# Patient Record
Sex: FEMALE | Race: WHITE | Employment: FULL TIME | URBAN - METROPOLITAN AREA
[De-identification: names, ages, dates, MRNs, and addresses within clinical notes are randomized per-mention and may not be internally consistent; named-entity substitution may affect disease eponyms.]

---

## 2021-04-12 ENCOUNTER — APPOINTMENT (OUTPATIENT)
Dept: CT IMAGING | Age: 51
End: 2021-04-12
Payer: COMMERCIAL

## 2021-04-12 ENCOUNTER — APPOINTMENT (OUTPATIENT)
Dept: GENERAL RADIOLOGY | Age: 51
End: 2021-04-12
Payer: COMMERCIAL

## 2021-04-12 ENCOUNTER — HOSPITAL ENCOUNTER (EMERGENCY)
Age: 51
Discharge: ANOTHER HOSPITAL | End: 2021-04-13
Attending: STUDENT IN AN ORGANIZED HEALTH CARE EDUCATION/TRAINING PROGRAM
Payer: COMMERCIAL

## 2021-04-12 DIAGNOSIS — I63.9 ACUTE CVA (CEREBROVASCULAR ACCIDENT) (HCC): Primary | ICD-10-CM

## 2021-04-12 LAB
GLUCOSE BLD-MCNC: 113 MG/DL (ref 70–99)
PERFORMED ON: ABNORMAL
REASON FOR REJECTION: NORMAL
REJECTED TEST: NORMAL

## 2021-04-12 PROCEDURE — 99285 EMERGENCY DEPT VISIT HI MDM: CPT

## 2021-04-12 PROCEDURE — 85025 COMPLETE CBC W/AUTO DIFF WBC: CPT

## 2021-04-12 PROCEDURE — 6360000004 HC RX CONTRAST MEDICATION: Performed by: STUDENT IN AN ORGANIZED HEALTH CARE EDUCATION/TRAINING PROGRAM

## 2021-04-12 PROCEDURE — 70450 CT HEAD/BRAIN W/O DYE: CPT

## 2021-04-12 PROCEDURE — 70496 CT ANGIOGRAPHY HEAD: CPT

## 2021-04-12 PROCEDURE — 71045 X-RAY EXAM CHEST 1 VIEW: CPT

## 2021-04-12 PROCEDURE — 36415 COLL VENOUS BLD VENIPUNCTURE: CPT

## 2021-04-12 PROCEDURE — 84484 ASSAY OF TROPONIN QUANT: CPT

## 2021-04-12 RX ADMIN — IOPAMIDOL 75 ML: 755 INJECTION, SOLUTION INTRAVENOUS at 23:19

## 2021-04-13 ENCOUNTER — ANESTHESIA (OUTPATIENT)
Dept: OPERATING ROOM | Age: 51
DRG: 023 | End: 2021-04-13
Payer: COMMERCIAL

## 2021-04-13 ENCOUNTER — HOSPITAL ENCOUNTER (INPATIENT)
Age: 51
LOS: 10 days | Discharge: NURSING, REHAB OR LONG-TERM CARE | DRG: 023 | End: 2021-04-23
Attending: INTERNAL MEDICINE | Admitting: INTERNAL MEDICINE
Payer: COMMERCIAL

## 2021-04-13 ENCOUNTER — APPOINTMENT (OUTPATIENT)
Dept: CT IMAGING | Age: 51
DRG: 023 | End: 2021-04-13
Attending: INTERNAL MEDICINE
Payer: COMMERCIAL

## 2021-04-13 ENCOUNTER — ANESTHESIA EVENT (OUTPATIENT)
Dept: OPERATING ROOM | Age: 51
DRG: 023 | End: 2021-04-13
Payer: COMMERCIAL

## 2021-04-13 VITALS
RESPIRATION RATE: 22 BRPM | TEMPERATURE: 98 F | OXYGEN SATURATION: 98 % | HEART RATE: 98 BPM | WEIGHT: 249.12 LBS | BODY MASS INDEX: 40.83 KG/M2 | SYSTOLIC BLOOD PRESSURE: 179 MMHG | DIASTOLIC BLOOD PRESSURE: 81 MMHG

## 2021-04-13 VITALS — SYSTOLIC BLOOD PRESSURE: 190 MMHG | DIASTOLIC BLOOD PRESSURE: 98 MMHG | OXYGEN SATURATION: 96 % | TEMPERATURE: 98.6 F

## 2021-04-13 DIAGNOSIS — Z98.890 STATUS POST CRANIECTOMY: ICD-10-CM

## 2021-04-13 DIAGNOSIS — I63.511 ACUTE RIGHT MCA STROKE (HCC): Primary | ICD-10-CM

## 2021-04-13 PROBLEM — I63.9 STROKE DETERMINED BY CLINICAL ASSESSMENT (HCC): Status: ACTIVE | Noted: 2021-04-13

## 2021-04-13 LAB
A/G RATIO: 1.1 (ref 1.1–2.2)
ABO/RH: NORMAL
ALBUMIN SERPL-MCNC: 4 G/DL (ref 3.4–5)
ALP BLD-CCNC: 86 U/L (ref 40–129)
ALT SERPL-CCNC: 32 U/L (ref 10–40)
ANION GAP SERPL CALCULATED.3IONS-SCNC: 15 MMOL/L (ref 3–16)
ANTIBODY SCREEN: NORMAL
AST SERPL-CCNC: 41 U/L (ref 15–37)
BANDED NEUTROPHILS RELATIVE PERCENT: 1 % (ref 0–7)
BASOPHILS ABSOLUTE: 0 K/UL (ref 0–0.2)
BASOPHILS RELATIVE PERCENT: 0 %
BILIRUB SERPL-MCNC: 0.7 MG/DL (ref 0–1)
BILIRUBIN URINE: NEGATIVE
BLOOD, URINE: ABNORMAL
BUN BLDV-MCNC: 10 MG/DL (ref 7–20)
BURR CELLS: ABNORMAL
CALCIUM SERPL-MCNC: 9.1 MG/DL (ref 8.3–10.6)
CHLORIDE BLD-SCNC: 102 MMOL/L (ref 99–110)
CLARITY: CLEAR
CO2: 19 MMOL/L (ref 21–32)
COLOR: YELLOW
CREAT SERPL-MCNC: 0.6 MG/DL (ref 0.6–1.1)
EKG ATRIAL RATE: 82 BPM
EKG DIAGNOSIS: NORMAL
EKG P AXIS: 67 DEGREES
EKG P-R INTERVAL: 134 MS
EKG Q-T INTERVAL: 390 MS
EKG QRS DURATION: 86 MS
EKG QTC CALCULATION (BAZETT): 455 MS
EKG R AXIS: 60 DEGREES
EKG T AXIS: 49 DEGREES
EKG VENTRICULAR RATE: 82 BPM
EOSINOPHILS ABSOLUTE: 0 K/UL (ref 0–0.6)
EOSINOPHILS RELATIVE PERCENT: 0 %
EPITHELIAL CELLS, UA: NORMAL /HPF (ref 0–5)
ETHANOL: NORMAL MG/DL (ref 0–0.08)
GFR AFRICAN AMERICAN: >60
GFR NON-AFRICAN AMERICAN: >60
GLOBULIN: 3.6 G/DL
GLUCOSE BLD-MCNC: 104 MG/DL (ref 70–99)
GLUCOSE URINE: NEGATIVE MG/DL
HCG QUALITATIVE: NEGATIVE
HCT VFR BLD CALC: 46.6 % (ref 36–48)
HEMOGLOBIN: 16 G/DL (ref 12–16)
HOWELL-JOLLY BODIES: ABNORMAL
INR BLD: 1.06 (ref 0.86–1.14)
KETONES, URINE: ABNORMAL MG/DL
LEUKOCYTE ESTERASE, URINE: NEGATIVE
LV EF: 55 %
LVEF MODALITY: NORMAL
LYMPHOCYTES ABSOLUTE: 2.6 K/UL (ref 1–5.1)
LYMPHOCYTES RELATIVE PERCENT: 12 %
MACROCYTES: ABNORMAL
MCH RBC QN AUTO: 35.7 PG (ref 26–34)
MCHC RBC AUTO-ENTMCNC: 34.4 G/DL (ref 31–36)
MCV RBC AUTO: 103.6 FL (ref 80–100)
MICROSCOPIC EXAMINATION: YES
MONOCYTES ABSOLUTE: 0.9 K/UL (ref 0–1.3)
MONOCYTES RELATIVE PERCENT: 4 %
NEUTROPHILS ABSOLUTE: 17.9 K/UL (ref 1.7–7.7)
NEUTROPHILS RELATIVE PERCENT: 83 %
NITRITE, URINE: NEGATIVE
PDW BLD-RTO: 14.2 % (ref 12.4–15.4)
PH UA: 5.5 (ref 5–8)
PLATELET # BLD: 282 K/UL (ref 135–450)
PMV BLD AUTO: 8.8 FL (ref 5–10.5)
POTASSIUM SERPL-SCNC: 3.8 MMOL/L (ref 3.5–5.1)
PROTEIN UA: NEGATIVE MG/DL
PROTHROMBIN TIME: 12.3 SEC (ref 10–13.2)
RBC # BLD: 4.5 M/UL (ref 4–5.2)
RBC UA: NORMAL /HPF (ref 0–4)
SODIUM BLD-SCNC: 136 MMOL/L (ref 136–145)
SPECIFIC GRAVITY UA: 1.02 (ref 1–1.03)
TOTAL CK: 466 U/L (ref 26–192)
TOTAL PROTEIN: 7.6 G/DL (ref 6.4–8.2)
TROPONIN: <0.01 NG/ML
URINE REFLEX TO CULTURE: ABNORMAL
URINE TYPE: ABNORMAL
UROBILINOGEN, URINE: 0.2 E.U./DL
WBC # BLD: 21.3 K/UL (ref 4–11)
WBC UA: NORMAL /HPF (ref 0–5)

## 2021-04-13 PROCEDURE — 83036 HEMOGLOBIN GLYCOSYLATED A1C: CPT

## 2021-04-13 PROCEDURE — 2580000003 HC RX 258: Performed by: NURSE PRACTITIONER

## 2021-04-13 PROCEDURE — 93005 ELECTROCARDIOGRAM TRACING: CPT | Performed by: STUDENT IN AN ORGANIZED HEALTH CARE EDUCATION/TRAINING PROGRAM

## 2021-04-13 PROCEDURE — 87075 CULTR BACTERIA EXCEPT BLOOD: CPT

## 2021-04-13 PROCEDURE — 86850 RBC ANTIBODY SCREEN: CPT

## 2021-04-13 PROCEDURE — C8929 TTE W OR WO FOL WCON,DOPPLER: HCPCS

## 2021-04-13 PROCEDURE — 93010 ELECTROCARDIOGRAM REPORT: CPT | Performed by: INTERNAL MEDICINE

## 2021-04-13 PROCEDURE — 87102 FUNGUS ISOLATION CULTURE: CPT

## 2021-04-13 PROCEDURE — 82077 ASSAY SPEC XCP UR&BREATH IA: CPT

## 2021-04-13 PROCEDURE — 6360000002 HC RX W HCPCS: Performed by: NEUROLOGICAL SURGERY

## 2021-04-13 PROCEDURE — 6370000000 HC RX 637 (ALT 250 FOR IP): Performed by: STUDENT IN AN ORGANIZED HEALTH CARE EDUCATION/TRAINING PROGRAM

## 2021-04-13 PROCEDURE — 82746 ASSAY OF FOLIC ACID SERUM: CPT

## 2021-04-13 PROCEDURE — 94761 N-INVAS EAR/PLS OXIMETRY MLT: CPT

## 2021-04-13 PROCEDURE — 82607 VITAMIN B-12: CPT

## 2021-04-13 PROCEDURE — 3700000001 HC ADD 15 MINUTES (ANESTHESIA): Performed by: NEUROLOGICAL SURGERY

## 2021-04-13 PROCEDURE — 2500000003 HC RX 250 WO HCPCS: Performed by: STUDENT IN AN ORGANIZED HEALTH CARE EDUCATION/TRAINING PROGRAM

## 2021-04-13 PROCEDURE — 2000000000 HC ICU R&B

## 2021-04-13 PROCEDURE — 86901 BLOOD TYPING SEROLOGIC RH(D): CPT

## 2021-04-13 PROCEDURE — 2700000000 HC OXYGEN THERAPY PER DAY

## 2021-04-13 PROCEDURE — 85610 PROTHROMBIN TIME: CPT

## 2021-04-13 PROCEDURE — 2580000003 HC RX 258: Performed by: NEUROLOGICAL SURGERY

## 2021-04-13 PROCEDURE — C1729 CATH, DRAINAGE: HCPCS | Performed by: NEUROLOGICAL SURGERY

## 2021-04-13 PROCEDURE — 81001 URINALYSIS AUTO W/SCOPE: CPT

## 2021-04-13 PROCEDURE — 87076 CULTURE ANAEROBE IDENT EACH: CPT

## 2021-04-13 PROCEDURE — 87070 CULTURE OTHR SPECIMN AEROBIC: CPT

## 2021-04-13 PROCEDURE — 36415 COLL VENOUS BLD VENIPUNCTURE: CPT

## 2021-04-13 PROCEDURE — 6360000002 HC RX W HCPCS: Performed by: NURSE ANESTHETIST, CERTIFIED REGISTERED

## 2021-04-13 PROCEDURE — 2580000003 HC RX 258: Performed by: NURSE ANESTHETIST, CERTIFIED REGISTERED

## 2021-04-13 PROCEDURE — C1763 CONN TISS, NON-HUMAN: HCPCS | Performed by: NEUROLOGICAL SURGERY

## 2021-04-13 PROCEDURE — 87116 MYCOBACTERIA CULTURE: CPT

## 2021-04-13 PROCEDURE — 2720000010 HC SURG SUPPLY STERILE: Performed by: NEUROLOGICAL SURGERY

## 2021-04-13 PROCEDURE — 87205 SMEAR GRAM STAIN: CPT

## 2021-04-13 PROCEDURE — 3700000000 HC ANESTHESIA ATTENDED CARE: Performed by: NEUROLOGICAL SURGERY

## 2021-04-13 PROCEDURE — 3600000004 HC SURGERY LEVEL 4 BASE: Performed by: NEUROLOGICAL SURGERY

## 2021-04-13 PROCEDURE — 82550 ASSAY OF CK (CPK): CPT

## 2021-04-13 PROCEDURE — 87015 SPECIMEN INFECT AGNT CONCNTJ: CPT

## 2021-04-13 PROCEDURE — 87206 SMEAR FLUORESCENT/ACID STAI: CPT

## 2021-04-13 PROCEDURE — 70450 CT HEAD/BRAIN W/O DYE: CPT

## 2021-04-13 PROCEDURE — 2500000003 HC RX 250 WO HCPCS: Performed by: NURSE ANESTHETIST, CERTIFIED REGISTERED

## 2021-04-13 PROCEDURE — 80061 LIPID PANEL: CPT

## 2021-04-13 PROCEDURE — 2709999900 HC NON-CHARGEABLE SUPPLY: Performed by: NEUROLOGICAL SURGERY

## 2021-04-13 PROCEDURE — 6370000000 HC RX 637 (ALT 250 FOR IP): Performed by: NEUROLOGICAL SURGERY

## 2021-04-13 PROCEDURE — 2780000010 HC IMPLANT OTHER: Performed by: NEUROLOGICAL SURGERY

## 2021-04-13 PROCEDURE — 2580000003 HC RX 258: Performed by: STUDENT IN AN ORGANIZED HEALTH CARE EDUCATION/TRAINING PROGRAM

## 2021-04-13 PROCEDURE — 92610 EVALUATE SWALLOWING FUNCTION: CPT

## 2021-04-13 PROCEDURE — 3600000014 HC SURGERY LEVEL 4 ADDTL 15MIN: Performed by: NEUROLOGICAL SURGERY

## 2021-04-13 PROCEDURE — 84703 CHORIONIC GONADOTROPIN ASSAY: CPT

## 2021-04-13 PROCEDURE — 92526 ORAL FUNCTION THERAPY: CPT

## 2021-04-13 PROCEDURE — 80053 COMPREHEN METABOLIC PANEL: CPT

## 2021-04-13 PROCEDURE — 2500000003 HC RX 250 WO HCPCS: Performed by: NEUROLOGICAL SURGERY

## 2021-04-13 PROCEDURE — 86900 BLOOD TYPING SEROLOGIC ABO: CPT

## 2021-04-13 PROCEDURE — 6360000002 HC RX W HCPCS: Performed by: NURSE PRACTITIONER

## 2021-04-13 PROCEDURE — 00N00ZZ RELEASE BRAIN, OPEN APPROACH: ICD-10-PCS | Performed by: NEUROLOGICAL SURGERY

## 2021-04-13 DEVICE — DURA 62110 SUBSTITUTE DUREPAIR 4X5IN NCE
Type: IMPLANTABLE DEVICE | Site: CRANIAL | Status: FUNCTIONAL
Brand: DUREPAIR®

## 2021-04-13 DEVICE — PATCH DURA W10XL16CM BOV PERICARD DURA-GUARD: Type: IMPLANTABLE DEVICE | Site: CRANIAL | Status: FUNCTIONAL

## 2021-04-13 RX ORDER — ATORVASTATIN CALCIUM 40 MG/1
40 TABLET, FILM COATED ORAL NIGHTLY
Status: DISCONTINUED | OUTPATIENT
Start: 2021-04-13 | End: 2021-04-21

## 2021-04-13 RX ORDER — SODIUM CHLORIDE 9 MG/ML
INJECTION, SOLUTION INTRAVENOUS CONTINUOUS PRN
Status: DISCONTINUED | OUTPATIENT
Start: 2021-04-13 | End: 2021-04-13 | Stop reason: SDUPTHER

## 2021-04-13 RX ORDER — MULTIVITAMIN WITH IRON
1 TABLET ORAL DAILY
Status: DISCONTINUED | OUTPATIENT
Start: 2021-04-13 | End: 2021-04-23 | Stop reason: HOSPADM

## 2021-04-13 RX ORDER — ACETAMINOPHEN AND CODEINE PHOSPHATE 120; 12 MG/5ML; MG/5ML
1 SOLUTION ORAL DAILY
Status: ON HOLD | COMMUNITY
End: 2021-04-22 | Stop reason: HOSPADM

## 2021-04-13 RX ORDER — LIDOCAINE HYDROCHLORIDE 20 MG/ML
INJECTION, SOLUTION INTRAVENOUS PRN
Status: DISCONTINUED | OUTPATIENT
Start: 2021-04-13 | End: 2021-04-13 | Stop reason: SDUPTHER

## 2021-04-13 RX ORDER — SUCCINYLCHOLINE CHLORIDE 20 MG/ML
INJECTION INTRAMUSCULAR; INTRAVENOUS PRN
Status: DISCONTINUED | OUTPATIENT
Start: 2021-04-13 | End: 2021-04-13 | Stop reason: SDUPTHER

## 2021-04-13 RX ORDER — SODIUM CHLORIDE 9 MG/ML
INJECTION, SOLUTION INTRAVENOUS CONTINUOUS
Status: DISCONTINUED | OUTPATIENT
Start: 2021-04-13 | End: 2021-04-14

## 2021-04-13 RX ORDER — LORAZEPAM 0.5 MG/1
0.5 TABLET ORAL EVERY 8 HOURS PRN
Status: ON HOLD | COMMUNITY
End: 2021-06-23 | Stop reason: HOSPADM

## 2021-04-13 RX ORDER — FENTANYL CITRATE 50 UG/ML
INJECTION, SOLUTION INTRAMUSCULAR; INTRAVENOUS PRN
Status: DISCONTINUED | OUTPATIENT
Start: 2021-04-13 | End: 2021-04-13 | Stop reason: SDUPTHER

## 2021-04-13 RX ORDER — LIDOCAINE HYDROCHLORIDE AND EPINEPHRINE 10; 10 MG/ML; UG/ML
INJECTION, SOLUTION INFILTRATION; PERINEURAL PRN
Status: DISCONTINUED | OUTPATIENT
Start: 2021-04-13 | End: 2021-04-13 | Stop reason: HOSPADM

## 2021-04-13 RX ORDER — MANNITOL 20 G/100ML
INJECTION, SOLUTION INTRAVENOUS PRN
Status: DISCONTINUED | OUTPATIENT
Start: 2021-04-13 | End: 2021-04-13 | Stop reason: SDUPTHER

## 2021-04-13 RX ORDER — SODIUM CHLORIDE 9 MG/ML
25 INJECTION, SOLUTION INTRAVENOUS PRN
Status: DISCONTINUED | OUTPATIENT
Start: 2021-04-13 | End: 2021-04-14 | Stop reason: SDUPTHER

## 2021-04-13 RX ORDER — SODIUM CHLORIDE 0.9 % (FLUSH) 0.9 %
5-40 SYRINGE (ML) INJECTION PRN
Status: DISCONTINUED | OUTPATIENT
Start: 2021-04-13 | End: 2021-04-23 | Stop reason: HOSPADM

## 2021-04-13 RX ORDER — POLYETHYLENE GLYCOL 3350 17 G/17G
17 POWDER, FOR SOLUTION ORAL DAILY PRN
Status: DISCONTINUED | OUTPATIENT
Start: 2021-04-13 | End: 2021-04-14

## 2021-04-13 RX ORDER — LABETALOL 20 MG/4 ML (5 MG/ML) INTRAVENOUS SYRINGE
10 EVERY 4 HOURS PRN
Status: DISCONTINUED | OUTPATIENT
Start: 2021-04-13 | End: 2021-04-17

## 2021-04-13 RX ORDER — DEXAMETHASONE SODIUM PHOSPHATE 4 MG/ML
INJECTION, SOLUTION INTRA-ARTICULAR; INTRALESIONAL; INTRAMUSCULAR; INTRAVENOUS; SOFT TISSUE PRN
Status: DISCONTINUED | OUTPATIENT
Start: 2021-04-13 | End: 2021-04-13 | Stop reason: SDUPTHER

## 2021-04-13 RX ORDER — PROPOFOL 10 MG/ML
INJECTION, EMULSION INTRAVENOUS PRN
Status: DISCONTINUED | OUTPATIENT
Start: 2021-04-13 | End: 2021-04-13 | Stop reason: SDUPTHER

## 2021-04-13 RX ORDER — FLUOXETINE HYDROCHLORIDE 20 MG/1
20 CAPSULE ORAL DAILY
COMMUNITY

## 2021-04-13 RX ORDER — PROMETHAZINE HYDROCHLORIDE 25 MG/1
12.5 TABLET ORAL EVERY 6 HOURS PRN
Status: DISCONTINUED | OUTPATIENT
Start: 2021-04-13 | End: 2021-04-23 | Stop reason: HOSPADM

## 2021-04-13 RX ORDER — SODIUM CHLORIDE 9 MG/ML
INJECTION, SOLUTION INTRAVENOUS CONTINUOUS
Status: DISCONTINUED | OUTPATIENT
Start: 2021-04-13 | End: 2021-04-15

## 2021-04-13 RX ORDER — LEVETIRACETAM 5 MG/ML
500 INJECTION INTRAVASCULAR ONCE
Status: COMPLETED | OUTPATIENT
Start: 2021-04-13 | End: 2021-04-13

## 2021-04-13 RX ORDER — ONDANSETRON 2 MG/ML
4 INJECTION INTRAMUSCULAR; INTRAVENOUS EVERY 6 HOURS PRN
Status: DISCONTINUED | OUTPATIENT
Start: 2021-04-13 | End: 2021-04-23 | Stop reason: HOSPADM

## 2021-04-13 RX ORDER — HYDRALAZINE HYDROCHLORIDE 20 MG/ML
5 INJECTION INTRAMUSCULAR; INTRAVENOUS EVERY 6 HOURS PRN
Status: DISCONTINUED | OUTPATIENT
Start: 2021-04-13 | End: 2021-04-17

## 2021-04-13 RX ORDER — MIDAZOLAM HYDROCHLORIDE 1 MG/ML
INJECTION INTRAMUSCULAR; INTRAVENOUS PRN
Status: DISCONTINUED | OUTPATIENT
Start: 2021-04-13 | End: 2021-04-13 | Stop reason: SDUPTHER

## 2021-04-13 RX ORDER — ROCURONIUM BROMIDE 10 MG/ML
INJECTION, SOLUTION INTRAVENOUS PRN
Status: DISCONTINUED | OUTPATIENT
Start: 2021-04-13 | End: 2021-04-13 | Stop reason: SDUPTHER

## 2021-04-13 RX ORDER — LANOLIN ALCOHOL/MO/W.PET/CERES
100 CREAM (GRAM) TOPICAL DAILY
Status: DISCONTINUED | OUTPATIENT
Start: 2021-04-13 | End: 2021-04-23 | Stop reason: HOSPADM

## 2021-04-13 RX ORDER — METOPROLOL TARTRATE 5 MG/5ML
INJECTION INTRAVENOUS PRN
Status: DISCONTINUED | OUTPATIENT
Start: 2021-04-13 | End: 2021-04-13 | Stop reason: SDUPTHER

## 2021-04-13 RX ORDER — NICOTINE 21 MG/24HR
1 PATCH, TRANSDERMAL 24 HOURS TRANSDERMAL DAILY PRN
Status: DISCONTINUED | OUTPATIENT
Start: 2021-04-13 | End: 2021-04-23 | Stop reason: HOSPADM

## 2021-04-13 RX ORDER — FAMOTIDINE 40 MG/1
40 TABLET, FILM COATED ORAL DAILY
Status: ON HOLD | COMMUNITY
End: 2021-04-22 | Stop reason: HOSPADM

## 2021-04-13 RX ORDER — SODIUM CHLORIDE 0.9 % (FLUSH) 0.9 %
5-40 SYRINGE (ML) INJECTION EVERY 12 HOURS SCHEDULED
Status: DISCONTINUED | OUTPATIENT
Start: 2021-04-13 | End: 2021-04-23 | Stop reason: HOSPADM

## 2021-04-13 RX ORDER — ACETAMINOPHEN 650 MG/1
650 SUPPOSITORY RECTAL EVERY 6 HOURS PRN
Status: DISCONTINUED | OUTPATIENT
Start: 2021-04-13 | End: 2021-04-14

## 2021-04-13 RX ORDER — ACETAMINOPHEN 325 MG/1
650 TABLET ORAL EVERY 6 HOURS PRN
Status: DISCONTINUED | OUTPATIENT
Start: 2021-04-13 | End: 2021-04-14

## 2021-04-13 RX ORDER — SODIUM CHLORIDE 9 MG/ML
25 INJECTION, SOLUTION INTRAVENOUS PRN
Status: DISCONTINUED | OUTPATIENT
Start: 2021-04-13 | End: 2021-04-23 | Stop reason: HOSPADM

## 2021-04-13 RX ADMIN — DEXAMETHASONE SODIUM PHOSPHATE 10 MG: 4 INJECTION, SOLUTION INTRAMUSCULAR; INTRAVENOUS at 14:41

## 2021-04-13 RX ADMIN — FENTANYL CITRATE 100 MCG: 50 INJECTION, SOLUTION INTRAMUSCULAR; INTRAVENOUS at 14:12

## 2021-04-13 RX ADMIN — SODIUM CHLORIDE: 9 INJECTION, SOLUTION INTRAVENOUS at 13:30

## 2021-04-13 RX ADMIN — METOPROLOL TARTRATE 5 MG: 5 INJECTION INTRAVENOUS at 16:05

## 2021-04-13 RX ADMIN — SODIUM CHLORIDE: 9 INJECTION, SOLUTION INTRAVENOUS at 13:56

## 2021-04-13 RX ADMIN — CEFAZOLIN SODIUM 2000 MG: 1 INJECTION, POWDER, FOR SOLUTION INTRAMUSCULAR; INTRAVENOUS at 14:32

## 2021-04-13 RX ADMIN — ACETAMINOPHEN 650 MG: 325 TABLET ORAL at 20:08

## 2021-04-13 RX ADMIN — LIDOCAINE HYDROCHLORIDE 100 MG: 20 INJECTION, SOLUTION INTRAVENOUS at 14:46

## 2021-04-13 RX ADMIN — LIDOCAINE HYDROCHLORIDE 100 MG: 20 INJECTION, SOLUTION INTRAVENOUS at 14:23

## 2021-04-13 RX ADMIN — ATORVASTATIN CALCIUM 40 MG: 40 TABLET, FILM COATED ORAL at 20:08

## 2021-04-13 RX ADMIN — Medication 10 ML: at 11:16

## 2021-04-13 RX ADMIN — LABETALOL HYDROCHLORIDE 10 MG: 5 INJECTION, SOLUTION INTRAVENOUS at 22:06

## 2021-04-13 RX ADMIN — PROPOFOL 200 MG: 10 INJECTION, EMULSION INTRAVENOUS at 14:23

## 2021-04-13 RX ADMIN — Medication 10 ML: at 20:09

## 2021-04-13 RX ADMIN — MIDAZOLAM HYDROCHLORIDE 2 MG: 2 INJECTION, SOLUTION INTRAMUSCULAR; INTRAVENOUS at 14:03

## 2021-04-13 RX ADMIN — PROPOFOL 100 MG: 10 INJECTION, EMULSION INTRAVENOUS at 14:40

## 2021-04-13 RX ADMIN — LABETALOL HYDROCHLORIDE 10 MG: 5 INJECTION, SOLUTION INTRAVENOUS at 11:15

## 2021-04-13 RX ADMIN — SODIUM CHLORIDE: 9 INJECTION, SOLUTION INTRAVENOUS at 14:26

## 2021-04-13 RX ADMIN — FAMOTIDINE 20 MG: 10 INJECTION, SOLUTION INTRAVENOUS at 11:15

## 2021-04-13 RX ADMIN — LEVETIRACETAM 500 MG: 5 INJECTION INTRAVENOUS at 14:40

## 2021-04-13 RX ADMIN — SODIUM CHLORIDE: 9 INJECTION, SOLUTION INTRAVENOUS at 18:35

## 2021-04-13 RX ADMIN — MANNITOL 75 G: 20 INJECTION, SOLUTION INTRAVENOUS at 15:15

## 2021-04-13 RX ADMIN — FAMOTIDINE 20 MG: 10 INJECTION, SOLUTION INTRAVENOUS at 20:11

## 2021-04-13 RX ADMIN — SODIUM CHLORIDE 25 ML: 9 INJECTION, SOLUTION INTRAVENOUS at 11:16

## 2021-04-13 RX ADMIN — SUCCINYLCHOLINE CHLORIDE 120 MG: 20 INJECTION, SOLUTION INTRAMUSCULAR; INTRAVENOUS; PARENTERAL at 14:23

## 2021-04-13 RX ADMIN — Medication 10 ML: at 20:30

## 2021-04-13 RX ADMIN — ROCURONIUM BROMIDE 50 MG: 10 INJECTION INTRAVENOUS at 14:29

## 2021-04-13 ASSESSMENT — ENCOUNTER SYMPTOMS
COUGH: 0
EYE PAIN: 0
COLOR CHANGE: 0
SHORTNESS OF BREATH: 0
SINUS PAIN: 0
ABDOMINAL PAIN: 0
DIARRHEA: 0
BACK PAIN: 0
CONSTIPATION: 0

## 2021-04-13 ASSESSMENT — PAIN DESCRIPTION - ORIENTATION
ORIENTATION: RIGHT
ORIENTATION: RIGHT;LEFT

## 2021-04-13 ASSESSMENT — PULMONARY FUNCTION TESTS
PIF_VALUE: 18
PIF_VALUE: 31
PIF_VALUE: 36
PIF_VALUE: 15
PIF_VALUE: 36
PIF_VALUE: 18
PIF_VALUE: 13
PIF_VALUE: 3
PIF_VALUE: 16
PIF_VALUE: 36
PIF_VALUE: 31
PIF_VALUE: 18
PIF_VALUE: 31
PIF_VALUE: 36
PIF_VALUE: 4
PIF_VALUE: 31
PIF_VALUE: 30
PIF_VALUE: 18
PIF_VALUE: 18
PIF_VALUE: 28
PIF_VALUE: 18
PIF_VALUE: 36
PIF_VALUE: 18
PIF_VALUE: 11
PIF_VALUE: 18
PIF_VALUE: 31
PIF_VALUE: 36
PIF_VALUE: 18
PIF_VALUE: 0
PIF_VALUE: 30
PIF_VALUE: 15
PIF_VALUE: 31
PIF_VALUE: 19
PIF_VALUE: 18
PIF_VALUE: 31
PIF_VALUE: 36
PIF_VALUE: 16
PIF_VALUE: 31
PIF_VALUE: 37
PIF_VALUE: 0
PIF_VALUE: 31
PIF_VALUE: 1
PIF_VALUE: 31
PIF_VALUE: 17
PIF_VALUE: 2
PIF_VALUE: 31
PIF_VALUE: 36
PIF_VALUE: 31
PIF_VALUE: 18
PIF_VALUE: 36
PIF_VALUE: 18
PIF_VALUE: 8
PIF_VALUE: 36
PIF_VALUE: 31
PIF_VALUE: 31
PIF_VALUE: 18
PIF_VALUE: 18
PIF_VALUE: 31
PIF_VALUE: 7
PIF_VALUE: 18
PIF_VALUE: 31
PIF_VALUE: 31
PIF_VALUE: 21
PIF_VALUE: 2
PIF_VALUE: 18

## 2021-04-13 ASSESSMENT — PAIN DESCRIPTION - LOCATION
LOCATION: HEAD
LOCATION: HEAD

## 2021-04-13 ASSESSMENT — PAIN SCALES - GENERAL
PAINLEVEL_OUTOF10: 0
PAINLEVEL_OUTOF10: 9
PAINLEVEL_OUTOF10: 9
PAINLEVEL_OUTOF10: 0
PAINLEVEL_OUTOF10: 0
PAINLEVEL_OUTOF10: 9

## 2021-04-13 ASSESSMENT — PAIN DESCRIPTION - DIRECTION: RADIATING_TOWARDS: LEFT

## 2021-04-13 ASSESSMENT — PAIN - FUNCTIONAL ASSESSMENT
PAIN_FUNCTIONAL_ASSESSMENT: PREVENTS OR INTERFERES SOME ACTIVE ACTIVITIES AND ADLS
PAIN_FUNCTIONAL_ASSESSMENT: ACTIVITIES ARE NOT PREVENTED

## 2021-04-13 ASSESSMENT — PAIN DESCRIPTION - FREQUENCY
FREQUENCY: CONTINUOUS
FREQUENCY: CONTINUOUS

## 2021-04-13 ASSESSMENT — PAIN DESCRIPTION - ONSET
ONSET: ON-GOING
ONSET: ON-GOING

## 2021-04-13 ASSESSMENT — PAIN DESCRIPTION - PROGRESSION
CLINICAL_PROGRESSION: NOT CHANGED
CLINICAL_PROGRESSION: NOT CHANGED

## 2021-04-13 ASSESSMENT — PAIN DESCRIPTION - PAIN TYPE
TYPE: ACUTE PAIN
TYPE: SURGICAL PAIN

## 2021-04-13 ASSESSMENT — LIFESTYLE VARIABLES: SMOKING_STATUS: 1

## 2021-04-13 NOTE — ED NOTES
Patient incontinent of large amount of urine, linens removed, soiled clothing removed  purwick in place     Jalen Valente RN  04/13/21 9479

## 2021-04-13 NOTE — H&P
ICU History and Physical  PGY-1    PCP: Chuyita Castillo MD    Code:Full Code  Admit Date: 2021  Vent Settings:    / / /   Diet: Diet NPO Effective Now Exceptions are: Ice Chips    History of present illness:      CC: Left-sided weakness    47 YO F PMH GERD, depression/anxiety presented to [facility] ED via EMS for new onset left sided weakness. Pt states that she fell out of her bed last night and was down on the ground all night until this AM.  Patient endorses she did not let her  call EMS because she felt like she could improve. Patient claims EMS was finally called after her coworker started calling her this morning. Pts GCS was 15 upon arrival @ ED with L sided facial droop, L sided weakness & R sided gaze deviation. Stroke alert was called. In the ED she was hemodynamically stable with /81. Labs significant for , troponin < 0.01, WBC 21.3, . 6. CXR with no acute cardiopulmonary abnormality. CTH revealed large acute/subacute infarct in the R frontal lobe with associated mass effect and 4 mm R to L midline shift. It also noted subdural hemorrhage of 6mm along falx, although NSGY less convinced. CTA head and neck revealed occluded R cervical ICA & R anterior cerebral artery with near complete occlusion of R MCA. Per Stroke team pt was found to be outside the window, so no intervention will be done. Patient denies fevers, chills, nausea, vomiting, chest pain, shortness of breath, diarrhea, constipation, dysuria, urinary frequency or urgency. Neurosurgery was consulted & recommended medical treatment. Pt transfered to [facility] for q1h neuro checks.      Past Medical / Surgical History:    Past Medical History:   Diagnosis Date    GERD (gastroesophageal reflux disease)     Hernia     Obesity     Unspecified sleep apnea     CPAP     Past Surgical History:   Procedure Laterality Date     SECTION  2004   Annika Paty HERNIA REPAIR      LAP BAND  2006    SPLENECTOMY  2000    TOTAL HIP ARTHROPLASTY  2001       Medications Prior to Admission:    No current facility-administered medications on file prior to encounter. Current Outpatient Medications on File Prior to Encounter   Medication Sig Dispense Refill    Multiple Vitamin (MULTIVITAMIN PO) Take  by mouth.  omeprazole (PRILOSEC) 40 MG capsule Take 1 capsule by mouth daily. 30 capsule 0       Allergies:  Patient has no known allergies. Social History:   TOBACCO: Patient has been smoking 1 pack/day for the past 15 years. Patient is still a current smoker      ETOH: Patient drinks 2 alcoholic seltzers per day  Patient currently lives  and son    Family History:       Problem Relation Age of Onset   Paola Cervantes Cancer Mother     Depression Mother     Mental Illness Mother     Stroke Father     Cancer Father         prostate       Vital/I&O/Physical examination:   VS:  BP (!) 156/84   Pulse 81   Temp 98.6 °F (37 °C) (Oral)   Resp 24   Ht 5' 6\" (1.676 m)   Wt 216 lb 4.3 oz (98.1 kg)   SpO2 98%   BMI 34.91 kg/m²     I/O:  No intake or output data in the 24 hours ending 04/13/21 1439    PE:  Physical Exam  HENT:      Head: Normocephalic and atraumatic. Eyes:      General: No scleral icterus. Right eye: No discharge. Left eye: No discharge. Extraocular Movements: Extraocular movements intact. Pupils: Pupils are equal, round, and reactive to light. Cardiovascular:      Rate and Rhythm: Normal rate and regular rhythm. Pulses: Normal pulses. Heart sounds: Normal heart sounds. No murmur. No friction rub. No gallop. Pulmonary:      Effort: Pulmonary effort is normal. No respiratory distress. Breath sounds: Normal breath sounds. No wheezing or rales. Abdominal:      General: Bowel sounds are normal. There is no distension. Palpations: Abdomen is soft. Tenderness: There is no abdominal tenderness. There is no guarding. Musculoskeletal:      Right lower leg: Edema present. Left lower leg: Edema present. Comments: Pedal edema present   Neurological:      Mental Status: She is alert and oriented to person, place, and time. Sensory: Sensory deficit present. Comments: Left-sided upper extremity weakness and left-sided lower extremity weakness  Patient has rightward gaze preference  No sensation on the left upper and left lower extremity  Sensation of the upper forehead on the left side intact   Psychiatric:         Behavior: Behavior normal.       ROS: Review of Systems   - Negative except HPI    Labs & Imaging:   LABS:  CBC:   Recent Labs     04/12/21 2315   WBC 21.3*   HGB 16.0   HCT 46.6      .6*                            Renal:   Recent Labs     04/13/21  0010      K 3.8      CO2 19*   BUN 10   CREATININE 0.6   GLUCOSE 104*   ANIONGAP 15     Hepatic:   Recent Labs     04/13/21  0010   AST 41*   ALT 32   BILITOT 0.7   ALKPHOS 86     Troponin:   Recent Labs     04/12/21 2315   TROPONINI <0.01     BNP: No results for input(s): BNP in the last 72 hours. Lipids: No results for input(s): CHOL, HDL in the last 72 hours. Invalid input(s): LDLCALCU, TRIGLYCERIDE  INR:   Recent Labs     04/13/21  0010   INR 1.06     Lactate: No results for input(s): LACTATE in the last 72 hours. ABGs:No results for input(s): PHART, GRE5BDV, PO2ART, AEB8GSV, BEART, THGBART, S5LRGUIU, TCM1EKQ in the last 72 hours. UA:No results for input(s): NITRITE, COLORU, PHUR, LABCAST, WBCUA, RBCUA, MUCUS, TRICHOMONAS, YEAST, BACTERIA, CLARITYU, SPECGRAV, LEUKOCYTESUR, UROBILINOGEN, BILIRUBINUR, BLOODU, GLUCOSEU, AMORPHOUS in the last 72 hours. Invalid input(s): Gerardine Sven     IMAGING:  CT head without contrast   Final Result   1. Suspect small parafalcine subdural hematoma in the left frontoparietal region measuring up to 4 mm in thickness.    2. Large zones of cytotoxic edema throughout the right cerebral hemisphere as described, within distribution of both the right anterior and right middle cerebral arteries. Hyperdense right MCA. Imaging findings are suspicious for occlusion of the right    internal carotid artery given infarct distribution within right anterior middle cerebral circulation. No intra-axial hemorrhage. 5 mm of right-to-left midline shift of the septum pellucidum. MRI brain without contrast    (Results Pending)       Assessment    Nell Tanner is a 46 y.o. female with PMH GERD p/w L sided weakness and facial droop. Neuro/Psych  #Acute ischemic CVA   CTH with large acute/subacute infarct in the R frontal lobe with 4 mm R to L midline shift & subdural hemorrhage of 6mm along falx. CTA showing complete occlusion R cervical ICA, R LEAH with near complete occlusion R MCA. Per stroke team no surgical intervention as outside window.  -Repeat CT head with large zones of cytotoxic edema throughout the right cerebral hemisphere as described, within distribution of both the right anterior and right middle cerebral arteries. Hyperdense right MCA. Imaging findings are suspicious for occlusion of the right internal carotid artery given infarct distribution within right anterior middle cerebral circulation. No intra-axial hemorrhage. 5 mm of right-to-left midline shift of the septum pellucidum.  -Neurosurgery consulted, appreciate recs: Right hemicraniectomy today  -Neurology consulted, appreciate recs: Neurochecks q1h. Seizure precautions. HOB at 30 degrees. Contact neurosugery for change in physical exam. F/U MRI-brain. #Subdural hemorrhage along falx   CTH subdural hemorrhage of 6mm along falx    #Depression  -Patient takes fluoxetine 20 mg daily at home    #Anxiety   -Patient takes Ativan 0.5 mg every 8 hours as needed for anxiety    Respiratory  #MISHA  -Patient uses CPAP at home    Gastroenterology  #GERD  -Patient takes omeprazole 40 mg daily at home    Endocrinology  #Obesity  Complicating assessment and treatment.   Patient's BMI is 34.91 kg/m² placing patient at risk for multiple co-morbidities as well as early death and contributing to the patient's presentation.      Infectious disease  #Leucocytosis  Likely reactive to current stress of acute stroke  -WBC 21.3 with ANC 17.9    Code Status: Full Code  FEN: Diet NPO Effective Now Exceptions are: Ice Chips  PPX: SCDs   DISPO: ICU    Plan     -Repeat CT head with worsening vasogenic edema, right decompressive hemicraniectomy  -MRI pending  -Lipid panel pending  -Hemoglobin A1c pending  -Every hour neurochecks  -SLP  -PT/OT  -Keep systolic blood TFIHYMKU<076  -Continue to monitor WBC count  -Continue CPAP while inpatient    This patient will be discussed with attending, Marquis Barakat MD.    Carina Gunter MD, PGY- 1  Contact via [email or web address]  4/13/2021,  2:39 PM 01-Jun-2019 14:36:56

## 2021-04-13 NOTE — ED NOTES
Patient safely transferred from ER bed to transport stretcher and taken to exit     [facility] Brigette García, RN  04/13/21 5531

## 2021-04-13 NOTE — OP NOTE
Operative Note      Patient: Dave Monge  YOB: 1970  MRN: [id number]    Date of Procedure: 4/13/2021    Pre-Op Diagnosis: RIGHT MCA STROKE, VASOGENIC EDEMA    Post-Op Diagnosis: Same       Procedure(s):  RIGHT HEMICRANIECTOMY    Surgeon(s):  Hannah Lancaster MD    Assistant:   * No surgical staff found *    Anesthesia: General    Estimated Blood Loss (mL): 206     Complications: None    Specimens:   * No specimens in log *   Skull flap (to freezer)    Implants:  * No implants in log *   Durarepair and Duraguard      Drains:   External Urinary Catheter (Active)   Catheter changed  Yes 04/13/21 0800   Urine Color Amee 04/13/21 0800   Urine Appearance Clear 04/13/21 0800   Suction- Female Only 40 mmgHg continuous 04/13/21 0800   Placement Initiated 04/13/21 0800   Skin Assessment No Injury 04/13/21 0800       Findings: malignant cerebral edema    Detailed Description of Procedure: The patient was brought into the operating room already intubated then was induced   under general anesthesia. The patient was placed in the supine position with the head turned to   the opposite side and fixed in the [facility]. Care was taken to ensure that all   pressure points are carefully padded and that the limbs are positioned comfortably. The   head was then shaved and marked for a large question agapito incision on the right. The   site was then prepped and draped in the usual sterile fashion. The skin of the scalp was infiltrated with 1% lidocaine with epinephrine and then incised   full thickness with a #10 blade. Hemostasis was maintained with Margret clips. The scalp   was elevated along with the temporalis extending down to the lateral orbital rim and the zygoma. The temporalis   muscle was then elevated from the periosteum at its base.  Lidia Garb holes were then placed in the frontal keyhole position in   the temporal bone and the posterior parietal bone and the underlying dura stripped free   from the inner table with blunt dissection. The craniotomy was then turned with a B1   foot-plated drill bit with thinning of the bone over the sphenoid wing and the bone rotated out of place as a single piece. At this point we turned our attention to further decompression. The residual bone over   the sphenoid wing was punched out with an Leksell rongeur and then dressed with bone wax for hemostasis. The dura was then opened in cruciate fashion with a Anglican scissors. Brain was noted to herniate partially through the craniectomy defect but remained pulsatile. The entire brain surface was then covered with Durarepair. During this maneuver, vigorous venous bleeding was encountered under the bone edge near the vertex that was controlled with FloSeal and Oxycel. The native dural leaflets were laid down loosely over the top of this then covered with Dura-Guard. The site was then irrigated with copious amounts of antibiotic irrigation. A 7 mm J-P drain was passed through a separate stab incision and cut to length. The   galea was then closed with 2-0 Vicryl sutures and the skin closed with skin staples. The   wound was then cleaned and dressed with PSO, Telfa dressing, sponge and paper tape. The patient was then allowed to awaken from anesthesia after removing the Shannon   crown-of-thorns but was left intubated, then was taken to the NSICU in stable condition. No complications were encountered.       Electronically signed by Rosita Kaur MD on 4/13/2021 at 2:14 PM
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
## 2021-04-13 NOTE — ED NOTES
Bed: [facility]  Expected date:   Expected time:   Means of arrival: [facility]  Comments:  nikko Olivo RN  04/12/21 7948
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
## 2021-04-13 NOTE — ANESTHESIA POSTPROCEDURE EVALUATION
Department of Anesthesiology  Postprocedure Note    Patient: Rosario Urbina  MRN: [id number]  YOB: 1970  Date of evaluation: 4/13/2021  Time:  4:49 PM     Procedure Summary     Date: 04/13/21 Room / Location: [facility]    Anesthesia Start: 3836 Anesthesia Stop: 1663    Procedure: RIGHT HEMICRANIECTOMY (Right ) Diagnosis: (RIGHT MCA STROKE, VASOGENIC EDEMA)    Surgeons: Adam Dillon MD Responsible Provider: Tee Allen MD    Anesthesia Type: general ASA Status: 3 - Emergent          Anesthesia Type: general    Gerardo Phase I:      Gerardo Phase II:      Last vitals: Reviewed and per EMR flowsheets.        Anesthesia Post Evaluation    Patient location during evaluation: bedside  Patient participation: complete - patient participated  Level of consciousness: awake  Pain score: 0  Airway patency: patent  Nausea & Vomiting: no nausea and no vomiting  Complications: no  Cardiovascular status: hemodynamically stable  Respiratory status: acceptable  Hydration status: euvolemic

## 2021-04-13 NOTE — PLAN OF CARE
Problem: HEMODYNAMIC STATUS  Goal: Patient has stable vital signs and fluid balance  Outcome: Ongoing     Problem: ACTIVITY INTOLERANCE/IMPAIRED MOBILITY  Goal: Mobility/activity is maintained at optimum level for patient  Outcome: Ongoing     Problem: COMMUNICATION IMPAIRMENT  Goal: Ability to express needs and understand communication  Outcome: Ongoing     Problem: Skin Integrity:  Goal: Will show no infection signs and symptoms  Description: Will show no infection signs and symptoms  Outcome: Ongoing  Goal: Absence of new skin breakdown  Description: Absence of new skin breakdown  Outcome: Ongoing     Problem: Falls - Risk of:  Goal: Will remain free from falls  Description: Will remain free from falls  Outcome: Ongoing  Goal: Absence of physical injury  Description: Absence of physical injury  Outcome: Ongoing     Problem: Discharge Planning:  Goal: Participates in care planning  Description: Participates in care planning  Outcome: Ongoing  Goal: Discharged to appropriate level of care  Description: Discharged to appropriate level of care  Outcome: Ongoing     Problem: Nutrition Deficit:  Goal: Ability to achieve adequate nutritional intake will improve  Description: Ability to achieve adequate nutritional intake will improve  Outcome: Ongoing     Problem: Pain:  Description: Pain management should include both nonpharmacologic and pharmacologic interventions.   Goal: Pain level will decrease  Description: Pain level will decrease  Outcome: Ongoing  Goal: Recognizes and communicates pain  Description: Recognizes and communicates pain  Outcome: Ongoing  Goal: Control of acute pain  Description: Control of acute pain  Outcome: Ongoing  Goal: Control of chronic pain  Description: Control of chronic pain  Outcome: Ongoing     Problem: Skin Integrity - Impaired:  Goal: Will show no infection signs and symptoms  Description: Will show no infection signs and symptoms  Outcome: Ongoing  Goal: Absence of new skin breakdown  Description: Absence of new skin breakdown  Outcome: Ongoing

## 2021-04-13 NOTE — PROGRESS NOTES
Patient received into [phone] via EMS from [facility]. Patient lethargic, answers appropriately. Contusion to left forearm noted. Bathed, Purewick placed.  Four eyes assessment completed with Felisha Childers RN

## 2021-04-13 NOTE — ANESTHESIA PRE PROCEDURE
Shahrzad Dumont MD        nicotine (NICODERM CQ) 21 MG/24HR 1 patch  1 patch Transdermal Daily PRN Bolling Harada, MD        atorvastatin (LIPITOR) tablet 40 mg  40 mg Oral Nightly Bolling Harada, MD        multivitamin 1 tablet  1 tablet Oral Daily Raisa Condon MD   Stopped at 21 1115    thiamine tablet 100 mg  100 mg Oral Daily Raisa Condon MD   Stopped at 21 1115    0.9 % sodium chloride infusion   Intravenous Continuous Alicia Del Orlin APRN -  mL/hr at 21 1356 New Bag at 21 1356    ceFAZolin (ANCEF) 2,000 mg in sodium chloride 0.9 % 50 mL IVPB  2,000 mg Intravenous On Call to [address], APRN - CNP           Allergies:  No Known Allergies    Problem List:    Patient Active Problem List   Diagnosis Code    GERD (gastroesophageal reflux disease) K21.9    Sleep apnea G47.30    Hernia K46.9    Status post gastric banding Z98.84    Obesity E66.9    Stroke determined by clinical assessment ([facility] [address] I63.9       Past Medical History:        Diagnosis Date    GERD (gastroesophageal reflux disease)     Hernia     Obesity     Unspecified sleep apnea     CPAP       Past Surgical History:        Procedure Laterality Date     SECTION      HERNIA REPAIR      LAP BAND      SPLENECTOMY      TOTAL HIP ARTHROPLASTY         Social History:    Social History     Tobacco Use    Smoking status: Current Every Day Smoker     Packs/day: 1.00     Years: 20.00     Pack years: 20.00    Smokeless tobacco: Never Used   Substance Use Topics    Alcohol use: Yes     Comment: occasional                                Ready to quit: Not Answered  Counseling given: Not Answered      Vital Signs (Current):   Vitals:    21 1000 21 1100 21 1200 21 1300   BP: (!) 146/75 (!) 178/92 (!) 156/135 (!) 156/84   Pulse: 79 91 85 81   Resp: 24  24   Temp:   98.6 °F (37 °C)    TempSrc:   Oral    SpO2: 97% 97% 98% 98%   Weight:       Height: BP Readings from Last 3 Encounters:   04/13/21 (!) 156/84   04/13/21 (!) 236/118   04/13/21 (!) 179/81       NPO Status:                                                                                 BMI:   Wt Readings from Last 3 Encounters:   04/13/21 216 lb 4.3 oz (98.1 kg)   04/13/21 249 lb 1.9 oz (113 kg)   12/06/12 218 lb 11.2 oz (99.2 kg)     Body mass index is 34.91 kg/m².     CBC:   Lab Results   Component Value Date    WBC 21.3 04/12/2021    RBC 4.50 04/12/2021    HGB 16.0 04/12/2021    HCT 46.6 04/12/2021    .6 04/12/2021    RDW 14.2 04/12/2021     04/12/2021       CMP:   Lab Results   Component Value Date     04/13/2021    K 3.8 04/13/2021     04/13/2021    CO2 19 04/13/2021    BUN 10 04/13/2021    CREATININE 0.6 04/13/2021    GFRAA >60 04/13/2021    AGRATIO 1.1 04/13/2021    LABGLOM >60 04/13/2021    GLUCOSE 104 04/13/2021    PROT 7.6 04/13/2021    CALCIUM 9.1 04/13/2021    BILITOT 0.7 04/13/2021    ALKPHOS 86 04/13/2021    AST 41 04/13/2021    ALT 32 04/13/2021       POC Tests:   Recent Labs     04/12/21  2313   POCGLU 113*       Coags:   Lab Results   Component Value Date    PROTIME 12.3 04/13/2021    INR 1.06 04/13/2021       HCG (If Applicable): No results found for: PREGTESTUR, PREGSERUM, HCG, HCGQUANT     ABGs: No results found for: PHART, PO2ART, TAD9JDW, FPY3KUI, BEART, D2QIGXZB     Type & Screen (If Applicable):  No results found for: LABABO, LABRH    Drug/Infectious Status (If Applicable):  No results found for: HIV, HEPCAB    COVID-19 Screening (If Applicable): No results found for: COVID19        Anesthesia Evaluation    Airway: Mallampati: IV  TM distance: >3 FB   Neck ROM: full  Mouth opening: < 3 FB Dental:          Pulmonary:   (+) COPD:  sleep apnea: on noncompliant,  current smoker                           Cardiovascular:  Exercise tolerance: good (>4 METS),   (+) hypertension:,     (-) past MI, dysrhythmias and  angina

## 2021-04-13 NOTE — ED NOTES
Stroke team advised no interventions at this time, patient outside of the window     Turner Matthews, [address] [facility]  04/12/21 7839

## 2021-04-13 NOTE — CONSULTS
List of Home Medications the patient is currently taking is complete. Home Medication list in EPIC updated to reflect changes noted below. Source of medications in list is [facility] prescription fill history. Medications added:   Fluoxetine   Famotidine   Lorazepam   Norethindrone    Medications removed:   Omeprazole    Medications adjusted:   None    Please call with questions.   Real Rowland, PharmD, [facility]  Main pharmacy: [id number]  4/13/2021 3:32 PM

## 2021-04-13 NOTE — ED PROVIDER NOTES
eMERGENCY dEPARTMENT eNCOUnter      Pt Name: Anatoly Silva  MRN: [id number]  [place] 1970  Date of evaluation: 2021  Provider: Marcy Owens MD     CHIEF COMPLAINT       Chief Complaint   Patient presents with    Cerebrovascular Accident     patient arrived via [address] [facility] from home, fell out of bed sometime last night, laid on the floor all day, spoke to coworker at [address] who noticed slurred speech, thought patient was intoxicated, [address] [employer] arrived for a well check and called EMS.  onscene all day. patient arrives alert but full L sided paralysis and L sided facial droop         HISTORY OF PRESENT ILLNESS   (Location/Symptom, Timing/Onset,Context/Setting, Quality, Duration, Modifying Factors, Severity) Note limiting factors. Pt presents to the ED via EMS for left side weakness. Per EMS report pt fell off bed last night, down since then until this AM when coworkers got worried and called 911. At ED arrival pt is GCS 15 with left side facial droop, left side weakness, gaze deviation to right. Stroke alert called. REVIEW OFSYSTEMS    (2+ for level 4; 10+ for level 5)   Review of Systems   Unable to perform ROS: Acuity of condition           PAST MEDICAL HISTORY     Past Medical History:   Diagnosis Date    GERD (gastroesophageal reflux disease)     Hernia     Obesity     Unspecified sleep apnea     CPAP       SURGICAL HISTORY       Past Surgical History:   Procedure Laterality Date     SECTION      HERNIA REPAIR      LAP BAND  2006    SPLENECTOMY      TOTAL HIP ARTHROPLASTY         CURRENT MEDICATIONS       Previous Medications    MULTIPLE VITAMIN (MULTIVITAMIN PO)    Take  by mouth. OMEPRAZOLE (PRILOSEC) 40 MG CAPSULE    Take 1 capsule by mouth daily. ALLERGIES     Patient has no known allergies.     FAMILY HISTORY       Family History   Problem Relation Age of Onset   Meagan Rose Cancer Mother     Depression Mother     Mental Illness Mother    Meagan Rose Stroke Father     Cancer Father         prostate        SOCIAL HISTORY       Social History     Socioeconomic History    Marital status:      Spouse name: Not on file    Number of children: Not on file    Years of education: Not on file    Highest education level: Not on file   Occupational History    Not on file   Social Needs    Financial resource strain: Not on file    Food insecurity     Worry: Not on file     Inability: Not on file    Transportation needs     Medical: Not on file     Non-medical: Not on file   Tobacco Use    Smoking status: Current Every Day Smoker     Packs/day: 1.00     Years: 20.00     Pack years: 20.00    Smokeless tobacco: Never Used   Substance and Sexual Activity    Alcohol use: Yes     Comment: occasional    Drug use: No    Sexual activity: Not on file   Lifestyle    Physical activity     Days per week: Not on file     Minutes per session: Not on file    Stress: Not on file   Relationships    Social connections     Talks on phone: Not on file     Gets together: Not on file     Attends [employer] service: Not on file     Active member of club or organization: Not on file     Attends meetings of clubs or organizations: Not on file     Relationship status: Not on file    Intimate partner violence     Fear of current or ex partner: Not on file     Emotionally abused: Not on file     Physically abused: Not on file     Forced sexual activity: Not on file   Other Topics Concern    Not on file   Social History Narrative    Not on file       SCREENINGS   NIH Stroke Scale  Interval: Baseline  Level of Consciousness (1a. ): Alert  LOC Questions (1b. ):  Answers both correctly  LOC Commands (1c. ): Performs both tasks correctly  Best Gaze (2. ): (!) Partial gaze palsy  Visual (3. ): (!) Partial hemianopia  Facial Palsy (4. ): (!) Partial paralysis  Motor Arm, Left (5a. ): No movement  Motor Arm, Right (5b. ): No drift  Motor Leg, Left (6a. ): No movement  Motor Leg, Right (6b. ): No drift  Limb Ataxia (7. ): (!) Present in one limb  Sensory (8. ): (!) Severe to total loss  Best Language (9. ): No aphasia  Dysarthria (10. ): Mild to moderate dysarthria  Extinction and Inattention (11): (!) Visual, tactile, auditory, spatial, or personal inattention  Total: 17Glasgow Coma Scale  Eye Opening: Spontaneous  Best Verbal Response: Oriented  Best Motor Response: Obeys commands  [place] Coma Scale Score: 15      PHYSICAL EXAM    (up to 7 for level 4, 8 or more for level 5)     ED Triage Vitals [04/12/21 2310]   BP Temp Temp src Pulse Resp SpO2 Height Weight   (!) 154/111 -- -- 80 23 98 % -- --       Physical Exam  Vitals signs and nursing note reviewed. Constitutional:       General: She is in acute distress. Appearance: She is obese. She is ill-appearing. HENT:      Head: Normocephalic. Right Ear: External ear normal.      Left Ear: External ear normal.      Nose: Nose normal.      Mouth/Throat:      Mouth: Mucous membranes are moist.      Pharynx: Oropharynx is clear. Eyes:      General: Visual field deficit present. No scleral icterus. Pupils: Pupils are equal, round, and reactive to light. Neck:      Musculoskeletal: Neck supple. No neck rigidity. Cardiovascular:      Rate and Rhythm: Normal rate. Pulses: Normal pulses. Heart sounds: Normal heart sounds. No friction rub. No gallop. Pulmonary:      Effort: Pulmonary effort is normal.      Breath sounds: Wheezing present. Abdominal:      General: Abdomen is flat. Palpations: Abdomen is soft. Tenderness: There is no abdominal tenderness. There is no guarding. Lymphadenopathy:      Cervical: No cervical adenopathy. Skin:     General: Skin is warm and dry. Capillary Refill: Capillary refill takes less than 2 seconds. Neurological:      Mental Status: She is lethargic. GCS: GCS eye subscore is 4. GCS verbal subscore is 5. GCS motor subscore is 6.       Cranial Nerves: Cranial nerve Reason for Exam: stroke FINDINGS: CTA NECK: AORTIC ARCH/ARCH VESSELS: No dissection or arterial injury. No significant stenosis of the brachiocephalic or subclavian arteries. CAROTID ARTERIES: The left carotid is normal in course and caliber. The right internal carotid artery is completely occluded after the bifurcation with partial reconstitution of flow beginning at the level of the petrous segment, likely filling in retrograde fashion. VERTEBRAL ARTERIES: No dissection, arterial injury, or significant stenosis. SOFT TISSUES: The lung apices are clear. No cervical or superior mediastinal lymphadenopathy. The larynx and pharynx are unremarkable. No acute abnormality of the salivary and thyroid glands. BONES: No acute osseous abnormality. CTA HEAD: ANTERIOR CIRCULATION: The left MCA is within normal limits. There appears to be occlusion of the A2 segment of the right anterior cerebral artery. A minimal amount of flow is seen within the right MCA. POSTERIOR CIRCULATION: No significant stenosis of the vertebral, basilar, or posterior cerebral arteries. No aneurysm. OTHER: No dural venous sinus thrombosis on this non-dedicated study. BRAIN: See separately dictated noncontrast head CT report. Occluded right cervical ICA. Occluded right LEAH. Near complete occlusion of the right MCA.        ED BEDSIDE ULTRASOUND:   Performed by ED Physician - none    LABS:  Labs Reviewed   CBC WITH AUTO DIFFERENTIAL - Abnormal; Notable for the following components:       Result Value    WBC 21.3 (*)     .6 (*)     MCH 35.7 (*)     All other components within normal limits    Narrative:     [address],  Rejected Test PT/Called to: Zulma Bearden RN, 04/12/2021 23:58, by Antonio Aguilar  Performed at:  [place] [address]   Phone [phone]   POCT GLUCOSE - Abnormal; Notable for the following components:    POC Glucose 113 (*)     All other They think this was so large that it is not surgical.  Recommended medical treatment. I discussed the case with the hospitalist at [facility] [employer] he accepted patient patient will go to the ICU. Patient is stable at this point. Is supportive care at this point. Given her chance of trauma I did not feel comfortable starting her on heparin. Can we just be monitor at this time. Stroke team was notified by Dr. Autumn Rodriguez. REVAL:         CRITICAL CARE TIME   Total CriticalCare time was 30 minutes, excluding separately reportable procedures. There was a high probability of clinically significant/life threatening deterioration in the patient's condition which required my urgent intervention. CONSULTS:  IP CONSULT TO PHARMACY  PHARMACY TO CHANGE BASE FLUIDS  IP CONSULT TO STROKE TEAM  IP CONSULT TO NEUROSURGERY  IP CONSULT TO HOSPITALIST    PROCEDURES:  Unless otherwise noted below, none     [unfilled]    FINAL IMPRESSION      1. Acute CVA (cerebrovascular accident) [facility])          DISPOSITION/PLAN   DISPOSITION Decision To Transfer 04/12/2021 11:27:52 PM      PATIENT REFERRED TO:  No follow-up provider specified. DISCHARGE MEDICATIONS:  New Prescriptions    No medications on file          (Please note:  Portions of this note were completed with a voice recognition program.Efforts were made to edit the dictations but occasionally words and phrases are mis-transcribed.)  Form v2016. J.5-cn    Lan DENG MD (electronically signed)  Emergency Medicine Provider        Jack Gonzalez MD  04/13/21 9782

## 2021-04-13 NOTE — CONSULTS
Denies chest pain  RESPIRATORY:  Denies shortness of breath  SKIN:  Denies rash or lesions   HEM:  Denies excessive bruising  PSYCH:  Denies anxiety or depression  NEURO: Left side weakness   :  Denies urinary difficulty  GI: Denies nausea, vomiting, diarrhea or constipation  MUSCULOSKELETAL:  No arthralgias    No Known Allergies    Past Medical History:   Diagnosis Date    GERD (gastroesophageal reflux disease)     Hernia     Obesity     Unspecified sleep apnea     CPAP        Past Surgical History:   Procedure Laterality Date     SECTION      HERNIA REPAIR      LAP BAND  2006    SPLENECTOMY      TOTAL HIP ARTHROPLASTY         Social History     Occupational History    Not on file   Tobacco Use    Smoking status: Current Every Day Smoker     Packs/day: 1.00     Years: 20.00     Pack years: 20.00    Smokeless tobacco: Never Used   Substance and Sexual Activity    Alcohol use: Yes     Comment: occasional    Drug use: No    Sexual activity: Not on file        Family History   Problem Relation Age of Onset    Cancer Mother     Depression Mother     Mental Illness Mother     Stroke Father     Cancer Father         prostate        No outpatient medications have been marked as taking for the 21 encounter [facility] Encounter).         Current Facility-Administered Medications   Medication Dose Route Frequency Provider Last Rate Last Admin    sodium chloride flush 0.9 % injection 5-40 mL  5-40 mL Intravenous 2 times per day Austin Flores MD   10 mL at 21 1116    sodium chloride flush 0.9 % injection 5-40 mL  5-40 mL Intravenous PRN Austin Flores MD        0.9 % sodium chloride infusion  25 mL Intravenous PRN Austin Flores  mL/hr at 21 1116 25 mL at 21 1116    promethazine (PHENERGAN) tablet 12.5 mg  12.5 mg Oral Q6H PRN Austin Flores MD        Or    ondansetron (ZOFRAN) injection 4 mg  4 mg Intravenous Q6H PRN Austin Flores MD        polyethylene glycol (GLYCOLAX) packet 17 g  17 g Oral Daily PRN Adalid Parker MD        acetaminophen (TYLENOL) tablet 650 mg  650 mg Oral Q6H PRN Adalid Parker MD        Or    acetaminophen (TYLENOL) suppository 650 mg  650 mg Rectal Q6H PRN Adalid Parker MD        perflutren lipid microspheres (DEFINITY) injection 1.65 mg  1.5 mL Intravenous ONCE PRN Adalid Parker MD        famotidine (PEPCID) injection 20 mg  20 mg Intravenous BID Adalid Parker MD   20 mg at 04/13/21 1115    labetalol (NORMODYNE;TRANDATE) injection syringe 10 mg  10 mg Intravenous Q4H PRN Adalid Parker MD   10 mg at 04/13/21 1115    hydrALAZINE (APRESOLINE) injection 5 mg  5 mg Intravenous Q6H PRN Adalid Parker MD        nicotine (NICODERM CQ) 21 MG/24HR 1 patch  1 patch Transdermal Daily PRN Adalid Parker MD        atorvastatin (LIPITOR) tablet 40 mg  40 mg Oral Nightly Adalid Parker MD        multivitamin 1 tablet  1 tablet Oral Daily Gilberto Tyler MD   Stopped at 04/13/21 1115    thiamine tablet 100 mg  100 mg Oral Daily Gilberto Tyler MD   Stopped at 04/13/21 1115    0.9 % sodium chloride infusion   Intravenous Continuous EUGENIE Bishop - CNP        ceFAZolin (ANCEF) 2000 mg in dextrose 3 % 50 mL IVPB (duplex)  2,000 mg Intravenous On Call to Σκαφίδια Ina, APRN - CNP            Objective:  BP (!) 178/92   Pulse 91   Temp 98.1 °F (36.7 °C) (Oral)   Resp 21   Ht 5' 6\" (1.676 m)   Wt 216 lb 4.3 oz (98.1 kg)   SpO2 97%   BMI 34.91 kg/m²     Physical Exam:   Patient seen and examined  GCS:  3 - Opens eyes to loud noise or command  5 - Alert and oriented  6 - Follows simple motor commands  General: Well developed. Alert and cooperative in no acute distress. HENT: atraumatic, neck supple  Eyes: Optic discs: Not tested  Pulmonary: unlabored respiratory effort  Cardiovascular:  Warm well perfused.  No peripheral edema  Gastrointestinal: abdomen soft, NT, ND    Neurological:  Mental Status:Lethargic but easily awakened; speech slurred but appropriate  Attention: Intact  Language: Dysarthic  Sensation: Decreased on left  Coordination: Intact  DTRs:    Right  Left    Patella   2+ 2+   ankle clonus  Neg Neg   toes (babinski)  Down Down     Cranial Nerves:  II: Left hemianopsia  III, IV, VI: PERRL, 3 mm bilaterally, EOMI, no nystagmus noted  V: Facial sensation intact bilaterally to touch  VII: Left facial droop  VIII: Hearing intact bilaterally to spoken voice  IX: Palate movement equal bilaterally  XI: Shoulder shrug R>L  XII: Tongue midline    Musculoskeletal:   Gait: Not tested   Assist devices: None   Tone: Left Flaccid; Right Normal  Motor strength:    Right  Left    Right  Left    Deltoid  5 0  Hip Flex  5 0   Biceps  5 0  Knee Extensors  5 0   Triceps  5 0  Knee Flexors  5 0   Wrist Ext  5 0  Ankle Dorsiflex. 5 0   Wrist Flex  5 0  Ankle Plantarflex. 5 0   Handgrip  5 0  Ext Gideon Longus  5 0   Thumb Ext  5 0         Radiological Findings:  Ct Head Wo Contrast  Result Date: 4/13/2021  Findings most consistent with large acute/subacute infarct in the right frontal lobe with associated mass effect as above. Further evaluation with MRI is suggested for better characterization and exclusion of additional underlying pathology. Additionally there is a small subdural hematoma along the falx cerebrum. Cta Head Neck W Contrast  Result Date: 4/12/2021  Occluded right cervical ICA. Occluded right LEAH. Near complete occlusion of the right MCA. Ct Head Without Contrast  Result Date: 4/13/2021  1. Suspect small parafalcine subdural hematoma in the left frontoparietal region measuring up to 4 mm in thickness. 2. Large zones of cytotoxic edema throughout the right cerebral hemisphere as described, within distribution of both the right anterior and right middle cerebral arteries. Hyperdense right MCA.  Imaging findings are suspicious for occlusion of the right internal carotid artery given infarct distribution within right anterior middle cerebral circulation. No intra-axial hemorrhage. 5 mm of right-to-left midline shift of the septum pellucidum. Labs:  Recent Labs     04/12/21  2315   WBC 21.3*   HGB 16.0   HCT 46.6          Recent Labs     04/13/21  0010      K 3.8      CO2 19*   BUN 10   CREATININE 0.6   GLUCOSE 104*   CALCIUM 9.1       Recent Labs     04/13/21  0010   PROTIME 12.3   INR 1.06       Patient Active Problem List    Diagnosis Date Noted    Stroke determined by clinical assessment ([facility] [address] 04/13/2021    Status post gastric banding 11/01/2012    Obesity 11/01/2012    GERD (gastroesophageal reflux disease)     Sleep apnea     Hernia        Assessment:  Patient is a 46 y.o. female w/right MCA & LEAH infarct with cytotoxic edema. Plan:  1. Right Hemicraniectomy today by Dr. Sung Hodge  2. Neurologic exams frequency:  - ICU: Q1H until otherwise directed  3. For change in exam MUST contact neurosurgery team along with critical care or primary team  4. NPO  5. Thank you for consult. Will follow inpatient. Please call with any questions or decline in neurological status    DISPO: Remain inpatient from neurosurgery standpoint. Dispo timing to be determined by primary team once patient is medically stable for discharge. Patient was discussed with Dr. Sung Hodge who agrees with above assessment and plan.      Electronically signed by: SHIN Pereira, 4/13/2021 12:23 PM  [phone]

## 2021-04-13 NOTE — CONSULTS
Clinical Pharmacy Consult Note    46 y.o. female admitted with ischemic stroke. Pharmacy has been asked by Dr. Annalee Saunders to adjust all drips to normal saline as appropriate based on compatibility to avoid fluid shifts since D5 is osmotically active. The following intermittent IV drips/infusions have been adjusted to saline:  None    The following medications must remain in D5W due to incompatibility with normal saline:  Amphotericin  Mycophenolate  Nitroprusside  Penicillin G Potassium    Please be aware that patient may have D5W ordered as part of hypoglycemia orderset. Total IV fluid delivered to patient over last 24h: N/A    RPh will follow daily to ensure all new IVPBs + drips are in NS. Please call with questions.   Michelle Almodovar PharmD, [facility]  Wireless: [phone]  4/13/2021 8:33 AM

## 2021-04-13 NOTE — ED NOTES
Patient arrived to ER via [facility], were called for a fall, patient reports falling out of bed \"sometime last night\" PD onscene states they were called for a welfare check, patient spoke to a coworker around 8am who noticed patient didn't come to work and that her speech was slurred, they thought she was intoxicated. Someone called for a welfare check. Pd found patient still on the floor with  at home intoxicated. Patient arrived with skin tear to LUE and bruising, patient alert to self, place and situation, L sided facial droop noted along with completely flaccid L side.      David Rivera RN  04/12/21 0248

## 2021-04-13 NOTE — PROGRESS NOTES
Speech Language Pathology  Facility/Department: [facility] ICU   CLINICAL BEDSIDE SWALLOW EVALUATION    NAME: Abelino Ashraf  : 1970  MRN: [id number]    ADMISSION DATE: 2021  ADMITTING DIAGNOSIS: has GERD (gastroesophageal reflux disease); Sleep apnea; Hernia; Status post gastric banding; Obesity; and Stroke determined by clinical assessment ([facility] [address] on their problem list.  ONSET DATE: 21    Recent Chest Xray 21  No acute abnormality. CT of head 21  Impression   Findings most consistent with large acute/subacute infarct in the right   frontal lobe with associated mass effect as above.  Further evaluation with   MRI is suggested for better characterization and exclusion of additional   underlying pathology.       Additionally there is a small subdural hematoma along the falx cerebrum. Date of Eval: 2021  Evaluating Therapist: Terri Mancuso    Current Diet level:  Current Diet : NPO  Current Liquid Diet : NPO      Primary Complaint  Patient Complaint: pt is without complaints    Pain:  Denied pain     Reason for Referral  Abelino Ashraf was referred for a bedside swallow evaluation to assess the efficiency of her swallow function, identify signs and symptoms of aspiration and make recommendations regarding safe dietary consistencies, effective compensatory strategies, and safe eating environment. HISTORY OF PRESENT ILLNESS   (Location/Symptom, Timing/Onset,Context/Setting, Quality, Duration, Modifying Factors, Severity) Note limiting factors. Pt presents to the ED via EMS for left side weakness. Per EMS report pt fell off bed last night, down since then until this AM when coworkers got worried and called 911. At ED arrival pt is GCS 15 with left side facial droop, left side weakness, gaze deviation to right. Stroke alert called. Impression  Pt is lethargic, but wakes easily. Pt is oriented x3 with left facial droop and dysarthria.  Pt with difficulty lateralizing tongue to the left. .  Pt had no difficulty closing lips around spoon or drawing liquid up a straw. Mastication with ice chip was prolonged with impaired coordination. There was no anterior spillage or oral residue noted with any consistency. Laryngeal movement noted with all PO trials, although question swallow delay and noted 2 spontaneous swallows noted following trials with ice chips and small sip of water. Pt produced reactive cough following 2 trials with ice chips and 2 small sips of water- Pt endorsed sensation of aspiration. No s/s aspiration with trials of 1/2 tsp of applesauce. Did not attempt cracker at this time due to concern for pt safety. Dysphagia Diagnosis: Mild to moderate oral stage dysphagia;Mild to moderate pharyngeal stage dysphagia  Dysphagia Outcome Severity Scale: Level 3: Moderate dysphagia- Total assisstance, supervision or strategies. Two or more diet consistencies restricted     Treatment Plan  Requires SLP Intervention: Yes  Duration/Frequency of Treatment: 3-5x  D/C Recommendations: To be determined       Recommended Diet and Intervention  Diet Solids Recommendation: NPO  Liquid Consistency Recommendation: NPO  Recommended Form of Meds: medication in applesauce   Recommendations: FEES       Compensatory Swallowing Strategies  TBD following instrumental assessment        Treatment/Goals  1- The patient will tolerate instrumental swallowing procedure    2- The pt/caregiver will demonstrate understanding of swallowing recommendations and concerns. 4/13-  The pt was educated to purpose of the visit, concerns for aspiration, recommendation for FEES, a description of the procedure, and plan to return at a later time to complete speech evaluation. The pt stated comprehension and agreement.  con't goal      General  Chart Reviewed: Yes  Behavior/Cognition: Lethargic;Cooperative;Pleasant mood  Respiratory Status: Room air  Communication Observation: Dysarthria  Follows Directions: Simple Dentition: Adequate  Patient Positioning: Upright in bed  Baseline Vocal Quality: Normal  Volitional Cough: Weak  Prior Dysphagia History: no history of dysphagia  Consistencies Administered: Ice Chips; Thin - straw;Dysphagia Pureed (Dysphagia I)         Vision/Hearing  Vision  Vision: Impaired  Vision Exceptions: Visual field cut  Hearing  Hearing: Within functional limits          Prognosis  Prognosis  Prognosis for safe diet advancement: excellent  Individuals consulted  Consulted and agree with results and recommendations: Patient;RN    Education  Patient Education: Role of SLP  Patient Education Response: Verbalizes understanding  Safety Devices in place: Yes  Type of devices: Call light within reach       Therapy Time  SLP Individual Minutes  Time In: 0845  Time Out: 0911  Minutes: 26        Pt's goal: to eat      Plan:  Continue goals per POC  Recommended diet:TBD following instrumental assessment/FEES (contacting MD for order)   Total treatment time:16dx, 10 tx  Pt's discharge plan:to go home   Discharge Plan: To be determined closer to discharge  Discussed with RNSilvia and Jaswinder Hawkins, SLP  Needs within reach.        Presley Keller, [address] [facility] [address] [facility] # [phone]  This document will serve as a discharge summary if pt discharge before next treatment   session

## 2021-04-14 ENCOUNTER — APPOINTMENT (OUTPATIENT)
Dept: CT IMAGING | Age: 51
DRG: 023 | End: 2021-04-14
Attending: INTERNAL MEDICINE
Payer: COMMERCIAL

## 2021-04-14 ENCOUNTER — APPOINTMENT (OUTPATIENT)
Dept: MRI IMAGING | Age: 51
DRG: 023 | End: 2021-04-14
Attending: INTERNAL MEDICINE
Payer: COMMERCIAL

## 2021-04-14 LAB
ANION GAP SERPL CALCULATED.3IONS-SCNC: 13 MMOL/L (ref 3–16)
BASOPHILS ABSOLUTE: 0 K/UL (ref 0–0.2)
BASOPHILS RELATIVE PERCENT: 0 %
BUN BLDV-MCNC: 9 MG/DL (ref 7–20)
CALCIUM SERPL-MCNC: 8.9 MG/DL (ref 8.3–10.6)
CHLORIDE BLD-SCNC: 107 MMOL/L (ref 99–110)
CHOLESTEROL, TOTAL: 251 MG/DL (ref 0–199)
CO2: 19 MMOL/L (ref 21–32)
CREAT SERPL-MCNC: 0.5 MG/DL (ref 0.6–1.1)
EOSINOPHILS ABSOLUTE: 0 K/UL (ref 0–0.6)
EOSINOPHILS RELATIVE PERCENT: 0 %
ESTIMATED AVERAGE GLUCOSE: 108.3 MG/DL
FOLATE: 5 NG/ML (ref 4.78–24.2)
GFR AFRICAN AMERICAN: >60
GFR NON-AFRICAN AMERICAN: >60
GLUCOSE BLD-MCNC: 132 MG/DL (ref 70–99)
HBA1C MFR BLD: 5.4 %
HCT VFR BLD CALC: 40.6 % (ref 36–48)
HDLC SERPL-MCNC: 45 MG/DL (ref 40–60)
HEMOGLOBIN: 13.8 G/DL (ref 12–16)
LDL CHOLESTEROL CALCULATED: 150 MG/DL
LYMPHOCYTES ABSOLUTE: 1.9 K/UL (ref 1–5.1)
LYMPHOCYTES RELATIVE PERCENT: 9 %
MACROCYTES: ABNORMAL
MAGNESIUM: 2.1 MG/DL (ref 1.8–2.4)
MCH RBC QN AUTO: 36 PG (ref 26–34)
MCHC RBC AUTO-ENTMCNC: 34.1 G/DL (ref 31–36)
MCV RBC AUTO: 105.7 FL (ref 80–100)
MONOCYTES ABSOLUTE: 1.3 K/UL (ref 0–1.3)
MONOCYTES RELATIVE PERCENT: 6 %
NEUTROPHILS ABSOLUTE: 18 K/UL (ref 1.7–7.7)
NEUTROPHILS RELATIVE PERCENT: 85 %
PDW BLD-RTO: 14.4 % (ref 12.4–15.4)
PLATELET # BLD: 298 K/UL (ref 135–450)
PMV BLD AUTO: 7.9 FL (ref 5–10.5)
POTASSIUM REFLEX MAGNESIUM: 3.9 MMOL/L (ref 3.5–5.1)
RBC # BLD: 3.84 M/UL (ref 4–5.2)
SODIUM BLD-SCNC: 139 MMOL/L (ref 136–145)
TRIGL SERPL-MCNC: 278 MG/DL (ref 0–150)
VITAMIN B-12: 335 PG/ML (ref 211–911)
VLDLC SERPL CALC-MCNC: 56 MG/DL
WBC # BLD: 21.2 K/UL (ref 4–11)

## 2021-04-14 PROCEDURE — 92526 ORAL FUNCTION THERAPY: CPT | Performed by: SPEECH-LANGUAGE PATHOLOGIST

## 2021-04-14 PROCEDURE — 2500000003 HC RX 250 WO HCPCS: Performed by: STUDENT IN AN ORGANIZED HEALTH CARE EDUCATION/TRAINING PROGRAM

## 2021-04-14 PROCEDURE — 92523 SPEECH SOUND LANG COMPREHEN: CPT

## 2021-04-14 PROCEDURE — 97166 OT EVAL MOD COMPLEX 45 MIN: CPT

## 2021-04-14 PROCEDURE — 70551 MRI BRAIN STEM W/O DYE: CPT

## 2021-04-14 PROCEDURE — 99222 1ST HOSP IP/OBS MODERATE 55: CPT | Performed by: INTERNAL MEDICINE

## 2021-04-14 PROCEDURE — 97530 THERAPEUTIC ACTIVITIES: CPT

## 2021-04-14 PROCEDURE — 97162 PT EVAL MOD COMPLEX 30 MIN: CPT

## 2021-04-14 PROCEDURE — 97110 THERAPEUTIC EXERCISES: CPT

## 2021-04-14 PROCEDURE — 92611 MOTION FLUOROSCOPY/SWALLOW: CPT | Performed by: SPEECH-LANGUAGE PATHOLOGIST

## 2021-04-14 PROCEDURE — 80048 BASIC METABOLIC PNL TOTAL CA: CPT

## 2021-04-14 PROCEDURE — 85025 COMPLETE CBC W/AUTO DIFF WBC: CPT

## 2021-04-14 PROCEDURE — 83036 HEMOGLOBIN GLYCOSYLATED A1C: CPT

## 2021-04-14 PROCEDURE — 2000000000 HC ICU R&B

## 2021-04-14 PROCEDURE — 6370000000 HC RX 637 (ALT 250 FOR IP): Performed by: STUDENT IN AN ORGANIZED HEALTH CARE EDUCATION/TRAINING PROGRAM

## 2021-04-14 PROCEDURE — 83735 ASSAY OF MAGNESIUM: CPT

## 2021-04-14 PROCEDURE — 70450 CT HEAD/BRAIN W/O DYE: CPT

## 2021-04-14 PROCEDURE — 6370000000 HC RX 637 (ALT 250 FOR IP): Performed by: NURSE PRACTITIONER

## 2021-04-14 PROCEDURE — 2580000003 HC RX 258: Performed by: NEUROLOGICAL SURGERY

## 2021-04-14 PROCEDURE — 2580000003 HC RX 258: Performed by: NURSE PRACTITIONER

## 2021-04-14 PROCEDURE — 36415 COLL VENOUS BLD VENIPUNCTURE: CPT

## 2021-04-14 PROCEDURE — 97535 SELF CARE MNGMENT TRAINING: CPT

## 2021-04-14 PROCEDURE — 6360000002 HC RX W HCPCS: Performed by: NURSE PRACTITIONER

## 2021-04-14 PROCEDURE — 92526 ORAL FUNCTION THERAPY: CPT

## 2021-04-14 RX ORDER — POLYETHYLENE GLYCOL 3350 17 G/17G
17 POWDER, FOR SOLUTION ORAL DAILY
Status: DISCONTINUED | OUTPATIENT
Start: 2021-04-14 | End: 2021-04-23 | Stop reason: HOSPADM

## 2021-04-14 RX ORDER — HEPARIN SODIUM 5000 [USP'U]/ML
5000 INJECTION, SOLUTION INTRAVENOUS; SUBCUTANEOUS EVERY 8 HOURS SCHEDULED
Status: DISCONTINUED | OUTPATIENT
Start: 2021-04-15 | End: 2021-04-23 | Stop reason: HOSPADM

## 2021-04-14 RX ORDER — ACETAMINOPHEN 500 MG
1000 TABLET ORAL EVERY 6 HOURS SCHEDULED
Status: DISCONTINUED | OUTPATIENT
Start: 2021-04-14 | End: 2021-04-23 | Stop reason: HOSPADM

## 2021-04-14 RX ORDER — OXYCODONE HYDROCHLORIDE 5 MG/1
5 TABLET ORAL EVERY 4 HOURS PRN
Status: DISCONTINUED | OUTPATIENT
Start: 2021-04-14 | End: 2021-04-23 | Stop reason: HOSPADM

## 2021-04-14 RX ORDER — OXYCODONE HYDROCHLORIDE 5 MG/1
10 TABLET ORAL EVERY 4 HOURS PRN
Status: DISCONTINUED | OUTPATIENT
Start: 2021-04-14 | End: 2021-04-23 | Stop reason: HOSPADM

## 2021-04-14 RX ORDER — SENNA AND DOCUSATE SODIUM 50; 8.6 MG/1; MG/1
2 TABLET, FILM COATED ORAL 2 TIMES DAILY
Status: DISCONTINUED | OUTPATIENT
Start: 2021-04-14 | End: 2021-04-23 | Stop reason: HOSPADM

## 2021-04-14 RX ADMIN — ACETAMINOPHEN 650 MG: 325 TABLET ORAL at 08:41

## 2021-04-14 RX ADMIN — THERA TABS 1 TABLET: TAB at 12:31

## 2021-04-14 RX ADMIN — DOCUSATE SODIUM 50 MG AND SENNOSIDES 8.6 MG 2 TABLET: 8.6; 5 TABLET, FILM COATED ORAL at 12:31

## 2021-04-14 RX ADMIN — ACETAMINOPHEN 1000 MG: 500 TABLET, COATED ORAL at 18:44

## 2021-04-14 RX ADMIN — ATORVASTATIN CALCIUM 40 MG: 40 TABLET, FILM COATED ORAL at 21:01

## 2021-04-14 RX ADMIN — DOCUSATE SODIUM 50 MG AND SENNOSIDES 8.6 MG 2 TABLET: 8.6; 5 TABLET, FILM COATED ORAL at 21:01

## 2021-04-14 RX ADMIN — POLYETHYLENE GLYCOL (3350) 17 G: 17 POWDER, FOR SOLUTION ORAL at 12:46

## 2021-04-14 RX ADMIN — Medication 100 MG: at 12:31

## 2021-04-14 RX ADMIN — FAMOTIDINE 20 MG: 10 INJECTION, SOLUTION INTRAVENOUS at 21:01

## 2021-04-14 RX ADMIN — Medication 5 ML: at 09:00

## 2021-04-14 RX ADMIN — SODIUM CHLORIDE: 9 INJECTION, SOLUTION INTRAVENOUS at 01:17

## 2021-04-14 RX ADMIN — OXYCODONE HYDROCHLORIDE 5 MG: 5 TABLET ORAL at 12:37

## 2021-04-14 RX ADMIN — FAMOTIDINE 20 MG: 10 INJECTION, SOLUTION INTRAVENOUS at 09:00

## 2021-04-14 RX ADMIN — SODIUM CHLORIDE 500 MG: 900 INJECTION, SOLUTION INTRAVENOUS at 12:31

## 2021-04-14 RX ADMIN — ACETAMINOPHEN 1000 MG: 500 TABLET, COATED ORAL at 12:37

## 2021-04-14 ASSESSMENT — PAIN DESCRIPTION - PROGRESSION
CLINICAL_PROGRESSION: NOT CHANGED
CLINICAL_PROGRESSION: GRADUALLY WORSENING
CLINICAL_PROGRESSION: NOT CHANGED
CLINICAL_PROGRESSION: GRADUALLY IMPROVING

## 2021-04-14 ASSESSMENT — PAIN SCALES - GENERAL
PAINLEVEL_OUTOF10: 0
PAINLEVEL_OUTOF10: 0
PAINLEVEL_OUTOF10: 3

## 2021-04-14 ASSESSMENT — PAIN DESCRIPTION - PAIN TYPE: TYPE: SURGICAL PAIN

## 2021-04-14 ASSESSMENT — PAIN DESCRIPTION - FREQUENCY
FREQUENCY: CONTINUOUS
FREQUENCY: CONTINUOUS

## 2021-04-14 ASSESSMENT — PAIN DESCRIPTION - LOCATION
LOCATION: HEAD
LOCATION: HEAD

## 2021-04-14 NOTE — CONSULTS
ICU History and Physical  PGY-1    PCP: Cheryl Bryant MD    Code:Full Code  Admit Date: 4/13/2021  Vent Settings:    / / /   Diet: Diet NPO Effective Now Exceptions are: Ice Chips    History of present illness:      CC: Left-sided weakness    45 YO F PMH GERD, depression/anxiety presented to [facility] ED via EMS for new onset left sided weakness. Pt states that she fell out of her bed last night and was down on the ground all night until this AM.  Patient endorses she did not let her  call EMS because she felt like she could improve. Patient claims EMS was finally called after her coworker started calling her this morning. Pts GCS was 15 upon arrival @ ED with L sided facial droop, L sided weakness & R sided gaze deviation. Stroke alert was called. In the ED she was hemodynamically stable with /81. Labs significant for , troponin < 0.01, WBC 21.3, . 6. CXR with no acute cardiopulmonary abnormality. CTH revealed large acute/subacute infarct in the R frontal lobe with associated mass effect and 4 mm R to L midline shift. It also noted subdural hemorrhage of 6mm along falx, although NSGY less convinced. CTA head and neck revealed occluded R cervical ICA & R anterior cerebral artery with near complete occlusion of R MCA. Per Stroke team pt was found to be outside the window, so no intervention will be done. Patient denies fevers, chills, nausea, vomiting, chest pain, shortness of breath, diarrhea, constipation, dysuria, urinary frequency or urgency. Neurosurgery was consulted & recommended medical treatment. Pt transfered to [facility] for q1h neuro checks. Interval History      Patient's repeat CT head came back yesterday in the morning, patient was taken for emergent right hemicraniectomy due to increasing vasogenic edema with concern of possible herniation.     Overnight, there were no acute events    Patient was seen this morning in bed with dressings over her right hemicraniectomy site as well as a HOLGER drain. Patient was quite sleepy was but was able to be awoken for examination. Patient still has left upper and lower extremity deficits. Patient endorses she feels better but does have a headache after her surgery. Patient at this time denies any shortness of breath, chest pain, nausea, vomiting, fever, chills, abdominal pain, diarrhea, constipation, urinary frequency or urgency, dysuria. Past Medical / Surgical History:    Past Medical History:   Diagnosis Date    GERD (gastroesophageal reflux disease)     Hernia     Obesity     Unspecified sleep apnea     CPAP     Past Surgical History:   Procedure Laterality Date     SECTION  2004    CRANIOTOMY Right 2021    RIGHT HEMICRANIECTOMY performed by Jose Miguel Plascencia MD at [address]       LAP BAND      SPLENECTOMY  2000    TOTAL HIP ARTHROPLASTY         Medications Prior to Admission:    No current facility-administered medications on file prior to encounter. Current Outpatient Medications on File Prior to Encounter   Medication Sig Dispense Refill    FLUoxetine (PROZAC) 20 MG capsule Take 20 mg by mouth daily      famotidine (PEPCID) 40 MG tablet Take 40 mg by mouth daily      LORazepam (ATIVAN) 0.5 MG tablet Take 0.5 mg by mouth every 8 hours as needed for Anxiety.  norethindrone (EDUARD) 0.35 MG tablet Take 1 tablet by mouth daily      Multiple Vitamin (MULTIVITAMIN PO) Take  by mouth. Allergies:  Patient has no known allergies. Social History:   TOBACCO:   reports that she has been smoking. She has a 20.00 pack-year smoking history. She has never used smokeless tobacco.       ETOH:   reports current alcohol use.   Patient currently lives with  and son    Family History:       Problem Relation Age of Onset   Marty Galla Cancer Mother     Depression Mother     Mental Illness Mother     Stroke Father     Cancer Father         prostate       Vital/I&O/Physical examination:   VS:  /66   Pulse 73   Temp 98 °F (36.7 °C) (Oral)   Resp 21   Ht 5' 6\" (1.676 m)   Wt 232 lb 2.3 oz (105.3 kg)   SpO2 93%   BMI 37.47 kg/m²     I/O:    Intake/Output Summary (Last 24 hours) at 4/14/2021 1812  Last data filed at 4/14/2021 1200  Gross per 24 hour   Intake 1577.08 ml   Output 2355 ml   Net -777.92 ml       PE:  Physical Exam   Physical Exam  HENT:      Head: Normocephalic and atraumatic. Patient head with surgical dressing. Dressing are not very soaked with bloody output. HOLGER drain noted with about 10ml serosanguinous output. Eyes:      General: No scleral icterus. Right eye: No discharge. Left eye: No discharge. Extraocular Movements: Extraocular movements intact. Pupils: Pupils are equal, round, and reactive to light. Cardiovascular:      Rate and Rhythm: Normal rate and regular rhythm. Pulses: Normal pulses. Heart sounds: Normal heart sounds. No murmur. No friction rub. No gallop. Pulmonary:      Effort: Pulmonary effort is normal. No respiratory distress. Breath sounds: Normal breath sounds. No wheezing or rales. Abdominal:      General: Bowel sounds are normal. There is no distension. Palpations: Abdomen is soft. Tenderness: There is no abdominal tenderness. There is no guarding. Neurological:      Mental Status: She is alert and oriented to person, place, and time. Sensory: Sensory deficit present.       Comments: Left-sided upper extremity weakness and left-sided lower extremity weakness  Patient has rightward gaze preference  No sensation on the left upper and left lower extremity  Sensation of the upper forehead on the left side intact   Psychiatric:         Behavior: Behavior normal.     ROS: Review of Systems   - Negative except HPI    Labs & Imaging:   LABS:  CBC:   Recent Labs     04/12/21  2315 04/14/21  0453   WBC 21.3* 21.2*   HGB 16.0 13.8   HCT 46.6 40.6    298   .6* 105.7* Renal:   Recent Labs     04/13/21  0010 04/14/21  0453    139   K 3.8 3.9    107   CO2 19* 19*   BUN 10 9   CREATININE 0.6 0.5*   GLUCOSE 104* 132*   ANIONGAP 15 13     Hepatic:   Recent Labs     04/13/21  0010   AST 41*   ALT 32   BILITOT 0.7   ALKPHOS 86     Troponin:   Recent Labs     04/12/21  2315   TROPONINI <0.01     BNP: No results for input(s): BNP in the last 72 hours. Lipids:   Recent Labs     04/13/21  1345   CHOL 251*   HDL 45     INR:   Recent Labs     04/13/21  0010   INR 1.06     Lactate: No results for input(s): LACTATE in the last 72 hours. ABGs:No results for input(s): PHART, JVT6ZDZ, PO2ART, QPQ7AMY, BEART, THGBART, U3BFWWUJ, QDQ8UKS in the last 72 hours. UA:  Recent Labs     04/13/21  2130   COLORU Yellow   PHUR 5.5   WBCUA 3-5   RBCUA 3-4   CLARITYU Clear   SPECGRAV 1.025   LEUKOCYTESUR Negative   UROBILINOGEN 0.2   BILIRUBINUR Negative   BLOODU TRACE-INTACT*   GLUCOSEU Negative        IMAGING:  MRI brain without contrast   Final Result   1. Imaging findings compatible with subacute infarct throughout the right hemisphere involving vascular distributions of right anterior cerebral artery and portions of the anterior division right middle cerebral artery. This zone of infarct includes    majority of the right temporal and right frontal lobes as well as a portion of the right parietal lobe, as well as the basal ganglia caudate and lentiform nuclei. Some areas of hemorrhagic transformation within this infarcted zone are demonstrated,    similar in appearance to recent CT from 4/14/2021. There is cytotoxic edema demonstrated. No midline shift identified status post right frontotemporal craniectomy. Continued effacement of the right lateral ventricle is demonstrated related to the    cytotoxic edema mass effect. CT HEAD WO CONTRAST   Final Result      No change since study earlier today.             CT HEAD WO CONTRAST   Final Result      Interval right hemicraniectomy with marked edema of the right cerebral hemisphere, similar to prior. New areas of hyperattenuation within the right frontal lobe reflecting likely intraparenchymal and subarachnoid hemorrhage. Small subdural hematoma, parafalcine, similar to prior. Findings were discussed with Cash Middleton RN at 0730 hours after discovery at [facility] hours. CT head without contrast   Final Result   1. Suspect small parafalcine subdural hematoma in the left frontoparietal region measuring up to 4 mm in thickness. 2. Large zones of cytotoxic edema throughout the right cerebral hemisphere as described, within distribution of both the right anterior and right middle cerebral arteries. Hyperdense right MCA. Imaging findings are suspicious for occlusion of the right    internal carotid artery given infarct distribution within right anterior middle cerebral circulation. No intra-axial hemorrhage. 5 mm of right-to-left midline shift of the septum pellucidum. Assessment    Kj Reyes a 46 y.o. female with PMH GERD p/w L sided weakness and facial droop.     Neuro/Psych  #Acute ischemic CVA   CTH s/p right hemicraniectomy with marked edema of the right cerebral hemisphere. There is 4 mm of left to right midline shift, previously 5 mm. There is local mass effect, frontal horn of the right lateral ventricle, similar to prior. MRI brain with subacute infarct throughout the right hemisphere involving vascular distributions of right anterior cerebral artery and portions of the anterior division right middle cerebral artery  -Neurosurgery consulted, appreciate recs: Every hour neurochecks, MRI postop.   -Neurology consulted, appreciate recs: MRI brain today.   Hypercoagulable studies.     #Subdural hemorrhage along falx   CTH s/p right hemicraniectomy with parafalcine subdural hematoma is again noted measuring 6 mm, slightly increased from the prior    #New intraparenchymal and subdural hemorrhage  CTH

## 2021-04-14 NOTE — PROGRESS NOTES
NEUROSURGERY PROGRESS NOTE    4/14/2021 10:55 AM                               Abelino Ashraf                      LOS: 1 day   POD#1  s/p Procedure(s) (LRB):  RIGHT HEMICRANIECTOMY (Right)    Subjective: Patient sitting up in bed sleeping upon entering the room. No acute events overnight. Patient has c/o headache pain. Physical Exam:  Patient seen and examined    Vitals:    04/14/21 0700   BP: 130/67   Pulse: 68   Resp: 21   Temp:    SpO2: 95%     GCS:  3 - Opens eyes to loud noise or command  5 - Alert and oriented  6 - Follows simple motor commands  General: Well developed. Alert and cooperative in no acute distress.     HENT: atraumatic, neck supple  Eyes: Optic discs: Not tested  Pulmonary: unlabored respiratory effort  Cardiovascular:  Warm well perfused. No peripheral edema  Gastrointestinal: abdomen soft, NT, ND     Neurological:  Mental Status:Lethargic but easily awakened; speech slurred but appropriate  Attention: Intact  Language: Dysarthic  Sensation: Decreased on left  Coordination: Intact  DTRs:     Right  Left    Patella   2+ 2+   ankle clonus  Neg Neg   toes (babinski)  Down Down      Cranial Nerves:  II: Left hemianopsia  III, IV, VI: PERRL, 3 mm bilaterally, EOMI, no nystagmus noted  V: Facial sensation intact bilaterally to touch  VII: Left facial droop  VIII: Hearing intact bilaterally to spoken voice  IX: Palate movement equal bilaterally  XI: Shoulder shrug R>L  XII: Tongue midline     Musculoskeletal:   Gait: Not tested   Assist devices: None   Tone: Left Flaccid; Right Normal  Motor strength:     Right  Left      Right  Left    Deltoid  5 0   Hip Flex  5 0   Biceps  5 0   Knee Extensors  5 0   Triceps  5 0   Knee Flexors  5 0   Wrist Ext  5 0   Ankle Dorsiflex. 5 0   Wrist Flex  5 0   Ankle Plantarflex.   5 0   Handgrip  5 0   Ext Gideon Longus  5 0   Thumb Ext  5 0              Incision: CDI    Drain: 80 mL in past 24 hours of bloody output    Radiological Findings:  Ct Head Wo Contrast Result Date: 4/13/2021  Findings most consistent with large acute/subacute infarct in the right frontal lobe with associated mass effect as above. Further evaluation with MRI is suggested for better characterization and exclusion of additional underlying pathology. Additionally there is a small subdural hematoma along the falx cerebrum.     Cta Head Neck W Contrast  Result Date: 4/12/2021  Occluded right cervical ICA. Occluded right LEAH. Near complete occlusion of the right MCA.      Ct Head Without Contrast  Result Date: 4/13/2021  1. Suspect small parafalcine subdural hematoma in the left frontoparietal region measuring up to 4 mm in thickness. 2. Large zones of cytotoxic edema throughout the right cerebral hemisphere as described, within distribution of both the right anterior and right middle cerebral arteries. Hyperdense right MCA. Imaging findings are suspicious for occlusion of the right internal carotid artery given infarct distribution within right anterior middle cerebral circulation. No intra-axial hemorrhage. 5 mm of right-to-left midline shift of the septum pellucidum. Ct Head Wo Contrast  Result Date: 4/14/2021  Interval right hemicraniectomy with marked edema of the right cerebral hemisphere, similar to prior. New areas of hyperattenuation within the right frontal lobe reflecting likely intraparenchymal and subarachnoid hemorrhage. Small subdural hematoma, parafalcine, similar to prior.     Labs:  Recent Labs     04/14/21  0453   WBC 21.2*   HGB 13.8   HCT 40.6          Recent Labs     04/14/21  0453      K 3.9      CO2 19*   BUN 9   CREATININE 0.5*   GLUCOSE 132*   CALCIUM 8.9   MG 2.10       Recent Labs     04/13/21  0010   PROTIME 12.3   INR 1.06       Patient Active Problem List    Diagnosis Date Noted    [facility]    Status post gastric banding 11/01/2012    Obesity 11/01/2012    GERD (gastroesophageal reflux disease)    

## 2021-04-14 NOTE — PROGRESS NOTES
Neurology Progress Note    Updates:  Denies hyperlipidemia, diabetes, CHF, afib, DVT/PE. Denies use of antiplatelet or anticoagulant at home  Reports 1 PPD smoker x 10 years, +hypertension  Has not had COVID-19 to her knowledge. Has not been vaccinated    Exam:  Constitutional    Vital signs: BP, HR, and RR reviewed   General Alert, no distress, well-nourished  Eyes: unable to visualize fundi   Cardiovascular: pulses symmetric in all 4 extremities. No peripheral edema. Psychiatric: cooperative with examination, no  psychotic behavior noted. Neurologic  Mental status: eyes open to voice  orientation to person and place, month, year. Attention intact as able to attend well to the exam; left hemineglect   Language fluent in conversation; no anomia. Repetition intact. Comprehension intact; follows simple commands and 2 step commands crossing midline  Cranial nerves:   CN2: left VF neglect  CN 3,4,6: pupils equal and reactive to light, rightward gaze preference. Eyes do not cross midline   CN5: facial sensation symmetric   CN7:face symmetric with mild dysarthria  CN8: hearing symmetric to finger rub   Strength: Left side 0/5. RUE 4/5. RLE antigravity 5 sec  Sensory: light touch intact and symmetric in all 4 extremities. Cerebellar/coordination: finger nose finger normal without ataxia on the right. Unable to assess given weakness on the left    Labs:  hcg negative  ETOH none detected  WBC 21.2K  INR 1.06    Studies:  CT-A head and neck 4/12/21 23:16: occluded cervical right ICA; occluded right LEAH. Near complete occlusion of right MCA    Head CT 4/14/21 5:20 AM  Interval right hemicraniectomy with marked edema of the right cerebral hemisphere, similar to prior.       New areas of hyperattenuation within the right frontal lobe reflecting likely intraparenchymal and subarachnoid hemorrhage.       Small subdural hematoma, parafalcine, similar to prior. TTE: EF 55%;  No evidence of right to left shunting    EKG: NSR    Medications   sodium chloride flush  5-40 mL Intravenous 2 times per day    famotidine (PEPCID) injection  20 mg Intravenous BID    atorvastatin  40 mg Oral Nightly    multivitamin  1 tablet Oral Daily    thiamine  100 mg Oral Daily    sodium chloride flush  5-40 mL Intravenous 2 times per day     Vitals:    04/14/21 0530 04/14/21 0600 04/14/21 0630 04/14/21 0700   BP: 120/67 115/67 118/75 130/67   Pulse: 63 59 67 68   Resp: 18 25 22 21   Temp:       TempSrc:       SpO2: 98% 95% 96% 95%   Weight:       Height:         Impression:  Maged Bangura is a 46 y.o. female with hypertension and tobacco abuse who presented after spending a prolonged period on the ground s/p fall out of bed; found to have acute left hemiparesis and gaze deviation and right ICA occlusion s/p hemicraniectomy 4/13. She has risk factors (HTN, smoking) but risk factors are disproportionate to her stroke burden and severity of presentation. Recommendations:  - MRI brain planned for today. If not done today would recheck head CT ~ 11 AM given hemorrhage on most recent CT (ordered)  - Hypercoagulable studies (ordered)  - Malignancy screening (CT chest, abdomen, pelvis) - (not ordered)  - JAKUB (non-urgent, not ordered)  - Hold off on antiplatelet, anticoagulation given small hemorrhage on head CT - need to ensure stability  - Telemetry monitoring and notify neurology of any atrial fibrillation. If no other source of stroke is found, would pursue long term cardiac monitor at discharge  - NPO for now. SLP for dysphagia   - BP parameters as per neurosurgery.  She appears to be tolerating normotension.   - PT/OT when cleared from neurosurgical perspective    A copy of this note was provided for MD Juan Pressley, NP  Neurology  [phone]  Evenings, weekends, and off weeks please discuss with the covering neurologist.

## 2021-04-14 NOTE — PROGRESS NOTES
Labetolol 10 mg IVP given for elevated BP. Lethargic but appropriate. Ice pack helping to relieve pain.

## 2021-04-14 NOTE — PROGRESS NOTES
Occupational Therapy   Occupational Therapy Initial Assessment/Tx  Date: 2021   Patient Name: Shola Cid  MRN: [id number]     : 1970    Date of Service: 2021    Discharge Recommendations: Shola Cid scored a 10/24 on the AM-PAC ADL Inpatient form. Current research shows that an AM-PAC score of 17 or less is typically not associated with a discharge to the patient's home setting. Based on the patient's AM-PAC score and their current ADL deficits, it is recommended that the patient have 3-5 sessions per week of Occupational Therapy at d/c to increase the patient's independence. Please see assessment section for further patient specific details. If patient discharges prior to next session this note will serve as a discharge summary. Please see below for the latest assessment towards goals. OT Equipment Recommendations  Other: defer to next level of care    Assessment   Performance deficits / Impairments: Decreased functional mobility ; Decreased ADL status; Decreased ROM; Decreased strength;Decreased sensation;Decreased fine motor control;Decreased endurance;Decreased posture;Decreased balance  Assessment: Pt presents to [facility] following a mechanical fall and left hemiparesis. Pt now POD #1 for hemicaniectomy. Pt is signficantly below independent baseline and requiring max A x 2 for all bed mobility and transfers with margo rawls. Pt able to use RUE functionally for grooming but has no purposeful movement in LUE. Pt sat on EOB with varying asssist from min A x2-max A x2 with fatigue. Pt would benefit from ongoing inpatient therapy services at discharge. Will cont to follow on acute OT POC. Treatment Diagnosis: decreased independence with ADLs and fx transfers sedondary to left hemiparesis  Decision Making: Medium Complexity  OT Education: OT Role;Plan of Care;Transfer Training;Family Education; ADL Adaptive Strategies  REQUIRES OT FOLLOW UP: Yes  Activity Tolerance  Activity Tolerance: stedy  ADL  Feeding: NPO  Grooming: Minimal assistance;Setup; Increased time to complete(setup to wash face with wash cloth ; min A to wipe blood from pts ear)  UE Dressing: Maximum assistance; Increased time to complete(max A to fix gown and don ties)  LE Dressing: Dependent/Total(total A to don/doff socks and underwear this date)  Toileting: Dependent/Total;Maximum assistance; Increased time to complete(max A to sit patient on bed pan from EOB- did not have BM; total A for brief mngmt; cath donned for urine)  Additional Comments: limited by left sided weakness  Tone RUE  RUE Tone: Normotonic  Tone LUE  LUE Tone: Hypertonic  Tone Description: per chart review pt initially hypotonic but appearing this date in transition to more rigity/hypertonic presentation noted in BUE particularily with movement and transfers  Coordination  Coordination and Movement description: Gross motor impairments; Fine motor impairments;Decreased accuracy; Left UE     Bed mobility  Rolling to Left: 2 Person assistance;Maximum assistance  Rolling to Right: 2 Person assistance;Maximum assistance  Supine to Sit: 2 Person assistance;Maximum assistance(HOB up, maximal cues to reach over with LUE)  Sit to Supine: Maximum assistance;2 Person assistance;Dependent/Total(fatigued quickly requiring total A to get back into bed, less engagement with sit-supine than with supine to sit transfer)  Scooting: Maximal assistance;2 Person assistance;Dependent/Total(to EOB ; to [facility])  Comment: maximal VC and TC throughout to sequencing bed mobility tasks; pt limtied by left hemiparesis  Transfers  Sit to stand: Maximum assistance;2 Person assistance  Stand to sit: Maximum assistance;2 Person assistance  Transfer Comments: 3x ; once from edge of bed, once from bed pan, and once from margo stedy seat -- use of RUE to assist pulling self up; margo stedy used for each transfer.  Pt with signficant Left lateral lean and weakness  Vision - Basic Assessment  Visual Field Cut: 1036)    Goals  Short term goals  Time Frame for Short term goals: by discharge  Short term goal 1: Pt will perform bed mobility with max A  Short term goal 2: Pt will perform seated on EOB for ~5 min with CGA to prepare for ADLs  Short term goal 3: Pt will perform hemipalegic dressing technique for UB dressing with assist as needed  Short term goal 4: Pt will perform 5 hand squeezes with LUE to increase functional movement with no more than verbal encouragement  Patient Goals   Patient goals : not stated       Therapy Time   Individual Concurrent Group Co-treatment   Time In 0905         Time Out 0959         Minutes 54         Timed Code Treatment Minutes: 39 Minutes(+ 15 min OT eval)       Marilyn Browne, OT

## 2021-04-14 NOTE — PROGRESS NOTES
Attempted to give patient a piece of ice. She coughed and drooled secretions. Will keep NPO.  Speech therapy evaluation pending

## 2021-04-14 NOTE — PROGRESS NOTES
Speech Language Pathology  Facility/Department: [address] ICU  Dysphagia Daily Treatment Note    NAME: Anila Vu  : 1970  MRN: [id number]    Patient Diagnosis(es):   Patient Active Problem List    Diagnosis Date Noted    [facility]    Status post gastric banding 2012    Obesity 2012    GERD (gastroesophageal reflux disease)     Sleep apnea     Hernia      Allergies: No Known Allergies    Recent Chest Xray 21  No acute abnormality.     CT of head 21  Impression   Findings most consistent with large acute/subacute infarct in the right   frontal lobe with associated mass effect as above.  Further evaluation with   MRI is suggested for better characterization and exclusion of additional   underlying pathology.       Additionally there is a small subdural hematoma along the falx cerebrum. Previous MBS - none    Chart reviewed. Medical Diagnosis: stroke  Treatment Diagnosis: dysphagia    BSE Impression 21  Pt is lethargic, but wakes easily. Pt is oriented x3 with left facial droop and dysarthria. Pt with difficulty lateralizing tongue to the left. .  Pt had no difficulty closing lips around spoon or drawing liquid up a straw. Mastication with ice chip was prolonged with impaired coordination. There was no anterior spillage or oral residue noted with any consistency. Laryngeal movement noted with all PO trials, although question swallow delay and noted 2 spontaneous swallows noted following trials with ice chips and small sip of water. Pt produced reactive cough following 2 trials with ice chips and 2 small sips of water- Pt endorsed sensation of aspiration. No s/s aspiration with trials of 1/2 tsp of applesauce.  Did not attempt cracker at this time due to concern for pt safety.     [facility] [place] - FEES scheduled this date,    Pain: yes at end of session pt reported headache, rated 10- notified KIZZY Booker Current Diet : NPO    Treatment:  Pt seen bedside to address the following goals:  1- The patient will tolerate instrumental swallowing procedure  4/14: FEES Scheduled this date     2- The pt/caregiver will demonstrate understanding of swallowing recommendations and concerns. 4/13-  The pt was educated to purpose of the visit, concerns for aspiration, recommendation for FEES, a description of the procedure, and plan to return at a later time to complete speech evaluation. The pt stated comprehension and agreement. con't goal  4/14: pt educated to purpose of visit and recommendation / rationale for FEES study. Pt stated understanding  Cont goal    3- Pt will participate in repeat BSE  4/14: pt re-assessed s/p Crani 4/13. Pt alert, oriented, followed simple commands. Pt analyzed with ice chips, water via cup and straw, puree and solid. No overt signs of aspiration emerged this date. Swallow movement difficult to palpate due to girth of pt neck. Pt demonstrated slow mastication with cracker, with lingual residue noted. Recommend proceeding with instrumental exam this date to fully assess swallow function, due to acute CVA/crani. Goal met    Patient/Family/Caregiver Education:  As above    Compensatory Strategies  TBD after FEES     Plan:  Continued daily Dysphagia treatment with goals per  plan of care. Diet recommendations: TBD after FEES stud  DC recommendation: TBD  Treatment: 13  D/W ashly Tran and Radonna Fabry, NP for NS  Needs met prior to leaving room, call button in reach. Mark Power M.S./[facility]-SLP #[id number]  Pg.  # [phone]  If patient is discharged prior to next treatment, this note will serve as the discharge summary

## 2021-04-14 NOTE — PROCEDURES
The [address] [facility]  Fiberoptic Endoscopic Evaluation of Swallowing  (FEES)      Name: Danita Hernández  YOB: 1970  Gender: female  MRN: [id number]  Account #: [de-identified]  Referring Physician: Dr. Clayton Sethi  Diagnosis: CVA  Onset Date: 4/12/21  History of Present Illness/Injury:   Patient Active Problem List    Diagnosis Date Noted    [facility] ([facility] 04/13/2021    Status post gastric banding 11/01/2012    Obesity 11/01/2012    GERD (gastroesophageal reflux disease)     Sleep apnea     Hernia      Date of Exam: 4/14/21    Recent Chest X-ray (4/12/21)-  Impression   No acute abnormality. BSE Impression 4/13/21  Pt is lethargic, but wakes easily. Pt is oriented x3 with left facial droop and dysarthria. Pt with difficulty lateralizing tongue to the left. .  Pt had no difficulty closing lips around spoon or drawing liquid up a straw.   Mastication with ice chip was prolonged with impaired coordination.  There was no anterior spillage or oral residue noted with any consistency. Laryngeal movement noted with all PO trials, although question swallow delay and noted 2 spontaneous swallows noted following trials with ice chips and small sip of water.  Pt produced reactive cough following 2 trials with ice chips and 2 small sips of water- Pt endorsed sensation of aspiration. No s/s aspiration with trials of 1/2 tsp of applesauce. Did not attempt cracker at this time due to concern for pt safety. Patient Complaints/Reason for Referral:  Danita Hernández was referred for a FEES to assess the efficiency of his/her swallow function, assess for aspiration, and to make recommendations regarding safe dietary consistencies, effective compensatory strategies, and safe eating environment.   Patient complaints: Thirsty; would like to begin PO    SUBJECTIVE:  Pt partially reclined resting comfortably in room on room air; easily roused and agreeable to instrumental evaluation. More alert this date. Independent recall of \"choking on water\" yesterday and concerned for swallow safety. Spouse present t/o evaluation. IMPRESSIONS:   Pt presents w/ mild oropharyngeal dysphagia characterized by premature spillage of thin liquids to UES and pyriforms w/ intermittent deep penetration of laryngeal vestibule; adequate airway protection and complete clearance of all residue after swallow initiation. No aspiration w/ any trials. Timely swallow initiation w/ puree and regular solid consistencies; no oropharyngeal residue. 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 Adequate ab/adduction. Complete closure of TVFs upon adduction. RECOMMENDATIONS:  Diet: [] NPO   [] NGT   [] PEG   [x] PO  Solid Consistency: Soft and Bite Sized (Dysphagia III)  Liquid Consistency:  Thin   Meds PO  Compensatory Techniques: Single sips of thin liquids  Positioning/Supervision: Upright as possible; remain upright 30-45 minutes  Referral to: [x] SLP (Dysphagia Tx)   [] ENT  [] GI    Current Diet Order:Diet NPO Effective Now Exceptions are: Ice Chips    Pain:  Mild headache    ORAL MOTOR EXAMINATION:  Alert : [x] Yes  [] No    Functional Comm : [x] intact  [] impaired [] absent  Voice Quality : [x] normal  [] hoarse  [] wet  [] aphonic  [] breathy  [] strained    Velopharyngeal competence : [x] intact  [] impaired [] absent   Gag : LEFT: [x] intact  [] impaired [] absent     RIGHT: [x] intact  [] impaired [] absent  Volitional Cough : [x] intact  [] impaired [] absent   Volitional Swallow : [x] intact  [] impaired [] absent   Spontaneous Swallow : [x] intact  [] impaired [] absent   Laryngeal Elevation : [x] intact  [] impaired [] absent    ENDOSCOPIC EXAMINATION:  Vocal Fold adduction : [x] Complete  [] Incomplete   Interarytenoid/Post-com Edema : [x] Yes  [] No   Arytenoid Edema : [x] Yes [] No   Arytenoid Erythema : [x] Yes  [] No   Vocal Fold Edema : [] Yes  [x] No   Pharyngeal Squeeze : LEFT: [x] intact  [] impaired [] absent      RIGHT:[x] intact  [] impaired [] absent    SWALLOWING EVALUATION:  Testing position :[] chair, upright  [] chair, 45 degrees  [x] bed, upright      [] bed, 45 degrees [] fully upright  Baseline Pooling of Secretions : [x] Yes  [] No    -Thick sputum noted on UES and R PPW; cleared w/ PO trials  Baseline Penetration of Secretions : [] Yes  [x] No   Baseline Aspiration of Secretions : [] Yes  [x] No  Backflow of Secretions : [] Yes  [x] No    CONSISTENCIES TRIALED :   Thin Puree Solid   Spillage [x] Yes [] No  Pyriform/UES [] Yes  [x] No [] Yes  [x] No     Laryngeal  Penetration  Able to clear? [x] Yes [] No  Intermittent  [x] Yes [] No  Complete clearance [] Yes  [x] No    [] Yes  [] No [] Yes  [x] No    [] Yes  [] No     Pharyngeal Residue  Able to clear? [x] Yes [] No  Trace UES d/t edematous changes  [x] Yes [] No   [] Yes  [x] No    [] Yes  [] No [] Yes  [x] No    [] Yes  [] No     Pharyngeal Pooling  Able to clear? [] Yes [x] No    [] Yes [] No [] Yes  [x] No    [] Yes  [] No [] Yes  [x] No    [] Yes  [] No   Aspiration    Response    Able to clear? [] Yes [x] No    []Silent []Cough    [] Yes  [] No [] Yes  [x] No    []Silent []Cough    [] Yes  [] No [] Yes  [x] No    []Silent []Cough    [] Yes  [] No       Bolus Backflow at UES  Able to clear?  [x] Yes  [] No      [x] Yes  [] No [] Yes  [x] No      [] Yes  [] No [] Yes  [x] No      [] Yes  [] No     COMPENSATORY TECHNIQUES FOR INCREASED SAFETY:  Postural Changes : [] Yes  [x] No   Comments: Upright during all meals    INCREASED RISK OF ASPIRATION DUE TO:  Poor Oral control and/or copious oral residue : [] Yes  [x] No   Redcued laryngopharyngeal sensation : [] Yes  [x] No  Premature spillage of bolus : [x] Yes  [] No  Inability to clear material from valleculae, pharynx, pyriform sinus or endolarynx : [] Yes  [x] No Backflow to Pharynx : [x] Yes  [] No     Other: Lethargy; ensure adequate alertness during meals    Endoscope was removed without incident, no adverse reactions. PENETRATION-ASPIRATION SCALE (PAS)  [] 8 Material enters the airway, passes below the vocal folds, and no effort is made to eject  [] 7 Material enters the airway, passes below the vocal folds, and is not ejected from the trachea despite effort  [] 6 Material enters the airway, passes below the vocal folds, and is ejected into the larynx or out of the airway  [] 5 Material enters the airway, contacts the vocal folds, and is not ejected into the airway  [] 4 Material enters the airway, contacts the vocal folds, and is ejected from the airway  [] 3 Material enters the airway, remains above the vocal folds, and is not ejected from airway  [x] 2 Material enters the airway, remains above the vocal folds, and is ejected from airway  [] 1 Material does not enter the airway    Dysphagia Treatment Goals  1- The patient will tolerate instrumental swallowing procedure  4/14: FEES Scheduled this date  4/14 (2): GOAL MET    2- The pt/caregiver will demonstrate understanding of swallowing recommendations and concerns. 4/13-  The pt was educated to purpose of the visit, concerns for aspiration, recommendation for FEES, a description of the procedure, and plan to return at a later time to complete speech evaluation. The pt stated comprehension and agreement. con't goal  4/14: pt educated to purpose of visit and recommendation / rationale for FEES study. Pt stated understanding  Cont goal  4/14 (2): Reviewed FEES w/ both pt and spouse; discussed premature spillage but adequate airway protection. Educated to remain upright during all meals. Discussed diet recommendation and pt endorsed preference for soft diet starting off; will begin dysphagia III (soft & bite sized) and upgrade per pt preference and/or as tolerated.    Cont goal     3- Pt will participate in repeat BSE  4/14: pt re-assessed s/p Crani 4/13. Pt alert, oriented, followed simple commands. Pt analyzed with ice chips, water via cup and straw, puree and solid. No overt signs of aspiration emerged this date. Swallow movement difficult to palpate due to girth of pt neck. Pt demonstrated slow mastication with cracker, with lingual residue noted. Recommend proceeding with instrumental exam this date to fully assess swallow function, due to acute CVA/crani. Goal met      RECOMMENDED COMPENSATORY STRATEGIES / POSTURAL CHANGES:  See the above. EDUCATION:  Reviewed results and recommendations of this evaluation, signs, symptoms, and risk of aspiration, as well as diet recommendations and strategies. Reviewed and discussed goals and plan of care. TREATMENT TIME:  30 mins    Plan:  Continued daily Dysphagia treatment with goals per plan of care. Diet recommendations: Upgrade to dysphagia III (soft & bite sized) + thin liquids; meds PO  DC recommendation: Ongoing inpatient ST  D/W nursing, Marc  Needs met prior to leaving room, call button in reach. Spouse present.       Thank you,    Young Ohs) [place], [place], [address] [facility]; [phone]  Speech-Language Pathologist  Pager #: [phone]

## 2021-04-14 NOTE — PLAN OF CARE
Problem: Pain:  Goal: Pain level will decrease  Description: Pain level will decrease  4/14/2021 0722 by Michelle Philip  Outcome: Ongoing   Significant headache, Tylenol ineffective.    Problem: HEMODYNAMIC STATUS  Goal: Patient has stable vital signs and fluid balance  4/14/2021 0722 by Michelle Philip  Outcome: Ongoing   Afebrile, VSS    Problem: ACTIVITY INTOLERANCE/IMPAIRED MOBILITY  Goal: Mobility/activity is maintained at optimum level for patient  4/14/2021 9730 by Michelle Philip  Outcome: Ongoing   Turn q2h, Avoiding right side due to craniotomy  Problem: Nutrition Deficit:  Goal: Ability to achieve adequate nutritional intake will improve  Description: Ability to achieve adequate nutritional intake will improve  4/14/2021 0722 by Michelle Philip  Outcome: Ongoing   Speech therapy to see
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
## 2021-04-14 NOTE — PROGRESS NOTES
Speech Language Pathology  Facility/Department: [facility]  Initial Speech/Language/Cognitive Assessment    NAME: Mariano Lindo  : 1970   MRN: [id number]  ADMISSION DATE: 2021  ADMITTING DIAGNOSIS: has GERD (gastroesophageal reflux disease); Sleep apnea; Hernia; Status post gastric banding; Obesity; and Stroke determined by clinical assessment [facility]) on their problem list.  DATE ONSET: 21    Date of Eval: 2021   Evaluating Therapist: Shayla Love, SLP    CT of head 21  Impression   Findings most consistent with large acute/subacute infarct in the right   frontal lobe with associated mass effect as above.  Further evaluation with   MRI is suggested for better characterization and exclusion of additional   underlying pathology.       Additionally there is a small subdural hematoma along the falx cerebrum. Primary Complaint:  Pt reports her speech is a little slurred and slow    Pain:  Pain Assessment  Pain Assessment:  At end of session pt reported headache, rated 10, notified RN Marc    Assessment:  pt exhibiting mild dysarthria, delayed responses, left visual field as well as cognitive impairment. No word finding/paraphasias noted. Pt followed commands and answered yes/no questions appropriately. Pt exhibited flat affect, kept eyes closed unless cued to keep open. Pt demonstrated significant difficulty with money/time concept problems.      Recommendations:  Requires SLP Intervention: Yes  Duration/Frequency of Treatment: 3-5 x/week  D/C Recommendations: (ongoing treatment indicated)       Plan:   Goals:  Long-term Goals  Goal 1: Pt will attend to left for reading/writing tasks with min cues  Goal 2: Pt will provide solutions to functional problems with 80% accuracy  Goal 3: Pt will solve money/time concept problems with 50% accuracy  Goal 4- the patient will name 3 strategies for improved speech intelligibility  Goal 5- The patient will use strategies for improved speech intelligibility with mod cues in structured sentence level tasks    Patient/family involved in developing goals and treatment plan: yes    Subjective:  Prior level of function and limitations: independent  General  Chart Reviewed: Yes  Family / Caregiver Present: No  Social/Functional History  Lives With: Spouse  Type of occupation: account managment      Objective:  Oral/Motor  Oral Motor: Exceptions to [facility]  Labial Symmetry: Abnormal symmetry left    Auditory Comprehension  Comprehension: Within Functional Limits    Verbal Expression  Verbal Expression: Within functional limits    Written Expression  Dominant Hand: Right   Pt wrote name accurately. Initially pt placed all numbers on clock on right side, however self corrected    Reading:  Pt read sentence level material with 80% accuracy, intermittently omitting words on left. Motor Speech  Motor Speech: Exceptions to [facility]  Intelligibility: Mild  Dysarthria : Mild    Pragmatics/Social Functioning  Pragmatics: Exceptions to [facility]  Affect: Moderate  Eye Contact: Moderate    Cognition:      Orientation  Overall Orientation Status: Within Functional Limits  Attention  Attention: Exceptions to [facility]  Sustained Attention: Mild  Numeric Reasoning  Numeric Reasoning: Exceptions to [facility]   Money Management: Moderate  Time: Moderate    Prognosis:  Speech Therapy Prognosis  Prognosis: Fair  Individuals consulted  Consulted and agree with results and recommendations: Patient;RN    Education:  Patient Education: pt educated to purpose of visit  Patient Education Response: Verbalizes understanding  Safety Devices in place: Yes  Type of devices: Call light within reach    Therapy Time:   Individual Concurrent Group Co-treatment   Time In 0810         Time Out 0830         Minutes 20            Plan:  Speech/lang/cog tx 3-5 x/wk  Dc recommendation: ongoing treatment indicated  Sarwat Rodriguez M.S./[facility]-SLP #[id number]  Pg.  # [phone]  Needs met prior to leaving room, call light within reach, d/w KIZZY Fraga  This document will serve as a dc summary if pt dc prior to next visit    4/14/2021 9:06 AM

## 2021-04-14 NOTE — PROGRESS NOTES
Hospitalist Progress Note      PCP: Karen Horta MD    Date of Admission: 4/13/2021    Status post right hemicraniectomy on 4/13    Subjective: Patient seen and examined  Extubated, lethargic but following commands, unable to move the left side      Medications:  Reviewed    Infusion Medications    sodium chloride      sodium chloride 75 mL/hr at 04/14/21 0810     Scheduled Medications    acetaminophen  1,000 mg Oral 4 times per day    levetiracetam  500 mg Intravenous Q12H    polyethylene glycol  17 g Oral Daily    sennosides-docusate sodium  2 tablet Oral BID    [START ON 4/15/2021] heparin (porcine)  5,000 Units Subcutaneous 3 times per day    sodium chloride flush  5-40 mL Intravenous 2 times per day    famotidine (PEPCID) injection  20 mg Intravenous BID    atorvastatin  40 mg Oral Nightly    multivitamin  1 tablet Oral Daily    thiamine  100 mg Oral Daily    sodium chloride flush  5-40 mL Intravenous 2 times per day     PRN Meds: oxyCODONE **OR** oxyCODONE, HYDROmorphone **OR** HYDROmorphone, sodium chloride flush, promethazine **OR** ondansetron, perflutren lipid microspheres, labetalol, hydrALAZINE, nicotine, sodium chloride flush, sodium chloride      Intake/Output Summary (Last 24 hours) at 4/14/2021 1356  Last data filed at 4/14/2021 1200  Gross per 24 hour   Intake 2677.08 ml   Output 2855 ml   Net -177.92 ml       Physical Exam Performed:    /66   Pulse 73   Temp 98 °F (36.7 °C) (Oral)   Resp 21   Ht 5' 6\" (1.676 m)   Wt 232 lb 2.3 oz (105.3 kg)   SpO2 93%   BMI 37.47 kg/m²     General appearance: No apparent distress, appears stated age and cooperative. Craniotomy scar  HEENT: Pupils equal, round, and reactive to light. Conjunctivae/corneas clear. Neck: Supple, with full range of motion. No jugular venous distention. Trachea midline. Respiratory:  Normal respiratory effort. Clear to auscultation, bilaterally without Rales/Wheezes/Rhonchi.   Cardiovascular: Regular rate and rhythm with normal S1/S2 without murmurs, rubs or gallops. Abdomen: Soft, non-tender, non-distended with normal bowel sounds. Musculoskeletal: No clubbing, cyanosis or edema bilaterally. Full range of motion without deformity. Skin: Skin color, texture, turgor normal.  No rashes or lesions. Neurologic: Unable to move left side psychiatric: Alert and oriented, thought content appropriate, normal insight  Capillary Refill: Brisk,< 3 seconds   Peripheral Pulses: +2 palpable, equal bilaterally       Labs:   Recent Labs     04/12/21 2315 04/14/21 0453   WBC 21.3* 21.2*   HGB 16.0 13.8   HCT 46.6 40.6    298     Recent Labs     04/13/21  0010 04/14/21 0453    139   K 3.8 3.9    107   CO2 19* 19*   BUN 10 9   CREATININE 0.6 0.5*   CALCIUM 9.1 8.9     Recent Labs     04/13/21  0010   AST 41*   ALT 32   BILITOT 0.7   ALKPHOS 86     Recent Labs     04/13/21  0010   INR 1.06     Recent Labs     04/12/21 2315 04/13/21  0010   CKTOTAL  --  466*   TROPONINI <0.01  --        Urinalysis:      Lab Results   Component Value Date    NITRU Negative 04/13/2021    WBCUA 3-5 04/13/2021    RBCUA 3-4 04/13/2021    BLOODU TRACE-INTACT 04/13/2021    SPECGRAV 1.025 04/13/2021    GLUCOSEU Negative 04/13/2021       Radiology:  CT HEAD WO CONTRAST   Final Result      Interval right hemicraniectomy with marked edema of the right cerebral hemisphere, similar to prior. New areas of hyperattenuation within the right frontal lobe reflecting likely intraparenchymal and subarachnoid hemorrhage. Small subdural hematoma, parafalcine, similar to prior. Findings were discussed with Anthony Mullen RN at 0730 hours after discovery at [facility] hours. CT head without contrast   Final Result   1. Suspect small parafalcine subdural hematoma in the left frontoparietal region measuring up to 4 mm in thickness.    2. Large zones of cytotoxic edema throughout the right cerebral hemisphere as described, within distribution of both the right anterior and right middle cerebral arteries. Hyperdense right MCA. Imaging findings are suspicious for occlusion of the right    internal carotid artery given infarct distribution within right anterior middle cerebral circulation. No intra-axial hemorrhage. 5 mm of right-to-left midline shift of the septum pellucidum.       MRI brain without contrast    (Results Pending)   CT HEAD WO CONTRAST    (Results Pending)           Assessment/Plan:    Active Hospital Problems    Diagnosis    Stroke determined by clinical assessment ([facility] [address] [I63.9]     Acute ischemic stroke/SDH:-Status post right hemicraniectomy, postop imaging giving new ICH and SAH  -Neurosurgery following, drain management per neurosurgery  Seizure prophylaxis  Continue ICU care  -Neurology following, hypercoagulable studies sent  SLP eval and treat patient they have been    Depression/anxiety  Continue home medications    Leukocytosis likely reactive  Continue to monitor    DVT Prophylaxis: scd  Diet: Diet NPO Effective Now Exceptions are: Ice Chips  Code Status: Full Code    PT[facility]OT Eval Status[facility]    Stormy Davila MD

## 2021-04-14 NOTE — PROGRESS NOTES
Clinical Pharmacy Progress Note    46 y.o. female admitted with ischemic stroke. Pharmacy has been asked by Dr. Tho Barrientos to adjust all drips to normal saline as appropriate based on compatibility to avoid fluid shifts since D5 is osmotically active. The following intermittent IV drips/infusions have been adjusted to saline:  Levetiracetam    The following medications must remain in D5W due to incompatibility with normal saline:  Amphotericin  Mycophenolate  Nitroprusside  Penicillin G Potassium    Please be aware that patient may have D5W ordered as part of hypoglycemia orderset. Total IV fluid delivered to patient over last 24h: ~2875 mL    RPh will follow daily to ensure all new IVPBs + drips are in NS. Please call with questions.   Honey Titus PharmD, [facility]  Wireless: [phone]  4/14/2021 11:04 AM

## 2021-04-15 LAB
ANION GAP SERPL CALCULATED.3IONS-SCNC: 13 MMOL/L (ref 3–16)
BASOPHILS ABSOLUTE: 0.1 K/UL (ref 0–0.2)
BASOPHILS RELATIVE PERCENT: 0.5 %
BUN BLDV-MCNC: 9 MG/DL (ref 7–20)
CALCIUM SERPL-MCNC: 8.5 MG/DL (ref 8.3–10.6)
CHLORIDE BLD-SCNC: 106 MMOL/L (ref 99–110)
CO2: 19 MMOL/L (ref 21–32)
CREAT SERPL-MCNC: <0.5 MG/DL (ref 0.6–1.1)
EOSINOPHILS ABSOLUTE: 0.1 K/UL (ref 0–0.6)
EOSINOPHILS RELATIVE PERCENT: 0.4 %
ESTIMATED AVERAGE GLUCOSE: 99.7 MG/DL
GFR AFRICAN AMERICAN: >60
GFR NON-AFRICAN AMERICAN: >60
GLUCOSE BLD-MCNC: 105 MG/DL (ref 70–99)
HBA1C MFR BLD: 5.1 %
HCT VFR BLD CALC: 37 % (ref 36–48)
HEMOGLOBIN: 12.5 G/DL (ref 12–16)
LYMPHOCYTES ABSOLUTE: 3.3 K/UL (ref 1–5.1)
LYMPHOCYTES RELATIVE PERCENT: 17 %
MCH RBC QN AUTO: 36.1 PG (ref 26–34)
MCHC RBC AUTO-ENTMCNC: 33.9 G/DL (ref 31–36)
MCV RBC AUTO: 106.5 FL (ref 80–100)
MONOCYTES ABSOLUTE: 2.1 K/UL (ref 0–1.3)
MONOCYTES RELATIVE PERCENT: 10.8 %
NEUTROPHILS ABSOLUTE: 13.8 K/UL (ref 1.7–7.7)
NEUTROPHILS RELATIVE PERCENT: 71.3 %
PDW BLD-RTO: 14.4 % (ref 12.4–15.4)
PLATELET # BLD: 269 K/UL (ref 135–450)
PMV BLD AUTO: 8.3 FL (ref 5–10.5)
POTASSIUM REFLEX MAGNESIUM: 3.8 MMOL/L (ref 3.5–5.1)
RBC # BLD: 3.47 M/UL (ref 4–5.2)
SODIUM BLD-SCNC: 138 MMOL/L (ref 136–145)
WBC # BLD: 19.4 K/UL (ref 4–11)

## 2021-04-15 PROCEDURE — 2580000003 HC RX 258: Performed by: NURSE PRACTITIONER

## 2021-04-15 PROCEDURE — 86146 BETA-2 GLYCOPROTEIN ANTIBODY: CPT

## 2021-04-15 PROCEDURE — 2500000003 HC RX 250 WO HCPCS: Performed by: STUDENT IN AN ORGANIZED HEALTH CARE EDUCATION/TRAINING PROGRAM

## 2021-04-15 PROCEDURE — 80048 BASIC METABOLIC PNL TOTAL CA: CPT

## 2021-04-15 PROCEDURE — 85613 RUSSELL VIPER VENOM DILUTED: CPT

## 2021-04-15 PROCEDURE — 6370000000 HC RX 637 (ALT 250 FOR IP): Performed by: STUDENT IN AN ORGANIZED HEALTH CARE EDUCATION/TRAINING PROGRAM

## 2021-04-15 PROCEDURE — 85025 COMPLETE CBC W/AUTO DIFF WBC: CPT

## 2021-04-15 PROCEDURE — 97530 THERAPEUTIC ACTIVITIES: CPT

## 2021-04-15 PROCEDURE — 6360000002 HC RX W HCPCS: Performed by: NURSE PRACTITIONER

## 2021-04-15 PROCEDURE — 85730 THROMBOPLASTIN TIME PARTIAL: CPT

## 2021-04-15 PROCEDURE — 85598 HEXAGNAL PHOSPH PLTLT NEUTRL: CPT

## 2021-04-15 PROCEDURE — 81241 F5 GENE: CPT

## 2021-04-15 PROCEDURE — 1200000000 HC SEMI PRIVATE

## 2021-04-15 PROCEDURE — 99233 SBSQ HOSP IP/OBS HIGH 50: CPT | Performed by: INTERNAL MEDICINE

## 2021-04-15 PROCEDURE — 81240 F2 GENE: CPT

## 2021-04-15 PROCEDURE — 94761 N-INVAS EAR/PLS OXIMETRY MLT: CPT

## 2021-04-15 PROCEDURE — 81400 MOPATH PROCEDURE LEVEL 1: CPT

## 2021-04-15 PROCEDURE — 2580000003 HC RX 258: Performed by: NEUROLOGICAL SURGERY

## 2021-04-15 PROCEDURE — 2580000003 HC RX 258: Performed by: STUDENT IN AN ORGANIZED HEALTH CARE EDUCATION/TRAINING PROGRAM

## 2021-04-15 PROCEDURE — 81291 MTHFR GENE: CPT

## 2021-04-15 PROCEDURE — 97535 SELF CARE MNGMENT TRAINING: CPT

## 2021-04-15 PROCEDURE — 99231 SBSQ HOSP IP/OBS SF/LOW 25: CPT | Performed by: NURSE PRACTITIONER

## 2021-04-15 PROCEDURE — 2000000000 HC ICU R&B

## 2021-04-15 PROCEDURE — 36415 COLL VENOUS BLD VENIPUNCTURE: CPT

## 2021-04-15 PROCEDURE — 6370000000 HC RX 637 (ALT 250 FOR IP): Performed by: NURSE PRACTITIONER

## 2021-04-15 PROCEDURE — 6360000002 HC RX W HCPCS: Performed by: STUDENT IN AN ORGANIZED HEALTH CARE EDUCATION/TRAINING PROGRAM

## 2021-04-15 PROCEDURE — 86147 CARDIOLIPIN ANTIBODY EA IG: CPT

## 2021-04-15 RX ADMIN — HEPARIN SODIUM 5000 UNITS: 5000 INJECTION INTRAVENOUS; SUBCUTANEOUS at 14:20

## 2021-04-15 RX ADMIN — HEPARIN SODIUM 5000 UNITS: 5000 INJECTION INTRAVENOUS; SUBCUTANEOUS at 20:50

## 2021-04-15 RX ADMIN — Medication 10 ML: at 08:34

## 2021-04-15 RX ADMIN — Medication 100 MG: at 08:19

## 2021-04-15 RX ADMIN — FAMOTIDINE 20 MG: 10 INJECTION, SOLUTION INTRAVENOUS at 08:18

## 2021-04-15 RX ADMIN — HEPARIN SODIUM 5000 UNITS: 5000 INJECTION INTRAVENOUS; SUBCUTANEOUS at 05:55

## 2021-04-15 RX ADMIN — ACETAMINOPHEN 1000 MG: 500 TABLET, COATED ORAL at 03:58

## 2021-04-15 RX ADMIN — SODIUM CHLORIDE: 9 INJECTION, SOLUTION INTRAVENOUS at 08:24

## 2021-04-15 RX ADMIN — ACETAMINOPHEN 1000 MG: 500 TABLET, COATED ORAL at 17:00

## 2021-04-15 RX ADMIN — DOCUSATE SODIUM 50 MG AND SENNOSIDES 8.6 MG 2 TABLET: 8.6; 5 TABLET, FILM COATED ORAL at 20:49

## 2021-04-15 RX ADMIN — SODIUM CHLORIDE 500 MG: 900 INJECTION, SOLUTION INTRAVENOUS at 11:43

## 2021-04-15 RX ADMIN — Medication 10 ML: at 20:56

## 2021-04-15 RX ADMIN — POLYETHYLENE GLYCOL (3350) 17 G: 17 POWDER, FOR SOLUTION ORAL at 08:19

## 2021-04-15 RX ADMIN — FAMOTIDINE 20 MG: 10 INJECTION, SOLUTION INTRAVENOUS at 20:49

## 2021-04-15 RX ADMIN — SODIUM CHLORIDE 500 MG: 900 INJECTION, SOLUTION INTRAVENOUS at 04:00

## 2021-04-15 RX ADMIN — HYDRALAZINE HYDROCHLORIDE 5 MG: 20 INJECTION INTRAMUSCULAR; INTRAVENOUS at 12:48

## 2021-04-15 RX ADMIN — ACETAMINOPHEN 1000 MG: 500 TABLET, COATED ORAL at 10:27

## 2021-04-15 RX ADMIN — ATORVASTATIN CALCIUM 40 MG: 40 TABLET, FILM COATED ORAL at 20:49

## 2021-04-15 RX ADMIN — THERA TABS 1 TABLET: TAB at 08:18

## 2021-04-15 RX ADMIN — OXYCODONE HYDROCHLORIDE 5 MG: 5 TABLET ORAL at 04:00

## 2021-04-15 RX ADMIN — DOCUSATE SODIUM 50 MG AND SENNOSIDES 8.6 MG 2 TABLET: 8.6; 5 TABLET, FILM COATED ORAL at 08:19

## 2021-04-15 ASSESSMENT — PAIN SCALES - GENERAL
PAINLEVEL_OUTOF10: 0
PAINLEVEL_OUTOF10: 5
PAINLEVEL_OUTOF10: 0
PAINLEVEL_OUTOF10: 0

## 2021-04-15 ASSESSMENT — PAIN DESCRIPTION - PROGRESSION
CLINICAL_PROGRESSION: NOT CHANGED

## 2021-04-15 NOTE — PROGRESS NOTES
Occupational Therapy  Facility/Department: [facility]  Daily Treatment Note  NAME: Tere Urbina  : 1970  MRN: [id number]    Date of Service: 4/15/2021    Discharge Recommendations:  Teer Urbina scored a 10/24 on the AM-PAC ADL Inpatient form. Current research shows that an AM-PAC score of 17 or less is typically not associated with a discharge to the patient's home setting. Based on the patient's AM-PAC score and their current ADL deficits, it is recommended that the patient have 3-5 sessions per week of Occupational Therapy at d/c to increase the patient's independence. Please see assessment section for further patient specific details. If patient discharges prior to next session this note will serve as a discharge summary. Please see below for the latest assessment towards goals. OT Equipment Recommendations  Other: defer to next level of care    Assessment   Assessment: Pt limited by fatigue and L side decreased funtion, sensation and strength. Bed mobility completed with Max A x2 and stand pivot transfer completed with Max A x2 toward R side. Pt sitting EOB requiring Max A - Min A. Pt would benefit from inpt OT upon discharge. Continue OT per POC. Treatment Diagnosis: decreased independence with ADLs and fx transfers sedondary to left hemiparesis  OT Education: OT Role;Plan of Care;Precautions;Transfer Training  Patient Education: Pt verb understanding and will need reinforcement  Activity Tolerance  Activity Tolerance: Patient Tolerated treatment well  Activity Tolerance: Pt with headache, RN aware. BP monitored after activity at 151/90         Patient Diagnosis(es): There were no encounter diagnoses. has a past medical history of GERD (gastroesophageal reflux disease), Hernia, Obesity, and Unspecified sleep apnea. has a past surgical history that includes Splenectomy (); Total hip arthroplasty (); Lap Band (); hernia repair ();   section (); and ADL Inpatient CMS G-Code Modifier : CL (04/15/21 0119)    Goals (as determined and assessed by primary OT)  Short term goals  Time Frame for Short term goals: by discharge  Short term goal 1: Pt will perform bed mobility with max A - ongoing  Short term goal 2: Pt will perform seated on EOB for ~5 min with CGA to prepare for ADLs - ongoing  Short term goal 3: Pt will perform hemipalegic dressing technique for UB dressing with assist as needed - ongoing  Short term goal 4: Pt will perform 5 hand squeezes with LUE to increase functional movement with no more than verbal encouragement - ongoing  Patient Goals   Patient goals : not stated       Therapy Time   Individual Concurrent Group Co-treatment   Time In 1345         Time Out 1430         Minutes 45         Timed Code Treatment Minutes: 1400 [facility], YANI/L
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
## 2021-04-15 NOTE — PROGRESS NOTES
9 9   CREATININE 0.5* <0.5*   GLUCOSE 132* 105*   CALCIUM 8.9 8.5   MG 2.10  --        Recent Labs     04/13/21  0010   PROTIME 12.3   INR 1.06       Patient Active Problem List    Diagnosis Date Noted    Stroke determined by clinical assessment ([facility] [address] 04/13/2021    Status post gastric banding 11/01/2012    Obesity 11/01/2012    GERD (gastroesophageal reflux disease)     Sleep apnea     Hernia        Assessment:  Patient is a 46 y.o. female w/ R MCA/LEAH ischemic stroke s/p R Hemicraniectomy by Dr. Feliberto Albrecht on 4/13/2021    Plan:  1. Neurologically stable  2. Neurologic exams frequency:  - ICU: Q4H until otherwise directed - if stable okay to transfer to floor later today or tomorrow. 3. For change in exam MUST contact neurosurgery team along with critical care or primary team  4. Head CT stable  5. Drain: Discontinue today   6. Cerebral edema:  - Keep Na within normal limits  - Keep HOB >30 degrees  7. GI Prophylaxis: Pepcid  8. Seizure prophylaxis: Keppra 500 mg BID x 7 days - okay to switch to PO  9. Bowel Regimen: Glycolax and Senokot-S  10. Pain control: Per primary team  11. DVT Prophylaxis: SCD's & SQ Heparin  12. Mobility:  - Advance as tolerates  - PT/OT consulted, appreciate recs  13. Diet: Advance as tolerates  14. Appreciate critical care team assistance in management  15. Will follow inpatient. Please call with any questions or decline in neurological status  16. PT/OT  17. PMR consult for Stroke rehab     DISPO: ICU today - okay to transfer to floor later today or tomorrow  PMR consult. Okay for discharge in next few days     Patient was discussed with Dr. Feliberto Albrecht who agrees with above assessment and plan.      Electronically signed by: EUGENIE Goodwin-CNP, 4/15/2021 11:42 AM  [phone]

## 2021-04-15 NOTE — PROGRESS NOTES
Neurology Progress Note    Exam:  Constitutional    Vital signs: BP, HR, and RR reviewed   General Alert, no distress, well-nourished  Eyes: unable to visualize fundi   Cardiovascular: pulses symmetric in all 4 extremities. Periorbital edema OD  Psychiatric: cooperative with examination, no psychotic behavior noted. Neurologic  Mental status: eyes open to voice  orientation to person and place, month, year. Attention intact as able to attend well to the exam; left hemineglect   Language fluent in conversation   Comprehension intact; follows simple commands   Cranial nerves:   CN2: left VF neglect  CN 3,4,6: ANU pupil on right due to edema, rightward gaze preference. Eyes do not cross midline   CN5: facial sensation symmetric   CN7:face symmetric with mild dysarthria  CN8: hearing symmetric to finger rub   Strength: Left side 0/5. RUE 4/5. RLE antigravity 5 sec  Sensory: sensation reduced on the left  Cerebellar/coordination: finger nose finger normal without ataxia on the right. Unable to assess given weakness on the left    Labs:  hcg negative  ETOH none detected   mg/dL  A1c 5.4%  WBC 19.4K    Studies:  CT-A head and neck 4/12/21 23:16: occluded cervical right ICA; occluded right LEAH. Near complete occlusion of right MCA    CT head 4/14/21: stable    EKG: NSR    MRI brain 4/14/21 14:12  1. Imaging findings compatible with subacute infarct throughout the right hemisphere involving vascular distributions of right anterior cerebral artery and portions of the anterior division right middle cerebral artery. This zone of infarct includes    majority of the right temporal and right frontal lobes as well as a portion of the right parietal lobe, as well as the basal ganglia caudate and lentiform nuclei. Some areas of hemorrhagic transformation within this infarcted zone are demonstrated,    similar in appearance to recent CT from 4/14/2021. There is cytotoxic edema demonstrated.  No midline shift identified status post right frontotemporal craniectomy. Continued effacement of the right lateral ventricle is demonstrated related to the    cytotoxic edema mass effect. Head CT 4/14/21 5:20 AM  Interval right hemicraniectomy with marked edema of the right cerebral hemisphere, similar to prior.       New areas of hyperattenuation within the right frontal lobe reflecting likely intraparenchymal and subarachnoid hemorrhage.       Small subdural hematoma, parafalcine, similar to prior. TTE: EF 55%; No evidence of right to left shunting    Medications   acetaminophen  1,000 mg Oral 4 times per day    levetiracetam  500 mg Intravenous Q12H    polyethylene glycol  17 g Oral Daily    sennosides-docusate sodium  2 tablet Oral BID    heparin (porcine)  5,000 Units Subcutaneous 3 times per day    sodium chloride flush  5-40 mL Intravenous 2 times per day    famotidine (PEPCID) injection  20 mg Intravenous BID    atorvastatin  40 mg Oral Nightly    multivitamin  1 tablet Oral Daily    thiamine  100 mg Oral Daily    sodium chloride flush  5-40 mL Intravenous 2 times per day     Vitals:    04/15/21 0830 04/15/21 0900 04/15/21 0905 04/15/21 0930   BP: (!) 165/85 (!) 159/73  (!) 183/97   Pulse: 73 59  77   Resp: 19 19 20 21   Temp:       TempSrc:       SpO2: 99% 99% 100% 100%   Weight:       Height:         Impression:  Emy Connor is a 46 y.o. female with hypertension and tobacco abuse who presented after spending a prolonged period on the ground s/p fall out of bed; found to have acute left hemiparesis and gaze deviation and right ICA occlusion s/p hemicraniectomy 4/13. She has risk factors (HTN, smoking) but risk factors are disproportionate to her stroke burden and severity of presentation. Exam somewhat worse today - increased sensory loss on the right.     Recommendations:  - SBP goal less than 160 mmHg given ICH  - Hold off on antiplatelets/anticoagulation given SAH and ICH  - Malignancy screening and JAKUB when able from post-op perspective  - F/U hypercoagulable labs    A copy of this note was provided for MD Jose Alfredo Garcia,   Neurology  [phone]  Evenings, weekends, and off weeks please discuss with the covering neurologist.

## 2021-04-15 NOTE — PROGRESS NOTES
Hospitalist Progress Note      PCP: Alfredo Sparrow MD    Date of Admission: 4/13/2021    CC left-sided weakness      Hospital course  Patient is 59-year-old female presented to [facility] ED with new onset left-sided weakness. In ED CT head and CT findings were consistent with ischemic stroke right ICA, LEAH, MCA. Patient was out of the window for therapeutic TPA intervention. Admitted with neurosurgery consultation. S/p right hemicraniectomy on 4/13. Subjective  Seen and examined today in ICU. She is lethargic. Able to move left side still have a gaze deviation. Denies any headache, nausea, vomiting.     Medications:  Reviewed    Infusion Medications    sodium chloride       Scheduled Medications    acetaminophen  1,000 mg Oral 4 times per day    levetiracetam  500 mg Intravenous Q12H    polyethylene glycol  17 g Oral Daily    sennosides-docusate sodium  2 tablet Oral BID    heparin (porcine)  5,000 Units Subcutaneous 3 times per day    sodium chloride flush  5-40 mL Intravenous 2 times per day    famotidine (PEPCID) injection  20 mg Intravenous BID    atorvastatin  40 mg Oral Nightly    multivitamin  1 tablet Oral Daily    thiamine  100 mg Oral Daily    sodium chloride flush  5-40 mL Intravenous 2 times per day     PRN Meds: oxyCODONE **OR** oxyCODONE, HYDROmorphone **OR** HYDROmorphone, sodium chloride flush, promethazine **OR** ondansetron, perflutren lipid microspheres, labetalol, hydrALAZINE, nicotine, sodium chloride flush, sodium chloride      Intake/Output Summary (Last 24 hours) at 4/15/2021 1341  Last data filed at 4/15/2021 1200  Gross per 24 hour   Intake 1480 ml   Output 820 ml   Net 660 ml       Physical Exam Performed:    BP (!) 167/86   Pulse 56   Temp 98.6 °F (37 °C) (Oral)   Resp 19   Ht 5' 6\" (1.676 m)   Wt 238 lb 8.6 oz (108.2 kg)   SpO2 99%   BMI 38.50 kg/m²     General lethargic, tired  HEENT surgical site covered with sutures no obvious drainage, HOLGER drain in place. Cardiac S1, S2 audible, regular demented, no murmur noted  Respiratory bilateral clear to auscultate, no wheeze no rhonchi  Abdomen soft, nontender, bowel sound present  Neuro alert oriented x3, left sided neglect, right thigh edema, rightward gaze preference. do not cross midline. Mild dysarthria, left upper and lower extremity motor 0, right upper and lower extremity 4+. MSK no lower extremity motor. Labs:   Recent Labs     04/12/21  2315 04/14/21  0453 04/15/21  0550   WBC 21.3* 21.2* 19.4*   HGB 16.0 13.8 12.5   HCT 46.6 40.6 37.0    298 269     Recent Labs     04/13/21  0010 04/14/21  0453 04/15/21  0550    139 138   K 3.8 3.9 3.8    107 106   CO2 19* 19* 19*   BUN 10 9 9   CREATININE 0.6 0.5* <0.5*   CALCIUM 9.1 8.9 8.5     Recent Labs     04/13/21  0010   AST 41*   ALT 32   BILITOT 0.7   ALKPHOS 86     Recent Labs     04/13/21  0010   INR 1.06     Recent Labs     04/12/21  2315 04/13/21  0010   CKTOTAL  --  466*   TROPONINI <0.01  --        Urinalysis:      Lab Results   Component Value Date    NITRU Negative 04/13/2021    WBCUA 3-5 04/13/2021    RBCUA 3-4 04/13/2021    BLOODU TRACE-INTACT 04/13/2021    SPECGRAV 1.025 04/13/2021    GLUCOSEU Negative 04/13/2021       Radiology:  MRI brain without contrast   Final Result   1. Imaging findings compatible with subacute infarct throughout the right hemisphere involving vascular distributions of right anterior cerebral artery and portions of the anterior division right middle cerebral artery. This zone of infarct includes    majority of the right temporal and right frontal lobes as well as a portion of the right parietal lobe, as well as the basal ganglia caudate and lentiform nuclei. Some areas of hemorrhagic transformation within this infarcted zone are demonstrated,    similar in appearance to recent CT from 4/14/2021. There is cytotoxic edema demonstrated.  No midline shift identified status post right frontotemporal craniectomy. Continued effacement of the right lateral ventricle is demonstrated related to the    cytotoxic edema mass effect. CT HEAD WO CONTRAST   Final Result      No change since study earlier today. CT HEAD WO CONTRAST   Final Result      Interval right hemicraniectomy with marked edema of the right cerebral hemisphere, similar to prior. New areas of hyperattenuation within the right frontal lobe reflecting likely intraparenchymal and subarachnoid hemorrhage. Small subdural hematoma, parafalcine, similar to prior. Findings were discussed with Anthony Mullen RN at 0730 hours after discovery at [facility] hours. CT head without contrast   Final Result   1. Suspect small parafalcine subdural hematoma in the left frontoparietal region measuring up to 4 mm in thickness. 2. Large zones of cytotoxic edema throughout the right cerebral hemisphere as described, within distribution of both the right anterior and right middle cerebral arteries. Hyperdense right MCA. Imaging findings are suspicious for occlusion of the right    internal carotid artery given infarct distribution within right anterior middle cerebral circulation. No intra-axial hemorrhage. 5 mm of right-to-left midline shift of the septum pellucidum. Assessment/Plan:    Active Hospital Problems    Diagnosis Date Noted    Status post craniectomy [Z98.890]     Stroke determined by clinical assessment ([facility] [address] [I63.9] 04/13/2021   Assessment and plan  #Acute ischemic stroke status post right hemicraniectomy on 4/13  Keep SBP less than 160. Hold AC/AP. Neurosurgery and neurology on board. Echo EF 55%. Bubble study failed to show PFO. Continue Keppra for seizure prophylaxis. Continue statin. #Alcohol use  Continue multivitamin and thiamine. DVT Prophylaxis: heparin  Diet: DIET DYSPHAGIA SOFT AND BITE-SIZED;  Dysphagia Soft and Bite-Sized  Dietary Nutrition Supplements: Low Calorie High Protein Supplement Code Status: Full Code    PT/OT Eval Status: in progress    Dispo - inpatient pending clinical course.     This chart was likely completed using voice recognition technology and may contain unintended grammatical , phraseology,and/or punctuation errors        Asya Saldana MD

## 2021-04-15 NOTE — PROGRESS NOTES
NUTRITION ASSESSMENT  Admission Date: 4/13/2021     Type and Reason for Visit: Initial, Positive Nutrition Screen    NUTRITION RECOMMENDATIONS:   1. Continue dysphagia soft and bite sized diet per SLP recommendations  2. Add Ensure High Protein BID and encourage acceptance    NUTRITION ASSESSMENT:  Positive screen for swallow. Patient admitted with stroke and s/p hemicraniectomy. Patient was evaluated by SLP yesterdebora who recommended dysphagia three diet however patient has been too fatigued for po. Patient reported to SLP that she had decreased appetite. Will order ONS and monitor nutritional status and need for further intervention. MALNUTRITION ASSESSMENT  Context of Malnutrition: Acute Illness   Malnutrition Status: At risk for malnutrition (Comment)  Due to current CDC guidelines recommending 6-ft distancing for social isolation for COVID19 prevention, physical aspects of the malnutrition assessment were withheld at this time.      NUTRITION DIAGNOSIS   Problem: Problem #1: Inadequate oral intake   Etiology: Acute or chronic injury or trauma  Signs & Symptoms: Intake 0-25%    NUTRITION INTERVENTION  Food and/or Nutrient Delivery: Continue Current Diet, Start Oral Nutrition Supplement Continue Current diet  or Start ONS       Coordination of Nutrition Care: Continue to monitor while inpatient       NUTRITION MONITORING & EVALUATION:  Evaluation:Goals set   Goals:Goals: Patient will tolerate and consume 50% or greater of all meals and supplements  Monitoring: Diet Tolerance , Pertinent Labs  or Supplement Intake      OBJECTIVE DATA: Significant to nutrition assessment  · Nutrition-Focused Physical Findings: No BM recorded  · Wounds Surgical Wound      Past Medical History:   Diagnosis Date    GERD (gastroesophageal reflux disease)     Hernia     Obesity     Unspecified sleep apnea     CPAP        ANTHROPOMETRICS  Current Height: 5' 6\" (167.6 cm)  Current Weight: 238 lb 8.6 oz (108.2 kg)    Admission weight: 216 lb 4.3 oz (98.1 kg)  Ideal Bodyweight 136 lb (62 kg)   Usual Bodyweight ANU   Weight Changes ANU       BMI BMI (Calculated): 38.6    Wt Readings from Last 50 Encounters:   04/15/21 238 lb 8.6 oz (108.2 kg)   04/13/21 249 lb 1.9 oz (113 kg)     COMPARATIVE STANDARDS  Estimated Total Kcals/Day : 8-15 Current Bodyweight (108 kg) 0060-7158 kcal/day    Estimated Total Protein (g/day) : 1.3-1.5 Ideal Bodyweight  (62 kg) 80-93 g/day  Estimated Daily Total Fluid (ml/day): 0568-7447 mL per day     Food / Nutrition-Related History  Pre-Admission / Home Diet:  Pre-Admission/Home Diet: General   Home Supplements / Herbals:    none noted  Food Restrictions / Cultural Requests:    none noted    Current Nutrition Therapies   DIET DYSPHAGIA SOFT AND BITE-SIZED; Dysphagia Soft and Bite-Sized     DIET DYSPHAGIA SOFT AND BITE-SIZED; Dysphagia Soft and Bite-Sized  PO Intake: 0%   PO Supplement: None   PO Supplement Intake: None   IVF: N/A ml/hr     NUTRITION RISK LEVEL: Risk Level:  Moderate     Shilpa Bird RD, LD  Adis:  [phone]  Office:  [phone]

## 2021-04-15 NOTE — PROGRESS NOTES
Speech Language Pathology  Therapy Attempt x 3    Chart reviewed, d/w RN Addi Cronin. Noted to still have NPO diet order; [employer] sent to Dr. Joshua Camarillo who provided verbal orders to change diet. Upon arrival, pt sleeping soundly in bed; easily roused. Expressed little-no sleep and requested rest.     Breakfast tray ordered and upon arrival, attempted tx again; pt reported no appetite and requested ongoing rest d/t excessive fatigue. Noted to quickly fall back asleep w/ snoring. Will attempt to see pt as more alert and schedule allows. Thank you,  Tammy Solis) [place], [place], [address] [facility]; [phone]  Speech-Language Pathologist  Pager #: [phone]      Addendum:  D/w Rufus Liter, who states okay to attempt follow up w/ pt. Pt remains lethargic, awakened briefly to verbal stim and stated she had some water. Pt stated too tired and asleep again. Educated pt spouse to purpose of visit and rationale for visit and diet recommendations. Explained need for pt to be fully alert and sitting upright for all PO. Educated to plan to follow up for treatment, as pt appropriate. Spouse stated comprehension. Deb Barnes M.S./CCC-SLP #[id number]  Pg.  # [phone] 12:13 PM

## 2021-04-15 NOTE — PROGRESS NOTES
ICU Progress Note  PGY-1    Admit Date: 4/13/2021  ICU Day: Hospital Day: 3  Vent Settings:    / / /   Diet: DIET DYSPHAGIA SOFT AND BITE-SIZED; Dysphagia Soft and Bite-Sized  Dietary Nutrition Supplements: Low Calorie High Protein Supplement  Code Status: Full Code     Interval history:  Overnight, patient had swelling in the right orbital region. Patient did not have any changes on neurological exam, and therefore a CTH was not pursued. Patient was seen this morning, with swelling around her right orbital region. Could not open her eyes for examination. Neurosurgery was contacted about this and they endorse some swelling after surgery is expected. Patient endorses slight headache that is well controlled with Tylenol. Patient denies fevers, chills, nausea, vomiting, chest pain, shortness of breath, diarrhea, constipation, dysuria, urinary frequency or urgency.      Medications:   Scheduled Meds:   acetaminophen  1,000 mg Oral 4 times per day    levetiracetam  500 mg Intravenous Q12H    polyethylene glycol  17 g Oral Daily    sennosides-docusate sodium  2 tablet Oral BID    heparin (porcine)  5,000 Units Subcutaneous 3 times per day    sodium chloride flush  5-40 mL Intravenous 2 times per day    famotidine (PEPCID) injection  20 mg Intravenous BID    atorvastatin  40 mg Oral Nightly    multivitamin  1 tablet Oral Daily    thiamine  100 mg Oral Daily    sodium chloride flush  5-40 mL Intravenous 2 times per day       Continuous Infusions:   sodium chloride         PRN Meds:  oxyCODONE **OR** oxyCODONE, HYDROmorphone **OR** HYDROmorphone, sodium chloride flush, promethazine **OR** ondansetron, perflutren lipid microspheres, labetalol, hydrALAZINE, nicotine, sodium chloride flush, sodium chloride    Vital/I&O/Physical examination:   VS:  BP (!) 151/90   Pulse 63   Temp 98.6 °F (37 °C) (Oral)   Resp 23   Ht 5' 6\" (1.676 m)   Wt 238 lb 8.6 oz (108.2 kg)   SpO2 99%   BMI 38.50 kg/m²     I/O: Intake/Output Summary (Last 24 hours) at 4/15/2021 1424  Last data filed at 4/15/2021 1400  Gross per 24 hour   Intake 1961.25 ml   Output 710 ml   Net 1251.25 ml       PE:  Physical Exam  Physical Exam  HENT:      Head: Normocephalic and atraumatic. Patient head with surgical dressing. Dressing are not very soaked with bloody output. HOLGER drain noted with about 5ml serosanguinous output. Eyes:      General: No scleral icterus.        Right eye: No discharge.         Left eye: No discharge.      Extraocular Movements: Extraocular movements intact.      Pupils: Pupils are equal, round, and reactive to light. Cardiovascular:      Rate and Rhythm: Normal rate and regular rhythm.      Pulses: Normal pulses.      Heart sounds: Normal heart sounds. No murmur. No friction rub. No gallop.    Pulmonary:      Effort: Pulmonary effort is normal. No respiratory distress.      Breath sounds: Normal breath sounds. No wheezing or rales. Abdominal:      General: Bowel sounds are normal. There is no distension.      Palpations: Abdomen is soft.      Tenderness: There is no abdominal tenderness. There is no guarding. Neurological:      Mental Status: She is alert and oriented to person, place, and time.      Sensory: Sensory deficit present.      Comments: Left-sided upper extremity weakness and left-sided lower extremity weakness  Patient has rightward gaze preference  No sensation on the left upper and left lower extremity  Sensation of the upper forehead on the left side intact   Psychiatric:         Behavior: Behavior normal.     ROS: Review of Systems   - Negative except interval history.     Labs & Imaging:   LABS:  Renal:   Recent Labs     04/13/21  0010 04/14/21  0453 04/15/21  0550    139 138   K 3.8 3.9 3.8    107 106   CO2 19* 19* 19*   BUN 10 9 9   CREATININE 0.6 0.5* <0.5*   GLUCOSE 104* 132* 105*   ANIONGAP 15 13 13     CBC:   Recent Labs     04/12/21  2315 04/14/21  0453 04/15/21  0550   WBC 21.3* 21.2* 19.4*   HGB 16.0 13.8 12.5   HCT 46.6 40.6 37.0    298 269   .6* 105.7* 106.5*                            Hepatic:   Recent Labs     04/13/21  0010   AST 41*   ALT 32   BILITOT 0.7   ALKPHOS 86     Troponin:   Recent Labs     04/12/21  2315   TROPONINI <0.01     BNP: No results for input(s): BNP in the last 72 hours. Lipids:   Recent Labs     04/13/21  1345   CHOL 251*   HDL 45     INR:   Recent Labs     04/13/21  0010   INR 1.06     Lactate: No results for input(s): LACTATE in the last 72 hours. ABGs:No results for input(s): PHART, SOR6HVN, PO2ART, RMS3IQG, BEART, THGBART, W3MGXEZM, YJW6CJV in the last 72 hours. UA:  Recent Labs     04/13/21  2130   COLORU Yellow   PHUR 5.5   WBCUA 3-5   RBCUA 3-4   CLARITYU Clear   SPECGRAV 1.025   LEUKOCYTESUR Negative   UROBILINOGEN 0.2   BILIRUBINUR Negative   BLOODU TRACE-INTACT*   GLUCOSEU Negative        IMAGING:  MRI brain without contrast   Final Result   1. Imaging findings compatible with subacute infarct throughout the right hemisphere involving vascular distributions of right anterior cerebral artery and portions of the anterior division right middle cerebral artery. This zone of infarct includes    majority of the right temporal and right frontal lobes as well as a portion of the right parietal lobe, as well as the basal ganglia caudate and lentiform nuclei. Some areas of hemorrhagic transformation within this infarcted zone are demonstrated,    similar in appearance to recent CT from 4/14/2021. There is cytotoxic edema demonstrated. No midline shift identified status post right frontotemporal craniectomy. Continued effacement of the right lateral ventricle is demonstrated related to the    cytotoxic edema mass effect. CT HEAD WO CONTRAST   Final Result      No change since study earlier today.             CT HEAD WO CONTRAST   Final Result      Interval right hemicraniectomy with marked edema of the right cerebral hemisphere, similar to #New intraparenchymal and subdural hemorrhage  CTH after right hemicraniectomy with new areas of hyperattenuation within the right frontal lobe reflecting likely intraparenchymal and subarachnoid hemorrhage.      #Depression  -Patient takes fluoxetine 20 mg daily at home     #Anxiety   -Patient takes Ativan 0.5 mg every 8 hours as needed for anxiety     Respiratory  #MISHA  -Patient uses CPAP at home     Gastroenterology  #GERD  -Patient takes omeprazole 40 mg daily at home     Endocrinology  #Obesity  Complicating assessment and treatment.  Patient's BMI is 34.91 kg/m² placing patient at risk for multiple co-morbidities as well as early death and contributing to the patient's presentation.      Infectious disease  #Leucocytosis-improving  Likely reactive to current stress of acute stroke and emergent surgical intervention. -WBC 21.2>>19.4    Code Status: Full Code  FEN: DIET DYSPHAGIA SOFT AND BITE-SIZED; Dysphagia Soft and Bite-Sized  Dietary Nutrition Supplements: Low Calorie High Protein Supplement   PPX: Subcu heparin  DISPO: ICU    Plan      -Neurochecks every 4 hours  -Keppra 500 mg twice daily for 7 days  -Continue to monitor HOLGER drain output  -Continue feeding  -Hypercoagulable studies pending  -Stop IV fluids  -Transfer out of the ICU likely tomorrow    This patient will be discussed with attending, Yumiko Nguyen MD.    Jd Armas MD, PGY- 1  Contact via [address] [place] [address]  4/15/2021,  2:24 PM  Patient examined, findings as discussed with Dr. Jani Cagle.   Agree with assessment and plan as above
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
## 2021-04-15 NOTE — PROGRESS NOTES
Drain site cleansed and suture removed, drain removed with sterile technique. Site closed with 2 staples.

## 2021-04-15 NOTE — PROGRESS NOTES
Clinical Pharmacy Progress Note    46 y.o. female admitted with ischemic stroke. Pharmacy has been asked by Dr. Beth Collado to adjust all drips to normal saline as appropriate based on compatibility to avoid fluid shifts since D5 is osmotically active. The following intermittent IV drips/infusions have been adjusted to saline:  Levetiracetam    Total IV fluid delivered to patient over last 24h: ~2 L    As patient is not being treated for pituitary tumor resection or requiring hypertonic saline (defined as >0.9% NaCl), pharmacy will sign off of IV review consult, per protocol. Please call with questions.   Nick Higgins, PharmD  PGY1 Pharmacy Resident  4/15/2021 10:23 AM  [id number]

## 2021-04-15 NOTE — PROGRESS NOTES
D: Pt's right eye appears grossly swollen. Roosevelt Chaney has lost suction. 20ccs total out overnight. Neuro exam at baseline. A: Informed internal medicine residents. Applied ice to right eye. Paged neuro surgery on call. R: Currently waiting on call back. Will continue to monitor.

## 2021-04-15 NOTE — PLAN OF CARE
Problem: Pain:  Goal: Pain level will decrease  Description: Pain level will decrease  Outcome: Ongoing  Goal: Control of acute pain  Description: Control of acute pain  Outcome: Ongoing  Goal: Control of chronic pain  Description: Control of chronic pain  Outcome: Ongoing     Problem: HEMODYNAMIC STATUS  Goal: Patient has stable vital signs and fluid balance  Outcome: Ongoing     Problem: ACTIVITY INTOLERANCE/IMPAIRED MOBILITY  Goal: Mobility/activity is maintained at optimum level for patient  Outcome: Ongoing     Problem: COMMUNICATION IMPAIRMENT  Goal: Ability to express needs and understand communication  Outcome: Ongoing     Problem: Skin Integrity:  Goal: Will show no infection signs and symptoms  Description: Will show no infection signs and symptoms  Outcome: Ongoing  Goal: Absence of new skin breakdown  Description: Absence of new skin breakdown  Outcome: Ongoing     Problem: Falls - Risk of:  Goal: Will remain free from falls  Description: Will remain free from falls  Outcome: Ongoing  Goal: Absence of physical injury  Description: Absence of physical injury  Outcome: Ongoing     Problem: Discharge Planning:  Goal: Participates in care planning  Description: Participates in care planning  Outcome: Ongoing  Goal: Discharged to appropriate level of care  Description: Discharged to appropriate level of care  Outcome: Ongoing     Problem: Nutrition Deficit:  Goal: Ability to achieve adequate nutritional intake will improve  Description: Ability to achieve adequate nutritional intake will improve  Outcome: Ongoing     Problem: Pain:  Goal: Pain level will decrease  Description: Pain level will decrease  Outcome: Ongoing  Goal: Recognizes and communicates pain  Description: Recognizes and communicates pain  Outcome: Ongoing  Goal: Control of acute pain  Description: Control of acute pain  Outcome: Ongoing  Goal: Control of chronic pain  Description: Control of chronic pain  Outcome: Ongoing     Problem: Skin

## 2021-04-16 LAB
ANION GAP SERPL CALCULATED.3IONS-SCNC: 12 MMOL/L (ref 3–16)
BASOPHILS ABSOLUTE: 0 K/UL (ref 0–0.2)
BASOPHILS RELATIVE PERCENT: 0 %
BUN BLDV-MCNC: 9 MG/DL (ref 7–20)
CALCIUM SERPL-MCNC: 8.9 MG/DL (ref 8.3–10.6)
CHLORIDE BLD-SCNC: 100 MMOL/L (ref 99–110)
CO2: 20 MMOL/L (ref 21–32)
CREAT SERPL-MCNC: <0.5 MG/DL (ref 0.6–1.1)
EOSINOPHILS ABSOLUTE: 0 K/UL (ref 0–0.6)
EOSINOPHILS RELATIVE PERCENT: 0 %
GFR AFRICAN AMERICAN: >60
GFR NON-AFRICAN AMERICAN: >60
GLUCOSE BLD-MCNC: 107 MG/DL (ref 70–99)
HCT VFR BLD CALC: 37.2 % (ref 36–48)
HEMOGLOBIN: 12.9 G/DL (ref 12–16)
LYMPHOCYTES ABSOLUTE: 2.7 K/UL (ref 1–5.1)
LYMPHOCYTES RELATIVE PERCENT: 16 %
MACROCYTES: ABNORMAL
MAGNESIUM: 2.1 MG/DL (ref 1.8–2.4)
MCH RBC QN AUTO: 36.2 PG (ref 26–34)
MCHC RBC AUTO-ENTMCNC: 34.7 G/DL (ref 31–36)
MCV RBC AUTO: 104.2 FL (ref 80–100)
MONOCYTES ABSOLUTE: 1.8 K/UL (ref 0–1.3)
MONOCYTES RELATIVE PERCENT: 11 %
NEUTROPHILS ABSOLUTE: 12.2 K/UL (ref 1.7–7.7)
NEUTROPHILS RELATIVE PERCENT: 73 %
PDW BLD-RTO: 13.8 % (ref 12.4–15.4)
PLATELET # BLD: 307 K/UL (ref 135–450)
PMV BLD AUTO: 8.5 FL (ref 5–10.5)
POLYCHROMASIA: ABNORMAL
POTASSIUM REFLEX MAGNESIUM: 3.5 MMOL/L (ref 3.5–5.1)
RBC # BLD: 3.57 M/UL (ref 4–5.2)
REPORT: NORMAL
SODIUM BLD-SCNC: 130 MMOL/L (ref 136–145)
SODIUM BLD-SCNC: 132 MMOL/L (ref 136–145)
WBC # BLD: 16.7 K/UL (ref 4–11)

## 2021-04-16 PROCEDURE — 36415 COLL VENOUS BLD VENIPUNCTURE: CPT

## 2021-04-16 PROCEDURE — 99232 SBSQ HOSP IP/OBS MODERATE 35: CPT | Performed by: NURSE PRACTITIONER

## 2021-04-16 PROCEDURE — 2580000003 HC RX 258: Performed by: STUDENT IN AN ORGANIZED HEALTH CARE EDUCATION/TRAINING PROGRAM

## 2021-04-16 PROCEDURE — 80048 BASIC METABOLIC PNL TOTAL CA: CPT

## 2021-04-16 PROCEDURE — 97112 NEUROMUSCULAR REEDUCATION: CPT

## 2021-04-16 PROCEDURE — 85730 THROMBOPLASTIN TIME PARTIAL: CPT

## 2021-04-16 PROCEDURE — 85613 RUSSELL VIPER VENOM DILUTED: CPT

## 2021-04-16 PROCEDURE — 92526 ORAL FUNCTION THERAPY: CPT

## 2021-04-16 PROCEDURE — 86147 CARDIOLIPIN ANTIBODY EA IG: CPT

## 2021-04-16 PROCEDURE — 6360000002 HC RX W HCPCS: Performed by: PHYSICIAN ASSISTANT

## 2021-04-16 PROCEDURE — 6360000002 HC RX W HCPCS: Performed by: NURSE PRACTITIONER

## 2021-04-16 PROCEDURE — 83735 ASSAY OF MAGNESIUM: CPT

## 2021-04-16 PROCEDURE — 2500000003 HC RX 250 WO HCPCS: Performed by: STUDENT IN AN ORGANIZED HEALTH CARE EDUCATION/TRAINING PROGRAM

## 2021-04-16 PROCEDURE — 2580000003 HC RX 258: Performed by: PHYSICIAN ASSISTANT

## 2021-04-16 PROCEDURE — 81241 F5 GENE: CPT

## 2021-04-16 PROCEDURE — 81240 F2 GENE: CPT

## 2021-04-16 PROCEDURE — 85610 PROTHROMBIN TIME: CPT

## 2021-04-16 PROCEDURE — 2580000003 HC RX 258: Performed by: NURSE PRACTITIONER

## 2021-04-16 PROCEDURE — 6370000000 HC RX 637 (ALT 250 FOR IP): Performed by: STUDENT IN AN ORGANIZED HEALTH CARE EDUCATION/TRAINING PROGRAM

## 2021-04-16 PROCEDURE — 97530 THERAPEUTIC ACTIVITIES: CPT

## 2021-04-16 PROCEDURE — 85025 COMPLETE CBC W/AUTO DIFF WBC: CPT

## 2021-04-16 PROCEDURE — 6370000000 HC RX 637 (ALT 250 FOR IP): Performed by: PHYSICIAN ASSISTANT

## 2021-04-16 PROCEDURE — 97110 THERAPEUTIC EXERCISES: CPT

## 2021-04-16 PROCEDURE — 6370000000 HC RX 637 (ALT 250 FOR IP): Performed by: NURSE PRACTITIONER

## 2021-04-16 PROCEDURE — 2500000003 HC RX 250 WO HCPCS: Performed by: PHYSICIAN ASSISTANT

## 2021-04-16 PROCEDURE — 6370000000 HC RX 637 (ALT 250 FOR IP): Performed by: INTERNAL MEDICINE

## 2021-04-16 PROCEDURE — 99255 IP/OBS CONSLTJ NEW/EST HI 80: CPT | Performed by: PHYSICAL MEDICINE & REHABILITATION

## 2021-04-16 PROCEDURE — 85303 CLOT INHIBIT PROT C ACTIVITY: CPT

## 2021-04-16 PROCEDURE — 85306 CLOT INHIBIT PROT S FREE: CPT

## 2021-04-16 PROCEDURE — 84295 ASSAY OF SERUM SODIUM: CPT

## 2021-04-16 PROCEDURE — 86146 BETA-2 GLYCOPROTEIN ANTIBODY: CPT

## 2021-04-16 PROCEDURE — 1200000000 HC SEMI PRIVATE

## 2021-04-16 PROCEDURE — 97535 SELF CARE MNGMENT TRAINING: CPT

## 2021-04-16 PROCEDURE — 2580000003 HC RX 258: Performed by: NEUROLOGICAL SURGERY

## 2021-04-16 PROCEDURE — 92507 TX SP LANG VOICE COMM INDIV: CPT

## 2021-04-16 RX ORDER — AMLODIPINE BESYLATE 5 MG/1
5 TABLET ORAL DAILY
Status: DISCONTINUED | OUTPATIENT
Start: 2021-04-16 | End: 2021-04-23 | Stop reason: HOSPADM

## 2021-04-16 RX ORDER — SODIUM CHLORIDE 1000 MG
1 TABLET, SOLUBLE MISCELLANEOUS ONCE
Status: DISCONTINUED | OUTPATIENT
Start: 2021-04-16 | End: 2021-04-16

## 2021-04-16 RX ORDER — SODIUM CHLORIDE 1000 MG
1 TABLET, SOLUBLE MISCELLANEOUS
Status: DISCONTINUED | OUTPATIENT
Start: 2021-04-16 | End: 2021-04-23 | Stop reason: HOSPADM

## 2021-04-16 RX ADMIN — THERA TABS 1 TABLET: TAB at 08:08

## 2021-04-16 RX ADMIN — FAMOTIDINE 20 MG: 10 INJECTION, SOLUTION INTRAVENOUS at 08:15

## 2021-04-16 RX ADMIN — HEPARIN SODIUM 5000 UNITS: 5000 INJECTION INTRAVENOUS; SUBCUTANEOUS at 14:28

## 2021-04-16 RX ADMIN — ACETAMINOPHEN 1000 MG: 500 TABLET, COATED ORAL at 20:24

## 2021-04-16 RX ADMIN — ATORVASTATIN CALCIUM 40 MG: 40 TABLET, FILM COATED ORAL at 20:24

## 2021-04-16 RX ADMIN — SODIUM CHLORIDE 500 MG: 900 INJECTION, SOLUTION INTRAVENOUS at 00:17

## 2021-04-16 RX ADMIN — OXYCODONE HYDROCHLORIDE 10 MG: 5 TABLET ORAL at 05:13

## 2021-04-16 RX ADMIN — POLYETHYLENE GLYCOL (3350) 17 G: 17 POWDER, FOR SOLUTION ORAL at 08:14

## 2021-04-16 RX ADMIN — DOCUSATE SODIUM 50 MG AND SENNOSIDES 8.6 MG 2 TABLET: 8.6; 5 TABLET, FILM COATED ORAL at 08:08

## 2021-04-16 RX ADMIN — Medication 10 ML: at 08:22

## 2021-04-16 RX ADMIN — DOCUSATE SODIUM 50 MG AND SENNOSIDES 8.6 MG 2 TABLET: 8.6; 5 TABLET, FILM COATED ORAL at 20:24

## 2021-04-16 RX ADMIN — ACETAMINOPHEN 1000 MG: 500 TABLET, COATED ORAL at 05:06

## 2021-04-16 RX ADMIN — ACETAMINOPHEN 1000 MG: 500 TABLET, COATED ORAL at 00:17

## 2021-04-16 RX ADMIN — Medication 1 G: at 17:13

## 2021-04-16 RX ADMIN — AMLODIPINE BESYLATE 5 MG: 5 TABLET ORAL at 08:39

## 2021-04-16 RX ADMIN — FAMOTIDINE 20 MG: 10 INJECTION, SOLUTION INTRAVENOUS at 20:24

## 2021-04-16 RX ADMIN — Medication 5 ML: at 22:17

## 2021-04-16 RX ADMIN — Medication 100 MG: at 08:14

## 2021-04-16 RX ADMIN — OXYCODONE HYDROCHLORIDE 10 MG: 5 TABLET ORAL at 17:11

## 2021-04-16 RX ADMIN — LABETALOL HYDROCHLORIDE 10 MG: 5 INJECTION, SOLUTION INTRAVENOUS at 00:34

## 2021-04-16 RX ADMIN — HEPARIN SODIUM 5000 UNITS: 5000 INJECTION INTRAVENOUS; SUBCUTANEOUS at 20:24

## 2021-04-16 RX ADMIN — Medication 10 ML: at 08:23

## 2021-04-16 RX ADMIN — OXYCODONE HYDROCHLORIDE 10 MG: 5 TABLET ORAL at 20:24

## 2021-04-16 RX ADMIN — HEPARIN SODIUM 5000 UNITS: 5000 INJECTION INTRAVENOUS; SUBCUTANEOUS at 05:08

## 2021-04-16 RX ADMIN — SODIUM CHLORIDE 500 MG: 900 INJECTION, SOLUTION INTRAVENOUS at 12:41

## 2021-04-16 ASSESSMENT — PAIN DESCRIPTION - ONSET: ONSET: ON-GOING

## 2021-04-16 ASSESSMENT — PAIN DESCRIPTION - DESCRIPTORS: DESCRIPTORS: ACHING

## 2021-04-16 ASSESSMENT — PAIN DESCRIPTION - FREQUENCY
FREQUENCY: CONTINUOUS

## 2021-04-16 ASSESSMENT — PAIN DESCRIPTION - LOCATION
LOCATION: HEAD

## 2021-04-16 ASSESSMENT — PAIN DESCRIPTION - ORIENTATION
ORIENTATION: RIGHT
ORIENTATION: RIGHT

## 2021-04-16 ASSESSMENT — PAIN SCALES - GENERAL
PAINLEVEL_OUTOF10: 5
PAINLEVEL_OUTOF10: 0
PAINLEVEL_OUTOF10: 5

## 2021-04-16 ASSESSMENT — PAIN DESCRIPTION - PROGRESSION
CLINICAL_PROGRESSION: NOT CHANGED
CLINICAL_PROGRESSION: NOT CHANGED
CLINICAL_PROGRESSION: GRADUALLY WORSENING
CLINICAL_PROGRESSION: NOT CHANGED
CLINICAL_PROGRESSION: NOT CHANGED

## 2021-04-16 ASSESSMENT — PAIN - FUNCTIONAL ASSESSMENT
PAIN_FUNCTIONAL_ASSESSMENT: PREVENTS OR INTERFERES SOME ACTIVE ACTIVITIES AND ADLS
PAIN_FUNCTIONAL_ASSESSMENT: PREVENTS OR INTERFERES SOME ACTIVE ACTIVITIES AND ADLS

## 2021-04-16 ASSESSMENT — PAIN DESCRIPTION - PAIN TYPE
TYPE: SURGICAL PAIN
TYPE: SURGICAL PAIN

## 2021-04-16 NOTE — PROGRESS NOTES
4 Eyes Admission Assessment     I agree as the admission nurse that 2 RN's have performed a thorough Head to Toe Skin Assessment on the patient. ALL assessment sites listed below have been assessed on admission. Areas assessed by both nurses:   [x]   Head, Face, and Ears   [x]   Shoulders, Back, and Chest  [x]   Arms, Elbows, and Hands   [x]   Coccyx, Sacrum, and Ischium  [x]   Legs, Feet, and Heels        Does the Patient have Skin Breakdown?   No         Mark Prevention initiated:  No   Wound Care Orders initiated:  No      [facility] nurse consulted for Pressure Injury (Stage 3,4, Unstageable, DTI, NWPT, and Complex wounds) or Mark score 18 or lower:  No      Nurse 1 eSignature: Electronically signed by Brandon Song RN on 4/16/21 at 1:10 AM EDT    **SHARE this note so that the co-signing nurse is able to place an eSignature**    Nurse 2 eSignature: Electronically signed by Efren Pereira RN on 4/18/21 at 4:30 AM EDT

## 2021-04-16 NOTE — PLAN OF CARE
Problem: Pain:  Goal: Pain level will decrease  Description: Pain level will decrease  4/16/2021 1933 by Ezequiel Fan RN  Outcome: Ongoing     Problem: Pain:  Goal: Control of acute pain  Description: Control of acute pain  4/16/2021 1933 by Ezequiel Fan RN  Outcome: Ongoing     Problem: Falls - Risk of:  Goal: Will remain free from falls  Description: Will remain free from falls  Outcome: Ongoing     Problem: Falls - Risk of:  Goal: Absence of physical injury  Description: Absence of physical injury  Outcome: Ongoing

## 2021-04-16 NOTE — PROGRESS NOTES
Occupational Therapy  Facility/Department: [facility] 5T ORTHO/NEURO  Daily Treatment Note  NAME: Donna Tran  : 1970  MRN: [id number]    Date of Service: 2021    Discharge Recommendations:    Donna Tran scored a 10/24 on the AM-PAC ADL Inpatient form. Current research shows that an AM-PAC score of 17 or less is typically not associated with a discharge to the patient's home setting. Based on the patient's AM-PAC score and their current ADL deficits, it is recommended that the patient have 3-5 sessions per week of Occupational Therapy at d/c to increase the patient's independence. Please see assessment section for further patient specific details. If patient discharges prior to next session this note will serve as a discharge summary. Please see below for the latest assessment towards goals. Assessment   Assessment: Pt limited by fatigue with difficulty keeping eyes open during session. LUE and LE continue to have significant tone and decreased strenght and sensation. Pt requiring Max A x2 for Bed mobility and stand pivot transfer from EOB to Chair. Pt would benefit from inpt OT upond discharge. If activity tolerance increases pt may benefit from intensive inpt OT. Continue OT per POC. Treatment Diagnosis: decreased independence with ADLs and fx transfers sedondary to left hemiparesis  OT Education: OT Role;Plan of Care;Transfer Training;Precautions  Patient Education: Pt verb understanding and will need reinforcement  Activity Tolerance  Activity Tolerance: Patient limited by fatigue  Activity Tolerance: Pt with difficulty keeping eyes open during session. Patient Diagnosis(es): There were no encounter diagnoses. has a past medical history of GERD (gastroesophageal reflux disease), Hernia, Obesity, and Unspecified sleep apnea. has a past surgical history that includes Splenectomy (); Total hip arthroplasty (); Lap Band (); hernia repair ();   section (2004); and craniotomy (Right, 4/13/2021). Restrictions  Position Activity Restriction  Other position/activity restrictions: Ambulate, Up with assist, Pt to wear hemet when OOB       Diagnosis: Stroke determined by clinical assessment  Treatment Diagnosis: decreased independence with ADLs and fx transfers sedondary to left hemiparesis    Subjective:   Pt met supine in bed requiring increased time for arousal. Pt cooperative and motivated for therapy despite fatigue. Pain:   Headache    Objective:    Cognition/Orientation:  Oriented x4     Bed mobility   Rolling: Mod A   Supine to sit: Max A x2 with VCs for tech  Scooting: Max A scooting to EOB and max A x2 scooting back in chair    Functional Mobility   Sit to Stand: Max A x2 from EOB with LLateral lean   Stand to Sit: Max A x2  Bed to Chair Transfer: Max A x2 for stand pivot transfer from EOB to chair    Pt sitting EOB for 4 min with L lateral lean but increased abililty to move toward midline requiring Min - Mod A for maintaining sitting. ADLs   Grooming: SBA washing face seated in recliner chair and Max A washing L hand    UE Exercises   PROM for LUE with prolong stretch for decreased tone  10 reps all digits flex/ext  10 reps elbow flex/ext  10 reps horizontal ab/adduction      fashioned and placed in Lhand. Safety Devices in Place:  Pt left seated in recliner chair.             Plan  If pt discharges prior to next treatment, this note will serve as discharge summary  Plan  Times per week: 5-7  Current Treatment Recommendations: Strengthening, Endurance Training, Neuromuscular Re-education, Self-Care / ADL, Functional Mobility Training, Safety Education & Training           AM-PAC Score        AM-PAC Inpatient Daily Activity Raw Score: 10 (04/16/21 1216)  AM-PAC Inpatient ADL T-Scale Score : 27.31 (04/16/21 1216)  ADL Inpatient CMS 0-100% Score: 74.7 (04/16/21 1216)  ADL Inpatient CMS G-Code Modifier : CL (04/16/21 1216)    Goals (as determined and assessed by primary OT)  Short term goals  Time Frame for Short term goals: by discharge  Short term goal 1: Pt will perform bed mobility with max A - ongoing  Short term goal 2: Pt will perform seated on EOB for ~5 min with CGA to prepare for ADLs - ongoing  Short term goal 3: Pt will perform hemipalegic dressing technique for UB dressing with assist as needed - ongoing  Short term goal 4: Pt will perform 5 hand squeezes with LUE to increase functional movement with no more than verbal encouragement - ongoing  Patient Goals   Patient goals : not stated       Therapy Time   Individual Concurrent Group Co-treatment   Time In 1130         Time Out 1215         Minutes 45         Timed Code Treatment Minutes: Roseanna Young 364, LOMELI/L

## 2021-04-16 NOTE — PROGRESS NOTES
Speech Language Pathology  Facility/Department: [facility] 5T ORTHO/NEURO  Sp/language/cognitive Daily Treatment Note    NAME: Jorge Coleman  : 1970  MRN: [id number]    Patient Diagnosis(es):   Patient Active Problem List    Diagnosis Date Noted    Status post craniectomy     [facility]    Status post gastric banding 2012    Obesity 2012    GERD (gastroesophageal reflux disease)     Sleep apnea     Hernia      Allergies: No Known Allergies     CT of head 21  Impression   Findings most consistent with large acute/subacute infarct in the right   frontal lobe with associated mass effect as above.  Further evaluation with   MRI is suggested for better characterization and exclusion of additional   underlying pathology.       Additionally there is a small subdural hematoma along the falx cerebrum.      MRI brain 21  Impression   1. Imaging findings compatible with subacute infarct throughout the right hemisphere involving vascular distributions of right anterior cerebral artery and portions of the anterior division right middle cerebral artery. This zone of infarct includes    majority of the right temporal and right frontal lobes as well as a portion of the right parietal lobe, as well as the basal ganglia caudate and lentiform nuclei. Some areas of hemorrhagic transformation within this infarcted zone are demonstrated,    similar in appearance to recent CT from 2021. There is cytotoxic edema demonstrated. No midline shift identified status post right frontotemporal craniectomy. Continued effacement of the right lateral ventricle is demonstrated related to the    cytotoxic edema mass effect.              Initial Assessment 21  pt exhibiting mild dysarthria, delayed responses, left visual field as well as cognitive impairment. No word finding/paraphasias noted. Pt followed commands and answered yes/no questions appropriately. Pt exhibited flat affect, kept eyes closed unless cued to keep open. Pt demonstrated significant difficulty with money/time concept problems. Chart reviewed. Pain: no  Medical diagnosis: stroke  Treatment diagnosis: dysarthria/cognitive impairment    Treatment:  Pt seen to address the following goals:  Goal 1: Pt will attend to left for reading/writing tasks with min cues  4/16: pt cont with left neglect, gazes to right requiring max cues to attend to left. Did not address reading/writing as pt became lethargic as session progressed  Cont goal     Goal 2: Pt will provide 3 solutions to functional problems with 80% accuracy  4/16: pt proving 1-2 solutions to everyday problems. Pt demonstrated decreased flexibility of thought when asked to provide additional solutions. Responses were often concrete  Cont goal    Goal 3: Pt will solve money/time concept problems with 50% accuracy  4/16: pt solved functional money/time concept problems with 20% accuracy and max cues provided. Cont goal    Goal 4- the patient will name 3 strategies for improved speech intelligibility  4/16: pt educated to strategies for improved speech intelligibility. Pt did not restate when requested  Cont goal    Goal 5- The patient will use strategies for improved speech intelligibility with mod cues in structured sentence level tasks  4/16: speech is ~80% intelligible this date, although lethargy appears to be impacting this as well. Pt completed graded phrases, requiring max cues to utilize strategies  Cont goal    Education:  Pt educated to purpose of visit and tasks presented. Pt stated comprehension    Plan:  Continue speech/language therapy to address above goals, 3-5 x/week x LOS  DC recommendations:ongonig treatment indicated  D/W nursing  Paige Oconnell M.S./[facility]-SLP #[id number]  Pg. # [phone]  Needs met prior to leaving room, call button in reach.   Treatment time:15  If patient is discharged prior to next treatment, this note will

## 2021-04-16 NOTE — PROGRESS NOTES
NEUROSURGERY PROGRESS NOTE    4/16/2021 11:26 AM                               Sydnee Moore                      LOS: 3 days   POD#3  s/p Procedure(s) (LRB):  RIGHT HEMICRANIECTOMY (Right)    Subjective: Patient moved to floor yesterday, easily arousable, no new events noted overnight        Physical Exam:  Patient seen and examined    Vitals:    04/16/21 1101   BP: 137/85   Pulse: 72   Resp: 16   Temp: 99.1 °F (37.3 °C)   SpO2: 98%     GCS:  4 - Opens eyes to loud noise or command  5 - Alert and oriented  6 - Follows simple motor commands  General: Well developed. Alert and cooperative in no acute distress.     HENT: atraumatic, neck supple  Eyes: Optic discs: Not tested  Pulmonary: unlabored respiratory effort  Cardiovascular:  Warm well perfused. No peripheral edema  Gastrointestinal: abdomen soft, NT, ND     Neurological:  Mental Status: Awake, alert  Attention: Intact  Language: Dysarthic  Sensation: Decreased on left  Coordination: Intact    Cranial Nerves:  II: Left hemianopsia  III, IV, VI: PERRL, 3 mm bilaterally, EOMI, no nystagmus noted  V: Facial sensation intact bilaterally to touch  VII: Left facial droop  VIII: Hearing intact bilaterally to spoken voice  IX: Palate movement equal bilaterally  XI: Shoulder shrug R>L  XII: Tongue midline     Musculoskeletal:   Gait: Not tested   Assist devices: None   Tone: Left Flaccid; Right Normal  Motor strength:     Right  Left      Right  Left    Deltoid  5 0   Hip Flex  5 0   Biceps  5 0   Knee Extensors  5 0   Triceps  5 0   Knee Flexors  5 0   Wrist Ext  5 0   Ankle Dorsiflex. 5 0   Wrist Flex  5 0   Ankle Plantarflex.   5 0   Handgrip  5 0   Ext Gideon Longus  5 0   Thumb Ext  5 0              Incision: cranial incision w/ stable, clean, dry        Labs:  Recent Labs     04/16/21  0619   WBC 16.7*   HGB 12.9   HCT 37.2          Recent Labs     04/16/21  0619   *   K 3.5      CO2 20*   BUN 9   CREATININE <0.5*   GLUCOSE 107*   CALCIUM 8.9   MG 2.10       No results for input(s): PROTIME, INR, APTT in the last 72 hours. Patient Active Problem List    Diagnosis Date Noted    Status post craniectomy     Stroke determined by clinical assessment ([facility] 04/13/2021    Status post gastric banding 11/01/2012    Obesity 11/01/2012    GERD (gastroesophageal reflux disease)     Sleep apnea     Hernia        Assessment:  Patient is a 46 y.o. female w/ R MCA/LEAH ischemic stroke s/p R Hemicraniectomy by Dr. Maykel Turpin on 4/13/2021    Plan:  1. Neurologically stable  2. Neurologic exams frequency: Q4H  3. For change in exam MUST contact neurosurgery team along with critical care or primary team  4. Head CT stable  5. Cerebral edema:  - Keep Na within normal limits  - Keep HOB >30 degrees  6. GI Prophylaxis: Pepcid  7. Seizure prophylaxis: Keppra 500 mg BID x 7 days - okay to switch to PO  8. Bowel Regimen: Glycolax and Senokot-S  9. Pain control: Per primary team  10. DVT Prophylaxis: SCD's & SQ Heparin  11. Mobility:  - Advance as tolerates  - PT/OT consulted, appreciate recs  12. Diet: Advance as tolerates  13. Appreciate critical care team assistance in management  14. Will follow inpatient. Please call with any questions or decline in neurological status  15. PT/OT  16. PMR consult for Stroke rehab     DISPO: Inpatient - floor status - PM&R consulted today    Patient was discussed with Dr. Maykel Turpin who agrees with above assessment and plan.      Electronically signed by: SHIN Diego, 4/16/2021 11:26 AM  [phone]
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
## 2021-04-16 NOTE — PROGRESS NOTES
ICU transfer admitted to [facility], A&Ox4, VSS, denies n/v at this time. Pain is managed with Prn and scheduled meds. No skin issues noted, fall precautions in place. Oriented to room. No need expressed at this time.

## 2021-04-16 NOTE — CONSULTS
Consult Note  Physical Medicine and Rehabilitation    Patient: Colton Sutherland  [id number]  Date: 2021      Chief Complaint: Left-sided weakness    History of Present Illness/Hospital Course:  47 YO F PMH GERD, depression/anxiety presented to [facility] ED via EMS for new onset left sided weakness. Pt states that she fell out of her bed the night prior to admission and was down on the ground all night until the AM.  Patient endorses she did not let her  call EMS because she felt like she could improve. Patient claims EMS was finally called after her coworker started calling her in the morning. Pts GCS was 15 upon arrival @ ED with L sided facial droop, L sided weakness & R sided gaze deviation. Stroke alert was called. In the ED she was hemodynamically stable with /81. CTH revealed large acute/subacute infarct in the R frontal lobe with associated mass effect and 4 mm R to L midline shift. It also noted subdural hemorrhage of 6mm along falx, although NSGY less convinced. CTA head and neck revealed occluded R cervical ICA & R anterior cerebral artery with near complete occlusion of R MCA. She was ultimately seen by NSx who performed a R hemicraniectomy. A MRI on  showing large subacute infarct throughout R LEAH and MCA with some areas of hemorrhagic conversion. She has been on seizure ppx.      Prior Level of Function:  Independent for mobility, ADLs, and IADLs    Current Level of Function:  PT:  AM-PAC score  OT: 10/24 AM-PAC score    Pertinent Social History:  Support: lives at home with her   Home set-up: unknown    Past Medical History:   Diagnosis Date    GERD (gastroesophageal reflux disease)     Hernia     Obesity     Unspecified sleep apnea     CPAP       Past Surgical History:   Procedure Laterality Date     SECTION  2004    CRANIOTOMY Right 2021    RIGHT HEMICRANIECTOMY performed by Adela Hinojosa MD at [place] [id number]      LAP BAND 2006    SPLENECTOMY  2000    TOTAL HIP ARTHROPLASTY  2001       Family History   Problem Relation Age of Onset   Shanice Meyer Cancer Mother     Depression Mother     Mental Illness Mother     Stroke Father     Cancer Father         prostate       Social History     Socioeconomic History    Marital status:      Spouse name: None    Number of children: None    Years of education: None    Highest education level: None   Occupational History    None   Social Needs    Financial resource strain: None    Food insecurity     Worry: None     Inability: None    Transportation needs     Medical: None     Non-medical: None   Tobacco Use    Smoking status: Current Every Day Smoker     Packs/day: 1.00     Years: 20.00     Pack years: 20.00    Smokeless tobacco: Never Used   Substance and Sexual Activity    Alcohol use: Yes     Comment: occasional    Drug use: No    Sexual activity: None   Lifestyle    Physical activity     Days per week: None     Minutes per session: None    Stress: None   Relationships    Social connections     Talks on phone: None     Gets together: None     Attends Rastafari service: None     Active member of club or organization: None     Attends meetings of clubs or organizations: None     Relationship status: None    Intimate partner violence     Fear of current or ex partner: None     Emotionally abused: None     Physically abused: None     Forced sexual activity: None   Other Topics Concern    None   Social History Narrative    None           REVIEW OF SYSTEMS:   CONSTITUTIONAL: negative for fevers, chills, diaphoresis, appetite change, night sweats, unexpected weight change, fatigue  EYES: negative for blurred vision, eye discharge, visual disturbance and icterus  HEENT: negative for hearing loss, tinnitus, ear drainage, sinus pressure, nasal congestion, epistaxis and snoring  RESPIRATORY: Negative for hemoptysis, cough, sputum production  CARDIOVASCULAR: negative for chest pain, palpitations, exertional chest pressure/discomfort, syncope, edema  GASTROINTESTINAL: negative for nausea, vomiting, diarrhea, blood in stool, abdominal pain, constipation  GENITOURINARY: negative for frequency, dysuria, urinary incontinence, decreased urine volume, and hematuria  HEMATOLOGIC/LYMPHATIC: negative for easy bruising, bleeding and lymphadenopathy  ALLERGIC/IMMUNOLOGIC: negative for recurrent infections, angioedema, anaphylaxis and drug reactions  ENDOCRINE: negative for weight changes and diabetic symptoms including polyuria, polydipsia and polyphagia  MUSCULOSKELETAL: negative for pain, joint swelling, decreased range of motion  NEUROLOGICAL: negative for headaches, slurred speech, unilateral weakness  PSYCHIATRIC/BEHAVIORAL: negative for hallucinations, behavioral problems, confusion and agitation.      Physical Examination:  Vitals:   Patient Vitals for the past 24 hrs:   BP Temp Temp src Pulse Resp SpO2   04/16/21 1101 137/85 99.1 °F (37.3 °C) Oral 72 16 98 %   04/16/21 0600 (!) 151/76 98.8 °F (37.1 °C) Oral 82 18 99 %   04/16/21 0100 (!) 149/80 98.4 °F (36.9 °C) Oral 79 18 98 %   04/16/21 0000 (!) 166/98 98.7 °F (37.1 °C) Oral 83 22 100 %   04/15/21 2300 (!) 153/94   68 22 99 %   04/15/21 2200 (!) 153/78   77 19 100 %   04/15/21 2100 (!) 167/72   82 23 100 %   04/15/21 2000 (!) 154/81 98.4 °F (36.9 °C) Oral 82 24 99 %   04/15/21 1900 (!) 165/112   113 21 99 %   04/15/21 1800 (!) 156/73   65 22 98 %   04/15/21 1730 (!) 150/80   72 20 99 %   04/15/21 1700 (!) 167/76        04/15/21 1630 (!) 156/76   74 24 98 %   04/15/21 1600 (!) 162/75 98.4 °F (36.9 °C) Oral 79 23 99 %   04/15/21 1530 (!) 161/74   66 22 98 %   04/15/21 1500 (!) 156/67   65 21 98 %   04/15/21 1430 (!) 163/87   78 20 99 %   04/15/21 1404 (!) 151/90   63 23 99 %   04/15/21 1400    72 20    04/15/21 1300 (!) 167/86   56 19 99 %   04/15/21 1248 (!) 176/75        04/15/21 1230 (!) 176/75   59 18 98 % 04/15/21 1200 (!) 161/73 98.6 °F (37 °C) Oral 55 19 99 %     Const: Alert. WDWN. No distress  Eyes: Conjunctiva noninjected, no icterus noted; pupils equal, round, and reactive to light. HENT: Scalp sutures in place without drainage or marginal erythema. Oral mucosa moist  Neck: Trachea midline, neck supple. No thyromegaly noted. CV: Regular rate and rhythm, no murmur rub or gallop noted  Resp: Lungs clear to auscultation bilaterally, no rales wheezes or ronchi, no retractions. Respirations unlabored. GI: Soft, nontender, nondistended. Normal bowel sounds. No palpable masses. Skin: Normal temperature and turgor. No rashes or breakdown noted. Ext: No significant edema appreciated. No varicosities. MSK: No joint tenderness, erythema, warmth noted. AROM intact. Neuro: Alert, oriented, appropriate. No cranial nerve deficits appreciated. 0/5 LUE and LLE. 5.5 stength on RHS. Psych: Stable mood, normal judgement, normal affect     Lab Results   Component Value Date    WBC 16.7 (H) 04/16/2021    HGB 12.9 04/16/2021    HCT 37.2 04/16/2021    .2 (H) 04/16/2021     04/16/2021     Lab Results   Component Value Date    INR 1.06 04/13/2021    PROTIME 12.3 04/13/2021     Lab Results   Component Value Date    CREATININE <0.5 (L) 04/16/2021    BUN 9 04/16/2021     (L) 04/16/2021    K 3.5 04/16/2021     04/16/2021    CO2 20 (L) 04/16/2021     Lab Results   Component Value Date    ALT 32 04/13/2021    AST 41 (H) 04/13/2021    ALKPHOS 86 04/13/2021    BILITOT 0.7 04/13/2021       Most recent echocardiogram revealed:  4/13/21  Summary   Normal left ventricle size. There is mild-moderate concentric left   ventricular hypertrophy. Overall left ventricular systolic function appears   normal with an ejection fraction of 55%. No regional wall motion   abnormalities are noted. Diastolic filling parameters suggests normal   diastolic function.    The aortic valve appears tricuspid with minimal right anterior and right middle cerebral arteries. Hyperdense right MCA. Imaging findings are suspicious for occlusion of the right    internal carotid artery given infarct distribution within right anterior middle cerebral circulation. No intra-axial hemorrhage. 5 mm of right-to-left midline shift of the septum pellucidum. Assessment:  Margie Villafuerte a 46 y.o. female with PMH GERD p/w L sided weakness and facial droop. CTA head/neck on 4/12 revealed occluded R cervical ICA, occluded right LEAH, near complete occlusion of right MCA.     Acute ischemic CVA, Subdural heomorrhage s/p right hemicraniectomy and subsequen SAH and IP hemorrhage  No tPA given. MRI 4/14 showing large subacute infarct throughout R LEAH and MCA with some areas of hemorrhagic conversion.  - Neurosurgery and neurology following  --SBP <160, PRN hydralazine, scheduled Norvasc  -- Keppra 500 mg twice daily for 7 days  - holding antiplatelets and anticoag in setting of SAH, ICA  - f/u hypercoagulable panel  - PT/OT consulted      Depression-Patient takes fluoxetine 20 mg daily at home  Anxiety -Patient takes Ativan 0.5 mg every 8 hours as needed for anxiety  MISHA-Patient uses CPAP at home   GERD- Patient takes omeprazole 40 mg daily at home  Obesity  Complicating assessment and treatment.  Patient's BMI is 34.91 kg/m² placing patient at risk for multiple co-morbidities as well as early death and contributing to the patient's presentation.      Impairments- Decreased functional mobility, Decreased ADLs    Recommendations:    Recommend ARU placement in a few days  - Still not medically ready  -  also is interested in [facility] as they live close. - Will need re-evaluation       Thank you for this consult. Please contact me with any questions or concerns.        Khloe Morillo MD  PGY-3    Kong Marin D.O. M.P.H  PM&R  4/16/2021  11:42 AM

## 2021-04-16 NOTE — PROGRESS NOTES
Speech Language Pathology  Facility/Department: [facility] ICU  Dysphagia Daily Treatment Note    NAME: Shola Cid  : 1970  MRN: [id number]    Patient Diagnosis(es):   Patient Active Problem List    Diagnosis Date Noted    Status post craniectomy     [facility]    Status post gastric banding 2012    Obesity 2012    GERD (gastroesophageal reflux disease)     Sleep apnea     Hernia      Allergies: No Known Allergies    Recent Chest Xray 21  No acute abnormality. CT of head 21  Impression   Findings most consistent with large acute/subacute infarct in the right   frontal lobe with associated mass effect as above.  Further evaluation with   MRI is suggested for better characterization and exclusion of additional   underlying pathology.       Additionally there is a small subdural hematoma along the falx cerebrum. MRI brain 21  Impression   1. Imaging findings compatible with subacute infarct throughout the right hemisphere involving vascular distributions of right anterior cerebral artery and portions of the anterior division right middle cerebral artery. This zone of infarct includes    majority of the right temporal and right frontal lobes as well as a portion of the right parietal lobe, as well as the basal ganglia caudate and lentiform nuclei. Some areas of hemorrhagic transformation within this infarcted zone are demonstrated,    similar in appearance to recent CT from 2021. There is cytotoxic edema demonstrated. No midline shift identified status post right frontotemporal craniectomy. Continued effacement of the right lateral ventricle is demonstrated related to the    cytotoxic edema mass effect. Previous MBS - none  Chart reviewed. Medical Diagnosis: stroke  Treatment Diagnosis: dysphagia    BSE Impression 21  Pt is lethargic, but wakes easily.  Pt is oriented x3 with left facial droop and dysarthria. Pt with difficulty lateralizing tongue to the left. .  Pt had no difficulty closing lips around spoon or drawing liquid up a straw. Mastication with ice chip was prolonged with impaired coordination. There was no anterior spillage or oral residue noted with any consistency. Laryngeal movement noted with all PO trials, although question swallow delay and noted 2 spontaneous swallows noted following trials with ice chips and small sip of water. Pt produced reactive cough following 2 trials with ice chips and 2 small sips of water- Pt endorsed sensation of aspiration. No s/s aspiration with trials of 1/2 tsp of applesauce. Did not attempt cracker at this time due to concern for pt safety.     FEES results 4/14  Pt presents w/ mild oropharyngeal dysphagia characterized by premature spillage of thin liquids to UES and pyriforms w/ intermittent deep penetration of laryngeal vestibule; adequate airway protection and complete clearance of all residue after swallow initiation. No aspiration w/ any trials. Timely swallow initiation w/ puree and regular solid consistencies; no oropharyngeal residue. 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 Adequate ab/adduction. Complete closure of TVFs upon adduction.     Pain:denies    Current Diet : soft and bite sized/thin liquids    Treatment:  Pt seen bedside to address the following goals:  1- The patient will tolerate instrumental swallowing procedure  4/14: FEES Scheduled this date - goal met     2- The pt/caregiver will demonstrate understanding of swallowing recommendations and concerns. 4/13-  The pt was educated to purpose of the visit, concerns for aspiration, recommendation for FEES, a description of the procedure, and plan to return at a later time to complete speech evaluation. The pt stated comprehension and agreement.  con't goal  4/14: pt educated to Plan:  Continued daily Dysphagia treatment with goals per  plan of care. Diet recommendations: dysphagia III soft and bite sized/thin liquids  DC recommendation: ongoing treatment for sp/cognition  Treatment: 15  D/W nursing Yeison Yuan prior to session  Needs met prior to leaving room, call button in reach. Jerome Zaragoza M.S./[facility] #[id number]  Pg.  # [phone]  If patient is discharged prior to next treatment, this note will serve as the discharge summary

## 2021-04-16 NOTE — PROGRESS NOTES
Patient A&Ox4, VSS. Neuro checks unchanged, NIH scale remains 17, patient flaccid on left side. Surgical incision  SHEMAR, clean, dry, and intact. Pain managed with oral medications. Patient up to the chair for several hours, returned to bed with maxi move x2 assist. Patient anticipating ARU at discharge. Will continue to monitor.

## 2021-04-16 NOTE — PLAN OF CARE
Problem: Pain:  Goal: Pain level will decrease  Description: Pain level will decrease  Outcome: Ongoing     Problem: Pain:  Goal: Control of acute pain  Description: Control of acute pain  Outcome: Ongoing     Problem: Falls - Risk of:  Goal: Will remain free from falls  Description: Will remain free from falls  Outcome: Ongoing

## 2021-04-16 NOTE — PLAN OF CARE
Problem: Pain:  Goal: Pain level will decrease  Description: Pain level will decrease  Outcome: Ongoing     Problem: Pain:  Goal: Control of acute pain  Description: Control of acute pain  Outcome: Ongoing     Problem: HEMODYNAMIC STATUS  Goal: Patient has stable vital signs and fluid balance  Outcome: Ongoing     Problem: ACTIVITY INTOLERANCE/IMPAIRED MOBILITY  Goal: Mobility/activity is maintained at optimum level for patient  Outcome: Ongoing     Problem: COMMUNICATION IMPAIRMENT  Goal: Ability to express needs and understand communication  Outcome: Ongoing     Problem: Skin Integrity:  Goal: Will show no infection signs and symptoms  Description: Will show no infection signs and symptoms  Outcome: Ongoing     Problem: Discharge Planning:  Goal: Discharged to appropriate level of care  Description: Discharged to appropriate level of care  Outcome: Ongoing     Problem: Pain:  Goal: Pain level will decrease  Description: Pain level will decrease  Outcome: Ongoing

## 2021-04-16 NOTE — PROGRESS NOTES
Progress Note  The patient is being evaluated for right hemispheric stroke; s/p decompressive pratik crani. Updates  No acute events overnight. She just finished working with therapy . She is sitting in the chair.  is at bedside. SR on monitor per RN.    Active Ambulatory Problems     Diagnosis Date Noted    GERD (gastroesophageal reflux disease)     Sleep apnea     Hernia     Status post gastric banding 11/01/2012    Obesity 11/01/2012     Resolved Ambulatory Problems     Diagnosis Date Noted    Dysphagia 11/01/2012     Past Medical History:   Diagnosis Date    Unspecified sleep apnea        Current Facility-Administered Medications:     amLODIPine (NORVASC) tablet 5 mg, 5 mg, Oral, Daily, Janus Siemens, MD, 5 mg at 04/16/21 0839    acetaminophen (TYLENOL) tablet 1,000 mg, 1,000 mg, Oral, 4 times per day, Yassine Fischer MD, Stopped at 04/16/21 0845    oxyCODONE (ROXICODONE) immediate release tablet 5 mg, 5 mg, Oral, Q4H PRN, 5 mg at 04/15/21 0400 **OR** oxyCODONE (ROXICODONE) immediate release tablet 10 mg, 10 mg, Oral, Q4H PRN, Yassine Fischer MD, 10 mg at 04/16/21 0513    levETIRAcetam (KEPPRA) 500 mg in sodium chloride 0.9 % 100 mL IVPB, 500 mg, Intravenous, Q12H, Yassine Fischer MD, Stopped at 04/16/21 0109    polyethylene glycol (GLYCOLAX) packet 17 g, 17 g, Oral, Daily, Yassine Fischer MD, 17 g at 04/16/21 0814    sennosides-docusate sodium (SENOKOT-S) 8.6-50 MG tablet 2 tablet, 2 tablet, Oral, BID, Yassine Fischer MD, 2 tablet at 04/16/21 0808    heparin (porcine) injection 5,000 Units, 5,000 Units, Subcutaneous, 3 times per day, Yassine Fischer MD, 5,000 Units at 04/16/21 0508    sodium chloride flush 0.9 % injection 5-40 mL, 5-40 mL, Intravenous, 2 times per day, Yassine Fischer MD, 10 mL at 04/16/21 0823    sodium chloride flush 0.9 % injection 5-40 mL, 5-40 mL, Intravenous, PRN, Yassine Fischer MD    promethazine (PHENERGAN) tablet 12.5 mg, 12.5 mg, Oral, Q6H PRN **OR** ondansetron (ZOFRAN) injection 4 mg, 4 mg, Intravenous, Q6H PRN, Rosa Flores MD    perflutren lipid microspheres (DEFINITY) injection 1.65 mg, 1.5 mL, Intravenous, ONCE PRN, Rosa Flores MD    famotidine (PEPCID) injection 20 mg, 20 mg, Intravenous, BID, Rosa Flores MD, 20 mg at 04/16/21 0815    labetalol (NORMODYNE;TRANDATE) injection syringe 10 mg, 10 mg, Intravenous, Q4H PRN, Rosa Flores MD, 10 mg at 04/16/21 0034    hydrALAZINE (APRESOLINE) injection 5 mg, 5 mg, Intravenous, Q6H PRN, Rosa Flores MD, 5 mg at 04/15/21 1248    nicotine (NICODERM CQ) 21 MG/24HR 1 patch, 1 patch, Transdermal, Daily PRN, Rosa Flores MD    atorvastatin (LIPITOR) tablet 40 mg, 40 mg, Oral, Nightly, Rosa Flores MD, 40 mg at 04/15/21 2049    multivitamin 1 tablet, 1 tablet, Oral, Daily, Rosa Flores MD, 1 tablet at 04/16/21 0808    thiamine tablet 100 mg, 100 mg, Oral, Daily, Rosa Flores MD, 100 mg at 04/16/21 7693    sodium chloride flush 0.9 % injection 5-40 mL, 5-40 mL, Intravenous, 2 times per day, Rosa Flores MD, 10 mL at 04/16/21 8174    sodium chloride flush 0.9 % injection 5-40 mL, 5-40 mL, Intravenous, PRN, Rosa Flores MD    0.9 % sodium chloride infusion, 25 mL, Intravenous, PRN, Rosa Flores MD      ROS -   Constitutional- No weight loss or fevers  Eyes- No diplopia. No photophobia. Ears/nose/throat- + dysphagia. + Dysarthria  Cardiovascular- No palpitations. No chest pain  Respiratory- No dyspnea. No Cough  Gastrointestinal- No Abdominal pain. No Vomiting. Neurologic- + left weakness. + Headache.      amLODIPine  5 mg Oral Daily    acetaminophen  1,000 mg Oral 4 times per day    levetiracetam  500 mg Intravenous Q12H    polyethylene glycol  17 g Oral Daily    sennosides-docusate sodium  2 tablet Oral BID    heparin (porcine)  5,000 Units Subcutaneous 3 times per day    sodium chloride flush  5-40 mL Intravenous 2 times per day    famotidine (PEPCID) injection  20 mg Intravenous BID    atorvastatin  40 mg Oral Nightly  multivitamin  1 tablet Oral Daily    thiamine  100 mg Oral Daily    sodium chloride flush  5-40 mL Intravenous 2 times per day         sodium chloride          oxyCODONE **OR** oxyCODONE, sodium chloride flush, promethazine **OR** ondansetron, perflutren lipid microspheres, labetalol, hydrALAZINE, nicotine, sodium chloride flush, sodium chloride     Exam:  Blood pressure (!) 151/76, pulse 82, temperature 98.8 °F (37.1 °C), temperature source Oral, resp. rate 18, height 5' 6\" (1.676 m), weight 238 lb 8.6 oz (108.2 kg), SpO2 99 %. Constitutional    Vital signs: BP, HR, and RR reviewed   General Alert, no distress, well-nourished. Right facial edema. Eyes: unable to visualize the fundi  Cardiovascular: pulses symmetric in all 4 extremities. No peripheral edema. Psychiatric: cooperative with examination, no  psychotic behavior noted. Neurologic  Mental status:   orientation to person, place, time and situation   General fund of knowledge:  grossly intact   Memory: Short term and long term intact   Attention intact as able to attend well to the exam     Language fluent in conversation   Comprehension intact; follows simple commands  Cranial nerves:   CN2: ANU right given facial edema. Left visual fields full   CN 3,4,6:ANU right eye given facial edema. Right rasta preference, grossly unable to cross midline to the left. Left pupil is round and reactive at 3mm. CN5: V1: V2: V3: intact to light touch sensation bilaterally  CN7: left facial weakness, speech is mildly dysarthric. CN8: hearing grossly intact to conversation. CN11: Right full, quite diminutive on left. CN12: tongue midline with protrusion. Strength:   RUE: 4+/5  LUE: 0/5. RLE: grossly 4+/5  LLE: 0/5  Sensory: decreased sensation to LUE and LLE throughout with sensory extincition with simultaneous testing. Cerebellar/coordination: finger nose finger normal without ataxia in RUE. LUE not assessed. Tone: decreased in LUE and LLE. Gait: deferred for safety. Labs  Na: 132  K: 3.5  BUN: 9  Creatinine: <0.5  Glucose: 107  Ca: 8.9    HbA1c: 51  LDL: 150  Triglycerides: 278    WBC: 16.7  RBC: 3.57  Hgb: 12.9  Hct: 37.2  Platelet: 639    Hcg: Negative. EtOH: non detected. Hypercoag Studies:   APTT 23.7 (L) (25.1-36.5 sec)  Antithrombin III Rapid 108 (%)  Factor 8 Quantitation: 387.60 (H) (50..00%)  Homocysteine Risk Factor Assay 17.8 (H) (3.7-13.9 mcmol/L)  EI Hemolysis 1 (0-2)  EI Icterus 0 (0-2)  EI Lipemia 0 (0-3)      Studies  MRI Brain w/o 4/14/21: Independently reviewed. Imaging findings compatible with subacute infarct throughout the right hemisphere involving vascular distributions of right anterior cerebral artery and portions of the anterior division right middle cerebral artery. This zone of infarct includes majority of the right temporal and right frontal lobes as well as a portion of the right parietal lobe, as well as the basal ganglia caudate and lentiform nuclei. Some areas of hemorrhagic transformation within this infarcted zone are demonstrated, similar in appearance to recent CT from 4/14/2021. There is cytotoxic edema demonstrated. No midline shift identified status post right frontotemporal craniectomy. Continued effacement of the right lateral ventricle is demonstrated related to the cytotoxic edema mass effect. CT Head w/o 4/14/21 13:09: stable     CT Head w/o 4/14/21 0520: Interval right hemicraniectomy with marked edema of the right cerebral hemisphere, similar to prior. New areas of hyperattenuation within the right frontal lobe reflecting likely intraparenchymal and subarachnoid hemorrhage. Small subdural hematoma, parafalcine, similar to prior. CTA Head & Neck w/ 4/12/21: Occluded right cervical ICA. Occluded right LEAH. Near complete occlusion of the right MCA. TTE w/ Bubble 4/13/21:  Ejection fraction of 55%. Left atrium is normal in size.  A bubble study was performed and

## 2021-04-16 NOTE — PROGRESS NOTES
Progress Note    Admit Date: 4/13/2021  Diet: DIET DYSPHAGIA SOFT AND BITE-SIZED; Dysphagia Soft and Bite-Sized  Dietary Nutrition Supplements: Low Calorie High Protein Supplement    CC: No chief complaint on file. Interval history: No acute events overnight. Ms. Gemma Godfrey was resting in bed, easily roused but very tired. She feels that her pain is well-controlled. She was able to eat some of her breakfast but does not have much of an appetite.     She denies dyspnea, difficulty swallowing, chest pain    Medications:     Scheduled Meds:   amLODIPine  5 mg Oral Daily    acetaminophen  1,000 mg Oral 4 times per day    levetiracetam  500 mg Intravenous Q12H    polyethylene glycol  17 g Oral Daily    sennosides-docusate sodium  2 tablet Oral BID    heparin (porcine)  5,000 Units Subcutaneous 3 times per day    sodium chloride flush  5-40 mL Intravenous 2 times per day    famotidine (PEPCID) injection  20 mg Intravenous BID    atorvastatin  40 mg Oral Nightly    multivitamin  1 tablet Oral Daily    thiamine  100 mg Oral Daily    sodium chloride flush  5-40 mL Intravenous 2 times per day     Continuous Infusions:   sodium chloride       PRN Meds:oxyCODONE **OR** oxyCODONE, sodium chloride flush, promethazine **OR** ondansetron, perflutren lipid microspheres, labetalol, hydrALAZINE, nicotine, sodium chloride flush, sodium chloride    Objective:   Vitals:   T-max:  Patient Vitals for the past 8 hrs:   BP Temp Temp src Pulse Resp SpO2   04/16/21 0600 (!) 151/76 98.8 °F (37.1 °C) Oral 82 18 99 %       Intake/Output Summary (Last 24 hours) at 4/16/2021 1012  Last data filed at 4/15/2021 2300  Gross per 24 hour   Intake 981.25 ml   Output 700 ml   Net 281.25 ml       Physical Exam:  Const: Well nourished, not diaphoretic  Eyes: PERRL, EOMI  HENT: Neck: Supple, no JVD, surgical staples on scalp without oozing or surrounding erythema   CV: RRR, no murmurs, 2+ pulses peripherally  RESP: CTAB, no wheezing, rhonchi, rales  GI: S/NT/ND  MSK/Extremities: No edema, no tenderness to palpation  Skin: Warm, dry. No rashes, no erythema  Neuro: AAOx3.  0/5 ULE and LLE; left facial  droop  Psych: Appropriate mood and affect. LABS:    CBC:   Recent Labs     04/14/21 0453 04/15/21  0550 04/16/21  0619   WBC 21.2* 19.4* 16.7*   HGB 13.8 12.5 12.9   HCT 40.6 37.0 37.2    269 307   .7* 106.5* 104.2*     Renal:    Recent Labs     04/14/21 0453 04/15/21  0550 04/16/21  0619    138 132*   K 3.9 3.8 3.5    106 100   CO2 19* 19* 20*   BUN 9 9 9   CREATININE 0.5* <0.5* <0.5*   GLUCOSE 132* 105* 107*   CALCIUM 8.9 8.5 8.9   MG 2.10  --  2.10   ANIONGAP 13 13 12     Hepatic: No results for input(s): AST, ALT, BILITOT, BILIDIR, PROT, LABALBU, ALKPHOS in the last 72 hours. Troponin: No results for input(s): TROPONINI in the last 72 hours. BNP: No results for input(s): BNP in the last 72 hours. Lipids:   Recent Labs     04/13/21  1345   CHOL 251*   HDL 45     ABGs:  No results for input(s): PHART, NJW6KCY, PO2ART, RYB4KCP, BEART, THGBART, K2AAHMXM, EWF6UKL in the last 72 hours. INR: No results for input(s): INR in the last 72 hours. Lactate: No results for input(s): LACTATE in the last 72 hours. Cultures:  -----------------------------------------------------------------  RAD:   MRI brain without contrast   Final Result   1. Imaging findings compatible with subacute infarct throughout the right hemisphere involving vascular distributions of right anterior cerebral artery and portions of the anterior division right middle cerebral artery. This zone of infarct includes    majority of the right temporal and right frontal lobes as well as a portion of the right parietal lobe, as well as the basal ganglia caudate and lentiform nuclei. Some areas of hemorrhagic transformation within this infarcted zone are demonstrated,    similar in appearance to recent CT from 4/14/2021. There is cytotoxic edema demonstrated.  No midline shift identified status post right frontotemporal craniectomy. Continued effacement of the right lateral ventricle is demonstrated related to the    cytotoxic edema mass effect. CT HEAD WO CONTRAST   Final Result      No change since study earlier today. CT HEAD WO CONTRAST   Final Result      Interval right hemicraniectomy with marked edema of the right cerebral hemisphere, similar to prior. New areas of hyperattenuation within the right frontal lobe reflecting likely intraparenchymal and subarachnoid hemorrhage. Small subdural hematoma, parafalcine, similar to prior. Findings were discussed with Zohreh Yañez RN at 0730 hours after discovery at [facility] hours. CT head without contrast   Final Result   1. Suspect small parafalcine subdural hematoma in the left frontoparietal region measuring up to 4 mm in thickness. 2. Large zones of cytotoxic edema throughout the right cerebral hemisphere as described, within distribution of both the right anterior and right middle cerebral arteries. Hyperdense right MCA. Imaging findings are suspicious for occlusion of the right    internal carotid artery given infarct distribution within right anterior middle cerebral circulation. No intra-axial hemorrhage. 5 mm of right-to-left midline shift of the septum pellucidum. Assessment/Plan:   Jay Jay Armendarizots a 46 y.o. female with PMH GERD p/w L sided weakness and facial droop. CTA head/neck on 4/12 revealed occluded R cervical ICA, occluded right LEAH, near complete occlusion of right MCA. Pending placement. Sodium trending down this AM; will recheck this afternoon and give NaCl tablets if further decreased.     PT/OT scores 6/10, respectively     Acute ischemic CVA, Subdural heomorrhage s/p right hemicraniectomy and subsequen SAH and IP hemorrhage  -no tPA given  -MRI 4/14 showing large subacute infarct throughout R LEAH and MCA with some areas of hemorrhagic conversion.  -Neurosurgery and neurology following   -SBP <160, PRN hydralazine, scheduled Norvasc   - Keppra 500 mg twice daily for 7 days  -holding antiplatelets and anticoag in setting of SAH, ICA  -f/u hypercoagulable panel  -PT/OT       Depression-Patient takes fluoxetine 20 mg daily at home  Anxiety -Patient takes Ativan 0.5 mg every 8 hours as needed for anxiety  MISHA-Patient uses CPAP at home   GERD- Patient takes omeprazole 40 mg daily at home  Obesity  Complicating assessment and treatment.  Patient's BMI is 34.91 kg/m² placing patient at risk for multiple co-morbidities as well as early death and contributing to the patient's presentation.      Leucocytosis-improving  Likely reactive to current stress of acute stroke and emergent surgical intervention.     Code Status: Full Code  FEN: DIET DYSPHAGIA SOFT AND BITE-SIZED;  Dysphagia Soft and Bite-Sized  Dietary Nutrition Supplements: Low Calorie High Protein Supplement  PPX: SCD's, Pepcid  DISPO: general medical floors; pending placement     Marciano Mao MD, PGY-1  04/16/21  10:12 AM    This patient has been staffed and discussed with Adwoa Greenfield MD.

## 2021-04-17 LAB
ANION GAP SERPL CALCULATED.3IONS-SCNC: 12 MMOL/L (ref 3–16)
BASOPHILS ABSOLUTE: 0.1 K/UL (ref 0–0.2)
BASOPHILS RELATIVE PERCENT: 0.9 %
BUN BLDV-MCNC: 10 MG/DL (ref 7–20)
CALCIUM SERPL-MCNC: 9.3 MG/DL (ref 8.3–10.6)
CHLORIDE BLD-SCNC: 98 MMOL/L (ref 99–110)
CO2: 23 MMOL/L (ref 21–32)
CREAT SERPL-MCNC: <0.5 MG/DL (ref 0.6–1.1)
EOSINOPHILS ABSOLUTE: 0.2 K/UL (ref 0–0.6)
EOSINOPHILS RELATIVE PERCENT: 1.1 %
GFR AFRICAN AMERICAN: >60
GFR NON-AFRICAN AMERICAN: >60
GLUCOSE BLD-MCNC: 101 MG/DL (ref 70–99)
HCT VFR BLD CALC: 36.1 % (ref 36–48)
HEMOGLOBIN: 12.3 G/DL (ref 12–16)
LYMPHOCYTES ABSOLUTE: 3.2 K/UL (ref 1–5.1)
LYMPHOCYTES RELATIVE PERCENT: 21.3 %
MCH RBC QN AUTO: 35.8 PG (ref 26–34)
MCHC RBC AUTO-ENTMCNC: 34 G/DL (ref 31–36)
MCV RBC AUTO: 105.2 FL (ref 80–100)
MONOCYTES ABSOLUTE: 1.8 K/UL (ref 0–1.3)
MONOCYTES RELATIVE PERCENT: 11.8 %
NEUTROPHILS ABSOLUTE: 9.7 K/UL (ref 1.7–7.7)
NEUTROPHILS RELATIVE PERCENT: 64.9 %
OSMOLALITY URINE: 754 MOSM/KG (ref 390–1070)
OSMOLALITY: 282 MOSM/KG (ref 278–305)
PDW BLD-RTO: 13.7 % (ref 12.4–15.4)
PLATELET # BLD: 338 K/UL (ref 135–450)
PMV BLD AUTO: 8.4 FL (ref 5–10.5)
POTASSIUM REFLEX MAGNESIUM: 3.6 MMOL/L (ref 3.5–5.1)
RBC # BLD: 3.43 M/UL (ref 4–5.2)
SODIUM BLD-SCNC: 132 MMOL/L (ref 136–145)
SODIUM BLD-SCNC: 133 MMOL/L (ref 136–145)
SODIUM BLD-SCNC: 134 MMOL/L (ref 136–145)
SODIUM URINE: 99 MMOL/L
WBC # BLD: 15 K/UL (ref 4–11)

## 2021-04-17 PROCEDURE — 2060000000 HC ICU INTERMEDIATE R&B

## 2021-04-17 PROCEDURE — 84300 ASSAY OF URINE SODIUM: CPT

## 2021-04-17 PROCEDURE — 83935 ASSAY OF URINE OSMOLALITY: CPT

## 2021-04-17 PROCEDURE — 2580000003 HC RX 258: Performed by: PHYSICIAN ASSISTANT

## 2021-04-17 PROCEDURE — 6370000000 HC RX 637 (ALT 250 FOR IP): Performed by: PHYSICIAN ASSISTANT

## 2021-04-17 PROCEDURE — 2500000003 HC RX 250 WO HCPCS: Performed by: PHYSICIAN ASSISTANT

## 2021-04-17 PROCEDURE — 83930 ASSAY OF BLOOD OSMOLALITY: CPT

## 2021-04-17 PROCEDURE — 6360000002 HC RX W HCPCS: Performed by: PHYSICIAN ASSISTANT

## 2021-04-17 PROCEDURE — 6370000000 HC RX 637 (ALT 250 FOR IP): Performed by: NURSE PRACTITIONER

## 2021-04-17 PROCEDURE — 84295 ASSAY OF SERUM SODIUM: CPT

## 2021-04-17 PROCEDURE — 36415 COLL VENOUS BLD VENIPUNCTURE: CPT

## 2021-04-17 PROCEDURE — 85025 COMPLETE CBC W/AUTO DIFF WBC: CPT

## 2021-04-17 PROCEDURE — 6370000000 HC RX 637 (ALT 250 FOR IP): Performed by: INTERNAL MEDICINE

## 2021-04-17 PROCEDURE — 80048 BASIC METABOLIC PNL TOTAL CA: CPT

## 2021-04-17 RX ADMIN — FAMOTIDINE 20 MG: 10 INJECTION, SOLUTION INTRAVENOUS at 09:39

## 2021-04-17 RX ADMIN — HEPARIN SODIUM 5000 UNITS: 5000 INJECTION INTRAVENOUS; SUBCUTANEOUS at 05:20

## 2021-04-17 RX ADMIN — SODIUM CHLORIDE 500 MG: 900 INJECTION, SOLUTION INTRAVENOUS at 12:04

## 2021-04-17 RX ADMIN — ACETAMINOPHEN 1000 MG: 500 TABLET, COATED ORAL at 16:28

## 2021-04-17 RX ADMIN — DOCUSATE SODIUM 50 MG AND SENNOSIDES 8.6 MG 2 TABLET: 8.6; 5 TABLET, FILM COATED ORAL at 09:39

## 2021-04-17 RX ADMIN — Medication 1 G: at 16:28

## 2021-04-17 RX ADMIN — POLYETHYLENE GLYCOL (3350) 17 G: 17 POWDER, FOR SOLUTION ORAL at 09:38

## 2021-04-17 RX ADMIN — Medication 10 ML: at 09:41

## 2021-04-17 RX ADMIN — ACETAMINOPHEN 1000 MG: 500 TABLET, COATED ORAL at 21:16

## 2021-04-17 RX ADMIN — Medication 1 G: at 09:40

## 2021-04-17 RX ADMIN — ATORVASTATIN CALCIUM 40 MG: 40 TABLET, FILM COATED ORAL at 21:17

## 2021-04-17 RX ADMIN — HEPARIN SODIUM 5000 UNITS: 5000 INJECTION INTRAVENOUS; SUBCUTANEOUS at 13:27

## 2021-04-17 RX ADMIN — Medication 100 MG: at 09:40

## 2021-04-17 RX ADMIN — FAMOTIDINE 20 MG: 10 INJECTION, SOLUTION INTRAVENOUS at 21:16

## 2021-04-17 RX ADMIN — THERA TABS 1 TABLET: TAB at 09:39

## 2021-04-17 RX ADMIN — AMLODIPINE BESYLATE 5 MG: 5 TABLET ORAL at 09:38

## 2021-04-17 RX ADMIN — OXYCODONE HYDROCHLORIDE 10 MG: 5 TABLET ORAL at 02:45

## 2021-04-17 RX ADMIN — DOCUSATE SODIUM 50 MG AND SENNOSIDES 8.6 MG 2 TABLET: 8.6; 5 TABLET, FILM COATED ORAL at 21:17

## 2021-04-17 RX ADMIN — Medication 10 ML: at 21:21

## 2021-04-17 RX ADMIN — Medication 1 G: at 12:04

## 2021-04-17 RX ADMIN — SODIUM CHLORIDE 500 MG: 900 INJECTION, SOLUTION INTRAVENOUS at 00:00

## 2021-04-17 RX ADMIN — Medication 10 ML: at 21:17

## 2021-04-17 RX ADMIN — HEPARIN SODIUM 5000 UNITS: 5000 INJECTION INTRAVENOUS; SUBCUTANEOUS at 21:16

## 2021-04-17 RX ADMIN — ACETAMINOPHEN 1000 MG: 500 TABLET, COATED ORAL at 09:38

## 2021-04-17 ASSESSMENT — PAIN SCALES - GENERAL
PAINLEVEL_OUTOF10: 7
PAINLEVEL_OUTOF10: 4
PAINLEVEL_OUTOF10: 7
PAINLEVEL_OUTOF10: 4

## 2021-04-17 ASSESSMENT — PAIN DESCRIPTION - PROGRESSION
CLINICAL_PROGRESSION: NOT CHANGED

## 2021-04-17 ASSESSMENT — PAIN DESCRIPTION - ONSET
ONSET: ON-GOING
ONSET: ON-GOING

## 2021-04-17 ASSESSMENT — PAIN DESCRIPTION - ORIENTATION: ORIENTATION: RIGHT

## 2021-04-17 ASSESSMENT — PAIN DESCRIPTION - FREQUENCY
FREQUENCY: CONTINUOUS
FREQUENCY: CONTINUOUS

## 2021-04-17 ASSESSMENT — PAIN DESCRIPTION - LOCATION
LOCATION: HEAD
LOCATION: HEAD

## 2021-04-17 ASSESSMENT — PAIN DESCRIPTION - DESCRIPTORS: DESCRIPTORS: ACHING

## 2021-04-17 NOTE — PROGRESS NOTES
Assessment complete, see flow sheet. Per report and per previous assessments the pt's neuro exam remains stable and intact. The pt doesn't want to eat right now or get bathed right now. The pt is able to answer my questions correctly, pt does have slow responses which has been her base line. Pt hasn't had a BM since admission and the pt was concerned about that. The pt denies any nausea and/or vomiting, bowel sounds are positive, pt states she hasn't really been eating a lot \"mostly applesauce. \" I did let the Md know and he would like to see if she is able to have a BM today with the ordered medications she is already on ( Senokot and Glycolax) and if not he will look into ordering her something different tomorrow. I will continue to follow through out the shift.   Ninfa Alfonso

## 2021-04-17 NOTE — PLAN OF CARE
Problem: Pain:  Goal: Pain level will decrease  Description: Pain level will decrease  Outcome: Ongoing  Goal: Control of acute pain  Description: Control of acute pain  Outcome: Ongoing  Goal: Control of chronic pain  Description: Control of chronic pain  Outcome: Ongoing     Problem: HEMODYNAMIC STATUS  Goal: Patient has stable vital signs and fluid balance  Outcome: Ongoing     Problem: ACTIVITY INTOLERANCE/IMPAIRED MOBILITY  Goal: Mobility/activity is maintained at optimum level for patient  Outcome: Ongoing     Problem: COMMUNICATION IMPAIRMENT  Goal: Ability to express needs and understand communication  Outcome: Ongoing     Problem: Nutrition  Goal: Optimal nutrition therapy  Description: Nutrition Problem #1: Inadequate oral intake  Intervention: Food and/or Nutrient Delivery: Continue Current Diet, Start Oral Nutrition Supplement  Nutritional Goals: Patient will tolerate and consume 50% or greater of all meals and supplements         Outcome: Ongoing     Problem: Skin Integrity:  Goal: Will show no infection signs and symptoms  Description: Will show no infection signs and symptoms  Outcome: Ongoing  Goal: Absence of new skin breakdown  Description: Absence of new skin breakdown  Outcome: Ongoing     Problem: Falls - Risk of:  Goal: Will remain free from falls  Description: Will remain free from falls  Outcome: Ongoing  Goal: Absence of physical injury  Description: Absence of physical injury  Outcome: Ongoing     Problem: Discharge Planning:  Goal: Participates in care planning  Description: Participates in care planning  Outcome: Ongoing  Goal: Discharged to appropriate level of care  Description: Discharged to appropriate level of care  Outcome: Ongoing     Problem: Nutrition Deficit:  Goal: Ability to achieve adequate nutritional intake will improve  Description: Ability to achieve adequate nutritional intake will improve  Outcome: Ongoing     Problem: Pain:  Goal: Pain level will decrease Description: Pain level will decrease  Outcome: Ongoing  Goal: Recognizes and communicates pain  Description: Recognizes and communicates pain  Outcome: Ongoing  Goal: Control of acute pain  Description: Control of acute pain  Outcome: Ongoing  Goal: Control of chronic pain  Description: Control of chronic pain  Outcome: Ongoing     Problem: Skin Integrity - Impaired:  Goal: Will show no infection signs and symptoms  Description: Will show no infection signs and symptoms  Outcome: Ongoing  Goal: Absence of new skin breakdown  Description: Absence of new skin breakdown  Outcome: Ongoing

## 2021-04-17 NOTE — PROGRESS NOTES
Pt's eye swelling improved, per last shift report, I will continue to follow her swelling as well, and watch for improvement throughout the shift.  Abdiel Sanchez

## 2021-04-17 NOTE — PROGRESS NOTES
A&Ox4 but lethargic and somnolent. Swelling on right eye has decreased and is now able to fully open. Denies n/v throughout shift, pain is being managed with PRN meds per [facility]. Voiding small amount of cloudy, yellow urine via purewick. New purewick placed on patient at approx 0430 to obtain urine sample to monitor sodium. Incision on scalp is C/D/I with no new drainage noted. Patient NIH remains unchanged at 16. No needs expressed at this time. Fall precautions in place. Patient on avasys for seizure precautions.

## 2021-04-17 NOTE — PROGRESS NOTES
Pt ate some dinner, not much, pt was done eating and then pt wanted to get back to bed. Assisted pt back to bed with 2 assist and the maxi move. The pt was repositioned until she stated she was comfortable, once back in bed the pt's helmet removed for comfort/per Md orders. Call light in reach and bed alarm on. Pt denies any other needs at this time. PARAS.   Pratibha Echevarria

## 2021-04-17 NOTE — PROGRESS NOTES
Progress Note    Admit Date: 4/13/2021  Diet: Dietary Nutrition Supplements: Low Calorie High Protein Supplement  DIET DYSPHAGIA SOFT AND BITE-SIZED; Dysphagia Soft and Bite-Sized; Daily Fluid Restriction: 1200 ml    CC: No chief complaint on file. Interval history: No acute events overnight. Some scalp pain over surgical site but overall pain is well-controlled.      She denies dyspnea, difficulty swallowing, chest pain    Medications:     Scheduled Meds:   amLODIPine  5 mg Oral Daily    sodium chloride  1 g Oral TID WC    acetaminophen  1,000 mg Oral 4 times per day    levetiracetam  500 mg Intravenous Q12H    polyethylene glycol  17 g Oral Daily    sennosides-docusate sodium  2 tablet Oral BID    heparin (porcine)  5,000 Units Subcutaneous 3 times per day    sodium chloride flush  5-40 mL Intravenous 2 times per day    famotidine (PEPCID) injection  20 mg Intravenous BID    atorvastatin  40 mg Oral Nightly    multivitamin  1 tablet Oral Daily    thiamine  100 mg Oral Daily    sodium chloride flush  5-40 mL Intravenous 2 times per day     Continuous Infusions:   sodium chloride       PRN Meds:oxyCODONE **OR** oxyCODONE, sodium chloride flush, promethazine **OR** ondansetron, perflutren lipid microspheres, nicotine, sodium chloride flush, sodium chloride    Objective:   Vitals:   T-max:  Patient Vitals for the past 8 hrs:   BP Temp Temp src Pulse Resp SpO2   04/17/21 1033 131/73 98.4 °F (36.9 °C) Oral  18    04/17/21 0630 130/80 97.4 °F (36.3 °C) Oral 63 18 95 %   04/17/21 0405 124/74 97.7 °F (36.5 °C) Oral 62 18 96 %       Intake/Output Summary (Last 24 hours) at 4/17/2021 1053  Last data filed at 4/17/2021 0941  Gross per 24 hour   Intake 210 ml   Output    Net 210 ml       Physical Exam:  Const: Well nourished, not diaphoretic  Eyes: PERRL, EOMI  HENT: Neck: Supple, no JVD, surgical staples on scalp without oozing or surrounding erythema   CV: RRR, no murmurs, 2+ pulses peripherally RESP: CTAB, no wheezing, rhonchi, rales  GI: S/NT/ND  MSK/Extremities: No edema, no tenderness to palpation  Skin: Warm, dry. No rashes, no erythema  Neuro: AAOx3.  0/5 ULE and LLE; left facial droop; several second delay in responding to questions  Psych: Appropriate mood and affect. LABS:    CBC:   Recent Labs     04/15/21  0550 04/16/21  0619 04/17/21  0425   WBC 19.4* 16.7* 15.0*   HGB 12.5 12.9 12.3   HCT 37.0 37.2 36.1    307 338   .5* 104.2* 105.2*     Renal:    Recent Labs     04/15/21  0550 04/16/21  0619 04/16/21  1334 04/17/21  0425 04/17/21  0712    132* 130* 133* 132*   K 3.8 3.5  --  3.6  --     100  --  98*  --    CO2 19* 20*  --  23  --    BUN 9 9  --  10  --    CREATININE <0.5* <0.5*  --  <0.5*  --    GLUCOSE 105* 107*  --  101*  --    CALCIUM 8.5 8.9  --  9.3  --    MG  --  2.10  --   --   --    ANIONGAP 13 12  --  12  --      Hepatic: No results for input(s): AST, ALT, BILITOT, BILIDIR, PROT, LABALBU, ALKPHOS in the last 72 hours. Troponin: No results for input(s): TROPONINI in the last 72 hours. BNP: No results for input(s): BNP in the last 72 hours. Lipids:   No results for input(s): CHOL, HDL in the last 72 hours. Invalid input(s): LDLCALCU, TRIGLYCERIDE  ABGs:  No results for input(s): PHART, PCY3ZIC, PO2ART, BYD1YVB, BEART, THGBART, P9QRMFIU, QPT8VHO in the last 72 hours. INR: No results for input(s): INR in the last 72 hours. Lactate: No results for input(s): LACTATE in the last 72 hours. Cultures:  -----------------------------------------------------------------  RAD:   MRI brain without contrast   Final Result   1. Imaging findings compatible with subacute infarct throughout the right hemisphere involving vascular distributions of right anterior cerebral artery and portions of the anterior division right middle cerebral artery.  This zone of infarct includes    majority of the right temporal and right frontal lobes as well as a portion of the right parietal lobe, as well as the basal ganglia caudate and lentiform nuclei. Some areas of hemorrhagic transformation within this infarcted zone are demonstrated,    similar in appearance to recent CT from 4/14/2021. There is cytotoxic edema demonstrated. No midline shift identified status post right frontotemporal craniectomy. Continued effacement of the right lateral ventricle is demonstrated related to the    cytotoxic edema mass effect. CT HEAD WO CONTRAST   Final Result      No change since study earlier today. CT HEAD WO CONTRAST   Final Result      Interval right hemicraniectomy with marked edema of the right cerebral hemisphere, similar to prior. New areas of hyperattenuation within the right frontal lobe reflecting likely intraparenchymal and subarachnoid hemorrhage. Small subdural hematoma, parafalcine, similar to prior. Findings were discussed with Zohreh Yañez RN at 0730 hours after discovery at [facility] hours. CT head without contrast   Final Result   1. Suspect small parafalcine subdural hematoma in the left frontoparietal region measuring up to 4 mm in thickness. 2. Large zones of cytotoxic edema throughout the right cerebral hemisphere as described, within distribution of both the right anterior and right middle cerebral arteries. Hyperdense right MCA. Imaging findings are suspicious for occlusion of the right    internal carotid artery given infarct distribution within right anterior middle cerebral circulation. No intra-axial hemorrhage. 5 mm of right-to-left midline shift of the septum pellucidum. Assessment/Plan:   Jay Jay Armendarizots a 46 y.o. female with PMH GERD p/w L sided weakness and facial droop. CTA head/neck on 4/12 revealed occluded R cervical ICA, occluded right LEAH, near complete occlusion of right MCA. Awaiting hypercoag labs and urine na studies. Na is stable; getting NaCl tablets. BP improved after PO Norvasc.   PRN BP meds dc'ed; continue oxycodone PRN as needed for pain. Will keep tonight. hopefully will be ready for dc by tomorrow or Monday. PT/OT scores 6/10, respectively     Acute ischemic CVA, Subdural heomorrhage s/p right hemicraniectomy and subsequen SAH and IP hemorrhage  -no tPA given  -MRI 4/14 showing large subacute infarct throughout R LEAH and MCA with some areas of hemorrhagic conversion.  -Neurosurgery and neurology following   -SBP <160, PRN hydralazine, scheduled Norvasc   - Keppra 500 mg twice daily for 7 days  -holding antiplatelets and anticoag in setting of SAH, ICA  -f/u hypercoagulable panel  -PT/OT       Depression-Patient takes fluoxetine 20 mg daily at home  Anxiety -Patient takes Ativan 0.5 mg every 8 hours as needed for anxiety  MISHA-Patient uses CPAP at home   GERD- Patient takes omeprazole 40 mg daily at home  Obesity  Complicating assessment and treatment.  Patient's BMI is 34.91 kg/m² placing patient at risk for multiple co-morbidities as well as early death and contributing to the patient's presentation.      Leucocytosis-improving  Likely reactive to current stress of acute stroke and emergent surgical intervention.     Code Status: Full Code  FEN: Dietary Nutrition Supplements: Low Calorie High Protein Supplement  DIET DYSPHAGIA SOFT AND BITE-SIZED;  Dysphagia Soft and Bite-Sized; Daily Fluid Restriction: 1200 ml  PPX: SCD's, Pepcid  DISPO: general medical floors; pending placement     Ángel Nolan MD, PGY-1  04/17/21  10:53 AM    This patient has been staffed and discussed with Daniella Ferrer MD.

## 2021-04-17 NOTE — PROGRESS NOTES
Assisted pt up to the chair using the maxi move and 2 assist.  The pt has her helmet on while out of bed and during transfers. Call light in reach and chair alarm is on. Pt denies any needs currently.  Heidi Perea

## 2021-04-18 LAB
ANION GAP SERPL CALCULATED.3IONS-SCNC: 11 MMOL/L (ref 3–16)
BASOPHILS ABSOLUTE: 0.1 K/UL (ref 0–0.2)
BASOPHILS RELATIVE PERCENT: 0.8 %
BUN BLDV-MCNC: 11 MG/DL (ref 7–20)
CALCIUM SERPL-MCNC: 9.3 MG/DL (ref 8.3–10.6)
CHLORIDE BLD-SCNC: 101 MMOL/L (ref 99–110)
CO2: 23 MMOL/L (ref 21–32)
CREAT SERPL-MCNC: <0.5 MG/DL (ref 0.6–1.1)
EOSINOPHILS ABSOLUTE: 0.2 K/UL (ref 0–0.6)
EOSINOPHILS RELATIVE PERCENT: 1.3 %
GFR AFRICAN AMERICAN: >60
GFR NON-AFRICAN AMERICAN: >60
GLUCOSE BLD-MCNC: 107 MG/DL (ref 70–99)
HCT VFR BLD CALC: 40.4 % (ref 36–48)
HEMOGLOBIN: 13.3 G/DL (ref 12–16)
LYMPHOCYTES ABSOLUTE: 3 K/UL (ref 1–5.1)
LYMPHOCYTES RELATIVE PERCENT: 20.2 %
MCH RBC QN AUTO: 36 PG (ref 26–34)
MCHC RBC AUTO-ENTMCNC: 32.9 G/DL (ref 31–36)
MCV RBC AUTO: 109.2 FL (ref 80–100)
MONOCYTES ABSOLUTE: 1.7 K/UL (ref 0–1.3)
MONOCYTES RELATIVE PERCENT: 11.4 %
NEUTROPHILS ABSOLUTE: 10 K/UL (ref 1.7–7.7)
NEUTROPHILS RELATIVE PERCENT: 66.3 %
PDW BLD-RTO: 14.2 % (ref 12.4–15.4)
PLATELET # BLD: 343 K/UL (ref 135–450)
PMV BLD AUTO: 7.9 FL (ref 5–10.5)
POTASSIUM REFLEX MAGNESIUM: 4.2 MMOL/L (ref 3.5–5.1)
RBC # BLD: 3.7 M/UL (ref 4–5.2)
SODIUM BLD-SCNC: 133 MMOL/L (ref 136–145)
SODIUM BLD-SCNC: 134 MMOL/L (ref 136–145)
SODIUM BLD-SCNC: 134 MMOL/L (ref 136–145)
SODIUM BLD-SCNC: 135 MMOL/L (ref 136–145)
WBC # BLD: 15.1 K/UL (ref 4–11)

## 2021-04-18 PROCEDURE — 80048 BASIC METABOLIC PNL TOTAL CA: CPT

## 2021-04-18 PROCEDURE — 6370000000 HC RX 637 (ALT 250 FOR IP): Performed by: PHYSICIAN ASSISTANT

## 2021-04-18 PROCEDURE — 2580000003 HC RX 258: Performed by: PHYSICIAN ASSISTANT

## 2021-04-18 PROCEDURE — 97112 NEUROMUSCULAR REEDUCATION: CPT

## 2021-04-18 PROCEDURE — 97129 THER IVNTJ 1ST 15 MIN: CPT

## 2021-04-18 PROCEDURE — 6370000000 HC RX 637 (ALT 250 FOR IP): Performed by: NURSE PRACTITIONER

## 2021-04-18 PROCEDURE — 84295 ASSAY OF SERUM SODIUM: CPT

## 2021-04-18 PROCEDURE — 2500000003 HC RX 250 WO HCPCS: Performed by: PHYSICIAN ASSISTANT

## 2021-04-18 PROCEDURE — 85025 COMPLETE CBC W/AUTO DIFF WBC: CPT

## 2021-04-18 PROCEDURE — 2060000000 HC ICU INTERMEDIATE R&B

## 2021-04-18 PROCEDURE — 6370000000 HC RX 637 (ALT 250 FOR IP): Performed by: INTERNAL MEDICINE

## 2021-04-18 PROCEDURE — 36415 COLL VENOUS BLD VENIPUNCTURE: CPT

## 2021-04-18 PROCEDURE — 92526 ORAL FUNCTION THERAPY: CPT

## 2021-04-18 PROCEDURE — 6370000000 HC RX 637 (ALT 250 FOR IP): Performed by: STUDENT IN AN ORGANIZED HEALTH CARE EDUCATION/TRAINING PROGRAM

## 2021-04-18 PROCEDURE — 97530 THERAPEUTIC ACTIVITIES: CPT

## 2021-04-18 PROCEDURE — 6360000002 HC RX W HCPCS: Performed by: PHYSICIAN ASSISTANT

## 2021-04-18 RX ORDER — BISACODYL 10 MG
10 SUPPOSITORY, RECTAL RECTAL DAILY PRN
Status: DISCONTINUED | OUTPATIENT
Start: 2021-04-18 | End: 2021-04-23 | Stop reason: HOSPADM

## 2021-04-18 RX ORDER — LEVETIRACETAM 500 MG/1
500 TABLET ORAL 2 TIMES DAILY
Status: DISCONTINUED | OUTPATIENT
Start: 2021-04-18 | End: 2021-04-23 | Stop reason: HOSPADM

## 2021-04-18 RX ORDER — LEVETIRACETAM 500 MG/1
500 TABLET ORAL 2 TIMES DAILY
Status: DISCONTINUED | OUTPATIENT
Start: 2021-04-18 | End: 2021-04-18

## 2021-04-18 RX ADMIN — Medication 1 G: at 12:22

## 2021-04-18 RX ADMIN — Medication 10 ML: at 08:39

## 2021-04-18 RX ADMIN — ATORVASTATIN CALCIUM 40 MG: 40 TABLET, FILM COATED ORAL at 21:02

## 2021-04-18 RX ADMIN — ACETAMINOPHEN 1000 MG: 500 TABLET, COATED ORAL at 16:40

## 2021-04-18 RX ADMIN — BISACODYL 10 MG: 10 SUPPOSITORY RECTAL at 16:41

## 2021-04-18 RX ADMIN — Medication 10 ML: at 21:13

## 2021-04-18 RX ADMIN — THERA TABS 1 TABLET: TAB at 08:38

## 2021-04-18 RX ADMIN — Medication 20 ML: at 21:03

## 2021-04-18 RX ADMIN — DOCUSATE SODIUM 50 MG AND SENNOSIDES 8.6 MG 2 TABLET: 8.6; 5 TABLET, FILM COATED ORAL at 08:37

## 2021-04-18 RX ADMIN — SODIUM CHLORIDE 500 MG: 900 INJECTION, SOLUTION INTRAVENOUS at 12:24

## 2021-04-18 RX ADMIN — FAMOTIDINE 20 MG: 10 INJECTION, SOLUTION INTRAVENOUS at 08:38

## 2021-04-18 RX ADMIN — POLYETHYLENE GLYCOL (3350) 17 G: 17 POWDER, FOR SOLUTION ORAL at 08:37

## 2021-04-18 RX ADMIN — LEVETIRACETAM 500 MG: 500 TABLET, FILM COATED ORAL at 21:03

## 2021-04-18 RX ADMIN — AMLODIPINE BESYLATE 5 MG: 5 TABLET ORAL at 08:38

## 2021-04-18 RX ADMIN — ACETAMINOPHEN 1000 MG: 500 TABLET, COATED ORAL at 10:49

## 2021-04-18 RX ADMIN — Medication 100 MG: at 08:39

## 2021-04-18 RX ADMIN — OXYCODONE HYDROCHLORIDE 10 MG: 5 TABLET ORAL at 16:43

## 2021-04-18 RX ADMIN — Medication 1 G: at 08:37

## 2021-04-18 RX ADMIN — HEPARIN SODIUM 5000 UNITS: 5000 INJECTION INTRAVENOUS; SUBCUTANEOUS at 14:02

## 2021-04-18 RX ADMIN — HEPARIN SODIUM 5000 UNITS: 5000 INJECTION INTRAVENOUS; SUBCUTANEOUS at 21:02

## 2021-04-18 RX ADMIN — DOCUSATE SODIUM 50 MG AND SENNOSIDES 8.6 MG 2 TABLET: 8.6; 5 TABLET, FILM COATED ORAL at 21:03

## 2021-04-18 RX ADMIN — FAMOTIDINE 20 MG: 10 INJECTION, SOLUTION INTRAVENOUS at 21:02

## 2021-04-18 RX ADMIN — SODIUM CHLORIDE 500 MG: 900 INJECTION, SOLUTION INTRAVENOUS at 00:20

## 2021-04-18 RX ADMIN — Medication 1 G: at 16:40

## 2021-04-18 RX ADMIN — HEPARIN SODIUM 5000 UNITS: 5000 INJECTION INTRAVENOUS; SUBCUTANEOUS at 06:02

## 2021-04-18 RX ADMIN — ACETAMINOPHEN 1000 MG: 500 TABLET, COATED ORAL at 06:02

## 2021-04-18 ASSESSMENT — PAIN SCALES - GENERAL
PAINLEVEL_OUTOF10: 0
PAINLEVEL_OUTOF10: 6
PAINLEVEL_OUTOF10: 8
PAINLEVEL_OUTOF10: 0
PAINLEVEL_OUTOF10: 7

## 2021-04-18 ASSESSMENT — PAIN DESCRIPTION - FREQUENCY: FREQUENCY: CONTINUOUS

## 2021-04-18 ASSESSMENT — PAIN DESCRIPTION - PAIN TYPE: TYPE: SURGICAL PAIN

## 2021-04-18 ASSESSMENT — PAIN DESCRIPTION - PROGRESSION
CLINICAL_PROGRESSION: NOT CHANGED

## 2021-04-18 ASSESSMENT — PAIN DESCRIPTION - LOCATION: LOCATION: HEAD

## 2021-04-18 ASSESSMENT — PAIN DESCRIPTION - DESCRIPTORS: DESCRIPTORS: ACHING;HEADACHE

## 2021-04-18 NOTE — PROGRESS NOTES
Internal Medicine Progress Note    Admit Date: 4/13/2021  Diet: Dietary Nutrition Supplements: Low Calorie High Protein Supplement  DIET DYSPHAGIA SOFT AND BITE-SIZED; Dysphagia Soft and Bite-Sized; Daily Fluid Restriction: 1200 ml    CC: Stroke    Interval history: No acute events overnight. Doing well working with therapies this morning. Some pain, but managed with current pain regimen. Still has not had a bowel movement yet, willing to try a suppository.     She denies dyspnea, difficulty swallowing, chest pain    Medications:     Scheduled Meds:   amLODIPine  5 mg Oral Daily    sodium chloride  1 g Oral TID WC    acetaminophen  1,000 mg Oral 4 times per day    levetiracetam  500 mg Intravenous Q12H    polyethylene glycol  17 g Oral Daily    sennosides-docusate sodium  2 tablet Oral BID    heparin (porcine)  5,000 Units Subcutaneous 3 times per day    sodium chloride flush  5-40 mL Intravenous 2 times per day    famotidine (PEPCID) injection  20 mg Intravenous BID    atorvastatin  40 mg Oral Nightly    multivitamin  1 tablet Oral Daily    thiamine  100 mg Oral Daily    sodium chloride flush  5-40 mL Intravenous 2 times per day     Continuous Infusions:   sodium chloride       PRN Meds:oxyCODONE **OR** oxyCODONE, sodium chloride flush, promethazine **OR** ondansetron, perflutren lipid microspheres, nicotine, sodium chloride flush, sodium chloride    Objective:   Vitals:   T-max:  Patient Vitals for the past 8 hrs:   BP Temp Temp src Pulse Resp SpO2   04/18/21 0731 131/74 98.5 °F (36.9 °C) Oral 73 16    04/18/21 0334 (!) 143/88 98.5 °F (36.9 °C) Oral 80 16 97 %       Intake/Output Summary (Last 24 hours) at 4/18/2021 0818  Last data filed at 4/18/2021 7229  Gross per 24 hour   Intake 750 ml   Output 2050 ml   Net -1300 ml       Physical Exam:  Const: Well nourished, not diaphoretic  Eyes: PERRL, EOMI  HENT: Neck: Supple, no JVD, surgical staples on scalp without oozing or surrounding erythema   CV: RRR, no murmurs, 2+ pulses peripherally  RESP: CTAB, no wheezing, rhonchi, rales  GI: S/NT/ND  MSK/Extremities: No edema, no tenderness to palpation  Skin: Warm, dry. No rashes, no erythema  Neuro: AAOx3.  0/5 ULE and LLE; left facial droop; several second delay in responding to questions  Psych: Appropriate mood and affect. LABS:    CBC:   Recent Labs     04/16/21 0619 04/17/21 0425 04/18/21 0624   WBC 16.7* 15.0* 15.1*   HGB 12.9 12.3 13.3   HCT 37.2 36.1 40.4    338 343   .2* 105.2* 109.2*     Renal:    Recent Labs     04/16/21 0619 04/16/21 0619 04/17/21 0425 04/17/21 0425 04/17/21  1748 04/18/21  0003 04/18/21 0624   *   < > 133*   < > 134* 134* 135*   K 3.5  --  3.6  --   --   --  4.2     --  98*  --   --   --  101   CO2 20*  --  23  --   --   --  23   BUN 9  --  10  --   --   --  11   CREATININE <0.5*  --  <0.5*  --   --   --  <0.5*   GLUCOSE 107*  --  101*  --   --   --  107*   CALCIUM 8.9  --  9.3  --   --   --  9.3   MG 2.10  --   --   --   --   --   --    ANIONGAP 12  --  12  --   --   --  11    < > = values in this interval not displayed. Hepatic: No results for input(s): AST, ALT, BILITOT, BILIDIR, PROT, LABALBU, ALKPHOS in the last 72 hours. Troponin: No results for input(s): TROPONINI in the last 72 hours. BNP: No results for input(s): BNP in the last 72 hours. Lipids:   No results for input(s): CHOL, HDL in the last 72 hours. Invalid input(s): LDLCALCU, TRIGLYCERIDE  ABGs:  No results for input(s): PHART, MGH4OJN, PO2ART, UGN2ACG, BEART, THGBART, F5GEZDIZ, YJO0STO in the last 72 hours. INR: No results for input(s): INR in the last 72 hours. Lactate: No results for input(s): LACTATE in the last 72 hours. Cultures:  -----------------------------------------------------------------  RAD:   MRI brain without contrast   Final Result   1.  Imaging findings compatible with subacute infarct throughout the right hemisphere involving vascular distributions of right anterior cerebral artery and portions of the anterior division right middle cerebral artery. This zone of infarct includes    majority of the right temporal and right frontal lobes as well as a portion of the right parietal lobe, as well as the basal ganglia caudate and lentiform nuclei. Some areas of hemorrhagic transformation within this infarcted zone are demonstrated,    similar in appearance to recent CT from 4/14/2021. There is cytotoxic edema demonstrated. No midline shift identified status post right frontotemporal craniectomy. Continued effacement of the right lateral ventricle is demonstrated related to the    cytotoxic edema mass effect. CT HEAD WO CONTRAST   Final Result      No change since study earlier today. CT HEAD WO CONTRAST   Final Result      Interval right hemicraniectomy with marked edema of the right cerebral hemisphere, similar to prior. New areas of hyperattenuation within the right frontal lobe reflecting likely intraparenchymal and subarachnoid hemorrhage. Small subdural hematoma, parafalcine, similar to prior. Findings were discussed with Claudia Bush RN at 0730 hours after discovery at [facility] hours. CT head without contrast   Final Result   1. Suspect small parafalcine subdural hematoma in the left frontoparietal region measuring up to 4 mm in thickness. 2. Large zones of cytotoxic edema throughout the right cerebral hemisphere as described, within distribution of both the right anterior and right middle cerebral arteries. Hyperdense right MCA. Imaging findings are suspicious for occlusion of the right    internal carotid artery given infarct distribution within right anterior middle cerebral circulation. No intra-axial hemorrhage. 5 mm of right-to-left midline shift of the septum pellucidum.           Assessment/Plan:     Acute ischemic CVA, Subdural heomorrhage s/p right hemicraniectomy and subsequen SAH and IP hemorrhage  -MRI as above

## 2021-04-18 NOTE — PROGRESS NOTES
Speech Language Pathology  Facility/Department: [facility] 5T ORTHO/NEURO  Speech / Language/ Cognitive Daily Treatment Note    NAME: Maged Bangura  : 1970  MRN: [id number]    Patient Diagnosis(es):   Patient Active Problem List    Diagnosis Date Noted    Status post craniectomy     [facility]    Status post gastric banding 2012    Obesity 2012    GERD (gastroesophageal reflux disease)     Sleep apnea     Hernia      Allergies: No Known Allergies     CT of head 21  Impression   Findings most consistent with large acute/subacute infarct in the right   frontal lobe with associated mass effect as above.  Further evaluation with   MRI is suggested for better characterization and exclusion of additional   underlying pathology.       Additionally there is a small subdural hematoma along the falx cerebrum.      MRI brain 21  Impression   1. Imaging findings compatible with subacute infarct throughout the right hemisphere involving vascular distributions of right anterior cerebral artery and portions of the anterior division right middle cerebral artery. This zone of infarct includes    majority of the right temporal and right frontal lobes as well as a portion of the right parietal lobe, as well as the basal ganglia caudate and lentiform nuclei. Some areas of hemorrhagic transformation within this infarcted zone are demonstrated,    similar in appearance to recent CT from 2021. There is cytotoxic edema demonstrated. No midline shift identified status post right frontotemporal craniectomy. Continued effacement of the right lateral ventricle is demonstrated related to the    cytotoxic edema mass effect.                Chart reviewed.   Pain: no  Medical diagnosis: stroke  Treatment diagnosis: dysarthria; cognitive-linguistic impairment; pragmatic impairment    Subjective: Pt in bed upon entry, endorsed fatigue but agreeable to tx and

## 2021-04-18 NOTE — PROGRESS NOTES
Progress Note  The patient is being evaluated for right hemispheric stroke; s/p decompressive pratik crani. Updates  No acute events overnight. ROS -   Constitutional- No weight loss or fevers  Eyes- No diplopia. No photophobia. Ears/nose/throat- + dysphagia. + Dysarthria  Cardiovascular- No palpitations. No chest pain  Respiratory- No dyspnea. No Cough  Gastrointestinal- No Abdominal pain. No Vomiting. Neurologic- + left weakness. + Headache.  [START ON 4/19/2021] levetiracetam  500 mg Intravenous Q12H    amLODIPine  5 mg Oral Daily    sodium chloride  1 g Oral TID WC    acetaminophen  1,000 mg Oral 4 times per day    polyethylene glycol  17 g Oral Daily    sennosides-docusate sodium  2 tablet Oral BID    heparin (porcine)  5,000 Units Subcutaneous 3 times per day    sodium chloride flush  5-40 mL Intravenous 2 times per day    famotidine (PEPCID) injection  20 mg Intravenous BID    atorvastatin  40 mg Oral Nightly    multivitamin  1 tablet Oral Daily    thiamine  100 mg Oral Daily    sodium chloride flush  5-40 mL Intravenous 2 times per day         sodium chloride          bisacodyl, oxyCODONE **OR** oxyCODONE, sodium chloride flush, promethazine **OR** ondansetron, perflutren lipid microspheres, nicotine, sodium chloride flush, sodium chloride     Exam:  Blood pressure 122/67, pulse 74, temperature 98.8 °F (37.1 °C), temperature source Oral, resp. rate 16, height 5' 6\" (1.676 m), weight 238 lb 8.6 oz (108.2 kg), SpO2 97 %. Constitutional    Vital signs: BP, HR, and RR reviewed   General Alert, no distress, well-nourished. Eyes: unable to visualize the fundi  Cardiovascular: pulses symmetric in all 4 extremities. No peripheral edema. Psychiatric: cooperative with examination, no  psychotic behavior noted.   Neurologic  Mental status:   orientation to person, place, time and situation   General fund of knowledge:  grossly intact   Memory: Short term and long term intact   Attention intact as able to attend well to the exam     Language fluent in conversation   Comprehension intact; follows simple commands  Cranial nerves:   CN2: JONATHON 4 mm to 2 mm  CN 3,4,6: Right gaze preference, grossly unable to cross midline to the left. CN5: V1: V2: V3: intact to light touch sensation bilaterally  CN7: left facial weakness, speech is mildly dysarthric. CN8: hearing grossly intact to conversation. CN12: tongue midline with protrusion. Strength:   RUE: 5/5  LUE: 0/5. RLE: 5/5  LLE: 0/5  Sensory: decreased sensation to LUE and LLE throughout with sensory extincition with simultaneous testing. Cerebellar/coordination: finger nose finger normal without ataxia in RUE. LUE not assessed. Tone: decreased in LUE and LLE. Gait: deferred for safety. Studies  MRI Brain w/o 4/14/21: Independently reviewed. Imaging findings compatible with subacute infarct throughout the right hemisphere involving vascular distributions of right anterior cerebral artery and portions of the anterior division right middle cerebral artery. This zone of infarct includes majority of the right temporal and right frontal lobes as well as a portion of the right parietal lobe, as well as the basal ganglia caudate and lentiform nuclei. Some areas of hemorrhagic transformation within this infarcted zone are demonstrated, similar in appearance to recent CT from 4/14/2021. There is cytotoxic edema demonstrated. No midline shift identified status post right frontotemporal craniectomy. Continued effacement of the right lateral ventricle is demonstrated related to the cytotoxic edema mass effect. TTE w/ Bubble 4/13/21:  Ejection fraction of 55%. Left atrium is normal in size. A bubble study was performed and fails to show evidence of right to left shunting. CTA Head Neck:  - Occluded right cervical ICA  - Occluded right LEAH. - Near complete occlusion of the right MCA      Impression  1.  Acute right hemispheric ischemic stroke with hemorrhagic conversion  2. Right ICA occlusion  3. S/P hemicraniectomy: 4/13  4. HTN  5. Tobacco abuse    Stable compared to when seen by Neurology on 4/16. Mechanism of stroke: unclear. Hypercoag labs ordered on 4/16, pending. Recommendations  - Contiunue Q4H Neuro checks. Page Neurosurgery and primary service/critical care for any acute changes. - Continue to hold antiplatelet/anticoagulation given ICH and SAH, will defer to Neurosurgery when safe to resume.   - Seizure prophylaxis per Neurosurgery. - Maintain SBP < 160 given evidence of bleed. Aim towards normotension  - Continue igh intensity statin    - Keep HOB elevated at 30 degrees. - Rehab efforts to continue      Naren Thomson MD  Consultant Neurologist  [address] [place] [address] [id number] Neuroscience  Ph: [phone]      A copy of this note was provided for Dr Adwoa Greenfield MD

## 2021-04-18 NOTE — PROGRESS NOTES
Message sent to the residents about changing the pt's IV keppra now to oral because speech just worked with the pt and states that the pt should be able to tolerate taking pills whole in applesauce, prior to speech seeing her she was taking pills crushed in apple sauce and keppra cannot be crushed, but now she is able to do the whole pills. I will continue to follow throughout the shift.   Latrice Leon

## 2021-04-18 NOTE — PROGRESS NOTES
Pt ate good for dinner, she had about half of her food (pasta and meat sauce with green beans), ate all of her ice cream, and drank about 3/4 of her ensure. Pt then repositioned for comfort and pressure relief. Pt states she is comfortable and denies any other needs at this time. The pt tried but was unable to have a BM post suppository at this time, call light in her hand if she needs me. Bed alarm on.   Ninfa Alfonso

## 2021-04-18 NOTE — PROGRESS NOTES
dysarthria. Pt with difficulty lateralizing tongue to the left. .  Pt had no difficulty closing lips around spoon or drawing liquid up a straw. Mastication with ice chip was prolonged with impaired coordination. There was no anterior spillage or oral residue noted with any consistency. Laryngeal movement noted with all PO trials, although question swallow delay and noted 2 spontaneous swallows noted following trials with ice chips and small sip of water. Pt produced reactive cough following 2 trials with ice chips and 2 small sips of water- Pt endorsed sensation of aspiration. No s/s aspiration with trials of 1/2 tsp of applesauce. Did not attempt cracker at this time due to concern for pt safety.     FEES results 4/14  Pt presents w/ mild oropharyngeal dysphagia characterized by premature spillage of thin liquids to UES and pyriforms w/ intermittent deep penetration of laryngeal vestibule; adequate airway protection and complete clearance of all residue after swallow initiation. No aspiration w/ any trials. Timely swallow initiation w/ puree and regular solid consistencies; no oropharyngeal residue. 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 Adequate ab/adduction. Complete closure of TVFs upon adduction.     Pain:denies    Current Diet : soft and bite sized/thin liquids    Treatment:  Pt seen bedside to address the following goals:  1- The patient will tolerate instrumental swallowing procedure  4/14: FEES Scheduled this date - goal met     2- The pt/caregiver will demonstrate understanding of swallowing recommendations and concerns. 4/13-  The pt was educated to purpose of the visit, concerns for aspiration, recommendation for FEES, a description of the procedure, and plan to return at a later time to complete speech evaluation. The pt stated comprehension and agreement.  con't goal  4/14: pt educated to purpose of visit and recommendation / rationale for FEES study. Pt stated understanding  Cont goal  4/14 (2): Reviewed FEES w/ both pt and spouse; discussed premature spillage but adequate airway protection. Educated to remain upright during all meals. Discussed diet recommendation and pt endorsed preference for soft diet starting off; will begin dysphagia III (soft & bite sized) and upgrade per pt preference and/or as tolerated. Cont goal  4/16: pt educated to purpose of tx session. Discussed need for full alertness when consuming PO. Discussed diet texture and pt agrees safer to cont soft /bite sized due to intermittent lethargy. Cont goal  4/18: Educated pt to aspiration risk, FEES results, importance of oral care, and rationale for tx tasks to address oral dysphagia. Pt verbalized comprehension but will require ongoing reinforcement. Continue goal.    3- Pt will participate in repeat BSE  4/14: pt re-assessed s/p Crani 4/13. Pt alert, oriented, followed simple commands. Pt analyzed with ice chips, water via cup and straw, puree and solid. No overt signs of aspiration emerged this date. Swallow movement difficult to palpate due to girth of pt neck. Pt demonstrated slow mastication with cracker, with lingual residue noted. Recommend proceeding with instrumental exam this date to fully assess swallow function, due to acute CVA/crani. Goal met    New goal:  4-  Pt will consume PO safely without signs or symptoms of aspiration  4/16: pt initially very alert with flat affect. Pt became lethargic intermittently throughout session, requiring cues to maintain alertness. Pt took medications crushed in puree per RN without difficulty. Pt consumed thin liquids via cup and straw with no overt signs of aspiration, voice clear, good swallow movement upon palpation of anterior neck. Pt demonstrated effective mastication with solid texture. Recommend cont soft and bite sized texture, due to intermittent lethargy. Pt agrees stating, \"I don't want to choke. \"  Cont goal  4/18: RN questioning if pt can consume meds whole. Pt in bed upon entry - residue noted on L labial seal where apparent anterior spillage vs reduced precision with self feeding occurred during lunch. Mild pooling of secretions with min residue in L buccal cavity. Targeted oral dysphagia via bolus control exercises. Lateralization of toothette x 20 with mod cues for attention and control. A-P propulsion with toothette x 10 with min-no cues. Pt demonstrated adequate ability to control toothette in middle of lingual surface for posterior propulsion - recommend trial meds whole one at a time with sip of liquids or in puree and check for pocketing on L. Notified pt and RN. No concerns for aspiration per chart review. Continue goal.    Patient/Family/Caregiver Education:  As above    Compensatory Strategies  Eat only when fully alert  90 degrees all meals  Check for pocketing of food  Oral care 3x/day       Plan:  Continued daily Dysphagia treatment with goals per  plan of care. Diet recommendations: dysphagia III soft and bite sized / thin liquids    Meds - trial whole one at a time placed midline followed by small sip of liquid or in puree; check for pocketing on L - if pt with difficulty, resume crushed in puree  DC recommendation: ongoing treatment indicated  Treatment: 15 min  D/W nursing Ashley  Needs met prior to leaving room, call button in reach. Cheyenne Davis MA CCC-SLP; [id number]  Speech-Language Pathologist  Pager # [phone]  Phone # [phone]  Office # [phone]    If patient is discharged prior to next session, this note will serve as discharge summary.

## 2021-04-18 NOTE — PROGRESS NOTES
Patient is A/O x4, VSS, and pain is being managed per the [facility]. Patient's appetite is decreased and is a total feed. Patient did finish a cup of applesauce after taking evening medications and tolerated it well. Patient's NIH continues to be 16 with Left side flaccid. Fall precautions in place - bed alarm engaged, will continue to monitor and assess patient.

## 2021-04-18 NOTE — PROGRESS NOTES
Pt just got done working with Pt/OT and is now sitting up in the chair, chair alarm on, and call light in reach. The pt currently denies any needs at this time.   Coy Saldana

## 2021-04-18 NOTE — PROGRESS NOTES
Assisted pt back to bed using the lift maxi move and another nurse, pt's helmet always on for transfers. Pt back to bed and repositioned for comfort, new pure wick put on and eugenie care done and brief changed. Pt assisted in feeding and ate about half of her chicken noodle soup. Pt has no other needs at this time, call light in reach, and bed alarm on.   Joi Yuan

## 2021-04-18 NOTE — PROGRESS NOTES
Incision care done per MD order. Washed pt's incision with warm soapy water, then rinsed with regular water, pat dry and painted incision with CHG swab. Pt tolerated well. Pt has no needs at this time and is resting comfortably.   Umesh Mahmood

## 2021-04-18 NOTE — PLAN OF CARE
injury  Outcome: Ongoing     Problem: Discharge Planning:  Goal: Participates in care planning  Description: Participates in care planning  Outcome: Ongoing  Goal: Discharged to appropriate level of care  Description: Discharged to appropriate level of care  Outcome: Ongoing     Problem: Nutrition Deficit:  Goal: Ability to achieve adequate nutritional intake will improve  Description: Ability to achieve adequate nutritional intake will improve  Outcome: Ongoing     Problem: Pain:  Goal: Pain level will decrease  Description: Pain level will decrease  4/18/2021 0737 by Madyson Almazan RN  Outcome: Ongoing  4/18/2021 0201 by Allyn Bowman RN  Outcome: Ongoing  Note: Patient's pain will continue to improve and continue to be managed per the [facility]. Patient will also try cold therapy to help with headache pain.   Goal: Recognizes and communicates pain  Description: Recognizes and communicates pain  Outcome: Ongoing  Goal: Control of acute pain  Description: Control of acute pain  Outcome: Ongoing  Goal: Control of chronic pain  Description: Control of chronic pain  Outcome: Ongoing     Problem: Skin Integrity - Impaired:  Goal: Will show no infection signs and symptoms  Description: Will show no infection signs and symptoms  Outcome: Ongoing  Goal: Absence of new skin breakdown  Description: Absence of new skin breakdown  Outcome: Ongoing

## 2021-04-18 NOTE — PROGRESS NOTES
Pt is sitting up in the chair and wanted to be reclined at this time, pt reclined per request.  Pt's  and son in room visiting her. Pt denies any other needs at this time. I will continue to follow throughout the shift.   Jason Bryan
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
## 2021-04-18 NOTE — PROGRESS NOTES
Occupational Therapy  Facility/Department: [facility] 5T ORTHO/NEURO  Daily Treatment Note  NAME: Nell Tanner  : 1970  MRN: [id number]    Date of Service: 2021    Discharge Recommendations:  . Nell Tanner scored a 10/24 on the AM-PAC ADL Inpatient form. Current research shows that an AM-PAC score of 17 or less is typically not associated with a discharge to the patient's home setting. Based on the patient's AM-PAC score and their current ADL deficits, it is recommended that the patient have 5-7 sessions per week of Occupational Therapy at d/c to increase the patient's independence. At this time, this patient demonstrates the endurance, and/or tolerance for 3 hours of therapy each day, with a treatment frequency of 5-7x/wk. Please see assessment section for further patient specific details. If patient discharges prior to next session this note will serve as a discharge summary. Please see below for the latest assessment towards goals. OT Equipment Recommendations  Other: defer to next level of care    Assessment   Assessment: Pt with increased alertness this session with delayed response to questions and high motivation for therapy. Pt requiring assist x2 for stand pivot transfer from EOB to chair. Pt with increased sensation in LUE but continues to have tone and decreased function in LUE and LE. Pt would benefit from intensive in pt OT for maximizing functional independence. Continue OT per POC      Treatment Diagnosis: decreased independence with ADLs and fx transfers sedondary to left hemiparesis  OT Education: OT Role;Plan of Care;Precautions;Transfer Training  Patient Education: Pt verb understanding and will need reinforcement  Activity Tolerance  Activity Tolerance: Patient limited by fatigue  Activity Tolerance: Pt with increased alertness this session. Delayed response to questions         Patient Diagnosis(es): There were no encounter diagnoses.       has a past medical history of GERD (gastroesophageal reflux disease), Hernia, Obesity, and Unspecified sleep apnea. has a past surgical history that includes Splenectomy (); Total hip arthroplasty (); Lap Band (); hernia repair ();  section (); and craniotomy (Right, 2021). Restrictions  Position Activity Restriction  Other position/activity restrictions: Ambulate, Up with assist, Pt to wear hemet when OOB       Diagnosis: Stroke determined by clinical assessment  Treatment Diagnosis: decreased independence with ADLs and fx transfers sedondary to left hemiparesis    Subjective:   Pt met supine in bed eating breakfast with assist from PCA. Pt agreeable and motivated for therapy. Pain:   Headache pain. RN aware. Ice applied      Objective:    Cognition/Orientation:  Oriented to place. Delayed response to questions. Bed mobility   Supine to sit: Max A x2 with increased time and step by step commands  Scooting: Max A for scooting to EOB and Mod A initially for scooting back in chair then Max A x2 for scooting all the way back. Functional Mobility   Sit to Stand: Max A + Mod A for paritial stand during transfer  Stand to Sit: Max A + Mod A   Bed to Chair Transfer: Stand pivot transfer from EOB to chair with Max A + Mod A    Pt sitting EOB for 10 min with fluctuating assist from SBA to Max A. Pt with L lateral lean and pushing with R UE. Elbow propping completed x2 to R with Min - Mod A for return to midline and improved midline sitting after each lean to R. Pt with tone in LUE. Forward rowing completed holding therapist hands. Prolong stretch completed in LUE. ADLs   Eating: Min A for eating several bits of pancake and sips of water with VCs for tongue sweep on both sides of mouth for pocketing and small sips with hard swallow.    Other: Max A donning helmet supine in bed    UE Exercises    PROM for LUE with prolonged stretch    5 reps all digits   5 reps elbow flex/ext   5 reps wrist flex/ext

## 2021-04-19 LAB
ANION GAP SERPL CALCULATED.3IONS-SCNC: 9 MMOL/L (ref 3–16)
ANTICARDIOLIPIN IGG ANTIBODY: 8 GPL (ref 0–14)
BASOPHILS ABSOLUTE: 0.1 K/UL (ref 0–0.2)
BASOPHILS RELATIVE PERCENT: 0.5 %
BETA-2 GLYCOPROTEIN 1 IGG ANTIBODY: 0 SGU (ref 0–20)
BETA-2 GLYCOPROTEIN 1 IGM ANTIBODY: 3 SMU (ref 0–20)
BUN BLDV-MCNC: 12 MG/DL (ref 7–20)
CALCIUM SERPL-MCNC: 9.4 MG/DL (ref 8.3–10.6)
CARDIOLIPIN AB IGM: 7 MPL (ref 0–12)
CHLORIDE BLD-SCNC: 101 MMOL/L (ref 99–110)
CO2: 23 MMOL/L (ref 21–32)
CREAT SERPL-MCNC: <0.5 MG/DL (ref 0.6–1.1)
EOSINOPHILS ABSOLUTE: 0.2 K/UL (ref 0–0.6)
EOSINOPHILS RELATIVE PERCENT: 1.5 %
GFR AFRICAN AMERICAN: >60
GFR NON-AFRICAN AMERICAN: >60
GLUCOSE BLD-MCNC: 110 MG/DL (ref 70–99)
HCT VFR BLD CALC: 37.1 % (ref 36–48)
HEMOGLOBIN: 12.7 G/DL (ref 12–16)
LYMPHOCYTES ABSOLUTE: 3.5 K/UL (ref 1–5.1)
LYMPHOCYTES RELATIVE PERCENT: 22.3 %
MCH RBC QN AUTO: 36 PG (ref 26–34)
MCHC RBC AUTO-ENTMCNC: 34.3 G/DL (ref 31–36)
MCV RBC AUTO: 104.9 FL (ref 80–100)
MONOCYTES ABSOLUTE: 1.6 K/UL (ref 0–1.3)
MONOCYTES RELATIVE PERCENT: 10.6 %
NEUTROPHILS ABSOLUTE: 10.1 K/UL (ref 1.7–7.7)
NEUTROPHILS RELATIVE PERCENT: 65.1 %
PDW BLD-RTO: 13.9 % (ref 12.4–15.4)
PLATELET # BLD: 392 K/UL (ref 135–450)
PMV BLD AUTO: 8 FL (ref 5–10.5)
POTASSIUM REFLEX MAGNESIUM: 4.1 MMOL/L (ref 3.5–5.1)
RBC # BLD: 3.53 M/UL (ref 4–5.2)
SARS-COV-2: NOT DETECTED
SODIUM BLD-SCNC: 132 MMOL/L (ref 136–145)
SODIUM BLD-SCNC: 133 MMOL/L (ref 136–145)
SODIUM BLD-SCNC: 134 MMOL/L (ref 136–145)
WBC # BLD: 15.5 K/UL (ref 4–11)

## 2021-04-19 PROCEDURE — U0003 INFECTIOUS AGENT DETECTION BY NUCLEIC ACID (DNA OR RNA); SEVERE ACUTE RESPIRATORY SYNDROME CORONAVIRUS 2 (SARS-COV-2) (CORONAVIRUS DISEASE [COVID-19]), AMPLIFIED PROBE TECHNIQUE, MAKING USE OF HIGH THROUGHPUT TECHNOLOGIES AS DESCRIBED BY CMS-2020-01-R: HCPCS

## 2021-04-19 PROCEDURE — 6370000000 HC RX 637 (ALT 250 FOR IP): Performed by: STUDENT IN AN ORGANIZED HEALTH CARE EDUCATION/TRAINING PROGRAM

## 2021-04-19 PROCEDURE — 97130 THER IVNTJ EA ADDL 15 MIN: CPT

## 2021-04-19 PROCEDURE — 6370000000 HC RX 637 (ALT 250 FOR IP): Performed by: INTERNAL MEDICINE

## 2021-04-19 PROCEDURE — 99232 SBSQ HOSP IP/OBS MODERATE 35: CPT | Performed by: PHYSICAL MEDICINE & REHABILITATION

## 2021-04-19 PROCEDURE — U0005 INFEC AGEN DETEC AMPLI PROBE: HCPCS

## 2021-04-19 PROCEDURE — 6370000000 HC RX 637 (ALT 250 FOR IP): Performed by: PHYSICIAN ASSISTANT

## 2021-04-19 PROCEDURE — 6370000000 HC RX 637 (ALT 250 FOR IP): Performed by: COUNSELOR

## 2021-04-19 PROCEDURE — 97535 SELF CARE MNGMENT TRAINING: CPT

## 2021-04-19 PROCEDURE — 85025 COMPLETE CBC W/AUTO DIFF WBC: CPT

## 2021-04-19 PROCEDURE — 97129 THER IVNTJ 1ST 15 MIN: CPT

## 2021-04-19 PROCEDURE — 6360000002 HC RX W HCPCS: Performed by: PHYSICIAN ASSISTANT

## 2021-04-19 PROCEDURE — 97530 THERAPEUTIC ACTIVITIES: CPT

## 2021-04-19 PROCEDURE — 99232 SBSQ HOSP IP/OBS MODERATE 35: CPT | Performed by: NURSE PRACTITIONER

## 2021-04-19 PROCEDURE — 99223 1ST HOSP IP/OBS HIGH 75: CPT | Performed by: INTERNAL MEDICINE

## 2021-04-19 PROCEDURE — 36415 COLL VENOUS BLD VENIPUNCTURE: CPT

## 2021-04-19 PROCEDURE — 2060000000 HC ICU INTERMEDIATE R&B

## 2021-04-19 PROCEDURE — 6370000000 HC RX 637 (ALT 250 FOR IP): Performed by: NURSE PRACTITIONER

## 2021-04-19 PROCEDURE — 84295 ASSAY OF SERUM SODIUM: CPT

## 2021-04-19 PROCEDURE — 2580000003 HC RX 258: Performed by: PHYSICIAN ASSISTANT

## 2021-04-19 PROCEDURE — 97110 THERAPEUTIC EXERCISES: CPT

## 2021-04-19 PROCEDURE — 80048 BASIC METABOLIC PNL TOTAL CA: CPT

## 2021-04-19 PROCEDURE — 2500000003 HC RX 250 WO HCPCS: Performed by: PHYSICIAN ASSISTANT

## 2021-04-19 RX ORDER — ASPIRIN 81 MG/1
81 TABLET, CHEWABLE ORAL DAILY
Status: DISCONTINUED | OUTPATIENT
Start: 2021-04-19 | End: 2021-04-23 | Stop reason: HOSPADM

## 2021-04-19 RX ADMIN — Medication 10 ML: at 20:09

## 2021-04-19 RX ADMIN — ACETAMINOPHEN 1000 MG: 500 TABLET, COATED ORAL at 11:40

## 2021-04-19 RX ADMIN — POLYETHYLENE GLYCOL (3350) 17 G: 17 POWDER, FOR SOLUTION ORAL at 11:42

## 2021-04-19 RX ADMIN — HEPARIN SODIUM 5000 UNITS: 5000 INJECTION INTRAVENOUS; SUBCUTANEOUS at 23:00

## 2021-04-19 RX ADMIN — DOCUSATE SODIUM 50 MG AND SENNOSIDES 8.6 MG 2 TABLET: 8.6; 5 TABLET, FILM COATED ORAL at 20:08

## 2021-04-19 RX ADMIN — OXYCODONE HYDROCHLORIDE 10 MG: 5 TABLET ORAL at 17:35

## 2021-04-19 RX ADMIN — DOCUSATE SODIUM 50 MG AND SENNOSIDES 8.6 MG 2 TABLET: 8.6; 5 TABLET, FILM COATED ORAL at 11:40

## 2021-04-19 RX ADMIN — Medication 10 ML: at 11:41

## 2021-04-19 RX ADMIN — THERA TABS 1 TABLET: TAB at 11:42

## 2021-04-19 RX ADMIN — LEVETIRACETAM 500 MG: 500 TABLET, FILM COATED ORAL at 11:40

## 2021-04-19 RX ADMIN — Medication 100 MG: at 11:42

## 2021-04-19 RX ADMIN — HEPARIN SODIUM 5000 UNITS: 5000 INJECTION INTRAVENOUS; SUBCUTANEOUS at 06:08

## 2021-04-19 RX ADMIN — FAMOTIDINE 20 MG: 10 INJECTION, SOLUTION INTRAVENOUS at 20:09

## 2021-04-19 RX ADMIN — OXYCODONE HYDROCHLORIDE 10 MG: 5 TABLET ORAL at 04:00

## 2021-04-19 RX ADMIN — Medication 1 G: at 11:40

## 2021-04-19 RX ADMIN — Medication 1 G: at 17:35

## 2021-04-19 RX ADMIN — LEVETIRACETAM 500 MG: 500 TABLET, FILM COATED ORAL at 20:09

## 2021-04-19 RX ADMIN — ACETAMINOPHEN 1000 MG: 500 TABLET, COATED ORAL at 17:35

## 2021-04-19 RX ADMIN — ACETAMINOPHEN 1000 MG: 500 TABLET, COATED ORAL at 04:00

## 2021-04-19 RX ADMIN — AMLODIPINE BESYLATE 5 MG: 5 TABLET ORAL at 11:40

## 2021-04-19 RX ADMIN — ACETAMINOPHEN 1000 MG: 500 TABLET, COATED ORAL at 23:00

## 2021-04-19 RX ADMIN — OXYCODONE HYDROCHLORIDE 10 MG: 5 TABLET ORAL at 11:39

## 2021-04-19 RX ADMIN — ATORVASTATIN CALCIUM 40 MG: 40 TABLET, FILM COATED ORAL at 20:09

## 2021-04-19 RX ADMIN — HEPARIN SODIUM 5000 UNITS: 5000 INJECTION INTRAVENOUS; SUBCUTANEOUS at 17:36

## 2021-04-19 RX ADMIN — FAMOTIDINE 20 MG: 10 INJECTION, SOLUTION INTRAVENOUS at 11:40

## 2021-04-19 RX ADMIN — ASPIRIN 81 MG: 81 TABLET, CHEWABLE ORAL at 17:35

## 2021-04-19 RX ADMIN — Medication 10 ML: at 11:43

## 2021-04-19 ASSESSMENT — PAIN DESCRIPTION - DESCRIPTORS
DESCRIPTORS: ACHING;HEADACHE

## 2021-04-19 ASSESSMENT — PAIN SCALES - GENERAL
PAINLEVEL_OUTOF10: 7

## 2021-04-19 ASSESSMENT — PAIN DESCRIPTION - ORIENTATION
ORIENTATION: RIGHT;ANTERIOR
ORIENTATION: RIGHT;ANTERIOR

## 2021-04-19 ASSESSMENT — PAIN DESCRIPTION - DIRECTION
RADIATING_TOWARDS: LEFT

## 2021-04-19 ASSESSMENT — PAIN - FUNCTIONAL ASSESSMENT
PAIN_FUNCTIONAL_ASSESSMENT: PREVENTS OR INTERFERES SOME ACTIVE ACTIVITIES AND ADLS

## 2021-04-19 ASSESSMENT — PAIN DESCRIPTION - FREQUENCY
FREQUENCY: CONTINUOUS
FREQUENCY: CONTINUOUS

## 2021-04-19 ASSESSMENT — PAIN DESCRIPTION - PAIN TYPE
TYPE: SURGICAL PAIN

## 2021-04-19 ASSESSMENT — PAIN DESCRIPTION - ONSET: ONSET: ON-GOING

## 2021-04-19 ASSESSMENT — PAIN DESCRIPTION - PROGRESSION
CLINICAL_PROGRESSION: NOT CHANGED
CLINICAL_PROGRESSION: NOT CHANGED

## 2021-04-19 NOTE — PLAN OF CARE
Problem: Pain:  Goal: Pain level will decrease  Description: Pain level will decrease  Outcome: Ongoing  Note: Patient's pain will continue to improve and continue to be managed per the [facility]. Problem: Falls - Risk of:  Goal: Will remain free from falls  Description: Will remain free from falls  Outcome: Ongoing  Note: Patient will remain free from falls. Patient will use call light to notify staff of needs prior to exiting the bed. Patient's bed will remain in lowest position with wheels locked and bed alarm engaged. Problem: Pain:  Goal: Pain level will decrease  Description: Pain level will decrease  Outcome: Ongoing  Note: Patient's pain will continue to improve and continue to be managed per the [facility].

## 2021-04-19 NOTE — PROGRESS NOTES
for reading/writing tasks with min cues  4/16: pt cont with left neglect, gazes to right requiring max cues to attend to left. Did not address reading/writing as pt became lethargic as session progressed  Cont goal  4/18: Targeted L attention during functional conversational task. MAX fading to mod cues to attend to conversational partner situated L of midline and make eye contact - pt frequently turned head to L but maintained R gaze preference. Max cues required to attend to L side of board in room detailing staff and activity recs. Continue goal.  4/19: when questioned regarding deficits from stroke, pt stated my left vision is bad. Pt demonstrates awareness of visual deficits however requires max cues to attend/locate windows/flowers on left side of room. Pt wrote name initially midline, however with cues to locate left of page, pt did so independently and wrote name accurately. Pt rosette clock face with appropriate contour and placement of numbers  Cont goal    Goal 2: Pt will provide 3 solutions to functional problems with 80% accuracy  4/16: pt proving 1-2 solutions to everyday problems. Pt demonstrated decreased flexibility of thought when asked to provide additional solutions. Responses were often concrete  Cont goal  4/18: Goal not targeted directly. Pt independently identified deficits s/p CVA. 4/19: pt completed problem solving tasks accurately and demonstrated good flexibility of thought this date, being able to provide alternate solutions  Goal met    Goal 3: Pt will solve money/time concept problems with 50% accuracy  4/16: pt solved functional money/time concept problems with 20% accuracy and max cues provided. Cont goal  4/18: Goal not targeted. 4/19: pt states \"I am still thinking about that problem the other day, the 7:45 one. \"  Pt cont to demonstrate significant difficulty with calculations/money / time concept problems, with 20% accuracy. Pt states, \" I should be able to do that. \" When presented with visual cues (looking at clock, drawing coins, etc), improvement to 50%. Cont goal    Goal 4- the patient will name 3 strategies for improved speech intelligibility  4/16: pt educated to strategies for improved speech intelligibility. Pt did not restate when requested  Cont goal  4/18: Goal not targeted directly. Pt identified feeling speech improved. 4/19: speech much improved, no dysarthria noted this date  Cont as appropriate    Goal 5- The patient will use strategies for improved speech intelligibility with mod cues in structured sentence level tasks  4/16: speech is ~80% intelligible this date, although lethargy appears to be impacting this as well. Pt completed graded phrases, requiring max cues to utilize strategies  Cont goal  4/18: Goal not directly targeted. Speech >90% comprehensible t/o session. Pt endorsed \"sound more like myself\" this date. Continue goal.  4/19:  speech much improved, no dysarthria noted this date  Cont as appropriate    Other areas targeted: discussed intonation/inflection of speech. Pt states she doesn't think her speech is off, however participated in tasks targeting inflection. Pt states, \" I feel like I am acting! \"  Pt did state understanding as to rationale for tasks presented    Assessment: Cognitive linguistic impairment with attention deficits (including L neglect) as well as executive dysfunction with reduced initiation and propulsion for task completion. Flat affect. Mild dysarthria which pt endorsed improved this date. Education:  Educated pt to rationale for tasks presented and recommendation for ongoing treatment at [facility]. Pt stated understanding and states she is hoping to go to rehab here    Plan:  Continue speech/language therapy to address above goals, 3-5 x/week x LOS  DC recommendations: ongoing ST at d/c  D/W nursing  Needs met prior to leaving room, call button placed in reach.    Treatment time:25 min timed code tx min  Arcadio Ya M.S./[facility]-SLP #[id number]  Pg. # [phone]  If patient is discharged prior to next session, this note will serve as discharge summary.

## 2021-04-19 NOTE — PLAN OF CARE
Problem: Nutrition  Goal: Optimal nutrition therapy  Description: Nutrition Problem #1: Inadequate oral intake  Intervention: Food and/or Nutrient Delivery: Continue Current Diet, Start Oral Nutrition Supplement  Nutritional Goals: Patient will tolerate and consume 50% or greater of all meals and supplements

## 2021-04-19 NOTE — PROGRESS NOTES
Scooting: Maximal assistance;2 Person assistance;Dependent/Total(To EOB, dependent posterior in recliner)  Comment: Pt with severe L posteriorlateral leaning requiring MaxAx1 for supine to sit transfer. Mod VC/TC for sequencing of all bed mobility. Transfers  Sit to Stand: 2 Person Assistance; Moderate Assistance;Minimal Assistance(ModAx1+MinAx1 from EOB, ModAx2-ModAx1 from recliner)  Stand to sit: Moderate Assistance(ModAx2-ModAx1 to recliner)  Bed to Chair: Dependent/Total;2 Person Assistance(Via Ramon Astudillo from EOB>recliner)  Comment: Pt with requiring constant VC/TC for sequencing of transfer demonstrating L posterior lean in sitting and standing with transfer. Balance  Posture: Poor  Sitting - Static: Fair  Sitting - Dynamic: Poor  Standing - Static: Fair  Standing - Dynamic: Fair  Comments: Weightshifitng onto R elbow in sitting with improved midline positioning. Moments of CGA but overall requiring MaxAx1 for static sitting with max VC/TC for use of BUE to maintain static sitting. ModAx1-MinAx1 required to maintain static standing for 1' with RUE on bedrail. Exercises  Knee Long Arc Quad: 1x10 LLE in sitting, AAROM with maxAx1  Comments: Stretching into DF LLE 3x30\"        AM-PAC Score  AM-PAC Inpatient Mobility Raw Score : 9 (04/19/21 0855)  AM-PAC Inpatient T-Scale Score : 30.55 (04/19/21 0855)  Mobility Inpatient CMS 0-100% Score: 81.38 (04/19/21 0855)  Mobility Inpatient CMS G-Code Modifier : CM (04/19/21 5204)          Goals  Short term goals  Time Frame for Short term goals: DC  Short term goal 1: Supine<>sit with MaxAx1 ongoing  Short term goal 2: Sit<>stand with MaxAx1 ongoing MET 4/19; progressed to sit<>stand with MinAx1  Short term goal 3: Pt will tolerate EOB>chair transfer for assessment MET 4/15; updated Pt will transfer EOB>chair with maxAx1 and LRAD. Ongoing  Short term goal 4: Pt will tolerate AMB assessment ongoing  Patient Goals   Patient goals :  To return home    Plan    Plan Times per week: 5-7  Times per day: Daily  Current Treatment Recommendations: Strengthening, ROM, Balance Training, Endurance Training, Functional Mobility Training, Transfer Training, Gait Training, Safety Education & Training, Home Exercise Program, Patient/Caregiver Education & Training  Safety Devices  Type of devices: Call light within reach, Chair alarm in place, Nurse notified, Left in chair     Therapy Time   Individual Concurrent Group Co-treatment   Time In 0750         Time Out 0830         Minutes 40           Timed Code Treatment Minutes:  40    Total Treatment Minutes:  40     If the patient is discharged before the next treatment session, this note will serve as the discharge summary. Rogers Gomez, PT, DPT     Therapist was present, directed the patient's care, made skilled judgement and was responsible for assessment and treatment of the patient.

## 2021-04-19 NOTE — PROGRESS NOTES
NUTRITION ASSESSMENT  Admission Date: 4/13/2021     Type and Reason for Visit: Reassess    NUTRITION RECOMMENDATIONS:   1. Continue dysphagia soft and bite sized diet per SLP recommendations (following testing)  2. Continue Ensure High Protein BID and encourage acceptance    NUTRITION ASSESSMENT:  Patient improving nutritionally as she is tolerating a dysphagia three diet with fair po intake per nsg. Patient is currently NPO for JAKUB procedure today. SLP continues to follow. ONS ordered, will continue to monitor nutritional status. MALNUTRITION ASSESSMENT  Context of Malnutrition: Acute Illness   Malnutrition Status: At risk for malnutrition (Comment)  Due to current CDC guidelines recommending 6-ft distancing for social isolation for COVID19 prevention, physical aspects of the malnutrition assessment were withheld at this time.      NUTRITION DIAGNOSIS   Problem: Problem #1: Inadequate oral intake   Etiology: Acute or chronic injury or trauma  Signs & Symptoms: Intake 0-25%    NUTRITION INTERVENTION  Food and/or Nutrient Delivery: Continue NPO(Resume po diet following procedure) Continue Current ONS   Coordination of Nutrition Care: Continue to monitor while inpatient     NUTRITION MONITORING & EVALUATION:  Evaluation:Progressing towards goal   Goals:Goals: Patient will tolerate and consume 50% or greater of all meals and supplements  Monitoring: Diet Tolerance , Pertinent Labs  or Supplement Intake      OBJECTIVE DATA: Significant to nutrition assessment  · Nutrition-Focused Physical Findings: +BM 4/18  · Wounds Surgical Wound      Past Medical History:   Diagnosis Date    GERD (gastroesophageal reflux disease)     Hernia     Obesity     Unspecified sleep apnea     CPAP        ANTHROPOMETRICS  Current Height: 5' 6\" (167.6 cm)  Current Weight: 238 lb 8.6 oz (108.2 kg)    Admission weight: 216 lb 4.3 oz (98.1 kg)  Ideal Bodyweight 136 lb (62 kg)   Usual Bodyweight ANU   Weight Changes ANU       BMI BMI (Calculated): 38.6    Wt Readings from Last 50 Encounters:   04/15/21 238 lb 8.6 oz (108.2 kg)   04/13/21 249 lb 1.9 oz (113 kg)     COMPARATIVE STANDARDS  Estimated Total Kcals/Day : 8-15 Current Bodyweight (108 kg) 9595-3906 kcal/day    Estimated Total Protein (g/day) : 1.3-1.5 Ideal Bodyweight  (62 kg) 80-93 g/day  Estimated Daily Total Fluid (ml/day): 5029-8977 mL per day     Food / Nutrition-Related History  Pre-Admission / Home Diet:  Pre-Admission/Home Diet: General   Home Supplements / Herbals:    none noted  Food Restrictions / Cultural Requests:    none noted    Current Nutrition Therapies   Diet NPO, After Midnight Exceptions are: Ice Chips  DIET GENERAL; No Added Salt (3-4 GM); Dysphagia Soft and Bite-Sized; Daily Fluid Restriction: 1200 ml     Diet NPO, After Midnight Exceptions are: Ice Chips  DIET GENERAL; No Added Salt (3-4 GM); Dysphagia Soft and Bite-Sized; Daily Fluid Restriction: 1200 ml  PO Intake: 0%  and 26-50%  PO Supplement: Low Calorie/High Protein    PO Supplement Intake: 26-50% and 51-75%  IVF: N/A ml/hr     NUTRITION RISK LEVEL: Risk Level:  Moderate     Álvaro Morales RD, LD  Adis:  [phone]  Office:  [phone]

## 2021-04-19 NOTE — PROGRESS NOTES
Internal Medicine Progress Note    Admit Date: 4/13/2021  Diet: Diet NPO, After Midnight Exceptions are: Ice Chips    CC: Stroke    Interval history: No acute events overnight. Bowel movement this AM after suppository. NPO for JAKUB today. Scalp pain present over surgical site but moderately well-controlled. She denies dyspnea, difficulty swallowing, chest pain    Medications:     Scheduled Meds:   levETIRAcetam  500 mg Oral BID    amLODIPine  5 mg Oral Daily    sodium chloride  1 g Oral TID WC    acetaminophen  1,000 mg Oral 4 times per day    polyethylene glycol  17 g Oral Daily    sennosides-docusate sodium  2 tablet Oral BID    heparin (porcine)  5,000 Units Subcutaneous 3 times per day    sodium chloride flush  5-40 mL Intravenous 2 times per day    famotidine (PEPCID) injection  20 mg Intravenous BID    atorvastatin  40 mg Oral Nightly    multivitamin  1 tablet Oral Daily    thiamine  100 mg Oral Daily    sodium chloride flush  5-40 mL Intravenous 2 times per day     Continuous Infusions:   sodium chloride       PRN Meds:bisacodyl, oxyCODONE **OR** oxyCODONE, sodium chloride flush, promethazine **OR** ondansetron, perflutren lipid microspheres, nicotine, sodium chloride flush, sodium chloride    Objective:   Vitals:   T-max:  Patient Vitals for the past 8 hrs:   BP Temp Temp src Pulse Resp   04/19/21 0700 112/70 98.5 °F (36.9 °C) Oral 63 16   04/19/21 0345 (!) 144/67 99.3 °F (37.4 °C) Oral 77 16       Intake/Output Summary (Last 24 hours) at 4/19/2021 1024  Last data filed at 4/19/2021 0552  Gross per 24 hour   Intake 570 ml   Output 1100 ml   Net -530 ml       Physical Exam:  Const: Well nourished, sitting up in chair with safety helmet on.   Eyes: PERRL, EOMI  HENT: Neck: Supple, no JVD, surgical staples on scalp without oozing or surrounding erythema   CV: RRR, no murmurs, 2+ pulses peripherally  RESP: CTAB, no wheezing, rhonchi, rales  GI: S/NT/ND  MSK/Extremities: No edema, no tenderness to palpation  Skin: Warm, dry. No rashes, no erythema  Neuro: AAOx3.  0/5 ULE and LLE; left facial droop; several second delay in responding to questions  Psych: Appropriate mood and affect. LABS:    CBC:   Recent Labs     04/17/21 0425 04/18/21  0624 04/19/21  0530   WBC 15.0* 15.1* 15.5*   HGB 12.3 13.3 12.7   HCT 36.1 40.4 37.1    343 392   .2* 109.2* 104.9*     Renal:    Recent Labs     04/17/21 0425 04/17/21  0425 04/18/21  0624 04/18/21  0909 04/18/21  1638 04/19/21  0530   *   < > 135* 134* 133* 133*   K 3.6  --  4.2  --   --  4.1   CL 98*  --  101  --   --  101   CO2 23  --  23  --   --  23   BUN 10  --  11  --   --  12   CREATININE <0.5*  --  <0.5*  --   --  <0.5*   GLUCOSE 101*  --  107*  --   --  110*   CALCIUM 9.3  --  9.3  --   --  9.4   ANIONGAP 12  --  11  --   --  9    < > = values in this interval not displayed. Hepatic: No results for input(s): AST, ALT, BILITOT, BILIDIR, PROT, LABALBU, ALKPHOS in the last 72 hours. Troponin: No results for input(s): TROPONINI in the last 72 hours. BNP: No results for input(s): BNP in the last 72 hours. Lipids:   No results for input(s): CHOL, HDL in the last 72 hours. Invalid input(s): LDLCALCU, TRIGLYCERIDE  ABGs:  No results for input(s): PHART, TBI8CXU, PO2ART, PYY5PJD, BEART, THGBART, J7FRZMIN, ZTW6ZID in the last 72 hours. INR: No results for input(s): INR in the last 72 hours. Lactate: No results for input(s): LACTATE in the last 72 hours. Cultures:  -----------------------------------------------------------------  RAD:   MRI brain without contrast   Final Result   1. Imaging findings compatible with subacute infarct throughout the right hemisphere involving vascular distributions of right anterior cerebral artery and portions of the anterior division right middle cerebral artery.  This zone of infarct includes    majority of the right temporal and right frontal lobes as well as a portion of the right parietal lobe, as well as the basal ganglia caudate and lentiform nuclei. Some areas of hemorrhagic transformation within this infarcted zone are demonstrated,    similar in appearance to recent CT from 4/14/2021. There is cytotoxic edema demonstrated. No midline shift identified status post right frontotemporal craniectomy. Continued effacement of the right lateral ventricle is demonstrated related to the    cytotoxic edema mass effect. CT HEAD WO CONTRAST   Final Result      No change since study earlier today. CT HEAD WO CONTRAST   Final Result      Interval right hemicraniectomy with marked edema of the right cerebral hemisphere, similar to prior. New areas of hyperattenuation within the right frontal lobe reflecting likely intraparenchymal and subarachnoid hemorrhage. Small subdural hematoma, parafalcine, similar to prior. Findings were discussed with Sharif Lam RN at 0730 hours after discovery at [facility] hours. CT head without contrast   Final Result   1. Suspect small parafalcine subdural hematoma in the left frontoparietal region measuring up to 4 mm in thickness. 2. Large zones of cytotoxic edema throughout the right cerebral hemisphere as described, within distribution of both the right anterior and right middle cerebral arteries. Hyperdense right MCA. Imaging findings are suspicious for occlusion of the right    internal carotid artery given infarct distribution within right anterior middle cerebral circulation. No intra-axial hemorrhage. 5 mm of right-to-left midline shift of the septum pellucidum. Assessment/Plan:   Destinee Castro is a 46 y.o. woman with  PMHx of HTN, HLD, gastric banding, splenectomy, smoking, who presented with acute right ICA occlusion/massive ischemic stroke s/p hemicrani. Continues working with PT; pending placement hoping to go to [facility]. JAKUB today to assess for intracardiac thrombus. Na stable on salt tablets. BP well-controlled. Acute ischemic CVA, Subdural heomorrhage s/p right hemicraniectomy and subsequen SAH and IP hemorrhage  -MRI as above  -Neurosurgery and neurology following   -SBP <160, PRN hydralazine, scheduled Norvasc   - Keppra 500 mg twice daily for 7 days  -holding antiplatelets and anticoag in setting of SAH, ICA  -f/u hypercoagulable panel; cardiolipin and beta-2 glycoprotein  antibodies negative  -PT/OT     Depression-Patient takes fluoxetine 20 mg daily at home  Anxiety -Patient takes Ativan 0.5 mg every 8 hours as needed for anxiety  MISHA-Patient uses CPAP at home   GERD- Patient takes omeprazole 40 mg daily at home  Obesity  Complicating assessment and treatment.  Patient's BMI is 34.91 kg/m² placing patient at risk for multiple co-morbidities as well as early death and contributing to the patient's presentation.      Leucocytosis-improving  Likely reactive to current stress of acute stroke and emergent surgical intervention.     Code Status: Full Code  FEN: Diet NPO, After Midnight Exceptions are: Ice Chips  PPX: SCD's, Pepcid  DISPO: general medical floors; pending placement     This patient will be staffed and discussed with Norm Jean Baptiste MD.     Serafin Loene MD  Internal Medicine, PGY-1  Reach me via [address] [place] [address]

## 2021-04-19 NOTE — PROGRESS NOTES
Patient is A/O x4, VSS, and pain is being managed per the [facility]. Patient's NIH - 16 and remains unchanged. Patient is currently NPO for possible JAKUB today, prior patient was tolerating PO liquids and diet well. Patient is a total assist with eating/drinking. Patient did have a small bowel movement early in shift. Fall precautions are in place - bed alarm is engaged. Will continue to monitor and assess patient.

## 2021-04-19 NOTE — PROGRESS NOTES
Tolerated treatment well  Safety Devices  Safety Devices in place: Yes  Type of devices: All fall risk precautions in place;Call light within reach; Chair alarm in place; Left in chair;Patient at risk for falls;Nurse notified         Patient Diagnosis(es): There were no encounter diagnoses. has a past medical history of GERD (gastroesophageal reflux disease), Hernia, Obesity, and Unspecified sleep apnea. has a past surgical history that includes Splenectomy (); Total hip arthroplasty (); Lap Band (); hernia repair ();  section (); and craniotomy (Right, 2021). Restrictions  Position Activity Restriction  Other position/activity restrictions: Ambulate, Up with assist, Pt to wear helmet when OOB  Subjective   General  Chart Reviewed: Yes  Patient assessed for rehabilitation services?: Yes  Additional Pertinent Hx: 46 y.o. female with hypertension and tobacco abuse who presented after spending a prolonged period on the ground s/p fall out of bed; found to have acute left hemiparesis and gaze deviation. CXR with no acute cardiopulmonary abnormality. CTH revealed large acute/subacute infarct in the R frontal lobe with associated mass effect and 4 mm R to L midline shift. It also noted subdural hemorrhage of 6mm along falx, although NSGY less convinced. CTA head and neck revealed occluded R cervical ICA & R anterior cerebral artery with near complete occlusion of R MCA. Outside window on TPA. Pt is now POD #1 following hemicraniectomy ()  Response to previous treatment: Patient with no complaints from previous session  Family / Caregiver Present: No  Referring Practitioner: Marcelino Winkler MD  Diagnosis: Stroke determined by clinical assessment  Subjective  Subjective: Pt supine in bed asleep upon entry, easily awakened with vcs/tactile cues and agreeable to therapy session.   General Comment  Comments: Pt supine in bed PROM LUE: 10 elbow flexion/extension x 2 sets and 10 wrist flexion/extension x 2 sets. Pt supine to sit Dependent Min A (LLE assist) and Max A (trunk). Pt with helmet donned Dependent. Pt sit EOB with left latera/posterior lean ranging from CGA - Max A (mostly); pt groomed (oral care/wash face). Pt sit to stand Sallye Aloe Min A + Mod A and pt transferred (requiring Max A seated on Sallye Aloe) to recliner via Sallye Aloe stand to sit (Mod A x 2). Pt with sit to stand from recliner (Mod A) pt in stance Min A ~30 sec witih pt using right hand holding on to bed rail. Pt stand to sit Mod A. Call light in reach, chair alarm on and pt positioned comfortably with pillows and rolled up washcloth placed in left hand to encourage extension of digits. Vital Signs  Patient Currently in Pain: Denies   Orientation  Orientation  Overall Orientation Status: Within Normal Limits  Objective    ADL  Grooming: Setup(seated balance on EOB CGA - Max A)        Balance  Sitting Balance: Maximum assistance(CGA - Max A)  Standing Balance: Minimal assistance(~30 sec with HHA and pt holding on to bed rail with right hand)  Standing Balance  Time: ~< 1 min total  Activity: Sallye Aloe transfer, static stance with HHA  Bed mobility  Rolling to Left: Minimal assistance  Supine to Sit: Dependent/Total;2 Person assistance(Max A (trunk) Min A (LLE))  Transfers  Sit to stand: Moderate assistance(Mod A from recliner, Mod A + Min A EOB to Sallye Aloe)  Stand to sit: Dependent/Total;2 Person assistance(Mod A x 2)                       Cognition  Overall Cognitive Status: Exceptions  Arousal/Alertness: Delayed responses to stimuli  Following Commands: Follows one step commands consistently; Follows one step commands with increased time  Attention Span: Appears intact  Memory: Appears intact  Insights: Decreased awareness of deficits  Initiation: Requires cues for some  Sequencing: Requires cues for some                                         Plan   Plan  Times per week: 5-7  Current Treatment Recommendations: Strengthening, Endurance Training, Neuromuscular Re-education, Self-Care / ADL, Functional Mobility Training, Safety Education & Training  G-Code     OutComes Score                                                  AM-PAC Score        AM-PAC Inpatient Daily Activity Raw Score: 11 (04/19/21 1531)  AM-PAC Inpatient ADL T-Scale Score : 29.04 (04/19/21 1531)  ADL Inpatient CMS 0-100% Score: 70.42 (04/19/21 1531)  ADL Inpatient CMS G-Code Modifier : CL (04/19/21 1531)    Goals  Short term goals  Time Frame for Short term goals: by discharge  Short term goal 1: Pt will perform bed mobility with max A - ongoing 4/19  Short term goal 2: Pt will perform seated on EOB for ~5 min with CGA to prepare for ADLs - ongoing 4/19  Short term goal 3: Pt will perform hemipalegic dressing technique for UB dressing with assist as needed - ongoing, not addressed 4/19  Short term goal 4: Pt will perform 5 hand squeezes with LUE to increase functional movement with no more than verbal encouragement - ongoing 4/19  Patient Goals   Patient goals : not stated       Therapy Time   Individual Concurrent Group Co-treatment   Time In [address]         Time Out 0830         Minutes 40         Timed Code Treatment Minutes: [facility] [place] [facility] [address]

## 2021-04-19 NOTE — PROGRESS NOTES
Neurology Progress Note    Exam:  -Mental status: Alert and oriented to person, place, month, year, flat & appropriate  -Speech & Language: no aphasia; no dysarthria  -Cranial nerves: pupils symmetric; no notable dysconjugate gaze; eyes do not cross midline to left; left facial weakness  -Motor: Left side 0/5. Right side 5/5.   -Other: no adventitious movements noted  Other Systems  -General Appearance: well-developed, well-nourished, no apparent distress  -Neck: supple  -Lungs: breathing unlabored, regular, no audible wheezes  -CV: pulses strong x 4 extremities  -Abd: flat    Labs:  hcg negative  ETOH none detected   mg/dL  A1c 5.4%  WBC 19.4K     Beta-2 Glycoprotein 1 IgG 0  Beta-2 Glycoprotein 1 IgM 3  Factor V Leiden, Protein C/S, Prothrombin gene, cardiolipin ab pending    APTT 23.7 s (L)  Antithrombin %  Factor 8 Quantitation 387% (H)  Homocysteine 17.8 (H)    Studies:  CT-A head and neck 4/12/21 23:16: occluded cervical right ICA; occluded right LEAH. Near complete occlusion of right MCA     CT head 4/14/21: stable    EKG: NSR     MRI brain 4/14/21 14:12  1. Imaging findings compatible with subacute infarct throughout the right hemisphere involving vascular distributions of right anterior cerebral artery and portions of the anterior division right middle cerebral artery. This zone of infarct includes    majority of the right temporal and right frontal lobes as well as a portion of the right parietal lobe, as well as the basal ganglia caudate and lentiform nuclei. Some areas of hemorrhagic transformation within this infarcted zone are demonstrated,    similar in appearance to recent CT from 4/14/2021. There is cytotoxic edema demonstrated. No midline shift identified status post right frontotemporal craniectomy.  Continued effacement of the right lateral ventricle is demonstrated related to the    cytotoxic edema mass effect.      Head CT 4/14/21 5:20 AM  Interval right hemicraniectomy with marked edema of the right cerebral hemisphere, similar to prior.       New areas of hyperattenuation within the right frontal lobe reflecting likely intraparenchymal and subarachnoid hemorrhage.       Small subdural hematoma, parafalcine, similar to prior.           TTE: EF 55%; No evidence of right to left shunting         Impression:  Danita Hernández is a 46 y.o. female with hypertension, hyperlipidemia, and tobacco abuse who presents with acute left hemiparesis found to have right ICA occlusion and massive ischemic stroke s/p hemicraniectomy with evidence of hemorrhagic transformation. Exam stable. Planning for possible JAKUB today. Recommendations:  - JAKUB and malignancy screening (CT chest/abdomen/pelvis), when able. Will leave to discretion of primary team. May consider heme/onc consult for further guidance. - F/U hypercoagulable labs  - Continue normotension, euglycemia, statin, PT/OT efforts, and telemetry    Will follow peripherally.  Call with questions or changes in exam.     A copy of this note was provided for MD Shraddha Gilbert NP  Neurology  [phone]  Evenings, weekends, and off weeks please discuss with the covering neurologist.

## 2021-04-19 NOTE — PLAN OF CARE
Problem: COMMUNICATION IMPAIRMENT  Goal: Ability to express needs and understand communication  Outcome: Ongoing  Note: Speech is clear but delayed. Patient answers questions appropriately. Pt is able to express needs. Pt oriented on ways to reach RN for needs. Will continue to monitor. Problem: Falls - Risk of:  Goal: Will remain free from falls  Description: Will remain free from falls  4/19/2021 0902 by Junior Eden RN  Outcome: Ongoing  Note: Pt is in bed with alarm on. Non-skid socks are on. Up x2 with stedy, tolerating fairly well. Fall precautions in place. Call light and bedside table in reach. Will continue to monitor.

## 2021-04-19 NOTE — PROGRESS NOTES
Progress/Consult Note  Physical Medicine and Rehabilitation    Patient: Adrianna Todd  [id number]  Date: 2021      Chief Complaint: Left-sided weakness    History of Present Illness/Hospital Course:  47 YO F PMH GERD, depression/anxiety presented to [facility] ED via EMS for new onset left sided weakness. Pt states that she fell out of her bed the night prior to admission and was down on the ground all night until the AM.  Patient endorses she did not let her  call EMS because she felt like she could improve. Patient claims EMS was finally called after her coworker started calling her in the morning. Pts GCS was 15 upon arrival @ ED with L sided facial droop, L sided weakness & R sided gaze deviation. Stroke alert was called. In the ED she was hemodynamically stable with /81. CTH revealed large acute/subacute infarct in the R frontal lobe with associated mass effect and 4 mm R to L midline shift. It also noted subdural hemorrhage of 6mm along falx, although NSGY less convinced. CTA head and neck revealed occluded R cervical ICA & R anterior cerebral artery with near complete occlusion of R MCA. She was ultimately seen by NSx who performed a R hemicraniectomy. A MRI on  showing large subacute infarct throughout R LEAH and MCA with some areas of hemorrhagic conversion. She has been on seizure ppx. Interval history: Still with left sided pratik-spatial  neglect. She is hopeful and improving. Plan to admit to ARU once medically stable.      Prior Level of Function:  Independent for mobility, ADLs, and IADLs    Current Level of Function:  PT:  AM-PAC score  OT: 10/24 AM-PAC score    Pertinent Social History:  Support: lives at home with her   Home set-up: unknown    Past Medical History:   Diagnosis Date    GERD (gastroesophageal reflux disease)     Hernia     Obesity     Unspecified sleep apnea     CPAP       Past Surgical History:   Procedure Laterality Date     SECTION  2004    CRANIOTOMY Right 4/13/2021    RIGHT HEMICRANIECTOMY performed by Yakov De Dios MD at [address]  2010    LAP BAND  2006    SPLENECTOMY  2000    TOTAL HIP ARTHROPLASTY  2001       Family History   Problem Relation Age of Onset   [facility] Cancer Mother     Depression Mother     Mental Illness Mother     Stroke Father     Cancer Father         prostate       Social History     Socioeconomic History    Marital status:      Spouse name: None    Number of children: None    Years of education: None    Highest education level: None   Occupational History    None   Social Needs    Financial resource strain: None    Food insecurity     Worry: None     Inability: None    Transportation needs     Medical: None     Non-medical: None   Tobacco Use    Smoking status: Current Every Day Smoker     Packs/day: 1.00     Years: 20.00     Pack years: 20.00    Smokeless tobacco: Never Used   Substance and Sexual Activity    Alcohol use: Yes     Comment: occasional    Drug use: No    Sexual activity: None   Lifestyle    Physical activity     Days per week: None     Minutes per session: None    Stress: None   Relationships    Social connections     Talks on phone: None     Gets together: None     Attends [employer] service: None     Active member of club or organization: None     Attends meetings of clubs or organizations: None     Relationship status: None    Intimate partner violence     Fear of current or ex partner: None     Emotionally abused: None     Physically abused: None     Forced sexual activity: None   Other Topics Concern    None   Social History Narrative    None           REVIEW OF SYSTEMS:   CONSTITUTIONAL: negative for fevers, chills, diaphoresis, appetite change, night sweats, unexpected weight change, fatigue  EYES: negative for blurred vision, eye discharge, visual disturbance and icterus  HEENT: negative for hearing loss, tinnitus, ear drainage, sinus pressure, nasal congestion, epistaxis and snoring  RESPIRATORY: Negative for hemoptysis, cough, sputum production  CARDIOVASCULAR: negative for chest pain, palpitations, exertional chest pressure/discomfort, syncope, edema  GASTROINTESTINAL: negative for nausea, vomiting, diarrhea, blood in stool, abdominal pain, constipation  GENITOURINARY: negative for frequency, dysuria, urinary incontinence, decreased urine volume, and hematuria  HEMATOLOGIC/LYMPHATIC: negative for easy bruising, bleeding and lymphadenopathy  ALLERGIC/IMMUNOLOGIC: negative for recurrent infections, angioedema, anaphylaxis and drug reactions  ENDOCRINE: negative for weight changes and diabetic symptoms including polyuria, polydipsia and polyphagia  MUSCULOSKELETAL: negative for pain, joint swelling, decreased range of motion  NEUROLOGICAL: negative for headaches, slurred speech, unilateral weakness  PSYCHIATRIC/BEHAVIORAL: negative for hallucinations, behavioral problems, confusion and agitation. Physical Examination:  Vitals:   Patient Vitals for the past 24 hrs:   BP Temp Temp src Pulse Resp   04/19/21 0700 112/70 98.5 °F (36.9 °C) Oral 63 16   04/19/21 0345 (!) 144/67 99.3 °F (37.4 °C) Oral 77 16   04/18/21 2345 117/73 99 °F (37.2 °C) Axillary 82 14   04/18/21 1904 126/81 98.4 °F (36.9 °C) Oral 82 15   04/18/21 1728    66    04/18/21 1528    65    04/18/21 1135 122/67 98.8 °F (37.1 °C) Oral 74 16     Const: Alert. WDWN. No distress  Eyes: Conjunctiva noninjected, no icterus noted; pupils equal, round, and reactive to light. HENT: Scalp sutures in place without drainage or marginal erythema. Oral mucosa moist  Neck: Trachea midline, neck supple. No thyromegaly noted. CV: Regular rate and rhythm, no murmur rub or gallop noted  Resp: Lungs clear to auscultation bilaterally, no rales wheezes or ronchi, no retractions. Respirations unlabored. GI: Soft, nontender, nondistended. Normal bowel sounds. No palpable masses.    Skin: Normal temperature and turgor. No rashes or breakdown noted. Ext: No significant edema appreciated. No varicosities. MSK: No joint tenderness, erythema, warmth noted. AROM intact. Neuro: Alert, oriented, appropriate. No cranial nerve deficits appreciated. 0/5 LUE and LLE. 5.5 stength on RHS. Psych: Stable mood, normal judgement, normal affect     Lab Results   Component Value Date    WBC 15.5 (H) 04/19/2021    HGB 12.7 04/19/2021    HCT 37.1 04/19/2021    .9 (H) 04/19/2021     04/19/2021     Lab Results   Component Value Date    INR 1.06 04/13/2021    PROTIME 12.3 04/13/2021     Lab Results   Component Value Date    CREATININE <0.5 (L) 04/19/2021    BUN 12 04/19/2021     (L) 04/19/2021    K 4.1 04/19/2021     04/19/2021    CO2 23 04/19/2021     Lab Results   Component Value Date    ALT 32 04/13/2021    AST 41 (H) 04/13/2021    ALKPHOS 86 04/13/2021    BILITOT 0.7 04/13/2021       Most recent echocardiogram revealed:  4/13/21  Summary   Normal left ventricle size. There is mild-moderate concentric left   ventricular hypertrophy. Overall left ventricular systolic function appears   normal with an ejection fraction of 55%. No regional wall motion   abnormalities are noted. Diastolic filling parameters suggests normal   diastolic function. The aortic valve appears tricuspid with minimal sclerosis. Trivial tricuspid regurgitation. Estimated pulmonary artery systolic pressure is 26 mmHg assuming a right   atrial pressure of 3 mmHg. Trivial pulmonic regurgitation present. A bubble study was performed and fails to show evidence of right to left   shunting. Most recent EKG revealed:  NSR      MRI brain without contrast   Final Result   1. Imaging findings compatible with subacute infarct throughout the right hemisphere involving vascular distributions of right anterior cerebral artery and portions of the anterior division right middle cerebral artery.  This zone of infarct includes majority of the right temporal and right frontal lobes as well as a portion of the right parietal lobe, as well as the basal ganglia caudate and lentiform nuclei. Some areas of hemorrhagic transformation within this infarcted zone are demonstrated,    similar in appearance to recent CT from 4/14/2021. There is cytotoxic edema demonstrated. No midline shift identified status post right frontotemporal craniectomy. Continued effacement of the right lateral ventricle is demonstrated related to the    cytotoxic edema mass effect. CT HEAD WO CONTRAST   Final Result      No change since study earlier today. CT HEAD WO CONTRAST   Final Result      Interval right hemicraniectomy with marked edema of the right cerebral hemisphere, similar to prior. New areas of hyperattenuation within the right frontal lobe reflecting likely intraparenchymal and subarachnoid hemorrhage. Small subdural hematoma, parafalcine, similar to prior. Findings were discussed with Marilu Johnson RN at 0730 hours after discovery at [facility] hours. CT head without contrast   Final Result   1. Suspect small parafalcine subdural hematoma in the left frontoparietal region measuring up to 4 mm in thickness. 2. Large zones of cytotoxic edema throughout the right cerebral hemisphere as described, within distribution of both the right anterior and right middle cerebral arteries. Hyperdense right MCA. Imaging findings are suspicious for occlusion of the right    internal carotid artery given infarct distribution within right anterior middle cerebral circulation. No intra-axial hemorrhage. 5 mm of right-to-left midline shift of the septum pellucidum. Assessment:  Ulysses American a 46 y.o. female with PMH GERD p/w L sided weakness and facial droop.   CTA head/neck on 4/12 revealed occluded R cervical ICA, occluded right LEAH, near complete occlusion of right MCA.     Acute ischemic CVA, Subdural heomorrhage s/p right hemicraniectomy and subsequen SAH and IP hemorrhage  No tPA given. MRI 4/14 showing large subacute infarct throughout R LEAH and MCA with some areas of hemorrhagic conversion.  - Neurosurgery and neurology following  --SBP <160, PRN hydralazine, scheduled Norvasc  -- Keppra 500 mg twice daily for 7 days  - holding antiplatelets and anticoag in setting of SAH, ICA  - f/u hypercoagulable panel  - PT/OT consulted      Depression-Patient takes fluoxetine 20 mg daily at home  Anxiety -Patient takes Ativan 0.5 mg every 8 hours as needed for anxiety  MISHA-Patient uses CPAP at home   GERD- Patient takes omeprazole 40 mg daily at home  Obesity  Complicating assessment and treatment.  Patient's BMI is 34.91 kg/m² placing patient at risk for multiple co-morbidities as well as early death and contributing to the patient's presentation.      Impairments- Decreased functional mobility, Decreased ADLs    Recommendations:    Recommend ARU placement in a few days     Improving and motivated. Thank you for this consult. Please contact me with any questions or concerns.      Jeanette Colorado D.O. M.P.H  PM&R  4/19/2021  11:13 AM

## 2021-04-19 NOTE — CONSULTS
[facility]  Cardiology Inpatient Consult Service                                                                                          Pt Name: Neno Peterson  Age: 46 y.o. Sex: female  : 1970  Location:     Referring Physician: Rashad Case MD      Reason for Consult:       Reason for Consultation/Chief Complaint: Stroke      HPI:      Neno Peterson is a 46 y.o. female hx HTN,HLD,smoking, who presented with acute right ICA occlusion/massive ischemic stroke s/p hemicrani. We are consulted to perform a JAKUB to assess for intracardiac thrombus. Histories     Past Medical History:   has a past medical history of GERD (gastroesophageal reflux disease), Hernia, Obesity, and Unspecified sleep apnea. Surgical History:   has a past surgical history that includes Splenectomy (); Total hip arthroplasty (); Lap Band (); hernia repair ();  section (); and craniotomy (Right, 2021). Social History:   reports that she has been smoking. She has a 20.00 pack-year smoking history. She has never used smokeless tobacco. She reports current alcohol use. She reports that she does not use drugs. Family History:  No evidence for sudden cardiac death or premature CAD      Medications:       Home Medications  Were reviewed and are listed in nursing record. and/or listed below  Prior to Admission medications    Medication Sig Start Date End Date Taking? Authorizing Provider   FLUoxetine (PROZAC) 20 MG capsule Take 20 mg by mouth daily   Yes Historical Provider, MD   famotidine (PEPCID) 40 MG tablet Take 40 mg by mouth daily   Yes Historical Provider, MD   LORazepam (ATIVAN) 0.5 MG tablet Take 0.5 mg by mouth every 8 hours as needed for Anxiety. Yes Historical Provider, MD   norethindrone (EDUARD) 0.35 MG tablet Take 1 tablet by mouth daily   Yes Historical Provider, MD   Multiple Vitamin (MULTIVITAMIN PO) Take  by mouth.       Historical Provider, MD          Inpatient Medications:   levETIRAcetam  500 mg Oral BID    amLODIPine  5 mg Oral Daily    sodium chloride  1 g Oral TID WC    acetaminophen  1,000 mg Oral 4 times per day    polyethylene glycol  17 g Oral Daily    sennosides-docusate sodium  2 tablet Oral BID    heparin (porcine)  5,000 Units Subcutaneous 3 times per day    sodium chloride flush  5-40 mL Intravenous 2 times per day    famotidine (PEPCID) injection  20 mg Intravenous BID    atorvastatin  40 mg Oral Nightly    multivitamin  1 tablet Oral Daily    thiamine  100 mg Oral Daily    sodium chloride flush  5-40 mL Intravenous 2 times per day       IV drips:   sodium chloride         PRN:  bisacodyl, oxyCODONE **OR** oxyCODONE, sodium chloride flush, promethazine **OR** ondansetron, perflutren lipid microspheres, nicotine, sodium chloride flush, sodium chloride    Allergy:     Patient has no known allergies. Review of Systems:     All 12 point review of symptoms completed. Pertinent positives identified in the HPI, all other review of symptoms negative as below. CONSTITUTIONAL: Nounanticipated weight loss. No change in energy level, sleep pattern, or activity level. SKIN: No rash or pruritis. EYES: No visual changes or diplopia. No scleral icterus. ENT: No Headaches, hearing loss or vertigo. No mouth sores or sore throat. CARDIOVASCULAR: No chest pain/chest pressure/chest discomfort. No palpitations. RESPIRATORY: No cough or wheezing, no sputum production. No hematemesis. GASTROINTESTINAL: No N/V/D. No abdominal pain, appetite loss, blood in stools. GENITOURINARY: No dysuria, trouble voiding, or hematuria. MUSCULOSKELETAL:  No gait disturbance, weakness or joint complaints. NEUROLOGICAL: No headache, diplopia, change in muscle strength, numbness or tingling. No change in gait, balance, coordination, mood, affect, memory, mentation, behavior.   PSHYCH: No anxiety, loss of interest, change in sexual behavior, feelings of self-harm, or confusion. ENDOCRINE: No malaise, fatigue or temperature intolerance. No excessive thirst, fluid intake, or urination. No tremor. HEMATOLOGIC: No abnormal bruising or bleeding. ALLERGY: No nasal congestion or hives.       Physical Examination:     Vitals:    04/18/21 1904 04/18/21 2345 04/19/21 0345 04/19/21 0700   BP: 126/81 117/73 (!) 144/67 112/70   Pulse: 82 82 77 63   Resp: 15 14 16 16   Temp: 98.4 °F (36.9 °C) 99 °F (37.2 °C) 99.3 °F (37.4 °C) 98.5 °F (36.9 °C)   TempSrc: Oral Axillary Oral Oral   SpO2:       Weight:       Height:           Wt Readings from Last 3 Encounters:   04/15/21 238 lb 8.6 oz (108.2 kg)   04/13/21 249 lb 1.9 oz (113 kg)   12/06/12 218 lb 11.2 oz (99.2 kg)       Objective      General Appearance:  Alert, cooperative, no distress, appears stated age Appropriate weight   Head:  Normocephalic, without obvious abnormality, atraumatic   Eyes:  PERRL, conjunctiva/corneas clear EOM intact  Ears normal   Throat no lesions       Nose: Nares normal, no drainage or sinus tenderness   Throat: Lips, mucosa, and tongue normal   Neck: Supple, symmetrical, trachea midline, no adenopathy, thyroid: not enlarged, symmetric, no tenderness/mass/nodules, no carotid bruit or JVD       Lungs:   Clear to auscultation bilaterally, respirations unlabored   Chest Wall:  No tenderness or deformity   Heart:  Regular rate and rhythm, S1, S2 normal, no murmur, rub or gallop PMI intact   Abdomen:   Soft, non-tender, bowel sounds active all four quadrants,  no masses, no organomegaly       Extremities: Extremities normal, atraumatic, no cyanosis or edema   Pulses: 2+ and symmetric   Skin: Skin color, texture, turgor normal, no rashes or lesions   Pysch: Normal mood and affect   Neurologic: Normal gross motor and sensory exam.  Cranial nerves intact        Labs:     Recent Labs     04/17/21  0425 04/17/21  0425 04/18/21  0003 04/18/21  5989 04/18/21  0909 04/18/21  1638 04/19/21 0530   *   < > 134* 135* 134* 133* 133*   K 3.6  --   --  4.2  --   --  4.1   BUN 10  --   --  11  --   --  12   CREATININE <0.5*  --   --  <0.5*  --   --  <0.5*   CL 98*  --   --  101  --   --  101   CO2 23  --   --  23  --   --  23   GLUCOSE 101*  --   --  107*  --   --  110*   CALCIUM 9.3  --   --  9.3  --   --  9.4    < > = values in this interval not displayed. Recent Labs     04/17/21  0425 04/18/21  0624 04/19/21  0530   WBC 15.0* 15.1* 15.5*   HGB 12.3 13.3 12.7   HCT 36.1 40.4 37.1    343 392   .2* 109.2* 104.9*     No results for input(s): CHOLTOT, TRIG, HDL in the last 72 hours. Invalid input(s): LIPIDCOMM, CHOLHDL, VLDCHOL, LDL  No results for input(s): PTT, INR in the last 72 hours. Invalid input(s): PT  No results for input(s): CKTOTAL, CKMB, CKMBINDEX, TROPONINI in the last 72 hours. No results for input(s): BNP in the last 72 hours. No results for input(s): TSH in the last 72 hours. No results for input(s): CHOL, HDL, LDLCALC, TRIG in the last 72 hours.]    Lab Results   Component Value Date    DNBUMQY 925 (H) 04/13/2021    TROPONINI <0.01 04/12/2021         Imaging:     I personally reviewed imaging studies including CXR, Stress test, TTE/JAKUB. Last ECG (if available) - EKG:  I have reviewed EKG with the following interpretation  NSR    Telemetry:  NSR    Last Stress (if available):    Last TTE/JAKUB(if available):  Normal left ventricle size. There is mild-moderate concentric left   ventricular hypertrophy. Overall left ventricular systolic function appears   normal with an ejection fraction of 55%. No regional wall motion   abnormalities are noted. Diastolic filling parameters suggests normal   diastolic function. The aortic valve appears tricuspid with minimal sclerosis. Trivial tricuspid regurgitation. Estimated pulmonary artery systolic pressure is 26 mmHg assuming a right   atrial pressure of 3 mmHg. Trivial pulmonic regurgitation present.    A bubble study was performed and fails to show evidence of right to left   shunting. Last Cath (if available):    Last CMR  (if available):      Assessment / Plan:     Stroke/JAKUB request  - plan for JAKUB tomorrow at noon  - NPO after midnight    Tobacco use was discussed with the patient and educated on the negative effects. I have asked the patient to not utilize these agents. Thank you for allowing to us to participate in the care or [place] [facility]. Further evaluation will be based upon the patient's clinical course and testing results. I have spent 40 minutes of face to face time with the patient with more than 50% spent counseling and coordinating care. All questions and concerns were addressed to the patient/family. Alternatives to my treatment were discussed. The note was completed using EMR. Every effort was made to ensure accuracy; however, inadvertent computerized transcription errors may be present. I have personally reviewed the reports and images of labs, radiological studies, cardiac studies including ECG's and telemetry, current and old medical records. Umesh Patel MD, [facility] [place], [facility]

## 2021-04-19 NOTE — PROGRESS NOTES
Pt is A/O x4. VSS. NIHSS 17, unchanged throughout shift. Incision is CDI, cleaned with CHG swab. Pt c/o pain to head, managed with PRN PO meds per MAR. Up x2 with stedy tolerating mobility better. Fall precautions in place. Will continue to monitor.

## 2021-04-20 ENCOUNTER — ANESTHESIA (OUTPATIENT)
Dept: CARDIAC CATH/INVASIVE PROCEDURES | Age: 51
DRG: 023 | End: 2021-04-20
Payer: COMMERCIAL

## 2021-04-20 ENCOUNTER — ANESTHESIA EVENT (OUTPATIENT)
Dept: CARDIAC CATH/INVASIVE PROCEDURES | Age: 51
DRG: 023 | End: 2021-04-20
Payer: COMMERCIAL

## 2021-04-20 ENCOUNTER — APPOINTMENT (OUTPATIENT)
Dept: CARDIAC CATH/INVASIVE PROCEDURES | Age: 51
DRG: 023 | End: 2021-04-20
Attending: INTERNAL MEDICINE
Payer: COMMERCIAL

## 2021-04-20 VITALS — SYSTOLIC BLOOD PRESSURE: 144 MMHG | DIASTOLIC BLOOD PRESSURE: 92 MMHG

## 2021-04-20 PROBLEM — I63.511 ACUTE RIGHT MCA STROKE (HCC): Status: ACTIVE | Noted: 2021-04-13

## 2021-04-20 LAB
ANAEROBIC CULTURE: ABNORMAL
ANION GAP SERPL CALCULATED.3IONS-SCNC: 10 MMOL/L (ref 3–16)
BASOPHILS ABSOLUTE: 0.1 K/UL (ref 0–0.2)
BASOPHILS RELATIVE PERCENT: 1 %
BUN BLDV-MCNC: 15 MG/DL (ref 7–20)
CALCIUM SERPL-MCNC: 9.3 MG/DL (ref 8.3–10.6)
CHLORIDE BLD-SCNC: 100 MMOL/L (ref 99–110)
CO2: 24 MMOL/L (ref 21–32)
CREAT SERPL-MCNC: 0.6 MG/DL (ref 0.6–1.1)
CULTURE SURGICAL: ABNORMAL
EOSINOPHILS ABSOLUTE: 0.1 K/UL (ref 0–0.6)
EOSINOPHILS RELATIVE PERCENT: 1 %
GFR AFRICAN AMERICAN: >60
GFR NON-AFRICAN AMERICAN: >60
GLUCOSE BLD-MCNC: 98 MG/DL (ref 70–99)
GRAM STAIN RESULT: ABNORMAL
HCT VFR BLD CALC: 36.3 % (ref 36–48)
HEMOGLOBIN: 12.3 G/DL (ref 12–16)
LYMPHOCYTES ABSOLUTE: 4 K/UL (ref 1–5.1)
LYMPHOCYTES RELATIVE PERCENT: 30 %
MACROCYTES: ABNORMAL
MCH RBC QN AUTO: 35.8 PG (ref 26–34)
MCHC RBC AUTO-ENTMCNC: 34 G/DL (ref 31–36)
MCV RBC AUTO: 105.1 FL (ref 80–100)
MONOCYTES ABSOLUTE: 0.9 K/UL (ref 0–1.3)
MONOCYTES RELATIVE PERCENT: 7 %
NEUTROPHILS ABSOLUTE: 8.1 K/UL (ref 1.7–7.7)
NEUTROPHILS RELATIVE PERCENT: 61 %
ORGANISM: ABNORMAL
PDW BLD-RTO: 14.1 % (ref 12.4–15.4)
PLATELET # BLD: 445 K/UL (ref 135–450)
PMV BLD AUTO: 8.1 FL (ref 5–10.5)
POTASSIUM REFLEX MAGNESIUM: 3.8 MMOL/L (ref 3.5–5.1)
PROTEIN C FUNCTIONAL: 163 % (ref 83–168)
PROTEIN S, FUNCTIONAL: 123 % (ref 57–131)
RBC # BLD: 3.45 M/UL (ref 4–5.2)
SODIUM BLD-SCNC: 132 MMOL/L (ref 136–145)
SODIUM BLD-SCNC: 134 MMOL/L (ref 136–145)
SODIUM BLD-SCNC: 135 MMOL/L (ref 136–145)
SODIUM BLD-SCNC: 136 MMOL/L (ref 136–145)
WBC # BLD: 13.3 K/UL (ref 4–11)

## 2021-04-20 PROCEDURE — 97112 NEUROMUSCULAR REEDUCATION: CPT

## 2021-04-20 PROCEDURE — 2580000003 HC RX 258: Performed by: PHYSICIAN ASSISTANT

## 2021-04-20 PROCEDURE — 97530 THERAPEUTIC ACTIVITIES: CPT

## 2021-04-20 PROCEDURE — 93312 ECHO TRANSESOPHAGEAL: CPT | Performed by: INTERNAL MEDICINE

## 2021-04-20 PROCEDURE — 6370000000 HC RX 637 (ALT 250 FOR IP): Performed by: STUDENT IN AN ORGANIZED HEALTH CARE EDUCATION/TRAINING PROGRAM

## 2021-04-20 PROCEDURE — 2500000003 HC RX 250 WO HCPCS: Performed by: PHYSICIAN ASSISTANT

## 2021-04-20 PROCEDURE — 6360000002 HC RX W HCPCS: Performed by: NURSE PRACTITIONER

## 2021-04-20 PROCEDURE — 6370000000 HC RX 637 (ALT 250 FOR IP): Performed by: PHYSICIAN ASSISTANT

## 2021-04-20 PROCEDURE — 97110 THERAPEUTIC EXERCISES: CPT

## 2021-04-20 PROCEDURE — 2060000000 HC ICU INTERMEDIATE R&B

## 2021-04-20 PROCEDURE — 6360000002 HC RX W HCPCS: Performed by: PHYSICIAN ASSISTANT

## 2021-04-20 PROCEDURE — 2580000003 HC RX 258: Performed by: NURSE ANESTHETIST, CERTIFIED REGISTERED

## 2021-04-20 PROCEDURE — 6370000000 HC RX 637 (ALT 250 FOR IP): Performed by: INTERNAL MEDICINE

## 2021-04-20 PROCEDURE — 84295 ASSAY OF SERUM SODIUM: CPT

## 2021-04-20 PROCEDURE — 80048 BASIC METABOLIC PNL TOTAL CA: CPT

## 2021-04-20 PROCEDURE — 99232 SBSQ HOSP IP/OBS MODERATE 35: CPT | Performed by: PSYCHIATRY & NEUROLOGY

## 2021-04-20 PROCEDURE — 6370000000 HC RX 637 (ALT 250 FOR IP): Performed by: NURSE PRACTITIONER

## 2021-04-20 PROCEDURE — 3700000001 HC ADD 15 MINUTES (ANESTHESIA)

## 2021-04-20 PROCEDURE — 6360000002 HC RX W HCPCS: Performed by: NURSE ANESTHETIST, CERTIFIED REGISTERED

## 2021-04-20 PROCEDURE — 6370000000 HC RX 637 (ALT 250 FOR IP): Performed by: COUNSELOR

## 2021-04-20 PROCEDURE — 3700000000 HC ANESTHESIA ATTENDED CARE

## 2021-04-20 PROCEDURE — 93320 DOPPLER ECHO COMPLETE: CPT | Performed by: INTERNAL MEDICINE

## 2021-04-20 PROCEDURE — 93325 DOPPLER ECHO COLOR FLOW MAPG: CPT | Performed by: INTERNAL MEDICINE

## 2021-04-20 PROCEDURE — 2500000003 HC RX 250 WO HCPCS: Performed by: NURSE ANESTHETIST, CERTIFIED REGISTERED

## 2021-04-20 PROCEDURE — 85025 COMPLETE CBC W/AUTO DIFF WBC: CPT

## 2021-04-20 PROCEDURE — B246ZZ4 ULTRASONOGRAPHY OF RIGHT AND LEFT HEART, TRANSESOPHAGEAL: ICD-10-PCS | Performed by: INTERNAL MEDICINE

## 2021-04-20 PROCEDURE — 36415 COLL VENOUS BLD VENIPUNCTURE: CPT

## 2021-04-20 PROCEDURE — 99231 SBSQ HOSP IP/OBS SF/LOW 25: CPT | Performed by: PHYSICAL MEDICINE & REHABILITATION

## 2021-04-20 RX ORDER — LIDOCAINE HYDROCHLORIDE 20 MG/ML
INJECTION, SOLUTION EPIDURAL; INFILTRATION; INTRACAUDAL; PERINEURAL PRN
Status: DISCONTINUED | OUTPATIENT
Start: 2021-04-20 | End: 2021-04-20 | Stop reason: SDUPTHER

## 2021-04-20 RX ORDER — FAMOTIDINE 20 MG/1
20 TABLET, FILM COATED ORAL 2 TIMES DAILY
Status: DISCONTINUED | OUTPATIENT
Start: 2021-04-20 | End: 2021-04-20

## 2021-04-20 RX ORDER — MORPHINE SULFATE 2 MG/ML
2 INJECTION, SOLUTION INTRAMUSCULAR; INTRAVENOUS
Status: ACTIVE | OUTPATIENT
Start: 2021-04-20 | End: 2021-04-21

## 2021-04-20 RX ORDER — SODIUM CHLORIDE 9 MG/ML
INJECTION, SOLUTION INTRAVENOUS CONTINUOUS PRN
Status: DISCONTINUED | OUTPATIENT
Start: 2021-04-20 | End: 2021-04-20 | Stop reason: SDUPTHER

## 2021-04-20 RX ORDER — FAMOTIDINE 20 MG/1
20 TABLET, FILM COATED ORAL 2 TIMES DAILY
Status: DISCONTINUED | OUTPATIENT
Start: 2021-04-20 | End: 2021-04-23 | Stop reason: HOSPADM

## 2021-04-20 RX ORDER — PROPOFOL 10 MG/ML
INJECTION, EMULSION INTRAVENOUS PRN
Status: DISCONTINUED | OUTPATIENT
Start: 2021-04-20 | End: 2021-04-20 | Stop reason: SDUPTHER

## 2021-04-20 RX ORDER — MORPHINE SULFATE 2 MG/ML
4 INJECTION, SOLUTION INTRAMUSCULAR; INTRAVENOUS
Status: DISPENSED | OUTPATIENT
Start: 2021-04-20 | End: 2021-04-21

## 2021-04-20 RX ADMIN — ACETAMINOPHEN 1000 MG: 500 TABLET, COATED ORAL at 20:46

## 2021-04-20 RX ADMIN — DOCUSATE SODIUM 50 MG AND SENNOSIDES 8.6 MG 2 TABLET: 8.6; 5 TABLET, FILM COATED ORAL at 20:43

## 2021-04-20 RX ADMIN — PROPOFOL 50 MG: 10 INJECTION, EMULSION INTRAVENOUS at 11:30

## 2021-04-20 RX ADMIN — PROPOFOL 50 MG: 10 INJECTION, EMULSION INTRAVENOUS at 11:29

## 2021-04-20 RX ADMIN — HEPARIN SODIUM 5000 UNITS: 5000 INJECTION INTRAVENOUS; SUBCUTANEOUS at 20:46

## 2021-04-20 RX ADMIN — Medication 1 G: at 17:10

## 2021-04-20 RX ADMIN — DOCUSATE SODIUM 50 MG AND SENNOSIDES 8.6 MG 2 TABLET: 8.6; 5 TABLET, FILM COATED ORAL at 13:08

## 2021-04-20 RX ADMIN — Medication 10 ML: at 20:58

## 2021-04-20 RX ADMIN — Medication 1 G: at 13:09

## 2021-04-20 RX ADMIN — THERA TABS 1 TABLET: TAB at 13:08

## 2021-04-20 RX ADMIN — ATORVASTATIN CALCIUM 40 MG: 40 TABLET, FILM COATED ORAL at 20:46

## 2021-04-20 RX ADMIN — HEPARIN SODIUM 5000 UNITS: 5000 INJECTION INTRAVENOUS; SUBCUTANEOUS at 13:12

## 2021-04-20 RX ADMIN — FAMOTIDINE 20 MG: 20 TABLET, FILM COATED ORAL at 20:46

## 2021-04-20 RX ADMIN — OXYCODONE HYDROCHLORIDE 10 MG: 5 TABLET ORAL at 21:19

## 2021-04-20 RX ADMIN — LIDOCAINE HYDROCHLORIDE 100 MG: 20 INJECTION, SOLUTION EPIDURAL; INFILTRATION; INTRACAUDAL; PERINEURAL at 11:29

## 2021-04-20 RX ADMIN — Medication 100 MG: at 13:10

## 2021-04-20 RX ADMIN — AMLODIPINE BESYLATE 5 MG: 5 TABLET ORAL at 13:09

## 2021-04-20 RX ADMIN — FAMOTIDINE 20 MG: 10 INJECTION, SOLUTION INTRAVENOUS at 13:09

## 2021-04-20 RX ADMIN — PROPOFOL 50 MG: 10 INJECTION, EMULSION INTRAVENOUS at 11:36

## 2021-04-20 RX ADMIN — Medication 10 ML: at 09:48

## 2021-04-20 RX ADMIN — LEVETIRACETAM 500 MG: 500 TABLET, FILM COATED ORAL at 13:08

## 2021-04-20 RX ADMIN — LEVETIRACETAM 500 MG: 500 TABLET, FILM COATED ORAL at 20:57

## 2021-04-20 RX ADMIN — Medication 10 ML: at 09:49

## 2021-04-20 RX ADMIN — SODIUM CHLORIDE: 9 INJECTION, SOLUTION INTRAVENOUS at 11:27

## 2021-04-20 RX ADMIN — ASPIRIN 81 MG: 81 TABLET, CHEWABLE ORAL at 13:08

## 2021-04-20 RX ADMIN — POLYETHYLENE GLYCOL (3350) 17 G: 17 POWDER, FOR SOLUTION ORAL at 13:08

## 2021-04-20 RX ADMIN — ACETAMINOPHEN 1000 MG: 500 TABLET, COATED ORAL at 13:07

## 2021-04-20 RX ADMIN — PROPOFOL 50 MG: 10 INJECTION, EMULSION INTRAVENOUS at 11:33

## 2021-04-20 RX ADMIN — HEPARIN SODIUM 5000 UNITS: 5000 INJECTION INTRAVENOUS; SUBCUTANEOUS at 06:12

## 2021-04-20 RX ADMIN — MORPHINE SULFATE 4 MG: 2 INJECTION, SOLUTION INTRAMUSCULAR; INTRAVENOUS at 09:10

## 2021-04-20 ASSESSMENT — PAIN DESCRIPTION - PROGRESSION: CLINICAL_PROGRESSION: GRADUALLY WORSENING

## 2021-04-20 ASSESSMENT — PAIN SCALES - GENERAL
PAINLEVEL_OUTOF10: 0
PAINLEVEL_OUTOF10: 7
PAINLEVEL_OUTOF10: 0
PAINLEVEL_OUTOF10: 2
PAINLEVEL_OUTOF10: 9
PAINLEVEL_OUTOF10: 7

## 2021-04-20 ASSESSMENT — PAIN - FUNCTIONAL ASSESSMENT: PAIN_FUNCTIONAL_ASSESSMENT: PREVENTS OR INTERFERES SOME ACTIVE ACTIVITIES AND ADLS

## 2021-04-20 ASSESSMENT — PAIN DESCRIPTION - ORIENTATION
ORIENTATION: ANTERIOR
ORIENTATION: LEFT
ORIENTATION: LEFT

## 2021-04-20 ASSESSMENT — PAIN DESCRIPTION - PAIN TYPE
TYPE: SURGICAL PAIN
TYPE: ACUTE PAIN

## 2021-04-20 ASSESSMENT — LIFESTYLE VARIABLES: SMOKING_STATUS: 1

## 2021-04-20 ASSESSMENT — PAIN DESCRIPTION - FREQUENCY
FREQUENCY: CONTINUOUS
FREQUENCY: INTERMITTENT

## 2021-04-20 ASSESSMENT — PAIN DESCRIPTION - LOCATION
LOCATION: SHOULDER
LOCATION: HIP

## 2021-04-20 ASSESSMENT — PAIN DESCRIPTION - DESCRIPTORS
DESCRIPTORS: ACHING

## 2021-04-20 ASSESSMENT — PAIN DESCRIPTION - ONSET: ONSET: ON-GOING

## 2021-04-20 NOTE — PROGRESS NOTES
Progress/Consult Note  Physical Medicine and Rehabilitation    Patient: Destinee Castro  [id number]  Date: 2021      Chief Complaint: Left-sided weakness    History of Present Illness/Hospital Course:  47 YO F PMH GERD, depression/anxiety presented to [facility] ED via EMS for new onset left sided weakness. Pt states that she fell out of her bed the night prior to admission and was down on the ground all night until the AM.  Patient endorses she did not let her  call EMS because she felt like she could improve. Patient claims EMS was finally called after her coworker started calling her in the morning. Pts GCS was 15 upon arrival @ ED with L sided facial droop, L sided weakness & R sided gaze deviation. Stroke alert was called. In the ED she was hemodynamically stable with /81. CTH revealed large acute/subacute infarct in the R frontal lobe with associated mass effect and 4 mm R to L midline shift. It also noted subdural hemorrhage of 6mm along falx, although NSGY less convinced. CTA head and neck revealed occluded R cervical ICA & R anterior cerebral artery with near complete occlusion of R MCA. She was ultimately seen by NSx who performed a R hemicraniectomy. A MRI on  showing large subacute infarct throughout R LEAH and MCA with some areas of hemorrhagic conversion. She has been on seizure ppx. Interval history: Participating more in therapy today. Showing improvement. Admit to ARU when medically ready.      Prior Level of Function:  Independent for mobility, ADLs, and IADLs    Current Level of Function:  PT:  AM-PAC score  OT: 10/24 AM-PAC score    Pertinent Social History:  Support: lives at home with her   Home set-up: unknown    Past Medical History:   Diagnosis Date    GERD (gastroesophageal reflux disease)     Hernia     Obesity     Unspecified sleep apnea     CPAP       Past Surgical History:   Procedure Laterality Date     SECTION  2004    CRANIOTOMY Right 4/13/2021    RIGHT HEMICRANIECTOMY performed by Fco Lancaster MD at [address]   2010    LAP BAND  2006    SPLENECTOMY  2000    TOTAL HIP ARTHROPLASTY  2001       Family History   Problem Relation Age of Onset   Carlie Keyes Cancer Mother     Depression Mother     Mental Illness Mother     Stroke Father     Cancer Father         prostate       Social History     Socioeconomic History    Marital status:      Spouse name: None    Number of children: None    Years of education: None    Highest education level: None   Occupational History    None   Social Needs    Financial resource strain: None    Food insecurity     Worry: None     Inability: None    Transportation needs     Medical: None     Non-medical: None   Tobacco Use    Smoking status: Current Every Day Smoker     Packs/day: 1.00     Years: 20.00     Pack years: 20.00    Smokeless tobacco: Never Used   Substance and Sexual Activity    Alcohol use: Yes     Comment: occasional    Drug use: No    Sexual activity: None   Lifestyle    Physical activity     Days per week: None     Minutes per session: None    Stress: None   Relationships    Social connections     Talks on phone: None     Gets together: None     Attends [employer] service: None     Active member of club or organization: None     Attends meetings of clubs or organizations: None     Relationship status: None    Intimate partner violence     Fear of current or ex partner: None     Emotionally abused: None     Physically abused: None     Forced sexual activity: None   Other Topics Concern    None   Social History Narrative    None           REVIEW OF SYSTEMS:   CONSTITUTIONAL: negative for fevers, chills, diaphoresis, appetite change, night sweats, unexpected weight change, fatigue  EYES: negative for blurred vision, eye discharge, visual disturbance and icterus  HEENT: negative for hearing loss, tinnitus, ear drainage, sinus pressure, nasal congestion, epistaxis and snoring  RESPIRATORY: Negative for hemoptysis, cough, sputum production  CARDIOVASCULAR: negative for chest pain, palpitations, exertional chest pressure/discomfort, syncope, edema  GASTROINTESTINAL: negative for nausea, vomiting, diarrhea, blood in stool, abdominal pain, constipation  GENITOURINARY: negative for frequency, dysuria, urinary incontinence, decreased urine volume, and hematuria  HEMATOLOGIC/LYMPHATIC: negative for easy bruising, bleeding and lymphadenopathy  ALLERGIC/IMMUNOLOGIC: negative for recurrent infections, angioedema, anaphylaxis and drug reactions  ENDOCRINE: negative for weight changes and diabetic symptoms including polyuria, polydipsia and polyphagia  MUSCULOSKELETAL: negative for pain, joint swelling, decreased range of motion  NEUROLOGICAL: negative for headaches, slurred speech, unilateral weakness  PSYCHIATRIC/BEHAVIORAL: negative for hallucinations, behavioral problems, confusion and agitation. Physical Examination:  Vitals:   Patient Vitals for the past 24 hrs:   BP Temp Temp src Pulse Resp SpO2   04/20/21 0716 127/80 98.8 °F (37.1 °C) Oral 74 16 96 %   04/20/21 0255 116/74 97.3 °F (36.3 °C) Oral 72 16 95 %   04/19/21 2259 133/87 98.7 °F (37.1 °C) Oral 77 16 95 %   04/19/21 1857 125/88 98.6 °F (37 °C) Oral 83 16 95 %   04/19/21 1453 124/78 98.6 °F (37 °C) Oral 76 16 97 %     Const: Alert. WDWN. No distress  Eyes: Conjunctiva noninjected, no icterus noted; pupils equal, round, and reactive to light. HENT: Scalp sutures in place without drainage or marginal erythema. Oral mucosa moist  Neck: Trachea midline, neck supple. No thyromegaly noted. CV: Regular rate and rhythm, no murmur rub or gallop noted  Resp: Lungs clear to auscultation bilaterally, no rales wheezes or ronchi, no retractions. Respirations unlabored. GI: Soft, nontender, nondistended. Normal bowel sounds. No palpable masses. Skin: Normal temperature and turgor. No rashes or breakdown noted.    Ext: No significant edema appreciated. No varicosities. MSK: No joint tenderness, erythema, warmth noted. AROM intact. Neuro: Alert, oriented, appropriate. No cranial nerve deficits appreciated. 0/5 LUE and LLE. 5.5 stength on RHS. Psych: Stable mood, normal judgement, normal affect     Lab Results   Component Value Date    WBC 13.3 (H) 04/20/2021    HGB 12.3 04/20/2021    HCT 36.3 04/20/2021    .1 (H) 04/20/2021     04/20/2021     Lab Results   Component Value Date    INR 1.06 04/13/2021    PROTIME 12.3 04/13/2021     Lab Results   Component Value Date    CREATININE 0.6 04/20/2021    BUN 15 04/20/2021     (L) 04/20/2021    K 3.8 04/20/2021     04/20/2021    CO2 24 04/20/2021     Lab Results   Component Value Date    ALT 32 04/13/2021    AST 41 (H) 04/13/2021    ALKPHOS 86 04/13/2021    BILITOT 0.7 04/13/2021       Most recent echocardiogram revealed:  4/13/21  Summary   Normal left ventricle size. There is mild-moderate concentric left   ventricular hypertrophy. Overall left ventricular systolic function appears   normal with an ejection fraction of 55%. No regional wall motion   abnormalities are noted. Diastolic filling parameters suggests normal   diastolic function. The aortic valve appears tricuspid with minimal sclerosis. Trivial tricuspid regurgitation. Estimated pulmonary artery systolic pressure is 26 mmHg assuming a right   atrial pressure of 3 mmHg. Trivial pulmonic regurgitation present. A bubble study was performed and fails to show evidence of right to left   shunting. Most recent EKG revealed:  NSR      MRI brain without contrast   Final Result   1. Imaging findings compatible with subacute infarct throughout the right hemisphere involving vascular distributions of right anterior cerebral artery and portions of the anterior division right middle cerebral artery.  This zone of infarct includes    majority of the right temporal and right frontal lobes as well as a portion of the right parietal lobe, as well as the basal ganglia caudate and lentiform nuclei. Some areas of hemorrhagic transformation within this infarcted zone are demonstrated,    similar in appearance to recent CT from 4/14/2021. There is cytotoxic edema demonstrated. No midline shift identified status post right frontotemporal craniectomy. Continued effacement of the right lateral ventricle is demonstrated related to the    cytotoxic edema mass effect. CT HEAD WO CONTRAST   Final Result      No change since study earlier today. CT HEAD WO CONTRAST   Final Result      Interval right hemicraniectomy with marked edema of the right cerebral hemisphere, similar to prior. New areas of hyperattenuation within the right frontal lobe reflecting likely intraparenchymal and subarachnoid hemorrhage. Small subdural hematoma, parafalcine, similar to prior. Findings were discussed with [facility], RN at 0730 hours after discovery at [facility] hours. CT head without contrast   Final Result   1. Suspect small parafalcine subdural hematoma in the left frontoparietal region measuring up to 4 mm in thickness. 2. Large zones of cytotoxic edema throughout the right cerebral hemisphere as described, within distribution of both the right anterior and right middle cerebral arteries. Hyperdense right MCA. Imaging findings are suspicious for occlusion of the right    internal carotid artery given infarct distribution within right anterior middle cerebral circulation. No intra-axial hemorrhage. 5 mm of right-to-left midline shift of the septum pellucidum. Assessment:  Javier Born a 46 y.o. female with PMH GERD p/w L sided weakness and facial droop. CTA head/neck on 4/12 revealed occluded R cervical ICA, occluded right LEAH, near complete occlusion of right MCA.     Acute ischemic CVA, Subdural heomorrhage s/p right hemicraniectomy and subsequen SAH and IP hemorrhage  No tPA given.  MRI 4/14 showing large subacute infarct throughout R LEAH and MCA with some areas of hemorrhagic conversion.  - Neurosurgery and neurology following  --SBP <160, PRN hydralazine, scheduled Norvasc  -- Keppra 500 mg twice daily for 7 days  - holding antiplatelets and anticoag in setting of SAH, ICA  - f/u hypercoagulable panel  - PT/OT consulted      Depression-Patient takes fluoxetine 20 mg daily at home  Anxiety -Patient takes Ativan 0.5 mg every 8 hours as needed for anxiety  MISHA-Patient uses CPAP at home   GERD- Patient takes omeprazole 40 mg daily at home  Obesity  Complicating assessment and treatment.  Patient's BMI is 34.91 kg/m² placing patient at risk for multiple co-morbidities as well as early death and contributing to the patient's presentation.      Impairments- Decreased functional mobility, Decreased ADLs    Recommendations:    Recommend ARU placement      Improving and motivated. Thank you for this consult. Please contact me with any questions or concerns.      TITO BestP.H  PM&R  4/20/2021  12:30 PM

## 2021-04-20 NOTE — PROGRESS NOTES
PRE JAKUB SEDATION     Pre procedural Sedation Assessment Note - JAKUB    Pre - procedure Diagnosis: Stroke    Planned procedure:  JAKUB    H&P reviewed   Colton Sutherland is a 46 y.o. female with a past medical ischemic stroke, we were asked to perform a JAKUB to rule out cardioembolic etiologies. Will proceed with JAKUB. Nursing history and assessment - reviewed    Allergies:  No Known Allergies    PMH:  Past Medical History:   Diagnosis Date    GERD (gastroesophageal reflux disease)     Hernia     Obesity     Unspecified sleep apnea     CPAP       Outpatient Meds:  Medications Prior to Admission: FLUoxetine (PROZAC) 20 MG capsule, Take 20 mg by mouth daily  famotidine (PEPCID) 40 MG tablet, Take 40 mg by mouth daily  LORazepam (ATIVAN) 0.5 MG tablet, Take 0.5 mg by mouth every 8 hours as needed for Anxiety. norethindrone (EDUARD) 0.35 MG tablet, Take 1 tablet by mouth daily  Multiple Vitamin (MULTIVITAMIN PO), Take  by mouth. [DISCONTINUED] omeprazole (PRILOSEC) 40 MG capsule, Take 1 capsule by mouth daily.     Inpatient Medications:   aspirin  81 mg Oral Daily    levETIRAcetam  500 mg Oral BID    amLODIPine  5 mg Oral Daily    sodium chloride  1 g Oral TID WC    acetaminophen  1,000 mg Oral 4 times per day    polyethylene glycol  17 g Oral Daily    sennosides-docusate sodium  2 tablet Oral BID    heparin (porcine)  5,000 Units Subcutaneous 3 times per day    sodium chloride flush  5-40 mL Intravenous 2 times per day    famotidine (PEPCID) injection  20 mg Intravenous BID    atorvastatin  40 mg Oral Nightly    multivitamin  1 tablet Oral Daily    thiamine  100 mg Oral Daily    sodium chloride flush  5-40 mL Intravenous 2 times per day       IV drips:   sodium chloride         PRN:  morphine **OR** morphine, bisacodyl, oxyCODONE **OR** oxyCODONE, sodium chloride flush, promethazine **OR** ondansetron, perflutren lipid microspheres, nicotine, sodium chloride flush, sodium chloride    Physical Examination   Vitals:    04/20/21 0716   BP: 127/80   Pulse: 74   Resp: 16   Temp: 98.8 °F (37.1 °C)   SpO2: 96%     General appearance - AAOX3  Chest - clear to auscultation  Heart - Regular heart sounds  Neck - No JVD    Airway assessment - Mallampati class - 3*    ASA classification - 3*    Labs:  Recent Labs     04/18/21  0624 04/18/21  0624 04/19/21  0530 04/19/21  0530 04/19/21  1636 04/19/21  2338 04/20/21  0507   *   < > 133*   < > 132* 132* 134*   K 4.2  --  4.1  --   --   --  3.8   BUN 11  --  12  --   --   --  15   CREATININE <0.5*  --  <0.5*  --   --   --  0.6     --  101  --   --   --  100   CO2 23  --  23  --   --   --  24   GLUCOSE 107*  --  110*  --   --   --  98   CALCIUM 9.3  --  9.4  --   --   --  9.3    < > = values in this interval not displayed. Recent Labs     04/18/21  0624 04/19/21  0530 04/20/21  0507   WBC 15.1* 15.5* 13.3*   HGB 13.3 12.7 12.3   HCT 40.4 37.1 36.3    392 445   .2* 104.9* 105.1*     No results for input(s): CHOLTOT, TRIG, HDL in the last 72 hours. Invalid input(s): LIPIDCOMM, CHOLHDL, VLDCHOL, LDL  No results for input(s): PTT, INR in the last 72 hours. Invalid input(s): PT  No results for input(s): CKTOTAL, CKMB, CKMBINDEX, TROPONINI in the last 72 hours. No results for input(s): BNP in the last 72 hours. No results for input(s): NTPROBNP in the last 72 hours. No results for input(s): TSH in the last 72 hours. Planned anesthetic agent -provided by anesthesia    Sedation plan, risks, benefits. Potential complications and any alternative options associated with procedure and possible use of blood discussed with patient. Informed consent obtained. Plan:  Proceed with procedure    Umesh Claire MD, [facility]    4/20/2021  11:51 AM

## 2021-04-20 NOTE — PROGRESS NOTES
Occupational Therapy  Facility/Department: [facility] 5T ORTHO/NEURO  Daily Treatment Note  NAME: Donna Tran  : 1970  MRN: [id number]    Date of Service: 2021    Discharge Recommendations:  Donna Tran scored a  on the AM-PAC ADL Inpatient form. Current research shows that an AM-PAC score of 17 or less is typically not associated with a discharge to the patient's home setting. Based on the patient's AM-PAC score and their current ADL deficits, it is recommended that the patient have 5-7 sessions per week of Occupational Therapy at d/c to increase the patient's independence. At this time, this patient demonstrates the endurance, and/or tolerance for 3 hours of therapy each day, with a treatment frequency of 5-7x/wk. Please see assessment section for further patient specific details. If patient discharges prior to next session this note will serve as a discharge summary. Please see below for the latest assessment towards goals. OT Equipment Recommendations  Other: defer to next level of care    Assessment   Performance deficits / Impairments: Decreased functional mobility ; Decreased ADL status; Decreased ROM; Decreased strength;Decreased sensation;Decreased fine motor control;Decreased endurance;Decreased posture;Decreased balance  Assessment: Pt requiring Max A + Min A this date for transfers with [place] and to back of [place] bar. Pt Max A for supine to sit and Max A for seated balance EOB with strong left lateral lean. Pt would greatly benefit from intensive inpt therapy to maximize functional independence. Continue with POC.   Treatment Diagnosis: decreased independence with ADLs and fx transfers sedondary to left hemiparesis  Prognosis: Good  OT Education: OT Role;Plan of Care;Transfer Training;Precautions  Patient Education: Pt verb understanding and will need reinforcement  REQUIRES OT FOLLOW UP: Yes  Activity Tolerance  Activity Tolerance: Patient Tolerated treatment well  Activity shoulder flexion. Pt supine to sit Max A (trunk and LLE). Pt sit EOB (helmet donned) with left latera/posterior lean Max A. Pt down on right elbow ~2 min then sit to midline for orientation to midline. Pt sit to stand Loise Ruddle Max A + Min A and pt transferred (requiring Max A seated on Loise Ruddle) to recliner via Loise Ruddle stand to sit (Max A + Min A). Pt with sit to stand from recliner to back of [place] Max A + Min A, pt in stance ~1 min at [address] with strong left lateral lean. Pt stand to sit Max A + Min A. Pt very fatigued. Call light in reach, chair alarm on and pt positioned comfortably with pillows and rolled up washcloth placed in left hand to encourage extension of digits. Pain Assessment  Pain Level: 6  Pain Type: Acute pain  Pain Location: Hip  Pain Orientation: Left  Pain Descriptors: Shooting  Pain Frequency: Intermittent  Pre Treatment Pain Screening  Intervention List: Patient declined any intervention;Patient able to continue with treatment  Comments / Details: At EOS pt reports pain not there after moving and standing  Vital Signs  Patient Currently in Pain: Yes   Orientation  Orientation  Overall Orientation Status: Within Normal Limits  Objective    ADL  LE Dressing: Dependent/Total(for brief change)        Balance  Sitting Balance: Maximum assistance  Standing Balance: Dependent/Total(Max A + Min A to back of Loise Ruddle with strong left lateral lean)  Standing Balance  Time: ~ 1 min 20 sec total  Activity: Loise Ruddle transfer, static stance to back of Loise Ruddle  Bed mobility  Supine to Sit: Maximum assistance  Transfers  Sit to stand: 2 Person assistance;Dependent/Total(Max A + Min A)  Stand to sit: Dependent/Total;2 Person assistance(Max A + Min A)                       Cognition  Overall Cognitive Status: Exceptions  Arousal/Alertness: Delayed responses to stimuli  Following Commands: Follows one step commands consistently; Follows one step commands with increased time  Attention Span: Appears intact  Memory: Appears intact  Insights: Decreased awareness of deficits  Initiation: Requires cues for some  Sequencing: Requires cues for some                                         Plan   Plan  Times per week: 5-7  Current Treatment Recommendations: Strengthening, Endurance Training, Neuromuscular Re-education, Self-Care / ADL, Functional Mobility Training, Safety Education & Training  G-Code     OutComes Score                                                  AM-PAC Score        AM-PAC Inpatient Daily Activity Raw Score: 11 (04/20/21 1520)  AM-PAC Inpatient ADL T-Scale Score : 29.04 (04/20/21 1520)  ADL Inpatient CMS 0-100% Score: 70.42 (04/20/21 1520)  ADL Inpatient CMS G-Code Modifier : CL (04/20/21 1520)    Goals  Short term goals  Time Frame for Short term goals: by discharge  Short term goal 1: Pt will perform bed mobility with max A - ongoing 4/19, goal met supine to sit 4/20  Short term goal 2: Pt will perform seated on EOB for ~5 min with CGA to prepare for ADLs - ongoing 4/20  Short term goal 3: Pt will perform hemipalegic dressing technique for UB dressing with assist as needed - ongoing, not addressed 4/19, 4/20  Short term goal 4: Pt will perform 5 hand squeezes with LUE to increase functional movement with no more than verbal encouragement - ongoing 4/19, 4/20  Patient Goals   Patient goals : not stated       Therapy Time   Individual Concurrent Group Co-treatment   Time In 0930         Time Out 1008         Minutes 38         Timed Code Treatment Minutes: [place], [address]

## 2021-04-20 NOTE — ANESTHESIA POSTPROCEDURE EVALUATION
Department of Anesthesiology  Postprocedure Note    Patient: Leora Fofana  MRN: [id number]  YOB: 1970  Date of evaluation: 4/20/2021  Time:  12:28 PM     Procedure Summary     Date: 04/20/21 Room / Location: [facility] Cath Lab    Anesthesia Start: 8678 Anesthesia Stop: 7468    Procedure: CATH LAB WITH ANESTHESIA Diagnosis:     Scheduled Providers: Lezlie Krabbe, APRN - CRNA; Kavon Muller MD Responsible Provider: Kavon Muller MD    Anesthesia Type: general ASA Status: 3          Anesthesia Type: general    Gerardo Phase I:      Gerardo Phase II:      Last vitals: Reviewed and per EMR flowsheets.        Anesthesia Post Evaluation    Patient location during evaluation: PACU  Level of consciousness: awake and alert  Airway patency: patent  Nausea & Vomiting: no vomiting  Complications: no  Cardiovascular status: blood pressure returned to baseline  Respiratory status: acceptable  Hydration status: euvolemic

## 2021-04-20 NOTE — PROGRESS NOTES
NEUROSURGERY PROGRESS NOTE    4/20/2021 10:21 AM                               Chevis Root                      LOS: 7 days   POD#7  s/p Procedure(s) (LRB):  RIGHT HEMICRANIECTOMY (Right)    Subjective: No new changes today     Physical Exam:  Patient seen and examined    Vitals:    04/20/21 0716   BP: 127/80   Pulse: 74   Resp: 16   Temp: 98.8 °F (37.1 °C)   SpO2: 96%     GCS:  4 - Opens eyes to loud noise or command  5 - Alert and oriented  6 - Follows simple motor commands  General: Well developed. Alert and cooperative in no acute distress.     HENT: atraumatic, neck supple  Eyes: Optic discs: Not tested  Pulmonary: unlabored respiratory effort  Cardiovascular:  Warm well perfused. No peripheral edema  Gastrointestinal: abdomen soft, NT, ND     Neurological:  Mental Status: Awake, alert  Attention: Intact  Language: Dysarthic  Sensation: Decreased on left  Coordination: Intact    Cranial Nerves:  II: Left hemianopsia  III, IV, VI: PERRL, 3 mm bilaterally, EOMI, no nystagmus noted  V: Facial sensation intact bilaterally to touch  VII: Left facial droop  VIII: Hearing intact bilaterally to spoken voice  IX: Palate movement equal bilaterally  XI: Shoulder shrug R>L  XII: Tongue midline     Musculoskeletal:   Gait: Not tested   Assist devices: None   Tone: Left Flaccid; Right Normal  Motor strength:     Right  Left      Right  Left    Deltoid  5 0   Hip Flex  5 0   Biceps  5 0   Knee Extensors  5 0   Triceps  5 0   Knee Flexors  5 0   Wrist Ext  5 0   Ankle Dorsiflex. 5 0   Wrist Flex  5 0   Ankle Plantarflex. 5 0   Handgrip  5 0   Ext Gideon Longus  5 0   Thumb Ext  5 0              Incision: cranial incision w/ stable, clean, dry    Labs:  Recent Labs     04/20/21  0507   WBC 13.3*   HGB 12.3   HCT 36.3          Recent Labs     04/20/21  0507   *   K 3.8      CO2 24   BUN 15   CREATININE 0.6   GLUCOSE 98   CALCIUM 9.3       No results for input(s): PROTIME, INR, APTT in the last 72 hours. Patient Active Problem List    Diagnosis Date Noted    Status post craniectomy     Stroke determined by clinical assessment ([facility] [address] 04/13/2021    Status post gastric banding 11/01/2012    Obesity 11/01/2012    GERD (gastroesophageal reflux disease)     Sleep apnea     Hernia        Assessment:  Patient is a 46 y.o. female w/ R MCA/LEAH ischemic stroke s/p R Hemicraniectomy by Dr. Rain Walden on 4/13/2021    Plan:  1. Neurologically stable  2. Okay to resume antiplt therapy now  3. Neurologic exams frequency: Q4H  4. Head CT stable  5. Cerebral edema:  - Keep Na within normal limits  - Keep HOB >30 degrees  6. GI Prophylaxis: Pepcid  7. Seizure prophylaxis: Keppra 500 mg BID x 7 days - okay to switch to PO  8. Bowel Regimen: Glycolax and Senokot-S  9. Pain control: Per primary team  10. DVT Prophylaxis: SCD's & SQ Heparin  11. Mobility:  - Advance as tolerates  - PT/OT consulted, appreciate recs  12. Diet: Advance as tolerates  13. Appreciate critical care team assistance in management  14. Will follow inpatient. Please call with any questions or decline in neurological status  15. PT/OT  16. PMR consult for Stroke rehab   17. Can proceed w/ JAKUB per cardiology / primary team recs - if full dose anticoagulation needed please update neurosurgery to discuss timing  18. We will sign off. Please call with questions  19. Follow up w/ Dr. Rain Walden in 1 months for evaluation for cranioplasty    20. Wash incision daily w/ soap and water - Call neurosurgery w/ any new drainage or swelling of incision.    21. Staples can be removed on 4/28/2021    DISPO: per primary team     Electronically signed by: SHIN Marrufo, 4/20/2021 10:21 AM  [phone]
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
## 2021-04-20 NOTE — PROGRESS NOTES
Internal Medicine Progress Note    Admit Date: 4/13/2021  Diet: Diet NPO, After Midnight Exceptions are: Ice Chips    CC: Stroke    Interval history: No acute events overnight. NPO for JAKUB today. Patient participating in physical therapy.   Surgical site pain on scalp is bothering her but otherwise no complaints    She denies dyspnea, chest pain    Medications:     Scheduled Meds:   aspirin  81 mg Oral Daily    levETIRAcetam  500 mg Oral BID    amLODIPine  5 mg Oral Daily    sodium chloride  1 g Oral TID WC    acetaminophen  1,000 mg Oral 4 times per day    polyethylene glycol  17 g Oral Daily    sennosides-docusate sodium  2 tablet Oral BID    heparin (porcine)  5,000 Units Subcutaneous 3 times per day    sodium chloride flush  5-40 mL Intravenous 2 times per day    famotidine (PEPCID) injection  20 mg Intravenous BID    atorvastatin  40 mg Oral Nightly    multivitamin  1 tablet Oral Daily    thiamine  100 mg Oral Daily    sodium chloride flush  5-40 mL Intravenous 2 times per day     Continuous Infusions:   sodium chloride       PRN Meds:morphine **OR** morphine, bisacodyl, oxyCODONE **OR** oxyCODONE, sodium chloride flush, promethazine **OR** ondansetron, perflutren lipid microspheres, nicotine, sodium chloride flush, sodium chloride    Objective:   Vitals:   T-max:  Patient Vitals for the past 8 hrs:   BP Temp Temp src Pulse Resp SpO2   04/20/21 0716 127/80 98.8 °F (37.1 °C) Oral 74 16 96 %   04/20/21 0255 116/74 97.3 °F (36.3 °C) Oral 72 16 95 %       Intake/Output Summary (Last 24 hours) at 4/20/2021 1008  Last data filed at 4/20/2021 0935  Gross per 24 hour   Intake 430 ml   Output 450 ml   Net -20 ml       Physical Exam:  Const: Well nourished, resting in bed  Eyes: PERRL, EOMI  HENT: Neck: Supple, no JVD, surgical staples on scalp without oozing or surrounding erythema   CV: RRR, no murmurs, 2+ pulses peripherally  RESP: CTAB, no wheezing, rhonchi, rales  GI: S/NT/ND  MSK/Extremities: No edema, no tenderness to palpation  Skin: Warm, dry. No rashes, no erythema; surgical site wound over scalp: sutures intact, no oozing or erythem  Neuro: AAOx3.  0/5 ULE and LLE; left facial droop; several second delay in responding to questions  Psych: Appropriate mood and affect. LABS:    CBC:   Recent Labs     04/18/21 0624 04/19/21  0530 04/20/21  0507   WBC 15.1* 15.5* 13.3*   HGB 13.3 12.7 12.3   HCT 40.4 37.1 36.3    392 445   .2* 104.9* 105.1*     Renal:    Recent Labs     04/18/21 0624 04/18/21  0624 04/19/21  0530 04/19/21  0530 04/19/21  1636 04/19/21  2338 04/20/21  0507   *   < > 133*   < > 132* 132* 134*   K 4.2  --  4.1  --   --   --  3.8     --  101  --   --   --  100   CO2 23  --  23  --   --   --  24   BUN 11  --  12  --   --   --  15   CREATININE <0.5*  --  <0.5*  --   --   --  0.6   GLUCOSE 107*  --  110*  --   --   --  98   CALCIUM 9.3  --  9.4  --   --   --  9.3   ANIONGAP 11  --  9  --   --   --  10    < > = values in this interval not displayed. Hepatic: No results for input(s): AST, ALT, BILITOT, BILIDIR, PROT, LABALBU, ALKPHOS in the last 72 hours. Troponin: No results for input(s): TROPONINI in the last 72 hours. BNP: No results for input(s): BNP in the last 72 hours. Lipids:   No results for input(s): CHOL, HDL in the last 72 hours. Invalid input(s): LDLCALCU, TRIGLYCERIDE  ABGs:  No results for input(s): PHART, RKB1BLJ, PO2ART, AEL1CHB, BEART, THGBART, V4TSMHZX, XAM5BTL in the last 72 hours. INR: No results for input(s): INR in the last 72 hours. Lactate: No results for input(s): LACTATE in the last 72 hours. Cultures:  -----------------------------------------------------------------  RAD:   MRI brain without contrast   Final Result   1.  Imaging findings compatible with subacute infarct throughout the right hemisphere involving vascular distributions of right anterior cerebral artery and portions of the anterior division right middle cerebral artery. This zone of infarct includes    majority of the right temporal and right frontal lobes as well as a portion of the right parietal lobe, as well as the basal ganglia caudate and lentiform nuclei. Some areas of hemorrhagic transformation within this infarcted zone are demonstrated,    similar in appearance to recent CT from 4/14/2021. There is cytotoxic edema demonstrated. No midline shift identified status post right frontotemporal craniectomy. Continued effacement of the right lateral ventricle is demonstrated related to the    cytotoxic edema mass effect. CT HEAD WO CONTRAST   Final Result      No change since study earlier today. CT HEAD WO CONTRAST   Final Result      Interval right hemicraniectomy with marked edema of the right cerebral hemisphere, similar to prior. New areas of hyperattenuation within the right frontal lobe reflecting likely intraparenchymal and subarachnoid hemorrhage. Small subdural hematoma, parafalcine, similar to prior. Findings were discussed with Theresa Rios RN at 0730 hours after discovery at [facility] hours. CT head without contrast   Final Result   1. Suspect small parafalcine subdural hematoma in the left frontoparietal region measuring up to 4 mm in thickness. 2. Large zones of cytotoxic edema throughout the right cerebral hemisphere as described, within distribution of both the right anterior and right middle cerebral arteries. Hyperdense right MCA. Imaging findings are suspicious for occlusion of the right    internal carotid artery given infarct distribution within right anterior middle cerebral circulation. No intra-axial hemorrhage. 5 mm of right-to-left midline shift of the septum pellucidum.           Assessment/Plan:   Gisele Prather is a 46 y.o. woman with  PMHx of HTN, HLD, gastric banding, splenectomy, smoking, who presented with acute right ICA occlusion/massive ischemic stroke s/p hemicrani.    -Continues working with PT; pending placement hoping to go to [facility]. -IV morphine added for surgical site pain. -JAKUB today at noon to assess for intracardiac thrombus.    -Na stable on salt tablets.   BP well-controlled.  -planning for CT c/a/p tomorrow for malignancy screening    Acute ischemic CVA, Subdural heomorrhage s/p right hemicraniectomy and subsequen SAH and IP hemorrhage  -Neurosurgery and neurology following   -SBP <160, scheduled Norvasc   - Keppra 500 mg twice daily for 7 days  -JAKUB today 4/20; TTE showed normal EF and diastolic function, no shunting  -started home aspirin  -high dose statin  -PT/OT  -f/u hypercoagulable panel; cardiolipin and beta-2 glycoprotein  antibodies  Negative, protein C and S wnl  -CT c/a/p tomorrow for malignancy screening   -hematology consulted; appreciate recs     Depression-Patient takes fluoxetine 20 mg daily at home; holding in setting    of hyponatremia  MISHA- CPAP   GERD- Patient takes omeprazole 40 mg daily at home  Obesity  Complicating assessment and treatment.  Patient's BMI is >35 kg/m² placing patient at risk for multiple co-morbidities as well as early death and contributing to the patient's presentation.      Leucocytosis-improving  Likely reactive to current stress of acute stroke and emergent surgical intervention.     Code Status: Full Code  FEN: Diet NPO, After Midnight Exceptions are: Ice Chips  PPX: SCD's, Pepcid  DISPO: general medical floors; pending placement     This patient will be staffed and discussed with Natalya Crow MD.     Brianna Rivera MD  Internal Medicine, PGY-1  Reach me via [facility]

## 2021-04-20 NOTE — CONSULTS
Needs    Financial resource strain: Not on file    Food insecurity     Worry: Not on file     Inability: Not on file    Transportation needs     Medical: Not on file     Non-medical: Not on file   Tobacco Use    Smoking status: Current Every Day Smoker     Packs/day: 1.00     Years: 20.00     Pack years: 20.00    Smokeless tobacco: Never Used   Substance and Sexual Activity    Alcohol use: Yes     Comment: occasional    Drug use: No    Sexual activity: Not on file   Lifestyle    Physical activity     Days per week: Not on file     Minutes per session: Not on file    Stress: Not on file   Relationships    Social connections     Talks on phone: Not on file     Gets together: Not on file     Attends [employer] service: Not on file     Active member of club or organization: Not on file     Attends meetings of clubs or organizations: Not on file     Relationship status: Not on file    Intimate partner violence     Fear of current or ex partner: Not on file     Emotionally abused: Not on file     Physically abused: Not on file     Forced sexual activity: Not on file   Other Topics Concern    Not on file   Social History Narrative    Not on file        Family History:    Family History   Problem Relation Age of Onset    Cancer Mother     Depression Mother     Mental Illness Mother     Stroke Father     Cancer Father         prostate        Allergies:    No Known Allergies     Medications:    Current Facility-Administered Medications   Medication Dose Route Frequency Provider Last Rate Last Admin    morphine (PF) injection 2 mg  2 mg Intravenous Q2H PRN EUGENIE Gross CNP        Or    morphine (PF) injection 4 mg  4 mg Intravenous Q2H PRN EUGENIE Gross CNP   4 mg at 04/20/21 0910    aspirin chewable tablet 81 mg  81 mg Oral Daily Celeste Rosario MD   Stopped at 04/20/21 0947    bisacodyl (DULCOLAX) suppository 10 mg  10 mg Rectal Daily PRN Saul Alicia DO   10 mg at 04/18/21 1647    levETIRAcetam (KEPPRA) tablet 500 mg  500 mg Oral BID Yumiko Irby MD   Stopped at 04/20/21 0948    amLODIPine (NORVASC) tablet 5 mg  5 mg Oral Daily Juan M Dalton MD   Stopped at 04/20/21 0947    sodium chloride tablet 1 g  1 g Oral TID WC Jay Jenkins, APRN - CNP   Stopped at 04/20/21 1963    acetaminophen (TYLENOL) tablet 1,000 mg  1,000 mg Oral 4 times per day Eugenia Ledesma MD   Stopped at 04/20/21 0319    oxyCODONE (ROXICODONE) immediate release tablet 5 mg  5 mg Oral Q4H PRN Eugenia Ledesma MD   5 mg at 04/15/21 0400    Or    oxyCODONE (ROXICODONE) immediate release tablet 10 mg  10 mg Oral Q4H PRN Eugenia Ledesma MD   10 mg at 04/19/21 1735    polyethylene glycol (GLYCOLAX) packet 17 g  17 g Oral Daily Eugenia Ledesma MD   Stopped at 04/20/21 0948    sennosides-docusate sodium (SENOKOT-S) 8.6-50 MG tablet 2 tablet  2 tablet Oral BID Eugenia Ledesma MD   Stopped at 04/20/21 0948    heparin (porcine) injection 5,000 Units  5,000 Units Subcutaneous 3 times per day Eugenia Ledesma MD   5,000 Units at 04/20/21 0612    sodium chloride flush 0.9 % injection 5-40 mL  5-40 mL Intravenous 2 times per day Eugenia Ledesma MD   10 mL at 04/20/21 0949    sodium chloride flush 0.9 % injection 5-40 mL  5-40 mL Intravenous PRN Eugenia Ledesma MD        promethazine (PHENERGAN) tablet 12.5 mg  12.5 mg Oral Q6H PRN Eugenia Ledesma MD        Or    ondansetron [facility]) injection 4 mg  4 mg Intravenous Q6H PRN Eugenia Ledesma MD        perflutren lipid microspheres (DEFINITY) injection 1.65 mg  1.5 mL Intravenous ONCE PRN Eugenia Ledesma MD        famotidine (PEPCID) injection 20 mg  20 mg Intravenous BID Eugenia Ledesma MD   Stopped at 04/20/21 0948    nicotine (NICODERM CQ) 21 MG/24HR 1 patch  1 patch Transdermal Daily PRN Eugenia Ledesma MD        atorvastatin (LIPITOR) tablet 40 mg  40 mg Oral Nightly Eugenia Ledesma MD   40 mg at 04/19/21 2009    multivitamin 1 tablet  1 tablet Oral Daily Eugenia Ledesma MD   Stopped at 04/20/21 6091    thiamine ------------------------------------------------------------------  Electronically signed by Bettye Bailey MD (Interpreting  physician) on 04/13/2021 at 02:34 PM  ------------------------------------------------------------------   Findings   Left Ventricle  Normal left ventricle size. There is mild-moderate concentric left  ventricular hypertrophy. Overall left ventricular systolic function appears  normal with an ejection fraction of 55%. No regional wall motion  abnormalities are noted. Diastolic filling parameters suggests normal  diastolic function. Mitral Valve  The mitral valve normal in structure and function. Trivial mitral regurgitation is present. No evidence of mitral valve stenosis. Left Atrium  The left atrium is normal in size. A bubble study was performed and fails to show evidence of right to left  shunting. Aortic Valve  The aortic valve appears tricuspid with minimal sclerosis. The aortic valve  is structurally normal. There is no significant aortic valve regurgitation  or stenosis. Aorta  The aortic root is normal in size. Right Ventricle  The right ventricle is normal in size and function. TAPSE 2.30 cm  RV S velocity 16.0 cm/s   Tricuspid Valve  The tricuspid valve is normal in structure and function. Trivial tricuspid regurgitation. Estimated pulmonary artery systolic pressure is 26 mmHg assuming a right  atrial pressure of 3 mmHg. No evidence of tricuspid stenosis. Right Atrium  The right atrial size is normal.   Pulmonic Valve  The pulmonic valve is not well visualized. Trivial pulmonic regurgitation present. No evidence of pulmonic valve stenosis. Pericardial Effusion  No pericardial effusion noted. Pleural Effusion  No pleural effusion. Miscellaneous  The inferior vena cava appears normal in size with normal respiratory  variation.   M-Mode/2D Measurements (cm)   LV Diastolic Dimension: 5.95 cm LV Systolic Dimension: 6.22 cm  LV Septum Diastolic: 0.75 cm LV Septum Systolic: 0.28 cm  LV PW Diastolic: 1.95 cm        LV PW Systolic: 7.49 cm  RV Diastolic Dimension: 3.16 cm AO Root Dimension: 2.8 cm                                  LA Dimension: 2.9 cm                                  LA Area: 21.6 cm2                                  LA volume/Index: 68.1 ml /33 ml/m2  Doppler Measurements                                  MV Peak E-Wave: 86.5 cm/s                                 MV Peak A-Wave: 104 cm/s                                 MV E/A Ratio: 0.83  TR Velocity:242 cm/s  TR Gradient:23.43 mmHg  Estimated RAP:3 mmHg  Estimated RVSP: 26 mmHg  E' Septal Velocity: 7.29 cm/s  MV Deceleration Time: 222 msec  E' Lateral Velocity: 11.4 cm/s  E/E' ratio: 7.6  Aorta   Aortic Root: 2.8 cm      Ct Head Wo Contrast    Result Date: 4/14/2021  CT head without contrast HISTORY: Evaluate new small hemorrhage COMPARISON: 4/14/2021 at 0534 CONTRAST:  none  TECHNIQUE: Individualized dose optimization technique was used in order to meet ALARA standards for radiation dose reduction. In addition to vendor specific dose reduction algorithms, the dose reduction techniques vary based on the specific scanner utilized but frequently include automated exposure control, adjustment of the mA and/or kV according to patient size, and use of iterative reconstruction technique. COMMENTS: Extensive right-sided postoperative changes are again seen. Multiple small foci of hemorrhage in the postoperative space are without significant change. No new hemorrhage. Fall seen subdural hematomas again noted. No hydrocephalus. No change since study earlier today. Ct Head Wo Contrast    Result Date: 4/14/2021  CT HEAD WITHOUT CONTRAST: REASON FOR EXAM: Stroke follow-up TECHNIQUE: Axial CT imaging obtained from vertex to skull base. Axial images and multiplanar reformatted images reviewed. Individualized dose optimization technique was used in order to meet ALARA standards for radiation dose reduction. In addition to  vendor specific dose reduction algorithms, the dose reduction techniques vary based on the specific scanner utilized but frequently include automated exposure control, adjustment of the mA and/or kV according to patient size, and use of iterative reconstruction technique. COMPARISON: 4/13/2021 FINDINGS: Interval right hemicraniectomy. There is marked edema again noted in the right cerebral hemisphere anteriorly involving the vascular territories of the right anterior cerebral artery and middle cerebral artery. Areas of hyperattenuation are noted within this reflecting focal hemorrhage. There is an intraparenchymal hemorrhage measuring approximately 11 mm of the high right frontal lobe medially. Additional areas of hemorrhage are somewhat linear in configuration and may pertain to the subarachnoid space and or intraparenchymal. A parafalcine subdural hematoma is again noted measuring 6 mm, slightly increased from the prior. There is 4 mm of left to right midline shift, previously 5 mm. There is local mass effect, frontal horn of the right lateral ventricle, similar to prior. Overall, the ventricular system is unchanged. Effacement of the basilar cisterns without herniation. Visualized paranasal sinuses and mastoid air cells are clear. No acute or suspicious bony abnormality. Interval right hemicraniectomy with marked edema of the right cerebral hemisphere, similar to prior. New areas of hyperattenuation within the right frontal lobe reflecting likely intraparenchymal and subarachnoid hemorrhage. Small subdural hematoma, parafalcine, similar to prior. Findings were discussed with Juan Carlos Sales RN at 0730 hours after discovery at [facility] hours. Ct Head Without Contrast    Result Date: 4/13/2021  CT head without contrast on 4/13/2021 Clinical history: Small subdural hematoma. Comparisons: None. PROCEDURE: Helical CT imaging evaluation through the head without intravenous contrast. Multiplanar reformats. Individualized dose optimization technique was used in order to meet ALARA standards for radiation dose reduction. In addition to vendor specific dose reduction algorithms, the dose reduction techniques vary based on the specific scanner utilized but frequently include automated exposure control, adjustment of the mA and/or kV according to patient size, and use of iterative reconstruction  technique. FINDINGS: No evidence of depressed skull fracture. Paranasal sinuses and mastoid air cells are clear. Cytotoxic edema demonstrated throughout the right cerebral hemisphere involving portions of the anterior medial right frontal lobe, lateral right frontal and temporal lobes, and right parietal lobe. These findings are within the distribution of the right  anterior and right middle cerebral arteries. Asymmetric hyperdense right MCA demonstrated, reference axial series 601 image 39. Cytotoxic edema within the right hemisphere effaces the frontal horn and body of the right lateral ventricle. Septum pellucidum deviates toward the left, 5 mm from midline. No evidence of intra-axial hemorrhage. There is mild increased attenuation and thickening along the left portion of the falx cerebri, reference axial image 59. This could represent a small left parafalcine subdural hematoma measuring up to 4 mm in thickness. There are no previous CT comparison examinations available at this institution. 1. Suspect small parafalcine subdural hematoma in the left frontoparietal region measuring up to 4 mm in thickness. 2. Large zones of cytotoxic edema throughout the right cerebral hemisphere as described, within distribution of both the right anterior and right middle cerebral arteries. Hyperdense right MCA. Imaging findings are suspicious for occlusion of the right internal carotid artery given infarct distribution within right anterior middle cerebral circulation. No intra-axial hemorrhage.  5 mm of right-to-left midline shift of the unremarkable. No acute abnormality. Cta Head Neck W Contrast    Result Date: 4/12/2021  EXAMINATION: CTA OF THE HEAD AND NECK WITH CONTRAST 4/12/2021 11:16 pm: TECHNIQUE: CTA of the head and neck was performed with the administration of intravenous contrast. Multiplanar reformatted images are provided for review. MIP images are provided for review. Stenosis of the internal carotid arteries measured using NASCET criteria. Dose modulation, iterative reconstruction, and/or weight based adjustment of the mA/kV was utilized to reduce the radiation dose to as low as reasonably achievable. COMPARISON: None. HISTORY: ORDERING SYSTEM PROVIDED HISTORY: stroke TECHNOLOGIST PROVIDED HISTORY: Reason for exam:->stroke Reason for Exam: stroke FINDINGS: CTA NECK: AORTIC ARCH/ARCH VESSELS: No dissection or arterial injury. No significant stenosis of the brachiocephalic or subclavian arteries. CAROTID ARTERIES: The left carotid is normal in course and caliber. The right internal carotid artery is completely occluded after the bifurcation with partial reconstitution of flow beginning at the level of the petrous segment, likely filling in retrograde fashion. VERTEBRAL ARTERIES: No dissection, arterial injury, or significant stenosis. SOFT TISSUES: The lung apices are clear. No cervical or superior mediastinal lymphadenopathy. The larynx and pharynx are unremarkable. No acute abnormality of the salivary and thyroid glands. BONES: No acute osseous abnormality. CTA HEAD: ANTERIOR CIRCULATION: The left MCA is within normal limits. There appears to be occlusion of the A2 segment of the right anterior cerebral artery. A minimal amount of flow is seen within the right MCA. POSTERIOR CIRCULATION: No significant stenosis of the vertebral, basilar, or posterior cerebral arteries. No aneurysm. OTHER: No dural venous sinus thrombosis on this non-dedicated study. BRAIN: See separately dictated noncontrast head CT report. Occluded right cervical ICA. Occluded right LEAH. Near complete occlusion of the right MCA. Mri Brain Without Contrast    Result Date: 4/14/2021  MRI head without contrast 4/14/2021 CLINICAL HISTORY: Right hemispheric infarct. Comparisons: 4/14/2021 CT scan of the head. PROCEDURE: Multiplanar, multisequence MR imaging of the head without contrast. FINDINGS: Midline structures: No abnormality of craniocervical junction or sella turcica. Paranasal sinuses/mastoid bones: No abnormality. Vascular: No gross abnormalities of flow voids or major dural sinuses identified. Brain parenchyma: Diffusion restriction is demonstrated involving the majority of the right frontal lobe, right temporal lobe, and portions of the right parietal lobe compatible with recent acute or subacute infarct. Diffusion restriction also demonstrated within the right caudate and lentiform basal nuclei. Gradient echo sequences demonstrate numerous areas of susceptibility artifact within the right frontal, right parietal and right temporal lobes, as well as within the right basal ganglia lentiform nucleus, corresponding to areas of known intra-axial mild hemorrhagic transformation noted on the recent CT scan. There is cytotoxic edema demonstrated throughout the subacute infarct zone. This produces some partial effacement of the right lateral ventricle. Patient status post right frontotemporal craniectomy. There is a small amount of extra-axial fluid noted in the region of the craniectomy. At this time, there is no evidence of significant midline shift. Contrast was not administered. 1. Imaging findings compatible with subacute infarct throughout the right hemisphere involving vascular distributions of right anterior cerebral artery and portions of the anterior division right middle cerebral artery.  This zone of infarct includes majority of the right temporal and right frontal lobes as well as a portion of the right parietal lobe, as well as the basal ganglia caudate and lentiform nuclei. Some areas of hemorrhagic transformation within this infarcted zone are demonstrated, similar in appearance to recent CT from 4/14/2021. There is cytotoxic edema demonstrated. No midline shift identified status post right frontotemporal craniectomy. Continued effacement of the right lateral ventricle is demonstrated related to the cytotoxic edema mass effect. Assessment / Plan:      R MCA/LEAH ischemic stroke  POD#7  s/p RIGHT HEMICRANIECTOMY    - anticipate at least 14 days prior to anti-plt therapy or anticoagulation  - patient does not have predisposed risk factors or family history of thrombophilia    - followup existing hypercoag workup, but do not anticipate a need for anticoagulation beyond aspirin +/- plavix          As always, thank you for allowing me the opportunity to participate in the care of your patient. Please do not hesitate to contact me with questions or concerns regarding further management. Deandre Mora DO, MS  [facility]    Please contact via:  1. [employer]  2.   Cell Phone:  [phone]    4/20/2021   12:53 PM

## 2021-04-20 NOTE — PLAN OF CARE
Problem: Pain:  Goal: Pain level will decrease  Description: Pain level will decrease  Outcome: Ongoing  Note: Patient denies pain at this time, patient exhibits no objective characteristics of pain, will continue to monitor. Problem: COMMUNICATION IMPAIRMENT  Goal: Ability to express needs and understand communication  Outcome: Ongoing  Note: Patient's speech is clear w/delayed responses. Patient able to utilize call light and voice all needs appropriately. Problem: Falls - Risk of:  Goal: Will remain free from falls  Description: Will remain free from falls  Outcome: Ongoing  Note: Hourly rounding on patient for needs. Non-skid socks on, bed in lowest position and locked. Bedside table, personal belongs, and nurse call light within reach. Instructed patient to use call light for assistance. Bed alarm on, camera in use for safety. Floor clear of clutter. Patient remains free of falls at this time.

## 2021-04-20 NOTE — PROGRESS NOTES
Neurology Progress Note    Updates:   No major changes. Mood withdrawn. \"this sucks. I had a big stroke\"     Exam:  -Mental status: Alert and oriented to person, place, month, year, flat & appropriate  -Speech & Language: no aphasia; no dysarthria  -Cranial nerves: pupils symmetric; no notable dysconjugate gaze; eyes do not cross midline to left; left facial weakness  -Motor: Left side 0/5. Right side 5/5.   -Other: no adventitious movements noted  Other Systems  -General Appearance: well-developed, well-nourished, no apparent distress  -Neck: supple  -Lungs: breathing unlabored, regular, no audible wheezes  -CV: pulses strong x 4 extremities  -Abd: flat    Labs:  hcg negative  ETOH none detected   mg/dL  A1c 5.4%  WBC 13.3K  COVID-19 not detected     Beta-2 Glycoprotein 1 IgG 0  Beta-2 Glycoprotein 1 IgM 3  Factor V Leiden, Protein C/S, Prothrombin gene, cardiolipin ab pending    APTT 23.7 s (L)  Antithrombin %  Factor 8 Quantitation 387% (H)  Homocysteine 17.8 (H)    Studies:  CT-A head and neck 4/12/21 23:16: occluded cervical right ICA; occluded right LEAH. Near complete occlusion of right MCA     CT head 4/14/21: stable    EKG: NSR     MRI brain 4/14/21 14:12  1. Imaging findings compatible with subacute infarct throughout the right hemisphere involving vascular distributions of right anterior cerebral artery and portions of the anterior division right middle cerebral artery. This zone of infarct includes    majority of the right temporal and right frontal lobes as well as a portion of the right parietal lobe, as well as the basal ganglia caudate and lentiform nuclei. Some areas of hemorrhagic transformation within this infarcted zone are demonstrated,    similar in appearance to recent CT from 4/14/2021. There is cytotoxic edema demonstrated. No midline shift identified status post right frontotemporal craniectomy.  Continued effacement of the right lateral ventricle is demonstrated related to the    cytotoxic edema mass effect.      Head CT 4/14/21 5:20 AM  Interval right hemicraniectomy with marked edema of the right cerebral hemisphere, similar to prior.       New areas of hyperattenuation within the right frontal lobe reflecting likely intraparenchymal and subarachnoid hemorrhage.       Small subdural hematoma, parafalcine, similar to prior.           TTE: EF 55%; No evidence of right to left shunting     Impression:  Maria Dolores Vick is a 46 y.o. female with hypertension, hyperlipidemia, and tobacco abuse who presents with acute left hemiparesis found to have right ICA occlusion and massive ischemic stroke s/p hemicraniectomy with evidence of hemorrhagic transformation. Exam stable. Planning for JAKUB today 12 PM. She endorses feeling sad about her diagnosis which is entirely appropriate. May be a good candidate for antidepressive therapy. Fluoxetine 20 mg daily shown to significantly decrease the risk of depression at 6 months. 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 May consider Fluoxetine 20 mg daily. I gave her some time to think this over. Would add calcium and vitamin D supplement as this may increase risk of fracture. - We discussed the importance of PT/OT/rehab and prognosis. - JAKUB today  - Will order malignancy screening (CT chest/abdomen/pelvis) tomorrow.     - May consider heme/onc consult given age  - F/U hypercoagulable labs  - Continue normotension, euglycemia, statin, PT/OT efforts, and telemetry    A copy of this note was provided for MD Lanre Clarke, BOBO  Neurology  [phone]  Evenings, weekends, and off weeks please discuss with the covering neurologist.

## 2021-04-20 NOTE — ANESTHESIA PRE PROCEDURE
Department of Anesthesiology  Preprocedure Note       Name:  Abelino Ashraf   Age:  46 y.o.  :  1970                                          MRN:  [id number]         Date:  2021      Surgeon: * Surgery not found *    Procedure:     Medications prior to admission:   Prior to Admission medications    Medication Sig Start Date End Date Taking? Authorizing Provider   FLUoxetine (PROZAC) 20 MG capsule Take 20 mg by mouth daily    Historical Provider, MD   famotidine (PEPCID) 40 MG tablet Take 40 mg by mouth daily    Historical Provider, MD   LORazepam (ATIVAN) 0.5 MG tablet Take 0.5 mg by mouth every 8 hours as needed for Anxiety. Historical Provider, MD   norethindrone (EDUARD) 0.35 MG tablet Take 1 tablet by mouth daily    Historical Provider, MD   Multiple Vitamin (MULTIVITAMIN PO) Take  by mouth. Historical Provider, MD       Current medications:    No current facility-administered medications for this visit. No current outpatient medications on file.      Facility-Administered Medications Ordered in Other Visits   Medication Dose Route Frequency Provider Last Rate Last Admin    morphine (PF) injection 2 mg  2 mg Intravenous Q2H PRN EUGENIE Pimentel CNP        Or    morphine (PF) injection 4 mg  4 mg Intravenous Q2H PRN EUGENIE Pimentel CNP   4 mg at 21 7124    aspirin chewable tablet 81 mg  81 mg Oral Daily Sharon Garduno MD   Stopped at 21 0947    bisacodyl (DULCOLAX) suppository 10 mg  10 mg Rectal Daily PRN Saul Alicia DO   10 mg at 21 1641    levETIRAcetam (KEPPRA) tablet 500 mg  500 mg Oral BID Jaya Bowman MD   Stopped at 21 0948    amLODIPine (NORVASC) tablet 5 mg  5 mg Oral Daily Blaire Conner MD   Stopped at 21 0947    sodium chloride tablet 1 g  1 g Oral TID  EUGENIE Pimentel CNP   Stopped at 21 8182    acetaminophen (TYLENOL) tablet 1,000 mg  1,000 mg Oral 4 times per day Natalee Quintana MD   Stopped at 21 6760    oxyCODONE (ROXICODONE) immediate release tablet 5 mg  5 mg Oral Q4H PRN Karon Fairchild MD   5 mg at 04/15/21 0400    Or    oxyCODONE (ROXICODONE) immediate release tablet 10 mg  10 mg Oral Q4H PRN Karon Fairchild MD   10 mg at 04/19/21 1735    polyethylene glycol (GLYCOLAX) packet 17 g  17 g Oral Daily Karon Fairchild MD   Stopped at 04/20/21 0948    sennosides-docusate sodium (SENOKOT-S) 8.6-50 MG tablet 2 tablet  2 tablet Oral BID Karon Fairchild MD   Stopped at 04/20/21 0948    heparin (porcine) injection 5,000 Units  5,000 Units Subcutaneous 3 times per day Karon Fairchild MD   5,000 Units at 04/20/21 0612    sodium chloride flush 0.9 % injection 5-40 mL  5-40 mL Intravenous 2 times per day Karon Fairchild MD   10 mL at 04/20/21 0949    sodium chloride flush 0.9 % injection 5-40 mL  5-40 mL Intravenous PRN Karon Fairchild MD        promethazine (PHENERGAN) tablet 12.5 mg  12.5 mg Oral Q6H PRN Karon Fairchild MD        Or    ondansetron [facility] injection 4 mg  4 mg Intravenous Q6H PRN Karon Fairchild MD        perflutren lipid microspheres (DEFINITY) injection 1.65 mg  1.5 mL Intravenous ONCE PRN Karon Fairchild MD        famotidine (PEPCID) injection 20 mg  20 mg Intravenous BID Karon Fairchild MD   Stopped at 04/20/21 0948    nicotine (NICODERM CQ) 21 MG/24HR 1 patch  1 patch Transdermal Daily PRN Karon Fairchild MD        atorvastatin (LIPITOR) tablet 40 mg  40 mg Oral Nightly Karon Fairchild MD   40 mg at 04/19/21 2009    multivitamin 1 tablet  1 tablet Oral Daily Karon Fairchild MD   Stopped at 04/20/21 3920    thiamine tablet 100 mg  100 mg Oral Daily Karon Fairchild MD   Stopped at 04/20/21 0949    sodium chloride flush 0.9 % injection 5-40 mL  5-40 mL Intravenous 2 times per day Karon Fairchild MD   10 mL at 04/20/21 0948    sodium chloride flush 0.9 % injection 5-40 mL  5-40 mL Intravenous PRN Karon Fairchild MD        0.9 % sodium chloride infusion  25 mL Intravenous PRN Karon Fairchild MD           Allergies:  No Known CO2 24 04/20/2021    BUN 15 04/20/2021    CREATININE 0.6 04/20/2021    GFRAA >60 04/20/2021    AGRATIO 1.1 04/13/2021    LABGLOM >60 04/20/2021    GLUCOSE 98 04/20/2021    PROT 7.6 04/13/2021    CALCIUM 9.3 04/20/2021    BILITOT 0.7 04/13/2021    ALKPHOS 86 04/13/2021    AST 41 04/13/2021    ALT 32 04/13/2021       POC Tests:   No results for input(s): POCGLU, POCNA, POCK, POCCL, POCBUN, POCHEMO, POCHCT in the last 72 hours. Coags:   Lab Results   Component Value Date    PROTIME 12.3 04/13/2021    INR 1.06 04/13/2021       HCG (If Applicable): No results found for: PREGTESTUR, PREGSERUM, HCG, HCGQUANT     ABGs: No results found for: PHART, PO2ART, NIB7JCH, MXA5DFX, BEART, F8FLZQXG     Type & Screen (If Applicable):  No results found for: LABABO, LABRH    Drug/Infectious Status (If Applicable):  No results found for: HIV, HEPCAB    COVID-19 Screening (If Applicable):   Lab Results   Component Value Date    COVID19 Not Detected 04/19/2021           Anesthesia Evaluation    Airway: Mallampati: IV  TM distance: >3 FB   Neck ROM: full  Mouth opening: < 3 FB Dental:          Pulmonary: breath sounds clear to auscultation  (+) COPD:  sleep apnea: on noncompliant,  current smoker                           Cardiovascular:  Exercise tolerance: good (>4 METS),   (+) hypertension:,     (-) past MI, dysrhythmias and  angina      Rhythm: regular  Rate: normal           Beta Blocker:  Not on Beta Blocker         Neuro/Psych:   (+) CVA:,    (-) seizures           GI/Hepatic/Renal:   (+) GERD:, morbid obesity          Endo/Other:        (-) diabetes mellitus               Abdominal:           Vascular:                                          Anesthesia Plan      general     ASA 3       Induction: intravenous. Anesthetic plan and risks discussed with patient. Plan discussed with CRNA.                   Breezy Dumas MD   4/20/2021

## 2021-04-20 NOTE — PROGRESS NOTES
Patient a/o x4,, responses clear, however delayed. Flaccid to L side, follows commands appropriately to R side. Patient endorses no sensation to L side. Patient tolerating PO intake well, no evidence of aspiration noted. Voiding per purewick. Call light bedside table and personal belongings within reach. Camera and bed alarm in use for safety.

## 2021-04-20 NOTE — PROGRESS NOTES
Speech Language Pathology  HOLD    Chart reviewed, d/w KIZZY Whitley. Pt currently NPO and transferring off-floor for JAKUB. Will attempt to see as pt available and schedule allows.     Thank you,    Bryon Crigler) [place], [place], Shani Molina; [phone]  Speech-Language Pathologist  Pager #: [phone]

## 2021-04-20 NOTE — PROGRESS NOTES
Notification of IV to PO Conversion     IV To Po Conversion:   Will convert famotidine to PO based on [facility] IV to PO policy (see below). Please call with questions.   Loris Councilman, PharmD, [address] [place] [address]: [phone]  [address] [place] [address]: [phone]  4/20/2021 1:35 PM      Criteria for conversion from IV to PO therapy per [facility] IV to PO Protocol:   Patients should meet all of the following inclusion criteria and none of the exclusion criteria    Criteria to initiate medication route switch (Inclusion Criteria)  IV therapy for > 24 hours (antibiotics only)  Tolerating diet more advanced than clear liquids  Tolerating oral (PO) medications  Does not require vasopressor therapy for blood pressure support  No seizures for 72hrs (antiepileptic medications only)      Criteria indicating that the patient is NOT a candidate for IV to PO conversion (Exclusion Criteria)  Infections requiring IV therapy (ie: meningitis, endocarditis, osteomyelitis, pancreatitis)  Nausea and/or vomiting or severe diarrhea within past 24 hours  Has gastrectomy, ileus, gastric outlet or bowel obstruction, or malabsorption syndromes  Has significant, painful oral ulceration  TPN with an NPO order  Active GI bleed  Unable to swallow  Nothing by Mouth (NPO) status  Febrile in the last 24 hours- (antibiotics only)  Clinical deteriorating or unstable - (antibiotics only)  Pediatric patients and patients who are not euthyroid (not on oral levothyroxine/not stabilized on oral levothyroxine) - (Levothyroxine only)  Patients being treated for myxedema coma or during the organ donation process - (Levothyroxine only)    Other notes-   Patients may be given suppositories when available for the product being ordered if no contraindications exist (ie: rectal surgery or infection)   Oral solutions/suspensions may be considered for patients with a functioning enteral tube not on continuous suction (if medication is available in this formulation)

## 2021-04-20 NOTE — PLAN OF CARE
Problem: Pain:  Goal: Pain level will decrease  Description: Pain level will decrease  4/20/2021 0938 by Mega Scott RN  Outcome: Ongoing  Note: Patient medicated per [facility] for pain control. Will continue to monitor. Problem: COMMUNICATION IMPAIRMENT  Goal: Ability to express needs and understand communication  4/20/2021 0938 by Mega Scott RN  Outcome: Ongoing  Note: Patient is able to communicate needs. Patient does have some delayed responses. Will continue to monitor. Problem: Falls - Risk of:  Goal: Will remain free from falls  Description: Will remain free from falls  4/20/2021 0938 by Mega Scott RN  Outcome: Ongoing  Note: Patient has remained free from falls. Bed is low and locked, bed alarm on, side rails up 2/4, non skid socks on patient. Call light and bedside table are within reach. Patient calls out appropriately. Will continue to monitor.

## 2021-04-21 ENCOUNTER — APPOINTMENT (OUTPATIENT)
Dept: CT IMAGING | Age: 51
DRG: 023 | End: 2021-04-21
Attending: INTERNAL MEDICINE
Payer: COMMERCIAL

## 2021-04-21 LAB
ANION GAP SERPL CALCULATED.3IONS-SCNC: 10 MMOL/L (ref 3–16)
BASOPHILS ABSOLUTE: 0.1 K/UL (ref 0–0.2)
BASOPHILS RELATIVE PERCENT: 0.8 %
BUN BLDV-MCNC: 15 MG/DL (ref 7–20)
CALCIUM SERPL-MCNC: 9.3 MG/DL (ref 8.3–10.6)
CHLORIDE BLD-SCNC: 101 MMOL/L (ref 99–110)
CO2: 23 MMOL/L (ref 21–32)
CREAT SERPL-MCNC: <0.5 MG/DL (ref 0.6–1.1)
DRVVT CONFIRMATION TEST: ABNORMAL RATIO
DRVVT SCREEN: 44 SEC (ref 33–44)
DRVVT,DIL: ABNORMAL SEC (ref 33–44)
EOSINOPHILS ABSOLUTE: 0.3 K/UL (ref 0–0.6)
EOSINOPHILS RELATIVE PERCENT: 1.9 %
FACTOR V LEIDEN: NEGATIVE
GFR AFRICAN AMERICAN: >60
GFR NON-AFRICAN AMERICAN: >60
GLUCOSE BLD-MCNC: 98 MG/DL (ref 70–99)
HCT VFR BLD CALC: 35.3 % (ref 36–48)
HEMOGLOBIN: 12.4 G/DL (ref 12–16)
HEXAGONAL PHOSPHOLIPID NEUTRALIZAT TEST: ABNORMAL
LUPUS ANTICOAG INTERP: ABNORMAL
LYMPHOCYTES ABSOLUTE: 3.3 K/UL (ref 1–5.1)
LYMPHOCYTES RELATIVE PERCENT: 23.9 %
MCH RBC QN AUTO: 36.5 PG (ref 26–34)
MCHC RBC AUTO-ENTMCNC: 35 G/DL (ref 31–36)
MCV RBC AUTO: 104.3 FL (ref 80–100)
MONOCYTES ABSOLUTE: 1.2 K/UL (ref 0–1.3)
MONOCYTES RELATIVE PERCENT: 8.5 %
NEUTROPHILS ABSOLUTE: 9 K/UL (ref 1.7–7.7)
NEUTROPHILS RELATIVE PERCENT: 64.9 %
PDW BLD-RTO: 13.6 % (ref 12.4–15.4)
PLATELET # BLD: 448 K/UL (ref 135–450)
PLT NEUTA: ABNORMAL
PMV BLD AUTO: 7.8 FL (ref 5–10.5)
POTASSIUM REFLEX MAGNESIUM: 3.9 MMOL/L (ref 3.5–5.1)
PT D: 13.4 SEC (ref 12–15.5)
PTT D: 51 SEC (ref 32–48)
PTT-D CORR REFLEX: 47 SEC (ref 32–48)
PTT-HEPARIN NEUTRALIZED: ABNORMAL SEC (ref 32–48)
RBC # BLD: 3.39 M/UL (ref 4–5.2)
REPTILASE TIME: ABNORMAL SEC
SODIUM BLD-SCNC: 131 MMOL/L (ref 136–145)
SODIUM BLD-SCNC: 132 MMOL/L (ref 136–145)
SODIUM BLD-SCNC: 134 MMOL/L (ref 136–145)
SODIUM BLD-SCNC: 134 MMOL/L (ref 136–145)
SPECIMEN: NORMAL
THROMBIN TIME: 15.1 SEC (ref 14.7–19.5)
WBC # BLD: 13.9 K/UL (ref 4–11)

## 2021-04-21 PROCEDURE — 80048 BASIC METABOLIC PNL TOTAL CA: CPT

## 2021-04-21 PROCEDURE — 6360000004 HC RX CONTRAST MEDICATION: Performed by: NURSE PRACTITIONER

## 2021-04-21 PROCEDURE — 85025 COMPLETE CBC W/AUTO DIFF WBC: CPT

## 2021-04-21 PROCEDURE — 6370000000 HC RX 637 (ALT 250 FOR IP): Performed by: INTERNAL MEDICINE

## 2021-04-21 PROCEDURE — 2580000003 HC RX 258: Performed by: PHYSICIAN ASSISTANT

## 2021-04-21 PROCEDURE — 71260 CT THORAX DX C+: CPT

## 2021-04-21 PROCEDURE — 6360000002 HC RX W HCPCS: Performed by: PHYSICIAN ASSISTANT

## 2021-04-21 PROCEDURE — 2060000000 HC ICU INTERMEDIATE R&B

## 2021-04-21 PROCEDURE — 84295 ASSAY OF SERUM SODIUM: CPT

## 2021-04-21 PROCEDURE — 99231 SBSQ HOSP IP/OBS SF/LOW 25: CPT | Performed by: PHYSICAL MEDICINE & REHABILITATION

## 2021-04-21 PROCEDURE — 6370000000 HC RX 637 (ALT 250 FOR IP): Performed by: NURSE PRACTITIONER

## 2021-04-21 PROCEDURE — 6370000000 HC RX 637 (ALT 250 FOR IP): Performed by: STUDENT IN AN ORGANIZED HEALTH CARE EDUCATION/TRAINING PROGRAM

## 2021-04-21 PROCEDURE — 99232 SBSQ HOSP IP/OBS MODERATE 35: CPT | Performed by: PSYCHIATRY & NEUROLOGY

## 2021-04-21 PROCEDURE — 94660 CPAP INITIATION&MGMT: CPT

## 2021-04-21 PROCEDURE — 36415 COLL VENOUS BLD VENIPUNCTURE: CPT

## 2021-04-21 PROCEDURE — 6370000000 HC RX 637 (ALT 250 FOR IP): Performed by: COUNSELOR

## 2021-04-21 PROCEDURE — 85732 THROMBOPLASTIN TIME PARTIAL: CPT

## 2021-04-21 PROCEDURE — 97530 THERAPEUTIC ACTIVITIES: CPT

## 2021-04-21 PROCEDURE — 6370000000 HC RX 637 (ALT 250 FOR IP): Performed by: PHYSICIAN ASSISTANT

## 2021-04-21 PROCEDURE — 97535 SELF CARE MNGMENT TRAINING: CPT

## 2021-04-21 PROCEDURE — 85670 THROMBIN TIME PLASMA: CPT

## 2021-04-21 PROCEDURE — 99223 1ST HOSP IP/OBS HIGH 75: CPT | Performed by: INTERNAL MEDICINE

## 2021-04-21 RX ORDER — ATORVASTATIN CALCIUM 80 MG/1
80 TABLET, FILM COATED ORAL NIGHTLY
Status: DISCONTINUED | OUTPATIENT
Start: 2021-04-21 | End: 2021-04-23 | Stop reason: HOSPADM

## 2021-04-21 RX ORDER — FLUOXETINE HYDROCHLORIDE 20 MG/1
20 CAPSULE ORAL DAILY
Status: DISCONTINUED | OUTPATIENT
Start: 2021-04-21 | End: 2021-04-23 | Stop reason: HOSPADM

## 2021-04-21 RX ADMIN — HEPARIN SODIUM 5000 UNITS: 5000 INJECTION INTRAVENOUS; SUBCUTANEOUS at 14:50

## 2021-04-21 RX ADMIN — ATORVASTATIN CALCIUM 80 MG: 80 TABLET, FILM COATED ORAL at 21:25

## 2021-04-21 RX ADMIN — Medication 10 ML: at 08:53

## 2021-04-21 RX ADMIN — HEPARIN SODIUM 5000 UNITS: 5000 INJECTION INTRAVENOUS; SUBCUTANEOUS at 05:33

## 2021-04-21 RX ADMIN — IOPAMIDOL 80 ML: 755 INJECTION, SOLUTION INTRAVENOUS at 11:02

## 2021-04-21 RX ADMIN — Medication 100 MG: at 08:52

## 2021-04-21 RX ADMIN — Medication 1 G: at 08:47

## 2021-04-21 RX ADMIN — DOCUSATE SODIUM 50 MG AND SENNOSIDES 8.6 MG 2 TABLET: 8.6; 5 TABLET, FILM COATED ORAL at 21:25

## 2021-04-21 RX ADMIN — THERA TABS 1 TABLET: TAB at 08:48

## 2021-04-21 RX ADMIN — DOCUSATE SODIUM 50 MG AND SENNOSIDES 8.6 MG 2 TABLET: 8.6; 5 TABLET, FILM COATED ORAL at 08:48

## 2021-04-21 RX ADMIN — Medication 1 G: at 17:57

## 2021-04-21 RX ADMIN — FAMOTIDINE 20 MG: 20 TABLET, FILM COATED ORAL at 21:25

## 2021-04-21 RX ADMIN — ACETAMINOPHEN 1000 MG: 500 TABLET, COATED ORAL at 21:25

## 2021-04-21 RX ADMIN — BISACODYL 10 MG: 10 SUPPOSITORY RECTAL at 18:24

## 2021-04-21 RX ADMIN — Medication 10 ML: at 21:48

## 2021-04-21 RX ADMIN — ASPIRIN 81 MG: 81 TABLET, CHEWABLE ORAL at 08:48

## 2021-04-21 RX ADMIN — OXYCODONE HYDROCHLORIDE 10 MG: 5 TABLET ORAL at 17:57

## 2021-04-21 RX ADMIN — AMLODIPINE BESYLATE 5 MG: 5 TABLET ORAL at 08:47

## 2021-04-21 RX ADMIN — ACETAMINOPHEN 1000 MG: 500 TABLET, COATED ORAL at 03:02

## 2021-04-21 RX ADMIN — IOHEXOL 50 ML: 240 INJECTION, SOLUTION INTRATHECAL; INTRAVASCULAR; INTRAVENOUS; ORAL at 11:02

## 2021-04-21 RX ADMIN — Medication 10 ML: at 08:52

## 2021-04-21 RX ADMIN — ACETAMINOPHEN 1000 MG: 500 TABLET, COATED ORAL at 17:57

## 2021-04-21 RX ADMIN — LEVETIRACETAM 500 MG: 500 TABLET, FILM COATED ORAL at 21:25

## 2021-04-21 RX ADMIN — Medication 1 G: at 12:19

## 2021-04-21 RX ADMIN — ACETAMINOPHEN 1000 MG: 500 TABLET, COATED ORAL at 10:24

## 2021-04-21 RX ADMIN — POLYETHYLENE GLYCOL (3350) 17 G: 17 POWDER, FOR SOLUTION ORAL at 08:46

## 2021-04-21 RX ADMIN — OXYCODONE HYDROCHLORIDE 10 MG: 5 TABLET ORAL at 08:48

## 2021-04-21 RX ADMIN — FAMOTIDINE 20 MG: 20 TABLET, FILM COATED ORAL at 08:47

## 2021-04-21 RX ADMIN — FLUOXETINE 20 MG: 20 CAPSULE ORAL at 12:07

## 2021-04-21 RX ADMIN — OXYCODONE HYDROCHLORIDE 10 MG: 5 TABLET ORAL at 03:02

## 2021-04-21 RX ADMIN — HEPARIN SODIUM 5000 UNITS: 5000 INJECTION INTRAVENOUS; SUBCUTANEOUS at 21:25

## 2021-04-21 RX ADMIN — LEVETIRACETAM 500 MG: 500 TABLET, FILM COATED ORAL at 08:47

## 2021-04-21 ASSESSMENT — PAIN DESCRIPTION - LOCATION
LOCATION: SHOULDER

## 2021-04-21 ASSESSMENT — PAIN DESCRIPTION - FREQUENCY
FREQUENCY: CONTINUOUS
FREQUENCY: CONTINUOUS

## 2021-04-21 ASSESSMENT — PAIN DESCRIPTION - ORIENTATION
ORIENTATION: LEFT

## 2021-04-21 ASSESSMENT — PAIN - FUNCTIONAL ASSESSMENT: PAIN_FUNCTIONAL_ASSESSMENT: PREVENTS OR INTERFERES SOME ACTIVE ACTIVITIES AND ADLS

## 2021-04-21 ASSESSMENT — PAIN SCALES - GENERAL
PAINLEVEL_OUTOF10: 5
PAINLEVEL_OUTOF10: 7

## 2021-04-21 ASSESSMENT — PAIN DESCRIPTION - PAIN TYPE
TYPE: ACUTE PAIN
TYPE: ACUTE PAIN

## 2021-04-21 ASSESSMENT — PAIN DESCRIPTION - PROGRESSION
CLINICAL_PROGRESSION: GRADUALLY WORSENING

## 2021-04-21 ASSESSMENT — PAIN DESCRIPTION - ONSET
ONSET: ON-GOING
ONSET: ON-GOING

## 2021-04-21 ASSESSMENT — PAIN DESCRIPTION - DIRECTION
RADIATING_TOWARDS: RIGHT
RADIATING_TOWARDS: RIGHT
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
## 2021-04-21 NOTE — CONSULTS
Review of System:  All other systems reviewed except for that noted above. Pertinent negatives and positives are:     Objective      · General: negative for fever, chills   · Ophthalmic ROS: negative for - eye pain or loss of vision  · ENT ROS: negative for - headaches, sore throat   · Respiratory: negative for - cough, sputum  · Cardiovascular: Reviewed in HPI  · Gastrointestinal: negative for - abdominal pain, diarrhea, N/V  · Hematology: negative for - bleeding, blood clots, bruising or jaundice  · Genito-Urinary:  negative for - Dysuria or incontinence  · Musculoskeletal: negative for - Joint swelling, muscle pain  · Neurological: negative for - confusion, dizziness, headaches   · Psychiatric: No anxiety, no depression. · Dermatological: negative for - rash    Physical Examination:  Vitals:    21 1200   BP: 121/80   Pulse: 72   Resp: 16   Temp: 98.4 °F (36.9 °C)   SpO2: 97%        Intake/Output Summary (Last 24 hours) at 2021 1717  Last data filed at 2021 1411  Gross per 24 hour   Intake 780 ml   Output 1200 ml   Net -420 ml     In: 680 [P.O.:680]  Out: 700    Wt Readings from Last 3 Encounters:   04/15/21 238 lb 8.6 oz (108.2 kg)   21 249 lb 1.9 oz (113 kg)   12 218 lb 11.2 oz (99.2 kg)     Temp  Av.8 °F (37.1 °C)  Min: 98.4 °F (36.9 °C)  Max: 99.6 °F (37.6 °C)  Pulse  Av  Min: 63  Max: 73  BP  Min: 119/75  Max: 139/82  SpO2  Av.4 %  Min: 96 %  Max: 97 %    · Telemetry: Sinus rhythm   · Constitutional: Alert. Oriented to person, place, and time. No distress. · Head: Normocephalic and atraumatic. · Mouth/Throat: Lips appear moist. Oropharynx is clear and moist.  · Eyes: Conjunctivae normal. EOM are normal.   · Neck: Neck supple. No lymphadenopathy. No rigidity. No JVD present. · Cardiovascular: Normal rate, regular rhythm. Normal S1&S2. Carotid pulse 2+ bilaterally. · Pulmonary/Chest: Bilateral respiratory sounds present. No respiratory accessory muscle use. amLODIPine  5 mg Oral Daily    sodium chloride  1 g Oral TID WC    acetaminophen  1,000 mg Oral 4 times per day    polyethylene glycol  17 g Oral Daily    sennosides-docusate sodium  2 tablet Oral BID    heparin (porcine)  5,000 Units Subcutaneous 3 times per day    sodium chloride flush  5-40 mL Intravenous 2 times per day    multivitamin  1 tablet Oral Daily    thiamine  100 mg Oral Daily    sodium chloride flush  5-40 mL Intravenous 2 times per day     Continuous Infusions:   sodium chloride       PRN Meds:.bisacodyl, oxyCODONE **OR** oxyCODONE, sodium chloride flush, promethazine **OR** ondansetron, perflutren lipid microspheres, nicotine, sodium chloride flush, sodium chloride     Assessment:   Patient Active Problem List    Diagnosis Date Noted    ICAO (internal carotid artery occlusion), right     Cerebral edema (HCC)     Smoking     Secondary hypertension     Status post craniectomy     Acute right MCA stroke ([facility] 04/13/2021    Status post gastric banding 11/01/2012    Obesity 11/01/2012    GERD (gastroesophageal reflux disease)     Sleep apnea     Hernia       Active Hospital Problems    Diagnosis Date Noted    ICAO (internal carotid artery occlusion), right [I65.21]     Cerebral edema (HCC) [G93.6]     Smoking [F17.200]     Secondary hypertension [I15.9]     Status post craniectomy [Z98.890]     Acute right MCA stroke ([facility] [I63.511] 04/13/2021     Recommendation (s):  1. Acute stroke involving LEAH and MCA, with hemorrhagic conversion, on aspirin and statins  2. Cerebral edema  3. Hypertension, on amlodipine  4. Sleep apnea, on CPAP    Implanting an implantable loop recorder for long-term cardiac dysrhythmia monitoring in order to evaluate for possible atrial fibrillation/atrial flutter as an etiology for the patient's CVA. The benefits and risks of implanting an implantable loop recorder has been discussed with the patient.  The potential risks include, but are not limited to, bleeding, trauma, infection. The patient is aware and understands the risks and willing to proceed with the ILR implantation. We will proceed with ILR implantation by tomorrow noon    She can have a light breakfast; after her ILR implantation, she can be transferred to ARU, on the same day    Thank you for allowing me to participate in the care of Delphine Ghosh . If you have any questions/comments, please do not hesitate to contact us. Mariaelena Newton MD   Cardiac Electrophysiology  [address] [place] [address]    For any EP related issues after 5 PM, contact [facility] [address] on call cardiology through .

## 2021-04-21 NOTE — PROGRESS NOTES
Occupational Therapy  Facility/Department: [facility] 5T ORTHO/NEURO  Daily Treatment Note  NAME: Gisele Prather  : 1970  MRN: [id number]    Date of Service: 2021    Discharge Recommendations:  Gisele Prather scored a 1224 on the AM-PAC ADL Inpatient form. Current research shows that an AM-PAC score of 17 or less is typically not associated with a discharge to the patient's home setting. Based on the patient's AM-PAC score and their current ADL deficits, it is recommended that the patient have 5-7 sessions per week of Occupational Therapy at d/c to increase the patient's independence. At this time, this patient demonstrates the endurance, and/or tolerance for 3 hours of therapy each day, with a treatment frequency of 5-7x/wk. Please see assessment section for further patient specific details. If patient discharges prior to next session this note will serve as a discharge summary. Please see below for the latest assessment towards goals. OT Equipment Recommendations  Other: defer to next level of care    Assessment   Performance deficits / Impairments: Decreased functional mobility ; Decreased ADL status; Decreased ROM; Decreased strength;Decreased sensation;Decreased fine motor control;Decreased endurance;Decreased posture;Decreased balance  Assessment: Pt progressing with therapy, with improved sitting balance SBA - Min A ~15 min. Pt requires Max A sit to stand and stand to sit Delphina Cables. Pt more alert, less fatigued and very motivated for therapy. Pt would greatly benefit from intensive inpt therapy to maximize functional independence. Continue with POC.   Treatment Diagnosis: decreased independence with ADLs and fx transfers sedondary to left hemiparesis  Prognosis: Good  OT Education: OT Role;Plan of Care;Transfer Training  Patient Education: Pt verb understanding and will need reinforcement  REQUIRES OT FOLLOW UP: Yes  Activity Tolerance  Activity Tolerance: Patient Tolerated treatment well  Safety Devices  Safety Devices in place: Yes  Type of devices: All fall risk precautions in place;Call light within reach; Chair alarm in place; Left in chair;Patient at risk for falls;Nurse notified         Patient Diagnosis(es): There were no encounter diagnoses. has a past medical history of GERD (gastroesophageal reflux disease), Hernia, Obesity, and Unspecified sleep apnea. has a past surgical history that includes Splenectomy (); Total hip arthroplasty (); Lap Band (); hernia repair ();  section (); and craniotomy (Right, 2021). Restrictions  Position Activity Restriction  Other position/activity restrictions: Ambulate, Up with assist, Pt to wear hemet when OOB  Subjective   General  Chart Reviewed: Yes  Patient assessed for rehabilitation services?: Yes  Additional Pertinent Hx: 46 y.o. female with hypertension and tobacco abuse who presented after spending a prolonged period on the ground s/p fall out of bed; found to have acute left hemiparesis and gaze deviation. CXR with no acute cardiopulmonary abnormality. CTH revealed large acute/subacute infarct in the R frontal lobe with associated mass effect and 4 mm R to L midline shift. It also noted subdural hemorrhage of 6mm along falx, although NSGY less convinced. CTA head and neck revealed occluded R cervical ICA & R anterior cerebral artery with near complete occlusion of R MCA. Outside window on TPA. Pt is now POD #1 following hemicraniectomy ()  Response to previous treatment: Patient with no complaints from previous session  Family / Caregiver Present: No  Referring Practitioner: Abhay Mejía MD  Diagnosis: Stroke determined by clinical assessment  Subjective  Subjective: Pt supine in bed asleep upon entry and agreeable to therapy session. General Comment  Comments: Pt supine in bed PROM LUE: 10 elbow flexion/extension. Pt supine to sit Max A (trunk and LLE).  Pt sit EOB (helmet donned) Min A progressing to SBA while feeding with setup. Pt down on right elbow ~2 min then sit to midline for orientation to midline. Pt sit to stand [place] Max A and pt transferred (requiring Max A seated on [place]) to recliner via [place] stand to sit Max A . Pt with sit to stand from recliner to back of [place] Max A for brief mgmt. Pt stand to sit Max A. Call light in reach, chair alarm on and pt positioned comfortably with pillows and rolled up washcloth placed in left hand to encourage extension of digits. Orientation  Orientation  Overall Orientation Status: Within Normal Limits  Objective    ADL  Feeding: Setup  LE Dressing: Dependent/Total(brief only)  Toileting: Dependent/Total(incontinent of urine)        Balance  Sitting Balance: Minimal assistance(SBA - Min A ~15 min EOB while feeding)  Standing Balance: Dependent/Total(Max A on [place])  Standing Balance  Time: ~1 min  Activity: [place] transfer and brief mgmt on [place]  Bed mobility  Supine to Sit: Maximum assistance  Scooting: Maximal assistance  Transfers  Sit to stand: Maximum assistance(with [place])  Stand to sit: Maximum assistance(with [place] Lento)                       Cognition  Overall Cognitive Status: Exceptions  Arousal/Alertness: Delayed responses to stimuli  Following Commands: Follows one step commands consistently; Follows one step commands with increased time  Attention Span: Appears intact  Memory: Appears intact  Insights: Decreased awareness of deficits  Initiation: Requires cues for some  Sequencing: Requires cues for some                                         Plan   Plan  Times per week: 5-7  Current Treatment Recommendations: Strengthening, Endurance Training, Neuromuscular Re-education, Self-Care / ADL, Functional Mobility Training, Safety Education & Training  G-Code     OutComes Score                                                  AM-PAC Score        AM-PAC Inpatient Daily Activity Raw Score: 12 (04/21/21 0936)  AM-PAC Inpatient ADL T-Scale Score : 30.6 (04/21/21 0936)  ADL Inpatient CMS 0-100% Score: 66.57 (04/21/21 0936)  ADL Inpatient CMS G-Code Modifier : CL (04/21/21 8021)    Goals  Short term goals  Time Frame for Short term goals: by discharge  Short term goal 1: Pt will perform bed mobility with max A - ongoing 4/19, goal met supine to sit 4/20; New goal: Pt will perform bed mobility with min A- not met 4/21  Short term goal 2: Pt will perform seated on EOB for ~5 min with CGA to prepare for ADLs - ongoing 4/20, goal met 4/21: new goal: pt will perform static standing balance >1min with min A to prepare for ADLS- not met 0/81  Short term goal 3: Pt will perform hemipalegic dressing technique for UB dressing with assist as needed - ongoing, not addressed 4/19, 4/20, 4/21  Short term goal 4: Pt will perform 5 hand squeezes with LUE to increase functional movement with no more than verbal encouragement - ongoing 4/19, 4/20, 4/21  Patient Goals   Patient goals : not stated       Therapy Time   Individual Concurrent Group Co-treatment   Time In [facility] 68         Time Out 0836         Minutes 48         Timed Code Treatment Minutes: [address], [address] [facility] GEO [place]/ZACHARY

## 2021-04-21 NOTE — DISCHARGE INSTR - COC
Continuity of Care Form    Patient Name: Sydnee Moore   :  1970  MRN:  [id number]    Admit date:  2021  Discharge date:  2021    Code Status Order: Full Code   Advance Directives:   Advance Care Flowsheet Documentation       Date/Time Healthcare Directive Type of Healthcare Directive Copy in [address] Agent's Name Healthcare Agent's Phone Number    21 1822  No, patient does not have an advance directive for healthcare treatment -- -- -- -- --            Admitting Physician:  Jamarcus Flores MD  PCP: Kristen Cheung MD    Discharging Nurse: [facility] Unit/Room#: 8399/2161-36  Discharging Unit Phone Number: [phone]    Emergency Contact:   Extended Emergency Contact Information  Primary Emergency Contact: GregorioRaymundo  Home Phone: [phone]  Relation: Spouse  Secondary Emergency Contact: Raymundo Perkins  Address: [address] Phone: [phone]  Work Phone: [phone]  Relation: Spouse    Past Surgical History:  Past Surgical History:   Procedure Laterality Date     SECTION  2004    CRANIOTOMY Right 2021    RIGHT HEMICRANIECTOMY performed by Arlis Brunner, MD at [address] LAP BAND  2006    SPLENECTOMY  2000   [facility] ARTHROPLASTY         Immunization History: There is no immunization history on file for this patient.     Active Problems:  Patient Active Problem List   Diagnosis Code    GERD (gastroesophageal reflux disease) K21.9    Sleep apnea G47.30    Hernia K46.9    Status post gastric banding Z98.84    Obesity E66.9    Acute right MCA stroke ([facility] [address] I63.511    Status post craniectomy Z98.890    ICAO (internal carotid artery occlusion), right I65.21    Cerebral edema (HCC) G93.6    Smoking F17.200    Secondary hypertension I15.9       Isolation/Infection:   Isolation            No Isolation Patient Infection Status       Infection Onset Added Last Indicated Last Indicated By Review Planned Expiration Resolved Resolved By    None active    Resolved    COVID-19 Rule Out 04/19/21 04/19/21 04/19/21 COVID-19 (Ordered)   04/19/21 Rule-Out Test Resulted            Nurse Assessment:  Last Vital Signs: /80   Pulse 72   Temp 98.4 °F (36.9 °C) (Oral)   Resp 16   Ht 5' 6\" (1.676 m)   Wt 238 lb 8.6 oz (108.2 kg)   SpO2 97%   BMI 38.50 kg/m²     Last documented pain score (0-10 scale): Pain Level: 7  Last Weight:   Wt Readings from Last 1 Encounters:   04/15/21 238 lb 8.6 oz (108.2 kg)     Mental Status:  oriented and alert    IV Access:  - Peripheral IV - site  R Antecubital, insertion date: 4/13/2021    Nursing Mobility/ADLs:  Walking   Dependent  Transfer  Dependent  Bathing  Dependent  Dressing  Dependent  Toileting  Dependent  Feeding  Assisted  Med Admin  Assisted  Med Delivery   whole    Wound Care Documentation and Therapy:        Elimination:  Continence:   · Bowel: No  · Bladder: No  Urinary Catheter: None   Colostomy/Ileostomy/Ileal Conduit: No       Date of Last BM: 4/21/2021    Intake/Output Summary (Last 24 hours) at 4/21/2021 1336  Last data filed at 4/21/2021 0944  Gross per 24 hour   Intake 400 ml   Output 1200 ml   Net -800 ml     I/O last 3 completed shifts: In: 540 [P.O.:340; I.V.:200]  Out: 1450 [Urine:1450]    Safety Concerns: At Risk for Falls    Impairments/Disabilities:      None    Nutrition Therapy:  Current Nutrition Therapy:   - Oral Diet:  Dysphagia 2 mechanically altered and Low Sodium (3-4gm)    Routes of Feeding: Oral  Liquids: No Restrictions  Daily Fluid Restriction: no  Last Modified Barium Swallow with Video (Video Swallowing Test): not done    Treatments at the Time of Hospital Discharge:   Respiratory Treatments: none  Oxygen Therapy:  is not on home oxygen therapy.      Ventilator:    - No ventilator support    Rehab Therapies: Physical Therapy, Occupational Therapy and Speech/Language Therapy  Weight Bearing Status/Restrictions: No weight bearing restirctions  Other Medical Equipment (for information only, NOT a DME order):  wheelchair and feliz  Other Treatments:     Patient's personal belongings (please select all that are sent with patient):  None    RN SIGNATURE:  Electronically signed by Willa Sky RN on 4/23/21 at 1:25 PM EDT    CASE MANAGEMENT/SOCIAL WORK SECTION    Inpatient Status Date: 4/13/2021    Readmission Risk Assessment Score:  Readmission Risk              Risk of Unplanned Readmission:        11         / signature: Electronically signed by Rosalina Lynne RN on 4/23/21 at 11:42 AM EDT    PHYSICIAN SECTION    Prognosis: Fair    Condition at Discharge: Stable    Rehab Potential (if transferring to Rehab): Good    Recommended Labs or Other Treatments After Discharge:  daily sodium levels    Physician Certification: I certify the above information and transfer of Dave Monge  is necessary for the continuing treatment of the diagnosis listed and that she requires Acute Rehab for less 30 days.      Update Admission H&P: No change in H&P    PHYSICIAN SIGNATURE:  Electronically signed by Ileana Nowak MD on 4/21/21 at 1:37 PM EDT

## 2021-04-21 NOTE — PROGRESS NOTES
Neurology Progress Note    Exam:  -Mental status: Alert and oriented to person, place, month, year, flat & appropriate  -Speech & Language: no aphasia; no dysarthria  -Cranial nerves: pupils symmetric; no notable dysconjugate gaze; eyes do not cross midline to left; left facial weakness  -Motor: Left side 0/5. Right side 5/5.   -Other: no adventitious movements noted  Other Systems  -General Appearance: well-developed, well-nourished, no apparent distress  -Neck: supple  -Lungs: breathing unlabored, regular, no audible wheezes  -CV: pulses strong x 4 extremities  -Abd: flat    Labs:  hcg negative  ETOH none detected   mg/dL  A1c 5.4%  WBC 13.9K  COVID-19 not detected     Beta-2 Glycoprotein 1 IgG 0  Beta-2 Glycoprotein 1 IgM 3  Factor V Leiden, Protein C/S, Prothrombin gene, cardiolipin ab pending    APTT 23.7 s (L)  Antithrombin %  Factor 8 Quantitation 387% (H)  Homocysteine 17.8 (H)  Protein C 1563  Protein S 123    Studies:  CT-A head and neck 4/12/21 23:16: occluded cervical right ICA; occluded right LEAH. Near complete occlusion of right MCA     CT head 4/14/21: stable    EKG: NSR     MRI brain 4/14/21 14:12  1. Imaging findings compatible with subacute infarct throughout the right hemisphere involving vascular distributions of right anterior cerebral artery and portions of the anterior division right middle cerebral artery. This zone of infarct includes    majority of the right temporal and right frontal lobes as well as a portion of the right parietal lobe, as well as the basal ganglia caudate and lentiform nuclei. Some areas of hemorrhagic transformation within this infarcted zone are demonstrated,    similar in appearance to recent CT from 4/14/2021. There is cytotoxic edema demonstrated. No midline shift identified status post right frontotemporal craniectomy.  Continued effacement of the right lateral ventricle is demonstrated related to the    cytotoxic edema mass effect.      Head CT 4/14/21 5:20 AM  Interval right hemicraniectomy with marked edema of the right cerebral hemisphere, similar to prior.       New areas of hyperattenuation within the right frontal lobe reflecting likely intraparenchymal and subarachnoid hemorrhage.       Small subdural hematoma, parafalcine, similar to prior.           TTE: EF 55%; No evidence of right to left shunting    JAKUB:    Normal aortic valve, no thrombus or vegetation. Normal mitral valve, no thrombus or vegetation. Left atrial appendage free of thrombus. Normal emptying velocities. Medications   famotidine  20 mg Oral BID    aspirin  81 mg Oral Daily    levETIRAcetam  500 mg Oral BID    amLODIPine  5 mg Oral Daily    sodium chloride  1 g Oral TID WC    acetaminophen  1,000 mg Oral 4 times per day    polyethylene glycol  17 g Oral Daily    sennosides-docusate sodium  2 tablet Oral BID    heparin (porcine)  5,000 Units Subcutaneous 3 times per day    sodium chloride flush  5-40 mL Intravenous 2 times per day    atorvastatin  40 mg Oral Nightly    multivitamin  1 tablet Oral Daily    thiamine  100 mg Oral Daily    sodium chloride flush  5-40 mL Intravenous 2 times per day     Impression:  Delphine Ghosh is a 46 y.o. female with hypertension, hyperlipidemia, and tobacco abuse who presents with acute left hemiparesis found to have right ICA occlusion and massive ischemic stroke s/p hemicraniectomy with evidence of hemorrhagic transformation. Exam stable. JAKUB unrevealing. Recommendations:  - Will order malignancy screening (CT chest/abdomen/pelvis)  - Heme/onc consultation  - If no evidence of malignancy will consult EP for long term cardiac monitor  - Start Fluoxetine 20 mg daily.  She reports higher doses as outpatient so would titrate up as tolerated to max dose of 80 mg daily  - F/U hypercoagulable labs  - Continue normotension, euglycemia, statin, PT/OT efforts, rehab, and telemetry    A copy of this note was provided for Dr Donnie GALLAGHER MD Nakia Moyer NP  Neurology  [phone]  Evenings, weekends, and off weeks please discuss with the covering neurologist.

## 2021-04-21 NOTE — PLAN OF CARE
Problem: Skin Integrity:  Goal: Will show no infection signs and symptoms  Description: Will show no infection signs and symptoms  Outcome: Ongoing  Note: No signs of infection. VSS. Surgical incision is clean/dry/intact. Incision cleaned with soap and water and CHG swab. No new redness, swelling or drainage. Will continue to monitor. Problem: Falls - Risk of:  Goal: Will remain free from falls  Description: Will remain free from falls  4/21/2021 0950 by Sage Paniagua RN  Outcome: Ongoing  Note: Pt is sitting up in chair with alarm on. Up x2 with stedy or maximove. Non-skid socks are on. Fall precautions in place. Call light and bedside table are in reach.

## 2021-04-21 NOTE — PROGRESS NOTES
Progress/Consult Note  Physical Medicine and Rehabilitation    Patient: Jacek Powers  [id number]  Date: 2021      Chief Complaint: Left-sided weakness    History of Present Illness/Hospital Course:  47 YO F PMH GERD, depression/anxiety presented to [facility] ED via EMS for new onset left sided weakness. Pt states that she fell out of her bed the night prior to admission and was down on the ground all night until the AM.  Patient endorses she did not let her  call EMS because she felt like she could improve. Patient claims EMS was finally called after her coworker started calling her in the morning. Pts GCS was 15 upon arrival @ ED with L sided facial droop, L sided weakness & R sided gaze deviation. Stroke alert was called. In the ED she was hemodynamically stable with /81. CTH revealed large acute/subacute infarct in the R frontal lobe with associated mass effect and 4 mm R to L midline shift. It also noted subdural hemorrhage of 6mm along falx, although NSGY less convinced. CTA head and neck revealed occluded R cervical ICA & R anterior cerebral artery with near complete occlusion of R MCA. She was ultimately seen by NSx who performed a R hemicraniectomy. A MRI on  showing large subacute infarct throughout R LEAH and MCA with some areas of hemorrhagic conversion. She has been on seizure ppx. Interval history: Doing well with continued left sided hemiplegia. Slowly improving. Will admit to ARU with bed availability.     Prior Level of Function:  Independent for mobility, ADLs, and IADLs    Current Level of Function:  PT:  AM-PAC score  OT: 10/24 AM-PAC score    Pertinent Social History:  Support: lives at home with her   Home set-up: unknown    Past Medical History:   Diagnosis Date    GERD (gastroesophageal reflux disease)     Hernia     Obesity     Unspecified sleep apnea     CPAP       Past Surgical History:   Procedure Laterality Date     SECTION    CRANIOTOMY Right 4/13/2021    RIGHT HEMICRANIECTOMY performed by Fco Lancaster MD at [address]  2010    LAP BAND  2006    SPLENECTOMY  2000    TOTAL HIP ARTHROPLASTY  2001       Family History   Problem Relation Age of Onset   [employer] Cancer Mother     Depression Mother     Mental Illness Mother     Stroke Father     Cancer Father         prostate       Social History     Socioeconomic History    Marital status:      Spouse name: None    Number of children: None    Years of education: None    Highest education level: None   Occupational History    None   Social Needs    Financial resource strain: None    Food insecurity     Worry: None     Inability: None    Transportation needs     Medical: None     Non-medical: None   Tobacco Use    Smoking status: Current Every Day Smoker     Packs/day: 1.00     Years: 20.00     Pack years: 20.00    Smokeless tobacco: Never Used   Substance and Sexual Activity    Alcohol use: Yes     Comment: occasional    Drug use: No    Sexual activity: None   Lifestyle    Physical activity     Days per week: None     Minutes per session: None    Stress: None   Relationships    Social connections     Talks on phone: None     Gets together: None     Attends Restorationist service: None     Active member of club or organization: None     Attends meetings of clubs or organizations: None     Relationship status: None    Intimate partner violence     Fear of current or ex partner: None     Emotionally abused: None     Physically abused: None     Forced sexual activity: None   Other Topics Concern    None   Social History Narrative    None           REVIEW OF SYSTEMS:   CONSTITUTIONAL: negative for fevers, chills, diaphoresis, appetite change, night sweats, unexpected weight change, fatigue  EYES: negative for blurred vision, eye discharge, visual disturbance and icterus  HEENT: negative for hearing loss, tinnitus, ear drainage, sinus pressure, nasal congestion, epistaxis and snoring  RESPIRATORY: Negative for hemoptysis, cough, sputum production  CARDIOVASCULAR: negative for chest pain, palpitations, exertional chest pressure/discomfort, syncope, edema  GASTROINTESTINAL: negative for nausea, vomiting, diarrhea, blood in stool, abdominal pain, constipation  GENITOURINARY: negative for frequency, dysuria, urinary incontinence, decreased urine volume, and hematuria  HEMATOLOGIC/LYMPHATIC: negative for easy bruising, bleeding and lymphadenopathy  ALLERGIC/IMMUNOLOGIC: negative for recurrent infections, angioedema, anaphylaxis and drug reactions  ENDOCRINE: negative for weight changes and diabetic symptoms including polyuria, polydipsia and polyphagia  MUSCULOSKELETAL: negative for pain, joint swelling, decreased range of motion  NEUROLOGICAL: negative for headaches, slurred speech, unilateral weakness  PSYCHIATRIC/BEHAVIORAL: negative for hallucinations, behavioral problems, confusion and agitation. Physical Examination:  Vitals:   Patient Vitals for the past 24 hrs:   BP Temp Temp src Pulse Resp SpO2   04/21/21 0302 128/85 98.8 °F (37.1 °C) Oral 66 16 96 %   04/21/21 0201 139/82 98.6 °F (37 °C) Oral 63 16 96 %   04/20/21 2330 127/71 98.7 °F (37.1 °C) Oral 66 16 96 %   04/20/21 1904 119/75 99.6 °F (37.6 °C) Oral 73 16 97 %   04/20/21 1445 116/75 98.5 °F (36.9 °C) Oral 73 16 96 %   04/20/21 1253 124/76 98.8 °F (37.1 °C) Oral 75 16 95 %     Const: Alert. WDWN. No distress  Eyes: Conjunctiva noninjected, no icterus noted; pupils equal, round, and reactive to light. HENT: Scalp sutures in place without drainage or marginal erythema. Oral mucosa moist  Neck: Trachea midline, neck supple. No thyromegaly noted. CV: Regular rate and rhythm, no murmur rub or gallop noted  Resp: Lungs clear to auscultation bilaterally, no rales wheezes or ronchi, no retractions. Respirations unlabored. GI: Soft, nontender, nondistended. Normal bowel sounds. No palpable masses. Skin: Normal temperature and turgor. No rashes or breakdown noted. Ext: No significant edema appreciated. No varicosities. MSK: No joint tenderness, erythema, warmth noted. AROM intact. Neuro: Alert, oriented, appropriate. No cranial nerve deficits appreciated. 0/5 LUE and LLE. 5.5 stength on RHS. Psych: Stable mood, normal judgement, normal affect     Lab Results   Component Value Date    WBC 13.9 (H) 04/21/2021    HGB 12.4 04/21/2021    HCT 35.3 (L) 04/21/2021    .3 (H) 04/21/2021     04/21/2021     Lab Results   Component Value Date    INR 1.06 04/13/2021    PROTIME 12.3 04/13/2021     Lab Results   Component Value Date    CREATININE <0.5 (L) 04/21/2021    BUN 15 04/21/2021     (L) 04/21/2021    K 3.9 04/21/2021     04/21/2021    CO2 23 04/21/2021     Lab Results   Component Value Date    ALT 32 04/13/2021    AST 41 (H) 04/13/2021    ALKPHOS 86 04/13/2021    BILITOT 0.7 04/13/2021       Most recent echocardiogram revealed:  4/13/21  Summary   Normal left ventricle size. There is mild-moderate concentric left   ventricular hypertrophy. Overall left ventricular systolic function appears   normal with an ejection fraction of 55%. No regional wall motion   abnormalities are noted. Diastolic filling parameters suggests normal   diastolic function. The aortic valve appears tricuspid with minimal sclerosis. Trivial tricuspid regurgitation. Estimated pulmonary artery systolic pressure is 26 mmHg assuming a right   atrial pressure of 3 mmHg. Trivial pulmonic regurgitation present. A bubble study was performed and fails to show evidence of right to left   shunting. Most recent EKG revealed:  NSR      MRI brain without contrast   Final Result   1. Imaging findings compatible with subacute infarct throughout the right hemisphere involving vascular distributions of right anterior cerebral artery and portions of the anterior division right middle cerebral artery.  This zone of infarct includes    majority of the right temporal and right frontal lobes as well as a portion of the right parietal lobe, as well as the basal ganglia caudate and lentiform nuclei. Some areas of hemorrhagic transformation within this infarcted zone are demonstrated,    similar in appearance to recent CT from 4/14/2021. There is cytotoxic edema demonstrated. No midline shift identified status post right frontotemporal craniectomy. Continued effacement of the right lateral ventricle is demonstrated related to the    cytotoxic edema mass effect. CT HEAD WO CONTRAST   Final Result      No change since study earlier today. CT HEAD WO CONTRAST   Final Result      Interval right hemicraniectomy with marked edema of the right cerebral hemisphere, similar to prior. New areas of hyperattenuation within the right frontal lobe reflecting likely intraparenchymal and subarachnoid hemorrhage. Small subdural hematoma, parafalcine, similar to prior. Findings were discussed with Krissy Jaramillo RN at 0730 hours after discovery at [facility] hours. CT head without contrast   Final Result   1. Suspect small parafalcine subdural hematoma in the left frontoparietal region measuring up to 4 mm in thickness. 2. Large zones of cytotoxic edema throughout the right cerebral hemisphere as described, within distribution of both the right anterior and right middle cerebral arteries. Hyperdense right MCA. Imaging findings are suspicious for occlusion of the right    internal carotid artery given infarct distribution within right anterior middle cerebral circulation. No intra-axial hemorrhage. 5 mm of right-to-left midline shift of the septum pellucidum. CT CHEST ABDOMEN PELVIS W CONTRAST    (Results Pending)         Assessment:  Panchito Salguero a 46 y.o. female with PMH GERD p/w L sided weakness and facial droop.   CTA head/neck on 4/12 revealed occluded R cervical ICA, occluded right LEAH, near complete occlusion

## 2021-04-21 NOTE — PROGRESS NOTES
Internal Medicine Progress Note    Admit Date: 4/13/2021  Diet: DIET GENERAL; No Added Salt (3-4 GM); Dysphagia Soft and Bite-Sized; Daily Fluid Restriction: 1800 ml    CC: Stroke    Interval history: No acute events overnight. Worked with PT this AM and very tired. Still with scalp pain but she says oxycodone helps. CT c/a/p today for cancer screening. She denies dyspnea, chest pain    Medications:     Scheduled Meds:   atorvastatin  80 mg Oral Nightly    FLUoxetine  20 mg Oral Daily    famotidine  20 mg Oral BID    aspirin  81 mg Oral Daily    levETIRAcetam  500 mg Oral BID    amLODIPine  5 mg Oral Daily    sodium chloride  1 g Oral TID WC    acetaminophen  1,000 mg Oral 4 times per day    polyethylene glycol  17 g Oral Daily    sennosides-docusate sodium  2 tablet Oral BID    heparin (porcine)  5,000 Units Subcutaneous 3 times per day    sodium chloride flush  5-40 mL Intravenous 2 times per day    multivitamin  1 tablet Oral Daily    thiamine  100 mg Oral Daily    sodium chloride flush  5-40 mL Intravenous 2 times per day     Continuous Infusions:   sodium chloride       PRN Meds:bisacodyl, oxyCODONE **OR** oxyCODONE, sodium chloride flush, promethazine **OR** ondansetron, perflutren lipid microspheres, nicotine, sodium chloride flush, sodium chloride    Objective:   Vitals:   T-max:  Patient Vitals for the past 8 hrs:   BP Temp Temp src Pulse Resp SpO2   04/21/21 0302 128/85 98.8 °F (37.1 °C) Oral 66 16 96 %       Intake/Output Summary (Last 24 hours) at 4/21/2021 1022  Last data filed at 4/21/2021 0944  Gross per 24 hour   Intake 600 ml   Output 1450 ml   Net -850 ml       Physical Exam:  Const: Well nourished, sitting in chair  Eyes: PERRL, EOMI  HENT: Neck: Supple, no JVD, safety helmet on   CV: RRR, no murmurs, 2+ pulses peripherally  RESP: CTAB, no wheezing, rhonchi, rales  GI: S/NT/ND  MSK/Extremities: No edema, no tenderness to palpation  Skin: Warm, dry.  No rashes, no erythema; surgical site wound over scalp: sutures intact, no oozing or erythem  Neuro: AAOx3.  0/5 ULE and LLE; left facial droop; several second delay in responding to questions  Psych: Appropriate mood and affect. LABS:    CBC:   Recent Labs     04/19/21  0530 04/20/21  0507 04/21/21  0500   WBC 15.5* 13.3* 13.9*   HGB 12.7 12.3 12.4   HCT 37.1 36.3 35.3*    445 448   .9* 105.1* 104.3*     Renal:    Recent Labs     04/19/21  0530 04/19/21  0530 04/20/21  0507 04/20/21  0507 04/20/21  1624 04/20/21  2344 04/21/21  0500   *   < > 134*   < > 136 134* 134*   K 4.1  --  3.8  --   --   --  3.9     --  100  --   --   --  101   CO2 23  --  24  --   --   --  23   BUN 12  --  15  --   --   --  15   CREATININE <0.5*  --  0.6  --   --   --  <0.5*   GLUCOSE 110*  --  98  --   --   --  98   CALCIUM 9.4  --  9.3  --   --   --  9.3   ANIONGAP 9  --  10  --   --   --  10    < > = values in this interval not displayed. Hepatic: No results for input(s): AST, ALT, BILITOT, BILIDIR, PROT, LABALBU, ALKPHOS in the last 72 hours. Troponin: No results for input(s): TROPONINI in the last 72 hours. BNP: No results for input(s): BNP in the last 72 hours. Lipids:   No results for input(s): CHOL, HDL in the last 72 hours. Invalid input(s): LDLCALCU, TRIGLYCERIDE  ABGs:  No results for input(s): PHART, PUW5FUY, PO2ART, YMU0ZAN, BEART, THGBART, F8BHICVD, KJV6ZYY in the last 72 hours. INR: No results for input(s): INR in the last 72 hours. Lactate: No results for input(s): LACTATE in the last 72 hours. Cultures:  -----------------------------------------------------------------  RAD:   MRI brain without contrast   Final Result   1. Imaging findings compatible with subacute infarct throughout the right hemisphere involving vascular distributions of right anterior cerebral artery and portions of the anterior division right middle cerebral artery.  This zone of infarct includes    majority of the right temporal and right frontal lobes as well as a portion of the right parietal lobe, as well as the basal ganglia caudate and lentiform nuclei. Some areas of hemorrhagic transformation within this infarcted zone are demonstrated,    similar in appearance to recent CT from 4/14/2021. There is cytotoxic edema demonstrated. No midline shift identified status post right frontotemporal craniectomy. Continued effacement of the right lateral ventricle is demonstrated related to the    cytotoxic edema mass effect. CT HEAD WO CONTRAST   Final Result      No change since study earlier today. CT HEAD WO CONTRAST   Final Result      Interval right hemicraniectomy with marked edema of the right cerebral hemisphere, similar to prior. New areas of hyperattenuation within the right frontal lobe reflecting likely intraparenchymal and subarachnoid hemorrhage. Small subdural hematoma, parafalcine, similar to prior. Findings were discussed with Don Hodges RN at 0730 hours after discovery at [facility] hours. CT head without contrast   Final Result   1. Suspect small parafalcine subdural hematoma in the left frontoparietal region measuring up to 4 mm in thickness. 2. Large zones of cytotoxic edema throughout the right cerebral hemisphere as described, within distribution of both the right anterior and right middle cerebral arteries. Hyperdense right MCA. Imaging findings are suspicious for occlusion of the right    internal carotid artery given infarct distribution within right anterior middle cerebral circulation. No intra-axial hemorrhage. 5 mm of right-to-left midline shift of the septum pellucidum.       CT CHEST ABDOMEN PELVIS W CONTRAST    (Results Pending)       Assessment/Plan:   Colton Sutherland is a 46 y.o. woman with  PMHx of HTN, HLD, gastric banding, splenectomy, smoking, who presented with acute right ICA occlusion/massive ischemic stroke s/p hemicrani.    -Continues working with PT; pending placement hoping to go to [facility]. -JAKUB showing no thrombus  -Na stable on salt tablets. BP well-controlled.  -telemetry dc'ed  -CT chest/abd/pelvis today as cancer screen; fluid restriction changed  from 1200 to 1800 mL for today only    Acute ischemic CVA, Subdural heomorrhage s/p right hemicraniectomy and subsequen SAH and IP hemorrhage  -Neurosurgery and neurology following   -SBP <160, scheduled Norvasc   - Keppra 500 mg twice daily for 7 days  -JAKUB 4/20 no thrombus; TTE showed normal EF and diastolic function, no shunting  -started home aspirin  -high dose statin  -PT/OT  -f/u hypercoagulable panel; cardiolipin and beta-2 glycoprotein  antibodies  Negative, protein C and S wnl  -CT c/a/p tomorrow for malignancy screening   -hematology consulted; appreciate recs     Depression-home prozac restarted  MISHA- CPAP   GERD- Patient takes omeprazole 40 mg daily at home  Obesity  Complicating assessment and treatment.  Patient's BMI is >35 kg/m² placing patient at risk for multiple co-morbidities as well as early death and contributing to the patient's presentation.      Leucocytosis-improving  Likely reactive to current stress of acute stroke and emergent surgical intervention.     Code Status: Full Code  FEN: DIET GENERAL; No Added Salt (3-4 GM);  Dysphagia Soft and Bite-Sized; Daily Fluid Restriction: 1800 ml  PPX: SCD's, Pepcid  DISPO: general medical floors; pending placement     This patient will be staffed and discussed with Karleen Cooks, MD.     Will Jones MD  Internal Medicine, PGY-1  Reach me via [address] [place] [address]

## 2021-04-22 ENCOUNTER — NURSE ONLY (OUTPATIENT)
Dept: CARDIOLOGY CLINIC | Age: 51
End: 2021-04-22

## 2021-04-22 ENCOUNTER — APPOINTMENT (OUTPATIENT)
Dept: CARDIAC CATH/INVASIVE PROCEDURES | Age: 51
DRG: 023 | End: 2021-04-22
Attending: INTERNAL MEDICINE
Payer: COMMERCIAL

## 2021-04-22 DIAGNOSIS — Z45.09 ENCOUNTER FOR LOOP RECORDER CHECK: Primary | ICD-10-CM

## 2021-04-22 LAB
ANION GAP SERPL CALCULATED.3IONS-SCNC: 8 MMOL/L (ref 3–16)
BASOPHILS ABSOLUTE: 0.1 K/UL (ref 0–0.2)
BASOPHILS RELATIVE PERCENT: 0.9 %
BUN BLDV-MCNC: 12 MG/DL (ref 7–20)
CALCIUM SERPL-MCNC: 9.4 MG/DL (ref 8.3–10.6)
CHLORIDE BLD-SCNC: 101 MMOL/L (ref 99–110)
CO2: 25 MMOL/L (ref 21–32)
CREAT SERPL-MCNC: 0.6 MG/DL (ref 0.6–1.1)
EOSINOPHILS ABSOLUTE: 0.3 K/UL (ref 0–0.6)
EOSINOPHILS RELATIVE PERCENT: 1.8 %
GFR AFRICAN AMERICAN: >60
GFR NON-AFRICAN AMERICAN: >60
GLUCOSE BLD-MCNC: 96 MG/DL (ref 70–99)
HCT VFR BLD CALC: 35.6 % (ref 36–48)
HEMOGLOBIN: 12.4 G/DL (ref 12–16)
LYMPHOCYTES ABSOLUTE: 3.3 K/UL (ref 1–5.1)
LYMPHOCYTES RELATIVE PERCENT: 21.5 %
MCH RBC QN AUTO: 36.3 PG (ref 26–34)
MCHC RBC AUTO-ENTMCNC: 34.7 G/DL (ref 31–36)
MCV RBC AUTO: 104.5 FL (ref 80–100)
MONOCYTES ABSOLUTE: 1.2 K/UL (ref 0–1.3)
MONOCYTES RELATIVE PERCENT: 8 %
NEUTROPHILS ABSOLUTE: 10.3 K/UL (ref 1.7–7.7)
NEUTROPHILS RELATIVE PERCENT: 67.8 %
PDW BLD-RTO: 13.8 % (ref 12.4–15.4)
PLATELET # BLD: 485 K/UL (ref 135–450)
PMV BLD AUTO: 8.3 FL (ref 5–10.5)
POTASSIUM REFLEX MAGNESIUM: 4.3 MMOL/L (ref 3.5–5.1)
PROTHROMBIN G20210A MUTATION: NEGATIVE
PT PCR SPECIMEN: NORMAL
RBC # BLD: 3.41 M/UL (ref 4–5.2)
SODIUM BLD-SCNC: 133 MMOL/L (ref 136–145)
SODIUM BLD-SCNC: 134 MMOL/L (ref 136–145)
SODIUM BLD-SCNC: 134 MMOL/L (ref 136–145)
SODIUM BLD-SCNC: 135 MMOL/L (ref 136–145)
SODIUM BLD-SCNC: 136 MMOL/L (ref 136–145)
WBC # BLD: 15.2 K/UL (ref 4–11)

## 2021-04-22 PROCEDURE — 33285 INSJ SUBQ CAR RHYTHM MNTR: CPT

## 2021-04-22 PROCEDURE — 85025 COMPLETE CBC W/AUTO DIFF WBC: CPT

## 2021-04-22 PROCEDURE — 33285 INSJ SUBQ CAR RHYTHM MNTR: CPT | Performed by: INTERNAL MEDICINE

## 2021-04-22 PROCEDURE — 6370000000 HC RX 637 (ALT 250 FOR IP): Performed by: PHYSICIAN ASSISTANT

## 2021-04-22 PROCEDURE — C1764 EVENT RECORDER, CARDIAC: HCPCS

## 2021-04-22 PROCEDURE — 6360000002 HC RX W HCPCS: Performed by: PHYSICIAN ASSISTANT

## 2021-04-22 PROCEDURE — 97535 SELF CARE MNGMENT TRAINING: CPT

## 2021-04-22 PROCEDURE — 92526 ORAL FUNCTION THERAPY: CPT

## 2021-04-22 PROCEDURE — 97530 THERAPEUTIC ACTIVITIES: CPT

## 2021-04-22 PROCEDURE — 36415 COLL VENOUS BLD VENIPUNCTURE: CPT

## 2021-04-22 PROCEDURE — 97129 THER IVNTJ 1ST 15 MIN: CPT

## 2021-04-22 PROCEDURE — 2580000003 HC RX 258: Performed by: PHYSICIAN ASSISTANT

## 2021-04-22 PROCEDURE — 99232 SBSQ HOSP IP/OBS MODERATE 35: CPT | Performed by: PHYSICAL MEDICINE & REHABILITATION

## 2021-04-22 PROCEDURE — 97110 THERAPEUTIC EXERCISES: CPT

## 2021-04-22 PROCEDURE — 6370000000 HC RX 637 (ALT 250 FOR IP): Performed by: INTERNAL MEDICINE

## 2021-04-22 PROCEDURE — 80048 BASIC METABOLIC PNL TOTAL CA: CPT

## 2021-04-22 PROCEDURE — 2060000000 HC ICU INTERMEDIATE R&B

## 2021-04-22 PROCEDURE — 97112 NEUROMUSCULAR REEDUCATION: CPT

## 2021-04-22 PROCEDURE — 6370000000 HC RX 637 (ALT 250 FOR IP): Performed by: STUDENT IN AN ORGANIZED HEALTH CARE EDUCATION/TRAINING PROGRAM

## 2021-04-22 PROCEDURE — 0JH632Z INSERTION OF MONITORING DEVICE INTO CHEST SUBCUTANEOUS TISSUE AND FASCIA, PERCUTANEOUS APPROACH: ICD-10-PCS | Performed by: INTERNAL MEDICINE

## 2021-04-22 PROCEDURE — 6370000000 HC RX 637 (ALT 250 FOR IP): Performed by: NURSE PRACTITIONER

## 2021-04-22 PROCEDURE — 6370000000 HC RX 637 (ALT 250 FOR IP): Performed by: COUNSELOR

## 2021-04-22 PROCEDURE — 84295 ASSAY OF SERUM SODIUM: CPT

## 2021-04-22 RX ORDER — LANOLIN ALCOHOL/MO/W.PET/CERES
100 CREAM (GRAM) TOPICAL DAILY
Qty: 30 TABLET | Refills: 3 | Status: ON HOLD | OUTPATIENT
Start: 2021-04-22 | End: 2021-04-24

## 2021-04-22 RX ORDER — FLUOXETINE HYDROCHLORIDE 20 MG/1
20 CAPSULE ORAL DAILY
Status: CANCELLED | OUTPATIENT
Start: 2021-04-23

## 2021-04-22 RX ORDER — ONDANSETRON 2 MG/ML
4 INJECTION INTRAMUSCULAR; INTRAVENOUS EVERY 6 HOURS PRN
Status: CANCELLED | OUTPATIENT
Start: 2021-04-22

## 2021-04-22 RX ORDER — PROMETHAZINE HYDROCHLORIDE 12.5 MG/1
12.5 TABLET ORAL EVERY 6 HOURS PRN
Status: CANCELLED | OUTPATIENT
Start: 2021-04-22

## 2021-04-22 RX ORDER — POLYETHYLENE GLYCOL 3350 17 G/17G
17 POWDER, FOR SOLUTION ORAL DAILY PRN
Status: CANCELLED | OUTPATIENT
Start: 2021-04-22

## 2021-04-22 RX ORDER — OXYCODONE HYDROCHLORIDE 5 MG/1
5 TABLET ORAL EVERY 4 HOURS PRN
Qty: 18 TABLET | Refills: 0 | Status: ON HOLD
Start: 2021-04-22 | End: 2021-04-24

## 2021-04-22 RX ORDER — HYDROCODONE BITARTRATE AND ACETAMINOPHEN 5; 325 MG/1; MG/1
1 TABLET ORAL EVERY 6 HOURS PRN
Status: DISCONTINUED | OUTPATIENT
Start: 2021-04-22 | End: 2021-04-22

## 2021-04-22 RX ORDER — LEVETIRACETAM 500 MG/1
500 TABLET ORAL 2 TIMES DAILY
Qty: 60 TABLET | Refills: 3 | Status: ON HOLD | OUTPATIENT
Start: 2021-04-22 | End: 2021-04-24

## 2021-04-22 RX ORDER — ATORVASTATIN CALCIUM 80 MG/1
80 TABLET, FILM COATED ORAL NIGHTLY
Status: CANCELLED | OUTPATIENT
Start: 2021-04-22

## 2021-04-22 RX ORDER — SODIUM CHLORIDE 1000 MG
1 TABLET, SOLUBLE MISCELLANEOUS
Qty: 90 TABLET | Refills: 3 | Status: ON HOLD | OUTPATIENT
Start: 2021-04-22 | End: 2021-04-24

## 2021-04-22 RX ORDER — AMLODIPINE BESYLATE 5 MG/1
5 TABLET ORAL DAILY
Status: CANCELLED | OUTPATIENT
Start: 2021-04-23

## 2021-04-22 RX ORDER — ATORVASTATIN CALCIUM 80 MG/1
80 TABLET, FILM COATED ORAL NIGHTLY
Qty: 30 TABLET | Refills: 3 | Status: ON HOLD | OUTPATIENT
Start: 2021-04-22 | End: 2021-04-24

## 2021-04-22 RX ORDER — ASPIRIN 81 MG/1
81 TABLET, CHEWABLE ORAL DAILY
Qty: 30 TABLET | Refills: 3 | Status: ON HOLD | OUTPATIENT
Start: 2021-04-22 | End: 2021-04-24

## 2021-04-22 RX ORDER — LEVETIRACETAM 500 MG/1
500 TABLET ORAL 2 TIMES DAILY
Status: CANCELLED | OUTPATIENT
Start: 2021-04-22

## 2021-04-22 RX ORDER — LANOLIN ALCOHOL/MO/W.PET/CERES
100 CREAM (GRAM) TOPICAL DAILY
Status: CANCELLED | OUTPATIENT
Start: 2021-04-23

## 2021-04-22 RX ORDER — FAMOTIDINE 20 MG/1
20 TABLET, FILM COATED ORAL 2 TIMES DAILY
Qty: 60 TABLET | Refills: 3 | Status: ON HOLD | OUTPATIENT
Start: 2021-04-22 | End: 2021-04-24

## 2021-04-22 RX ORDER — BISACODYL 10 MG
10 SUPPOSITORY, RECTAL RECTAL DAILY PRN
Status: CANCELLED | OUTPATIENT
Start: 2021-04-22

## 2021-04-22 RX ORDER — NICOTINE 21 MG/24HR
1 PATCH, TRANSDERMAL 24 HOURS TRANSDERMAL DAILY PRN
Qty: 30 PATCH | Refills: 3 | Status: ON HOLD | OUTPATIENT
Start: 2021-04-22 | End: 2021-04-24

## 2021-04-22 RX ORDER — OXYCODONE HYDROCHLORIDE 5 MG/1
5 TABLET ORAL EVERY 4 HOURS PRN
Status: CANCELLED | OUTPATIENT
Start: 2021-04-22

## 2021-04-22 RX ORDER — HEPARIN SODIUM 5000 [USP'U]/ML
5000 INJECTION, SOLUTION INTRAVENOUS; SUBCUTANEOUS EVERY 8 HOURS SCHEDULED
Status: CANCELLED | OUTPATIENT
Start: 2021-04-22

## 2021-04-22 RX ORDER — ASPIRIN 81 MG/1
81 TABLET, CHEWABLE ORAL DAILY
Status: CANCELLED | OUTPATIENT
Start: 2021-04-23

## 2021-04-22 RX ORDER — ACETAMINOPHEN 500 MG
1000 TABLET ORAL EVERY 6 HOURS SCHEDULED
Status: CANCELLED | OUTPATIENT
Start: 2021-04-22

## 2021-04-22 RX ORDER — SENNA AND DOCUSATE SODIUM 50; 8.6 MG/1; MG/1
2 TABLET, FILM COATED ORAL 2 TIMES DAILY
Status: CANCELLED | OUTPATIENT
Start: 2021-04-22

## 2021-04-22 RX ORDER — SODIUM CHLORIDE 1000 MG
1 TABLET, SOLUBLE MISCELLANEOUS
Status: CANCELLED | OUTPATIENT
Start: 2021-04-22

## 2021-04-22 RX ORDER — NICOTINE 21 MG/24HR
1 PATCH, TRANSDERMAL 24 HOURS TRANSDERMAL DAILY PRN
Status: CANCELLED | OUTPATIENT
Start: 2021-04-22

## 2021-04-22 RX ORDER — FAMOTIDINE 20 MG/1
20 TABLET, FILM COATED ORAL 2 TIMES DAILY
Status: CANCELLED | OUTPATIENT
Start: 2021-04-22

## 2021-04-22 RX ORDER — AMLODIPINE BESYLATE 5 MG/1
5 TABLET ORAL DAILY
Qty: 30 TABLET | Refills: 3 | Status: ON HOLD | OUTPATIENT
Start: 2021-04-22 | End: 2021-04-24

## 2021-04-22 RX ORDER — MULTIVITAMIN WITH IRON
1 TABLET ORAL DAILY
Status: CANCELLED | OUTPATIENT
Start: 2021-04-23

## 2021-04-22 RX ORDER — OXYCODONE HYDROCHLORIDE 5 MG/1
10 TABLET ORAL EVERY 4 HOURS PRN
Status: CANCELLED | OUTPATIENT
Start: 2021-04-22

## 2021-04-22 RX ADMIN — Medication 10 ML: at 09:06

## 2021-04-22 RX ADMIN — Medication 100 MG: at 09:06

## 2021-04-22 RX ADMIN — ASPIRIN 81 MG: 81 TABLET, CHEWABLE ORAL at 09:04

## 2021-04-22 RX ADMIN — DOCUSATE SODIUM 50 MG AND SENNOSIDES 8.6 MG 2 TABLET: 8.6; 5 TABLET, FILM COATED ORAL at 21:40

## 2021-04-22 RX ADMIN — HEPARIN SODIUM 5000 UNITS: 5000 INJECTION INTRAVENOUS; SUBCUTANEOUS at 21:41

## 2021-04-22 RX ADMIN — HEPARIN SODIUM 5000 UNITS: 5000 INJECTION INTRAVENOUS; SUBCUTANEOUS at 17:43

## 2021-04-22 RX ADMIN — THERA TABS 1 TABLET: TAB at 09:05

## 2021-04-22 RX ADMIN — AMLODIPINE BESYLATE 5 MG: 5 TABLET ORAL at 09:04

## 2021-04-22 RX ADMIN — Medication 5 ML: at 21:41

## 2021-04-22 RX ADMIN — HEPARIN SODIUM 5000 UNITS: 5000 INJECTION INTRAVENOUS; SUBCUTANEOUS at 05:41

## 2021-04-22 RX ADMIN — DOCUSATE SODIUM 50 MG AND SENNOSIDES 8.6 MG 2 TABLET: 8.6; 5 TABLET, FILM COATED ORAL at 09:05

## 2021-04-22 RX ADMIN — LEVETIRACETAM 500 MG: 500 TABLET, FILM COATED ORAL at 09:04

## 2021-04-22 RX ADMIN — OXYCODONE HYDROCHLORIDE 10 MG: 5 TABLET ORAL at 05:41

## 2021-04-22 RX ADMIN — ATORVASTATIN CALCIUM 80 MG: 80 TABLET, FILM COATED ORAL at 21:41

## 2021-04-22 RX ADMIN — LEVETIRACETAM 500 MG: 500 TABLET, FILM COATED ORAL at 21:41

## 2021-04-22 RX ADMIN — Medication 1 G: at 18:12

## 2021-04-22 RX ADMIN — OXYCODONE HYDROCHLORIDE 10 MG: 5 TABLET ORAL at 21:40

## 2021-04-22 RX ADMIN — POLYETHYLENE GLYCOL (3350) 17 G: 17 POWDER, FOR SOLUTION ORAL at 09:05

## 2021-04-22 RX ADMIN — FLUOXETINE 20 MG: 20 CAPSULE ORAL at 09:04

## 2021-04-22 RX ADMIN — ACETAMINOPHEN 1000 MG: 500 TABLET, COATED ORAL at 17:46

## 2021-04-22 RX ADMIN — OXYCODONE HYDROCHLORIDE 10 MG: 5 TABLET ORAL at 17:47

## 2021-04-22 RX ADMIN — ACETAMINOPHEN 1000 MG: 500 TABLET, COATED ORAL at 21:40

## 2021-04-22 RX ADMIN — ACETAMINOPHEN 1000 MG: 500 TABLET, COATED ORAL at 05:41

## 2021-04-22 RX ADMIN — FAMOTIDINE 20 MG: 20 TABLET, FILM COATED ORAL at 09:04

## 2021-04-22 RX ADMIN — FAMOTIDINE 20 MG: 20 TABLET, FILM COATED ORAL at 21:41

## 2021-04-22 RX ADMIN — Medication 1 G: at 09:05

## 2021-04-22 ASSESSMENT — PAIN DESCRIPTION - ONSET
ONSET: ON-GOING

## 2021-04-22 ASSESSMENT — PAIN SCALES - GENERAL: PAINLEVEL_OUTOF10: 7

## 2021-04-22 ASSESSMENT — PAIN DESCRIPTION - PAIN TYPE: TYPE: SURGICAL PAIN

## 2021-04-22 ASSESSMENT — PAIN DESCRIPTION - PROGRESSION
CLINICAL_PROGRESSION: GRADUALLY WORSENING
CLINICAL_PROGRESSION: NOT CHANGED
CLINICAL_PROGRESSION: GRADUALLY WORSENING

## 2021-04-22 ASSESSMENT — PAIN DESCRIPTION - ORIENTATION
ORIENTATION: RIGHT
ORIENTATION: RIGHT

## 2021-04-22 ASSESSMENT — PAIN DESCRIPTION - DESCRIPTORS
DESCRIPTORS: ACHING
DESCRIPTORS: ACHING

## 2021-04-22 ASSESSMENT — PAIN - FUNCTIONAL ASSESSMENT: PAIN_FUNCTIONAL_ASSESSMENT: PREVENTS OR INTERFERES SOME ACTIVE ACTIVITIES AND ADLS

## 2021-04-22 NOTE — PROGRESS NOTES
1.06 04/13/2021        CARDIAC LABS  ENZYMES:No results for input(s): CKMB, CKMBINDEX, TROPONINI in the last 72 hours. Invalid input(s): CKTOTAL;3  FASTING LIPID PANEL:  Lab Results   Component Value Date    HDL 45 04/13/2021    [address] 150 04/13/2021    TRIG 278 04/13/2021     LIVER PROFILE:  Lab Results   Component Value Date    AST 41 04/13/2021    ALT 32 04/13/2021       -----------------------------------------------------------------  Telemetry: Personally reviewed  NSR    Objective:   Vitals: /82   Pulse 68   Temp 99 °F (37.2 °C) (Oral)   Resp 16   Ht 5' 6\" (1.676 m)   Wt 238 lb 8.6 oz (108.2 kg)   SpO2 98%   BMI 38.50 kg/m²   General appearance: alert, appears stated age and cooperative, No acute distress   Eyes: Conjunctiva and pupils normal and reactive  Skin: Skin color, texture, turgor normal. No rashes or ecchymosis. Neck: no JVD, supple, symmetrical, trachea midline   Lungs: , no accessory muscle use, no respiratory distress  Heart: RRR  Abdomen: soft, non-tender; bowel sounds normal  Extremities: No edema, DP +, L arm dysarthrai  Psychiatric: normal insight and affect    Patient Active Problem List:     GERD (gastroesophageal reflux disease)     Sleep apnea     Hernia     Status post gastric banding     Obesity     Acute right MCA stroke (HCC)     Status post craniectomy     ICAO (internal carotid artery occlusion), right     Cerebral edema (HCC)     Smoking     Secondary hypertension        Assessment & Plan:      1. CVA  2. ILR placement    45 y/o woman with a h/o MISHA, morbid obesity, GERD who p/w L sided weakness, CT showed a large acute/subacute infarct corresponding to R LEAH/MCA territory with mass effect w/ associated subdural hemorrhage, occluded R ICA, and R cerebral artery with near complete occlusion of R MCA, EP consult for long term monitoring for AF.      CVA  - Reviewed ILR with patient  - Reviewed post op care  - F/u appmts made      Opal Mathur [address] [place]

## 2021-04-22 NOTE — PROGRESS NOTES
Occupational Therapy  Facility/Department: [facility] 5T ORTHO/NEURO  Daily Treatment Note  NAME: Colton Sutherland  : 1970  MRN: [id number]    Date of Service: 2021    Discharge Recommendations:    Colton Sutherland scored a  on the AM-PAC ADL Inpatient form. Current research shows that an AM-PAC score of 17 or less is typically not associated with a discharge to the patient's home setting. Based on the patient's AM-PAC score and their current ADL deficits, it is recommended that the patient have 5-7 sessions per week of Occupational Therapy at d/c to increase the patient's independence. At this time, this patient demonstrates the endurance, and/or tolerance for 3 hours of therapy each day, with a treatment frequency of 5-7x/wk. Please see assessment section for further patient specific details. If patient discharges prior to next session this note will serve as a discharge summary. Please see below for the latest assessment towards goals. OT Equipment Recommendations  Other: defer to next level of care    Assessment   Assessment: Pt motivated for therapy. Pt sitting EOB for 12min with SBA to Min A. Raul rawls used for transfer from EOB to chair with assist x2 for stance and L lateral lean requiring Max A. Pt would benefit from intensive inpt OT for maximizing functional indepence. Continue OT per POC. Treatment Diagnosis: decreased independence with ADLs and fx transfers sedondary to left hemiparesis  OT Education: OT Role;Plan of Care;Transfer Training  Patient Education: Pt verb understanding and will need reinforcement  Activity Tolerance  Activity Tolerance: Patient Tolerated treatment well;Patient limited by fatigue         Patient Diagnosis(es): There were no encounter diagnoses. has a past medical history of GERD (gastroesophageal reflux disease), Hernia, Obesity, and Unspecified sleep apnea. has a past surgical history that includes Splenectomy (); Total hip arthroplasty ();  Lap Band (); hernia repair ();  section (); and craniotomy (Right, 2021). Restrictions  Position Activity Restriction  Other position/activity restrictions: Ambulate, Up with assist, Pt to wear hemet when OOB       Diagnosis: Stroke determined by clinical assessment  Treatment Diagnosis: decreased independence with ADLs and fx transfers sedondary to left hemiparesis    Subjective:   Pt met supine in bed and agreeable to OT treatment. Pain:   Mild headache pain    Objective:    Cognition/Orientation:  Oriented x4 delayed response to commands/questions. Increased processing time    Bed mobility   Rolling: Mod A   Supine to sit:  Mod a x2  Scooting: Mod A scooting to EOB    Pt sitting EOB for 10 min with SBA to Min A with L lateral lean    Functional Mobility   Sit to Stand: Max A + Mod A x2 from EOB to margo stedy  Stand to Sit: Mod A to recliner chair  Bed to Chair Transfer:  Dependent on Edward Abler stedy with assist x2      ADLs   Grooming: CGA to SBA for oral care and washing face/hands  seated EOB    UE Exercises   PROM completed for LUE with prolong stretch for decreased tone  5 reps flex/ext all digit  5 rep wrist flex/ext  5 reps elbow flex/ext    10 reps AROM for RUE  Shoulder flex/ext  Elbow flex/ext  Grasp release          Safety Devices in Place:  Pt left seated in bed with alarm on and call light in reach.                          Plan  If pt discharges prior to next treatment, this note will serve as discharge summary  Plan  Times per week: 5-7  Current Treatment Recommendations: Strengthening, Endurance Training, Neuromuscular Re-education, Self-Care / ADL, Functional Mobility Training, Safety Education & Training    AM-PAC Score        AM-PAC Inpatient Daily Activity Raw Score: 11 (21 1220)  AM-PAC Inpatient ADL T-Scale Score : 29.04 (21 1220)  ADL Inpatient CMS 0-100% Score: 70.42 (21 1220)  ADL Inpatient CMS G-Code Modifier : CL (21 122)    Goals (as determined and assessed by primary OT)  Short term goals  Time Frame for Short term goals: by discharge  Short term goal 1: Pt will perform bed mobility with max A - goal met supine to sit 4/20; new goal: pt will perform bed mobility with min A- not met 4/21  Short term goal 2: Pt will perform seated on EOB for ~5 min with CGA to prepare for ADLs - goal met 4/21: new goal: pt will perform static standing balance >1min with min A to prepare for ADLS- goal met 2/83; New goal: pt will perform static standing balance >1min with CGA to prepare for ADLS- not met, 4/40  Short term goal 3: Pt will perform hemipalegic dressing technique for UB dressing with assist as needed - ongoing  Short term goal 4: Pt will perform 5 hand squeezes with LUE to increase functional movement with no more than verbal encouragement - ongoing  Patient Goals   Patient goals : not stated       Therapy Time   Individual Concurrent Group Co-treatment   Time In 1020         Time Out 1109         Minutes 49         Timed Code Treatment Minutes: [address], [address] [facility], OTR/L

## 2021-04-22 NOTE — PROGRESS NOTES
Pt returned to unit. Loop recorder in place, dressing over site. Pt tolerating well.  Will continue to monitor

## 2021-04-22 NOTE — PROCEDURES
[employer]     Electrophysiology Procedure Note       Date of Procedure: 4/22/2021  Patient's Name: Benedict Allison  YOB: 1970   Medical Record Number: [id number]  Procedure Performed by: Avelino Trujillo MD.,    Procedures performed:    · Loop recorder implantation    Indication of the procedure: Syncope, Palpitation   Benedict Allison is a 46 y.o. female with cerebrovascular accident. Because the patient is at risk of having atrial dysrhythmia, particularly atrial fibrillation, the decision was made to undergo a procedure that would afford long term rhythm detection. Therefore, the patient has been scheduled to undergo an ILR implantation. Details of procedure:   Procedure's risks, benefits and alternatives of procedure were explained to patient. All questions were answered. Patient understood and informed consent was obtained. The patient was brought to the holding bay in a fasting nonsedated state. Patient was prepped and draped in usual sterile fashion. No moderate sedation (conscious sedation) nor general anesthesia was administered or utilized for this procedure. The patient was monitored continuously with ECG, pulse oximetry, blood pressure monitoring, and direct observation. After injection of 0.5% bupivacaine/lidocaine mixture in the left 4th intercostal space, a 5 mm small incision was made using the [facility] -provided scalpel, after which a #11 blade was used to expand the opening of this incision on both ends. Then a [facility]-provided tunneling tool was inserted into this scar in a diagonal trajectory towards the L nipple which created the necessary space to insert the implantable loop recorder. This tool was used to deliver the implantable loop recorder into the created space. Both the plunger and the tunneling tool was then retracted. The surgical scar was taped with Steri-strips and manual pressure was held over the taped area to achieve hemostasis.      The patient tolerated the procedure well without any complications. Device information:       Impression:    Pre- and post-procedural diagnoses of stroke with successful implantation of a SJM Implantable Loop Recorder. Plan:   The patient will have usual post-implant care with a 1-week wound check with our nurse in the [facility] at [facility].     The patient can be discharged home if the patient remains stable. Follow-up also includes routine device interrogation with the Device Clinic. Thank you for allowing us to participate in the care of your patient. If you have any questions or comments, please feel free to contact us.     Frederick Moran MD   Cardiac Electrophysiology  [facility]

## 2021-04-22 NOTE — PLAN OF CARE
Problem: Pain:  Goal: Pain level will decrease  Description: Pain level will decrease  Outcome: Ongoing     Problem: HEMODYNAMIC STATUS  Goal: Patient has stable vital signs and fluid balance  Outcome: Ongoing     Problem: ACTIVITY INTOLERANCE/IMPAIRED MOBILITY  Goal: Mobility/activity is maintained at optimum level for patient  Outcome: Ongoing     Problem: COMMUNICATION IMPAIRMENT  Goal: Ability to express needs and understand communication  Outcome: Ongoing     Problem: Skin Integrity:  Goal: Will show no infection signs and symptoms  Description: Will show no infection signs and symptoms  Outcome: Ongoing     Problem: Falls - Risk of:  Goal: Will remain free from falls  Description: Will remain free from falls  Outcome: Ongoing
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
## 2021-04-22 NOTE — PLAN OF CARE
Problem: COMMUNICATION IMPAIRMENT  Goal: Ability to express needs and understand communication  4/22/2021 0930 by Sushant Castillo RN  Outcome: Ongoing  Note: Speech is clear but delayed. Pt oriented to call light and uses it appropriately. Pt able to express needs. Will continue to monitor. Problem: Skin Integrity:  Goal: Will show no infection signs and symptoms  Description: Will show no infection signs and symptoms  4/22/2021 0930 by Sushant Castillo RN  Outcome: Ongoing  Note: No signs of infection. VSS. Surgical site is CDI. Incision cleaned with soap and water and CHG swab. No signs of redness, swelling or drainage. Will continue to monitor.

## 2021-04-22 NOTE — PROGRESS NOTES
Internal Medicine Progress Note    Admit Date: 4/13/2021  Diet: DIET GENERAL; No Added Salt (3-4 GM); Dysphagia Soft and Bite-Sized; Daily Fluid Restriction: 1500 ml    CC: Stroke    Interval history: No acute events overnight. Worked with PT this AM.  No complaints; surgical site pain on scalp is well-controlled. She is going to have implantable loop recorder placed today. Planning for transfer to ARU tomorrow morning.     She denies dyspnea, chest pain, cough    Medications:     Scheduled Meds:   atorvastatin  80 mg Oral Nightly    FLUoxetine  20 mg Oral Daily    famotidine  20 mg Oral BID    aspirin  81 mg Oral Daily    levETIRAcetam  500 mg Oral BID    amLODIPine  5 mg Oral Daily    sodium chloride  1 g Oral TID WC    acetaminophen  1,000 mg Oral 4 times per day    polyethylene glycol  17 g Oral Daily    sennosides-docusate sodium  2 tablet Oral BID    heparin (porcine)  5,000 Units Subcutaneous 3 times per day    sodium chloride flush  5-40 mL Intravenous 2 times per day    multivitamin  1 tablet Oral Daily    thiamine  100 mg Oral Daily    sodium chloride flush  5-40 mL Intravenous 2 times per day     Continuous Infusions:   sodium chloride       PRN Meds:HYDROcodone 5 mg - acetaminophen, bisacodyl, oxyCODONE **OR** oxyCODONE, sodium chloride flush, promethazine **OR** ondansetron, perflutren lipid microspheres, nicotine, sodium chloride flush, sodium chloride    Objective:   Vitals:   T-max:  Patient Vitals for the past 8 hrs:   BP Temp Temp src Pulse Resp SpO2   04/22/21 0715 123/82 99 °F (37.2 °C) Oral 68 16 98 %       Intake/Output Summary (Last 24 hours) at 4/22/2021 1148  Last data filed at 4/22/2021 0901  Gross per 24 hour   Intake 240 ml   Output 1000 ml   Net -760 ml       Physical Exam:  Const: Well nourished, sitting up working with physical therapy  Eyes: PERRL, EOMI  HENT: Neck: Supple, no JVD, safety helmet on   CV: RRR, no murmurs, 2+ pulses peripherally  RESP: CTAB, no junction with previous gastric band surgery noted. CT OF THE ABDOMEN AND PELVIS WITH CONTRAST      FINDINGS: CT ABDOMEN:       The lung bases  are clear . Gastric band device noted in the upper stomach region       There is normal appearance of the liver. The spleen is of normal size. The adrenal glands appear normal in size. The gallbladder appears normal without stone disease or wall thickening. The pancreas is normal in appearance without signs of any discrete mass or ductal dilation. The right and left kidney excrete contrast without delay or hydronephrosis. The aorta is of normal caliber without aneurysm. There is no sign of free air or free fluid. Some fluid filled loops of small bowel are noted. No sign of any retroperitoneal or periaortic adenopathy. Infrarenal atherosclerotic disease of the aorta and common iliac arteries are noted. CT THE PELVIS:      CT pelvis performed demonstrates a mild amount of stool throughout the colon. There is intact uterus. No adnexal mass appreciated. Scatter artifact noted from left hip replacement. Urinary bladder is unremarkable. [facility] IMPRESSION:      1. No sign of any mass or lymphadenopathy       2. Postsurgical changes with gastric band device in place in satisfactory position with some mild thickening in the gastroesophageal junction      3. No lytic or blastic bony lesions evident with disc space loss and spondylosis at L5-S1 level      MRI brain without contrast   Final Result   1. Imaging findings compatible with subacute infarct throughout the right hemisphere involving vascular distributions of right anterior cerebral artery and portions of the anterior division right middle cerebral artery. This zone of infarct includes    majority of the right temporal and right frontal lobes as well as a portion of the right parietal lobe, as well as the basal ganglia caudate and lentiform nuclei.  Some areas of hemorrhagic following   -SBP <160, scheduled Norvasc   - Keppra 500 mg twice daily for 7 days  -JAKUB 4/20 no thrombus; TTE showed normal EF and diastolic function, no shunting  -started home aspirin  -high dose statin  -PT/OT  -f/u hypercoagulable panel; cardiolipin and beta-2 glycoprotein  antibodies  Negative, protein C and S wnl  -CT c/a/p tomorrow for malignancy screening   -hematology consulted; appreciate recs     Depression-home prozac restarted  MISHA- CPAP   GERD- Pepcid 20 BID  Obesity  Complicating assessment and treatment.  Patient's BMI is >35 kg/m² placing patient at risk for multiple co-morbidities as well as early death and contributing to the patient's presentation.      Leucocytosis-improving  Likely reactive to current stress of acute stroke and emergent surgical intervention.     Code Status: Full Code  FEN: DIET GENERAL; No Added Salt (3-4 GM);  Dysphagia Soft and Bite-Sized; Daily Fluid Restriction: 1500 ml  PPX: SCD's, Pepcid  DISPO: general medical floors; ARU tomorrow    This patient has been and discussed with Jesus Nagy MD.     Miguelito Eid MD  Internal Medicine, PGY-1  Reach me via [address] [place] [address]

## 2021-04-22 NOTE — PROGRESS NOTES
Patient had large bowel movement this shift. Patient is resting in bed with all fall precautions in place. Will continue to monitor.

## 2021-04-22 NOTE — PROGRESS NOTES
Speech Language Pathology  Facility/Department: [facility] 5T ORTHO/NEURO  Speech / Language/ Cognitive Daily Treatment Note    NAME: Meghan Rick  : 1970  MRN: [id number]    Patient Diagnosis(es):   Patient Active Problem List    Diagnosis Date Noted    ICAO (internal carotid artery occlusion), right     Cerebral edema ([facility] [address]     Smoking     Secondary hypertension     Status post craniectomy     Acute right MCA stroke ([facility] [address] 2021    Status post gastric banding 2012    Obesity 2012    GERD (gastroesophageal reflux disease)     Sleep apnea     Hernia      Allergies: No Known Allergies     CT of head 21  Impression   Findings most consistent with large acute/subacute infarct in the right   frontal lobe with associated mass effect as above.  Further evaluation with   MRI is suggested for better characterization and exclusion of additional   underlying pathology.       Additionally there is a small subdural hematoma along the falx cerebrum.      MRI brain 21  Impression   1. Imaging findings compatible with subacute infarct throughout the right hemisphere involving vascular distributions of right anterior cerebral artery and portions of the anterior division right middle cerebral artery. This zone of infarct includes    majority of the right temporal and right frontal lobes as well as a portion of the right parietal lobe, as well as the basal ganglia caudate and lentiform nuclei. Some areas of hemorrhagic transformation within this infarcted zone are demonstrated,    similar in appearance to recent CT from 2021. There is cytotoxic edema demonstrated. No midline shift identified status post right frontotemporal craniectomy. Continued effacement of the right lateral ventricle is demonstrated related to the    cytotoxic edema mass effect.            Chart reviewed.   Pain: no  Medical diagnosis: stroke  Treatment diagnosis: dysarthria; cognitive-linguistic impairment; pragmatic impairment    Treatment:  Pt seen to address the following goals:  Goal 1: Pt will attend to left for reading/writing tasks with min cues  4/16: pt cont with left neglect, gazes to right requiring max cues to attend to left. Did not address reading/writing as pt became lethargic as session progressed  Cont goal  4/18: Targeted L attention during functional conversational task. MAX fading to mod cues to attend to conversational partner situated L of midline and make eye contact - pt frequently turned head to L but maintained R gaze preference. Max cues required to attend to L side of board in room detailing staff and activity recs. Continue goal.  4/19: when questioned regarding deficits from stroke, pt stated my left vision is bad. Pt demonstrates awareness of visual deficits however requires max cues to attend/locate windows/flowers on left side of room. Pt wrote name initially midline, however with cues to locate left of page, pt did so independently and wrote name accurately. Pt rosette clock face with appropriate contour and placement of numbers  Cont goal  4/22: pt cont with significant left visual field neglect. As stated in dysphagia note, pt was not eating breakfast, as she stated she didn't have utensils, which were on left of tray. Pt provided with max cues and instructed to use hand on tray and feel until she reaches left side and look where her fingers are. Pt also requiring max cues for short sentence level reading tasks. Pt wrote name with appropriate page orientations when dark/bold line placed at left margin. Pt rosette face of clock with correct number placement this date  Cont goal    Goal 2: Pt will provide 3 solutions to functional problems with 80% accuracy  4/16: pt proving 1-2 solutions to everyday problems. Pt demonstrated decreased flexibility of thought when asked to provide additional solutions. Responses were often concrete  Cont goal  4/18: Goal not targeted directly.  Pt independently identified deficits s/p CVA. 4/19: pt completed problem solving tasks accurately and demonstrated good flexibility of thought this date, being able to provide alternate solutions  Goal met  4/22: not targeted    Goal 3: Pt will solve money/time concept problems with 50% accuracy  4/16: pt solved functional money/time concept problems with 20% accuracy and max cues provided. Cont goal  4/18: Goal not targeted. 4/19: pt states \"I am still thinking about that problem the other day, the 7:45 one. \"  Pt cont to demonstrate significant difficulty with calculations/money / time concept problems, with 20% accuracy. Pt states, \" I should be able to do that. \" When presented with visual cues (looking at clock, drawing coins, etc), improvement to 50%. Cont goal  4/22: pt solved money concept problems with 50% accuracy and min cues, improved from prior sessions. Cont goal    Goal 4- the patient will name 3 strategies for improved speech intelligibility  4/16: pt educated to strategies for improved speech intelligibility. Pt did not restate when requested  Cont goal  4/18: Goal not targeted directly. Pt identified feeling speech improved. 4/19: speech much improved, no dysarthria noted this date  Cont as appropriate  4/22: pt recalled 1 strategy, reviewed others for use as needed. Pt stated understanding  Cont goal    Goal 5- The patient will use strategies for improved speech intelligibility with mod cues in structured sentence level tasks  4/16: speech is ~80% intelligible this date, although lethargy appears to be impacting this as well. Pt completed graded phrases, requiring max cues to utilize strategies  Cont goal  4/18: Goal not directly targeted. Speech >90% comprehensible t/o session. Pt endorsed \"sound more like myself\" this date. Continue goal.  4/19:  speech much improved, no dysarthria noted this date  Cont as appropriate  4/22: intelligibility of speech >90%.   Pt remains monotone with flat affect  Cont as appropriate    Assessment: Cognitive linguistic impairment with attention deficits (including L neglect) as well as executive dysfunction with reduced initiation and propulsion for task completion. Flat affect. Mild dysarthria which pt endorsed improved this date. Education:  Educated pt to rationale for tasks presented and recommendation for ongoing treatment at [facility]. Pt stated understanding and states she is hoping to go to rehab here    Plan:  Continue speech/language therapy to address above goals, 3-5 x/week x LOS  DC recommendations: ongoing ST at d/c  D/W nursing  Needs met prior to leaving room, call button placed in reach. Treatment time:  20 min  Tre Worrell M.S./[facility]-SLP #[id number]  Pg. # [phone]  If patient is discharged prior to next session, this note will serve as discharge summary.

## 2021-04-22 NOTE — PROGRESS NOTES
Attempted to call report to ARU. Receiving RN stated pt will not be going today because there is no bed available. Pt informed.

## 2021-04-22 NOTE — PROGRESS NOTES
2004    CRANIOTOMY Right 4/13/2021    RIGHT HEMICRANIECTOMY performed by Jose Miguel Plascencia MD at [address]  2010    LAP BAND  2006    SPLENECTOMY  2000    TOTAL HIP ARTHROPLASTY  2001       Family History   Problem Relation Age of Onset   Marty Galla Cancer Mother     Depression Mother     Mental Illness Mother     Stroke Father     Cancer Father         prostate       Social History     Socioeconomic History    Marital status:      Spouse name: None    Number of children: None    Years of education: None    Highest education level: None   Occupational History    None   Social Needs    Financial resource strain: None    Food insecurity     Worry: None     Inability: None    Transportation needs     Medical: None     Non-medical: None   Tobacco Use    Smoking status: Current Every Day Smoker     Packs/day: 1.00     Years: 20.00     Pack years: 20.00    Smokeless tobacco: Never Used   Substance and Sexual Activity    Alcohol use: Yes     Comment: occasional    Drug use: No    Sexual activity: None   Lifestyle    Physical activity     Days per week: None     Minutes per session: None    Stress: None   Relationships    Social connections     Talks on phone: None     Gets together: None     Attends Baptism service: None     Active member of club or organization: None     Attends meetings of clubs or organizations: None     Relationship status: None    Intimate partner violence     Fear of current or ex partner: None     Emotionally abused: None     Physically abused: None     Forced sexual activity: None   Other Topics Concern    None   Social History Narrative    None           REVIEW OF SYSTEMS:   CONSTITUTIONAL: negative for fevers, chills, diaphoresis, appetite change, night sweats, unexpected weight change, fatigue  EYES: negative for blurred vision, eye discharge, visual disturbance and icterus  HEENT: negative for hearing loss, tinnitus, ear drainage, sinus pressure, nasal congestion, epistaxis and snoring  RESPIRATORY: Negative for hemoptysis, cough, sputum production  CARDIOVASCULAR: negative for chest pain, palpitations, exertional chest pressure/discomfort, syncope, edema  GASTROINTESTINAL: negative for nausea, vomiting, diarrhea, blood in stool, abdominal pain, constipation  GENITOURINARY: negative for frequency, dysuria, urinary incontinence, decreased urine volume, and hematuria  HEMATOLOGIC/LYMPHATIC: negative for easy bruising, bleeding and lymphadenopathy  ALLERGIC/IMMUNOLOGIC: negative for recurrent infections, angioedema, anaphylaxis and drug reactions  ENDOCRINE: negative for weight changes and diabetic symptoms including polyuria, polydipsia and polyphagia  MUSCULOSKELETAL: negative for pain, joint swelling, decreased range of motion  NEUROLOGICAL: negative for headaches, slurred speech, unilateral weakness  PSYCHIATRIC/BEHAVIORAL: negative for hallucinations, behavioral problems, confusion and agitation. Physical Examination:  Vitals:   Patient Vitals for the past 24 hrs:   BP Temp Temp src Pulse Resp SpO2   04/22/21 0715 123/82 99 °F (37.2 °C) Oral 68 16 98 %   04/22/21 0330 124/77 98.6 °F (37 °C) Oral 66 16 97 %   04/22/21 0220     16    04/21/21 2251 136/79 98.7 °F (37.1 °C) Oral 73 16 96 %   04/21/21 1915 119/81 99.4 °F (37.4 °C) Oral 75 16 95 %   04/21/21 1504 131/84 98.9 °F (37.2 °C) Oral 78 16 96 %   04/21/21 1200 121/80 98.4 °F (36.9 °C) Oral 72 16 97 %     Const: Alert. WDWN. No distress  Eyes: Conjunctiva noninjected, no icterus noted; pupils equal, round, and reactive to light. HENT: Scalp sutures in place without drainage or marginal erythema. Oral mucosa moist  Neck: Trachea midline, neck supple. No thyromegaly noted. CV: Regular rate and rhythm, no murmur rub or gallop noted  Resp: Lungs clear to auscultation bilaterally, no rales wheezes or ronchi, no retractions. Respirations unlabored. GI: Soft, nontender, nondistended.  Normal bowel sounds. No palpable masses. Skin: Normal temperature and turgor. No rashes or breakdown noted. Ext: No significant edema appreciated. No varicosities. MSK: No joint tenderness, erythema, warmth noted. AROM intact. Neuro: Alert, oriented, appropriate. No cranial nerve deficits appreciated. 0/5 LUE and LLE. 5.5 stength on RHS. Psych: Stable mood, normal judgement, normal affect     Lab Results   Component Value Date    WBC 15.2 (H) 04/22/2021    HGB 12.4 04/22/2021    HCT 35.6 (L) 04/22/2021    .5 (H) 04/22/2021     (H) 04/22/2021     Lab Results   Component Value Date    INR 1.06 04/13/2021    PROTIME 12.3 04/13/2021     Lab Results   Component Value Date    CREATININE 0.6 04/22/2021    BUN 12 04/22/2021     (L) 04/22/2021    K 4.3 04/22/2021     04/22/2021    CO2 25 04/22/2021     Lab Results   Component Value Date    ALT 32 04/13/2021    AST 41 (H) 04/13/2021    ALKPHOS 86 04/13/2021    BILITOT 0.7 04/13/2021       Most recent echocardiogram revealed:  4/13/21  Summary   Normal left ventricle size. There is mild-moderate concentric left   ventricular hypertrophy. Overall left ventricular systolic function appears   normal with an ejection fraction of 55%. No regional wall motion   abnormalities are noted. Diastolic filling parameters suggests normal   diastolic function. The aortic valve appears tricuspid with minimal sclerosis. Trivial tricuspid regurgitation. Estimated pulmonary artery systolic pressure is 26 mmHg assuming a right   atrial pressure of 3 mmHg. Trivial pulmonic regurgitation present. A bubble study was performed and fails to show evidence of right to left   shunting. Most recent EKG revealed:  NSR      CT CHEST ABDOMEN PELVIS W CONTRAST   Final Result      1. No sign of any lymphadenopathy, mass or lung parenchymal consolidation. 2.  Mild thickening of lower esophagus and gastroesophageal junction with previous gastric band surgery noted. CT OF THE ABDOMEN AND PELVIS WITH CONTRAST      FINDINGS: CT ABDOMEN:       The lung bases  are clear . Gastric band device noted in the upper stomach region       There is normal appearance of the liver. The spleen is of normal size. The adrenal glands appear normal in size. The gallbladder appears normal without stone disease or wall thickening. The pancreas is normal in appearance without signs of any discrete mass or ductal dilation. The right and left kidney excrete contrast without delay or hydronephrosis. The aorta is of normal caliber without aneurysm. There is no sign of free air or free fluid. Some fluid filled loops of small bowel are noted. No sign of any retroperitoneal or periaortic adenopathy. Infrarenal atherosclerotic disease of the aorta and common iliac arteries are noted. CT THE PELVIS:      CT pelvis performed demonstrates a mild amount of stool throughout the colon. There is intact uterus. No adnexal mass appreciated. Scatter artifact noted from left hip replacement. Urinary bladder is unremarkable. Wellington Hurtado IMPRESSION:      1. No sign of any mass or lymphadenopathy       2. Postsurgical changes with gastric band device in place in satisfactory position with some mild thickening in the gastroesophageal junction      3. No lytic or blastic bony lesions evident with disc space loss and spondylosis at L5-S1 level      MRI brain without contrast   Final Result   1. Imaging findings compatible with subacute infarct throughout the right hemisphere involving vascular distributions of right anterior cerebral artery and portions of the anterior division right middle cerebral artery. This zone of infarct includes    majority of the right temporal and right frontal lobes as well as a portion of the right parietal lobe, as well as the basal ganglia caudate and lentiform nuclei.  Some areas of hemorrhagic transformation within this infarcted zone are demonstrated,    similar in appearance to recent CT from 4/14/2021. There is cytotoxic edema demonstrated. No midline shift identified status post right frontotemporal craniectomy. Continued effacement of the right lateral ventricle is demonstrated related to the    cytotoxic edema mass effect. CT HEAD WO CONTRAST   Final Result      No change since study earlier today. CT HEAD WO CONTRAST   Final Result      Interval right hemicraniectomy with marked edema of the right cerebral hemisphere, similar to prior. New areas of hyperattenuation within the right frontal lobe reflecting likely intraparenchymal and subarachnoid hemorrhage. Small subdural hematoma, parafalcine, similar to prior. Findings were discussed with [facility], RN at 0730 hours after discovery at [facility] hours. CT head without contrast   Final Result   1. Suspect small parafalcine subdural hematoma in the left frontoparietal region measuring up to 4 mm in thickness. 2. Large zones of cytotoxic edema throughout the right cerebral hemisphere as described, within distribution of both the right anterior and right middle cerebral arteries. Hyperdense right MCA. Imaging findings are suspicious for occlusion of the right    internal carotid artery given infarct distribution within right anterior middle cerebral circulation. No intra-axial hemorrhage. 5 mm of right-to-left midline shift of the septum pellucidum. Assessment:  Karen Kasper a 46 y.o. female with PMH GERD p/w L sided weakness and facial droop. CTA head/neck on 4/12 revealed occluded R cervical ICA, occluded right LEAH, near complete occlusion of right MCA.     Acute ischemic CVA, Subdural heomorrhage s/p right hemicraniectomy and subsequen SAH and IP hemorrhage  No tPA given.  MRI 4/14 showing large subacute infarct throughout R LEAH and MCA with some areas of hemorrhagic conversion.  - Neurosurgery and neurology following  --SBP <160, PRN hydralazine, scheduled Norvasc  -- Keppra 500 mg twice daily   -  SQH  - PT/OT consulted      Aphasia- SLP consulted   Depression-Patient takes fluoxetine 20 mg daily at home  Anxiety -Patient takes Ativan 0.5 mg every 8 hours as needed for anxiety- holding  MISHA-Patient uses CPAP at home   GERD- Patient takes omeprazole 40 mg daily at home- half dose  Obesity  Complicating assessment and treatment.  Patient's BMI is 34.91 kg/m² placing patient at risk for multiple co-morbidities as well as early death and contributing to the patient's presentation.      Impairments- Decreased functional mobility, Decreased ADLs    Recommendations:  Admit to ARU tomorrow- The patient is going to require an extended stay in the ARU. Bed availability would be best for a Friday morning admit. - Prolongs her ARU days. Thank you for this consult. Please contact me with any questions or concerns.      Jovani Ca D.O. M.P.H  PM&R  4/22/2021  9:23 AM

## 2021-04-22 NOTE — PROGRESS NOTES
Speech Language Pathology  Facility/Department: [facility]  Dysphagia Daily Treatment Note    NAME: Carmelo Lagunas  : 1970  MRN: [id number]    Patient Diagnosis(es):   Patient Active Problem List    Diagnosis Date Noted    ICAO (internal carotid artery occlusion), right     Cerebral edema ([facility] [address] [facility]     Smoking     Secondary hypertension     Status post craniectomy     Acute right MCA stroke ([facility] [address] [facility] 2021    Status post gastric banding 2012    Obesity 2012    GERD (gastroesophageal reflux disease)     Sleep apnea     Hernia      Allergies: No Known Allergies    Recent Chest Xray 21  No acute abnormality. CT of head 21  Impression   Findings most consistent with large acute/subacute infarct in the right   frontal lobe with associated mass effect as above.  Further evaluation with   MRI is suggested for better characterization and exclusion of additional   underlying pathology.       Additionally there is a small subdural hematoma along the falx cerebrum. MRI brain 21  Impression   1. Imaging findings compatible with subacute infarct throughout the right hemisphere involving vascular distributions of right anterior cerebral artery and portions of the anterior division right middle cerebral artery. This zone of infarct includes    majority of the right temporal and right frontal lobes as well as a portion of the right parietal lobe, as well as the basal ganglia caudate and lentiform nuclei. Some areas of hemorrhagic transformation within this infarcted zone are demonstrated,    similar in appearance to recent CT from 2021. There is cytotoxic edema demonstrated. No midline shift identified status post right frontotemporal craniectomy. Continued effacement of the right lateral ventricle is demonstrated related to the    cytotoxic edema mass effect. Previous MBS - none  Chart reviewed.   Medical Diagnosis: stroke  Treatment Diagnosis: dysphagia    BSE Impression 4/13/21  Pt is lethargic, but wakes easily. Pt is oriented x3 with left facial droop and dysarthria. Pt with difficulty lateralizing tongue to the left. .  Pt had no difficulty closing lips around spoon or drawing liquid up a straw. Mastication with ice chip was prolonged with impaired coordination. There was no anterior spillage or oral residue noted with any consistency. Laryngeal movement noted with all PO trials, although question swallow delay and noted 2 spontaneous swallows noted following trials with ice chips and small sip of water. Pt produced reactive cough following 2 trials with ice chips and 2 small sips of water- Pt endorsed sensation of aspiration. No s/s aspiration with trials of 1/2 tsp of applesauce. Did not attempt cracker at this time due to concern for pt safety.     FEES results 4/14/21  Pt presents w/ mild oropharyngeal dysphagia characterized by premature spillage of thin liquids to UES and pyriforms w/ intermittent deep penetration of laryngeal vestibule; adequate airway protection and complete clearance of all residue after swallow initiation. No aspiration w/ any trials. Timely swallow initiation w/ puree and regular solid consistencies; no oropharyngeal residue. 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 Adequate ab/adduction. Complete closure of TVFs upon adduction.     Pain:denies    Current Diet : soft and bite sized/thin liquids    Treatment:  Pt seen bedside to address the following goals:  1- The patient will tolerate instrumental swallowing procedure  4/14: FEES Scheduled this date - goal met     2- The pt/caregiver will demonstrate understanding of swallowing recommendations and concerns.   4/13-  The pt was educated to purpose of the visit, concerns for aspiration, recommendation for FEES, a description of the procedure, and plan to return at a later time to complete speech evaluation. The pt stated comprehension and agreement. con't goal  4/14: pt educated to purpose of visit and recommendation / rationale for FEES study. Pt stated understanding  Cont goal  4/14 (2): Reviewed FEES w/ both pt and spouse; discussed premature spillage but adequate airway protection. Educated to remain upright during all meals. Discussed diet recommendation and pt endorsed preference for soft diet starting off; will begin dysphagia III (soft & bite sized) and upgrade per pt preference and/or as tolerated. Cont goal  4/16: pt educated to purpose of tx session. Discussed need for full alertness when consuming PO. Discussed diet texture and pt agrees safer to cont soft /bite sized due to intermittent lethargy. Cont goal  4/18: Educated pt to aspiration risk, FEES results, importance of oral care, and rationale for tx tasks to address oral dysphagia. Pt verbalized comprehension but will require ongoing reinforcement. Continue goal.  4/22: pt recalled use of tongue sweep, utilizing independently. Pt required cues for sitting fully upright and small bites/sips. Educated again to importance of oral care and rationale. Pt stated comprehension  Cont goal    3- Pt will participate in repeat BSE  4/14: pt re-assessed s/p Crani 4/13. Pt alert, oriented, followed simple commands. Pt analyzed with ice chips, water via cup and straw, puree and solid. No overt signs of aspiration emerged this date. Swallow movement difficult to palpate due to girth of pt neck. Pt demonstrated slow mastication with cracker, with lingual residue noted. Recommend proceeding with instrumental exam this date to fully assess swallow function, due to acute CVA/crani. Goal met    New goal:  4-  Pt will consume PO safely without signs or symptoms of aspiration  4/16: pt initially very alert with flat affect. Pt became lethargic intermittently throughout session, requiring cues to maintain alertness.   Pt took medications crushed in puree per RN without difficulty. Pt consumed thin liquids via cup and straw with no overt signs of aspiration, voice clear, good swallow movement upon palpation of anterior neck. Pt demonstrated effective mastication with solid texture. Recommend cont soft and bite sized texture, due to intermittent lethargy. Pt agrees stating, \"I don't want to choke. \"  Cont goal  4/18: RN questioning if pt can consume meds whole. Pt in bed upon entry - residue noted on L labial seal where apparent anterior spillage vs reduced precision with self feeding occurred during lunch. Mild pooling of secretions with min residue in L buccal cavity. Targeted oral dysphagia via bolus control exercises. Lateralization of toothette x 20 with mod cues for attention and control. A-P propulsion with toothette x 10 with min-no cues. Pt demonstrated adequate ability to control toothette in middle of lingual surface for posterior propulsion - recommend trial meds whole one at a time with sip of liquids or in puree and check for pocketing on L. Notified pt and RN. No concerns for aspiration per chart review. Continue goal.  4/22: pt found sitting up in bed, with tray in front of her. Pt questioned as to if she wanted to eat. Pt stated she didn't have anything to eat with. Explained to pt that utensils on right side of tray, pt able to locate with mod cues. Pt did not then initiate feeding self. When SLP placed food on utensil and given to pt, she then ate. Explained to pt rationale for decreased initiation, related to neuro deficits. Pt then analyzed with soft solids and thin liquids. No  Overt signs of aspiration emerged, voice clear. Pt with prolonged mastication with mild residue in left buccal cavity. Pt demonstrated use of lingual sweep independently to clear. Pt also instructed to use liquid wash. Pt stated understanding but will require cues/reminders.  No respiratory decline per chart review  Cont goal Patient/Family/Caregiver Education:  As above    Compensatory Strategies  Place utensils on right side of tray and tray to right on bedside table  Eat only when fully alert  90 degrees all meals  Check for pocketing of food    Plan:  Continued daily Dysphagia treatment with goals per  plan of care. Diet recommendations: dysphagia III soft and bite sized / thin liquids  Oral care 2-3 x/day  Meds - trial whole one at a time placed midline followed by small sip of liquid or in puree; check for pocketing on L - if pt with difficulty, resume crushed in puree  DC recommendation: ongoing treatment indicated  Treatment: 15 min  D/W nursing [place] - discussed placement of tray and utensils so that pt is able to locate them. Needs met prior to leaving room, call button in reach. Arcelia Dupont M.S./[facility]-SLP #[id number]  Pg. # [phone]  If patient is discharged prior to next session, this note will serve as discharge summary.

## 2021-04-23 ENCOUNTER — HOSPITAL ENCOUNTER (INPATIENT)
Age: 51
LOS: 62 days | Discharge: NURSING, REHAB OR LONG-TERM CARE | DRG: 057 | End: 2021-06-24
Attending: PHYSICAL MEDICINE & REHABILITATION | Admitting: PHYSICAL MEDICINE & REHABILITATION
Payer: COMMERCIAL

## 2021-04-23 VITALS
OXYGEN SATURATION: 97 % | SYSTOLIC BLOOD PRESSURE: 108 MMHG | HEART RATE: 70 BPM | WEIGHT: 238.54 LBS | RESPIRATION RATE: 16 BRPM | HEIGHT: 66 IN | DIASTOLIC BLOOD PRESSURE: 69 MMHG | BODY MASS INDEX: 38.34 KG/M2 | TEMPERATURE: 98.9 F

## 2021-04-23 DIAGNOSIS — I63.9 ACUTE CVA (CEREBROVASCULAR ACCIDENT) (HCC): Primary | ICD-10-CM

## 2021-04-23 DIAGNOSIS — Z98.890 STATUS POST CRANIECTOMY: ICD-10-CM

## 2021-04-23 DIAGNOSIS — I63.511 ACUTE RIGHT MCA STROKE (HCC): ICD-10-CM

## 2021-04-23 LAB
ANION GAP SERPL CALCULATED.3IONS-SCNC: 9 MMOL/L (ref 3–16)
BASOPHILS ABSOLUTE: 0.1 K/UL (ref 0–0.2)
BASOPHILS RELATIVE PERCENT: 0.9 %
BUN BLDV-MCNC: 13 MG/DL (ref 7–20)
CALCIUM SERPL-MCNC: 9.5 MG/DL (ref 8.3–10.6)
CHLORIDE BLD-SCNC: 98 MMOL/L (ref 99–110)
CO2: 25 MMOL/L (ref 21–32)
CREAT SERPL-MCNC: 0.6 MG/DL (ref 0.6–1.1)
EOSINOPHILS ABSOLUTE: 0.3 K/UL (ref 0–0.6)
EOSINOPHILS RELATIVE PERCENT: 2.1 %
GFR AFRICAN AMERICAN: >60
GFR NON-AFRICAN AMERICAN: >60
GLUCOSE BLD-MCNC: 90 MG/DL (ref 70–99)
HCT VFR BLD CALC: 38 % (ref 36–48)
HEMOGLOBIN: 12.8 G/DL (ref 12–16)
LYMPHOCYTES ABSOLUTE: 4.8 K/UL (ref 1–5.1)
LYMPHOCYTES RELATIVE PERCENT: 30.9 %
MCH RBC QN AUTO: 35.7 PG (ref 26–34)
MCHC RBC AUTO-ENTMCNC: 33.8 G/DL (ref 31–36)
MCV RBC AUTO: 105.6 FL (ref 80–100)
MONOCYTES ABSOLUTE: 1.5 K/UL (ref 0–1.3)
MONOCYTES RELATIVE PERCENT: 9.6 %
NEUTROPHILS ABSOLUTE: 8.8 K/UL (ref 1.7–7.7)
NEUTROPHILS RELATIVE PERCENT: 56.5 %
PDW BLD-RTO: 13.8 % (ref 12.4–15.4)
PLATELET # BLD: 531 K/UL (ref 135–450)
PMV BLD AUTO: 8.2 FL (ref 5–10.5)
POTASSIUM REFLEX MAGNESIUM: 4 MMOL/L (ref 3.5–5.1)
RBC # BLD: 3.6 M/UL (ref 4–5.2)
SODIUM BLD-SCNC: 132 MMOL/L (ref 136–145)
SODIUM BLD-SCNC: 132 MMOL/L (ref 136–145)
SODIUM BLD-SCNC: 137 MMOL/L (ref 136–145)
WBC # BLD: 15.5 K/UL (ref 4–11)

## 2021-04-23 PROCEDURE — 97530 THERAPEUTIC ACTIVITIES: CPT

## 2021-04-23 PROCEDURE — 6370000000 HC RX 637 (ALT 250 FOR IP): Performed by: INTERNAL MEDICINE

## 2021-04-23 PROCEDURE — 80048 BASIC METABOLIC PNL TOTAL CA: CPT

## 2021-04-23 PROCEDURE — 6370000000 HC RX 637 (ALT 250 FOR IP): Performed by: STUDENT IN AN ORGANIZED HEALTH CARE EDUCATION/TRAINING PROGRAM

## 2021-04-23 PROCEDURE — 84295 ASSAY OF SERUM SODIUM: CPT

## 2021-04-23 PROCEDURE — 2580000003 HC RX 258: Performed by: PHYSICIAN ASSISTANT

## 2021-04-23 PROCEDURE — 1280000000 HC REHAB R&B

## 2021-04-23 PROCEDURE — 6360000002 HC RX W HCPCS: Performed by: PHYSICAL MEDICINE & REHABILITATION

## 2021-04-23 PROCEDURE — 6370000000 HC RX 637 (ALT 250 FOR IP): Performed by: COUNSELOR

## 2021-04-23 PROCEDURE — 99232 SBSQ HOSP IP/OBS MODERATE 35: CPT | Performed by: PHYSICAL MEDICINE & REHABILITATION

## 2021-04-23 PROCEDURE — 97112 NEUROMUSCULAR REEDUCATION: CPT

## 2021-04-23 PROCEDURE — 36415 COLL VENOUS BLD VENIPUNCTURE: CPT

## 2021-04-23 PROCEDURE — 6360000002 HC RX W HCPCS: Performed by: PHYSICIAN ASSISTANT

## 2021-04-23 PROCEDURE — 85025 COMPLETE CBC W/AUTO DIFF WBC: CPT

## 2021-04-23 PROCEDURE — 6370000000 HC RX 637 (ALT 250 FOR IP): Performed by: NURSE PRACTITIONER

## 2021-04-23 PROCEDURE — 97535 SELF CARE MNGMENT TRAINING: CPT

## 2021-04-23 PROCEDURE — 6370000000 HC RX 637 (ALT 250 FOR IP): Performed by: PHYSICIAN ASSISTANT

## 2021-04-23 PROCEDURE — 6370000000 HC RX 637 (ALT 250 FOR IP): Performed by: PHYSICAL MEDICINE & REHABILITATION

## 2021-04-23 PROCEDURE — 99222 1ST HOSP IP/OBS MODERATE 55: CPT | Performed by: INTERNAL MEDICINE

## 2021-04-23 RX ORDER — POLYETHYLENE GLYCOL 3350 17 G/17G
17 POWDER, FOR SOLUTION ORAL DAILY PRN
Status: DISCONTINUED | OUTPATIENT
Start: 2021-04-23 | End: 2021-06-24 | Stop reason: HOSPADM

## 2021-04-23 RX ORDER — ACETAMINOPHEN 500 MG
1000 TABLET ORAL EVERY 6 HOURS SCHEDULED
Status: DISCONTINUED | OUTPATIENT
Start: 2021-04-23 | End: 2021-06-24 | Stop reason: HOSPADM

## 2021-04-23 RX ORDER — NICOTINE 21 MG/24HR
1 PATCH, TRANSDERMAL 24 HOURS TRANSDERMAL DAILY PRN
Status: DISCONTINUED | OUTPATIENT
Start: 2021-04-23 | End: 2021-06-24 | Stop reason: HOSPADM

## 2021-04-23 RX ORDER — FAMOTIDINE 20 MG/1
20 TABLET, FILM COATED ORAL 2 TIMES DAILY
Status: DISCONTINUED | OUTPATIENT
Start: 2021-04-23 | End: 2021-06-24 | Stop reason: HOSPADM

## 2021-04-23 RX ORDER — MULTIVITAMIN WITH IRON
1 TABLET ORAL DAILY
Status: DISCONTINUED | OUTPATIENT
Start: 2021-04-24 | End: 2021-06-24 | Stop reason: HOSPADM

## 2021-04-23 RX ORDER — FLUOXETINE HYDROCHLORIDE 20 MG/1
20 CAPSULE ORAL DAILY
Status: DISCONTINUED | OUTPATIENT
Start: 2021-04-24 | End: 2021-06-24 | Stop reason: HOSPADM

## 2021-04-23 RX ORDER — GAUZE BANDAGE 2" X 2"
100 BANDAGE TOPICAL DAILY
Status: DISCONTINUED | OUTPATIENT
Start: 2021-04-24 | End: 2021-06-24 | Stop reason: HOSPADM

## 2021-04-23 RX ORDER — SODIUM CHLORIDE 1000 MG
1 TABLET, SOLUBLE MISCELLANEOUS
Status: DISCONTINUED | OUTPATIENT
Start: 2021-04-24 | End: 2021-05-05

## 2021-04-23 RX ORDER — AMLODIPINE BESYLATE 5 MG/1
5 TABLET ORAL DAILY
Status: DISCONTINUED | OUTPATIENT
Start: 2021-04-24 | End: 2021-06-24 | Stop reason: HOSPADM

## 2021-04-23 RX ORDER — OXYCODONE HYDROCHLORIDE 5 MG/1
5 TABLET ORAL EVERY 4 HOURS PRN
Status: DISCONTINUED | OUTPATIENT
Start: 2021-04-23 | End: 2021-06-24 | Stop reason: HOSPADM

## 2021-04-23 RX ORDER — HEPARIN SODIUM 5000 [USP'U]/ML
5000 INJECTION, SOLUTION INTRAVENOUS; SUBCUTANEOUS EVERY 8 HOURS SCHEDULED
Status: DISCONTINUED | OUTPATIENT
Start: 2021-04-23 | End: 2021-06-24 | Stop reason: HOSPADM

## 2021-04-23 RX ORDER — BISACODYL 10 MG
10 SUPPOSITORY, RECTAL RECTAL DAILY PRN
Status: DISCONTINUED | OUTPATIENT
Start: 2021-04-23 | End: 2021-06-24 | Stop reason: HOSPADM

## 2021-04-23 RX ORDER — LEVETIRACETAM 500 MG/1
500 TABLET ORAL 2 TIMES DAILY
Status: DISCONTINUED | OUTPATIENT
Start: 2021-04-23 | End: 2021-06-24 | Stop reason: HOSPADM

## 2021-04-23 RX ORDER — SENNA AND DOCUSATE SODIUM 50; 8.6 MG/1; MG/1
2 TABLET, FILM COATED ORAL 2 TIMES DAILY
Status: DISCONTINUED | OUTPATIENT
Start: 2021-04-23 | End: 2021-06-24 | Stop reason: HOSPADM

## 2021-04-23 RX ORDER — OXYCODONE HYDROCHLORIDE 5 MG/1
10 TABLET ORAL EVERY 4 HOURS PRN
Status: DISCONTINUED | OUTPATIENT
Start: 2021-04-23 | End: 2021-06-24 | Stop reason: HOSPADM

## 2021-04-23 RX ORDER — ASPIRIN 81 MG/1
81 TABLET, CHEWABLE ORAL DAILY
Status: DISCONTINUED | OUTPATIENT
Start: 2021-04-24 | End: 2021-06-24 | Stop reason: HOSPADM

## 2021-04-23 RX ORDER — ONDANSETRON 2 MG/ML
4 INJECTION INTRAMUSCULAR; INTRAVENOUS EVERY 6 HOURS PRN
Status: DISCONTINUED | OUTPATIENT
Start: 2021-04-23 | End: 2021-06-24 | Stop reason: HOSPADM

## 2021-04-23 RX ORDER — ATORVASTATIN CALCIUM 80 MG/1
80 TABLET, FILM COATED ORAL NIGHTLY
Status: DISCONTINUED | OUTPATIENT
Start: 2021-04-23 | End: 2021-06-24 | Stop reason: HOSPADM

## 2021-04-23 RX ORDER — PROMETHAZINE HYDROCHLORIDE 25 MG/1
12.5 TABLET ORAL EVERY 6 HOURS PRN
Status: DISCONTINUED | OUTPATIENT
Start: 2021-04-23 | End: 2021-06-24 | Stop reason: HOSPADM

## 2021-04-23 RX ADMIN — LEVETIRACETAM 500 MG: 500 TABLET, FILM COATED ORAL at 10:22

## 2021-04-23 RX ADMIN — ACETAMINOPHEN 1000 MG: 500 TABLET, COATED ORAL at 05:19

## 2021-04-23 RX ADMIN — ACETAMINOPHEN 1000 MG: 500 TABLET, COATED ORAL at 10:25

## 2021-04-23 RX ADMIN — LEVETIRACETAM 500 MG: 500 TABLET ORAL at 21:07

## 2021-04-23 RX ADMIN — FAMOTIDINE 20 MG: 20 TABLET, FILM COATED ORAL at 10:13

## 2021-04-23 RX ADMIN — HEPARIN SODIUM 5000 UNITS: 5000 INJECTION INTRAVENOUS; SUBCUTANEOUS at 15:09

## 2021-04-23 RX ADMIN — OXYCODONE HYDROCHLORIDE 5 MG: 5 TABLET ORAL at 21:07

## 2021-04-23 RX ADMIN — ASPIRIN 81 MG: 81 TABLET, CHEWABLE ORAL at 10:12

## 2021-04-23 RX ADMIN — AMLODIPINE BESYLATE 5 MG: 5 TABLET ORAL at 10:13

## 2021-04-23 RX ADMIN — THERA TABS 1 TABLET: TAB at 10:22

## 2021-04-23 RX ADMIN — OXYCODONE HYDROCHLORIDE 10 MG: 5 TABLET ORAL at 05:20

## 2021-04-23 RX ADMIN — FLUOXETINE 20 MG: 20 CAPSULE ORAL at 10:13

## 2021-04-23 RX ADMIN — Medication 1 G: at 10:18

## 2021-04-23 RX ADMIN — HEPARIN SODIUM 5000 UNITS: 5000 INJECTION INTRAVENOUS; SUBCUTANEOUS at 05:18

## 2021-04-23 RX ADMIN — Medication 10 ML: at 10:13

## 2021-04-23 RX ADMIN — ACETAMINOPHEN 1000 MG: 500 TABLET, COATED ORAL at 23:19

## 2021-04-23 RX ADMIN — Medication 100 MG: at 10:25

## 2021-04-23 RX ADMIN — DOCUSATE SODIUM 50 MG AND SENNOSIDES 8.6 MG 2 TABLET: 8.6; 5 TABLET, FILM COATED ORAL at 21:07

## 2021-04-23 RX ADMIN — DOCUSATE SODIUM 50 MG AND SENNOSIDES 8.6 MG 2 TABLET: 8.6; 5 TABLET, FILM COATED ORAL at 10:12

## 2021-04-23 RX ADMIN — Medication 1 G: at 15:09

## 2021-04-23 RX ADMIN — FAMOTIDINE 20 MG: 20 TABLET, FILM COATED ORAL at 21:07

## 2021-04-23 RX ADMIN — HEPARIN SODIUM 5000 UNITS: 5000 INJECTION INTRAVENOUS; SUBCUTANEOUS at 23:19

## 2021-04-23 RX ADMIN — ATORVASTATIN CALCIUM 80 MG: 80 TABLET, FILM COATED ORAL at 21:07

## 2021-04-23 RX ADMIN — POLYETHYLENE GLYCOL (3350) 17 G: 17 POWDER, FOR SOLUTION ORAL at 10:13

## 2021-04-23 ASSESSMENT — PAIN DESCRIPTION - LOCATION
LOCATION: HEAD

## 2021-04-23 ASSESSMENT — PAIN DESCRIPTION - FREQUENCY
FREQUENCY: CONTINUOUS
FREQUENCY: INTERMITTENT
FREQUENCY: CONTINUOUS
FREQUENCY: INTERMITTENT

## 2021-04-23 ASSESSMENT — PAIN - FUNCTIONAL ASSESSMENT
PAIN_FUNCTIONAL_ASSESSMENT: ACTIVITIES ARE NOT PREVENTED

## 2021-04-23 ASSESSMENT — PAIN DESCRIPTION - PROGRESSION
CLINICAL_PROGRESSION: GRADUALLY WORSENING

## 2021-04-23 ASSESSMENT — PAIN SCALES - GENERAL
PAINLEVEL_OUTOF10: 7
PAINLEVEL_OUTOF10: 5
PAINLEVEL_OUTOF10: 5
PAINLEVEL_OUTOF10: 4
PAINLEVEL_OUTOF10: 6

## 2021-04-23 ASSESSMENT — PAIN DESCRIPTION - DESCRIPTORS
DESCRIPTORS: ACHING;HEADACHE
DESCRIPTORS: ACHING;DULL
DESCRIPTORS: ACHING;HEADACHE
DESCRIPTORS: ACHING

## 2021-04-23 ASSESSMENT — PAIN DESCRIPTION - ORIENTATION
ORIENTATION: RIGHT

## 2021-04-23 ASSESSMENT — PAIN DESCRIPTION - PAIN TYPE
TYPE: ACUTE PAIN;SURGICAL PAIN
TYPE: ACUTE PAIN;SURGICAL PAIN
TYPE: SURGICAL PAIN
TYPE: ACUTE PAIN

## 2021-04-23 ASSESSMENT — PAIN DESCRIPTION - ONSET
ONSET: ON-GOING

## 2021-04-23 NOTE — PROGRESS NOTES
Pt arrived to ARU from  at 1810. Report taken from [place]. Pt was stable. Alert and oriented and able to answer all BIM questions appropriately. Pt verbalized having a headache. Pt head was with midline head incision with staples and WA. No drainage note. Oncoming RN made aware. Pt was orientated call light, Room. New ID armband applied. Remaining admission to be completed by Cayetano Becerra RN.      Electronically signed by Ricky Eaton RN on 4/23/2021 at 7:50 PM

## 2021-04-23 NOTE — CARE COORDINATION
CM reviewed discharge plans at bedside with patient. Patient here from home. Plans for ARU when medically ready. Per previous note, patient trying to decide between ARU at [facility] vs ARU at [facility].  Patient stated she prefers to stay at [facility] due to her drs being here. CM then notified Ashley/SALONI. Will initiate precert. CM will continue to follow for any changes to discharge plans.   Nathalie Garcia RN  RN Case Manager  [phone]
Case Management Assessment            Discharge Note                    Date / Time of Note: 4/23/2021 11:39 AM                  Discharge Note Completed by: Carmen Miranda    Patient Name: Anatoly Silva   YOB: 1970  Diagnosis: Stroke determined by clinical assessment [facility]) [I63.9]   Date / Time: 4/13/2021  7:01 AM    Current PCP: Gemma Winter MD  Clinic patient: No    Hospitalization in the last 30 days: No    Advance Directives:  Code Status: Full Code  [place] DNR form completed and on chart: Not Indicated    Financial:  Payor: Mita Aguilar / Plan: Mita Aguilar / Product Type: *No Product type* /      Pharmacy:  No Pharmacies [id number] [employer] medications?:    Assistance provided by Case Management: None at this time    Does patient want to participate in local refill/ meds to beds program?:      Meds To Beds General Rules:  1. Can ONLY be done Monday- Friday between 8:30am-5pm  2. Prescription(s) must be in pharmacy by 3pm to be filled same day  3. Copy of patient's insurance/ prescription drug card and patient face sheet must be sent along with the prescription(s)  4. Cost of Rx cannot be added to hospital bill. If financial assistance is needed, please contact unit  or ;  or  CANNOT provide pharmacy voucher for patients co-pays  5. Patients can then  the prescription on their way out of the hospital at discharge, or pharmacy can deliver to the bedside if staff is available. (payment due at time of pick-up or delivery - cash, check, or card accepted)     Able to afford home medications/ co-pay costs: Yes    ADLS:  Current PT AM-PAC Score: 8 /24  Current OT AM-PAC Score: 11 /24      DISCHARGE Disposition: Inpatient Rehab: [facility] Phone: . Fax: Sheridan Trimble     LOC at discharge: Skilled  LIZA Completed: Yes    Notification completed in HENS/PAS?:  Not Applicable    IMM Completed:   Not Indicated    Transportation:  Transportation PLAN for discharge:
Case Management Assessment           Daily Note                 Date/ Time of Note: 4/19/2021 10:13 AM         Note completed by: Lonnie Kaur    Patient Name: Shola Cid  YOB: 1970    Diagnosis:Stroke determined by clinical assessment [facility]) [I63.9]  Patient Admission Status: Inpatient    Date of Admission:4/13/2021  7:01 AM Length of Stay: 6 GLOS:      Current Plan of Care: Ilda Cantrell  ________________________________________________________________________________________  PT AM-PAC: 9 / 24 per last evaluation on: 4/19    OT AM-PAC: 10 / 24 per last evaluation on: 4/18    DME Needs for discharge: defer  ________________________________________________________________________________________  Discharge Plan: Inpatient Rehab: Ilda Cantrell    Tentative discharge date: pending    Current barriers to discharge: medically ready, precert    Referrals completed: Inpatient Rehab: Ilda Cantrell    Resources/ information provided: Not indicated at this time  ________________________________________________________________________________________  Case Management Notes:  Patient here from home. Precert pending Ilda Cantrell. Cardiology consult pending. PCR pending. Stephy Barnhart and her family were provided with choice of provider; she and her family are in agreement with the discharge plan.     Care Transition Patient: Marisela Kaur RN  [facility]  Case Management Department  Ph: [phone]  Fax: [phone]
Case Management Assessment           Daily Note                 Date/ Time of Note: 4/22/2021 9:41 AM         Note completed by: Sena Linder    Patient Name: Sydnee Moore  YOB: 1970    Diagnosis:Stroke determined by clinical assessment [facility]) [I63.9]  Patient Admission Status: Inpatient    Date of Admission:4/13/2021  7:01 AM Length of Stay: 9 GLOS: GMLOS: 7.1    Current Plan of Care: Fatimah Cat  ________________________________________________________________________________________  PT AM-PAC: 8 / 24 per last evaluation on: 4/21    OT AM-PAC: 12 / 24 per last evaluation on: 4/21    DME Needs for discharge: defer  ________________________________________________________________________________________   Discharge Plan: Inpatient Rehab: Fatimah Cat    Tentative discharge date: 1 day    Current barriers to discharge: medically ready    Referrals completed: Inpatient Rehab: .    Resources/ information provided: Inpatient Rehab Information  ________________________________________________________________________________________  Case Management Notes:  Patient here from home with spouse. Plans for discharge to ARU. Originally planned for discharge today. However, after Fatimah Cat Dr saw patient this morning he determined patient would not be ready until tomorrow. Patient is not willing to admit to any other ARU. Stated her drs are all here so wants to remain at [facility]. Loop recorder placement planned for today. Yusef Chopra and her family were provided with choice of provider; she and her family are in agreement with the discharge plan.     Care Transition Patient: Marisela Linder RN  [facility]  Case Management Department  Ph:   Fax: [phone]
Patient here from home. Precert pending Lizzy Tim. Covid completed. Echo pending. CM will continue to follow for any changes to discharge plans.   Keara Reyes RN  RN Case Manager  [phone]
Spoke with patient while up in chair. She does agree that she will need rehab upon discharge and prefers an ARU. She lives on the [address] [place] [address] and is not sure if she would want to stay at ours or go to [address] [place] [address]. We will need therapy evals to submit for [employer] precert when pt is closer to d/c.      Electronically signed by Matheus Mclaughlin RN on 4/15/2021 at 3:32 PM  [phone]
NA    The Plan for Transition of Care is related to the following treatment goals of Stroke determined by clinical assessment [facility]) [I63.9]    The Patient and/or patient representative Marita Grullon and her family were provided with a choice of provider and agrees with the discharge plan Not Indicated    Freedom of choice list was provided with basic dialogue that supports the patient's individualized plan of care/goals and shares the quality data associated with the providers.  Not Indicated    Care Transition patient: No    Joe Webster RN  [facility] [employer]  Case Management Department  Ph: [phone]   Fax: [phone]

## 2021-04-23 NOTE — PROGRESS NOTES
Patient is alert and oriented. Tolerating diet well. Denies nausea/vomiting. Was up to the chair today, maxi-moved back to bed. VSS. Helmet on when OOB. Incontinent, pure wick in place, has been voiding today. No BM this shift. Incision CD&I.
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
## 2021-04-23 NOTE — PLAN OF CARE
Problem: Pain:  Goal: Pain level will decrease  Description: Pain level will decrease. Patient resting comfortably, tylenol given this morning. Patient c/o some incisional head pain on the right side of her head. 4/23/2021 1204 by Mery Hemphill RN  Outcome: Ongoing     Problem: Falls - Risk of:  Goal: Will remain free from falls  Description: Fall precautions in place. Bed is in lowest position, wheels locked, alarm on, non-skid socks on. Call light and bedside table within reach. Patient calls out appropriately. Patient is up x2 max assist with feliz. Will continue to assess and monitor. Outcome: Ongoing     Problem: HEMODYNAMIC STATUS  Goal: Patient has stable vital signs and fluid balance  VSS. Will continue to monitor and assess.

## 2021-04-23 NOTE — PROGRESS NOTES
Occupational Therapy  Facility/Department: [facility] 5T ORTHO/NEURO  Daily Treatment Note  NAME: Abelino Ashraf  : 1970  MRN: [id number]    Date of Service: 2021    Discharge Recommendations:  Abelino Ashraf scored a  on the AM-PAC ADL Inpatient form. Current research shows that an AM-PAC score of 17 or less is typically not associated with a discharge to the patient's home setting. Based on the patient's AM-PAC score and their current ADL deficits, it is recommended that the patient have 5-7 sessions per week of Occupational Therapy at d/c to increase the patient's independence. At this time, this patient demonstrates the endurance, and/or tolerance for 3 hours of therapy each day, with a treatment frequency of 5-7x/wk. Please see assessment section for further patient specific details. If patient discharges prior to next session this note will serve as a discharge summary. Please see below for the latest assessment towards goals. OT Equipment Recommendations  Other: defer to next level of care    Assessment   Assessment: Pt with good participation in therapy however with slightly decreased activity tolerance this session. Sitting balance during reaching tasks completed with CGA to Min A. Transfer completed with Sawyer rawls with assist x2 and heavy Llateral lean. Bed mobility completed with Max A. Pt would benefit from intensive in pt OT for maximizing functional independence. Continue OT per POC.       Treatment Diagnosis: decreased independence with ADLs and fx transfers sedondary to left hemiparesis  Prognosis: Good  OT Education: OT Role;Plan of Care;Transfer Training  Patient Education: helmet wearing schedule - pt verb understanding  REQUIRES OT FOLLOW UP: Yes  Activity Tolerance  Activity Tolerance: Patient Tolerated treatment well;Patient limited by fatigue  Activity Tolerance: BP monitored due to pt reporting feeling \"strange\"  BP at 106/52 and 121/84         Patient Diagnosis(es): The primary encounter diagnosis was Acute right MCA stroke ([facility] [address] A diagnosis of Status post craniectomy was also pertinent to this visit. has a past medical history of GERD (gastroesophageal reflux disease), Hernia, Obesity, and Unspecified sleep apnea. has a past surgical history that includes Splenectomy (); Total hip arthroplasty (); Lap Band (); hernia repair ();  section (); and craniotomy (Right, 2021). Restrictions  Position Activity Restriction  Other position/activity restrictions: Ambulate, Up with assist, Pt to wear hemet when OOB       Diagnosis: Stroke determined by clinical assessment  Treatment Diagnosis: decreased independence with ADLs and fx transfers sedondary to left hemiparesis    Subjective:   Pt met supine in bed and agreeable to OT treatment. Pt with discomfort in bed and eager to get out of bed. Pt stating \"This helmet gets on my nerves\" during session. Pain:   Migrating pain in L side and buttocks. Hypersensitivity in LUE. Objective:    Cognition/Orientation:  Oriented x4 with increased processing time. Bed mobility   Supine to sit: Max A with increased time for completion   Scooting: Mod A scooting to EOB. Max A x2 scooting back in chair    Functional Mobility   Sit to Stand: Max A + [facility]  Stand to Sit: Mod A + Min A to recliner from [address] [place] [address] ste  Bed to Chair Transfer: Dependent upon Gutierrez rawls with assist x2  Other:  Pt with heavy L latera lean while in stance and during transfer with Gutierrez rawls     Pt sitting EOB with CGA leaning to R and re turing to midline x2 with improved midline sitting after elbow propping to R. Reaching task completed seated in recliner chair grasping object out of therapist hand requiring CGA to Min A with increased time and effort needed when crossing midline toward R. ADLs   UB dressing:  Mod A donning gown with education on attention to L side    UE Exercises   PROM completed x5 reps for RUE with pt noting increased sensitivity in RUE. Elbow flex/ext  Wrist flex/ext  Supination/pronation  All digit flex/ext  Pt with decreased tone in RUE. Safety Devices in Place:  Pt left seated in recliner chair with alarm on and call light in reach.               Plan  If pt discharges prior to next treatment, this note will serve as discharge summary  Plan  Times per week: 5-7  Current Treatment Recommendations: Strengthening, Endurance Training, Neuromuscular Re-education, Self-Care / ADL, Functional Mobility Training, Safety Education & Training           AM-PAC Score        AM-[facility] Inpatient Daily Activity Raw Score: 11 (04/23/21 1421)  AM-PAC Inpatient ADL T-Scale Score : 29.04 (04/23/21 1421)  ADL Inpatient CMS 0-100% Score: 70.42 (04/23/21 1421)  ADL Inpatient CMS G-Code Modifier : CL (04/23/21 1421)    Goals (as determined and assessed by primary OT)  Short term goals  Time Frame for Short term goals: by discharge  Short term goal 1: Pt will perform bed mobility with max A - goal met supine to sit 4/20; new goal: pt will perform bed mobility with min A- not met 4/21  Short term goal 2: Pt will perform seated on EOB for ~5 min with CGA to prepare for ADLs - goal met 4/21: new goal: pt will perform static standing balance >1min with min A to prepare for ADLS- goal met 1/24; New goal: pt will perform static standing balance >1min with CGA to prepare for ADLS- not met  Short term goal 3: Pt will perform hemipalegic dressing technique for UB dressing with assist as needed - ongoing  Short term goal 4: Pt will perform 5 hand squeezes with LUE to increase functional movement with no more than verbal encouragement - ongoing  Patient Goals   Patient goals : not stated       Therapy Time   Individual Concurrent Group Co-treatment   Time In 1330         Time Out 1418         Minutes 48         Timed Code Treatment Minutes: [address]

## 2021-04-23 NOTE — PROGRESS NOTES
Progress/Consult Note  Physical Medicine and Rehabilitation    Patient: Gisele Prather  [id number]  Date: 2021      Chief Complaint: Left-sided weakness    History of Present Illness/Hospital Course:  47 YO F PMH GERD, depression/anxiety presented to [facility] ED via EMS for new onset left sided weakness. Pt states that she fell out of her bed the night prior to admission and was down on the ground all night until the AM.  Patient endorses she did not let her  call EMS because she felt like she could improve. Patient claims EMS was finally called after her coworker started calling her in the morning. Pts GCS was 15 upon arrival @ ED with L sided facial droop, L sided weakness & R sided gaze deviation. Stroke alert was called. In the ED she was hemodynamically stable with /81. CTH revealed large acute/subacute infarct in the R frontal lobe with associated mass effect and 4 mm R to L midline shift. It also noted subdural hemorrhage of 6mm along falx, although NSGY less convinced. CTA head and neck revealed occluded R cervical ICA & R anterior cerebral artery with near complete occlusion of R MCA. She was ultimately seen by NSx who performed a R hemicraniectomy. A MRI on  showing large subacute infarct throughout R LEAH and MCA with some areas of hemorrhagic conversion. She has been on seizure ppx. Interval history: Plan to admit to ARU today.       Prior Level of Function:  Independent for mobility, ADLs, and IADLs    Current Level of Function:  PT:  AM-PAC score  OT: 10/24 AM-PAC score    Pertinent Social History:  Support: lives at home with her   Home set-up: unknown    Past Medical History:   Diagnosis Date    GERD (gastroesophageal reflux disease)     Hernia     Obesity     Unspecified sleep apnea     CPAP       Past Surgical History:   Procedure Laterality Date     SECTION  2004    CRANIOTOMY Right 2021    RIGHT HEMICRANIECTOMY performed by Choco Smith MD Anisha at [address]   2010    LAP BAND  2006    SPLENECTOMY  2000    TOTAL HIP ARTHROPLASTY  2001       Family History   Problem Relation Age of Onset   Mcqueen Cancer Mother     Depression Mother     Mental Illness Mother     Stroke Father     Cancer Father         prostate       Social History     Socioeconomic History    Marital status:      Spouse name: None    Number of children: None    Years of education: None    Highest education level: None   Occupational History    None   Social Needs    Financial resource strain: None    Food insecurity     Worry: None     Inability: None    Transportation needs     Medical: None     Non-medical: None   Tobacco Use    Smoking status: Current Every Day Smoker     Packs/day: 1.00     Years: 20.00     Pack years: 20.00    Smokeless tobacco: Never Used   Substance and Sexual Activity    Alcohol use: Yes     Comment: occasional    Drug use: No    Sexual activity: None   Lifestyle    Physical activity     Days per week: None     Minutes per session: None    Stress: None   Relationships    Social connections     Talks on phone: None     Gets together: None     Attends Jainism service: None     Active member of club or organization: None     Attends meetings of clubs or organizations: None     Relationship status: None    Intimate partner violence     Fear of current or ex partner: None     Emotionally abused: None     Physically abused: None     Forced sexual activity: None   Other Topics Concern    None   Social History Narrative    None           REVIEW OF SYSTEMS:   CONSTITUTIONAL: negative for fevers, chills, diaphoresis, appetite change, night sweats, unexpected weight change, fatigue  EYES: negative for blurred vision, eye discharge, visual disturbance and icterus  HEENT: negative for hearing loss, tinnitus, ear drainage, sinus pressure, nasal congestion, epistaxis and snoring  RESPIRATORY: Negative for hemoptysis, cough, sputum production  CARDIOVASCULAR: negative for chest pain, palpitations, exertional chest pressure/discomfort, syncope, edema  GASTROINTESTINAL: negative for nausea, vomiting, diarrhea, blood in stool, abdominal pain, constipation  GENITOURINARY: negative for frequency, dysuria, urinary incontinence, decreased urine volume, and hematuria  HEMATOLOGIC/LYMPHATIC: negative for easy bruising, bleeding and lymphadenopathy  ALLERGIC/IMMUNOLOGIC: negative for recurrent infections, angioedema, anaphylaxis and drug reactions  ENDOCRINE: negative for weight changes and diabetic symptoms including polyuria, polydipsia and polyphagia  MUSCULOSKELETAL: negative for pain, joint swelling, decreased range of motion  NEUROLOGICAL: negative for headaches, slurred speech, unilateral weakness  PSYCHIATRIC/BEHAVIORAL: negative for hallucinations, behavioral problems, confusion and agitation. Physical Examination:  Vitals:   Patient Vitals for the past 24 hrs:   BP Temp Temp src Pulse Resp SpO2   04/23/21 0732 123/85 98.7 °F (37.1 °C) Oral 79 16 96 %   04/23/21 0330 129/87 98.6 °F (37 °C) Oral 65 18 97 %   04/22/21 2330 108/74 97.5 °F (36.4 °C) Oral 60 18 95 %   04/22/21 1528 (!) 143/82 99 °F (37.2 °C) Oral 78 16 96 %     Const: Alert. WDWN. No distress  Eyes: Conjunctiva noninjected, no icterus noted; pupils equal, round, and reactive to light. HENT: Scalp sutures in place without drainage or marginal erythema. Oral mucosa moist  Neck: Trachea midline, neck supple. No thyromegaly noted. CV: Regular rate and rhythm, no murmur rub or gallop noted  Resp: Lungs clear to auscultation bilaterally, no rales wheezes or ronchi, no retractions. Respirations unlabored. GI: Soft, nontender, nondistended. Normal bowel sounds. No palpable masses. Skin: Normal temperature and turgor. No rashes or breakdown noted. Ext: No significant edema appreciated. No varicosities. MSK: No joint tenderness, erythema, warmth noted. AROM intact. Neuro: Alert, oriented, appropriate. No cranial nerve deficits appreciated. 0/5 LUE and LLE. 5.5 stength on RHS. Psych: Stable mood, normal judgement, normal affect     Lab Results   Component Value Date    WBC 15.5 (H) 04/23/2021    HGB 12.8 04/23/2021    HCT 38.0 04/23/2021    .6 (H) 04/23/2021     (H) 04/23/2021     Lab Results   Component Value Date    INR 1.06 04/13/2021    PROTIME 12.3 04/13/2021     Lab Results   Component Value Date    CREATININE 0.6 04/23/2021    BUN 13 04/23/2021     (L) 04/23/2021    K 4.0 04/23/2021    CL 98 (L) 04/23/2021    CO2 25 04/23/2021     Lab Results   Component Value Date    ALT 32 04/13/2021    AST 41 (H) 04/13/2021    ALKPHOS 86 04/13/2021    BILITOT 0.7 04/13/2021       Most recent echocardiogram revealed:  4/13/21  Summary   Normal left ventricle size. There is mild-moderate concentric left   ventricular hypertrophy. Overall left ventricular systolic function appears   normal with an ejection fraction of 55%. No regional wall motion   abnormalities are noted. Diastolic filling parameters suggests normal   diastolic function. The aortic valve appears tricuspid with minimal sclerosis. Trivial tricuspid regurgitation. Estimated pulmonary artery systolic pressure is 26 mmHg assuming a right   atrial pressure of 3 mmHg. Trivial pulmonic regurgitation present. A bubble study was performed and fails to show evidence of right to left   shunting. Most recent EKG revealed:  NSR      CT CHEST ABDOMEN PELVIS W CONTRAST   Final Result      1. No sign of any lymphadenopathy, mass or lung parenchymal consolidation. 2.  Mild thickening of lower esophagus and gastroesophageal junction with previous gastric band surgery noted. CT OF THE ABDOMEN AND PELVIS WITH CONTRAST      FINDINGS: CT ABDOMEN:       The lung bases  are clear . Gastric band device noted in the upper stomach region       There is normal appearance of the liver.  The spleen is of normal size. The adrenal glands appear normal in size. The gallbladder appears normal without stone disease or wall thickening. The pancreas is normal in appearance without signs of any discrete mass or ductal dilation. The right and left kidney excrete contrast without delay or hydronephrosis. The aorta is of normal caliber without aneurysm. There is no sign of free air or free fluid. Some fluid filled loops of small bowel are noted. No sign of any retroperitoneal or periaortic adenopathy. Infrarenal atherosclerotic disease of the aorta and common iliac arteries are noted. CT THE PELVIS:      CT pelvis performed demonstrates a mild amount of stool throughout the colon. There is intact uterus. No adnexal mass appreciated. Scatter artifact noted from left hip replacement. Urinary bladder is unremarkable. Gerber Hirsch IMPRESSION:      1. No sign of any mass or lymphadenopathy       2. Postsurgical changes with gastric band device in place in satisfactory position with some mild thickening in the gastroesophageal junction      3. No lytic or blastic bony lesions evident with disc space loss and spondylosis at L5-S1 level      MRI brain without contrast   Final Result   1. Imaging findings compatible with subacute infarct throughout the right hemisphere involving vascular distributions of right anterior cerebral artery and portions of the anterior division right middle cerebral artery. This zone of infarct includes    majority of the right temporal and right frontal lobes as well as a portion of the right parietal lobe, as well as the basal ganglia caudate and lentiform nuclei. Some areas of hemorrhagic transformation within this infarcted zone are demonstrated,    similar in appearance to recent CT from 4/14/2021. There is cytotoxic edema demonstrated. No midline shift identified status post right frontotemporal craniectomy.  Continued effacement of the right lateral ventricle anxiety- holding  MISHA-Patient uses CPAP at home   GERD- Patient takes omeprazole 40 mg daily at home- half dose  Obesity  Complicating assessment and treatment.  Patient's BMI is 34.91 kg/m² placing patient at risk for multiple co-morbidities as well as early death and contributing to the patient's presentation.      Impairments- Decreased functional mobility, Decreased ADLs    Recommendations:  Admit to ARU       Thank you for this consult. Please contact me with any questions or concerns.      Megan Acevedo D.O. M.P.H  PM&R  4/23/2021  9:25 AM

## 2021-04-23 NOTE — PLAN OF CARE
Problem: Pain:  Goal: Pain level will decrease  Description: Pain level will decrease  Outcome: Ongoing  RN assesses pain using 0-10 scale. Patient understands how to rate pain using 0-10 scale. Pain is controled with medication per MAR. RN encourages patient to call out for breakthrough pain. Will continue to monitor and reassess. Problem: HEMODYNAMIC STATUS  Goal: Patient has stable vital signs and fluid balance  Outcome: Ongoing   Patient's vital signs are stable. Patient tolerates PO fluids. Ale Sober in place and has adequate output. Will continue to monitor and reassess.
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
## 2021-04-24 PROCEDURE — 6360000002 HC RX W HCPCS: Performed by: PHYSICAL MEDICINE & REHABILITATION

## 2021-04-24 PROCEDURE — 92610 EVALUATE SWALLOWING FUNCTION: CPT

## 2021-04-24 PROCEDURE — 99232 SBSQ HOSP IP/OBS MODERATE 35: CPT | Performed by: INTERNAL MEDICINE

## 2021-04-24 PROCEDURE — 97535 SELF CARE MNGMENT TRAINING: CPT

## 2021-04-24 PROCEDURE — 99223 1ST HOSP IP/OBS HIGH 75: CPT | Performed by: PHYSICAL MEDICINE & REHABILITATION

## 2021-04-24 PROCEDURE — 94660 CPAP INITIATION&MGMT: CPT

## 2021-04-24 PROCEDURE — 1280000000 HC REHAB R&B

## 2021-04-24 PROCEDURE — 97163 PT EVAL HIGH COMPLEX 45 MIN: CPT

## 2021-04-24 PROCEDURE — 92523 SPEECH SOUND LANG COMPREHEN: CPT

## 2021-04-24 PROCEDURE — 97530 THERAPEUTIC ACTIVITIES: CPT

## 2021-04-24 PROCEDURE — 6370000000 HC RX 637 (ALT 250 FOR IP): Performed by: PHYSICAL MEDICINE & REHABILITATION

## 2021-04-24 PROCEDURE — 97167 OT EVAL HIGH COMPLEX 60 MIN: CPT

## 2021-04-24 RX ORDER — OMEPRAZOLE 20 MG/1
40 CAPSULE, DELAYED RELEASE ORAL DAILY
COMMUNITY

## 2021-04-24 RX ORDER — ACETAMINOPHEN AND CODEINE PHOSPHATE 120; 12 MG/5ML; MG/5ML
1 SOLUTION ORAL DAILY
Status: ON HOLD | COMMUNITY
End: 2021-06-23 | Stop reason: HOSPADM

## 2021-04-24 RX ADMIN — LEVETIRACETAM 500 MG: 500 TABLET ORAL at 10:56

## 2021-04-24 RX ADMIN — HEPARIN SODIUM 5000 UNITS: 5000 INJECTION INTRAVENOUS; SUBCUTANEOUS at 14:29

## 2021-04-24 RX ADMIN — HEPARIN SODIUM 5000 UNITS: 5000 INJECTION INTRAVENOUS; SUBCUTANEOUS at 06:18

## 2021-04-24 RX ADMIN — Medication 1 G: at 18:53

## 2021-04-24 RX ADMIN — Medication 5 MG: at 10:56

## 2021-04-24 RX ADMIN — OXYCODONE HYDROCHLORIDE 5 MG: 5 TABLET ORAL at 11:04

## 2021-04-24 RX ADMIN — THERA TABS 1 TABLET: TAB at 10:56

## 2021-04-24 RX ADMIN — DOCUSATE SODIUM 50 MG AND SENNOSIDES 8.6 MG 2 TABLET: 8.6; 5 TABLET, FILM COATED ORAL at 22:31

## 2021-04-24 RX ADMIN — ATORVASTATIN CALCIUM 80 MG: 80 TABLET, FILM COATED ORAL at 21:17

## 2021-04-24 RX ADMIN — Medication 1 G: at 11:04

## 2021-04-24 RX ADMIN — THIAMINE HCL TAB 100 MG 100 MG: 100 TAB at 10:56

## 2021-04-24 RX ADMIN — FAMOTIDINE 20 MG: 20 TABLET, FILM COATED ORAL at 21:17

## 2021-04-24 RX ADMIN — FAMOTIDINE 20 MG: 20 TABLET, FILM COATED ORAL at 10:56

## 2021-04-24 RX ADMIN — AMLODIPINE BESYLATE 5 MG: 5 TABLET ORAL at 10:56

## 2021-04-24 RX ADMIN — ACETAMINOPHEN 1000 MG: 500 TABLET, COATED ORAL at 05:21

## 2021-04-24 RX ADMIN — ACETAMINOPHEN 1000 MG: 500 TABLET, COATED ORAL at 17:03

## 2021-04-24 RX ADMIN — ACETAMINOPHEN 1000 MG: 500 TABLET, COATED ORAL at 11:04

## 2021-04-24 RX ADMIN — FLUOXETINE 20 MG: 20 CAPSULE ORAL at 10:56

## 2021-04-24 RX ADMIN — ASPIRIN 81 MG: 81 TABLET, CHEWABLE ORAL at 10:56

## 2021-04-24 RX ADMIN — HEPARIN SODIUM 5000 UNITS: 5000 INJECTION INTRAVENOUS; SUBCUTANEOUS at 21:18

## 2021-04-24 RX ADMIN — LEVETIRACETAM 500 MG: 500 TABLET ORAL at 21:17

## 2021-04-24 RX ADMIN — OXYCODONE HYDROCHLORIDE 5 MG: 5 TABLET ORAL at 17:03

## 2021-04-24 RX ADMIN — DOCUSATE SODIUM 50 MG AND SENNOSIDES 8.6 MG 2 TABLET: 8.6; 5 TABLET, FILM COATED ORAL at 10:57

## 2021-04-24 RX ADMIN — Medication 1 G: at 14:29

## 2021-04-24 ASSESSMENT — PAIN DESCRIPTION - DESCRIPTORS
DESCRIPTORS: ACHING;HEADACHE
DESCRIPTORS: ACHING;HEADACHE

## 2021-04-24 ASSESSMENT — PAIN SCALES - GENERAL
PAINLEVEL_OUTOF10: 5
PAINLEVEL_OUTOF10: 3
PAINLEVEL_OUTOF10: 0
PAINLEVEL_OUTOF10: 5
PAINLEVEL_OUTOF10: 4

## 2021-04-24 ASSESSMENT — PAIN DESCRIPTION - ORIENTATION
ORIENTATION: ANTERIOR;LOWER
ORIENTATION: ANTERIOR;LOWER

## 2021-04-24 ASSESSMENT — PAIN DESCRIPTION - PAIN TYPE
TYPE: ACUTE PAIN;SURGICAL PAIN
TYPE: ACUTE PAIN;SURGICAL PAIN

## 2021-04-24 ASSESSMENT — PAIN DESCRIPTION - LOCATION
LOCATION: HEAD;BACK
LOCATION: HEAD;BACK

## 2021-04-24 ASSESSMENT — PAIN DESCRIPTION - FREQUENCY
FREQUENCY: CONTINUOUS
FREQUENCY: CONTINUOUS

## 2021-04-24 ASSESSMENT — PAIN DESCRIPTION - ONSET
ONSET: ON-GOING
ONSET: ON-GOING

## 2021-04-24 ASSESSMENT — PAIN - FUNCTIONAL ASSESSMENT: PAIN_FUNCTIONAL_ASSESSMENT: PREVENTS OR INTERFERES SOME ACTIVE ACTIVITIES AND ADLS

## 2021-04-24 NOTE — PROGRESS NOTES
Occupational Therapy   Occupational Therapy Initial Assessment/Treatment Note   Date: 2021   Patient Name: Elier Flores  MRN: [id number]     : 1970    Date of Service: 2021    Discharge Recommendations:  24 hour supervision or assist, Home with Home health OT, Home with nursing aide, Continue to assess pending progress  OT Equipment Recommendations  Mobility Devices: ADL Assistive Devices  ADL Assistive Devices: Transfer Tub Bench  Other: continue to assess    Assessment   Performance deficits / Impairments: Decreased functional mobility ; Decreased ADL status; Decreased ROM; Decreased strength;Decreased sensation;Decreased fine motor control;Decreased endurance;Decreased posture;Decreased balance;Decreased safe awareness;Decreased coordination;Decreased vision/visual deficit; Decreased cognition  Assessment: Pt is a 45 yo female who presents to [facility] 2/2 CVA. Prior to admission pt was independent w/ all ADLs and IADLs and she was working full time. Pt is currently functioning well below her baseline and is requiring assist x2 for all mobility and transfers. Pt requires assist x2 for most ADLs and is significantly limited by LUE and LLE weakness. Pt presents w/ increased tone in LUE and w/ no active movement. Pt is limited by L neglect and impaired balance. Pt is highly motivated and benefits from OT in order to maximize functional independence. Treatment Diagnosis: decreased independence with ADLs and decreased functional mobility 2/2 CVA  Prognosis: Fair  Decision Making: High Complexity    OT Education: Plan of Care;OT Role;Precautions; ADL Adaptive Strategies;Transfer Training;Orientation  Patient Education: pt verb understanding but benefits from reinforcement  REQUIRES OT FOLLOW UP: Yes  Activity Tolerance  Activity Tolerance: Patient Tolerated treatment well  Safety Devices  Safety Devices in place: Yes  Type of devices: Call light within reach; Chair alarm in place; Left in chair;Nurse notified Patient Diagnosis(es): There were no encounter diagnoses. has a past medical history of GERD (gastroesophageal reflux disease), Hernia, Obesity, and Unspecified sleep apnea. has a past surgical history that includes Splenectomy (); Total hip arthroplasty (); Lap Band (); hernia repair ();  section (); and craniotomy (Right, 2021). Treatment Diagnosis: decreased independence with ADLs and decreased functional mobility 2/2 CVA      Restrictions  Position Activity Restriction  Other position/activity restrictions: Ambulate, Up with assist, Pt to wear hemet when OOB (no orders in chart regarding helmet wear, per chart review this was in therapy notes), Per pt HOB to elevated at 30*    Subjective   General  Chart Reviewed: Yes  Patient assessed for rehabilitation services?: Yes  Additional Pertinent Hx: 46 y.o. female with hypertension and tobacco abuse who presented after spending a prolonged period on the ground s/p fall out of bed; found to have acute left hemiparesis and gaze deviation. CXR with no acute cardiopulmonary abnormality. CTH revealed large acute/subacute infarct in the R frontal lobe with associated mass effect and 4 mm R to L midline shift. It also noted subdural hemorrhage of 6mm along falx, although NSGY less convinced. CTA head and neck revealed occluded R cervical ICA & R anterior cerebral artery with near complete occlusion of R MCA. Outside window on TPA. Pt is now POD #1 following hemicraniectomy (). Pt admitted to ARU   Family / Caregiver Present: No  Referring Practitioner: Dr. Walter Whitney DO  Diagnosis: CVA  Subjective  Subjective: Pt was seated EOB w/ PT present upon arrival. Pt was pleasant and agreeable to OT evaluation. General Comment  Comments: .        Social/Functional History  Social/Functional History  Lives With: Spouse, Son( reported that son is diagnosed with Autism.)  Type of Home: House  Home Layout: One level, Laundry in basement  Home Access: Level entry  Bathroom Shower/Tub: Tub/Shower unit  Bathroom Toilet: Handicap height  Home Equipment: (No DME)  ADL Assistance: Independent  Homemaking Assistance: Independent  Homemaking Responsibilities: Yes  Ambulation Assistance: Independent  Transfer Assistance: Independent  Active : Yes  Mode of Transportation: Car  Occupation: Full time employment  Type of occupation:  at [employer]"  [address]: Likes to hang out with her friends  Additional Comments: One recent fall preceeding admission. Objective        Orientation  Overall Orientation Status: Within Functional Limits  Observation/Palpation  Observation: incision on the R side of skull. Incision on chest wall between breasts from ILR. Healing wounds on L forearm. IV in R wrist.    Balance  Sitting Balance: Minimal assistance(CGA-SBA seated on TTB; up to min A seated on toilet due to L lateral lean)  Standing Balance: Dependent/Total  Standing Balance  Time: ~1 min total  Activity: squat pivot transfers; stance for pants management  Comment: Pt required assist x2 in stance for pants management and toileting tasks. VCs and TCs n  eeded in stance to prevent L lateral lean  Functional Mobility  Functional - Mobility Device: Wheelchair  Activity: To/from bathroom  Assist Level: Dependent/Total  Functional Mobility Comments: OT assisted pt in/out of bathroom due to time constraints  Toilet Transfers  Toilet - Technique: Squat pivot  Equipment Used: Standard toilet  Toilet Transfer: Dependent/Total(Max Ax1 + Mod Ax1 from w/c <> standard toilet. VCs needed to sequence transfer and attend to LLE.)  Shower Transfers  Shower - Transfer From: Wheelchair  Shower - Transfer Type: To and From  Shower - Transfer To: Transfer tub bench  Shower - Technique: Squat pivot  Shower Transfers: Dependent(Max Ax1 + Mod Ax1 from w/c <> TTB. VCs needed to sequence transfer)  ADL  Grooming: Setup;Verbal cueing; Increased time to complete(Pt brushed teeth seated in w/c at sink. Assistance needed to open toothpaste. HOHA provided to maintain grasp of toothrbush in L hand while pt squeezed toothpasted onto brush w/ R hand.)  UE Bathing: Setup;Verbal cueing; Increased time to complete; Moderate assistance(Pt initiated washing LUE w/ RUE however w/ decreased thoroughness. Pt washed chest and abdomen but required assistance to wash RUE and lift LUE to clean thoroughly)  LE Bathing: Setup;Verbal cueing; Increased time to complete;Dependent/Total(Pt washed B thighs w/ RUE. Pt required assistance to wash B feet. Assist x2 in stance needed to dry buttocks)  UE Dressing: Setup;Verbal cueing; Increased time to complete;Maximum assistance(Pt did not have any clothes from home present. Max A needed to don gown)  LE Dressing: Dependent/Total(Pt required total A for threading BLEs into underwear and pants and assist x2 in stance to pull over hips. Total A to don socks)  Toileting: Dependent/Total(Pt sat on toilet in attempt to have a BM but was unsuccessful. Pt urinated and required assist x2 in stance to manage clothing up and down and to complete eugenie care. Pt attempted to wipe seated w/ RUE but could not maintain seated balance safely)  Additional Comments: Pt completed ADLs w/ above listed levels of assistance needed. Pt maintained seated balance on TTB w/ CGA-SBA and VCs needed to correct L lateral lean. Pt w/ inability to engage LUE actively due to hypertonicity and no active movement of LUE.  Pt w/ poor awareness of LUE and LLE during shower and required VCs to initiate bathing    Tone RUE  RUE Tone: Normotonic  Tone LUE  LUE Tone: Hypertonic  Coordination  Coordination and Movement description: Left UE       Bed mobility  Supine to Sit: Maximum assistance  Sit to Supine: Maximum assistance    Transfers  Sit to stand: Dependent/Total(Max Ax1 + CGAx1 from toilet and TTB for pants management and eugenie care)  Stand to sit: Maximum assistance  Vision - Basic Assessment  Visual Field Cut: Left  Vision Comments: R visual gaze preference however pt able to attend to L w/ VCs. Pt denied any diplopia or blurred vision. Pt was able to read whiteboard from ~10ft away. Pt able to scan to the L w/ VCs    Cognition  Overall Cognitive Status: Exceptions  Arousal/Alertness: Delayed responses to stimuli  Following Commands: Follows one step commands consistently; Follows one step commands with increased time  Attention Span: Appears intact  Memory: Appears intact  Safety Judgement: Decreased awareness of need for assistance;Decreased awareness of need for safety  Problem Solving: Assistance required to generate solutions  Insights: Decreased awareness of deficits  Initiation: Requires cues for some  Sequencing: Requires cues for some  Cognition Comment: Pt w/ delayed processing at times and requires increased time to respond to commands. Pt presents w/ flat affect but appears very motivated    Perception  Overall Perceptual Status: Impaired  Unilateral Attention: Cues to attend left visual field;Cues to attend to left side of body;Cues to maintain midline in sitting;Cues to maintain midline in standing  Initiation: Cues to initiate tasks       Sensation  Overall Sensation Status: Impaired(Pt c/o numbness to LUE and LLE. Pt reported she was able to feel hot and cold during shower)        LUE AROM (degrees)  LUE AROM : Exceptions  LUE General AROM: Pt limited by hypertonicity. OT performed passive stretch of L shoulder, L elbow and L wrist/hand. Pt's L hand became less hypertonic w/ stretch however OT w/ concerns related to skin integrity due to increased flexor tone resulting in fingernails digging into skin. OT placed a rolled up towel to extend fingers and prevent increased tone. Resting hand splint should be considered in next session. Pt w/ some c/o pain in L pec during passive stretch. Pt w/ no active movement of LUE.   RUE AROM (degrees)  RUE AROM : [facility] Plan   Plan  Times per week: 5x a week for 60 mins daily  Current Treatment Recommendations: Strengthening, Endurance Training, Neuromuscular Re-education, Self-Care / ADL, Functional Mobility Training, Safety Education & Training, ROM, Positioning, Wheelchair Mobility Training, Balance Training, Patient/Caregiver Education & Training, Manual Therapy:  MLD, Equipment Evaluation, Education, & procurement, Home Management Training, Cognitive Reorientation    G-Code     OutComes Score                                                  AM-PAC Score             Goals  Short term goals  Time Frame for Short term goals: 2 weeks  Short term goal 1: Pt will complete toilet transfer w/ Mod A  Short term goal 2: Pt will complete UE dressing w/ Mod A  Short term goal 3: Pt will complete oral care w/ spvn seated at sink w/o VCs for L sided attention  Short term goal 4: Pt will complete LE dressing w/ Mod A  Long term goals  Time Frame for Long term goals : 4 weeks  Long term goal 1: Pt will complete toilet transfer w/ SBA  Long term goal 2: Pt will complete UE dressing w/ setup  Long term goal 3: Pt will complete LE dressing w/ SBA  Long term goal 4: Pt will be independent w/ tone management exercises to improve functional use of LUE  Long term goal 5: Pt will complete oral care I'ly  Patient Goals   Patient goals : \"get back to my normal self\"       Therapy Time   Individual Concurrent Group Co-treatment   Time In 0845     0745   Time Out 0900     0845   Minutes 15     60   Timed Code Treatment Minutes: 75 Minutes       Lawanda Greer OT

## 2021-04-24 NOTE — PLAN OF CARE
Problem: Pain:  Goal: Control of acute pain  Description: Control of acute pain  Outcome: Ongoing  Note: Pt complains of a continued headache. Scheduled Tylenol and prn Oxycodone given for pain. Will continue to assess pain and notify MD of increase in pain or ineffectiveness of pain medication. Pt advised a decrease in pain. Problem: Falls - Risk of:  Goal: Will remain free from falls  Description: Will remain free from falls  Outcome: Ongoing  Note: Pt with history of falls. Up with max assistance and feliz. Pt's LUE & LLE flaccid. Fall precautions maintained. Fall risk armband on. Non skid footwear on. Bed in lowest position. Bed alarm in use. Reminded pt to call for assistance with any needs. Call light within reach. Pt uses call light appropriately. No falls thus far during shift. Problem: Skin Integrity:  Goal: Will show no infection signs and symptoms  Description: Will show no infection signs and symptoms  Outcome: Ongoing  Note: Pt with surgical incision to right side of head. Staples in place. No drainage or redness to incision. Pt afebrile. Surgical site shows no s/sx of infection at this time. Will continue to assess q shift and prn and provide interventions as needed.

## 2021-04-24 NOTE — PROGRESS NOTES
Pt awake in wheelchair. Physical assessment and vital signs as charted. Pt currently rates her pain as a 5 out of 10 on the pain scale, pain medication given at this time. Pt assisted back to bed by 2 RNs for comfort. Call light placed within reach. RN will continue to monitor Pt.

## 2021-04-24 NOTE — PLAN OF CARE
bowel/bladder training   [x] education for medical assistive devices   [x] medication education   [] O2 saturation management   [] energy conservation   [] stress management techniques   [x] fall prevention   [] alarms protocol   [x] seating and positioning   [x] skin/wound care   [x] pressure relief instruction   [] dressing changes     [x] infection protection   [x] DVT prophylaxis  [x] assistance with in room safety with transfers to bed, toilet, wheelchair, shower   [] bathroom activities and hygiene  [] Other:    Patient/Caregiver Education for:  [] Disease/sustained injury/management     [] Medication Use  [x] Surgical intervention  [x] Safety  [] Body mechanics and or joint protection  [x] Health maintenance     [] Other:     PHYSICAL THERAPY:  Goals:                  Short term goals  Time Frame for Short term goals: 2 Weeks  Short term goal 1: Pt will complete bed mobility with MIN A  Short term goal 2: Pt will transfer sit <> stand with MIN A  Short term goal 3: Pt will transfer bed <> chair with MIN A  Short term goal 4: Pt will propel the w/c 50' with SBA  Short term goal 5: Pt will ambulate 5' with LRAD and MOD A            Long term goals  Time Frame for Long term goals : 4 Weeks  Long term goal 1: Pt will complete bed mobility with SBA  Long term goal 2: Pt will transfer sit <> stand with SBA  Long term goal 3: Pt will transfer bed <> chair with SBA  Long term goal 4: Pt will propel the w/c 150' independently  Long term goal 5: Pt will ambulate 22' with LRAD and MIN A  These goals were reviewed with this patient at the time of assessment and Delphine Ghosh is in agreement.      Plan of Care: Pt to be seen 5 out of 7 days per week per ARU protocol (60 minutes with PT)                  Current Treatment Recommendations: Strengthening, ROM, Balance Training, Endurance Training, Functional Mobility Training, Transfer Training, Gait Training, Safety Education & Training, Home Exercise Program, mgmt    [x]  cognitive training            [x]  energy conservation        []  dysphagia tx    []  speech/language/communication therapy   []  group therapy    [x]  patient/family education    [] Other:    CASE MANAGEMENT:  Goals:   Assist patient/family with discharge planning, patient/family counseling,   and coordination with insurance during ARU stay.     Admission Period/Goal QM SCORES  QM Admit/Goal Score   Eating CARE Score: 4 / Discharge Goal: Set-up or clean-up assistance   Oral Hygiene CARE Score: 4 / Discharge Goal: Independent   Shower/Bathing CARE Score: 1 / Discharge Goal: Supervision or touching assistance   UB Dressing CARE Score: 10 / Discharge Goal: Set-up or clean-up assistance   LB Dressing CARE Score: 1 / Discharge Goal: Supervision or touching assistance   Putting on/off Footwear CARE Score: 1 / Discharge Goal: Supervision or touching assistance   Toileting Hygiene CARE Score: 1 / Discharge Goal: Supervision or touching assistance   Bladder Continence Bladder Continence: Incontinent daily    Bowel Continence Bowel Continence: Frequently incontinent    Toilet Transfers CARE Score: 1 / Discharge Goal: Supervision or touching assistance   Shower/Bathe Self  CARE Score: 1 / Discharge Goal: Supervision or touching assistance   Rolling Left and Right CARE Score: 1 / Discharge Goal: Supervision or touching assistance   Sit to Lying CARE Score: 2 / Discharge Goal: Supervision or touching assistance   Lying to Sitting on Bedside CARE Score: 2 / Discharge Goal: Supervision or touching assistance   Sit to Stand CARE Score: 1 / Discharge Goal: Supervision or touching assistance   Chair/Bed to Chair Transfer CARE Score: 1 / Discharge Goal: Supervision or touching assistance   Car Transfers CARE Score: 1 / Discharge Goal: Supervision or touching assistance   Walk 10 Feet CARE Score: 88 / Discharge Goal: Partial/moderate assistance   Walk 50 Feet with Two Turns CARE Score: 88 / Discharge Goal: Not Attempted Walk 150 Feet CARE Score: 88 / Discharge Goal: Not Attempted   Walk 10 Feet on Uneven Surfaces CARE Score: 88 / Discharge Goal: Not Attempted   1 Step (Curb) CARE Score: 88 / Discharge Goal: Partial/moderate assistance   4 Steps CARE Score: 88 / Discharge Goal: Partial/moderate assistance   12 Steps CARE Score: 88 / Discharge Goal: Not Attempted   Picking up Object from Floor CARE Score: 88 / Discharge Goal: Partial/moderate assistance   Wheel 50 Feet with 2 Turns CARE Score: 88 / Discharge Goal: Independent   Type         [] Manual        [] Motorized        [] N/A   Wheel 150 Feet CARE Score: 88 / Discharge Goal: Independent   Type         [] Manual        [] Motorized        [] N/A        Donna Tran will be seen a minimum of 3 hours of therapy per day, a minimum of 5 out of 7 days per week (please see above for specific treatment plan per PT/OT/SLP). [] In this rare instance due to the nature of this patient's medical involvement, this patient will be seen 15 hours per week (900 minutes within a 7day period). In addition, dietician/nutritionist may monitor calorie count as well as intake and collaboratively work with SLP on dietary upgrades. Neuropsychology/Psychology may evaluate and provide necessary support.     Medical issues being managed closely and that require 24hour availability of a physician:  [x] Swallowing Precautions  [x] Bowel/Bladder Fx  [] Weight bearing precautions  [x] Wound Care    [x] Pain Mgmt   [x] Infection Protection  [x] DVT Prophylaxis   [x] Fall Precautions  [x] Fluid/Electrolyte/Nutrition Balance  [x] Voice Protection   [] Respiratory  [] Other:    Medical Prognosis: [] Good  [x] Fair    [] Guarded   Total expected IRF days:  23  Anticipated discharge destination: Home with spouse  [] Home Independently   [x] Home Modified Independent  [] Home with supervision    []SNF     [] Other                                           Physician anticipated functional outcomes:Home with LRAD    IPOC brief synthesis: 47 YO F PMH GERD, depression/anxiety presented to [facility] ED via EMS for new onset left sided weakness. This initial ARU patient treatment plan of care, together with the IPOC & the Education plan, form the foundation for the patient's plan of care. Weekly patient care conferences are held to evaluate progress towards the initial treatment plan & goals.     I have reviewed this initial plan of care and agree with its contents:    Title   Name    Date    Time    Physician: Antonio Evans D.O. M.P.H  PM&R  4/26/2021  10:29 AM        Case Mgmt:Electronically signed by USMAN Johnson, BEATAW-S on 4/26/2021 at 4:13 PM     OT: Ovidio Beavers, [id number]/L 4/24/2021  1612     PT: Tonny Banda PT, DPT 4/24/2021, @ 786 [id number]    RN: Madi Moscoso    4/24/2021    ST: Adriana Sandoval, [address] [place] [address] Indy Rea [facility]-SLP; [id number] 4/24/21 @4524    :  Judit Adams MA, PD, 4/25/2021  17:43    Other:

## 2021-04-24 NOTE — CONSULTS
Nephrology Consult Note                                                                                                                                                                                                                                                                                                                                                               Office : [phone]     Fax :[phone]              Patient's Name: Maged Bangura  12:50 AM  4/25/2021    Reason for Consult:  Hyponatremia   Requesting Physician:  Tosha Mendez MD      Chief Complaint:   debility     History of Present Ilness:    45 YO F PMH GERD, depression/anxiety presented to [facility] ED via EMS for new onset left sided weakness.      Pt states that she fell out of her bed the night prior to admission and was down on the ground all night until the AM.  Patient endorses she did not let her  call EMS because she felt like she could improve.  Patient claims EMS was finally called after her coworker started calling her in the morning.  Pts GCS was 15 upon arrival @ ED with L sided facial droop, L sided weakness & R sided gaze deviation. Stroke alert was called. In the ED she was hemodynamically stable with /81. CTH revealed large acute/subacute infarct in the R frontal lobe with associated mass effect and 4 mm R to L midline shift. It also noted subdural hemorrhage of 6mm along falx, although NSGY less convinced. CTA head and neck revealed occluded R cervical ICA & R anterior cerebral artery with near complete occlusion of R MCA. She was ultimately seen by NSx who performed a R hemicraniectomy. A MRI on 4/14 showing large subacute infarct throughout R LEAH and MCA with some areas of hemorrhagic conversion.  She has been on seizure ppx.        Past Medical History:   Diagnosis Date    GERD (gastroesophageal reflux disease)     Hernia     Obesity     Unspecified sleep apnea     CPAP       Past Surgical History:   Procedure Laterality Date     SECTION  2004    CRANIOTOMY Right 2021    RIGHT HEMICRANIECTOMY performed by Irvin Lozano MD at [place] [address]    LAP BAND  2006    SPLENECTOMY      TOTAL HIP ARTHROPLASTY         Family History   Problem Relation Age of Onset    Cancer Mother     Depression Mother     Mental Illness Mother     Stroke Father     Cancer Father         prostate        reports that she has been smoking. She has a 20.00 pack-year smoking history. She has never used smokeless tobacco. She reports current alcohol use. She reports that she does not use drugs. Allergies:  Patient has no known allergies.     Current Medications:    nicotine (NICODERM CQ) 21 MG/24HR 1 patch, Daily PRN  promethazine (PHENERGAN) tablet 12.5 mg, Q6H PRN    Or  ondansetron (ZOFRAN) injection 4 mg, Q6H PRN  atorvastatin (LIPITOR) tablet 80 mg, Nightly  acetaminophen (TYLENOL) tablet 1,000 mg, 4 times per day  oxyCODONE (ROXICODONE) immediate release tablet 5 mg, Q4H PRN    Or  oxyCODONE (ROXICODONE) immediate release tablet 10 mg, Q4H PRN  bisacodyl (DULCOLAX) EC tablet 5 mg, Daily  magnesium hydroxide (MILK OF MAGNESIA) 400 MG/5ML suspension 30 mL, Daily PRN  polyethylene glycol (GLYCOLAX) packet 17 g, Daily PRN  amLODIPine (NORVASC) tablet 5 mg, Daily  aspirin chewable tablet 81 mg, Daily  bisacodyl (DULCOLAX) suppository 10 mg, Daily PRN  famotidine (PEPCID) tablet 20 mg, BID  FLUoxetine (PROZAC) capsule 20 mg, Daily  heparin (porcine) injection 5,000 Units, 3 times per day  levETIRAcetam (KEPPRA) tablet 500 mg, BID  multivitamin 1 tablet, Daily  sennosides-docusate sodium (SENOKOT-S) 8.6-50 MG tablet 2 tablet, BID  sodium chloride tablet 1 g, TID WC  thiamine mononitrate tablet 100 mg, Daily        Review of Systems:   14 point ROS obtained but were negative except mentioned in HPI      Physical exam:     Vitals:  /75   Pulse 77   Temp 97.9 °F (36.6 °C) (Oral)   Resp 16   Ht 5' 6\" (1.676 m)   Wt 238 lb 8.6 oz (108.2 kg)   SpO2 95%   BMI 38.50 kg/m²   Constitutional:  OAA X3 NAD  Skin: no rash, turgor wnl  Heent:  eomi, mmm  Neck: no bruits or jvd noted  Cardiovascular:  S1, S2 without m/r/g  Respiratory: CTA B without w/r/r  Abdomen:  +bs, soft, nt, nd  Ext: + llower extremity edema  Psychiatric: mood and affect appropriate  Musculoskeletal:  Rom, muscular strength intact    Data:   Labs:  CBC:   Recent Labs     04/22/21  0520 04/23/21  0433   WBC 15.2* 15.5*   HGB 12.4 12.8   * 531*     BMP:    Recent Labs     04/22/21 0520 04/22/21 0520 04/23/21 0433 04/23/21  0915 04/23/21  1647   *   < > 132* 132* 137   K 4.3  --  4.0  --   --      --  98*  --   --    CO2 25  --  25  --   --    BUN 12  --  13  --   --    CREATININE 0.6  --  0.6  --   --    GLUCOSE 96  --  90  --   --     < > = values in this interval not displayed. Ca/Mg/Phos:   Recent Labs     04/22/21 0520 04/23/21 0433   CALCIUM 9.4 9.5     Hepatic: No results for input(s): AST, ALT, ALB, BILITOT, ALKPHOS in the last 72 hours. Troponin: No results for input(s): TROPONINI in the last 72 hours. BNP: No results for input(s): BNP in the last 72 hours. Lipids: No results for input(s): CHOL, TRIG, HDL, LDLCALC, LABVLDL in the last 72 hours. ABGs: No results for input(s): PHART, PO2ART, NVE7BGQ in the last 72 hours. INR: No results for input(s): INR in the last 72 hours. UA:No results for input(s): Gabi Labs, GLUCOSEU, BILIRUBINUR, Dub Dancer, BLOODU, PHUR, PROTEINU, UROBILINOGEN, NITRU, LEUKOCYTESUR, LABMICR, URINETYPE in the last 72 hours. Urine Microscopic: No results for input(s): LABCAST, BACTERIA, COMU, HYALCAST, WBCUA, RBCUA, EPIU in the last 72 hours. Urine Culture: No results for input(s): LABURIN in the last 72 hours. Urine Chemistry: No results for input(s): Eudelia Moots, PROTEINUR, NAUR in the last 72 hours.           IMAGING:  No orders to display

## 2021-04-24 NOTE — PROGRESS NOTES
Assistance: Independent  Transfer Assistance: Independent  Active : Yes  Mode of Transportation: Car  Occupation: Full time employment  Type of occupation:  at [employer]"  Additional Comments: One recent fall preceeding admission. Cognition        Objective     Observation/Palpation  Observation: incision on the R side of skull. Incision on chest wall between breasts from ILR. Healing wounds on L forearm. IV in R wrist.    AROM RLE (degrees)  RLE AROM: WFL  PROM LLE (degrees)  LLE PROM: WFL  AROM LLE (degrees)  LLE General AROM: No active movement demonstrated  Strength RLE  Strength RLE: WFL  Strength LLE  Comment: Unable to demonstrate active movement on her L LE. Has increased tone in extensors. Strength RUE  Strength RUE: WFL  Strength LUE  Comment: No active movement demonstrated  Tone LLE  LLE Tone: Hypertonic  Tone Description: Predominantly through extensors  LLE Modified Mando Scale  LLE Modified Mando Scale Completed: Yes  LLE Modified Mando Scale  LLE Knee Flexors Score: 2  LLE Knee Extensors Score: 4  LLE Plantarflexors Score: 4  Motor Control  Gross Motor?: (No active movement demonstrated. Pt with 6 beat clonus at the L ankle)  Sensation  Overall Sensation Status: Impaired(Inconsistently reporting she can feel pressure on the L LE during deep pressure. No sensation to light touch)  Bed mobility  Rolling to Left: Minimal assistance  Rolling to Right: Dependent/Total  Supine to Sit: Maximum assistance  Sit to Supine: Maximum assistance  Scooting: Maximal assistance  Comment: Mobility completed in the bed with HOB at 30* per medical order. No bed rail used. Pt needing step by step instruction for sequencing and progression of all bed mobility. Unable to advance L LE or LUE at all during mobility. Transfers  Sit to Stand:  Total Assistance(From bed, w/c, commode, TTB; Max of 1 and CGA of 1; Pt requiring VC for sequencing, facilitation for trunk flexion, ant wt shift, B LE positioning, and midline once in standing.)  Stand to sit: Maximum Assistance  Bed to Chair: Dependent/Total(bed to w/c, w/c <> commode, w/c <> TTB; Pt requiring MAX of 1 and MOD-MAX of 2nd therapist;Pt given step by step instruction prior to transfer. Pt requiring facilitation for wt shifting, LE placement and advancement of transfer. Consistently pushing to the L during transfers)  Ambulation  Ambulation?: No(unsafe to attempt at this time 2/2 deficits)  Stairs/Curb  Stairs?: No  Wheelchair Activities  Wheelchair Size: 18\"  Wheelchair Type: Standard  Wheelchair Cushion: Standard  Propulsion: Yes  Propulsion 1  Propulsion: Manual  Level: Level Tile  Method: RUE;RLE  Level of Assistance: Moderate assistance  Description/ Details: Difficulty steering path. Pt unable to coordinate between R UE and R LE to navigate the w/c for operating in a straight path. Distance: 5'     Balance  Comments: Pt fluctuating between CGA and MIN A to maintain static sitting at the EOB. Pt with L push that she was able to intermittently correct by reaching to the R or utilizing R elbow propping. Pt needing increased assistance when attempting to complete reaching at the EOB. Pt requiring MOD-MAX A for all standing tasks. One brief instance of standing with the use of a grab bar on the R with MIN A and MAX VC. PT completed ~15 minutes of seated dynamic reaching in/out of JOSETTE with R UE with CGA and back support via TTB. Pt needing strong VC to reach across midline.         Plan   Plan  Times per week: 5x/wk for 60 minutes/day  Times per day: Daily  Current Treatment Recommendations: Strengthening, ROM, Balance Training, Endurance Training, Functional Mobility Training, Transfer Training, Gait Training, Safety Education & Training, Home Exercise Program, Patient/Caregiver Education & Training, Neuromuscular Re-education, Stair training  Safety Devices  Type of devices: Call light within reach, Left in chair, Chair alarm in place, Nurse

## 2021-04-24 NOTE — PROGRESS NOTES
Dysfunction   Pharyngeal Phase  Pharyngeal Phase: WFL  Pharyngeal Phase   Pharyngeal: Not assessed    Prognosis  Prognosis  Prognosis for safe diet advancement: excellent  Individuals consulted  Consulted and agree with results and recommendations: Patient; Family member  Family member consulted:     Education  Patient Education: Pt was educated to safe swallow compensatory strategies.   Patient Education Response: Verbalizes understanding  Safety Devices in place: Yes  Type of devices: Bed alarm in place;Call light within reach       Therapy Time  SLP Individual Minutes  Time In: 7134  Time Out: Saad Bo 79  Minutes: 30     SLP Total Treatment Time  Timed Code Treatment Minutes: 0 Minutes  Total Treatment Time: [address] [facility], SLP  4/24/2021 3:09 PM     Electronically Signed By:  Bob Veloz, [address]; [id number]  Speech-Language Pathologist

## 2021-04-24 NOTE — PROGRESS NOTES
Pt in bed, awake. Complained of a headache. Vitals and assessment completed. Nighttime medications given including Oxycodone for pain,  whole in pureed. Pt tolerated well. Pt incontinent of bladder. Changed attends and repositioned pt in the bed. Oriented pt to the call light. Call light within reach. Safety measures in place.

## 2021-04-24 NOTE — PROGRESS NOTES
A complete drug regimen review was completed for this patient.      [x]  No clinically significant medication issue was identified    []   Yes, a clinically significant medication issue was identified     []  Adverse Drug Event:      []  Allergy:      []  Side Effect:      []  Ineffective Therapy:      []  Drug Interaction:     []  Duplicated Therapy:     []  Untreated Indication:      []  Non-adherence:     []  Other:      Nursing/Pharmacy contacted the physician:   Date:   Time:      Actions recommended by physician were completed:  Date :   Time:      Electronically signed by Gatuam Coelho RN on 4/24/2021 at 1:29 AM

## 2021-04-24 NOTE — PROGRESS NOTES
Speech Language Pathology  Facility/Department: [facility] ACUTE REHAB UNIT  Initial Speech/Language/Cognitive Assessment    NAME: Jacek Powers  : 1970   MRN: [id number]  ADMISSION DATE: 2021  ADMITTING DIAGNOSIS: has GERD (gastroesophageal reflux disease); Sleep apnea; Hernia; Status post gastric banding; Obesity; Acute right MCA stroke ([address] Status post craniectomy; ICAO (internal carotid artery occlusion), right; Cerebral edema ([address] Smoking; Secondary hypertension; Encounter for loop recorder check-[facility] CONFIRM ILR 2021 Dr Ashlyn Galvez for cryptogenic stroke. [email or web address]; and Acute CVA [facility]cerebrovascular accident) [facility]) on their problem list.  DATE ONSET: 21    Date of Eval: 2021   Evaluating Therapist: FELIX Garcia    RECENT RESULTS  CT OF HEAD/MRI:   Narrative   CT head without contrast       HISTORY: Evaluate new small hemorrhage   COMPARISON: 2021 at 0534   CONTRAST:  none     TECHNIQUE: Individualized dose optimization technique was used in order to meet ALARA standards for radiation dose reduction. In addition to vendor specific dose reduction algorithms, the dose reduction techniques vary based on the specific scanner    utilized but frequently include automated exposure control, adjustment of the mA and/or kV according to patient size, and use of iterative reconstruction technique.       COMMENTS:        Extensive right-sided postoperative changes are again seen. Multiple small foci of hemorrhage in the postoperative space are without significant change. No new hemorrhage. Fall seen subdural hematomas again noted. No hydrocephalus. Primary Complaint: Pt reports that she isn't thinking as quickly. Assessment:  Cognitive Diagnosis: Pt presents with moderate-severe cognitive linguistic impairment. Diagnosis: Pt presents with moderate-severe cognitive linguistic impairment characterized by significantly decreased attention and exective functioning skills.  Pt with severe L sided visual field cut. See below for results of CLQT. Pt moderately impulsive with decreased insight to deficits. Mildly decrease pragmatic language. Flat affect noted throughout evaluation. Recommendations:  Requires SLP Intervention: Yes  Duration/Frequency of Treatment: 5x per week for LOS  D/C Recommendations: To be determined       Plan:   Goals:  Short-term Goals  Goal 1: Pt will complete divided/alternating attention tasks with 85% accuracy given min cues. Goal 2: Pt will accurately complete executive functioning tasks with min cues. Goal 3: Pt will attend to L visual field with min cues. Goal 4: Pt will participate in ongoing cognitive linguistic assessment. Patient/family involved in developing goals and treatment plan:  was educated to results of evaluation and was in agreement. Subjective:  General  Chart Reviewed: Yes  Patient assessed for rehabilitation services?: Yes  Additional Pertinent Hx: GERD (gastroesophageal reflux disease); Sleep apnea; Hernia; Status post gastric banding; Obesity; and Stroke determined by clinical assessment ([facility] [address] Family / Caregiver Present: Yes  General Comment  Comments:  and son present at bedside upon SLP arrival.  Subjective  Subjective: Pt was visited laying in bed. She appeared mildly lethargic but was agreeable to evaluation.   Social/Functional History  Lives With: Spouse;Son( reported that son is diagnosed with Autism.)  Type of Home: House  Active : Yes  Mode of Transportation: Car  Occupation: Full time employment  Type of occupation:  at [employer]"  Vision  Vision: Impaired  Vision Exceptions: Visual field cut(Pt with severe L sided neglect.)  Hearing  Hearing: Within functional limits           Objective:     Oral/Motor  Oral Motor: Exceptions to [facility]  Labial Symmetry: Abnormal symmetry left(Most noticeable during oral [facility] exam when pt was asked to smile.)    Auditory Comprehension  Comprehension: Within Functional Limits         Expression  Primary Mode of Expression: Verbal    Verbal Expression  Verbal Expression: Within functional limits   Mild difficulty with confrontation naming but suspected difficulty is due to decreased attention. Written Expression  Dominant Hand: Right    Motor Speech  Motor Speech: Within Functional Limits(Per chart review, pt had previously presented with dysarthria that negatively impacted her speech intelligibility. At this time, pt did not present with dysarthria and speech intelligibility was 100%. )    Pragmatics/Social Functioning  Pragmatics: Exceptions to WFL(Pt with what appeared to be random outbursts. She stated, \"You're bossy. \" when the SLP asked her to begin eating.)  Affect: Mild(Mildly flat affect this date.)    Cognition:      Orientation  Overall Orientation Status: Within Normal Limits(Oriented x4 this date.)  Attention  Attention: Exceptions to [facility]  Alternating Attention: Moderate  Divided Attention: Moderate  Selective Attention: Moderate  Sustained Attention: Moderate  Memory  Memory: Exceptions to [facility]  Short-term Memory: Moderate  Working Memory: Moderate  Problem Solving  Problem Solving: Exceptions to [facility]  Complex Functional Tasks: Mild  Managing Finances: To be assessed in therapy  Managing Medications: To be assessed in therapy  Simple Functional Tasks: To be assessed in therapy  Verbal Reasoning Skills: To be assessed in therapy  Safety/Judgement  Safety/Judgement: Exceptions to [facility]  Unable to Self-monitor and Self-correct Consistently: Mild  Insight: Mild    Impulsive: Moderate  Flexibility of Thought: Moderate    Additional Assessments:  Pt was administer the Cognitive Linguistic Quick Test (CLQT) with the following results:    Attention: Moderate  Memory: Moderate  Executive Function: Severe  Language: Mild  Visuospatial: Severe    Results of the CLQT indicate a moderate-severe cognitive-linguistic impairment.     Prognosis:  Speech Therapy Prognosis Prognosis: Good  Prognosis Considerations: Participation Level; Family/Community Support  Individuals consulted  Consulted and agree with results and recommendations: Patient; Family member  Family member consulted:     Education:  Patient Education: Pt was educated to rational for visit and to results of evaluation.   Patient Education Response: Verbalizes understanding  Safety Devices in place: Yes  Type of devices: Bed alarm in place;Call light within reach    Therapy Time:   Individual Concurrent Group Co-treatment   Time In 1130         Time Out 1215         Minutes 45            Timed Code Treatment Minutes: 0 Minutes  Total Treatment Time: [facility], [facility]     Electronically Signed By:  Cresencio Monte, [address] [facility]; .[phone]  Speech-Language Pathologist    4/24/2021 2:42 PM

## 2021-04-24 NOTE — CONSULTS
NUTRITION ASSESSMENT  Admission Date: 4/23/2021     Type and Reason for Visit: Consult, Initial    NUTRITION RECOMMENDATIONS:   1. Continue dysphagia soft and bite sized diet per SLP recommendations. No added salt per MD.   2. Add Ensure High Protein BID. Offer if Po intakes < 50% of meal provided. NUTRITION ASSESSMENT:  Nutritional summary & status: Consult for nutrition supplements recommendations for new ARU admission. This pt has been followed by this department prior to transfer to rehab unit and it was noted she has been nutritionally improving AEB increased PO intakes per EMR. RD unable to see pt as she was busy with therapy however will send ONS BID to be offered if PO intakes is < 50% and will continue to monitor per [facility]. Patient admitted d/t GERD, depression/anxiety     PMH significant for: CVA    MALNUTRITION ASSESSMENT  Context of Malnutrition: Acute Illness   Malnutrition Status: At risk for malnutrition (Comment)  Findings of the 6 clinical characteristics of malnutrition (Minimum of 2 out of 6 clinical characteristics is required to make the diagnosis of moderate or severe Protein Calorie Malnutrition based on AND/ASPEN Guidelines):  Energy Intake: Less than/equal to 75% of estimated energy requirements    Energy Intake Time: Greater than or equal to 7 days    Weight Loss %: No significant loss   Weight loss Time: Greater than or equal to 7 days    Due to current CDC guidelines recommending 6-ft distancing for social isolation for COVID19 prevention, physical aspects of the malnutrition assessment were withheld at this time.      NUTRITION DIAGNOSIS   Problem: Problem #1: Predicted inadequate energy intake   Etiology: Decreased ability to consume sufficient energy   Signs & Symptoms: Intake 0-25%    NUTRITION INTERVENTION  Food and/or Nutrient Delivery: Continue Current Diet, Start Oral Nutrition Supplement   Nutrition Education/Counseling: No recommendation at this time   Coordination of Nutrition Care: Continue to monitor while inpatient     NUTRITION MONITORING & EVALUATION:  Evaluation:Goals set   Goals:Goals: Patient will tolerate and consume 50% or greater of all meals and supplements  Monitoring: Chewing/Swallowing , Meal Intake  or Supplement Intake      OBJECTIVE DATA: Significant to nutrition assessment  · Nutrition-Focused Physical Findings: LBM 4/21. No edema. · Labs: Reviewed;   · Meds: Reviewed;   · Wounds Surgical Wound      ANTHROPOMETRICS  Current Height: 5' 6\" (167.6 cm)  Current Weight: 238 lb 8.6 oz (108.2 kg)    Admission weight: 238 lb 8.6 oz (108.2 kg)  Ideal Bodyweight 136 lb/ 62 kg   Usual Bodyweight ANU   Weight Changes ANU       BMI BMI (Calculated): 38.6    Wt Readings from Last 50 Encounters:   04/23/21 238 lb 8.6 oz (108.2 kg)   04/15/21 238 lb 8.6 oz (108.2 kg)   04/13/21 249 lb 1.9 oz (113 kg)       COMPARATIVE STANDARDS  Estimated Total Kcals/Day : 11-14  Current Bodyweight (108 kg) 1760-4925 kcal/day    Estimated Total Protein (g/day) : 1.3-1.5 Ideal Bodyweight  (62 kg) 80-93 g/day  Estimated Daily Total Fluid (ml/day): >1500 mL per day     Food / Nutrition-Related History  Pre-Admission / Home Diet:  Pre-Admission/Home Diet: General, Low Sodium, Fluid Restriction   Home Supplements / Herbals:    none noted  Food Restrictions / Cultural Requests:    none noted    Current Nutrition Therapies   DIET GENERAL; No Added Salt (3-4 GM); Dysphagia Soft and Bite-Sized; Daily Fluid Restriction: 1500 ml  Dietary Nutrition Supplements: Low Calorie High Protein Supplement     PO Intake: 26-50%, 51-75% and %  PO Supplement: None   PO Supplement Intake: None   IVF: none    NUTRITION RISK LEVEL: Risk Level:  Moderate     Devorah Pearson, [address]  [place]:  [phone]  Office:  [phone]

## 2021-04-24 NOTE — PROGRESS NOTES
4 Eyes Admission Assessment     I agree as the admission nurse that 2 RN's have performed a thorough Head to Toe Skin Assessment on the patient. ALL assessment sites listed below have been assessed on admission. Areas assessed by both nurses:   [x]   Head, Face, and Ears   [x]   Shoulders, Back, and Chest  [x]   Arms, Elbows, and Hands   [x]   Coccyx, Sacrum, and Ischium  [x]   Legs, Feet, and Heels        Does the Patient have Skin Breakdown? No. Surgical incision to right side of head, staples in place. Abrasion to left arm.          Mark Prevention initiated:  No, already in place   Wound Care Orders initiated:  No      [facility] nurse consulted for Pressure Injury (Stage 3,4, Unstageable, DTI, NWPT, and Complex wounds) or Mark score 18 or lower:  No     Nurse 1 eSignature: Electronically signed by Jonny Spangler RN on 4/24/2021 at 12:56 AM    **SHARE this note so that the co-signing nurse is able to place an eSignature**    Nurse 2 eSignature: Electronically signed by Chino Norris RN on 4/24/2021 at 4:47 AM

## 2021-04-25 LAB
ALBUMIN SERPL-MCNC: 3.4 G/DL (ref 3.4–5)
ANION GAP SERPL CALCULATED.3IONS-SCNC: 14 MMOL/L (ref 3–16)
BUN BLDV-MCNC: 16 MG/DL (ref 7–20)
CALCIUM SERPL-MCNC: 9.5 MG/DL (ref 8.3–10.6)
CHLORIDE BLD-SCNC: 100 MMOL/L (ref 99–110)
CO2: 23 MMOL/L (ref 21–32)
CREAT SERPL-MCNC: <0.5 MG/DL (ref 0.6–1.1)
GFR AFRICAN AMERICAN: >60
GFR NON-AFRICAN AMERICAN: >60
GLUCOSE BLD-MCNC: 107 MG/DL (ref 70–99)
PHOSPHORUS: 4.3 MG/DL (ref 2.5–4.9)
POTASSIUM SERPL-SCNC: 3.9 MMOL/L (ref 3.5–5.1)
SODIUM BLD-SCNC: 137 MMOL/L (ref 136–145)

## 2021-04-25 PROCEDURE — 80069 RENAL FUNCTION PANEL: CPT

## 2021-04-25 PROCEDURE — 99232 SBSQ HOSP IP/OBS MODERATE 35: CPT | Performed by: INTERNAL MEDICINE

## 2021-04-25 PROCEDURE — 99232 SBSQ HOSP IP/OBS MODERATE 35: CPT | Performed by: PHYSICAL MEDICINE & REHABILITATION

## 2021-04-25 PROCEDURE — 1280000000 HC REHAB R&B

## 2021-04-25 PROCEDURE — 94660 CPAP INITIATION&MGMT: CPT

## 2021-04-25 PROCEDURE — 36415 COLL VENOUS BLD VENIPUNCTURE: CPT

## 2021-04-25 PROCEDURE — 6370000000 HC RX 637 (ALT 250 FOR IP): Performed by: PHYSICAL MEDICINE & REHABILITATION

## 2021-04-25 PROCEDURE — 6360000002 HC RX W HCPCS: Performed by: PHYSICAL MEDICINE & REHABILITATION

## 2021-04-25 RX ADMIN — LEVETIRACETAM 500 MG: 500 TABLET ORAL at 10:14

## 2021-04-25 RX ADMIN — THERA TABS 1 TABLET: TAB at 10:14

## 2021-04-25 RX ADMIN — FAMOTIDINE 20 MG: 20 TABLET, FILM COATED ORAL at 10:15

## 2021-04-25 RX ADMIN — POLYETHYLENE GLYCOL 3350 17 G: 17 POWDER, FOR SOLUTION ORAL at 18:14

## 2021-04-25 RX ADMIN — FLUOXETINE 20 MG: 20 CAPSULE ORAL at 10:15

## 2021-04-25 RX ADMIN — ATORVASTATIN CALCIUM 80 MG: 80 TABLET, FILM COATED ORAL at 20:13

## 2021-04-25 RX ADMIN — Medication 1 G: at 18:14

## 2021-04-25 RX ADMIN — ACETAMINOPHEN 1000 MG: 500 TABLET, COATED ORAL at 05:38

## 2021-04-25 RX ADMIN — HEPARIN SODIUM 5000 UNITS: 5000 INJECTION INTRAVENOUS; SUBCUTANEOUS at 20:11

## 2021-04-25 RX ADMIN — ASPIRIN 81 MG: 81 TABLET, CHEWABLE ORAL at 10:14

## 2021-04-25 RX ADMIN — HEPARIN SODIUM 5000 UNITS: 5000 INJECTION INTRAVENOUS; SUBCUTANEOUS at 13:26

## 2021-04-25 RX ADMIN — ACETAMINOPHEN 1000 MG: 500 TABLET, COATED ORAL at 12:27

## 2021-04-25 RX ADMIN — Medication 1 G: at 13:26

## 2021-04-25 RX ADMIN — AMLODIPINE BESYLATE 5 MG: 5 TABLET ORAL at 10:15

## 2021-04-25 RX ADMIN — Medication 1 G: at 10:15

## 2021-04-25 RX ADMIN — OXYCODONE HYDROCHLORIDE 5 MG: 5 TABLET ORAL at 10:15

## 2021-04-25 RX ADMIN — DOCUSATE SODIUM 50 MG AND SENNOSIDES 8.6 MG 2 TABLET: 8.6; 5 TABLET, FILM COATED ORAL at 20:13

## 2021-04-25 RX ADMIN — DOCUSATE SODIUM 50 MG AND SENNOSIDES 8.6 MG 2 TABLET: 8.6; 5 TABLET, FILM COATED ORAL at 10:13

## 2021-04-25 RX ADMIN — ACETAMINOPHEN 1000 MG: 500 TABLET, COATED ORAL at 22:12

## 2021-04-25 RX ADMIN — Medication 5 MG: at 10:14

## 2021-04-25 RX ADMIN — HEPARIN SODIUM 5000 UNITS: 5000 INJECTION INTRAVENOUS; SUBCUTANEOUS at 05:39

## 2021-04-25 RX ADMIN — LEVETIRACETAM 500 MG: 500 TABLET ORAL at 20:13

## 2021-04-25 RX ADMIN — FAMOTIDINE 20 MG: 20 TABLET, FILM COATED ORAL at 20:13

## 2021-04-25 RX ADMIN — OXYCODONE HYDROCHLORIDE 5 MG: 5 TABLET ORAL at 18:46

## 2021-04-25 RX ADMIN — THIAMINE HCL TAB 100 MG 100 MG: 100 TAB at 10:14

## 2021-04-25 ASSESSMENT — PAIN DESCRIPTION - PROGRESSION: CLINICAL_PROGRESSION: GRADUALLY IMPROVING

## 2021-04-25 ASSESSMENT — PAIN DESCRIPTION - DIRECTION: RADIATING_TOWARDS: LEFT

## 2021-04-25 ASSESSMENT — PAIN SCALES - GENERAL
PAINLEVEL_OUTOF10: 3
PAINLEVEL_OUTOF10: 2
PAINLEVEL_OUTOF10: 4
PAINLEVEL_OUTOF10: 0
PAINLEVEL_OUTOF10: 0
PAINLEVEL_OUTOF10: 5
PAINLEVEL_OUTOF10: 0
PAINLEVEL_OUTOF10: 6

## 2021-04-25 ASSESSMENT — PAIN DESCRIPTION - ORIENTATION
ORIENTATION: ANTERIOR
ORIENTATION: ANTERIOR

## 2021-04-25 ASSESSMENT — PAIN DESCRIPTION - LOCATION
LOCATION: HEAD
LOCATION: HEAD

## 2021-04-25 ASSESSMENT — PAIN DESCRIPTION - DESCRIPTORS
DESCRIPTORS: HEADACHE
DESCRIPTORS: HEADACHE

## 2021-04-25 ASSESSMENT — PAIN DESCRIPTION - ONSET
ONSET: ON-GOING
ONSET: ON-GOING

## 2021-04-25 ASSESSMENT — PAIN DESCRIPTION - PAIN TYPE
TYPE: SURGICAL PAIN
TYPE: SURGICAL PAIN

## 2021-04-25 ASSESSMENT — PAIN DESCRIPTION - FREQUENCY
FREQUENCY: INTERMITTENT
FREQUENCY: CONTINUOUS

## 2021-04-25 NOTE — PROGRESS NOTES
Shift assessment complete. VSS, A/Ox4, fall precautions in place. Call light in reach. Pt denies pain or discomfort. Pt given nightly medication whole w/ puree. No issues.  Will continue to monitor until end of shift      Vitals:    04/24/21 2015   BP: 110/75   Pulse: 77   Resp: 16   Temp: 97.9 °F (36.6 °C)   SpO2: 95%

## 2021-04-25 NOTE — PROGRESS NOTES
Department of Physical Medicine & Rehabilitation  Progress Note    Patient Identification:  Shell Singh  [id number]  : 1970  Admit date: 2021    Chief Complaint: CVA    Subjective:   No acute events overnight. Patient seen this am. She has no new complaints and is working on left sided motion today in bed. ROS: No f/c, n/v, cp     Objective:  Patient Vitals for the past 24 hrs:   BP Temp Temp src Pulse Resp SpO2   21 0400     16    21 0015     16    21 110/75 97.9 °F (36.6 °C) Oral 77 16 95 %   21 1038 133/85 98.3 °F (36.8 °C) Oral 83 16 96 %     Const: Alert. No distress, pleasant. HEENT: Normocephalic, atraumatic. Normal sclera/conjunctiva. MMM. Incision healing well  CV: Regular rate and rhythm. Resp: No respiratory distress. Lungs CTAB. Abd: Soft, nontender, nondistended, NABS+   Ext: No edema. Neuro: Alert, oriented, appropriately interactive. Psych: Cooperative, appropriate mood and affect    Laboratory data: Available via EMR.    Last 24 hour lab  Recent Results (from the past 24 hour(s))   Renal Function Panel    Collection Time: 21  7:19 AM   Result Value Ref Range    Sodium 137 136 - 145 mmol/L    Potassium 3.9 3.5 - 5.1 mmol/L    Chloride 100 99 - 110 mmol/L    CO2 23 21 - 32 mmol/L    Anion Gap 14 3 - 16    Glucose 107 (H) 70 - 99 mg/dL    BUN 16 7 - 20 mg/dL    CREATININE <0.5 (L) 0.6 - 1.1 mg/dL    GFR Non-African American >60 >60    GFR African American >60 >60    Calcium 9.5 8.3 - 10.6 mg/dL    Phosphorus 4.3 2.5 - 4.9 mg/dL    Albumin 3.4 3.4 - 5.0 g/dL       Therapy progress:  PT  Position Activity Restriction  Other position/activity restrictions: Ambulate, Up with assist, Pt to wear hemet when OOB (no orders in chart regarding helmet wear, per chart review this was in therapy notes), Per pt HOB to elevated at 30*  Objective     Sit to Stand: Maximum Assistance(From bed, w/c, gabrielle, TTB; Pt requiring VC for sequencing, facilitation for trunk flexion, ant wt shift, B LE positioning, and midline once in standing.)  Stand to sit: Maximum Assistance  Bed to Chair: Dependent/Total(bed to w/c, w/c <> commode, w/c <> TTB; Pt requiring MAX of 1 and   : Pt given step by step instruction prior to transfer. Pt requiring facilitation for wt shifting, LE placement and advancement of transfer. Consistently pushing to the L during transfers)     OT  PT Equipment Recommendations  Equipment Needed: (Will continue to assess equipment needs pending progress)  Toilet - Technique: Squat pivot  Equipment Used: Standard toilet  Assessment        SLP  Diet Solids Recommendation: Dysphagia Soft and Bite-Sized (Dysphagia III)(with regular solids as tolerated. Per pt request, gravy/sauces to keep for moist.)       Body mass index is 38.5 kg/m². Assessment and Plan:  Matty Lauren a 46 y.o. female with PMH GERD p/w L sided weakness and facial droop.  CTA head/neck on 4/12 revealed occluded R cervical ICA, occluded right LEAH, near complete occlusion of right MCA.     Acute ischemic CVA, Subdural heomorrhage s/p right hemicraniectomy and subsequen SAH and IP hemorrhage  No tPA given.  MRI 4/14 showing large subacute infarct throughout R LEAH and MCA with some areas of hemorrhagic conversion.  - Neurosurgery and neurology following  --SBP <160, PRN hydralazine, scheduled Norvasc  -- Keppra 500 mg twice daily   -  SQH  - PT/OT consulted      Aphasia- SLP consulted   Depression-Patient takes fluoxetine 20 mg daily at home  Anxiety -Patient takes Ativan 0.5 mg every 8 hours as needed for anxiety- holding  MISHA-Patient uses CPAP at home   GERD- Patient takes omeprazole 40 mg daily at home- half dose  Obesity    Rehab Progress: Improving  Anticipated Dispo: home  Services/DME: LIANG  ELOS: LIANG Liu D.O. M.P.H  PM&R  4/25/2021  8:55 AM

## 2021-04-25 NOTE — PROGRESS NOTES
Pt awake in bed. Physical assessment and vital signs as charted. Pt currently rates her pain as a 4 out of 10 on the pain scale, pain medication given at this time. Call light placed within reach. RN will continue to monitor Pt.

## 2021-04-25 NOTE — PLAN OF CARE
Problem: Falls - Risk of:  Goal: Will remain free from falls  Description: Will remain free from falls  4/24/2021 2052 by Amish Davison RN  Outcome: Ongoing  Note: Pt educated to use her call light when she needs assistance. Pt also educated not to get up unassisted at this time. Bed alarm on when Pt in bed and chair alarm on when Pt up in chair. Non skid socks on and call light placed within reach. RN will continue to monitor Pt.    4/24/2021 0703 by Mami Polo RN  Outcome: Ongoing  Note: Pt with history of falls. Up with max assistance and feliz. Pt's LUE & LLE flaccid. Fall precautions maintained. Fall risk armband on. Non skid footwear on. Bed in lowest position. Bed alarm in use. Reminded pt to call for assistance with any needs. Call light within reach. Pt uses call light appropriately. No falls thus far during shift.

## 2021-04-25 NOTE — PLAN OF CARE
Problem: Falls - Risk of:  Goal: Will remain free from falls  Description: Will remain free from falls  Outcome: Ongoing  Note: Pt educated to use her call light when she needs assistance. Pt also educated not to get up unassisted at this time. Bed alarm on when Pt in bed and chair alarm on when Pt up in chair. Non skid socks on and call light placed within reach. RN will continue to monitor Pt.

## 2021-04-25 NOTE — PLAN OF CARE
Problem: Pain:  Goal: Control of acute pain  Description: Control of acute pain  Outcome: Ongoing     Problem: Falls - Risk of:  Goal: Will remain free from falls  Description: Will remain free from falls  4/25/2021 0056 by Stefania Helms RN  Outcome: Ongoing     Problem: Skin Integrity:  Goal: Will show no infection signs and symptoms  Description: Will show no infection signs and symptoms  Outcome: Ongoing

## 2021-04-26 LAB
ALBUMIN SERPL-MCNC: 3.1 G/DL (ref 3.4–5)
ANION GAP SERPL CALCULATED.3IONS-SCNC: 10 MMOL/L (ref 3–16)
BASOPHILS ABSOLUTE: 0.2 K/UL (ref 0–0.2)
BASOPHILS RELATIVE PERCENT: 1 %
BUN BLDV-MCNC: 13 MG/DL (ref 7–20)
CALCIUM SERPL-MCNC: 9.4 MG/DL (ref 8.3–10.6)
CHLORIDE BLD-SCNC: 104 MMOL/L (ref 99–110)
CO2: 24 MMOL/L (ref 21–32)
CREAT SERPL-MCNC: <0.5 MG/DL (ref 0.6–1.1)
EOSINOPHILS ABSOLUTE: 0.4 K/UL (ref 0–0.6)
EOSINOPHILS RELATIVE PERCENT: 2.2 %
GFR AFRICAN AMERICAN: >60
GFR NON-AFRICAN AMERICAN: >60
GLUCOSE BLD-MCNC: 97 MG/DL (ref 70–99)
HCT VFR BLD CALC: 35.6 % (ref 36–48)
HEMOGLOBIN: 12.2 G/DL (ref 12–16)
LYMPHOCYTES ABSOLUTE: 4.3 K/UL (ref 1–5.1)
LYMPHOCYTES RELATIVE PERCENT: 26.4 %
MCH RBC QN AUTO: 35.5 PG (ref 26–34)
MCHC RBC AUTO-ENTMCNC: 34.3 G/DL (ref 31–36)
MCV RBC AUTO: 103.5 FL (ref 80–100)
MONOCYTES ABSOLUTE: 1.2 K/UL (ref 0–1.3)
MONOCYTES RELATIVE PERCENT: 7.6 %
NEUTROPHILS ABSOLUTE: 10.2 K/UL (ref 1.7–7.7)
NEUTROPHILS RELATIVE PERCENT: 62.8 %
PDW BLD-RTO: 14 % (ref 12.4–15.4)
PHOSPHORUS: 4.5 MG/DL (ref 2.5–4.9)
PLATELET # BLD: 616 K/UL (ref 135–450)
PMV BLD AUTO: 7.8 FL (ref 5–10.5)
POTASSIUM SERPL-SCNC: 4 MMOL/L (ref 3.5–5.1)
RBC # BLD: 3.44 M/UL (ref 4–5.2)
SODIUM BLD-SCNC: 138 MMOL/L (ref 136–145)
WBC # BLD: 16.2 K/UL (ref 4–11)

## 2021-04-26 PROCEDURE — 97535 SELF CARE MNGMENT TRAINING: CPT

## 2021-04-26 PROCEDURE — 92526 ORAL FUNCTION THERAPY: CPT

## 2021-04-26 PROCEDURE — 6360000002 HC RX W HCPCS: Performed by: PHYSICAL MEDICINE & REHABILITATION

## 2021-04-26 PROCEDURE — 80069 RENAL FUNCTION PANEL: CPT

## 2021-04-26 PROCEDURE — 36415 COLL VENOUS BLD VENIPUNCTURE: CPT

## 2021-04-26 PROCEDURE — 97530 THERAPEUTIC ACTIVITIES: CPT

## 2021-04-26 PROCEDURE — 97112 NEUROMUSCULAR REEDUCATION: CPT

## 2021-04-26 PROCEDURE — 97130 THER IVNTJ EA ADDL 15 MIN: CPT

## 2021-04-26 PROCEDURE — 94660 CPAP INITIATION&MGMT: CPT

## 2021-04-26 PROCEDURE — 81003 URINALYSIS AUTO W/O SCOPE: CPT

## 2021-04-26 PROCEDURE — 99232 SBSQ HOSP IP/OBS MODERATE 35: CPT | Performed by: PHYSICAL MEDICINE & REHABILITATION

## 2021-04-26 PROCEDURE — 85025 COMPLETE CBC W/AUTO DIFF WBC: CPT

## 2021-04-26 PROCEDURE — 1280000000 HC REHAB R&B

## 2021-04-26 PROCEDURE — 6370000000 HC RX 637 (ALT 250 FOR IP): Performed by: PHYSICAL MEDICINE & REHABILITATION

## 2021-04-26 PROCEDURE — 99232 SBSQ HOSP IP/OBS MODERATE 35: CPT | Performed by: INTERNAL MEDICINE

## 2021-04-26 PROCEDURE — 97129 THER IVNTJ 1ST 15 MIN: CPT

## 2021-04-26 RX ADMIN — THIAMINE HCL TAB 100 MG 100 MG: 100 TAB at 10:27

## 2021-04-26 RX ADMIN — Medication 1 G: at 10:27

## 2021-04-26 RX ADMIN — DOCUSATE SODIUM 50 MG AND SENNOSIDES 8.6 MG 2 TABLET: 8.6; 5 TABLET, FILM COATED ORAL at 10:28

## 2021-04-26 RX ADMIN — ACETAMINOPHEN 1000 MG: 500 TABLET, COATED ORAL at 23:09

## 2021-04-26 RX ADMIN — Medication 5 MG: at 10:27

## 2021-04-26 RX ADMIN — ATORVASTATIN CALCIUM 80 MG: 80 TABLET, FILM COATED ORAL at 23:09

## 2021-04-26 RX ADMIN — AMLODIPINE BESYLATE 5 MG: 5 TABLET ORAL at 10:26

## 2021-04-26 RX ADMIN — HEPARIN SODIUM 5000 UNITS: 5000 INJECTION INTRAVENOUS; SUBCUTANEOUS at 07:07

## 2021-04-26 RX ADMIN — Medication 1 G: at 12:35

## 2021-04-26 RX ADMIN — ACETAMINOPHEN 1000 MG: 500 TABLET, COATED ORAL at 14:52

## 2021-04-26 RX ADMIN — Medication 1 G: at 17:25

## 2021-04-26 RX ADMIN — FLUOXETINE 20 MG: 20 CAPSULE ORAL at 10:27

## 2021-04-26 RX ADMIN — THERA TABS 1 TABLET: TAB at 10:26

## 2021-04-26 RX ADMIN — ACETAMINOPHEN 1000 MG: 500 TABLET, COATED ORAL at 10:28

## 2021-04-26 RX ADMIN — FAMOTIDINE 20 MG: 20 TABLET, FILM COATED ORAL at 23:10

## 2021-04-26 RX ADMIN — LEVETIRACETAM 500 MG: 500 TABLET ORAL at 23:10

## 2021-04-26 RX ADMIN — DOCUSATE SODIUM 50 MG AND SENNOSIDES 8.6 MG 2 TABLET: 8.6; 5 TABLET, FILM COATED ORAL at 23:09

## 2021-04-26 RX ADMIN — HEPARIN SODIUM 5000 UNITS: 5000 INJECTION INTRAVENOUS; SUBCUTANEOUS at 14:52

## 2021-04-26 RX ADMIN — ACETAMINOPHEN 1000 MG: 500 TABLET, COATED ORAL at 03:00

## 2021-04-26 RX ADMIN — ASPIRIN 81 MG: 81 TABLET, CHEWABLE ORAL at 10:27

## 2021-04-26 RX ADMIN — HEPARIN SODIUM 5000 UNITS: 5000 INJECTION INTRAVENOUS; SUBCUTANEOUS at 23:21

## 2021-04-26 RX ADMIN — FAMOTIDINE 20 MG: 20 TABLET, FILM COATED ORAL at 10:27

## 2021-04-26 RX ADMIN — LEVETIRACETAM 500 MG: 500 TABLET ORAL at 10:27

## 2021-04-26 ASSESSMENT — PAIN - FUNCTIONAL ASSESSMENT
PAIN_FUNCTIONAL_ASSESSMENT: PREVENTS OR INTERFERES SOME ACTIVE ACTIVITIES AND ADLS
PAIN_FUNCTIONAL_ASSESSMENT: ACTIVITIES ARE NOT PREVENTED
PAIN_FUNCTIONAL_ASSESSMENT: ACTIVITIES ARE NOT PREVENTED
PAIN_FUNCTIONAL_ASSESSMENT: PREVENTS OR INTERFERES SOME ACTIVE ACTIVITIES AND ADLS

## 2021-04-26 ASSESSMENT — PAIN SCALES - GENERAL
PAINLEVEL_OUTOF10: 5
PAINLEVEL_OUTOF10: 4
PAINLEVEL_OUTOF10: 0
PAINLEVEL_OUTOF10: 7
PAINLEVEL_OUTOF10: 0
PAINLEVEL_OUTOF10: 3
PAINLEVEL_OUTOF10: 0
PAINLEVEL_OUTOF10: 3
PAINLEVEL_OUTOF10: 2
PAINLEVEL_OUTOF10: 0

## 2021-04-26 ASSESSMENT — PAIN DESCRIPTION - FREQUENCY
FREQUENCY: INTERMITTENT

## 2021-04-26 ASSESSMENT — PAIN DESCRIPTION - PAIN TYPE
TYPE: ACUTE PAIN;SURGICAL PAIN
TYPE: ACUTE PAIN

## 2021-04-26 ASSESSMENT — PAIN DESCRIPTION - DESCRIPTORS
DESCRIPTORS: DISCOMFORT
DESCRIPTORS: DISCOMFORT
DESCRIPTORS: ACHING;SORE
DESCRIPTORS: ACHING;HEADACHE

## 2021-04-26 ASSESSMENT — PAIN DESCRIPTION - PROGRESSION
CLINICAL_PROGRESSION: GRADUALLY IMPROVING
CLINICAL_PROGRESSION: GRADUALLY WORSENING
CLINICAL_PROGRESSION: GRADUALLY IMPROVING

## 2021-04-26 ASSESSMENT — PAIN DESCRIPTION - LOCATION
LOCATION: BACK
LOCATION: HEAD

## 2021-04-26 ASSESSMENT — PAIN DESCRIPTION - ONSET
ONSET: ON-GOING

## 2021-04-26 ASSESSMENT — PAIN DESCRIPTION - ORIENTATION
ORIENTATION: LOWER
ORIENTATION: LOWER
ORIENTATION: RIGHT
ORIENTATION: LOWER

## 2021-04-26 NOTE — PROGRESS NOTES
time.    Pt in bed upon SLP return, agreeable to tx session. Pleasant and cooperative. Endorsed \"this is hard\" when completing tx tasks. Ongoing flat affect. Objective / Goals:     Pt will complete divided/alternating attention tasks with 85% accuracy given min cues. Goal not directly targeted. Goal not directly addressed. Pt will accurately complete executive functioning tasks with min cues. Reduced propulsion through meal despite verbalizing intentions to continue PO intake. Min-mod cues required for pacing and self-monitoring. Targeted via tasks detailed below. Reduced initiation and propulsion through tasks. Pt will attend to L visual field with min cues. Mod-max cues to attend to conversational partner situated on L side. Mod cues to locate items L of midline on lunch tray. Min cues for reading of sentences. Mod cues for attention to stimuli presented L of midline on table top. Max cues for eye contact with conversational partner on L. Pt will participate in ongoing cognitive linguistic assessment. Insight into deficits: pt independently identified deficits s/p CVA and demonstrated full awareness of assist required at this time. Patient was administered portions of the SULMA (Assessment of Language-Related Functional Activities) with the following results:  · Counting Money: 50% accuracy, indicating high probability pt not able to function independently with this task. Pt required 12 minutes and 31 seconds to complete, which is longer than expected. Errors marked by perseverations and reduced attention to detail. · Solving Daily Math Problems: 50% accuracy, indicating some assistance required with this task / further assessment warranted. Pt required 10 minutes to complete, which is longer than expected. Other areas targeted: Analyzed pt with soft and bite-sized meal tray.  Anterior loss of liquids via cup edge demonstrated x 1 on L d/t poor labial seal - eliminated after verbal supervision  [] ECF [] Other  Continued Tx Upon Discharge: ? [x] Yes    [] No    [] TBD based on progress while on ARU     [] Vital Stim indicated     [] Other:   Estimated discharge date: Not yet established      Electronically signed by  Shweta Mensah MA CCC-SLP; [id number]  Speech-Language Pathologist

## 2021-04-26 NOTE — PROGRESS NOTES
Shift assessment complete. VSS, A/Ox4, fall precautions in place. Call light in reach. Pt c/o pain or discomfort 2/10 surgical pain. Pt given scheduled Tylenol along with nightly medication. No issues. Will continue to monitor until end of shift.       Vitals:    04/25/21 2000   BP: 125/70   Pulse: 81   Resp: 16   Temp: 98.8 °F (37.1 °C)   SpO2: 95%

## 2021-04-26 NOTE — PLAN OF CARE
Problem: Pain:  Goal: Control of acute pain  Description: Control of acute pain  Outcome: Ongoing     Problem: Falls - Risk of:  Goal: Will remain free from falls  Description: Will remain free from falls  4/25/2021 2338 by Luz Elena Alan RN  Outcome: Ongoing     Problem: Skin Integrity:  Goal: Will show no infection signs and symptoms  Description: Will show no infection signs and symptoms  Outcome: Ongoing

## 2021-04-26 NOTE — PROGRESS NOTES
Occupational Therapy  Facility/Department: [facility]  Daily Treatment Note  NAME: Nell Tanner  : 1970  MRN: [id number]    Date of Service: 2021    Discharge Recommendations:  24 hour supervision or assist, Home with Home health OT, Home with nursing aide, Continue to assess pending progress  OT Equipment Recommendations  Equipment Needed: Yes  Mobility Devices: ADL Assistive Devices  ADL Assistive Devices: Transfer Tub Bench  Other: continue to assess    Assessment   Performance deficits / Impairments: Decreased functional mobility ; Decreased ADL status; Decreased ROM; Decreased strength;Decreased sensation;Decreased fine motor control;Decreased endurance;Decreased posture;Decreased balance;Decreased safe awareness;Decreased coordination;Decreased vision/visual deficit; Decreased cognition  Assessment: Pt requires 2 person assist for LB dressing and difficulty maintainign midline in stance with L lateral lean. Pt requires varying levels of assistance for transfers depending on technique, requiring 2 person assist for squat pivot but Min A for scoot pivot leading to R with cues for sequencing. Pt demonstrates poor visual awareness of environment on L side d/t visual field deficit and requires constant cues to attend to L side. Pt able have LUE positioned into ER and extension with approximation but currently causes discomfort. Pt educated on self ROM to promote LUE elbow extension and ER. Pt functioning significantly below baseline but highly motivated to return to PLOF. Cont per OT POC. Treatment Diagnosis: decreased independence with ADLs and decreased functional mobility 2/2 CVA  Prognosis: Fair  REQUIRES OT FOLLOW UP: Yes  Activity Tolerance  Activity Tolerance: Patient Tolerated treatment well         Patient Diagnosis(es): There were no encounter diagnoses. has a past medical history of GERD (gastroesophageal reflux disease), Hernia, Obesity, and Unspecified sleep apnea.    has a past surgical history that includes Splenectomy (); Total hip arthroplasty (); Lap Band (); hernia repair ();  section (); and craniotomy (Right, 2021). Restrictions  Position Activity Restriction  Other position/activity restrictions: Ambulate, Up with assist, Pt to wear hemet when OOB (no orders in chart regarding helmet wear, per chart review this was in therapy notes), Per pt HOB to elevated at 30*     Subjective   General  Chart Reviewed: Yes  Patient assessed for rehabilitation services?: Yes  Additional Pertinent Hx: 46 y.o. female with hypertension and tobacco abuse who presented after spending a prolonged period on the ground s/p fall out of bed; found to have acute left hemiparesis and gaze deviation. CXR with no acute cardiopulmonary abnormality. CTH revealed large acute/subacute infarct in the R frontal lobe with associated mass effect and 4 mm R to L midline shift. It also noted subdural hemorrhage of 6mm along falx, although NSGY less convinced. CTA head and neck revealed occluded R cervical ICA & R anterior cerebral artery with near complete occlusion of R MCA. Outside window on TPA. Pt is now POD #1 following hemicraniectomy (). Pt admitted to ARU   Response to previous treatment: Patient with no complaints from previous session  Family / Caregiver Present: No  Referring Practitioner: Dr. Donny Ruiz DO  Diagnosis: CVA  Subjective  Subjective: Pt sitting in recliner upon arrival, pleasant and agreeable to OT/PT cotx to maximize independence with functional transfers with collaboration of 2 disciplines. General Comment  Comments: Matthew  Vital Signs  Patient Currently in Pain: Denies     Orientation  Orientation  Overall Orientation Status: Within Functional Limits  Objective    ADL  UE Dressing: Moderate assistance;Setup;Verbal cueing; Increased time to complete(pt doffed gown with VCs and min A to unthread LUE, pt donned shirt with mod A req  cues for hemidressing tech, assist to thread LUE, sequence threading RUE assist to pull down in front/L side)  LE Dressing: Dependent/Total;Setup; Increased time to complete;Verbal cueing(pt req assist to thread BLEs, max A x 1 to maintain stance, total A of another to manage pants over hips in stance)        Balance  Sitting Balance: Contact guard assistance(CGA-SBA seated EOM, CGA dynamically when reaching contralaterally with RUE)  Standing Balance: Maximum assistance(LLE hyperextends in stance)  Standing Balance  Time: ~45 seconds total  Activity: static stance for LB dressing, functional transfers  Comment: Pt required assist x2 in stance for pants management, VCs and TCs needed in stance to prevent L lateral lean  Functional Mobility  Functional - Mobility Device: Wheelchair  Activity: Other(~50 feet in hallway)  Assist Level: Minimal assistance  Functional Mobility Comments: Pt propelled self using RLE and RUE with cues to sequence, pt with poor awareness of L side d/t visual field cut and gaze preference and unable to navigate environmental barriers i'ly req cues to problem solve, pt running into wall on L and environmetal barriers     Transfers  Sit Pivot Transfers: 2 Person assistance; Moderate assistance;Minimal assistance(Mod A x 1 + Min A x 1 from recliner<w/c with step by step VCs to sequence; Min A x 1 scoot pivot from w/c<EOM leading R; Min A x 2 squat pivot EOM<w/c leading L)  Sit to stand: Maximum assistance(from recliner for LB clothing mgmt)  Transfer Comments: Pt req simple VCs and + time to process d/t cognition                 Neuromuscular Education  Neuromuscular education: Yes  NDT Treatment: Upper extremity  Functional Movement Patterns: Pt instructed on handling technique using RUE to encourage self range of LUE to promote motor recovery, ROM and decrease flexion synergy, pt reached outside JOSETTE ipsilaterally and contralaterally for visual target req VCs for ant lean and to maintain BUEs for 3 second hold, cues to attend to L visual field, SBA overall and no LOB  Weight Bearing  Weight Bearing Technique: Yes  Response To Weight Bearing Technique: Pt LUE placed into extended arm WBing while seated EOM to increase proprioception, motor recovery and decrease flexion synergy while RUE reached outside JOSETTE contralaterally, pt able to maintain WBing with CGA and facilitation to position UE as well as place fingers into extension, pt reported discomfort in L pectoralis muscle as a \"stetch\" but able to tolerate     Cognition  Overall Cognitive Status: Exceptions  Arousal/Alertness: Delayed responses to stimuli  Following Commands: Follows one step commands consistently; Follows one step commands with increased time  Attention Span: Appears intact  Memory: Appears intact  Safety Judgement: Decreased awareness of need for assistance;Decreased awareness of need for safety  Problem Solving: Assistance required to generate solutions;Assistance required to correct errors made;Assistance required to implement solutions;Decreased awareness of errors  Insights: Decreased awareness of deficits  Initiation: Requires cues for some  Sequencing: Requires cues for some  Cognition Comment: Pt w/ delayed processing at times and requires increased time to respond to commands, pt motivated but frustrated with deficits stating, \"I'm so mad at myself this should be easy, but my L side just isn't working\"     Perception  Overall Perceptual Status: Impaired  Unilateral Attention: Cues to attend left visual field;Cues to attend to left side of body;Cues to maintain midline in sitting;Cues to maintain midline in standing  Initiation: Cues to initiate tasks           Upper Extremity Function  NDT Treatment: Upper extremity  Type of ROM/Therapeutic Exercise  Type of ROM/Therapeutic Exercise: Self PROM  Comment: Pt educated on handling technique placing R thumb into L palm and wrapping fingers around the L wrist in order to promote AAROM to decrease flexion synergy and increase LUE motor recovery  Exercises  Elbow Extension: x5                    Plan   Plan  Times per week: 5x a week for 60 mins daily  Current Treatment Recommendations: Strengthening, Endurance Training, Neuromuscular Re-education, Self-Care / ADL, Functional Mobility Training, Safety Education & Training, ROM, Positioning, Wheelchair Mobility Training, Balance Training, Patient/Caregiver Education & Training, Manual Therapy:  MLD, Equipment Evaluation, Education, & procurement, Home Management Training, Cognitive Reorientation  G-Code     OutComes Score                                                  AM-PAC Score             Goals  Short term goals  Time Frame for Short term goals: 2 weeks-all ongoing  Short term goal 1: Pt will complete toilet transfer w/ Mod A  Short term goal 2: Pt will complete UE dressing w/ Mod A  Short term goal 3: Pt will complete oral care w/ spvn seated at sink w/o VCs for L sided attention  Short term goal 4: Pt will complete LE dressing w/ Mod A  Long term goals  Time Frame for Long term goals : 4 weeks-all ongoing  Long term goal 1: Pt will complete toilet transfer w/ SBA  Long term goal 2: Pt will complete UE dressing w/ setup  Long term goal 3: Pt will complete LE dressing w/ SBA  Long term goal 4: Pt will be independent w/ tone management exercises to improve functional use of LUE  Long term goal 5: Pt will complete oral care I'ly  Patient Goals   Patient goals : \"get back to my normal self\"       Therapy Time   Individual Concurrent Group Co-treatment   Time In       0830   Time Out       0930   Minutes       60   Timed Code Treatment Minutes: 60 Minutes       Matthew Norwood, OT

## 2021-04-26 NOTE — PROGRESS NOTES
Office : [phone]     Fax :[phone]         Renal Progress Note  Subjective:   Admit Date: 4/23/2021     HPI      Interval History:     Good UO   No Diarrhea   Na stable       DIET DIET GENERAL; No Added Salt (3-4 GM); Dysphagia Soft and Bite-Sized; Daily Fluid Restriction: 1500 ml  Dietary Nutrition Supplements: Low Calorie High Protein Supplement  Medications:   Scheduled Meds:   atorvastatin  80 mg Oral Nightly    acetaminophen  1,000 mg Oral 4 times per day    bisacodyl  5 mg Oral Daily    amLODIPine  5 mg Oral Daily    aspirin  81 mg Oral Daily    famotidine  20 mg Oral BID    FLUoxetine  20 mg Oral Daily    heparin (porcine)  5,000 Units Subcutaneous 3 times per day    levETIRAcetam  500 mg Oral BID    multivitamin  1 tablet Oral Daily    sennosides-docusate sodium  2 tablet Oral BID    sodium chloride  1 g Oral TID WC    thiamine mononitrate  100 mg Oral Daily     Continuous Infusions:    Labs:  CBC:   Recent Labs     04/23/21 0433   WBC 15.5*   HGB 12.8   *     BMP:    Recent Labs     04/23/21  0433 04/23/21  0915 04/23/21  1647 04/25/21  0719   * 132* 137 137   K 4.0  --   --  3.9   CL 98*  --   --  100   CO2 25  --   --  23   BUN 13  --   --  16   CREATININE 0.6  --   --  <0.5*   GLUCOSE 90  --   --  107*     Ca/Mg/Phos:   Recent Labs     04/23/21  0433 04/25/21  0719   CALCIUM 9.5 9.5   PHOS  --  4.3     Hepatic: No results for input(s): AST, ALT, ALB, BILITOT, ALKPHOS in the last 72 hours. Troponin: No results for input(s): TROPONINI in the last 72 hours. BNP: No results for input(s): BNP in the last 72 hours.   Lipids: No results for input(s): CHOL, TRIG, HDL, LDLCALC, LABVLDL in the last 72 hours. ABGs: No results for input(s): PHART, PO2ART, PLJ1TFL in the last 72 hours. INR: No results for input(s): INR in the last 72 hours. UA:No results for input(s): Lucho Boys, GLUCOSEU, BILIRUBINUR, [place]on Heading, BLOODU, PHUR, PROTEINU, UROBILINOGEN, NITRU, LEUKOCYTESUR, LABMICR, URINETYPE in the last 72 hours. Urine Microscopic: No results for input(s): LABCAST, BACTERIA, COMU, HYALCAST, WBCUA, RBCUA, EPIU in the last 72 hours. Urine Culture: No results for input(s): LABURIN in the last 72 hours. Urine Chemistry: No results for input(s): S[place] Harm, PROTEINUR, NAUR in the last 72 hours.     Objective:   Vitals: /70   Pulse 81   Temp 98.8 °F (37.1 °C) (Oral)   Resp 16   Ht 5' 6\" (1.676 m)   Wt 238 lb 8.6 oz (108.2 kg)   SpO2 95%   BMI 38.50 kg/m²    Wt Readings from Last 3 Encounters:   04/23/21 238 lb 8.6 oz (108.2 kg)   04/15/21 238 lb 8.6 oz (108.2 kg)   04/13/21 249 lb 1.9 oz (113 kg)      24HR INTAKE/OUTPUT:      Intake/Output Summary (Last 24 hours) at 4/26/2021 0101  Last data filed at 4/25/2021 1857  Gross per 24 hour   Intake 840 ml   Output    Net 840 ml     Constitutional:  Alert, awake, no apparent distress  NECK: supple, no JVD  Cardiovascular:  S1, S2 without m/r/g  Respiratory:  CTA B without w/r/r  Abdomen: +bs, soft, nt  Ext: +,LE edema    Assessment and Plan:       IMAGING:  No orders to display       Assessment/Plan     SIADH   CVA   Electrolyte imbalance   HTN     Plan   - check NA in am   - ur studies   - Fluid restriction   - labs in am   - PT/OT          Marko Dotson MD  [facility] [address] [place], [place] [phone]  [phone]

## 2021-04-26 NOTE — PROGRESS NOTES
Physical Therapy  Facility/Department: [facility] ACUTE REHAB UNIT  Daily Treatment Note  NAME: Jacek Powers  : 1970  MRN: [id number]    Date of Service: 2021    Discharge Recommendations:  24 hour supervision or assist, Home with Home health PT   PT Equipment Recommendations  Equipment Needed: (Will continue to assess equipment needs pending progress)    Assessment   Body structures, Functions, Activity limitations: Decreased functional mobility ; Decreased sensation;Decreased ROM; Decreased strength;Decreased endurance;Decreased balance;Decreased posture;Decreased vision/visual deficit; Decreased coordination;Decreased cognition;Decreased fine motor control;Decreased safe awareness  Assessment: Pt is increasing in ability to perform transfers noted by less assist needed compared to previous sesions. Pt is improving in her wheelchair mobility noted by being able to propel self for a further distance. Pt has L sided perceptual deficit noted by pt having difficulty navigating around objects to her L. Pt is below baseline and would benefit from continued skilled therapy to increase her independence with all functional mobility to allow for a safe retrun home. Treatment Diagnosis: Decreased independence with functional mobility. Prognosis: Good  Decision Making: High Complexity  PT Education: Plan of Care;PT Role;General Safety;Transfer Training  Barriers to Learning: Cognition  REQUIRES PT FOLLOW UP: Yes  Activity Tolerance  Activity Tolerance: Patient limited by fatigue;Patient limited by endurance   This session was a Cotreatment with PT and OT to increase pt safety and maximize therapeutic effect of session. Patient Diagnosis(es): There were no encounter diagnoses. has a past medical history of GERD (gastroesophageal reflux disease), Hernia, Obesity, and Unspecified sleep apnea. has a past surgical history that includes Splenectomy (); Total hip arthroplasty ();  Lap Band (); hernia repair sequencing of transfer demonstrating L posterior lean in sitting and standing with transfer. Ambulation  Ambulation?: No  Stairs/Curb  Stairs?: No  Wheelchair Activities  Wheelchair Size: 20\"  Wheelchair Type: Standard  Wheelchair Cushion: Standard  Propulsion: Yes  Propulsion 1  Propulsion: Manual  Level: Level Tile  Method: RUE;RLE  Level of Assistance: Minimal assistance(pt needed VC's to manuever around objects)  Description/ Details: Pt needed VC's for screening espicially to the L. Pt ran into walls and a cart on her L side,  pt able to [facility] around objects with VC's  Distance: [de-identified]' required addition time    Neuromuscular Education  Neuromuscular education: Yes  NDT Treatment: Upper extremity      Functional Movement Patterns: Pt was instructed in reaching with R UE to R and to L of midline with WB through L UE. This was to facilitate righting reaction, orienting pt to midline, WS and trunk lean. Between CGA and SBA  Pt was instructed how to  L hand with R hand with a handling technique in a way that stabilizes the L wrist and hand in order to incorporate L UE into activity. Pt was then asked to perform dynamic reaching with B UE in sitting on EOM to R and to L of midline. This was to facilitate WS, trunk lean and assist the pt with orienting herself to midline. Between CGA and SBA      Balance  Comments: sitting balance: Pt fluctuating between CGA and SBA to maintain sitting balance on EOM, due to pt appearing unsteady no moments of LOB. See above for dynamic balance details. Standing balance static: Pt needed max assist to maintain standing balance. Her L knee went into hyperextension and pt was pushing to her laterally to the L and posteriorly. Pt needed VC's and TC's for anterior WS. This was performed while OT assisted pt in lower body dressing. While sitting in chair pt was assisted in upper body dressing. See OT note for lower and upper body dressing status.                               G-Code OutComes Score                                                     AM-PAC Score             Goals  Short term goals  Time Frame for Short term goals: 2 Weeks all goals ongoing at this time  Short term goal 1: Pt will complete bed mobility with MIN A  Short term goal 2: Pt will transfer sit <> stand with MIN A  Short term goal 3: Pt will transfer bed <> chair with MIN A  Short term goal 4: Pt will propel the w/c 50' with SBA  Short term goal 5: Pt will ambulate 5' with LRAD and MOD A  Long term goals  Time Frame for Long term goals : 4 Weeks  Long term goal 1: Pt will complete bed mobility with SBA  Long term goal 2: Pt will transfer sit <> stand with SBA  Long term goal 3: Pt will transfer bed <> chair with SBA  Long term goal 4: Pt will propel the w/c 150' independently  Long term goal 5: Pt will ambulate 22' with LRAD and MIN A  Patient Goals   Patient goals : Return home and back to caring for her son    Plan    Plan  Times per week: 5x/wk for 60 minutes/day  Times per day: Daily  Current Treatment Recommendations: Strengthening, ROM, Balance Training, Endurance Training, Functional Mobility Training, Transfer Training, Gait Training, Safety Education & Training, Home Exercise Program, Patient/Caregiver Education & Training, Neuromuscular Re-education, Stair training  Safety Devices  Type of devices: Call light within reach, Left in chair, Chair alarm in place     Therapy Time   Individual Concurrent Group Co-treatment   Time In       0830   Time Out       0930   Minutes       60           [employer], SPT

## 2021-04-26 NOTE — PROGRESS NOTES
Department of Physical Medicine & Rehabilitation  Progress Note    Patient Identification:  Leora Fofana  [id number]  : 1970  Admit date: 2021    Chief Complaint: CVA    Subjective:   No acute events overnight. Pt see in the AM. Has mild pain (2/10) at the surgical site. She is improving in therapy. ROS: No f/c, n/v, cp     Objective:  Patient Vitals for the past 24 hrs:   BP Temp Temp src Pulse Resp SpO2   21 0824 118/82 98.1 °F (36.7 °C) Oral 76 17 95 %   21 0017     16    21 2000 125/70 98.8 °F (37.1 °C) Oral 81 16 95 %   21 0950 121/76 98.4 °F (36.9 °C) Oral 74 16 94 %     Const: Alert. No distress, pleasant. HEENT: Normocephalic, atraumatic. Normal sclera/conjunctiva. MMM. Incision healing well  CV: Regular rate and rhythm. Resp: No respiratory distress. Lungs CTAB. Abd: Soft, nontender, nondistended, NABS+   Ext: No edema. Neuro: Alert, oriented, appropriately interactive. Psych: Cooperative, appropriate mood and affect    Laboratory data: Available via EMR.    Last 24 hour lab  Recent Results (from the past 24 hour(s))   Renal Function Panel    Collection Time: 21  6:14 AM   Result Value Ref Range    Sodium 138 136 - 145 mmol/L    Potassium 4.0 3.5 - 5.1 mmol/L    Chloride 104 99 - 110 mmol/L    CO2 24 21 - 32 mmol/L    Anion Gap 10 3 - 16    Glucose 97 70 - 99 mg/dL    BUN 13 7 - 20 mg/dL    CREATININE <0.5 (L) 0.6 - 1.1 mg/dL    GFR Non-African American >60 >60    GFR African American >60 >60    Calcium 9.4 8.3 - 10.6 mg/dL    Phosphorus 4.5 2.5 - 4.9 mg/dL    Albumin 3.1 (L) 3.4 - 5.0 g/dL   CBC auto differential    Collection Time: 21  6:14 AM   Result Value Ref Range    WBC 16.2 (H) 4.0 - 11.0 K/uL    RBC 3.44 (L) 4.00 - 5.20 M/uL    Hemoglobin 12.2 12.0 - 16.0 g/dL    Hematocrit 35.6 (L) 36.0 - 48.0 %    .5 (H) 80.0 - 100.0 fL    MCH 35.5 (H) 26.0 - 34.0 pg    MCHC 34.3 31.0 - 36.0 g/dL    RDW 14.0 12.4 - 15.4 %    Platelets 797 (H) 135 - 450 K/uL    MPV 7.8 5.0 - 10.5 fL    Neutrophils % 62.8 %    Lymphocytes % 26.4 %    Monocytes % 7.6 %    Eosinophils % 2.2 %    Basophils % 1.0 %    Neutrophils Absolute 10.2 (H) 1.7 - 7.7 K/uL    Lymphocytes Absolute 4.3 1.0 - 5.1 K/uL    Monocytes Absolute 1.2 0.0 - 1.3 K/uL    Eosinophils Absolute 0.4 0.0 - 0.6 K/uL    Basophils Absolute 0.2 0.0 - 0.2 K/uL       Therapy progress:  PT  Position Activity Restriction  Other position/activity restrictions: Ambulate, Up with assist, Pt to wear hemet when OOB (no orders in chart regarding helmet wear, per chart review this was in therapy notes), Per pt HOB to elevated at 30*  Objective     Sit to Stand: Maximum Assistance(From bed, w/c, commode, TTB; Pt requiring VC for sequencing, facilitation for trunk flexion, ant wt shift, B LE positioning, and midline once in standing.)  Stand to sit: Maximum Assistance  Bed to Chair: Dependent/Total(bed to w/c, w/c <> commode, w/c <> TTB; Pt requiring MAX of 1 and   : Pt given step by step instruction prior to transfer. Pt requiring facilitation for wt shifting, LE placement and advancement of transfer. Consistently pushing to the L during transfers)     OT  PT Equipment Recommendations  Equipment Needed: (Will continue to assess equipment needs pending progress)  Toilet - Technique: Squat pivot  Equipment Used: Standard toilet  Assessment        SLP  Diet Solids Recommendation: Dysphagia Soft and Bite-Sized (Dysphagia III)(with regular solids as tolerated. Per pt request, gravy/sauces to keep for moist.)       Body mass index is 38.5 kg/m². Assessment and Plan:  Kaleigh Ford a 46 y.o. female with PMH GERD p/w L sided weakness and facial droop.  CTA head/neck on 4/12 revealed occluded R cervical ICA, occluded right LEAH, near complete occlusion of right MCA.     Acute ischemic CVA, Subdural heomorrhage s/p right hemicraniectomy and subsequen SAH and IP hemorrhage  No tPA given.  MRI 4/14 showing large subacute infarct throughout R LEAH and MCA with some areas of hemorrhagic conversion.  - Neurosurgery and neurology following  --SBP <160, PRN hydralazine, scheduled Norvasc  -- Keppra 500 mg twice daily   -  SQH  - PT/OT consulted      Aphasia- SLP consulted   Depression-Patient takes fluoxetine 20 mg daily at home  Anxiety -Patient takes Ativan 0.5 mg every 8 hours as needed for anxiety- holding  MISHA-Patient uses CPAP at home   GERD- Patient takes omeprazole 40 mg daily at home- half dose  Obesity    Rehab Progress: Improving  Anticipated Dispo: home  Services/DME: LIANG  ELOS: LIANG Burgos Sender, D.O. M.P.H  PM&R  4/26/2021  9:35 AM

## 2021-04-26 NOTE — PROGRESS NOTES
The eugenie wound of the Pt's surgical incision is pink and edematous. No drainage, odor, or warmth noted. Incision was washed with soap and water in the shower this AM and painted with 2X CHG swabs during morning assessment. Heis DO notified at 1350. No new orders at this time. RN will continue to monitor Pt.

## 2021-04-26 NOTE — CARE COORDINATION
Referred to patient for d/c planning. Spoke to patient. Patient is a 46year old female admitted for CVA. Patient usually lives at home with . Reports she was independent in ADLs PTA. Per medical record,  left patient on the floor all day. Spoke to patient. States she fell out of bed and told  to leave her there. Patient denies safety issues at [facility]. Explained the role of SW and will continue to follow for d/c needs. Case Management Assessment           Initial Evaluation                Date / Time of Evaluation: 4/26/2021 4:16 PM                 Assessment Completed by: Cooper Alvarado    Patient Name: Stalin Mar     YOB: 1970  Diagnosis: Acute CVA (cerebrovascular accident) [facility]) [I63.9]     Date / Time: 4/23/2021  6:23 PM    Patient Admission Status: Inpatient    If patient is discharged prior to next notation, then this note serves as note for discharge by case management. Current PCP: Tempie Councilman, MD  Clinic Patient: No    Chart Reviewed: Yes  Patient/ Family Interviewed: Yes    Initial assessment completed at bedside with: patient    Hospitalization in the last 30 days: Yes    Emergency Contacts:  Extended Emergency Contact Information  Primary Emergency Contact: Raymundo Perkins Phone: [phone]  Relation: Spouse    Advance Directives:   Code Status: Full 2021 Jermiane Beckwithy: No      Financial  Payor: Rashawn Bruce / Plan: Rashawn Bruce / Product Type: *No Product type* /     Pre-cert required for SNF: Yes    Pharmacy    [facility] [address], [address] [facility]  [address] [address] [phone]  Phone: [phone] Fax: [phone]      Potential assistance Purchasing Medications: Potential Assistance Purchasing Medications: No  Does Patient want to participate in local refill/ meds to beds program?: No    Meds To Beds General Rules:  1.  Can ONLY be done Monday- Friday between 8: 30am-5pm  2. Prescription(s) must be in pharmacy by 3pm to be filled same day  3. Copy of patient's insurance/ prescription drug card and patient face sheet must be sent along with the prescription(s)  4. Cost of Rx cannot be added to hospital bill. If financial assistance is needed, please contact unit  or ;  or  CANNOT provide pharmacy voucher for patients co-pays  5.  Patients can then  the prescription on their way out of the hospital at discharge, or pharmacy can deliver to the bedside if staff is available. (payment due at time of pick-up or delivery - cash, check, or card accepted)     Able to afford home medications/ co-pay costs: Yes    ADLS  Support Systems: Spouse/Significant Other, Friends/Neighbors, Children    PT AM-PAC:   /24  OT AM-PAC:   /24    [place]: home with  and 12year old son  Steps:     Plans to RETURN to current housing: Yes  Barriers to RETURNING to current housing: medical complications    Home Care Information  Currently ACTIVE with [address]: No  Home Care Agency: Not Applicable    Currently ACTIVE with [place] [facility]: No        Durable Medical Equipment  DME Provider:   Equipment: none noted    Home Oxygen and Respiratory Equipment  Has HOME OXYGEN prior to admission: No  [address]: Not Applicable      DISCHARGE PLAN:  Disposition: Home with [address]: TBD     Transportation PLAN for discharge: family     Factors facilitating achievement of predicted outcomes: Family support, Cooperative and Pleasant    Barriers to discharge: Medical complications    Additional Case Management Notes: see above    The Plan for Transition of Care is related to the following treatment goals of Acute CVA (cerebrovascular accident) [facility]) [I63.9]    The Patient and/or patient representative Stephy Barnhart and her family were provided with a choice of provider and agrees with the discharge plan Not Indicated

## 2021-04-27 LAB
ALBUMIN SERPL-MCNC: 3.7 G/DL (ref 3.4–5)
ANION GAP SERPL CALCULATED.3IONS-SCNC: 11 MMOL/L (ref 3–16)
BILIRUBIN URINE: NEGATIVE
BLOOD, URINE: NEGATIVE
BUN BLDV-MCNC: 11 MG/DL (ref 7–20)
CALCIUM SERPL-MCNC: 9.8 MG/DL (ref 8.3–10.6)
CHLORIDE BLD-SCNC: 101 MMOL/L (ref 99–110)
CLARITY: CLEAR
CO2: 27 MMOL/L (ref 21–32)
COLOR: YELLOW
CREAT SERPL-MCNC: 0.6 MG/DL (ref 0.6–1.1)
GFR AFRICAN AMERICAN: >60
GFR NON-AFRICAN AMERICAN: >60
GLUCOSE BLD-MCNC: 97 MG/DL (ref 70–99)
GLUCOSE URINE: NEGATIVE MG/DL
KETONES, URINE: NEGATIVE MG/DL
LEUKOCYTE ESTERASE, URINE: NEGATIVE
MICROSCOPIC EXAMINATION: NORMAL
NITRITE, URINE: NEGATIVE
PH UA: 6 (ref 5–8)
PHOSPHORUS: 4.6 MG/DL (ref 2.5–4.9)
POTASSIUM SERPL-SCNC: 4.3 MMOL/L (ref 3.5–5.1)
PROTEIN UA: NEGATIVE MG/DL
SODIUM BLD-SCNC: 139 MMOL/L (ref 136–145)
SPECIFIC GRAVITY UA: 1.02 (ref 1–1.03)
URINE REFLEX TO CULTURE: NORMAL
URINE TYPE: NORMAL
UROBILINOGEN, URINE: 0.2 E.U./DL

## 2021-04-27 PROCEDURE — 97130 THER IVNTJ EA ADDL 15 MIN: CPT

## 2021-04-27 PROCEDURE — 36415 COLL VENOUS BLD VENIPUNCTURE: CPT

## 2021-04-27 PROCEDURE — 97530 THERAPEUTIC ACTIVITIES: CPT

## 2021-04-27 PROCEDURE — 97530 THERAPEUTIC ACTIVITIES: CPT | Performed by: PHYSICAL THERAPIST

## 2021-04-27 PROCEDURE — 92526 ORAL FUNCTION THERAPY: CPT

## 2021-04-27 PROCEDURE — 99232 SBSQ HOSP IP/OBS MODERATE 35: CPT | Performed by: PHYSICAL MEDICINE & REHABILITATION

## 2021-04-27 PROCEDURE — 80069 RENAL FUNCTION PANEL: CPT

## 2021-04-27 PROCEDURE — 97112 NEUROMUSCULAR REEDUCATION: CPT

## 2021-04-27 PROCEDURE — 97129 THER IVNTJ 1ST 15 MIN: CPT

## 2021-04-27 PROCEDURE — 97535 SELF CARE MNGMENT TRAINING: CPT

## 2021-04-27 PROCEDURE — 1280000000 HC REHAB R&B

## 2021-04-27 PROCEDURE — 99232 SBSQ HOSP IP/OBS MODERATE 35: CPT | Performed by: INTERNAL MEDICINE

## 2021-04-27 PROCEDURE — 6360000002 HC RX W HCPCS: Performed by: PHYSICAL MEDICINE & REHABILITATION

## 2021-04-27 PROCEDURE — 94660 CPAP INITIATION&MGMT: CPT

## 2021-04-27 PROCEDURE — 6370000000 HC RX 637 (ALT 250 FOR IP): Performed by: PHYSICAL MEDICINE & REHABILITATION

## 2021-04-27 RX ADMIN — FAMOTIDINE 20 MG: 20 TABLET, FILM COATED ORAL at 08:22

## 2021-04-27 RX ADMIN — LEVETIRACETAM 500 MG: 500 TABLET ORAL at 21:55

## 2021-04-27 RX ADMIN — LEVETIRACETAM 500 MG: 500 TABLET ORAL at 08:23

## 2021-04-27 RX ADMIN — FLUOXETINE 20 MG: 20 CAPSULE ORAL at 08:23

## 2021-04-27 RX ADMIN — OXYCODONE HYDROCHLORIDE 5 MG: 5 TABLET ORAL at 14:16

## 2021-04-27 RX ADMIN — ACETAMINOPHEN 1000 MG: 500 TABLET, COATED ORAL at 17:09

## 2021-04-27 RX ADMIN — Medication 1 G: at 08:23

## 2021-04-27 RX ADMIN — FAMOTIDINE 20 MG: 20 TABLET, FILM COATED ORAL at 21:56

## 2021-04-27 RX ADMIN — ACETAMINOPHEN 1000 MG: 500 TABLET, COATED ORAL at 21:54

## 2021-04-27 RX ADMIN — HEPARIN SODIUM 5000 UNITS: 5000 INJECTION INTRAVENOUS; SUBCUTANEOUS at 07:01

## 2021-04-27 RX ADMIN — ATORVASTATIN CALCIUM 80 MG: 80 TABLET, FILM COATED ORAL at 21:55

## 2021-04-27 RX ADMIN — THERA TABS 1 TABLET: TAB at 08:22

## 2021-04-27 RX ADMIN — ASPIRIN 81 MG: 81 TABLET, CHEWABLE ORAL at 08:22

## 2021-04-27 RX ADMIN — THIAMINE HCL TAB 100 MG 100 MG: 100 TAB at 08:23

## 2021-04-27 RX ADMIN — Medication 1 G: at 12:19

## 2021-04-27 RX ADMIN — HEPARIN SODIUM 5000 UNITS: 5000 INJECTION INTRAVENOUS; SUBCUTANEOUS at 14:16

## 2021-04-27 RX ADMIN — AMLODIPINE BESYLATE 5 MG: 5 TABLET ORAL at 08:24

## 2021-04-27 RX ADMIN — DOCUSATE SODIUM 50 MG AND SENNOSIDES 8.6 MG 2 TABLET: 8.6; 5 TABLET, FILM COATED ORAL at 08:22

## 2021-04-27 RX ADMIN — Medication 1 G: at 17:09

## 2021-04-27 RX ADMIN — HEPARIN SODIUM 5000 UNITS: 5000 INJECTION INTRAVENOUS; SUBCUTANEOUS at 21:58

## 2021-04-27 RX ADMIN — DOCUSATE SODIUM 50 MG AND SENNOSIDES 8.6 MG 2 TABLET: 8.6; 5 TABLET, FILM COATED ORAL at 21:55

## 2021-04-27 RX ADMIN — Medication 5 MG: at 08:22

## 2021-04-27 RX ADMIN — ACETAMINOPHEN 1000 MG: 500 TABLET, COATED ORAL at 06:49

## 2021-04-27 ASSESSMENT — PAIN SCALES - GENERAL
PAINLEVEL_OUTOF10: 0
PAINLEVEL_OUTOF10: 4
PAINLEVEL_OUTOF10: 0

## 2021-04-27 ASSESSMENT — PAIN DESCRIPTION - ONSET
ONSET: SUDDEN
ONSET: ON-GOING

## 2021-04-27 ASSESSMENT — PAIN - FUNCTIONAL ASSESSMENT
PAIN_FUNCTIONAL_ASSESSMENT: ACTIVITIES ARE NOT PREVENTED
PAIN_FUNCTIONAL_ASSESSMENT: PREVENTS OR INTERFERES SOME ACTIVE ACTIVITIES AND ADLS

## 2021-04-27 ASSESSMENT — PAIN DESCRIPTION - FREQUENCY
FREQUENCY: INTERMITTENT
FREQUENCY: CONTINUOUS

## 2021-04-27 ASSESSMENT — PAIN DESCRIPTION - ORIENTATION
ORIENTATION: ANTERIOR
ORIENTATION: LEFT

## 2021-04-27 ASSESSMENT — PAIN DESCRIPTION - LOCATION
LOCATION: HEAD
LOCATION: HIP

## 2021-04-27 ASSESSMENT — PAIN DESCRIPTION - DESCRIPTORS
DESCRIPTORS: HEADACHE
DESCRIPTORS: ACHING

## 2021-04-27 ASSESSMENT — PAIN DESCRIPTION - PROGRESSION: CLINICAL_PROGRESSION: RESOLVED

## 2021-04-27 ASSESSMENT — PAIN DESCRIPTION - PAIN TYPE
TYPE: ACUTE PAIN;SURGICAL PAIN
TYPE: ACUTE PAIN

## 2021-04-27 NOTE — PROGRESS NOTES
Pt in bed, awake watching television. Complained of head hurting. Vitals and assessment completed. Nighttime medications given which included scheduled Tylenol, whole in pureed. Pt tolerated well. Changed attends because they were to small. Cleansed eugenie area with dimethicone wipes. Repositioned bed pad and pt in bed with assistance of another nurse. Brought pt a diet soda per request. Reminded pt to call for assistance with any needs. Call light within reach. Safety measures in place.

## 2021-04-27 NOTE — PLAN OF CARE
Problem: Pain:  Goal: Control of acute pain  Description: Control of acute pain  Outcome: Ongoing  Pt instructed on pain and comfort management . Scheduled Extra Strength Tylenol, rest and positioning was effective for pain control. Problem: Falls - Risk of:  Goal: Will remain free from falls  Description: Will remain free from falls  Outcome: Ongoing   No falls this shift. Pt uses call light appropriately. Problem: Skin Integrity:  Goal: Absence of new skin breakdown  Description: Absence of new skin breakdown  Outcome: Ongoing   No new skin issues noted. Monitoring scalp incision. DU Chavez OTA.     Electronically signed by David Myrick RN on 4/26/2021 at 9:07 PM

## 2021-04-27 NOTE — PROGRESS NOTES
Pt awake in bed eating breakfast. Physical assessment and vital signs as charted. Pt currently denies experiencing any pain at this time. Call light placed within reach. RN will continue to monitor Pt.

## 2021-04-27 NOTE — PROGRESS NOTES
Physical Therapy  Facility/Department: [facility] ACUTE REHAB UNIT  Daily Treatment Note  NAME: Danita Hernández  : 1970  MRN: [id number]    Date of Service: 2021    Discharge Recommendations:  24 hour supervision or assist, Home with Home health PT   PT Equipment Recommendations  Equipment Needed: (Will continue to assess equipment needs pending progress)    Assessment   Body structures, Functions, Activity limitations: Decreased functional mobility ; Decreased sensation;Decreased ROM; Decreased strength;Decreased endurance;Decreased balance;Decreased posture;Decreased vision/visual deficit; Decreased coordination;Decreased cognition;Decreased fine motor control;Decreased safe awareness  Assessment: Pt cont to be well motivated and maximizes effort noted in treatment. Pt gina the use of the tilt table w/o difficulty. Pt's synergistic patterns ( Flexor in LUE ) and( extensor in LLE )both decreased following use of the TT allowing for safer standing and transf. Pt is well below baseline and would benefit from continued skilled therapy to maximize her potential and  increase her functional mobility  towards Ind to  allow for a safer d/c to home. Treatment Diagnosis: Decreased independence with functional mobility. Prognosis: Good  Decision Making: High Complexity  PT Education: Transfer Training;Weight-bearing Education; Functional Mobility Training; Adaptive Device Training;Energy Conservation;General Safety  Barriers to Learning: Cognition  REQUIRES PT FOLLOW UP: Yes  Activity Tolerance  Activity Tolerance: Patient limited by fatigue;Patient limited by endurance     Patient Diagnosis(es): There were no encounter diagnoses. has a past medical history of GERD (gastroesophageal reflux disease), Hernia, Obesity, and Unspecified sleep apnea. has a past surgical history that includes Splenectomy (); Total hip arthroplasty (); Lap Band (); hernia repair ();   section (); and craniotomy activity. Used bridges for lat scooting in supine)  Scooting: Dependent/Total(VC for step by step instructions to move laterally. Therapy assisted with flexing the L LE to assist with bridges)  Transfers  Sit to Stand: Maximum Assistance; Moderate Assistance(Transf from w/c to standing with RUE support on therapist's shoulder)  Stand to sit: Maximum Assistance(VC to anterior WS and max A to maintain a midline position when descending)  Stand Pivot Transfers: Moderate Assistance(Transf to the R moving from tilt table to the w/c doing a SPT)  Comment: Transf from bed > tilt table was dependent. Pt req assist with flexing the L knee to perform bridges in a scooting method. However, pt was unable to completely clear hips then resulting in a a dependent transf with using a josselyn to move hips and manually assisting with moving upper body in a lateral direction  Ambulation  Ambulation?: No(unsafe to attempt at this time 2/2 deficits)  Stairs/Curb  Stairs?: No  Wheelchair Activities  Wheelchair Size: 20\"  Wheelchair Type: Standard  Wheelchair Cushion: Pressure Relieving  Wheelchair Parts Management: No  Propulsion: No  Propulsion 1  Description/ Details: Pt needed VC's for screening espicially to the L. pt able to [place] with VC's  Neuromuscular Education  Functional Movement Patterns: To increase WB in BLES with goal of inhibiting extensor tone in LLE, therapy utilized a tilt table. On TT, 2 tsraps utilized, one at waist and one just distal to knees. A rolled towel was used under B knees to avoid excessive knee hyperextension. Pt's BP once positioned in WB on TT : 113/76, MAP 87, P77. TT increased to 68 degrees  : /78, MAP 91, P102. Pt gina ~20 minutes on TT at max range and performed reaching activities in all directions. Reaching performed unilat and Bilat with focus on obtaining midline position.  (Pt deviated to the R and has L side retraction) Reaching activities also facilitated trunk elongation with targeted placement  Balance  Comments: Following TT activity, pt stood 2 x with focus on midline. Pt req facilitation to maintain midline. As fatigue increased , pt demo a heavy lean to the L resulting in increased assistance especially with stand to sit transition to guide back into w/c. Comment: For time management, therapy assisted pt with donning B shoes       Second Therapy Session     Pt was found in w/c with chair alarm on and was agreeable to therapy. Transfers  Squat pivot w/c <> mat table. ModA (VC's for pt to perfrom anterior trunk lean, VC's for pt to perform scooting in w/c)   Scooting in w/c in sitting (VC's for sequencing) Hugo to assisted with scooting L hip forward or backwards  Pt performed 2 x sit to stand on EOM (Pt with step by step instructions to perform scooting, anterior trunk lean and WS ) modA  For this transfer L leg stabilize to prevent hyperextension, Pt had a difficulty maintaining standing balance, due to pt leaning laterally to the L and posteriorly, TC's and VC's for pt to perform WS anteriorly and to the R. MaxA for standing balance  Stand to sit  Hugo (pt needed VC's to perform hip flexion)    Exercises  Pt was instructed in sitting on the EOM and maintaining balance. Mat table was elevated in increments to pt tolerance for the reason of progressively increaseing WB through B LE. Before the start of pt dynamic reaching exercises pt's L UE was placed in an air cast facilitating ER and extension. Dynamic reaching in sitting   While seated on the elevated EOM  Pt was instructed with a cognitive and also dynamic reaching task. Pt was asked to take Velcro letters that were placed on her R side and place them on the Velcro on the side of a rolling mirror anterior and laterally to the R of her. This was performed to facilitate WS and increase extension in L LE.    This was also performed with R foot on top of a ball to decrease WB through R Le and increase WB through the L. This was also to facilitate extension of the L LE when pt performed dynamic reaching to her Right. Pt performed dynamic reaching exercise with R UE with L UE in Extension WB on mat. Triceps activation was noted. Due to pt L flexion synergy PT assisted the pt in stretching their L UE into elbow extension. Pt was left in w/c with chair alarm on, call light within reach and Speech therapy present. G-Code     OutComes Score                                                     AM-PAC Score             Goals  Short term goals  Time Frame for Short term goals: 2 weeks  Short term goal 1: Pt will complete bed mobility with MIN A. Ongoing  Short term goal 2: Pt will transfer sit <> stand with MIN A, Ongoing  Short term goal 3: Pt will transfer bed <> chair with MIN A. Ongoing  Short term goal 4: Pt will propel the w/c 48' with SBA. Ongoing  Short term goal 5: Pt will ambulate 5' with LRAD and MOD A. Ongoing  Long term goals  Time Frame for Long term goals : 4 Weeks  Long term goal 1: Pt will complete bed mobility with SBA. Ongoing  Long term goal 2: Pt will transfer sit <> stand with SBA. Ongoing  Long term goal 3: Pt will transfer bed <> chair with SBA. Ongoing  Long term goal 4: Pt will propel the w/c 150' independently. Ongoing  Long term goal 5: Pt will ambulate 22' with LRAD and MIN A.  Ongoing  Patient Goals   Patient goals : Return home and back to caring for her son    Plan    Plan  Times per week: 5x/wk for 60 minutes/day  Times per day: Daily  Current Treatment Recommendations: Strengthening, ROM, Balance Training, Endurance Training, Functional Mobility Training, Transfer Training, Gait Training, Safety Education & Training, Home Exercise Program, Patient/Caregiver Education & Training, Neuromuscular Re-education, Stair training  Safety Devices  Type of devices: Call light within reach, Left in chair, Chair alarm in place     Therapy Time   Individual Concurrent Group Co-treatment Time In       0830   Time Out       0930   Minutes       60       Second Therapy Time   Individual Concurrent Group Co-treatment   Time In  1115        Time Out  1210        Minutes  55            treatment time 60 + 55 = [address] [facility],[address], [place]   Maranda Rosenbaum SPT, documenting and treating therapist for second session

## 2021-04-27 NOTE — PROGRESS NOTES
ACUTE REHAB UNIT  SPEECH/LANGUAGE PATHOLOGY      [x] Daily  [] Weekly Care Conference Note  [] Discharge    Patient:Laura Andrade      PTU:  FS  Rehab Dx/Hx: Acute CVA (cerebrovascular accident) ([facility] [address] [I63.9]    Precautions: [x] Aspiration  [x] Fall risk  [] Sternal  [] Seizure [] Hip  [] Weight Bearing [] Other  ST Dx: [] Aphasia  [] Dysarthria  [] Apraxia   [x] Oropharyngeal dysphagia [x] Cognitive Impairment  [] Other:   Date of Admit: 2021  Room #: 3101/3101-01  Date: 2021          Current Diet Order:DIET GENERAL; No Added Salt (3-4 GM); Dysphagia Soft and Bite-Sized; Daily Fluid Restriction: 1500 ml  Dietary Nutrition Supplements: Low Calorie High Protein Supplement   Recommended Form of Meds: PO  Compensatory Swallowing Strategies: Alternate solids and liquids, Upright as possible for all oral intake, Check for pocketing of food on the Left, Small bites/sips, Lingual sweep     Dentition: Adequate  Vision  Vision: Impaired  Vision Exceptions: Visual field cut  Hearing  Hearing: Within functional limits  Barriers toward progress: None towards stated goals      Date: 2021      Tx session 1 Tx session 2   Total Timed Code Min 30 15   Total Treatment Minutes 30 30   Individual Treatment Minutes 30 30   Group Treatment Minutes 0 0   Co-Treat Minutes 0 0   Brief Exception: 0 N/A   Pain Denied None indicated   Pain Intervention: [] RN notified  [] Repositioned  [] Intervention offered and patient declined  [x] N/A  [] Other:    [] RN notified  [] Repositioned  [] Intervention offered and patient declined  [x] N/A  [] Other:   Subjective:     Patient awake, alert, pleasant, agreeable to treatment session. Seated up in w/c. Pt upright in w/c upon entry finishing with PT student. Seen with meal tray. Objective / Goals:       Patient will consume regular solids with timely mastication and no pocketing in L buccal cavity across 2 sessions. Did not target.  Analyzed with regular solids and thin liquids. Pt mildly impulsive with bigger bites this session; suspect this caused increased PO pocketed in L buccal cavity. Moderate amount of residue in L buccal cavity with some unmasticated solids. Pt able to clear with lingual sweep, however, inconsistently effective. Pt also utilized finger sweep to clear. Independently recalled swallowing concerns (mastication and pocketing). Patient will consume PO without anterior spillage or overt pocketing across 2 sessions. Did not target. x2 anterior spillage of regular solids with no apparent awareness or clearance. Pt will complete divided/alternating attention tasks with 85% accuracy given min cues. Targeted via mental manipulation task; required min cues for accuracy. Required min cues for redirecting attention back to meal.     Pt will accurately complete executive functioning tasks with min cues. Targeted mental math task; required mod cues. Min to mod cues for propulsion through meal. Functional problem-solving with min cues. Independent recall of previous tx activities. Pt will attend to L visual field with min cues. Patient required max cues for attention on left. Max fading to mod cues to attend to communication partner and items on meal tray on L side. Pt with x1 spontaneous complete head turn to L to attend to window and comment about the weather. Pt will participate in ongoing cognitive linguistic assessment. Not directly targeted. Goal not targeted this session. Other areas targeted: NA    Education:   Educated pt to purpose of visit, swallow function, recommendations, strategies. Education re: rationale for tx, areas targeted, and compensatory strategies for oral phase of dysphagia. Educated to diet consistency options and recommendation for continuing soft and bite sized at this time given increased impulsivity and pocketing with regular solids - pt in agreement.      Safety Devices: [x] Call light within reach  [x] Chair alarm activated and connected to nurse call light system  [] Bed alarm activated   [] Other:    [x] Call light within reach  [x] Chair alarm activated and connected to nurse call light system  [] Bed alarm activated  [] Other:    Assessment: Oral phase dysphagia with reduced anterior to posterior bolus propulsion resulting in pocketing of L buccal cavity, as well as mildly prolonged mastication. Pt with mild anterior loss of PO. Pt in agreement to continue soft and bite sized at this time as increased impulsivity and pocketing exhibited with regular solids. Cognitive-linguistic impairment marked by L sided inattention, impulsivity, and reduced initiation. Plan: Continue per POC. Interventions used this date:  [] Speech/Language Treatment  [] Instruction in HEP  [x] Dysphagia Treatment [x] Cognitive Treatment   [] Other:    Discharge recommendations:  [] Home independently  [x] Home with assistance []  24 hour supervision  [] ECF [] Other  Continued Tx Upon Discharge: ? [x] Yes    [] No    [] TBD based on progress while on ARU     [] Vital Stim indicated     [] Other:   Estimated discharge date: Not yet established      Electronically signed by: Tx Session 1:  Gaynel Goodell, Lynnae Georgis., [place] Guardian [id number]  Speech-Language Pathologist    Tx Session 2:  Dileep Fernandez   Speech-Language Pathology Graduate Clinician     John Rodgers, [address] [place] [facility]-SL[facility]; [id number]  Speech-Language Pathologist    The speech-language pathologist was present, directed the patient's care, made skilled judgment and was responsible for assessment and treatment.

## 2021-04-27 NOTE — PROGRESS NOTES
Occupational Therapy  Facility/Department: [facility] ACUTE REHAB UNIT  Daily Treatment Note  NAME: Carmelo Lagunas  : 1970  MRN: [id number]    Date of Service: 2021    Discharge Recommendations:  24 hour supervision or assist, Home with Home health OT, Home with nursing aide, Continue to assess pending progress  OT Equipment Recommendations  Equipment Needed: Yes  ADL Assistive Devices: Transfer Tub Bench  Other: continue to assess    Assessment   Performance deficits / Impairments: Decreased functional mobility ; Decreased ADL status; Decreased ROM; Decreased strength;Decreased sensation;Decreased fine motor control;Decreased endurance;Decreased posture;Decreased balance;Decreased safe awareness;Decreased coordination;Decreased vision/visual deficit; Decreased cognition  Assessment: Pt progressed to Mod A x for STS transfer but requires cues and significant facilitation to maintain midline orientation d/t left lateral lean/pushing and prevention of knee hyperextension. Pt tolerated prolonged stance in tilt table without adverse effects. Pt continues to require 2 person assist for toileting to maintain stance and for LB clothing mgmt. Pt functioning significantly below baseline, cont per OT POC. Treatment Diagnosis: decreased independence with ADLs and decreased functional mobility 2/2 CVA  Prognosis: Fair  OT Education: Plan of Care;Precautions; ADL Adaptive Strategies;Transfer Training  Patient Education: pt educated on transfer training, positioning of LUE-continue to reinforce  REQUIRES OT FOLLOW UP: Yes  Activity Tolerance  Activity Tolerance: Patient Tolerated treatment well  Safety Devices  Safety Devices in place: Yes  Type of devices: Call light within reach; Chair alarm in place; Left in chair;Nurse notified         Patient Diagnosis(es): There were no encounter diagnoses. has a past medical history of GERD (gastroesophageal reflux disease), Hernia, Obesity, and Unspecified sleep apnea.    has a past surgical history that includes Splenectomy (); Total hip arthroplasty (); Lap Band (); hernia repair ();  section (); and craniotomy (Right, 2021). Restrictions  Position Activity Restriction  Other position/activity restrictions: Ambulate, Up with assist, Pt to wear helmet when OOB (no orders in chart regarding helmet wear, per chart review this was in therapy notes), Per pt HOB to elevated at 30*  Subjective   General  Chart Reviewed: Yes  Patient assessed for rehabilitation services?: Yes  Additional Pertinent Hx: 46 y.o. female with hypertension and tobacco abuse who presented after spending a prolonged period on the ground s/p fall out of bed; found to have acute left hemiparesis and gaze deviation. CXR with no acute cardiopulmonary abnormality. CTH revealed large acute/subacute infarct in the R frontal lobe with associated mass effect and 4 mm R to L midline shift. It also noted subdural hemorrhage of 6mm along falx, although NSGY less convinced. CTA head and neck revealed occluded R cervical ICA & R anterior cerebral artery with near complete occlusion of R MCA. Outside window on TPA. Pt is now POD #1 following hemicraniectomy (). Pt admitted to ARU   Response to previous treatment: Patient with no complaints from previous session  Family / Caregiver Present: No  Referring Practitioner: Dr. Mckenna Maldonado DO  Diagnosis: CVA  Subjective  Subjective: Pt supine in bed upon arrival eating breakfast, pleasant and agreeable to OT/PT cotx to maximize independence with functional transfers with collaboration of 2 disciplines. General Comment  Comments: Khoa Batres Vital Signs  Patient Currently in Pain: Yes(3/10 HA)   Orientation  Orientation  Overall Orientation Status: Within Functional Limits  Objective    ADL  LE Dressing: Dependent/Total(to don shoes supine in bed)        Balance  Standing Balance:  Moderate assistance  Standing Balance  Time: ~20 minutes total  Activity: in stance on tilt table, x 2 static stance  Comment: Pt req Mod A to maintain stance, heavy lateral lean to L req TCs and VCs to orient to midline; pt participated in stance in tilt table see Neuromuscular Education for details  Bed mobility  Scooting: (VC for step by step instructions to move laterally from bed<tilt table; assist to flex LLE to assist with bridging, assist to move trunk)  Transfers  Stand Pivot Transfers: Moderate assistance(from tilt table to w/c, cues to sequence and for hand placement)  Sit to stand: Moderate assistance(from w/c x 2, cues for ant lean)         Neuromuscular Education    Functional Movement Patterns: To increase WB in BLES with goal of inhibiting extensor tone in LLE, therapy utilized a tilt table in which 2 straps were utilized, one at waist and one just distal to knees. A rolled towel was used under B knees to avoid excessive knee hyperextension. Pt's BP once positioned in WB on TT : 113/76, MAP 87, Pulse 77. TT increased to 68 degrees  : /78, MAP 91, Pulse 102 with no adverse effects. Pt gina ~20 minutes on TT at max range and performed reaching activities in all directions ipsilaterally and contralaterally using RUE in order to challenge righting reactions and promote WSing. (Pt deviated to the R and has L side retraction) Reaching activities also facilitated trunk elongation with targeted placement and cues to attend to L visual field. Pt performed all reaching tasks with CGA-SBA. Cognition  Overall Cognitive Status: Exceptions  Arousal/Alertness: Delayed responses to stimuli  Following Commands: Follows one step commands consistently; Follows one step commands with increased time  Attention Span: Appears intact  Memory: Appears intact  Safety Judgement: Decreased awareness of need for assistance;Decreased awareness of need for safety  Problem Solving: Assistance required to generate solutions;Assistance required to correct errors made;Assistance required to implement solutions;Decreased awareness of errors  Insights: Decreased awareness of deficits  Initiation: Requires cues for some  Sequencing: Requires cues for some  Cognition Comment: pt with decreased insight into deficits demonstrating decreased frustration tolerance and stating desire to be back to baseline ASAP, required emotional support to come to terms with functional status and work towards progress, pt highly motivated           Positioning  Wheelchair Postion Comment: folded towel placed on left side of w/c to offload weight on L hip to promote midline sitting posture and decrease left lateral lean          Second Session: Pt sitting in w/c upon arrival finishing lunch, pt with minimal head turning to L with therapist sitting on couch to left of pt and pt with overt R gaze preference despite speaker to left of pt. Pt requesting to use bathroom. Pt dependently taken into bathroom in w/c and setup for toilet transfer. Pt completed stand pivot transfer from w/c to standard toilet with Max A, (Mod A to stand from w/c but d/t hyperextension of bilateral knees pt had difficulty pivoting to toilet, cues for ant lean and hand placement). Pt dependent for toileting req Mod A to maintain stance and total A of another to manage LB clothing on/off hips. Pt initially demo left lateral lean while seated on toilet but with VCs to reach with RUE outside JOSETTE pt able to correct and sit midline. ++time spent in attempt to have BM but pt unsuccessful. Pt req step by step VCs to sequence transfer and hand placement, improved pivoting when leading to L. Pt requesting to get back to bed. Squat pivot from w/c<EOB with Mod A, step by step VCs and momentum strategies. Pt able to scoot posteriorly once seated on bed with CGA. Pt req min A x 2 for sit<supine req assistance to manage LLE and trunk. LUE positioned in elbow extension, ER and abduction.  Pt left in bed at end of session with bed alarm engaged, call light within reach and all needs met.       Plan   Plan  Times per week: 5x a week for 60 mins daily  Current Treatment Recommendations: Strengthening, Endurance Training, Neuromuscular Re-education, Self-Care / ADL, Functional Mobility Training, Safety Education & Training, ROM, Positioning, Wheelchair Mobility Training, Balance Training, Patient/Caregiver Education & Training, Manual Therapy:  MLD, Equipment Evaluation, Education, & procurement, Home Management Training, Cognitive Reorientation    Goals  Short term goals  Time Frame for Short term goals: 2 weeks-all ongoing  Short term goal 1: Pt will complete toilet transfer w/ Mod A  Short term goal 2: Pt will complete UE dressing w/ Mod A  Short term goal 3: Pt will complete oral care w/ spvn seated at sink w/o VCs for L sided attention  Short term goal 4: Pt will complete LE dressing w/ Mod A  Long term goals  Time Frame for Long term goals : 4 weeks-all ongoing  Long term goal 1: Pt will complete toilet transfer w/ SBA  Long term goal 2: Pt will complete UE dressing w/ setup  Long term goal 3: Pt will complete LE dressing w/ SBA  Long term goal 4: Pt will be independent w/ tone management exercises to improve functional use of LUE  Long term goal 5: Pt will complete oral care I'ly  Patient Goals   Patient goals : \"get back to my normal self\"       Therapy Time   Individual Concurrent Group Co-treatment   Time In 1245     0830   Time Out 1325     0930   Minutes 40     60    Timed Code Treatment Minutes: 60 Minutes   Total Treatment Time: 60 + 67=470 Minutes       Cash Reeder, OT

## 2021-04-27 NOTE — PLAN OF CARE
Problem: Falls - Risk of:  Goal: Will remain free from falls  Description: Will remain free from falls  4/27/2021 1010 by Maria M Tran RN  Outcome: Ongoing  Note: Pt educated to use her call light when she needs assistance. Pt also educated not to get up unassisted at this time. Bed alarm on when Pt in bed and chair alarm on when Pt up in chair. Non skid socks on and call light placed within reach. RN will continue to monitor Pt.    4/27/2021 1010 by Maria M Tran RN  Outcome: Ongoing  4/27/2021 0246 by Agatha Matthews RN  Outcome: Ongoing  Note: Pt with history of falls. Up with max assistance, gait belt and stand pivot. LUE & LLE  are flaccid. Fall precautions maintained. Fall risk armband on. Non skid footwear on. Bed in lowest position. Bed alarm in use. Reminded pt to call for assistance with any needs. Call light within reach. Pt uses call light appropriately. No falls thus far during shift.    4/26/2021 2103 by Abram Mueller RN  Outcome: Ongoing

## 2021-04-27 NOTE — PROGRESS NOTES
Office : [phone]     Fax :[phone]         Renal Progress Note  Subjective:   Admit Date: 4/23/2021     HPI      Interval History:     Good UO   No Diarrhea   Na stable       DIET DIET GENERAL; No Added Salt (3-4 GM); Dysphagia Soft and Bite-Sized; Daily Fluid Restriction: 1500 ml  Dietary Nutrition Supplements: Low Calorie High Protein Supplement  Medications:   Scheduled Meds:   atorvastatin  80 mg Oral Nightly    acetaminophen  1,000 mg Oral 4 times per day    bisacodyl  5 mg Oral Daily    amLODIPine  5 mg Oral Daily    aspirin  81 mg Oral Daily    famotidine  20 mg Oral BID    FLUoxetine  20 mg Oral Daily    heparin (porcine)  5,000 Units Subcutaneous 3 times per day    levETIRAcetam  500 mg Oral BID    multivitamin  1 tablet Oral Daily    sennosides-docusate sodium  2 tablet Oral BID    sodium chloride  1 g Oral TID WC    thiamine mononitrate  100 mg Oral Daily     Continuous Infusions:    Labs:  CBC:   Recent Labs     04/26/21  0614   WBC 16.2*   HGB 12.2   *     BMP:    Recent Labs     04/25/21  0719 04/26/21  0614    138   K 3.9 4.0    104   CO2 23 24   BUN 16 13   CREATININE <0.5* <0.5*   GLUCOSE 107* 97     Ca/Mg/Phos:   Recent Labs     04/25/21  0719 04/26/21  0614   CALCIUM 9.5 9.4   PHOS 4.3 4.5     Hepatic: No results for input(s): AST, ALT, ALB, BILITOT, ALKPHOS in the last 72 hours. Troponin: No results for input(s): TROPONINI in the last 72 hours. BNP: No results for input(s): BNP in the last 72 hours. Lipids: No results for input(s): CHOL, TRIG, HDL, LDLCALC, LABVLDL in the last 72 hours.   ABGs: No results for input(s): PHART, PO2ART, SRU3PZH in the last 72 hours. INR: No results for input(s): INR in the last 72 hours. UA:No results for input(s): Apoorva Inch, GLUCOSEU, BILIRUBINUR, Tyler Clutter, BLOODU, PHUR, PROTEINU, UROBILINOGEN, NITRU, LEUKOCYTESUR, LABMICR, URINETYPE in the last 72 hours. Urine Microscopic: No results for input(s): LABCAST, BACTERIA, COMU, HYALCAST, WBCUA, RBCUA, EPIU in the last 72 hours. Urine Culture: No results for input(s): LABURIN in the last 72 hours. Urine Chemistry: No results for input(s): Jhonnie Dixons, PROTEINUR, NAUR in the last 72 hours.     Objective:   Vitals: /82   Pulse 76   Temp 98.1 °F (36.7 °C) (Oral)   Resp 17   Ht 5' 6\" (1.676 m)   Wt 238 lb 8.6 oz (108.2 kg)   SpO2 95%   BMI 38.50 kg/m²    Wt Readings from Last 3 Encounters:   04/23/21 238 lb 8.6 oz (108.2 kg)   04/15/21 238 lb 8.6 oz (108.2 kg)   04/13/21 249 lb 1.9 oz (113 kg)      24HR INTAKE/OUTPUT:      Intake/Output Summary (Last 24 hours) at 4/26/2021 2233  Last data filed at 4/26/2021 1801  Gross per 24 hour   Intake 630 ml   Output    Net 630 ml     Constitutional:  Alert, awake, no apparent distress  NECK: supple, no JVD  Cardiovascular:  S1, S2 without m/r/g  Respiratory:  CTA B without w/r/r  Abdomen: +bs, soft, nt  Ext: +,LE edema    Assessment and Plan:       IMAGING:  No orders to display       Assessment/Plan     SIADH   CVA   Electrolyte imbalance   HTN     Plan   - check NA in am   - ur studies   - Fluid restriction   - labs in am   - PT/OT          Stephan Rodriguez MD  [facility] [address] [place], [place] [phone]  [phone]

## 2021-04-27 NOTE — PROGRESS NOTES
Neurosurg NP at bedside to assess Pt's surgical incision at 1725. Nursing communication order placed by Shravan Casey NP at this time to remove staples from surgical incision as Pt is 14 days postop today. Surgical incision cleansed with warm soap and water. Incision gently patted dry with a clean towel and swabbed with X2 CHG swabs. Staples removed at this time per nursing communication order. Pt tolerated procedure fairly well and only had a scant amount of serosanguinous drainage status post removal. Surgical incision swabbed again after staple removal with X2 CHG swabs as ordered. Call light placed within reach. Bedside report given to oncoming shift RN who will continue to monitor Pt.

## 2021-04-27 NOTE — PROGRESS NOTES
Department of Physical Medicine & Rehabilitation  Progress Note    Patient Identification:  Ronda Duran  [id number]  : 1970  Admit date: 2021    Chief Complaint: CVA    Subjective:   No acute events overnight. Pt seen at resting comfortably on chair and working with Speech. States that she had a HA tx with tylenol. Continues to have LHS weakness; no significant improvement. ROS: No f/c, n/v, cp     Objective:  Patient Vitals for the past 24 hrs:   BP Temp Temp src Pulse Resp SpO2   21 0818 114/72 98.3 °F (36.8 °C) Oral 71 18 96 %   21 0155     18 95 %   21 2200 139/76 97.9 °F (36.6 °C) Oral 67 18 96 %     Const: Alert. No distress, pleasant. HEENT: Normocephalic, atraumatic. Normal sclera/conjunctiva. MMM. Incision healing well  CV: Regular rate and rhythm. Resp: No respiratory distress. Lungs CTAB. Abd: Soft, nontender, nondistended, NABS+   Ext: No edema. Neuro: Alert, oriented, appropriately interactive. Psych: Cooperative, appropriate mood and affect    Laboratory data: Available via EMR.    Last 24 hour lab  Recent Results (from the past 24 hour(s))   Urine Reflex to Culture    Collection Time: 21  9:40 PM    Specimen: Urine, clean catch   Result Value Ref Range    Color, UA Yellow Straw/Yellow    Clarity, UA Clear Clear    Glucose, Ur Negative Negative mg/dL    Bilirubin Urine Negative Negative    Ketones, Urine Negative Negative mg/dL    Specific Gravity, UA 1.025 1.005 - 1.030    Blood, Urine Negative Negative    pH, UA 6.0 5.0 - 8.0    Protein, UA Negative Negative mg/dL    Urobilinogen, Urine 0.2 <2.0 E.U./dL    Nitrite, Urine Negative Negative    Leukocyte Esterase, Urine Negative Negative    Microscopic Examination Not Indicated     Urine Type Voided     Urine Reflex to Culture Not Indicated    Renal Function Panel    Collection Time: 21  6:04 AM   Result Value Ref Range    Sodium 139 136 - 145 mmol/L    Potassium 4.3 3.5 - 5.1 mmol/L Chloride 101 99 - 110 mmol/L    CO2 27 21 - 32 mmol/L    Anion Gap 11 3 - 16    Glucose 97 70 - 99 mg/dL    BUN 11 7 - 20 mg/dL    CREATININE 0.6 0.6 - 1.1 mg/dL    GFR Non-African American >60 >60    GFR African American >60 >60    Calcium 9.8 8.3 - 10.6 mg/dL    Phosphorus 4.6 2.5 - 4.9 mg/dL    Albumin 3.7 3.4 - 5.0 g/dL       Therapy progress:  PT  Position Activity Restriction  Other position/activity restrictions: Ambulate, Up with assist, Pt to wear hemet when OOB (no orders in chart regarding helmet wear, per chart review this was in therapy notes), Per pt HOB to elevated at 30*  Objective     Sit to Stand: Moderate Assistance, Maximum Assistance(VC's for pt to perform anterior trunk lean,)  Stand to sit: Maximum Assistance  Bed to Chair: Dependent/Total(bed to w/c, w/c <> commode, w/c <> TTB; Pt requiring MAX of 1 and   : Pt given step by step instruction prior to transfer. Pt requiring facilitation for wt shifting, LE placement and advancement of transfer. Consistently pushing to the L during transfers)     OT  PT Equipment Recommendations  Equipment Needed: (Will continue to assess equipment needs pending progress)  Toilet - Technique: Squat pivot  Equipment Used: Standard toilet  Assessment        SLP  Diet Solids Recommendation: Dysphagia Soft and Bite-Sized (Dysphagia III)(with regular solids as tolerated. Per pt request, gravy/sauces to keep for moist.)       Body mass index is 38.5 kg/m². Assessment and Plan:  [place] a 46 y.o. female with PMH GERD p/w L sided weakness and facial droop.  CTA head/neck on 4/12 revealed occluded R cervical ICA, occluded right LEAH, near complete occlusion of right MCA.     Acute ischemic CVA, Subdural heomorrhage s/p right hemicraniectomy and subsequen SAH and IP hemorrhage  No tPA given.  MRI 4/14 showing large subacute infarct throughout R LEAH and MCA with some areas of hemorrhagic conversion.  - Neurosurgery and neurology following  --SBP <160, PRN

## 2021-04-27 NOTE — PLAN OF CARE
Problem: Pain:  Goal: Control of acute pain  Description: Control of acute pain  4/27/2021 0246 by Mainor Suarez RN  Outcome: Ongoing  Note: Pt complained of pain to head at surgical incision site. Scheduled Tylenol given. Offered Oxycodone, pt refused. Will continue to assess pain and offer again Oxycodone if no relief from pain with Tylenol. Pt advised a decrease in pain. Problem: Falls - Risk of:  Goal: Will remain free from falls  Description: Will remain free from falls  4/27/2021 0246 by Mainor Suarez RN  Outcome: Ongoing  Note: Pt with history of falls. Up with max assistance, gait belt and stand pivot. LUE & LLE  are flaccid. Fall precautions maintained. Fall risk armband on. Non skid footwear on. Bed in lowest position. Bed alarm in use. Reminded pt to call for assistance with any needs. Call light within reach. Pt uses call light appropriately. No falls thus far during shift. Problem: Skin Integrity:  Goal: Will show no infection signs and symptoms  Description: Will show no infection signs and symptoms  Outcome: Ongoing  Note: Pt with surgical incision to right side of head. Staples in place. No redness or drainage to incision. Pt afebrile. Surgical site shows no s/sx of infection at this time. Will continue to assess q shift and prn and provide interventions as needed.

## 2021-04-27 NOTE — PROGRESS NOTES
Office : [phone]     Fax :[phone]         Renal Progress Note  Subjective:   Admit Date: 4/23/2021     HPI      Interval History:     Good UO   No Diarrhea   Na stable       DIET DIET GENERAL; No Added Salt (3-4 GM); Dysphagia Soft and Bite-Sized; Daily Fluid Restriction: 1500 ml  Dietary Nutrition Supplements: Low Calorie High Protein Supplement  Medications:   Scheduled Meds:   atorvastatin  80 mg Oral Nightly    acetaminophen  1,000 mg Oral 4 times per day    bisacodyl  5 mg Oral Daily    amLODIPine  5 mg Oral Daily    aspirin  81 mg Oral Daily    famotidine  20 mg Oral BID    FLUoxetine  20 mg Oral Daily    heparin (porcine)  5,000 Units Subcutaneous 3 times per day    levETIRAcetam  500 mg Oral BID    multivitamin  1 tablet Oral Daily    sennosides-docusate sodium  2 tablet Oral BID    sodium chloride  1 g Oral TID WC    thiamine mononitrate  100 mg Oral Daily     Continuous Infusions:    Labs:  CBC:   Recent Labs     04/26/21  0614   WBC 16.2*   HGB 12.2   *     BMP:    Recent Labs     04/25/21  0719 04/26/21  0614 04/27/21  0604    138 139   K 3.9 4.0 4.3    104 101   CO2 23 24 27   BUN 16 13 11   CREATININE <0.5* <0.5* 0.6   GLUCOSE 107* 97 97     Ca/Mg/Phos:   Recent Labs     04/25/21  0719 04/26/21  0614 04/27/21  0604   CALCIUM 9.5 9.4 9.8   PHOS 4.3 4.5 4.6     Hepatic: No results for input(s): AST, ALT, ALB, BILITOT, ALKPHOS in the last 72 hours. Troponin: No results for input(s): TROPONINI in the last 72 hours. BNP: No results for input(s): BNP in the last 72 hours.   Lipids: No results for input(s): CHOL, TRIG, HDL, LDLCALC, LABVLDL in the last 72 hours. ABGs: No results for input(s): PHART, PO2ART, BHB2CUV in the last 72 hours. INR: No results for input(s): INR in the last 72 hours. UA:  Recent Labs     04/26/21  2140   COLORU Yellow   CLARITYU Clear   GLUCOSEU Negative   BILIRUBINUR Negative   KETUA Negative   SPECGRAV 1.025   BLOODU Negative   PHUR 6.0   PROTEINU Negative   UROBILINOGEN 0.2   NITRU Negative   LEUKOCYTESUR Negative   LABMICR Not Indicated   URINETYPE Voided      Urine Microscopic: No results for input(s): LABCAST, BACTERIA, COMU, HYALCAST, WBCUA, RBCUA, EPIU in the last 72 hours. Urine Culture: No results for input(s): LABURIN in the last 72 hours. Urine Chemistry: No results for input(s): Jhonnie Dixons, PROTEINUR, NAUR in the last 72 hours.     Objective:   Vitals: /72   Pulse 71   Temp 98.3 °F (36.8 °C) (Oral)   Resp 18   Ht 5' 6\" (1.676 m)   Wt 238 lb 8.6 oz (108.2 kg)   SpO2 96%   BMI 38.50 kg/m²    Wt Readings from Last 3 Encounters:   04/23/21 238 lb 8.6 oz (108.2 kg)   04/15/21 238 lb 8.6 oz (108.2 kg)   04/13/21 249 lb 1.9 oz (113 kg)      24HR INTAKE/OUTPUT:      Intake/Output Summary (Last 24 hours) at 4/27/2021 1033  Last data filed at 4/26/2021 1801  Gross per 24 hour   Intake 540 ml   Output    Net 540 ml     Constitutional:  Alert, awake, no apparent distress  NECK: supple, no JVD  Cardiovascular:  S1, S2 without m/r/g  Respiratory:  CTA B without w/r/r  Abdomen: +bs, soft, nt  Ext: +,LE edema    Assessment and Plan:       IMAGING:  No orders to display       Assessment/Plan     SIADH   CVA   Electrolyte imbalance   HTN     Plan   - check NA in am   - ur studies   - Fluid restriction   - labs in am   - PT/OT          Stephan Rodriguez MD  [phone]  BFC-[phone]

## 2021-04-27 NOTE — PROGRESS NOTES
SHIFT: 0700 - 1930  Pt this shift was stable. Alert and oriented. VS were stable. Pt denied having chest pain or SOB. Pt was continent and in continent of bladder and continent of bowel. Pt is with poor appetite. Scalp incision was with staples and well approximated. Pt participated well in therapies. Pt at end of shift was resting in bed with bed in low position and call light was within reach. Bed alarm on to promote pt safety.     Electronically signed by Madisyn Sultana RN on 4/26/2021 at 9:14 PM

## 2021-04-28 LAB
ALBUMIN SERPL-MCNC: 3.3 G/DL (ref 3.4–5)
ANION GAP SERPL CALCULATED.3IONS-SCNC: 12 MMOL/L (ref 3–16)
ANION GAP SERPL CALCULATED.3IONS-SCNC: 12 MMOL/L (ref 3–16)
BASOPHILS ABSOLUTE: 0.2 K/UL (ref 0–0.2)
BASOPHILS RELATIVE PERCENT: 1.2 %
BUN BLDV-MCNC: 11 MG/DL (ref 7–20)
BUN BLDV-MCNC: 11 MG/DL (ref 7–20)
CALCIUM SERPL-MCNC: 9.5 MG/DL (ref 8.3–10.6)
CALCIUM SERPL-MCNC: 9.6 MG/DL (ref 8.3–10.6)
CHLORIDE BLD-SCNC: 102 MMOL/L (ref 99–110)
CHLORIDE BLD-SCNC: 102 MMOL/L (ref 99–110)
CO2: 23 MMOL/L (ref 21–32)
CO2: 23 MMOL/L (ref 21–32)
CREAT SERPL-MCNC: 0.6 MG/DL (ref 0.6–1.1)
CREAT SERPL-MCNC: 0.6 MG/DL (ref 0.6–1.1)
EOSINOPHILS ABSOLUTE: 0.5 K/UL (ref 0–0.6)
EOSINOPHILS RELATIVE PERCENT: 3.8 %
GFR AFRICAN AMERICAN: >60
GFR AFRICAN AMERICAN: >60
GFR NON-AFRICAN AMERICAN: >60
GFR NON-AFRICAN AMERICAN: >60
GLUCOSE BLD-MCNC: 93 MG/DL (ref 70–99)
GLUCOSE BLD-MCNC: 95 MG/DL (ref 70–99)
HCT VFR BLD CALC: 36.6 % (ref 36–48)
HEMOGLOBIN: 12.6 G/DL (ref 12–16)
LYMPHOCYTES ABSOLUTE: 3.7 K/UL (ref 1–5.1)
LYMPHOCYTES RELATIVE PERCENT: 25.5 %
MCH RBC QN AUTO: 35.9 PG (ref 26–34)
MCHC RBC AUTO-ENTMCNC: 34.6 G/DL (ref 31–36)
MCV RBC AUTO: 103.8 FL (ref 80–100)
MONOCYTES ABSOLUTE: 1.2 K/UL (ref 0–1.3)
MONOCYTES RELATIVE PERCENT: 8 %
NEUTROPHILS ABSOLUTE: 8.9 K/UL (ref 1.7–7.7)
NEUTROPHILS RELATIVE PERCENT: 61.5 %
PDW BLD-RTO: 13.9 % (ref 12.4–15.4)
PHOSPHORUS: 4.2 MG/DL (ref 2.5–4.9)
PLATELET # BLD: 592 K/UL (ref 135–450)
PMV BLD AUTO: 7.8 FL (ref 5–10.5)
POTASSIUM REFLEX MAGNESIUM: 3.8 MMOL/L (ref 3.5–5.1)
POTASSIUM SERPL-SCNC: 3.8 MMOL/L (ref 3.5–5.1)
RBC # BLD: 3.52 M/UL (ref 4–5.2)
SODIUM BLD-SCNC: 137 MMOL/L (ref 136–145)
SODIUM BLD-SCNC: 137 MMOL/L (ref 136–145)
WBC # BLD: 14.5 K/UL (ref 4–11)

## 2021-04-28 PROCEDURE — 85025 COMPLETE CBC W/AUTO DIFF WBC: CPT

## 2021-04-28 PROCEDURE — 36415 COLL VENOUS BLD VENIPUNCTURE: CPT

## 2021-04-28 PROCEDURE — 97129 THER IVNTJ 1ST 15 MIN: CPT

## 2021-04-28 PROCEDURE — 1280000000 HC REHAB R&B

## 2021-04-28 PROCEDURE — 97535 SELF CARE MNGMENT TRAINING: CPT

## 2021-04-28 PROCEDURE — 97530 THERAPEUTIC ACTIVITIES: CPT

## 2021-04-28 PROCEDURE — 80069 RENAL FUNCTION PANEL: CPT

## 2021-04-28 PROCEDURE — 99232 SBSQ HOSP IP/OBS MODERATE 35: CPT | Performed by: PHYSICAL MEDICINE & REHABILITATION

## 2021-04-28 PROCEDURE — 94660 CPAP INITIATION&MGMT: CPT

## 2021-04-28 PROCEDURE — 99232 SBSQ HOSP IP/OBS MODERATE 35: CPT | Performed by: INTERNAL MEDICINE

## 2021-04-28 PROCEDURE — 6360000002 HC RX W HCPCS: Performed by: PHYSICAL MEDICINE & REHABILITATION

## 2021-04-28 PROCEDURE — 97130 THER IVNTJ EA ADDL 15 MIN: CPT

## 2021-04-28 PROCEDURE — 6370000000 HC RX 637 (ALT 250 FOR IP): Performed by: PHYSICAL MEDICINE & REHABILITATION

## 2021-04-28 PROCEDURE — 92526 ORAL FUNCTION THERAPY: CPT

## 2021-04-28 RX ADMIN — FLUOXETINE 20 MG: 20 CAPSULE ORAL at 09:29

## 2021-04-28 RX ADMIN — DOCUSATE SODIUM 50 MG AND SENNOSIDES 8.6 MG 2 TABLET: 8.6; 5 TABLET, FILM COATED ORAL at 09:28

## 2021-04-28 RX ADMIN — LEVETIRACETAM 500 MG: 500 TABLET ORAL at 21:59

## 2021-04-28 RX ADMIN — OXYCODONE 10 MG: 5 TABLET ORAL at 13:19

## 2021-04-28 RX ADMIN — ACETAMINOPHEN 1000 MG: 500 TABLET, COATED ORAL at 09:28

## 2021-04-28 RX ADMIN — ACETAMINOPHEN 1000 MG: 500 TABLET, COATED ORAL at 17:11

## 2021-04-28 RX ADMIN — Medication 5 MG: at 09:29

## 2021-04-28 RX ADMIN — THIAMINE HCL TAB 100 MG 100 MG: 100 TAB at 09:28

## 2021-04-28 RX ADMIN — DOCUSATE SODIUM 50 MG AND SENNOSIDES 8.6 MG 2 TABLET: 8.6; 5 TABLET, FILM COATED ORAL at 21:59

## 2021-04-28 RX ADMIN — Medication 1 G: at 09:27

## 2021-04-28 RX ADMIN — LEVETIRACETAM 500 MG: 500 TABLET ORAL at 09:29

## 2021-04-28 RX ADMIN — ATORVASTATIN CALCIUM 80 MG: 80 TABLET, FILM COATED ORAL at 21:59

## 2021-04-28 RX ADMIN — ACETAMINOPHEN 1000 MG: 500 TABLET, COATED ORAL at 21:59

## 2021-04-28 RX ADMIN — THERA TABS 1 TABLET: TAB at 09:29

## 2021-04-28 RX ADMIN — ASPIRIN 81 MG: 81 TABLET, CHEWABLE ORAL at 09:28

## 2021-04-28 RX ADMIN — FAMOTIDINE 20 MG: 20 TABLET, FILM COATED ORAL at 09:28

## 2021-04-28 RX ADMIN — ACETAMINOPHEN 1000 MG: 500 TABLET, COATED ORAL at 04:17

## 2021-04-28 RX ADMIN — HEPARIN SODIUM 5000 UNITS: 5000 INJECTION INTRAVENOUS; SUBCUTANEOUS at 22:00

## 2021-04-28 RX ADMIN — Medication 1 G: at 13:18

## 2021-04-28 RX ADMIN — Medication 1 G: at 17:11

## 2021-04-28 RX ADMIN — HEPARIN SODIUM 5000 UNITS: 5000 INJECTION INTRAVENOUS; SUBCUTANEOUS at 07:21

## 2021-04-28 RX ADMIN — FAMOTIDINE 20 MG: 20 TABLET, FILM COATED ORAL at 21:59

## 2021-04-28 RX ADMIN — HEPARIN SODIUM 5000 UNITS: 5000 INJECTION INTRAVENOUS; SUBCUTANEOUS at 17:07

## 2021-04-28 RX ADMIN — AMLODIPINE BESYLATE 5 MG: 5 TABLET ORAL at 09:28

## 2021-04-28 ASSESSMENT — PAIN DESCRIPTION - FREQUENCY: FREQUENCY: CONTINUOUS

## 2021-04-28 ASSESSMENT — PAIN SCALES - GENERAL
PAINLEVEL_OUTOF10: 8
PAINLEVEL_OUTOF10: 0

## 2021-04-28 ASSESSMENT — PAIN DESCRIPTION - PROGRESSION: CLINICAL_PROGRESSION: GRADUALLY WORSENING

## 2021-04-28 ASSESSMENT — PAIN - FUNCTIONAL ASSESSMENT: PAIN_FUNCTIONAL_ASSESSMENT: ACTIVITIES ARE NOT PREVENTED

## 2021-04-28 ASSESSMENT — PAIN DESCRIPTION - ORIENTATION: ORIENTATION: ANTERIOR

## 2021-04-28 ASSESSMENT — PAIN DESCRIPTION - DESCRIPTORS: DESCRIPTORS: HEADACHE

## 2021-04-28 ASSESSMENT — PAIN DESCRIPTION - LOCATION: LOCATION: HEAD

## 2021-04-28 ASSESSMENT — PAIN DESCRIPTION - ONSET: ONSET: ON-GOING

## 2021-04-28 ASSESSMENT — PAIN DESCRIPTION - PAIN TYPE: TYPE: ACUTE PAIN

## 2021-04-28 NOTE — PLAN OF CARE
Problem: Pain:  Goal: Pain level will decrease  Description: Pain level will decrease  Outcome: Ongoing  Note: Pt had no complaints of pain thus far during shift. Pt receiving scheduled Tylenol every 6 hours. Will continue to assess for pain and offer another intervention. Pt resting comfortable at this time. Problem: Falls - Risk of:  Goal: Will remain free from falls  Description: Will remain free from falls  Outcome: Ongoing  Note: Pt with history of falls. Up with max assistance, gait belt and squat pivot or margo stedy. LUE & LLE flaccid. Fall precautions maintained. Fall risk armband on. Non skid footwear on. Bed in lowest position. Bed alarm in use. Reminded pt to call for assistance with any needs. Call light within reach. Pt uses call light appropriately. No falls thus far during shift. Problem: Skin Integrity:  Goal: Will show no infection signs and symptoms  Description: Will show no infection signs and symptoms  Outcome: Ongoing  Note: Pt with surgical incision to right scalp. Staples removed by day shift nurse. Pinkness to incision. No drainage. Pt afebrile. Surgical site shows no s/sx of infection at this time. Will continue to assess q shift and prn and provide interventions as needed.

## 2021-04-28 NOTE — PROGRESS NOTES
Occupational Therapy  Facility/Department: [facility] ACUTE REHAB UNIT  Daily Treatment Note  NAME: Tere Urbina  : 1970  MRN: [id number]    Date of Service: 2021    Discharge Recommendations:  24 hour supervision or assist, Home with Home health OT, Home with nursing aide, Continue to assess pending progress  OT Equipment Recommendations  Equipment Needed: Yes  ADL Assistive Devices: Transfer Tub Bench  Other: continue to assess    Assessment   Performance deficits / Impairments: Decreased functional mobility ; Decreased ADL status; Decreased ROM; Decreased strength;Decreased sensation;Decreased fine motor control;Decreased endurance;Decreased posture;Decreased balance;Decreased safe awareness;Decreased coordination;Decreased vision/visual deficit; Decreased cognition  Assessment: Pt demonstrated good awareness of LUE during bathing task but required mod A for thoroughness, positioning assist of LUE and to wash RUE. Pt continues to require 2 person assist for LB bathing and LB dressing as pt req Max A for standing balance and total A of another for LB clothing management. Pt highly motivated but is limited by L sided hemiplegia, increased tone and difficulty maintaining midline in stance. Cont per OT POC. Treatment Diagnosis: decreased independence with ADLs and decreased functional mobility 2/2 CVA  Prognosis: Fair  OT Education: Plan of Care;Precautions; ADL Adaptive Strategies;Transfer Training  Patient Education: pt educated on hemidressing technique as well as adaptive strategy to wash RUE with washcloth- pt verb and demo understanding  REQUIRES OT FOLLOW UP: Yes  Activity Tolerance  Activity Tolerance: Patient Tolerated treatment well  Safety Devices  Safety Devices in place: Yes  Type of devices: Call light within reach; Chair alarm in place; Left in chair; All fall risk precautions in place         Patient Diagnosis(es): There were no encounter diagnoses.       has a past medical history of GERD (gastroesophageal reflux disease), Hernia, Obesity, and Unspecified sleep apnea. has a past surgical history that includes Splenectomy (); Total hip arthroplasty (); Lap Band (); hernia repair ();  section (); and craniotomy (Right, 2021). Restrictions  Position Activity Restriction  Other position/activity restrictions: Ambulate, Up with assist, Pt to wear helmet when OOB (no orders in chart regarding helmet wear, per chart review this was in therapy notes), Per pt HOB to elevated at 30*     Subjective    General  Chart Reviewed: Yes  Patient assessed for rehabilitation services?: Yes  Additional Pertinent Hx: 46 y.o. female with hypertension and tobacco abuse who presented after spending a prolonged period on the ground s/p fall out of bed; found to have acute left hemiparesis and gaze deviation. CXR with no acute cardiopulmonary abnormality. CTH revealed large acute/subacute infarct in the R frontal lobe with associated mass effect and 4 mm R to L midline shift. It also noted subdural hemorrhage of 6mm along falx, although NSGY less convinced. CTA head and neck revealed occluded R cervical ICA & R anterior cerebral artery with near complete occlusion of R MCA. Outside window on TPA. Pt is now POD #1 following hemicraniectomy (). Pt admitted to ARU   Response to previous treatment: Patient with no complaints from previous session  Family / Caregiver Present: No  Referring Practitioner: Dr. Annette Koroma DO  Diagnosis: CVA  Subjective  Subjective: Pt supine in bed upon arrival eating breakfast, pleasant and excited for shower, agreeable to OT/PT cotx to maximize independence with functional transfers with collaboration of 2 disciplines. General Comment  Comments: Nelson Gonsalez Vital Signs  Patient Currently in Pain: Denies     Orientation  Orientation  Overall Orientation Status: Within Functional Limits  Objective    ADL  UE Bathing: Setup;Verbal cueing; Increased time to responses to stimuli  Following Commands: Follows one step commands consistently; Follows one step commands with increased time  Attention Span: Appears intact  Memory: Appears intact  Safety Judgement: Decreased awareness of need for assistance;Decreased awareness of need for safety  Problem Solving: Assistance required to generate solutions;Assistance required to correct errors made;Assistance required to implement solutions;Decreased awareness of errors  Insights: Decreased awareness of deficits  Initiation: Requires cues for some  Sequencing: Requires cues for some     Second Session: Pt sitting in recliner upon arrival stating discomfort and requesting to get back to bed. Pt agreeable to therapy session if getting out of w/c. Pt completed oral hygiene seated in w/c with spvn assist and VCs for pratik-technique to open and close toothpaste, pt observed to have pocketed a significant amount of food when rinsing. Pt then pushed down to therapy gym in w/ for sake of time. Squat pivot from w/c<EOM with Mod A and step by step VCs and cues for ant lean, use of momentum strategies. Pt req min to scoot hips posteriorly to square on mat. Pt sitting EOB with CGA-SBA. Pt then educated on strategy to flex L wrist using RUE in order to promote finger extension, pt verb and demo understanding completing x 10 times. Facilitation to promote LUE elbow extension, ER and abduction for ~3 minutes but limited d/t time constraints. Pt squat pivot from EOM<w/c with Mod A, req Mod A to scoot hips posteriorly d/t poor positioning. Squat pivot from w/c<EOB with Mod A, pt demo difficulty pivoting hips. Pt sit<supine with Max A for trunk ascension and to manage LLE. Pt dependent to scoot superiorly in bed. Pt left in bed at end of session with bed alarm engaged, call light within reach and all needs met.        Plan   Plan  Times per week: 5x a week for 60 mins daily  Times per day: Daily  Current Treatment Recommendations: Strengthening,

## 2021-04-28 NOTE — PROGRESS NOTES
Lawrence Minaya NP with neuro surgery notified at [phone] that the eugenie wound of Pt's surgical incision was pink. NP said that one of the other nurse practitioners would come by later to look at the incision. No new orders at this time. RN will continue to monitor Pt.

## 2021-04-28 NOTE — PROGRESS NOTES
differential    Collection Time: 04/28/21  7:04 AM   Result Value Ref Range    WBC 14.5 (H) 4.0 - 11.0 K/uL    RBC 3.52 (L) 4.00 - 5.20 M/uL    Hemoglobin 12.6 12.0 - 16.0 g/dL    Hematocrit 36.6 36.0 - 48.0 %    .8 (H) 80.0 - 100.0 fL    MCH 35.9 (H) 26.0 - 34.0 pg    MCHC 34.6 31.0 - 36.0 g/dL    RDW 13.9 12.4 - 15.4 %    Platelets 846 (H) 398 - 450 K/uL    MPV 7.8 5.0 - 10.5 fL    Neutrophils % 61.5 %    Lymphocytes % 25.5 %    Monocytes % 8.0 %    Eosinophils % 3.8 %    Basophils % 1.2 %    Neutrophils Absolute 8.9 (H) 1.7 - 7.7 K/uL    Lymphocytes Absolute 3.7 1.0 - 5.1 K/uL    Monocytes Absolute 1.2 0.0 - 1.3 K/uL    Eosinophils Absolute 0.5 0.0 - 0.6 K/uL    Basophils Absolute 0.2 0.0 - 0.2 K/uL       Therapy progress:  PT  Position Activity Restriction  Other position/activity restrictions: Ambulate, Up with assist, Pt to wear helmet when OOB (no orders in chart regarding helmet wear, per chart review this was in therapy notes), Per pt HOB to elevated at 30*  Objective     Sit to Stand: Maximum Assistance, Moderate Assistance(Transf from w/c to standing with RUE support on therapist's shoulder)  Stand to sit: Maximum Assistance(VC to anterior WS and max A to maintain a midline position when descending)  Bed to Chair: Dependent/Total(bed to w/c, w/c <> commode, w/c <> TTB; Pt requiring MAX of 1 and   : Pt given step by step instruction prior to transfer. Pt requiring facilitation for wt shifting, LE placement and advancement of transfer. Consistently pushing to the L during transfers)     OT  PT Equipment Recommendations  Equipment Needed: (Will continue to assess equipment needs pending progress)  Toilet - Technique: Squat pivot  Equipment Used: Standard toilet  Assessment        SLP  Diet Solids Recommendation: Dysphagia Soft and Bite-Sized (Dysphagia III)(with regular solids as tolerated. Per pt request, gravy/sauces to keep for moist.)       Body mass index is 38.5 kg/m².     Assessment and Plan: Matty Car a 46 y.o. female with PMH GERD p/w L sided weakness and facial droop.  CTA head/neck on 4/12 revealed occluded R cervical ICA, occluded right LEAH, near complete occlusion of right MCA.     Acute ischemic CVA, Subdural heomorrhage s/p right hemicraniectomy and subsequen SAH and IP hemorrhage  No tPA given.  MRI 4/14 showing large subacute infarct throughout R ELAH and MCA with some areas of hemorrhagic conversion.  - Neurosurgery and neurology following  --SBP <160, PRN hydralazine, scheduled Norvasc  -- Keppra 500 mg twice daily   -  SQH  - PT/OT consulted      Aphasia- SLP consulted   Depression-Patient takes fluoxetine 20 mg daily at home  Anxiety -Patient takes Ativan 0.5 mg every 8 hours as needed for anxiety- holding  MISHA-Patient uses CPAP at home   GERD- Patient takes omeprazole 40 mg daily at home- half dose  Obesity    Rehab Progress: Improving  Anticipated Dispo: home  Services/DME: LIANG  ELOS: LIANG Schofield MD  PGY-3    Gustabo Liu D.O. M.P.H  PM&R  4/28/2021  8:57 AM
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
## 2021-04-28 NOTE — PROGRESS NOTES
ACUTE REHAB UNIT  SPEECH/LANGUAGE PATHOLOGY      [x] Daily  [] Weekly Care Conference Note  [] Discharge    Patient:Laura Vasquez      XDV:9[birth year]  [facility]:[id number]  Rehab Dx/Hx: Acute CVA (cerebrovascular accident) ([facility] [address] [I63.9]    Precautions: [x] Aspiration  [x] Fall risk  [] Sternal  [] Seizure [] Hip  [] Weight Bearing [] Other  ST Dx: [] Aphasia  [] Dysarthria  [] Apraxia   [x] Oropharyngeal dysphagia [x] Cognitive Impairment  [] Other:   Date of Admit: 4/23/2021  Room #: 3101/3101-01  Date: 4/28/2021          Current Diet Order:DIET GENERAL; No Added Salt (3-4 GM); Dysphagia Soft and Bite-Sized; Daily Fluid Restriction: 1500 ml  Dietary Nutrition Supplements: Low Calorie High Protein Supplement   Recommended Form of Meds: PO  Compensatory Swallowing Strategies: Alternate solids and liquids, Upright as possible for all oral intake, Check for pocketing of food on the Left, Small bites/sips, Lingual sweep     Subjective: Pt admitted 4/12 after fall with left sided paralysis. Pt underwent craniectomy on 4/13 per Dr. Jesus Leahy. Social/Functional History  Lives With: Spouse;Son( reported that son is diagnosed with Autism.)  Occupation: Full time employment  Type of occupation:  at [employer]"    FEES 4/14: IMPRESSIONS:   Pt presents w/ mild oropharyngeal dysphagia characterized by premature spillage of thin liquids to UES and pyriforms w/ intermittent deep penetration of laryngeal vestibule; adequate airway protection and complete clearance of all residue after swallow initiation. No aspiration w/ any trials. Timely swallow initiation w/ puree and regular solid consistencies; no oropharyngeal residue.    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 Adequate ab/adduction. Complete closure of TVFs upon adduction.      Dentition: Adequate  Vision  Vision: Impaired  Vision Exceptions: Visual field cut  Hearing  Hearing: Within functional limits  Barriers toward progress: None towards stated goals      Date: 4/28/2021      Tx session 1 Tx session 2   Total Timed Code Min 20 30   Total Treatment Minutes 30 30   Individual Treatment Minutes 30 30   Group Treatment Minutes 0 0   Co-Treat Minutes 0 0   Brief Exception: N/A N/A   Pain None indicated at time of session Initially none reported. Pt later reported back pain as 8/10   Pain Intervention: [] RN notified  [] Repositioned  [] Intervention offered and patient declined  [x] N/A  [] Other:    [x] RN notified  [x] Repositioned  [] Intervention offered and patient declined  [] N/A  [] Other:   Subjective:     Pt upright in chair and agreeable to therapy. Pt upright in chair reclined with helmet on backwards and unbuttoned; education provided. Mildly increased facial expressions and significant use of humor. Objective / Goals:     Patient will consume regular solids with timely mastication and no pocketing in L buccal cavity across 2 sessions. Pt assessed with regular solids and soft solids and thins via cup edge. Pt not noted to have increased pocketing with soft solids vs regular solids this date but consistent pocketing of toast specifically was noted. Discussed plan for additional trial prior to diet advancement. No overt s/s associated with aspiration. Goal not targeted this session. Patient will consume PO without anterior spillage or overt pocketing across 2 sessions. Anterior spillage of solids only when talking during oral prep phase. Pt with liquid on upper labial seal without apparent awareness. Pt noted to have mild solid residue on labial seal from self feeding intermittently but pt managed independently and reports being able to visualize better than sense. Goal not targeted this session. Pt will complete divided/alternating attention tasks with 85% accuracy given min cues.     Mod cues to alternate between conversational exchanges Yes    [] No    [] TBD based on progress while on ARU     [] Vital Stim indicated     [] Other:   Estimated discharge date: Not yet established    Electronically signed by:  Stevie Conner M.A., [place]  Speech-Language Pathologist

## 2021-04-28 NOTE — PATIENT CARE CONFERENCE
Inpatient Rehabilitation  Weekly Team Conference Note  The [address]  [phone]  Patient Name: Destinee Castro        MRN: [id number]    : 1970  (46 y.o.)  Gender: female   Referring Practitioner: Dr. Wendy Ang  The team conference for this patient was held on 2021 at 10:30am by:  Trena Frye DO    Current/Goal QM SCORES  QM Current/Goal Score   Eating CARE Score: 5 / Discharge Goal: Set-up or clean-up assistance   Oral Hygiene CARE Score: 4 / Discharge Goal: Independent   Shower/Bathing CARE Score: 1 / Discharge Goal: Supervision or touching assistance   UB Dressing CARE Score: 3 / Discharge Goal: Set-up or clean-up assistance   LB Dressing CARE Score: 1 / Discharge Goal: Supervision or touching assistance   Putting on/off Footwear CARE Score: 1 / Discharge Goal: Supervision or touching assistance   Toileting Hygiene CARE Score: 1 / Discharge Goal: Supervision or touching assistance   Bladder Continence Bladder Continence: Incontinent daily    Bowel Continence Bowel Continence: Frequently incontinent    Toilet Transfers CARE Score: 1 / Discharge Goal: Supervision or touching assistance   Shower/Bathe Self  CARE Score: 1 / Discharge Goal: Supervision or touching assistance   Rolling Left and Right CARE Score: 2 / Discharge Goal: Supervision or touching assistance   Sit to Lying CARE Score: 2 / Discharge Goal: Supervision or touching assistance   Lying to Sitting on Bedside CARE Score: 3 / Discharge Goal: Supervision or touching assistance   Sit to Stand CARE Score: 2 / Discharge Goal: Supervision or touching assistance   Chair/Bed to Chair Transfer CARE Score: 2 / Discharge Goal: Supervision or touching assistance   Car Transfers CARE Score: 1 / Discharge Goal: Supervision or touching assistance   Walk 10 Feet CARE Score: 88 / Discharge Goal: Partial/moderate assistance   Walk 50 Feet with Two Turns CARE Score: 88 / Discharge Goal: Not Attempted   Walk 150 Feet CARE Score: 88 / Discharge Goal: Not Attempted   Walk 10 Feet on Uneven Surfaces CARE Score: 88 / Discharge Goal: Not Attempted   1 Step (Curb) CARE Score: 88 / Discharge Goal: Partial/moderate assistance   4 Steps CARE Score: 88 / Discharge Goal: Partial/moderate assistance   12 Steps CARE Score: 88 / Discharge Goal: Not Attempted   Picking up Object from Floor CARE Score: 88 / Discharge Goal: Partial/moderate assistance   Wheel 50 Feet with 2 Turns CARE Score: 3 / Discharge Goal: Independent   Type         [x] Manual        [] Motorized        [] N/A   Wheel 150 Feet CARE Score: 88 / Discharge Goal: Independent   Type         [x] Manual        [] Motorized        [] N/A     NURSING:  A&Ox: Level of Consciousness: Alert (0)  Orientation Level: Oriented X4  Kat Fall Risk Score: Score: 70  Admission BIMS: 15  Wounds/Incisions/Ulcers: surgical incision R side of head   Medication Review: Reviewed meds   Pain: no pain at this time  Consultations: nephrology   Imaging:   No orders to display     Active Comorbid Conditions: obesity, CVA, SIADH, HTN   Systems Review:   Renal: SIADH, Dialysis:  Type: , Frequency:   Neurological: alert and oriented x 4, flat effect  Musculoskeletal: L side flaccid, bilateral lower extremity weakness  Respiratory: clear and diminished    Cardiac/Circulatory/Peripheral Vascular: HTN   Abnormal/Relevant Test Results:   Abnormal/Relevant Lab Values:   CBC:   Recent Labs     04/28/21  0704   WBC 14.5*   HGB 12.6   HCT 36.6   .8*   *     BMP:   Recent Labs     04/27/21  0604 04/28/21  0704    137  137   K 4.3 3.8  3.8    102  102   CO2 27 23  23   PHOS 4.6 4.2   BUN 11 11  11   CREATININE 0.6 0.6  0.6     PT/INR: No results for input(s): PROTIME, INR in the last 72 hours. APTT: No results for input(s): APTT in the last 72 hours.   Liver Profile:  Lab Results   Component Value Date    AST 41 04/13/2021    ALT 32 04/13/2021    BILITOT 0.7 management, therapy assisted pt with donning B shoes    OCCUPATIONAL THERAPY:  ADL  Grooming: Setup, Verbal cueing, Increased time to complete(Pt brushed teeth seated in w/c at sink. Assistance needed to open toothpaste. HOHA provided to maintain grasp of toothrbush in L hand while pt squeezed toothpasted onto brush w/ R hand.)  UE Bathing: Setup, Verbal cueing, Increased time to complete, Moderate assistance  LE Bathing: Dependent/Total, Verbal cueing, Setup, Increased time to complete(pt used long handled sponge to tyrel lower limbs and feet, assist to wash buttocks with lateral WS and fwd flexion, assist x 2 to dry buttocks in stance)  UE Dressing: Moderate assistance, Setup, Verbal cueing, Increased time to complete(pt donned long sleeve shirt with VCs for hemidressing tech, req setup and assist to thread LUE and pull down in back and on L side)  LE Dressing: Dependent/Total(total A to thread BLEs for underwear and pants and total A to manage over hips in stance with max A of another to maintain stance)  Toileting: Dependent/Total(Pt sat on toilet in attempt to have a BM but was unsuccessful. Pt urinated and required assist x2 in stance to manage clothing up and down and to complete eugenie care. Pt attempted to wipe seated w/ RUE but could not maintain seated balance safely)  Additional Comments: total A to don shoes    Toilet Transfers: Toilet Transfers  Toilet - Technique: Squat pivot  Equipment Used: Standard toilet  Toilet Transfer: Dependent/Total(Max Ax1 + Mod Ax1 from w/c <> standard toilet. VCs needed to sequence transfer and attend to LLE.)    Tub/ShowerTransfers:  Shower Transfers  Shower - Transfer From: Wheelchair  Shower - Transfer Type: To and From  Shower - Transfer To: Transfer tub bench  Shower - Technique: Squat pivot  Shower Transfers: Dependent  Shower Transfers Comments: Max Ax1 + Min A x 1 from w/c <> TTB with use of GB, VCs needed to sequence transfer;  Max A x 1 + Mod A x 1 from TTB<w/c SPEECH THERAPY:  Assessment: Speech Therapy Diagnosis  Cognitive Diagnosis: Pt presents with moderate-severe executive dysfunction with left sided inattention, oral phase dysphagia. NUTRITION:  Please see nutrition note for details. Weight: 238 lb 8.6 oz (108.2 kg) / Body mass index is 38.5 kg/m². Current diet order:  DIET GENERAL; No Added Salt (3-4 GM); Dysphagia Soft and Bite-Sized; Daily Fluid Restriction: 1500 ml  Dietary Nutrition Supplements: Low Calorie High Protein Supplement         Feeding: Needs assist  Room Service: Selective   NSG Recorded PO: PO Meals Eaten (%): 51 - 75% PO Supplement (%): 76 - 100%  Malnutrition Status Malnutrition Status: At risk for malnutrition (Comment)    CASE MANAGEMENT:  Psychosocial/Behavioral Issues: none  Assessment:  DC home    Patient/Family Education provided by team:  Role of PT/OT/ST, Recommended diet level and plan for additional trial trays, positioning and tone mgmt of LUE, hemidressing techniques    Patient Goals for Rehab stay:  1. Get back to normal       Rehab Team Goals for patient for the Upcoming Week:  1. Increased attention to left visual field with min cues    2. Increase independence with ADLs      Barriers to Progress/Attainment of Goals & Efforts/Interventions to remove Barriers:  1. Significant right gaze preference - continue aggressive cognitive retraining, reminders and prompts from all staff  2. L sided hemiplegia and increased tone-continue OT/PT      Discharge Plan:  Estimated Length of Stay: 23 days  Destination: home health  Services at Discharge: [address] [place] [address], Occupational Therapy, Speech Therapy and Nursing 3x week  Equipment at Discharge: Will continue to assess  Community Resources:   Factors facilitating achievement of predicted outcomes:  Motivated, Cooperative, Pleasant and Sense of humor  Barriers to the achievement of predicted outcomes: Cognitive deficit, Upper extremity weakness, Lower extremity weakness and Impaired vision    Special Needs in the Upcoming Week:   [x] Family/Caregiver Education  [] Home visit  []Therapeutic Pass   [] Equipment  Type:   [] Consults:_______   [] Family/Caregiver Training    [] Stroke Risk Factor Education     [] Other;_______     TEAM SUMMARY: Will continue with current poc & goals until anticipated d/c date of 5/15/2021.  However, pt will require extension in order to safely d/c.    MD:   Stroke Risk Factors:    [] N/A for this patient [x] HTN  []  Diabetes  [] Hyperlipidemia  [x]Obesity BMI >25  [] Atrial Fibrillation  [] Smoker (current)  [] Smoker (quit in last 12 months)   [] Sleep Apnea   [] Other:    Risk for Readmission: Moderate (10-19)    Justification for Continued Stay:   Criteria for continued IRF stay:  Based on my medical assessment of the patient and review of information from the interdisciplinary team, as part of this weekly team conference, the patient continues to meet the following criteria for IRF level of care:  [x] The patient requires 24-hour rehabilitation nursing care   [x] The patient requires an intensive rehabilitation therapy program  [x] The patient requires active and ongoing intervention of multiple therapy disciplines  [x] The patient requires continued physician supervision by a rehabilitation physician  [x] The patient requires an intensive and coordinated interdisciplinary team approach to the delivery of rehabilitative care    Medical Necessity-continued close physician medical management is required for:   [] Cardiac/Circulatory dysfunction  [] Respiratory/Pulmonary dysfunction  [] Integumentary complications  [] Peripheral Vascular dysfunction  [] Musculoskeletal dysfunction  [x] Neurological dysfunction d/t:  [x] CVA  [] SCI  [] TBI  [] Other: __________  [] Renal dysfunction  [] Hematologic dysfunction    [] Endocrine disorders  [] GI disorders     [] Genito-Urinary dysfunction    Assessment/Plan:  [x] The patient is making good progression towards their LTG's, is actively participating in, and has a reasonable expectation to continue to benefit from the intensive rehabilitation program.  [] The estimated discharge date has been changed from initial team conference due to:   [] The estimated discharge destination has been changed from initial team conference due to:     Rehab Team Members in attendance for Team Conference:  :  ROSETTA Gonzales    Therapy Manager:  Elton Barber, PT, DPT    Nursing Director:  Clara Hooper, MSN, RN, ACNS-BC    Social Work:  Eliezer Purcell, MSW, LISW-S    Nursing:  PATRICIA DelaneyN, RN  Odette Crawford, KIZZY Jensen, KIZZY Berry, KIZZY    Therapy:  Ivan Hopkins, PT  Jaycob Sorensen, PT, DPT  Radha Gant, PT, DPT    Perry Grshilo, OTR/L  Zahira Gregory, OTR/L  Britney, OTR/L    Koki Damon MA/CCC-SLP    Nutrition:  Yimi Jennings, [facility] LD    I approve the established interdisciplinary plan of care as documented within the medical record of Maged Bangura.     MD Signature Joel Merlin, AILEEN.O. M.P.H  PM&R  4/29/2021  11:19 AM

## 2021-04-28 NOTE — CARE COORDINATION
Per team, anticipate possible stay of 21 days. Patient updated. Insurance approved to 5/4. Will continue to follow for d/c needs.   Electronically signed by USMAN Villatoro LISW-S on 4/28/2021 at 11:54 AM

## 2021-04-28 NOTE — PROGRESS NOTES
Pt in bed, awake listening to music on her phone. No complaints at this time. Vitals and assessment completed. VSS. Nighttime medications given. Pt tolerated well. Flushed IV with NS. Placed purewick for overnight. Cleansed right scalp incision with CHG swabs. Pt repositioned in bed. Reminded pt to call for assistance with any needs. Call light within reach. Safety measures in place.

## 2021-04-29 PROCEDURE — 97112 NEUROMUSCULAR REEDUCATION: CPT

## 2021-04-29 PROCEDURE — 97530 THERAPEUTIC ACTIVITIES: CPT

## 2021-04-29 PROCEDURE — 1280000000 HC REHAB R&B

## 2021-04-29 PROCEDURE — 99232 SBSQ HOSP IP/OBS MODERATE 35: CPT | Performed by: PHYSICAL MEDICINE & REHABILITATION

## 2021-04-29 PROCEDURE — 97130 THER IVNTJ EA ADDL 15 MIN: CPT

## 2021-04-29 PROCEDURE — 6360000002 HC RX W HCPCS: Performed by: PHYSICAL MEDICINE & REHABILITATION

## 2021-04-29 PROCEDURE — 94660 CPAP INITIATION&MGMT: CPT

## 2021-04-29 PROCEDURE — 92526 ORAL FUNCTION THERAPY: CPT

## 2021-04-29 PROCEDURE — 97535 SELF CARE MNGMENT TRAINING: CPT

## 2021-04-29 PROCEDURE — 97129 THER IVNTJ 1ST 15 MIN: CPT

## 2021-04-29 PROCEDURE — 6370000000 HC RX 637 (ALT 250 FOR IP): Performed by: PHYSICAL MEDICINE & REHABILITATION

## 2021-04-29 PROCEDURE — 99232 SBSQ HOSP IP/OBS MODERATE 35: CPT | Performed by: INTERNAL MEDICINE

## 2021-04-29 RX ADMIN — Medication 1 G: at 16:21

## 2021-04-29 RX ADMIN — ACETAMINOPHEN 1000 MG: 500 TABLET, COATED ORAL at 16:21

## 2021-04-29 RX ADMIN — OXYCODONE HYDROCHLORIDE 5 MG: 5 TABLET ORAL at 06:04

## 2021-04-29 RX ADMIN — ACETAMINOPHEN 1000 MG: 500 TABLET, COATED ORAL at 08:36

## 2021-04-29 RX ADMIN — LEVETIRACETAM 500 MG: 500 TABLET ORAL at 21:32

## 2021-04-29 RX ADMIN — DOCUSATE SODIUM 50 MG AND SENNOSIDES 8.6 MG 2 TABLET: 8.6; 5 TABLET, FILM COATED ORAL at 21:32

## 2021-04-29 RX ADMIN — LEVETIRACETAM 500 MG: 500 TABLET ORAL at 08:36

## 2021-04-29 RX ADMIN — THERA TABS 1 TABLET: TAB at 08:36

## 2021-04-29 RX ADMIN — AMLODIPINE BESYLATE 5 MG: 5 TABLET ORAL at 08:36

## 2021-04-29 RX ADMIN — FLUOXETINE 20 MG: 20 CAPSULE ORAL at 08:36

## 2021-04-29 RX ADMIN — THIAMINE HCL TAB 100 MG 100 MG: 100 TAB at 08:36

## 2021-04-29 RX ADMIN — HEPARIN SODIUM 5000 UNITS: 5000 INJECTION INTRAVENOUS; SUBCUTANEOUS at 14:34

## 2021-04-29 RX ADMIN — Medication 5 MG: at 08:35

## 2021-04-29 RX ADMIN — HEPARIN SODIUM 5000 UNITS: 5000 INJECTION INTRAVENOUS; SUBCUTANEOUS at 05:34

## 2021-04-29 RX ADMIN — ASPIRIN 81 MG: 81 TABLET, CHEWABLE ORAL at 08:36

## 2021-04-29 RX ADMIN — FAMOTIDINE 20 MG: 20 TABLET, FILM COATED ORAL at 21:32

## 2021-04-29 RX ADMIN — ACETAMINOPHEN 1000 MG: 500 TABLET, COATED ORAL at 21:32

## 2021-04-29 RX ADMIN — Medication 1 G: at 08:36

## 2021-04-29 RX ADMIN — DOCUSATE SODIUM 50 MG AND SENNOSIDES 8.6 MG 2 TABLET: 8.6; 5 TABLET, FILM COATED ORAL at 08:36

## 2021-04-29 RX ADMIN — FAMOTIDINE 20 MG: 20 TABLET, FILM COATED ORAL at 08:36

## 2021-04-29 RX ADMIN — ATORVASTATIN CALCIUM 80 MG: 80 TABLET, FILM COATED ORAL at 21:32

## 2021-04-29 RX ADMIN — Medication 1 G: at 12:55

## 2021-04-29 RX ADMIN — HEPARIN SODIUM 5000 UNITS: 5000 INJECTION INTRAVENOUS; SUBCUTANEOUS at 21:33

## 2021-04-29 ASSESSMENT — PAIN DESCRIPTION - ONSET
ONSET: ON-GOING

## 2021-04-29 ASSESSMENT — PAIN DESCRIPTION - DESCRIPTORS
DESCRIPTORS: HEADACHE
DESCRIPTORS: ACHING;DULL
DESCRIPTORS: HEADACHE

## 2021-04-29 ASSESSMENT — PAIN DESCRIPTION - ORIENTATION
ORIENTATION: MID
ORIENTATION: ANTERIOR
ORIENTATION: ANTERIOR

## 2021-04-29 ASSESSMENT — PAIN SCALES - GENERAL
PAINLEVEL_OUTOF10: 3
PAINLEVEL_OUTOF10: 0
PAINLEVEL_OUTOF10: 2
PAINLEVEL_OUTOF10: 0
PAINLEVEL_OUTOF10: 4
PAINLEVEL_OUTOF10: 2
PAINLEVEL_OUTOF10: 6
PAINLEVEL_OUTOF10: 0
PAINLEVEL_OUTOF10: 1
PAINLEVEL_OUTOF10: 0

## 2021-04-29 ASSESSMENT — PAIN DESCRIPTION - PROGRESSION
CLINICAL_PROGRESSION: GRADUALLY WORSENING
CLINICAL_PROGRESSION: GRADUALLY WORSENING
CLINICAL_PROGRESSION: NOT CHANGED

## 2021-04-29 ASSESSMENT — PAIN DESCRIPTION - PAIN TYPE
TYPE: ACUTE PAIN

## 2021-04-29 ASSESSMENT — PAIN - FUNCTIONAL ASSESSMENT
PAIN_FUNCTIONAL_ASSESSMENT: ACTIVITIES ARE NOT PREVENTED

## 2021-04-29 ASSESSMENT — PAIN DESCRIPTION - FREQUENCY
FREQUENCY: CONTINUOUS
FREQUENCY: CONTINUOUS
FREQUENCY: INTERMITTENT

## 2021-04-29 ASSESSMENT — PAIN DESCRIPTION - LOCATION
LOCATION: HEAD

## 2021-04-29 NOTE — PROGRESS NOTES
ACUTE REHAB UNIT  SPEECH/LANGUAGE PATHOLOGY      [x] Daily  [x] Weekly Care Conference Note  [] Discharge    Patient:Laura Anne      EJD:[birth year]  LRJ:[id number]  Rehab Dx/Hx: Acute CVA (cerebrovascular accident) ([facility] [address] [I63.9]    Precautions: [x] Aspiration  [x] Fall risk  [] Sternal  [] Seizure [] Hip  [] Weight Bearing [] Other  ST Dx: [] Aphasia  [] Dysarthria  [] Apraxia   [x] Oropharyngeal dysphagia [x] Cognitive Impairment  [] Other:   Date of Admit: 4/23/2021  Room #: 3101/3101-01  Date: 4/29/2021          Current Diet Order:DIET GENERAL; No Added Salt (3-4 GM); Dysphagia Soft and Bite-Sized; Daily Fluid Restriction: 1500 ml  Dietary Nutrition Supplements: Low Calorie High Protein Supplement   Recommended Form of Meds: PO  Compensatory Swallowing Strategies: Alternate solids and liquids, Upright as possible for all oral intake, Check for pocketing of food on the Left, Small bites/sips, Lingual sweep     Subjective: Pt admitted 4/12 after fall with left sided paralysis. Pt underwent craniectomy on 4/13 per Dr. Gabi Reed. Social/Functional History  Lives With: Spouse;Son( reported that son is diagnosed with Autism.)  Occupation: Full time employment  Type of occupation:  at [employer]"    FEES 4/14: IMPRESSIONS:   Pt presents w/ mild oropharyngeal dysphagia characterized by premature spillage of thin liquids to UES and pyriforms w/ intermittent deep penetration of laryngeal vestibule; adequate airway protection and complete clearance of all residue after swallow initiation. No aspiration w/ any trials. Timely swallow initiation w/ puree and regular solid consistencies; no oropharyngeal residue.    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 Adequate ab/adduction. Complete closure of TVFs upon adduction.      Dentition: Adequate  Vision  Vision: Impaired  Vision Exceptions: Visual attention tasks with 85% accuracy given min cues. Mod cues to alternate attention between conversation and completing breakfast meal. Pt endorsed moderate difficulty alternating attention between scanning word bank and writing responses for cloze paragraph. GOAL NOT MET - continue to target   Pt will accurately complete executive functioning tasks with min cues. Targeted via tasks detailed below. Novel leisure task: max fading to no cues to limit impulsivity and demonstrate appropriate attention to detail    Cloze paragraph: error x 1 that pt was dependent to self correct. Overall min cues. GOAL NOT MET - continue to target   Pt will attend to L visual field with min cues. No cues for medication label task or calendar task detailed below. Min cues to attend to L upper quadrant during reading instructions portion after educated on deficits noted. Spontaneous eye contact with conversational partner situated L of midline. Overall mod cues to attend to partner on L side during conversation. Pt with head turn and gaze L of midline to conversational partner but max cues required to make eye contact consistently. Min cues to attend to L for novel leisure task with L->R scanning component. Cloze paragraph: min cues to identify items previously used from word bank on far L    Mod-max cues for attention to L side for functional menu reading task. GOAL NOT MET - continue to target   Pt will participate in ongoing cognitive linguistic assessment. Patient was administered portions of the SULMA (Assessment of Language-Related Functional Activities) with the following results:  · Understanding Medication Labels:  90% accuracy, indicating high probability of independent functioning with this task.   · Using a Calendar: 80% accuracy, indicating some level of assistance required with this task / further assessment warranted  · Reading Instructions: 60% accuracy, indicating some level of assistance required with

## 2021-04-29 NOTE — PROGRESS NOTES
Occupational Therapy  Facility/Department: [facility] ACUTE REHAB UNIT  Daily Treatment Note  NAME: Ronda Duran  : 1970  MRN: [id number]    Date of Service: 2021    Discharge Recommendations:  24 hour supervision or assist, Home with Home health OT, Home with nursing aide, Continue to assess pending progress  OT Equipment Recommendations  Equipment Needed: Yes  ADL Assistive Devices: Transfer Tub Bench  Other: continue to assess    Assessment   Performance deficits / Impairments: Decreased functional mobility ; Decreased ADL status; Decreased ROM; Decreased strength;Decreased sensation;Decreased fine motor control;Decreased endurance;Decreased posture;Decreased balance;Decreased safe awareness;Decreased coordination;Decreased vision/visual deficit; Decreased cognition  Treatment Diagnosis: decreased independence with ADLs and decreased functional mobility 2/2 CVA  Prognosis: Fair  OT Education: Plan of Care;Precautions; ADL Adaptive Strategies;Transfer Training  Patient Education: VCs/TCs for ant lean during transfers and scooting-continue to reinforce  REQUIRES OT FOLLOW UP: Yes  Activity Tolerance  Activity Tolerance: Patient Tolerated treatment well  Activity Tolerance: Pt demonstrated max exertion as evidenced by FORBES and red face when attempting tall kneeling  Safety Devices  Safety Devices in place: Yes  Type of devices: Call light within reach; Chair alarm in place; Left in chair; All fall risk precautions in place;Nurse notified         Patient Diagnosis(es): There were no encounter diagnoses. has a past medical history of GERD (gastroesophageal reflux disease), Hernia, Obesity, and Unspecified sleep apnea. has a past surgical history that includes Splenectomy (); Total hip arthroplasty (); Lap Band (); hernia repair ();  section (); and craniotomy (Right, 2021).     Restrictions  Position Activity Restriction  Other position/activity restrictions: Ambulate, Up with assist, Pt to wear helmet when OOB (no orders in chart regarding helmet wear, per chart review this was in therapy notes), Per pt HOB to elevated at 30*  Subjective   General  Chart Reviewed: Yes  Patient assessed for rehabilitation services?: Yes  Additional Pertinent Hx: 46 y.o. female with hypertension and tobacco abuse who presented after spending a prolonged period on the ground s/p fall out of bed; found to have acute left hemiparesis and gaze deviation. CXR with no acute cardiopulmonary abnormality. CTH revealed large acute/subacute infarct in the R frontal lobe with associated mass effect and 4 mm R to L midline shift. It also noted subdural hemorrhage of 6mm along falx, although NSGY less convinced. CTA head and neck revealed occluded R cervical ICA & R anterior cerebral artery with near complete occlusion of R MCA. Outside window on TPA. Pt is now POD #1 following hemicraniectomy (4/13). Pt admitted to ARU 4/23  Response to previous treatment: Patient with no complaints from previous session  Family / Caregiver Present: No  Referring Practitioner: Dr. oPllo St DO  Diagnosis: CVA  Subjective  Subjective: Pt supine in bed upon arrival eating breakfast, pleasant and agreeable to OT/PT cotx to maximize independence with functional transfers with collaboration of 2 disciplines. General Comment  Comments: Shahida Rued   Vital Signs  Patient Currently in Pain: Yes(3/10 HA)     Orientation  Orientation  Overall Orientation Status: Within Functional Limits  Objective    ADL  Feeding: Setup(to open containers and VCs to locate utensils placed on L of tray)  LE Dressing: Dependent/Total(to don shoes supine in bed)        Balance  Sitting Balance: Contact guard assistance(seated EOM)  Standing Balance  Time: ~3 minutes total  Activity: functional transfers; partial stands for lateral scooting along perimeter of mat  Comment: Pt initially req min A and VCs for ant lean to complete partial stance/WBing through BLEs to scoot laterally along mat and assist to facilitate/position LLE, pt progressed to CGA to scoot and facilitation of LLE with occasional cues for ant lean, pt completed x 12 scoots  Bed mobility  Supine to Sit: Maximum assistance(step by step VCs to sequence and hook RLE to manage LLE off bed, assist for trunk ascension from sidelying to sitting EOB; on mat, assist to manage BLEs and for trunk ascension, step by step VCs to sequence)  Sit to Supine: Moderate assistance(on mat, assist to manage LLE, VCs for sequencing)  Transfers  Sit Pivot Transfers: Moderate assistance;Maximum assistance(Mod-Max A from EOB<w/c leading to R, VCs to sequence; Mod A squat pivot w/c<>EOM with VCs for ant lean)                 Neuromuscular Education  Neuromuscular education: Yes  Weight Bearing  Weight Bearing Technique: Yes  Response To Weight Bearing Technique: Pt completed dependent transition from sidelying to qudriped position and req Mod-Max A to transition to tall kneeling with use of red swiss therapy ball placed under trunk and RUE in WBing extended arm positioning and LUE elbow extension and ER, pt able to maintain ~25 seconds; maintaining tall kneeling and quadriped both effortful for pt     Cognition  Overall Cognitive Status: Exceptions  Arousal/Alertness: Appropriate responses to stimuli  Following Commands: Follows one step commands consistently; Follows one step commands with increased time  Attention Span: Appears intact  Memory: Appears intact  Safety Judgement: Decreased awareness of need for assistance;Decreased awareness of need for safety  Problem Solving: Assistance required to generate solutions;Assistance required to correct errors made;Assistance required to implement solutions;Decreased awareness of errors  Insights: Decreased awareness of deficits  Initiation: Requires cues for some  Sequencing: Requires cues for some         Second Session: Pt sitting in w/c upon arrival, pleasant and agreeable to OT/PT cotx to maximize functional transfers and mobility with collaboration of 2 disciplines. Pt performed squat pivot transfer mod A of 1 WC<> mat table in both directions req cues for hand placement, and anterior lean. Pt completed x 2 partial stands req mod A x 1 and other therapist placing wedge under patient to facilitate anterior lean and increased WB on BLE. Pt engaged in dynamic sitting balance activity while seated on wedge with LUE placed in elbow extension and finger extension on mat to increase LUE WB. Pt reached outside JOSETTE with RUE for colored cones at floor level facilitating anterior and contralateral reaching to left to increase sitting balance, attention to left, posture, WB on LUE and LLE. Pt performed x 15 mins with CGA of 1 therapist to guard pt in front and facilitate task, pt with no overt LOB and occasional VCs to reorient to midline, and min A of other therapist to maintain LUE in WB facilitation. Pt sit to stand from w/c to change cushion to a ROHO cushion with mod A and stand to sit with mod A of 1 + min A of 1 to facilitate hips d/t posterolateral lean to L. Pt demonstrated left knee hyperextension in stance, extensor tone LLE, and flexor tone LUE. Pt verbalized relief from change of seat cushion. Pt left in w/c at end of session with chair alarm engaged, call light within reach and all needs met.     Plan   Plan  Times per week: 5x a week for 60 mins daily  Times per day: Daily  Current Treatment Recommendations: Strengthening, Endurance Training, Neuromuscular Re-education, Self-Care / ADL, Functional Mobility Training, Safety Education & Training, ROM, Positioning, Wheelchair Mobility Training, Balance Training, Patient/Caregiver Education & Training, Manual Therapy:  MLD, Equipment Evaluation, Education, & procurement, Home Management Training, Cognitive Reorientation    Goals  Short term goals  Time Frame for Short term goals: 2 weeks-all ongoing  Short term goal 1: Pt will complete toilet transfer w/ Mod A  Short term goal 2: Pt will complete UE dressing w/ Mod A  Short term goal 3: Pt will complete oral care w/ spvn seated at sink w/o VCs for L sided attention  Short term goal 4: Pt will complete LE dressing w/ Mod A  Long term goals  Time Frame for Long term goals : 4 weeks-all ongoing  Long term goal 1: Pt will complete toilet transfer w/ SBA  Long term goal 2: Pt will complete UE dressing w/ setup  Long term goal 3: Pt will complete LE dressing w/ SBA  Long term goal 4: Pt will be independent w/ tone management exercises to improve functional use of LUE  Long term goal 5: Pt will complete oral care I'ly  Patient Goals   Patient goals : \"get back to my normal self\"       Therapy Time   Individual Concurrent Group Co-treatment   Time In 0830     0730   Time Out 0838     0830   Minutes 8     60   Timed Code Treatment Minutes: 68 Minutes     Second Session Therapy Time:   Individual Concurrent Group Co-treatment   Time In      4275   Time Out      1321   Minutes      36     Timed Code Treatment Minutes:  68 + 36    Total Treatment Minutes:  Sheela 13, OT

## 2021-04-29 NOTE — PROGRESS NOTES
Office : [phone]     Fax :[phone]         Renal Progress Note  Subjective:   Admit Date: 4/23/2021     HPI      Interval History:     Good UO   No Diarrhea   Na stable       DIET DIET GENERAL; No Added Salt (3-4 GM); Dysphagia Soft and Bite-Sized; Daily Fluid Restriction: 1500 ml  Dietary Nutrition Supplements: Low Calorie High Protein Supplement  Medications:   Scheduled Meds:   atorvastatin  80 mg Oral Nightly    acetaminophen  1,000 mg Oral 4 times per day    bisacodyl  5 mg Oral Daily    amLODIPine  5 mg Oral Daily    aspirin  81 mg Oral Daily    famotidine  20 mg Oral BID    FLUoxetine  20 mg Oral Daily    heparin (porcine)  5,000 Units Subcutaneous 3 times per day    levETIRAcetam  500 mg Oral BID    multivitamin  1 tablet Oral Daily    sennosides-docusate sodium  2 tablet Oral BID    sodium chloride  1 g Oral TID WC    thiamine mononitrate  100 mg Oral Daily     Continuous Infusions:    Labs:  CBC:   Recent Labs     04/26/21  0614 04/28/21  0704   WBC 16.2* 14.5*   HGB 12.2 12.6   * 592*     BMP:    Recent Labs     04/26/21  0614 04/27/21  0604 04/28/21  0704    139 137  137   K 4.0 4.3 3.8  3.8    101 102  102   CO2 24 27 23  23   BUN 13 11 11  11   CREATININE <0.5* 0.6 0.6  0.6   GLUCOSE 97 97 95  93     Ca/Mg/Phos:   Recent Labs     04/26/21  0614 04/27/21  0604 04/28/21  0704   CALCIUM 9.4 9.8 9.6  9.5   PHOS 4.5 4.6 4.2     Hepatic: No results for input(s): AST, ALT, ALB, BILITOT, ALKPHOS in the last 72 hours. Troponin: No results for input(s): TROPONINI in the last 72 hours.   BNP: No results for input(s):
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
## 2021-04-29 NOTE — PROGRESS NOTES
Patient is in bed and resting at this time, vitals stable, no complaints of pain at this time. Patient is alert and oriented x 4, left side is flaccid, put towel in left hand because of earline. Bed alarm is on and call light with in reach.

## 2021-04-29 NOTE — PROGRESS NOTES
Department of Physical Medicine & Rehabilitation  Progress Note    Patient Identification:  Gisele Prather  [id number]  : 1970  Admit date: 2021    Chief Complaint: CVA    Subjective:   No new complaints overnight. She is doing better each day in therapy. No new complaints. ROS: No f/c, n/v, cp     Objective:  Patient Vitals for the past 24 hrs:   BP Temp Temp src Pulse Resp SpO2   21 0741 125/74 98.3 °F (36.8 °C) Oral 85 16 91 %   21 0115     16    21 2305     18    21 2155 123/74 98.1 °F (36.7 °C) Oral 69 16 96 %     Const: Alert. No distress, pleasant. HEENT: Normocephalic, atraumatic. Normal sclera/conjunctiva. MMM. Incision healing well  CV: Regular rate and rhythm. Resp: No respiratory distress. Lungs CTAB. Abd: Soft, nontender, nondistended, NABS+   Ext: No edema. Neuro: Alert, oriented, appropriately interactive. Psych: Cooperative, appropriate mood and affect    Laboratory data: Available via EMR. Last 24 hour lab  No results found for this or any previous visit (from the past 24 hour(s)). Therapy progress:  PT  Position Activity Restriction  Other position/activity restrictions: Ambulate, Up with assist, Pt to wear helmet when OOB (no orders in chart regarding helmet wear, per chart review this was in therapy notes), Per pt HOB to elevated at 30*  Objective     Sit to Stand: Maximum Assistance, Moderate Assistance(Transf from w/c to standing. pt was assisted in lower body dressing by OT during stand)  Stand to sit: 2 Person Assistance, Dependent/Total, Moderate Assistance(VC to anterior WS and ModAx2 for controlled descent)  Bed to Chair: Dependent/Total(bed to w/c, w/c <> commode, w/c <> TTB; Pt requiring MAX of 1 and   : Pt given step by step instruction prior to transfer. Pt requiring facilitation for wt shifting, LE placement and advancement of transfer.  Consistently pushing to the L during transfers)     OT  PT Equipment Recommendations Equipment Needed: (Will continue to assess equipment needs pending progress)  Toilet - Technique: Squat pivot  Equipment Used: Standard toilet  Assessment        SLP  Diet Solids Recommendation: Dysphagia Soft and Bite-Sized (Dysphagia III)(with regular solids as tolerated. Per pt request, gravy/sauces to keep for moist.)       Body mass index is 38.5 kg/m². Assessment and Plan:  Stephy Frazier a 46 y.o. female with PMH GERD p/w L sided weakness and facial droop.  CTA head/neck on 4/12 revealed occluded R cervical ICA, occluded right LEAH, near complete occlusion of right MCA.     Acute ischemic CVA, Subdural heomorrhage s/p right hemicraniectomy and subsequen SAH and IP hemorrhage  No tPA given.  MRI 4/14 showing large subacute infarct throughout R LEAH and MCA with some areas of hemorrhagic conversion.  - Neurosurgery and neurology following  --SBP <160, PRN hydralazine, scheduled Norvasc  -- Keppra 500 mg twice daily   -  SQH  - PT/OT consulted      Aphasia- SLP consulted   Depression-Patient takes fluoxetine 20 mg daily at home  Anxiety -Patient takes Ativan 0.5 mg every 8 hours as needed for anxiety- holding  MISHA-Patient uses CPAP at home   GERD- Patient takes omeprazole 40 mg daily at home- half dose  Obesity    Rehab Progress: Improving  Anticipated Dispo: home  Services/DME: LIANG  ELOS: LIANG Rosas D.O. M.P.H  PM&R  4/29/2021  10:18 AM

## 2021-04-29 NOTE — PLAN OF CARE
Problem: Pain:  Goal: Control of acute pain  Description: Control of acute pain  4/29/2021 1447 by Ling Byrd RN  Outcome: Ongoing  Pt verbalized having pain this shift. Pt was medicated with scheduled ES Tylenol. Medication and rest was effective. Problem: Falls - Risk of:  Goal: Will remain free from falls  Description: Will remain free from falls  4/29/2021 1447 by Ling Byrd RN  Outcome: Ongoing  Patient is a fall risk. Patient is a x 2 stand pivot with gait belt. No attempts to get out of bed have been made, calls appropriately when assistance is needed. Bed alarm and hourly rounds in place for safety. No falls this shift. Problem: Skin Integrity:  Goal: Absence of new skin breakdown  Description: Absence of new skin breakdown  4/29/2021 1447 by Ling Byrd RN  Outcome: Ongoing  Scalp incision is well approximated and without drainage.      Electronically signed by Ling Byrd RN on 4/29/2021 at 2:54 PM

## 2021-04-29 NOTE — PLAN OF CARE
Problem: Pain:  Goal: Pain level will decrease  Description: Pain level will decrease  Outcome: Ongoing  Note: Pt resting at this time. No complaints of pain. Encouraged patient to call out with any needs. Goal: Control of acute pain  Description: Control of acute pain  Outcome: Ongoing  Goal: Control of chronic pain  Description: Control of chronic pain  Outcome: Ongoing     Problem: Falls - Risk of:  Goal: Will remain free from falls  Description: Will remain free from falls  Outcome: Ongoing  Note: Patient is a fall risk. Patient is a x 2 stand pivot with gait belt. See Fall Risk assessment for details. Bed is in low, lock position; call light/belongings within reach. No attempts to get out of bed have been made, calls appropriately when assistance is needed. Bed alarm and hourly rounds in place for safety. Goal: Absence of physical injury  Description: Absence of physical injury  Outcome: Ongoing     Problem: Skin Integrity:  Goal: Will show no infection signs and symptoms  Description: Will show no infection signs and symptoms  Outcome: Ongoing  Note: Pt at risk for skin breakdown. Patient has a surgical incision on R side. Skin assessment as documented. Pt repositioned in bed with pillow support as needed. Call light within reach.     Goal: Absence of new skin breakdown  Description: Absence of new skin breakdown  Outcome: Ongoing     Problem: Nutrition  Goal: Optimal nutrition therapy  Outcome: Ongoing

## 2021-04-30 LAB
ANION GAP SERPL CALCULATED.3IONS-SCNC: 14 MMOL/L (ref 3–16)
BASOPHILS ABSOLUTE: 0.2 K/UL (ref 0–0.2)
BASOPHILS RELATIVE PERCENT: 1.2 %
BUN BLDV-MCNC: 10 MG/DL (ref 7–20)
CALCIUM SERPL-MCNC: 10.2 MG/DL (ref 8.3–10.6)
CHLORIDE BLD-SCNC: 103 MMOL/L (ref 99–110)
CO2: 23 MMOL/L (ref 21–32)
CREAT SERPL-MCNC: 0.5 MG/DL (ref 0.6–1.1)
EOSINOPHILS ABSOLUTE: 0.5 K/UL (ref 0–0.6)
EOSINOPHILS RELATIVE PERCENT: 2.6 %
GFR AFRICAN AMERICAN: >60
GFR NON-AFRICAN AMERICAN: >60
GLUCOSE BLD-MCNC: 93 MG/DL (ref 70–99)
HCT VFR BLD CALC: 42.7 % (ref 36–48)
HEMOGLOBIN: 14.5 G/DL (ref 12–16)
LYMPHOCYTES ABSOLUTE: 4.5 K/UL (ref 1–5.1)
LYMPHOCYTES RELATIVE PERCENT: 26.4 %
MCH RBC QN AUTO: 35.7 PG (ref 26–34)
MCHC RBC AUTO-ENTMCNC: 34 G/DL (ref 31–36)
MCV RBC AUTO: 105.1 FL (ref 80–100)
MONOCYTES ABSOLUTE: 0.7 K/UL (ref 0–1.3)
MONOCYTES RELATIVE PERCENT: 4.1 %
NEUTROPHILS ABSOLUTE: 11.3 K/UL (ref 1.7–7.7)
NEUTROPHILS RELATIVE PERCENT: 65.7 %
PDW BLD-RTO: 13.7 % (ref 12.4–15.4)
PLATELET # BLD: 644 K/UL (ref 135–450)
PMV BLD AUTO: 8.5 FL (ref 5–10.5)
POTASSIUM REFLEX MAGNESIUM: 4.4 MMOL/L (ref 3.5–5.1)
RBC # BLD: 4.06 M/UL (ref 4–5.2)
SODIUM BLD-SCNC: 140 MMOL/L (ref 136–145)
WBC # BLD: 17.1 K/UL (ref 4–11)

## 2021-04-30 PROCEDURE — 6370000000 HC RX 637 (ALT 250 FOR IP): Performed by: PHYSICAL MEDICINE & REHABILITATION

## 2021-04-30 PROCEDURE — 97129 THER IVNTJ 1ST 15 MIN: CPT

## 2021-04-30 PROCEDURE — 92526 ORAL FUNCTION THERAPY: CPT

## 2021-04-30 PROCEDURE — 97535 SELF CARE MNGMENT TRAINING: CPT

## 2021-04-30 PROCEDURE — 97530 THERAPEUTIC ACTIVITIES: CPT

## 2021-04-30 PROCEDURE — 36415 COLL VENOUS BLD VENIPUNCTURE: CPT

## 2021-04-30 PROCEDURE — 6360000002 HC RX W HCPCS: Performed by: PHYSICAL MEDICINE & REHABILITATION

## 2021-04-30 PROCEDURE — 97110 THERAPEUTIC EXERCISES: CPT

## 2021-04-30 PROCEDURE — 99232 SBSQ HOSP IP/OBS MODERATE 35: CPT | Performed by: PHYSICAL MEDICINE & REHABILITATION

## 2021-04-30 PROCEDURE — 97112 NEUROMUSCULAR REEDUCATION: CPT

## 2021-04-30 PROCEDURE — 1280000000 HC REHAB R&B

## 2021-04-30 PROCEDURE — 85025 COMPLETE CBC W/AUTO DIFF WBC: CPT

## 2021-04-30 PROCEDURE — 94660 CPAP INITIATION&MGMT: CPT

## 2021-04-30 PROCEDURE — 97130 THER IVNTJ EA ADDL 15 MIN: CPT

## 2021-04-30 PROCEDURE — 99232 SBSQ HOSP IP/OBS MODERATE 35: CPT | Performed by: INTERNAL MEDICINE

## 2021-04-30 PROCEDURE — 80048 BASIC METABOLIC PNL TOTAL CA: CPT

## 2021-04-30 RX ADMIN — THIAMINE HCL TAB 100 MG 100 MG: 100 TAB at 09:00

## 2021-04-30 RX ADMIN — OXYCODONE 10 MG: 5 TABLET ORAL at 03:36

## 2021-04-30 RX ADMIN — HEPARIN SODIUM 5000 UNITS: 5000 INJECTION INTRAVENOUS; SUBCUTANEOUS at 21:45

## 2021-04-30 RX ADMIN — FLUOXETINE 20 MG: 20 CAPSULE ORAL at 09:00

## 2021-04-30 RX ADMIN — ATORVASTATIN CALCIUM 80 MG: 80 TABLET, FILM COATED ORAL at 21:45

## 2021-04-30 RX ADMIN — ACETAMINOPHEN 1000 MG: 500 TABLET, COATED ORAL at 03:36

## 2021-04-30 RX ADMIN — ACETAMINOPHEN 1000 MG: 500 TABLET, COATED ORAL at 18:10

## 2021-04-30 RX ADMIN — LEVETIRACETAM 500 MG: 500 TABLET ORAL at 09:00

## 2021-04-30 RX ADMIN — FAMOTIDINE 20 MG: 20 TABLET, FILM COATED ORAL at 21:44

## 2021-04-30 RX ADMIN — ASPIRIN 81 MG: 81 TABLET, CHEWABLE ORAL at 09:00

## 2021-04-30 RX ADMIN — Medication 5 MG: at 09:00

## 2021-04-30 RX ADMIN — ACETAMINOPHEN 1000 MG: 500 TABLET, COATED ORAL at 11:11

## 2021-04-30 RX ADMIN — AMLODIPINE BESYLATE 5 MG: 5 TABLET ORAL at 09:00

## 2021-04-30 RX ADMIN — LEVETIRACETAM 500 MG: 500 TABLET ORAL at 21:45

## 2021-04-30 RX ADMIN — Medication 1 G: at 09:00

## 2021-04-30 RX ADMIN — DOCUSATE SODIUM 50 MG AND SENNOSIDES 8.6 MG 2 TABLET: 8.6; 5 TABLET, FILM COATED ORAL at 09:00

## 2021-04-30 RX ADMIN — THERA TABS 1 TABLET: TAB at 09:00

## 2021-04-30 RX ADMIN — Medication 1 G: at 12:20

## 2021-04-30 RX ADMIN — Medication 1 G: at 18:10

## 2021-04-30 RX ADMIN — FAMOTIDINE 20 MG: 20 TABLET, FILM COATED ORAL at 09:00

## 2021-04-30 RX ADMIN — ACETAMINOPHEN 1000 MG: 500 TABLET, COATED ORAL at 21:44

## 2021-04-30 RX ADMIN — HEPARIN SODIUM 5000 UNITS: 5000 INJECTION INTRAVENOUS; SUBCUTANEOUS at 14:20

## 2021-04-30 RX ADMIN — HEPARIN SODIUM 5000 UNITS: 5000 INJECTION INTRAVENOUS; SUBCUTANEOUS at 06:28

## 2021-04-30 RX ADMIN — DOCUSATE SODIUM 50 MG AND SENNOSIDES 8.6 MG 2 TABLET: 8.6; 5 TABLET, FILM COATED ORAL at 21:45

## 2021-04-30 ASSESSMENT — PAIN DESCRIPTION - FREQUENCY
FREQUENCY: INTERMITTENT

## 2021-04-30 ASSESSMENT — PAIN SCALES - GENERAL
PAINLEVEL_OUTOF10: 0
PAINLEVEL_OUTOF10: 9
PAINLEVEL_OUTOF10: 0
PAINLEVEL_OUTOF10: 2
PAINLEVEL_OUTOF10: 3
PAINLEVEL_OUTOF10: 0
PAINLEVEL_OUTOF10: 2
PAINLEVEL_OUTOF10: 2
PAINLEVEL_OUTOF10: 0

## 2021-04-30 ASSESSMENT — PAIN DESCRIPTION - DESCRIPTORS
DESCRIPTORS: DISCOMFORT
DESCRIPTORS: DISCOMFORT
DESCRIPTORS: HEADACHE
DESCRIPTORS: ACHING;SHARP

## 2021-04-30 ASSESSMENT — PAIN DESCRIPTION - LOCATION
LOCATION: HEAD
LOCATION: BACK
LOCATION: BACK
LOCATION: HIP

## 2021-04-30 ASSESSMENT — PAIN DESCRIPTION - PAIN TYPE
TYPE: ACUTE PAIN

## 2021-04-30 ASSESSMENT — PAIN DESCRIPTION - ORIENTATION
ORIENTATION: LEFT
ORIENTATION: MID
ORIENTATION: LOWER
ORIENTATION: LOWER

## 2021-04-30 ASSESSMENT — PAIN DESCRIPTION - ONSET
ONSET: AWAKENED FROM SLEEP
ONSET: ON-GOING

## 2021-04-30 ASSESSMENT — PAIN - FUNCTIONAL ASSESSMENT
PAIN_FUNCTIONAL_ASSESSMENT: ACTIVITIES ARE NOT PREVENTED

## 2021-04-30 ASSESSMENT — PAIN DESCRIPTION - PROGRESSION
CLINICAL_PROGRESSION: GRADUALLY IMPROVING
CLINICAL_PROGRESSION: GRADUALLY WORSENING
CLINICAL_PROGRESSION: GRADUALLY WORSENING

## 2021-04-30 NOTE — PROGRESS NOTES
in the last 72 hours. ABGs: No results for input(s): PHART, PO2ART, [id number] in the last 72 hours. INR: No results for input(s): INR in the last 72 hours. UA:  No results for input(s): Lorin Jean, GLUCOSEU, BILIRUBINUR, KETUA, SPECGRAV, BLOODU, PHUR, PROTEINU, UROBILINOGEN, NITRU, LEUKOCYTESUR, Russ Carne in the last 72 hours. Urine Microscopic: No results for input(s): LABCAST, BACTERIA, COMU, HYALCAST, WBCUA, RBCUA, EPIU in the last 72 hours. Urine Culture: No results for input(s): LABURIN in the last 72 hours. Urine Chemistry: No results for input(s): Cathern Jimenez, PROTEINUR, NAUR in the last 72 hours.     Objective:   Vitals: /68   Pulse 75   Temp 97.6 °F (36.4 °C) (Axillary)   Resp 18   Ht 5' 6\" (1.676 m)   Wt 238 lb 8.6 oz (108.2 kg)   SpO2 95%   BMI 38.50 kg/m²    Wt Readings from Last 3 Encounters:   04/23/21 238 lb 8.6 oz (108.2 kg)   04/15/21 238 lb 8.6 oz (108.2 kg)   04/13/21 249 lb 1.9 oz (113 kg)      24HR INTAKE/OUTPUT:    No intake or output data in the 24 hours ending 04/30/21 1911  Constitutional:  Alert, awake, no apparent distress  NECK: supple, no JVD  Cardiovascular:  S1, S2 without m/r/g  Respiratory:  CTA B without w/r/r  Abdomen: +bs, soft, nt  Ext: +,LE edema    Assessment and Plan:       IMAGING:  No orders to display       Assessment/Plan     SIADH   CVA   Electrolyte imbalance   HTN     Plan   - check NA in am   - ur studies   - Fluid restriction   - labs in am   - PT/OT          Bhargav Lazo MD  [facility] [address] [place] [address] [phone]  [phone]

## 2021-04-30 NOTE — PROGRESS NOTES
NUTRITION NOTE   Admission Date: 4/23/2021     Type and Reason for Visit: Reassess    NUTRITION RECOMMENDATIONS:   1. PO Diet: Continue general, no added salt diet  -1500 ml FR per MD   2. ONS: Continue Low ed, HP ONS   3. Obtain updated wt     NUTRITION ASSESSMENT:  Pt is nutritionally stable this admission AEB good po intakes. Pt has been consuming >50% of meals with good acceptance of ONS. Recommend obtaining an updated wt as no wt since admission. Will continue to follow. MALNUTRITION ASSESSMENT  Context of Malnutrition: Acute Illness   Malnutrition Status: No malnutrition    NUTRITION DIAGNOSIS No nutrition diagnosis at this time. NUTRITION INTERVENTION  Food and/or Nutrient Delivery: Continue Current Diet, Continue Oral Nutrition Supplement   Nutrition Education/Counseling: No recommendation at this time Coordination of Nutrition Care: Continue to monitor while inpatient     NUTRITION RISK LEVEL: Risk Level: Low        The patient will still be monitored per nutrition standards of care. Consult dietitian if nutrition interventions essential to patient care is needed.      Sandy Patel, 66 [address], [address] [place] [address] Drive:  [phone]  Office:  [phone]

## 2021-04-30 NOTE — PROGRESS NOTES
Department of Physical Medicine & Rehabilitation  Progress Note    Patient Identification:  Héctor Cruz  [id number]  : 1970  Admit date: 2021    Chief Complaint: CVA    Subjective:   No acute events overnight. She has an assited fall this morning when she slid down her chair; slow descent without head trauma. Denies HA, N,V. Pt seen at bedside resting comfortably. ROS: No f/c, n/v, cp     Objective:  Patient Vitals for the past 24 hrs:   BP Temp Temp src Pulse Resp SpO2   21 0253     18    21 124/74 98.4 °F (36.9 °C) Oral 72 18 96 %     Const: Alert. No distress, pleasant. HEENT: Normocephalic, atraumatic. Normal sclera/conjunctiva. MMM. Incision healing well  CV: Regular rate and rhythm. Resp: No respiratory distress. Lungs CTAB. Abd: Soft, nontender, nondistended, NABS+   Ext: No edema. Neuro: Alert, oriented, appropriately interactive. Psych: Cooperative, appropriate mood and affect    Laboratory data: Available via EMR. Last 24 hour lab  No results found for this or any previous visit (from the past 24 hour(s)). Therapy progress:  PT  Position Activity Restriction  Other position/activity restrictions: Ambulate, Up with assist, Pt to wear helmet when OOB (no orders in chart regarding helmet wear, per chart review this was in therapy notes), Per pt HOB to elevated at 30*  Objective     Sit to Stand: Maximum Assistance, Moderate Assistance(Transf from w/c to standing. pt was assisted in lower body dressing by OT during stand)  Stand to sit: 2 Person Assistance, Dependent/Total, Moderate Assistance(VC to anterior WS and ModAx2 for controlled descent)  Bed to Chair: Dependent/Total(bed to w/c, w/c <> commode, w/c <> TTB; Pt requiring MAX of 1 and   : Pt given step by step instruction prior to transfer. Pt requiring facilitation for wt shifting, LE placement and advancement of transfer.  Consistently pushing to the L during transfers)     OT  PT Equipment Recommendations  Equipment Needed: (Will continue to assess equipment needs pending progress)  Toilet - Technique: Squat pivot  Equipment Used: Standard toilet  Assessment        SLP  Diet Solids Recommendation: Dysphagia Soft and Bite-Sized (Dysphagia III)(with regular solids as tolerated. Per pt request, gravy/sauces to keep for moist.)       Body mass index is 38.5 kg/m². Assessment and Plan:  Juni sevilla 46 y.o. female with PMH GERD p/w L sided weakness and facial droop.  CTA head/neck on 4/12 revealed occluded R cervical ICA, occluded right LEAH, near complete occlusion of right MCA.     Acute ischemic CVA, Subdural heomorrhage s/p right hemicraniectomy and subsequen SAH and IP hemorrhage  No tPA given.  MRI 4/14 showing large subacute infarct throughout R LEAH and MCA with some areas of hemorrhagic conversion.  - Neurosurgery and neurology following  --SBP <160, PRN hydralazine, scheduled Norvasc  -- Keppra 500 mg twice daily   -  SQH  - PT/OT consulted      Aphasia- SLP consulted   Depression-Patient takes fluoxetine 20 mg daily at home  Anxiety -Patient takes Ativan 0.5 mg every 8 hours as needed for anxiety- holding  MISHA-Patient uses CPAP at home   GERD- Patient takes omeprazole 40 mg daily at home- half dose  Obesity    Rehab Progress: Improving  Anticipated Dispo: home  Services/DME: LIANG  ELOS: LIANG Gar MD  PGY-3    Dilan Alan D.O. M.P.H  PM&R  4/30/2021  8:36 AM

## 2021-04-30 NOTE — PROGRESS NOTES
ACUTE REHAB UNIT  SPEECH/LANGUAGE PATHOLOGY      [x] Daily  [x] Weekly Care Conference Note  [] Discharge    Patient:Laura Rivera      FIR:9/30/[birth year]126  VTL:[id number]  Rehab Dx/Hx: Acute CVA (cerebrovascular accident) ([facility] [address] [I63.9]    Precautions: [x] Aspiration  [x] Fall risk  [] Sternal  [] Seizure [] Hip  [] Weight Bearing [] Other  ST Dx: [] Aphasia  [] Dysarthria  [] Apraxia   [x] Oropharyngeal dysphagia [x] Cognitive Impairment  [] Other:   Date of Admit: 4/23/2021  Room #: 3101/3101-01  Date: 4/30/2021          Current Diet Order:Dietary Nutrition Supplements: Low Calorie High Protein Supplement  DIET GENERAL; No Added Salt (3-4 GM); Daily Fluid Restriction: 1500 ml   Recommended Form of Meds: PO  Compensatory Swallowing Strategies: Alternate solids and liquids, Upright as possible for all oral intake, Check for pocketing of food on the Left, Small bites/sips, Lingual sweep     Subjective: Pt admitted 4/12 after fall with left sided paralysis. Pt underwent craniectomy on 4/13 per Dr. Brianna Morocho. Social/Functional History  Lives With: Spouse;Son( reported that son is diagnosed with Autism.)  Occupation: Full time employment  Type of occupation:  at [employer]"    FEES 4/14: IMPRESSIONS:   Pt presents w/ mild oropharyngeal dysphagia characterized by premature spillage of thin liquids to UES and pyriforms w/ intermittent deep penetration of laryngeal vestibule; adequate airway protection and complete clearance of all residue after swallow initiation. No aspiration w/ any trials. Timely swallow initiation w/ puree and regular solid consistencies; no oropharyngeal residue.    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 Adequate ab/adduction. Complete closure of TVFs upon adduction.      Dentition: Adequate  Vision  Vision: Impaired  Vision Exceptions: Visual field cut  Hearing  Hearing: Within functional limits  Barriers toward progress: None towards stated goals      Date: 4/30/2021      Tx session 1 Tx session 2   Total Timed Code Min 15 15   Total Treatment Minutes 42 30   Individual Treatment Minutes 42 30   Group Treatment Minutes 0 0   Co-Treat Minutes 0 0   Brief Exception:     Pain  None reports    Pain Intervention: [] RN notified  [] Repositioned  [] Intervention offered and patient declined  [x] N/A  [] Other:    [] RN notified  [] Repositioned  [] Intervention offered and patient declined  [x] N/A  [] Other:   Subjective:     Diet advanced to regular textures yesterday. Chart review does not reveal any documented difficulty on current recommended diet level. Pt reports sliding out of chair this AM. Requesting to complete hand hygiene - \"I figure since I've been crawling around on the hospital floor. Like a turtle on it's shell kicking\" Pleasant, cooperative, flat affect. Objective / Goals:     Patient will consume regular solids with timely mastication and no pocketing in L buccal cavity across 2 sessions. Pt assessed with regular solids with pocketing x2 which was easily cleared with finger sweep by pt when given prompt by SLP. Patient tolerated NMES via VitalStim placement 4a (targeting orbicularis oris, buccinator and superior pharyngeal constrictor) @ 4.5 mA on right x35 minutes total with positive sensory response ~2.0 mA, 8.0 mA x 10 minutes with excellent contraction fading to minimal contraction requiring increase to 9.0 mA x 25 minutes (total of 35 minutes). Excellent candidate for VitalStim. Pt verbalized awareness of oral deficits and strategies to reduce buccal residue, but she required verbal cues to implement lingual/finger sweep. Pocketed residue identified 1/4 trials. Patient will consume PO without anterior spillage or overt pocketing across 2 sessions. Pt without anterior spillage from PO within oral cavity.  The patient demonstrated ongoing difficulty with self feeding resulting in placement of PO outside of PO or on labial surface without awareness or sensations x5. This often results in PO lost to shirt which pt demonstrates awareness of but required cues to remove from shirt 3/5 opps. No anterior loss demonstrated at this time; however pt does depend on verbal cues to identify left labial residue (which occurs during feeding). Pt will complete divided/alternating attention tasks with 85% accuracy given min cues. Mod-max cues to continue PO intake this date with conversational partner present. Pt self identified that her inability to complete meal within specified time was due to attention and not early satiation. Goal not directly targeted during this session. Pt will accurately complete executive functioning tasks with min cues. Self identified attention and physical limitations / associated goal areas. Pt appropriately requesting for assistance for tray setup (opening packages as required due to hemiparesis). Pt verbalized good recall of safety measures (call for assistance to reposition, use sweep to clear oral residue, low sodium diet, fluid restrictions, helmet rationale). Pt will attend to L visual field with min cues. Independent for locating tray on left side of tray. Pt initially independent with eye contact to conversational partner on left and at midline fading to mod-max cues when distractors present and increased time demands. Pt able to verbalize awareness of functional tasks affected by Left neglect independently; however she requires max cues to attend to SLP in left visual field, and moderate cues to attend to items on left side of meal tray. Pt requires max cues to calculate time that involves using left side of clock on the wall. Pt will participate in ongoing cognitive linguistic assessment.  GOAL MET - continue as appropriate GOAL PREVIOUSLY MET - continue as appropriate   Other areas targeted:     Education:
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
## 2021-04-30 NOTE — PLAN OF CARE
Problem: Pain:  Goal: Control of acute pain  Description: Control of acute pain  4/30/2021 1535 by Mauricio Parham RN  Note: Pt verbalized having back pain. Pt was medicated with scheduled Tylenol. Tylenol was effective. Pt is also using positioning and rest to promote comfort. Problem: Falls - Risk of:  Goal: Will remain free from falls  Description: Will remain free from falls  Outcome: Not Met This Shift  Note: Pt this shift was assisted to floor from [facility]. Pt states she was wiggling in [facility] and was at edge and was sliding off seat when I arrived. Pt was assisted to floor. Dysum was placed between pad and seat cushion. Pt instructed again on use of call light and the importance of safety.      Problem: Skin Integrity:  Goal: Absence of new skin breakdown  Description: Absence of new skin breakdown  Outcome: Ongoing  No new skin issues noted    Electronically signed by Mauricio Parham RN on 4/30/2021 at 3:41 PM

## 2021-04-30 NOTE — PROGRESS NOTES
At 0720 pt was assisted to floor from [facility]. Pt states she was wiggling in [facility] because she had to go to bathroom. Pt was at edge and was sliding off seat when this nurse arrived. Pt was assisted to floor. Dysum was placed between pad and seat cushion. Pt instructed again on use of call light and the importance of safety. No injuries noted and pt did not hit her head. Pt this shift was stable. Alert and oriented. VS were stable. Pt denied having chest pain or SOB. Pt verbalized having back pain and was medicated with scheduled Extra Strength Tylenol. Medication was effective. Continent of bladder and bowel this shift. Pt is with poor appetite. Scalp incision was with staples and well approximated.     Electronically signed by Elise Fonseca RN on 4/30/2021 at 3:47 PM

## 2021-04-30 NOTE — PROGRESS NOTES
Occupational Therapy  Facility/Department: [facility] ACUTE REHAB UNIT  Daily Treatment Note  NAME: Colton Sutherland  : 1970  MRN: [id number]    Date of Service: 2021    Discharge Recommendations:  24 hour supervision or assist, Home with Home health OT, Home with nursing aide, Continue to assess pending progress  OT Equipment Recommendations  Equipment Needed: Yes  ADL Assistive Devices: Transfer Tub Bench  Other: continue to assess    Assessment   Performance deficits / Impairments: Decreased functional mobility ; Decreased ADL status; Decreased ROM; Decreased strength;Decreased sensation;Decreased fine motor control;Decreased endurance;Decreased posture;Decreased balance;Decreased safe awareness;Decreased coordination;Decreased vision/visual deficit; Decreased cognition  Assessment: Pt demonstrates improved ability to doff shirt via hemidressing technique but requires max A to don longs sleeve shirt. Pt is Mod A for stand/squat pivot requiring significant setup and facilitiation to scoot to edge of w/c, placement of BLEs and anterior lean to initiate transfers. However pt demonstrates improved carryover of weightshifting to scoot with min A to facilitate offloading. Pt demonstrates LLE hyperextension in stance, posterolateral lean to L and hip rotation req significant facilitation to maintain midline orientation in stance. Pt functioning significantly below baseline, cont per OT POC. Treatment Diagnosis: decreased independence with ADLs and decreased functional mobility 2/2 CVA  Prognosis: Fair  OT Education: Plan of Care;Precautions; ADL Adaptive Strategies;Transfer Training  Patient Education: hemidressing technique, VCs/TCs for ant lean during transfers and scooting, positioning of LUE and tone mgmt-continue to reinforce,  REQUIRES OT FOLLOW UP: Yes  Activity Tolerance  Activity Tolerance: Patient Tolerated treatment well  Safety Devices  Safety Devices in place: Yes  Type of devices: Call light within reach;Chair alarm in place; Left in chair; All fall risk precautions in place;Nurse notified         Patient Diagnosis(es): There were no encounter diagnoses. has a past medical history of GERD (gastroesophageal reflux disease), Hernia, Obesity, and Unspecified sleep apnea. has a past surgical history that includes Splenectomy (); Total hip arthroplasty (); Lap Band (); hernia repair ();  section (); and craniotomy (Right, 2021). Restrictions  Position Activity Restriction  Other position/activity restrictions: Ambulate, Up with assist, Pt to wear helmet when OOB (no orders in chart regarding helmet wear, per chart review this was in therapy notes), Per pt HOB to elevated at 30*     Subjective   General  Chart Reviewed: Yes  Patient assessed for rehabilitation services?: Yes  Additional Pertinent Hx: 46 y.o. female with hypertension and tobacco abuse who presented after spending a prolonged period on the ground s/p fall out of bed; found to have acute left hemiparesis and gaze deviation. CXR with no acute cardiopulmonary abnormality. CTH revealed large acute/subacute infarct in the R frontal lobe with associated mass effect and 4 mm R to L midline shift. It also noted subdural hemorrhage of 6mm along falx, although NSGY less convinced. CTA head and neck revealed occluded R cervical ICA & R anterior cerebral artery with near complete occlusion of R MCA. Outside window on TPA. Pt is now POD #1 following hemicraniectomy (). Pt admitted to ARU   Response to previous treatment: Patient with no complaints from previous session  Family / Caregiver Present: No  Referring Practitioner: Dr. Flores Vick DO  Diagnosis: CVA  Subjective  Subjective: Pt sitting in recliner upon arrival, pleasant and agreeable to OT session requesting to change shirts and put on deodorant, \"I smell. \"  General Comment  Comments: [place]   Vital Signs  Patient Currently in Pain: Denies     Orientation  Orientation Overall Orientation Status: Within Functional Limits  Objective    ADL  Grooming: Moderate assistance(assist to wash R underarm and apply deodorant, assist to position LUE in order for pt to wash and apply deodorant to LUE)  UE Dressing: Maximum assistance;Setup;Verbal cueing; Increased time to complete(pt doffed shirt with min A and VCs for hemidressing tech and to unthread LUE, pt donned shirt with setup, assist to thread LUE, manage over head and pull down on L side)           Bed mobility  Scooting: Minimal assistance(pt req VC for ant lean to scoot posteriorly in chair)                          Cognition  Overall Cognitive Status: Exceptions  Arousal/Alertness: Appropriate responses to stimuli  Following Commands: Follows one step commands consistently; Follows one step commands with increased time  Attention Span: Appears intact  Memory: Appears intact  Safety Judgement: Decreased awareness of need for assistance;Decreased awareness of need for safety  Problem Solving: Assistance required to generate solutions;Assistance required to correct errors made;Assistance required to implement solutions;Decreased awareness of errors  Insights: Decreased awareness of deficits  Initiation: Requires cues for some  Sequencing: Requires cues for some  Cognition Comment: pt with decreased insight into deficits demonstrating decreased frustration tolerance and stating desire to be back to baseline ASAP, required emotional support to come to terms with functional status and work towards progress, pt highly motivated                    Exercises  Elbow Extension: x 5 PROM  Wrist Flexion: x5  Finger Extension: x5 to all digits                  Second Session: Pt sitting in recliner upon arrival, pleasant and agreeable to OT/PT cotx to maximize functional mobility with collaboration of 2 disciplines. Stand pivot from recliner<w/c leading to R with Mod A and VCs for ant lean.  Pt req min A for w/c mobility with pt propelling self engaged, call light within reach and all needs met.       Plan   Plan  Times per week: 5x a week for 60 mins daily  Times per day: Daily  Current Treatment Recommendations: Strengthening, Endurance Training, Neuromuscular Re-education, Self-Care / ADL, Functional Mobility Training, Safety Education & Training, ROM, Positioning, Wheelchair Mobility Training, Balance Training, Patient/Caregiver Education & Training, Manual Therapy:  MLD, Equipment Evaluation, Education, & procurement, Home Management Training, Cognitive Reorientation    Goals  Short term goals  Time Frame for Short term goals: 2 weeks-all ongoing  Short term goal 1: Pt will complete toilet transfer w/ Mod A  Short term goal 2: Pt will complete UE dressing w/ Mod A  Short term goal 3: Pt will complete oral care w/ spvn seated at sink w/o VCs for L sided attention  Short term goal 4: Pt will complete LE dressing w/ Mod A  Long term goals  Time Frame for Long term goals : 4 weeks-all ongoing  Long term goal 1: Pt will complete toilet transfer w/ SBA  Long term goal 2: Pt will complete UE dressing w/ setup  Long term goal 3: Pt will complete LE dressing w/ SBA  Long term goal 4: Pt will be independent w/ tone management exercises to improve functional use of LUE  Long term goal 5: Pt will complete oral care I'ly  Patient Goals   Patient goals : \"get back to my normal self\"       Therapy Time   Individual Concurrent Group Co-treatment   Time In 0900      1100   Time Out 0930      1210   Minutes 30      70   Timed Code Treatment Minutes: 30 Minutes + 70 Minutes  Total Treatment Time: 100 Minutes        Augusto Mccoy, OT

## 2021-04-30 NOTE — PLAN OF CARE
Problem: Pain:  Description: Pain management should include both nonpharmacologic and pharmacologic interventions. Goal: Control of acute pain  Description: Control of acute pain  Outcome: Ongoing  Note: Patient complained of severe left hip pian. She describes the pain as aching with sharp shooting pains. She rated her pain 9 on pain scale. Medicated with Oxycodone and scheduled Tylenol. She appears to be sleeping with no further complaints. Problem: Falls - Risk of:  Goal: Absence of physical injury  Description: Absence of physical injury  Outcome: Ongoing  Note: Patient is alert and oriented. Fall precautions in place. Bed alarm set. Bed is in low and locked position. Call light and bedside table within reach. Problem: Skin Integrity:  Goal: Absence of new skin breakdown  Description: Absence of new skin breakdown  Outcome: Ongoing  Note: Skin is warm and dry. Scattered bruising on abdomen. Left scalp incision well approximated. No redness or drainage noted.

## 2021-05-01 PROCEDURE — 1280000000 HC REHAB R&B

## 2021-05-01 PROCEDURE — 99232 SBSQ HOSP IP/OBS MODERATE 35: CPT | Performed by: PHYSICAL MEDICINE & REHABILITATION

## 2021-05-01 PROCEDURE — 6370000000 HC RX 637 (ALT 250 FOR IP): Performed by: PHYSICAL MEDICINE & REHABILITATION

## 2021-05-01 PROCEDURE — 94660 CPAP INITIATION&MGMT: CPT

## 2021-05-01 PROCEDURE — 6360000002 HC RX W HCPCS: Performed by: PHYSICAL MEDICINE & REHABILITATION

## 2021-05-01 PROCEDURE — 99232 SBSQ HOSP IP/OBS MODERATE 35: CPT | Performed by: INTERNAL MEDICINE

## 2021-05-01 RX ADMIN — THIAMINE HCL TAB 100 MG 100 MG: 100 TAB at 08:19

## 2021-05-01 RX ADMIN — DOCUSATE SODIUM 50 MG AND SENNOSIDES 8.6 MG 2 TABLET: 8.6; 5 TABLET, FILM COATED ORAL at 08:19

## 2021-05-01 RX ADMIN — HEPARIN SODIUM 5000 UNITS: 5000 INJECTION INTRAVENOUS; SUBCUTANEOUS at 14:52

## 2021-05-01 RX ADMIN — ASPIRIN 81 MG: 81 TABLET, CHEWABLE ORAL at 08:18

## 2021-05-01 RX ADMIN — LEVETIRACETAM 500 MG: 500 TABLET ORAL at 20:55

## 2021-05-01 RX ADMIN — FLUOXETINE 20 MG: 20 CAPSULE ORAL at 08:19

## 2021-05-01 RX ADMIN — Medication 1 G: at 08:20

## 2021-05-01 RX ADMIN — ACETAMINOPHEN 1000 MG: 500 TABLET, COATED ORAL at 04:08

## 2021-05-01 RX ADMIN — Medication 1 G: at 17:13

## 2021-05-01 RX ADMIN — LEVETIRACETAM 500 MG: 500 TABLET ORAL at 08:19

## 2021-05-01 RX ADMIN — HEPARIN SODIUM 5000 UNITS: 5000 INJECTION INTRAVENOUS; SUBCUTANEOUS at 20:55

## 2021-05-01 RX ADMIN — ACETAMINOPHEN 1000 MG: 500 TABLET, COATED ORAL at 14:46

## 2021-05-01 RX ADMIN — DOCUSATE SODIUM 50 MG AND SENNOSIDES 8.6 MG 2 TABLET: 8.6; 5 TABLET, FILM COATED ORAL at 20:55

## 2021-05-01 RX ADMIN — FAMOTIDINE 20 MG: 20 TABLET, FILM COATED ORAL at 20:55

## 2021-05-01 RX ADMIN — OXYCODONE 10 MG: 5 TABLET ORAL at 01:48

## 2021-05-01 RX ADMIN — FAMOTIDINE 20 MG: 20 TABLET, FILM COATED ORAL at 08:19

## 2021-05-01 RX ADMIN — Medication 1 G: at 14:46

## 2021-05-01 RX ADMIN — ACETAMINOPHEN 1000 MG: 500 TABLET, COATED ORAL at 22:15

## 2021-05-01 RX ADMIN — AMLODIPINE BESYLATE 5 MG: 5 TABLET ORAL at 08:20

## 2021-05-01 RX ADMIN — ATORVASTATIN CALCIUM 80 MG: 80 TABLET, FILM COATED ORAL at 20:55

## 2021-05-01 RX ADMIN — HEPARIN SODIUM 5000 UNITS: 5000 INJECTION INTRAVENOUS; SUBCUTANEOUS at 06:28

## 2021-05-01 RX ADMIN — THERA TABS 1 TABLET: TAB at 08:19

## 2021-05-01 RX ADMIN — Medication 5 MG: at 08:19

## 2021-05-01 ASSESSMENT — PAIN DESCRIPTION - DESCRIPTORS: DESCRIPTORS: BURNING

## 2021-05-01 ASSESSMENT — PAIN SCALES - GENERAL
PAINLEVEL_OUTOF10: 7
PAINLEVEL_OUTOF10: 0
PAINLEVEL_OUTOF10: 0
PAINLEVEL_OUTOF10: 3
PAINLEVEL_OUTOF10: 0

## 2021-05-01 ASSESSMENT — PAIN - FUNCTIONAL ASSESSMENT: PAIN_FUNCTIONAL_ASSESSMENT: ACTIVITIES ARE NOT PREVENTED

## 2021-05-01 ASSESSMENT — PAIN DESCRIPTION - ONSET: ONSET: GRADUAL

## 2021-05-01 ASSESSMENT — PAIN DESCRIPTION - PAIN TYPE: TYPE: ACUTE PAIN

## 2021-05-01 ASSESSMENT — PAIN DESCRIPTION - LOCATION: LOCATION: HIP

## 2021-05-01 ASSESSMENT — PAIN DESCRIPTION - FREQUENCY: FREQUENCY: INTERMITTENT

## 2021-05-01 ASSESSMENT — PAIN DESCRIPTION - ORIENTATION: ORIENTATION: LEFT

## 2021-05-01 ASSESSMENT — PAIN DESCRIPTION - PROGRESSION: CLINICAL_PROGRESSION: GRADUALLY WORSENING

## 2021-05-01 NOTE — PLAN OF CARE
Problem: Pain:  Goal: Control of chronic pain  Description: Control of chronic pain  5/1/2021 1510 by Jose Vega  Outcome: Ongoing  Note: Patient states headache type pain of 3 on a 0-10 scale Receives scheduled acetaminophen and is satisfied with current pain medication regimen. Problem: Falls - Risk of:  Goal: Absence of physical injury  Description: Absence of physical injury  5/1/2021 1510 by Jose Vega  Outcome: Ongoing  Note: No evidence of physical injury noted this shift. Will continue to monitor     Problem: Skin Integrity:  Goal: Absence of new skin breakdown  Description: Absence of new skin breakdown  5/1/2021 1510 by Jose Vega  Outcome: Ongoing  Note: No evidence of new skin breakdown noted this shift.  Will continue to monitor

## 2021-05-01 NOTE — PROGRESS NOTES
Patient is in bed and resting at this time, vitals stable, pain 3/10 with head, gave scheduled Tylenol. CPAP is hooked up and on patient. Call light with in reach and bed alarm on.

## 2021-05-01 NOTE — PLAN OF CARE
Problem: Pain:  Goal: Pain level will decrease  Description: Pain level will decrease  5/1/2021 0246 by Kiara Argueta RN  Outcome: Ongoing  Note: Patient complains of pain at level 7/10. Patient describes pain as burning. Patient requests pain medication. Patient medicated with PRN oxycodone. Problem: Pain:  Goal: Control of acute pain  Description: Control of acute pain  5/1/2021 0246 by Anurag Rivera RN  Outcome: Ongoing     Problem: Pain:  Goal: Control of chronic pain  Description: Control of chronic pain  Outcome: Ongoing     Problem: Falls - Risk of:  Goal: Will remain free from falls  Description: Will remain free from falls  5/1/2021 0246 by Kiara Argueta RN  Outcome: Ongoing  Note: Patient is a fall risk. Patient is a x 2 stand pivot with gait belt. See Fall Risk assessment for details. Bed is in low, lock position; call light/belongings within reach. No attempts to get out of bed have been made, calls appropriately when assistance is needed. Bed alarm and hourly rounds in place for safety.       Problem: Falls - Risk of:  Goal: Absence of physical injury  Description: Absence of physical injury  Outcome: Ongoing     Problem: Skin Integrity:  Goal: Will show no infection signs and symptoms  Description: Will show no infection signs and symptoms  Outcome: Ongoing     Problem: Skin Integrity:  Goal: Absence of new skin breakdown  Description: Absence of new skin breakdown  Outcome: Ongoing     Problem: Nutrition  Goal: Optimal nutrition therapy  Outcome: Ongoing

## 2021-05-01 NOTE — PROGRESS NOTES
Office : [phone]     Fax :[phone]         Renal Progress Note  Subjective:   Admit Date: 4/23/2021     HPI      Interval History:     Good UO   No Diarrhea   Na stable       DIET Dietary Nutrition Supplements: Low Calorie High Protein Supplement  DIET GENERAL; No Added Salt (3-4 GM); Daily Fluid Restriction: 1500 ml  Medications:   Scheduled Meds:   atorvastatin  80 mg Oral Nightly    acetaminophen  1,000 mg Oral 4 times per day    bisacodyl  5 mg Oral Daily    amLODIPine  5 mg Oral Daily    aspirin  81 mg Oral Daily    famotidine  20 mg Oral BID    FLUoxetine  20 mg Oral Daily    heparin (porcine)  5,000 Units Subcutaneous 3 times per day    levETIRAcetam  500 mg Oral BID    multivitamin  1 tablet Oral Daily    sennosides-docusate sodium  2 tablet Oral BID    sodium chloride  1 g Oral TID WC    thiamine mononitrate  100 mg Oral Daily     Continuous Infusions:    Labs:  CBC:   Recent Labs     04/30/21  0721   WBC 17.1*   HGB 14.5   *     BMP:    Recent Labs     04/30/21  0721      K 4.4      CO2 23   BUN 10   CREATININE 0.5*   GLUCOSE 93     Ca/Mg/Phos:   Recent Labs     04/30/21  0721   CALCIUM 10.2     Hepatic: No results for input(s): AST, ALT, ALB, BILITOT, ALKPHOS in the last 72 hours. Troponin: No results for input(s): TROPONINI in the last 72 hours. BNP: No results for input(s): BNP in the last 72 hours. Lipids: No results for input(s): CHOL, TRIG, HDL, LDLCALC, LABVLDL in the last 72 hours. ABGs: No results for input(s): PHART, PO2ART, ILT8MWH in the last 72 hours.   INR: No results for input(s): INR in the last 72

## 2021-05-02 PROCEDURE — 6360000002 HC RX W HCPCS: Performed by: PHYSICAL MEDICINE & REHABILITATION

## 2021-05-02 PROCEDURE — 6370000000 HC RX 637 (ALT 250 FOR IP): Performed by: PHYSICAL MEDICINE & REHABILITATION

## 2021-05-02 PROCEDURE — 94660 CPAP INITIATION&MGMT: CPT

## 2021-05-02 PROCEDURE — 99232 SBSQ HOSP IP/OBS MODERATE 35: CPT | Performed by: INTERNAL MEDICINE

## 2021-05-02 PROCEDURE — 1280000000 HC REHAB R&B

## 2021-05-02 RX ADMIN — ACETAMINOPHEN 1000 MG: 500 TABLET, COATED ORAL at 22:20

## 2021-05-02 RX ADMIN — HEPARIN SODIUM 5000 UNITS: 5000 INJECTION INTRAVENOUS; SUBCUTANEOUS at 22:20

## 2021-05-02 RX ADMIN — Medication 1 G: at 16:30

## 2021-05-02 RX ADMIN — ATORVASTATIN CALCIUM 80 MG: 80 TABLET, FILM COATED ORAL at 19:51

## 2021-05-02 RX ADMIN — HEPARIN SODIUM 5000 UNITS: 5000 INJECTION INTRAVENOUS; SUBCUTANEOUS at 06:30

## 2021-05-02 RX ADMIN — THERA TABS 1 TABLET: TAB at 09:30

## 2021-05-02 RX ADMIN — THIAMINE HCL TAB 100 MG 100 MG: 100 TAB at 08:31

## 2021-05-02 RX ADMIN — FLUOXETINE 20 MG: 20 CAPSULE ORAL at 08:31

## 2021-05-02 RX ADMIN — LEVETIRACETAM 500 MG: 500 TABLET ORAL at 08:31

## 2021-05-02 RX ADMIN — Medication 1 G: at 12:52

## 2021-05-02 RX ADMIN — ACETAMINOPHEN 1000 MG: 500 TABLET, COATED ORAL at 04:00

## 2021-05-02 RX ADMIN — ACETAMINOPHEN 1000 MG: 500 TABLET, COATED ORAL at 09:30

## 2021-05-02 RX ADMIN — LEVETIRACETAM 500 MG: 500 TABLET ORAL at 19:51

## 2021-05-02 RX ADMIN — ASPIRIN 81 MG: 81 TABLET, CHEWABLE ORAL at 08:30

## 2021-05-02 RX ADMIN — HEPARIN SODIUM 5000 UNITS: 5000 INJECTION INTRAVENOUS; SUBCUTANEOUS at 14:55

## 2021-05-02 RX ADMIN — FAMOTIDINE 20 MG: 20 TABLET, FILM COATED ORAL at 19:51

## 2021-05-02 RX ADMIN — ACETAMINOPHEN 1000 MG: 500 TABLET, COATED ORAL at 16:30

## 2021-05-02 RX ADMIN — Medication 1 G: at 08:30

## 2021-05-02 RX ADMIN — FAMOTIDINE 20 MG: 20 TABLET, FILM COATED ORAL at 08:31

## 2021-05-02 RX ADMIN — AMLODIPINE BESYLATE 5 MG: 5 TABLET ORAL at 08:31

## 2021-05-02 ASSESSMENT — PAIN SCALES - GENERAL
PAINLEVEL_OUTOF10: 0
PAINLEVEL_OUTOF10: 2
PAINLEVEL_OUTOF10: 0

## 2021-05-02 ASSESSMENT — PAIN DESCRIPTION - PAIN TYPE: TYPE: ACUTE PAIN

## 2021-05-02 ASSESSMENT — PAIN DESCRIPTION - FREQUENCY: FREQUENCY: INTERMITTENT

## 2021-05-02 ASSESSMENT — PAIN DESCRIPTION - DESCRIPTORS: DESCRIPTORS: BURNING

## 2021-05-02 ASSESSMENT — PAIN DESCRIPTION - LOCATION: LOCATION: HIP

## 2021-05-02 ASSESSMENT — PAIN - FUNCTIONAL ASSESSMENT: PAIN_FUNCTIONAL_ASSESSMENT: ACTIVITIES ARE NOT PREVENTED

## 2021-05-02 ASSESSMENT — PAIN DESCRIPTION - ORIENTATION: ORIENTATION: LEFT

## 2021-05-02 ASSESSMENT — PAIN DESCRIPTION - ONSET: ONSET: GRADUAL

## 2021-05-02 ASSESSMENT — PAIN DESCRIPTION - PROGRESSION: CLINICAL_PROGRESSION: GRADUALLY WORSENING

## 2021-05-02 NOTE — PROGRESS NOTES
Patient resting in bed. Alert and oriented X 4. Denies pain or discomfort at this time. Family at bedside. Continent of bowel and bladder this shift. Transfers with assist of 1 from bed to chair and 2 assist for bathroom transfers. Helmet in place when out of bed. Bed in low position and call light within reach.

## 2021-05-02 NOTE — PLAN OF CARE
Problem: Falls - Risk of:  Goal: Will remain free from falls  Description: Will remain free from falls  5/2/2021 1602 by Ruby Bartlett  Outcome: Ongoing  Note: Patient has remained free of falls this shift. Room free of clutter, bed in low position, call light within reach     Problem: Falls - Risk of:  Goal: Absence of physical injury  Description: Absence of physical injury  5/2/2021 1602 by Ruby Bartlett  Outcome: Ongoing  Note: Patient remains free of physical injury this shift. Will continue to monitor     Problem: Skin Integrity:  Goal: Absence of new skin breakdown  Description: Absence of new skin breakdown  5/2/2021 1602 by Ruby Bartlett  Outcome: Ongoing  Note: Patient exhibits no signs of new skin breakdown noted.

## 2021-05-02 NOTE — PLAN OF CARE
Problem: Pain:  Goal: Pain level will decrease  Description: Pain level will decrease  Outcome: Ongoing  Note: Pt resting at this time. No complaints of pain. Encouraged patient to call out with any needs. Problem: Pain:  Goal: Control of acute pain  Description: Control of acute pain  Outcome: Ongoing       Problem: Pain:  Goal: Control of chronic pain  Description: Control of chronic pain  5/2/2021 0311 by Lizy Grier RN  Outcome: Ongoing     Problem: Falls - Risk of:  Goal: Will remain free from falls  Description: Will remain free from falls  Outcome: Ongoing  Note: Patient is a fall risk. Patient is a x 2 stand pivot with gait belt. See Fall Risk assessment for details. Bed is in low, lock position; call light/belongings within reach. No attempts to get out of bed have been made, calls appropriately when assistance is needed. Bed alarm and hourly rounds in place for safety.       Problem: Falls - Risk of:  Goal: Absence of physical injury  Description: Absence of physical injury  5/2/2021 0311 by Lizy Grier RN  Outcome: Ongoing     Problem: Skin Integrity:  Goal: Will show no infection signs and symptoms  Description: Will show no infection signs and symptoms  Outcome: Ongoing     Problem: Skin Integrity:  Goal: Absence of new skin breakdown  Description: Absence of new skin breakdown  5/2/2021 0311 by Lizy Grier RN  Outcome: Ongoing     Problem: Nutrition  Goal: Optimal nutrition therapy  Outcome: Ongoing

## 2021-05-02 NOTE — PROGRESS NOTES
hours.  UA:  No results for input(s): Delorise Elsa, GLUCOSEU, BILIRUBINUR, Marletta Hank, BLOODU, PHUR, PROTEINU, UROBILINOGEN, NITRU, LEUKOCYTESUR, Poppy Bookbinder in the last 72 hours. Urine Microscopic: No results for input(s): LABCAST, BACTERIA, COMU, HYALCAST, WBCUA, RBCUA, EPIU in the last 72 hours. Urine Culture: No results for input(s): LABURIN in the last 72 hours. Urine Chemistry: No results for input(s): Canary Medina, PROTEINUR, NAUR in the last 72 hours.     Objective:   Vitals: BP (!) 133/93   Pulse 79   Temp 98.2 °F (36.8 °C) (Oral)   Resp 18   Ht 5' 6\" (1.676 m)   Wt 238 lb 8.6 oz (108.2 kg)   SpO2 97%   BMI 38.50 kg/m²    Wt Readings from Last 3 Encounters:   04/23/21 238 lb 8.6 oz (108.2 kg)   04/15/21 238 lb 8.6 oz (108.2 kg)   04/13/21 249 lb 1.9 oz (113 kg)      24HR INTAKE/OUTPUT:      Intake/Output Summary (Last 24 hours) at 5/2/2021 0912  Last data filed at 5/1/2021 2045  Gross per 24 hour   Intake 240 ml   Output    Net 240 ml     Constitutional:  Alert, awake, no apparent distress  NECK: supple, no JVD  Cardiovascular:  S1, S2 without m/r/g  Respiratory:  CTA B without w/r/r  Abdomen: +bs, soft, nt  Ext: +,LE edema    Assessment and Plan:       IMAGING:  No orders to display       Assessment/Plan     SIADH   CVA   Electrolyte imbalance   HTN       - continue Fluid restriction   - PT/OT  - check sodium level in am         Beverly Melchor MD  [facility] [address] [place], [place] [phone]  [phone]

## 2021-05-02 NOTE — PROGRESS NOTES
Patient is in bed and resting at this time, vitals stable, no pain at this time. Patient is hooked up to the CPAP, Patient had a large BM at beginning of shift. Call light with reach and bed alarm on.

## 2021-05-03 ENCOUNTER — NURSE ONLY (OUTPATIENT)
Dept: CARDIOLOGY CLINIC | Age: 51
End: 2021-05-03
Payer: COMMERCIAL

## 2021-05-03 DIAGNOSIS — Z45.09 ENCOUNTER FOR LOOP RECORDER CHECK: ICD-10-CM

## 2021-05-03 DIAGNOSIS — I63.9 ACUTE CVA (CEREBROVASCULAR ACCIDENT) (HCC): ICD-10-CM

## 2021-05-03 DIAGNOSIS — I63.511 ACUTE RIGHT MCA STROKE (HCC): ICD-10-CM

## 2021-05-03 LAB
ANION GAP SERPL CALCULATED.3IONS-SCNC: 11 MMOL/L (ref 3–16)
BASOPHILS ABSOLUTE: 0.1 K/UL (ref 0–0.2)
BASOPHILS RELATIVE PERCENT: 1 %
BUN BLDV-MCNC: 11 MG/DL (ref 7–20)
CALCIUM SERPL-MCNC: 9.7 MG/DL (ref 8.3–10.6)
CHLORIDE BLD-SCNC: 104 MMOL/L (ref 99–110)
CO2: 26 MMOL/L (ref 21–32)
CREAT SERPL-MCNC: 0.6 MG/DL (ref 0.6–1.1)
EOSINOPHILS ABSOLUTE: 0.5 K/UL (ref 0–0.6)
EOSINOPHILS RELATIVE PERCENT: 4.1 %
GFR AFRICAN AMERICAN: >60
GFR NON-AFRICAN AMERICAN: >60
GLUCOSE BLD-MCNC: 94 MG/DL (ref 70–99)
HCT VFR BLD CALC: 37.5 % (ref 36–48)
HEMOGLOBIN: 12.5 G/DL (ref 12–16)
LYMPHOCYTES ABSOLUTE: 3.5 K/UL (ref 1–5.1)
LYMPHOCYTES RELATIVE PERCENT: 26.5 %
MCH RBC QN AUTO: 35.2 PG (ref 26–34)
MCHC RBC AUTO-ENTMCNC: 33.3 G/DL (ref 31–36)
MCV RBC AUTO: 105.7 FL (ref 80–100)
MONOCYTES ABSOLUTE: 1.2 K/UL (ref 0–1.3)
MONOCYTES RELATIVE PERCENT: 9.2 %
NEUTROPHILS ABSOLUTE: 7.8 K/UL (ref 1.7–7.7)
NEUTROPHILS RELATIVE PERCENT: 59.2 %
PDW BLD-RTO: 14 % (ref 12.4–15.4)
PLATELET # BLD: 526 K/UL (ref 135–450)
PMV BLD AUTO: 8.6 FL (ref 5–10.5)
POTASSIUM REFLEX MAGNESIUM: 4.1 MMOL/L (ref 3.5–5.1)
RBC # BLD: 3.55 M/UL (ref 4–5.2)
SODIUM BLD-SCNC: 141 MMOL/L (ref 136–145)
WBC # BLD: 13.2 K/UL (ref 4–11)

## 2021-05-03 PROCEDURE — 85025 COMPLETE CBC W/AUTO DIFF WBC: CPT

## 2021-05-03 PROCEDURE — 94660 CPAP INITIATION&MGMT: CPT

## 2021-05-03 PROCEDURE — 36415 COLL VENOUS BLD VENIPUNCTURE: CPT

## 2021-05-03 PROCEDURE — 6370000000 HC RX 637 (ALT 250 FOR IP): Performed by: PHYSICAL MEDICINE & REHABILITATION

## 2021-05-03 PROCEDURE — 97130 THER IVNTJ EA ADDL 15 MIN: CPT

## 2021-05-03 PROCEDURE — 97530 THERAPEUTIC ACTIVITIES: CPT

## 2021-05-03 PROCEDURE — 97110 THERAPEUTIC EXERCISES: CPT

## 2021-05-03 PROCEDURE — 97535 SELF CARE MNGMENT TRAINING: CPT

## 2021-05-03 PROCEDURE — 80048 BASIC METABOLIC PNL TOTAL CA: CPT

## 2021-05-03 PROCEDURE — 6360000002 HC RX W HCPCS: Performed by: PHYSICAL MEDICINE & REHABILITATION

## 2021-05-03 PROCEDURE — 99232 SBSQ HOSP IP/OBS MODERATE 35: CPT | Performed by: INTERNAL MEDICINE

## 2021-05-03 PROCEDURE — 92526 ORAL FUNCTION THERAPY: CPT

## 2021-05-03 PROCEDURE — 93291 INTERROG DEV EVAL SCRMS IP: CPT | Performed by: INTERNAL MEDICINE

## 2021-05-03 PROCEDURE — 97129 THER IVNTJ 1ST 15 MIN: CPT

## 2021-05-03 PROCEDURE — 1280000000 HC REHAB R&B

## 2021-05-03 PROCEDURE — 99232 SBSQ HOSP IP/OBS MODERATE 35: CPT | Performed by: PHYSICAL MEDICINE & REHABILITATION

## 2021-05-03 RX ADMIN — DOCUSATE SODIUM 50 MG AND SENNOSIDES 8.6 MG 2 TABLET: 8.6; 5 TABLET, FILM COATED ORAL at 07:55

## 2021-05-03 RX ADMIN — HEPARIN SODIUM 5000 UNITS: 5000 INJECTION INTRAVENOUS; SUBCUTANEOUS at 21:36

## 2021-05-03 RX ADMIN — ATORVASTATIN CALCIUM 80 MG: 80 TABLET, FILM COATED ORAL at 21:34

## 2021-05-03 RX ADMIN — Medication 1 G: at 07:55

## 2021-05-03 RX ADMIN — HEPARIN SODIUM 5000 UNITS: 5000 INJECTION INTRAVENOUS; SUBCUTANEOUS at 14:24

## 2021-05-03 RX ADMIN — OXYCODONE HYDROCHLORIDE 5 MG: 5 TABLET ORAL at 14:26

## 2021-05-03 RX ADMIN — Medication 1 G: at 16:28

## 2021-05-03 RX ADMIN — THIAMINE HCL TAB 100 MG 100 MG: 100 TAB at 07:56

## 2021-05-03 RX ADMIN — ACETAMINOPHEN 1000 MG: 500 TABLET, COATED ORAL at 17:03

## 2021-05-03 RX ADMIN — ACETAMINOPHEN 1000 MG: 500 TABLET, COATED ORAL at 10:09

## 2021-05-03 RX ADMIN — ASPIRIN 81 MG: 81 TABLET, CHEWABLE ORAL at 07:55

## 2021-05-03 RX ADMIN — AMLODIPINE BESYLATE 5 MG: 5 TABLET ORAL at 07:55

## 2021-05-03 RX ADMIN — LEVETIRACETAM 500 MG: 500 TABLET ORAL at 21:35

## 2021-05-03 RX ADMIN — FAMOTIDINE 20 MG: 20 TABLET, FILM COATED ORAL at 21:35

## 2021-05-03 RX ADMIN — FLUOXETINE 20 MG: 20 CAPSULE ORAL at 07:55

## 2021-05-03 RX ADMIN — THERA TABS 1 TABLET: TAB at 07:56

## 2021-05-03 RX ADMIN — HEPARIN SODIUM 5000 UNITS: 5000 INJECTION INTRAVENOUS; SUBCUTANEOUS at 06:34

## 2021-05-03 RX ADMIN — FAMOTIDINE 20 MG: 20 TABLET, FILM COATED ORAL at 07:55

## 2021-05-03 RX ADMIN — Medication 1 G: at 11:52

## 2021-05-03 RX ADMIN — LEVETIRACETAM 500 MG: 500 TABLET ORAL at 07:55

## 2021-05-03 RX ADMIN — Medication 5 MG: at 07:56

## 2021-05-03 RX ADMIN — ACETAMINOPHEN 1000 MG: 500 TABLET, COATED ORAL at 04:12

## 2021-05-03 RX ADMIN — DOCUSATE SODIUM 50 MG AND SENNOSIDES 8.6 MG 2 TABLET: 8.6; 5 TABLET, FILM COATED ORAL at 21:34

## 2021-05-03 ASSESSMENT — PAIN DESCRIPTION - PAIN TYPE
TYPE: ACUTE PAIN

## 2021-05-03 ASSESSMENT — PAIN DESCRIPTION - LOCATION
LOCATION: HEAD
LOCATION: HEAD
LOCATION: BACK
LOCATION: HEAD
LOCATION: HEAD

## 2021-05-03 ASSESSMENT — PAIN DESCRIPTION - DESCRIPTORS
DESCRIPTORS: ACHING
DESCRIPTORS: ACHING;DULL

## 2021-05-03 ASSESSMENT — PAIN SCALES - GENERAL
PAINLEVEL_OUTOF10: 2
PAINLEVEL_OUTOF10: 0
PAINLEVEL_OUTOF10: 6
PAINLEVEL_OUTOF10: 0
PAINLEVEL_OUTOF10: 0
PAINLEVEL_OUTOF10: 7
PAINLEVEL_OUTOF10: 2
PAINLEVEL_OUTOF10: 0
PAINLEVEL_OUTOF10: 7
PAINLEVEL_OUTOF10: 0
PAINLEVEL_OUTOF10: 1
PAINLEVEL_OUTOF10: 0

## 2021-05-03 ASSESSMENT — PAIN DESCRIPTION - ONSET
ONSET: GRADUAL
ONSET: ON-GOING

## 2021-05-03 ASSESSMENT — PAIN DESCRIPTION - ORIENTATION
ORIENTATION: ANTERIOR
ORIENTATION: LOWER
ORIENTATION: ANTERIOR
ORIENTATION: ANTERIOR

## 2021-05-03 ASSESSMENT — PAIN DESCRIPTION - PROGRESSION
CLINICAL_PROGRESSION: GRADUALLY WORSENING
CLINICAL_PROGRESSION: GRADUALLY WORSENING
CLINICAL_PROGRESSION: NOT CHANGED
CLINICAL_PROGRESSION: GRADUALLY WORSENING

## 2021-05-03 ASSESSMENT — PAIN DESCRIPTION - FREQUENCY
FREQUENCY: CONTINUOUS
FREQUENCY: INTERMITTENT
FREQUENCY: CONTINUOUS
FREQUENCY: INTERMITTENT

## 2021-05-03 NOTE — PLAN OF CARE
Problem: Pain:  Goal: Control of acute pain  Description: Control of acute pain  5/3/2021 1505 by Ling Byrd RN  Outcome: Ongoing   Pt verbalized having headache this shift that became increasingly worse during PT session. She also verbalized having back pain. Pt was medicated with scheduled Tylenol or Oxy-IR. Pt is also use rest and positioning to promote comfort. Problem: Falls - Risk of:  Goal: Will remain free from falls  Description: Will remain free from falls  5/3/2021 1505 by Ling Byrd RN  Outcome: Ongoing   No falls this shift. Pt assisted with transfers this shift. Call light is within reach and bed and chair alarms is in use. Problem: Skin Integrity:  Goal: Absence of new skin breakdown  Description: Absence of new skin breakdown  5/3/2021 1505 by Ling Byrd RN  Outcome: Ongoing   No new skin issues noted. Pt was repositioned when up in chair/WC and in bed.     Electronically signed by Ling Byrd RN on 5/3/2021 at 3:18 PM

## 2021-05-03 NOTE — PLAN OF CARE
Problem: Pain:  Goal: Pain level will decrease  Description: Pain level will decrease  Outcome: Ongoing  Note: Patient complains of pain at level 2/10. Patient describes pain as a headache. Patient requests pain medication. Patient medicated with scheduled Tylenol. Problem: Pain:  Goal: Control of acute pain  Description: Control of acute pain  Outcome: Ongoing     Problem: Pain:  Goal: Control of chronic pain  Description: Control of chronic pain  Outcome: Ongoing     Problem: Falls - Risk of:  Goal: Will remain free from falls  Description: Will remain free from falls  5/3/2021 0223 by Nilam Cervantes RN  Outcome: Ongoing  Note: Patient is a fall risk. Patient is a x 2 stand pivot. See Fall Risk assessment for details. Bed is in low, lock position; call light/belongings within reach. No attempts to get out of bed have been made, calls appropriately when assistance is needed. Bed alarm and hourly rounds in place for safety. Problem: Falls - Risk of:  Goal: Absence of physical injury  Description: Absence of physical injury  5/3/2021 0223 by Gerald Rivera RN  Outcome: Ongoing     Problem: Skin Integrity:  Goal: Will show no infection signs and symptoms  Description: Will show no infection signs and symptoms  Outcome: Ongoing  Note: Pt at risk for skin breakdown. Patient has scattered bruising and a surgical incision on R side of scalp, on specialty mattress. Skin assessment as documented. Pt repositioned in bed with pillow support as needed. Call light within reach.        Problem: Skin Integrity:  Goal: Absence of new skin breakdown  Description: Absence of new skin breakdown  5/3/2021 0223 by Nilam Cervantes RN  Outcome: Ongoing     Problem: Nutrition  Goal: Optimal nutrition therapy  Outcome: Ongoing
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
## 2021-05-03 NOTE — PROGRESS NOTES
Office : [phone]     Fax :[phone]         Renal Progress Note  Subjective:   Admit Date: 4/23/2021     HPI      Interval History:     Good UO   No Diarrhea   Na stable       DIET Dietary Nutrition Supplements: Low Calorie High Protein Supplement  DIET GENERAL; No Added Salt (3-4 GM); Daily Fluid Restriction: 1500 ml  Medications:   Scheduled Meds:   atorvastatin  80 mg Oral Nightly    acetaminophen  1,000 mg Oral 4 times per day    bisacodyl  5 mg Oral Daily    amLODIPine  5 mg Oral Daily    aspirin  81 mg Oral Daily    famotidine  20 mg Oral BID    FLUoxetine  20 mg Oral Daily    heparin (porcine)  5,000 Units Subcutaneous 3 times per day    levETIRAcetam  500 mg Oral BID    multivitamin  1 tablet Oral Daily    sennosides-docusate sodium  2 tablet Oral BID    sodium chloride  1 g Oral TID WC    thiamine mononitrate  100 mg Oral Daily     Continuous Infusions:    Labs:  CBC:   Recent Labs     05/03/21  0557   WBC 13.2*   HGB 12.5   *     BMP:    Recent Labs     05/03/21  0557      K 4.1      CO2 26   BUN 11   CREATININE 0.6   GLUCOSE 94     Ca/Mg/Phos:   Recent Labs     05/03/21  0557   CALCIUM 9.7     Hepatic: No results for input(s): AST, ALT, ALB, BILITOT, ALKPHOS in the last 72 hours. Troponin: No results for input(s): TROPONINI in the last 72 hours. BNP: No results for input(s): BNP in the last 72 hours. Lipids: No results for input(s): CHOL, TRIG, HDL, LDLCALC, LABVLDL in the last 72 hours. ABGs: No results for input(s): PHART, PO2ART, WBA0IEC in the last 72 hours.   INR: No results for input(s): INR in the last 72 hours.  UA:  No results for input(s): [place], GLUCOSEU, BILIRUBINUR, Rosana Harrington, BLOODU, PHUR, PROTEINU, UROBILINOGEN, NITRU, LEUKOCYTESUR, Scherrie Cho in the last 72 hours. Urine Microscopic: No results for input(s): LABCAST, BACTERIA, COMU, HYALCAST, WBCUA, RBCUA, EPIU in the last 72 hours. Urine Culture: No results for input(s): LABURIN in the last 72 hours. Urine Chemistry: No results for input(s): Clifton Heady, PROTEINUR, NAUR in the last 72 hours.     Objective:   Vitals: /70   Pulse 69   Temp 98 °F (36.7 °C) (Oral)   Resp 18   Ht 5' 6\" (1.676 m)   Wt 238 lb 8.6 oz (108.2 kg)   SpO2 98%   BMI 38.50 kg/m²    Wt Readings from Last 3 Encounters:   04/23/21 238 lb 8.6 oz (108.2 kg)   04/15/21 238 lb 8.6 oz (108.2 kg)   04/13/21 249 lb 1.9 oz (113 kg)      24HR INTAKE/OUTPUT:      Intake/Output Summary (Last 24 hours) at 5/3/2021 1605  Last data filed at 5/2/2021 2245  Gross per 24 hour   Intake 300 ml   Output    Net 300 ml     Constitutional:  Alert, awake, no apparent distress  NECK: supple, no JVD  Cardiovascular:  S1, S2 without m/r/g  Respiratory:  CTA B without w/r/r  Abdomen: +bs, soft, nt  Ext: +,LE edema    Assessment and Plan:       IMAGING:  No orders to display       Assessment/Plan     SIADH   CVA   Electrolyte imbalance   HTN       - continue Fluid restriction   - PT/OT  - check sodium level in am         Faith Robert MD  [facility] [address] [facility] -[phone]  [phone]

## 2021-05-03 NOTE — PROGRESS NOTES
functional limits  Barriers toward progress: None towards stated goals      Date: 5/3/2021      Tx session 1 Tx session 2   Total Timed Code Min 15 45   Total Treatment Minutes 30 45   Individual Treatment Minutes 30 45   Group Treatment Minutes 0 0   Co-Treat Minutes 0 0   Brief Exception: N/A N/A   Pain C/o sacral pain 8/10    Pain Intervention: [x] RN notified  [x] Repositioned  [] Intervention offered and patient declined  [] N/A  [x] Other: Distraction   [x] RN notified  [] Repositioned  [] Intervention offered and patient declined  [] N/A  [x] Other: RN gave pt pain med   Subjective:     Pt upright in chair with meal mostly consumed already. Declines oral care at time of session. Pt seen at bedside. Requested lights off and non-visual tasks d/t head ache. Auditory tasks only presented. Objective / Goals:     Patient will consume regular solids with timely mastication and no pocketing in L buccal cavity across 2 sessions. Patient tolerated NMES via VitalStim placement 4a (targeting orbicularis oris, buccinator and superior pharyngeal constrictor) @ 3.0 mA on right x 6 minutes increased to 4.0 mA on right x 17minutes (54) and 7.5 mA on left x 6 minutes increased to 8.5 mA on left x 17 minutes (total stimulation time of 23 minutes ) Did not directly target this session. Pt/RN report good tolerance of current diet level. Pt reports that she still requests her food to be cut up small. She states that she has to check for pocketing of food on the L side. Patient will consume PO without anterior spillage or overt pocketing across 2 sessions. Pt assessed with regular solids (challenge item of bread) with pocketing x1. Did not directly target this session. Pt will complete divided/alternating attention tasks with 85% accuracy given min cues. Mod-max  cues for sustained attention / task   Alternating attention task completed with min cues.  Pt instructed to alternate b/w boy and girl names through the alphabet. Pt got stuck x 2 and forgot where she was. When cued to state the last name that she said, pt was accurate 2/2 opportunities and was able to get back on track. Pt will accurately complete executive functioning tasks with min cues. Initially independent but increased to min cues for self monitoring / self correction as task progressed. Min cues for generative naming x 3 concrete categories. Pt named 12 animals, 11 cities, and 12 refrigerator items in < 1 min. Ave >14. Pt will attend to L visual field with min cues. Writing words from directions: Min cues. Mod cues for eye contact to the L. Pt with frequent sustained eye gaze to the R. Pt will participate in ongoing cognitive linguistic assessment. GOAL MET - continue as appropriate GOAL PREVIOUSLY MET - continue as appropriate   Other areas targeted:  Played \"20 ? 's\" to address higher level reasoning/deduction by  and working memory. Pt completed with min cues. Education:   Education regarding rationale for NMES, skills targeted and improvement in left visual attention. SLP educated pt re: role of SLP and rationale for treatment tasks. Safety Devices: [x] Call light within reach  [x] Chair alarm activated and connected to nurse call light system  [] Bed alarm activated   [] Other:  [x] Call light within reach  [] Chair alarm activated and connected to nurse call light system  [x] Bed alarm activated  [] Other:    Assessment:  Left neglect, oral stage dysphagia, attention impairment, executive dysfunction. Improved left attention for table topc activity  Assessment:  Pt able to verbalize current deficits and previous tasks completed in speech therapy. Reduced carry over and initiation of strategies to improve visual attention to the left. Min cues for alternating attention, deduction by exclusion, working memory, and generative naming/organization task. Plan: Continue per POC.       Interventions used this date:  [] Speech/Language Treatment  [] Instruction in HEP  [x] Dysphagia Treatment [x] Cognitive Treatment   [] Other:    Discharge recommendations:  [] Home independently  [x] Home with assistance []  24 hour supervision  [] ECF [] Other  Continued Tx Upon Discharge: ? [x] Yes    [] No    [] TBD based on progress while on ARU     [] Vital Stim indicated     [] Other:   Estimated discharge date: approved through 5/4/21 by insurance, will request extension per team decision    Job MARTIN Boyd, [facility]  Speech-Language Pathologist

## 2021-05-03 NOTE — PROGRESS NOTES
Occupational Therapy  Facility/Department: [facility] ACUTE REHAB UNIT  Daily Treatment Note  NAME: Nell Tanner  : 1970  MRN: [id number]    Date of Service: 5/3/2021    Discharge Recommendations:  24 hour supervision or assist, Home with Home health OT, Home with nursing aide, Continue to assess pending progress  OT Equipment Recommendations  Equipment Needed: Yes  ADL Assistive Devices: Transfer Tub Bench  Other: continue to assess    Assessment   Performance deficits / Impairments: Decreased functional mobility ; Decreased ADL status; Decreased ROM; Decreased strength;Decreased sensation;Decreased fine motor control;Decreased endurance;Decreased posture;Decreased balance;Decreased safe awareness;Decreased coordination;Decreased vision/visual deficit; Decreased cognition  Assessment: AM session: Pt demonstrated decrased stand pivot transfer, however noted increased L tone during transfer. Pt also reporting she has more sensation in her L side this date. Pt completed IADL task for clothing care, required cueing to locate all clothes on pts L visual field. Required max A for wc mobility, as pt L side inattention limiting ability to safely negotiate obstacles. Continue OT POC. PM session: Cotreat completed this session 2/2 pts need for increased safety and assistance with complex transfers (i.e. TTB transfer). Pt demonstrated increased unsuported sitting balance while on TTB, however assist to wash lower LEs and to maintain sitting balance while leaning forward. Pt completed dressing, however difficulty completing pratik techinque and required cueing for correct sequencing. Increased assist to transfer TTB > wc 2/2 increased tone in L LE. Pt tolerated challanging unsupported balance tasks well. Cont. OT POC.   Treatment Diagnosis: decreased independence with ADLs and decreased functional mobility 2/2 CVA  OT Education: Plan of Care;Transfer Training;IADL Safety  Patient Education: continue to ed pt on safe wc mobility, Heis DO  Diagnosis: CVA  Subjective  Subjective: Pt sitting in recliner chair upon arrival for am session, agreeable to IADL task for laundry. PM session focusing on cotreat for showering and shower transfers- cotx completed to increase pts safety and progression with complex transfers. General Comment  Comments: . Objective    ADL  UE Bathing: Moderate assistance;Verbal cueing; Increased time to complete(assist to lift L LE to wash underarm, cueing to wash chest and assist to wash R UE)  LE Bathing: Dependent/Total;Verbal cueing;Setup; Increased time to complete(assist to wash lower LEs, pt leaning forward with therapist in front to protect from leaning too far, another therapist washed pts buttocks. Cueing to wash upper LEs)  UE Dressing: Maximum assistance;Setup;Verbal cueing; Increased time to complete(for pratik technique, max cueing to push shirt up over elbow)  LE Dressing: Dependent/Total(max A to thread LEs into pants, assist of one to stand assist of other to pull pants up over hips. Pt initially mod A to maintain standing balance, progressing to max A 2/2 L lean and weakness in stance. Pt able to don R shoe with LE placed on stool, assist to tie and don L shoe)  Additional Comments: Pt completed bathing as listed above, PT assisting wtih pts sitting balance and OT assisting with ADL and bathing tasks. Pt required cueing throughout for completeness of task. Instrumental ADL's  Instrumental ADLs: Yes  Light Housekeeping  Light Housekeeping Level: Wheelchair  Light Housekeeping Level of Assistance: Moderate assistance  Light Housekeeping: Pt completed laundry task from seated wc position. Required max A to correctly position in front of washer, as pt with difficulty managing wc in tight as well as open spaces. Pt reaching to L side with R hand and ++ verbal cueing to attend to L visual field and to retrieve all clothes vs a few shirts and stopping.  Pt provided with detergent, cueing to place in correct location and cueing to push start button. Pt discussed either having her  do her laundry at home or having him move the laundry up to the first level. Balance  Sitting Balance: Minimal assistance(CGA static sitting balance, min A dynamic sitting balance/ reaching)  Functional Mobility  Functional - Mobility Device: Wheelchair  Activity: Other(in hallway from room to dining room)  Assist Level: Moderate assistance  Functional Mobility Comments: Pt completed mobility from room to dining room, mod A to successfully wheel wc in straight line using R UE/ LE. Pt required max cueing to locate obstacles/ doorway on L visual field, despite pt reporting aware she bumps into things on that side. Shower Transfers  Shower - Transfer From: Wheelchair  Shower - Transfer Type: To and From  Shower - Transfer To: Transfer tub bench  Shower - Technique: Squat pivot  Shower Transfers: Dependent  Shower Transfers Comments: mod of one, + min of one for lateral scoot transfer from  to TTB, max A x 2 to fully scoot from TTB to  and to scoot back in wc. Noted extensor tone in L knee during transfer, impacting pts ability to scoot laterally and posteriorly. Transfers  Stand Pivot Transfers: Maximum assistance(noted L knee extension/ tone during transfer from recliner to  with armrest removed. Pt completed partial transfer to , L buttocks coming off of edge of wc requiring max A and blocking L knee to scoot to the R in .)  Sit to stand: Moderate assistance(pt initially completed sit to stand transfer in preparation to complete stand pivot, however L foot was not in a good position to WB for pivot to . Required return to recliner chair and sit pivot transfer for safety.)   PM session with physical therapy: Pt transferred to [facility] with mod A x 1 and cueing to remove armrest, lateral scoot.       Coordination  Gross Motor: Pt completed dynamic sitting balance seated EOM: sitting on wedge to encourage L LE coordination pt completed reaching anteriorly and L laterally to retreive cones 2x6. Pt noted with increased WB on L LE while reaching forward, CGA to safely lean outside of JOSETTE. Cueing to attend to objects on pts far L side. Cognition  Overall Cognitive Status: Exceptions  Arousal/Alertness: Appropriate responses to stimuli  Following Commands: Follows one step commands consistently; Follows one step commands with increased time  Attention Span: Appears intact  Memory: Appears intact  Safety Judgement: Decreased awareness of need for assistance;Decreased awareness of need for safety  Problem Solving: Assistance required to generate solutions;Assistance required to correct errors made;Assistance required to implement solutions;Decreased awareness of errors  Insights: Decreased awareness of deficits  Initiation: Requires cues for some  Sequencing: Requires cues for some                  Plan   Plan  Times per week: 5x a week for 60 mins daily  Times per day: Daily  Current Treatment Recommendations: Strengthening, Endurance Training, Neuromuscular Re-education, Self-Care / ADL, Functional Mobility Training, Safety Education & Training, ROM, Positioning, Wheelchair Mobility Training, Balance Training, Patient/Caregiver Education & Training, Manual Therapy:  MLD, Equipment Evaluation, Education, & procurement, Home Management Training, Cognitive Reorientation       Goals  Short term goals  Time Frame for Short term goals: 2 weeks-all ongoing  Short term goal 1: Pt will complete toilet transfer w/ Mod A  Short term goal 2: Pt will complete UE dressing w/ Mod A  Short term goal 3: Pt will complete oral care w/ spvn seated at sink w/o VCs for L sided attention  Short term goal 4: Pt will complete LE dressing w/ Mod A  Long term goals  Time Frame for Long term goals : 4 weeks-all ongoing  Long term goal 1: Pt will complete toilet transfer w/ SBA  Long term goal 2: Pt will complete UE dressing w/ setup  Long term goal 3: Pt will complete LE dressing w/ SBA  Long term goal 4: Pt will be independent w/ tone management exercises to improve functional use of LUE  Long term goal 5: Pt will complete oral care I'ly  Patient Goals   Patient goals : \"get back to my normal self\"       Therapy Time   Individual Concurrent Group Co-treatment   Time In 0900     1245   Time Out 0930     1345   Minutes 30     60   Timed Code Treatment Minutes: 30 Minutes +60  Total Time: 90 minutes  Angella LAKHANI  [facility], OTR/L [id number]

## 2021-05-03 NOTE — PROGRESS NOTES
Department of Physical Medicine & Rehabilitation  Progress Note    Patient Identification:  Janina Adkins  [id number]  : 1970  Admit date: 2021    Chief Complaint: CVA    Subjective:   No acute events overnight. Pt seen at resting comfortably in chair. States that her HA are better and less frequent. ROS: No f/c, n/v, cp     Objective:  Patient Vitals for the past 24 hrs:   BP Temp Temp src Pulse Resp SpO2   21 0737 110/70 98 °F (36.7 °C) Oral 69 18 98 %   21 1939 100/67 98.4 °F (36.9 °C) Oral 72 18 96 %     Const: Alert. No distress, pleasant. HEENT: Normocephalic, atraumatic. Normal sclera/conjunctiva. MMM. Incision healing well  CV: Regular rate and rhythm. Resp: No respiratory distress. Lungs CTAB. Abd: Soft, nontender, nondistended, NABS+   Ext: No edema. Neuro: Alert, oriented, appropriately interactive. Psych: Cooperative, appropriate mood and affect    Laboratory data: Available via EMR.    Last 24 hour lab  Recent Results (from the past 24 hour(s))   Basic Metabolic Panel w/ Reflex to MG    Collection Time: 21  5:57 AM   Result Value Ref Range    Sodium 141 136 - 145 mmol/L    Potassium reflex Magnesium 4.1 3.5 - 5.1 mmol/L    Chloride 104 99 - 110 mmol/L    CO2 26 21 - 32 mmol/L    Anion Gap 11 3 - 16    Glucose 94 70 - 99 mg/dL    BUN 11 7 - 20 mg/dL    CREATININE 0.6 0.6 - 1.1 mg/dL    GFR Non-African American >60 >60    GFR African American >60 >60    Calcium 9.7 8.3 - 10.6 mg/dL   CBC auto differential    Collection Time: 21  5:57 AM   Result Value Ref Range    WBC 13.2 (H) 4.0 - 11.0 K/uL    RBC 3.55 (L) 4.00 - 5.20 M/uL    Hemoglobin 12.5 12.0 - 16.0 g/dL    Hematocrit 37.5 36.0 - 48.0 %    .7 (H) 80.0 - 100.0 fL    MCH 35.2 (H) 26.0 - 34.0 pg    MCHC 33.3 31.0 - 36.0 g/dL    RDW 14.0 12.4 - 15.4 %    Platelets 563 (H) 176 - 450 K/uL    MPV 8.6 5.0 - 10.5 fL    Neutrophils % 59.2 %    Lymphocytes % 26.5 %    Monocytes % 9.2 %    Eosinophils % 4.1 %    Basophils % 1.0 %    Neutrophils Absolute 7.8 (H) 1.7 - 7.7 K/uL    Lymphocytes Absolute 3.5 1.0 - 5.1 K/uL    Monocytes Absolute 1.2 0.0 - 1.3 K/uL    Eosinophils Absolute 0.5 0.0 - 0.6 K/uL    Basophils Absolute 0.1 0.0 - 0.2 K/uL       Therapy progress:  PT  Position Activity Restriction  Other position/activity restrictions: Ambulate, Up with assist, Pt to wear helmet when OOB (no orders in chart regarding helmet wear, per chart review this was in therapy notes), Per pt HOB to elevated at 30*  Objective     Sit to Stand: Minimal Assistance(at EOM, Pt stabilized L knee to prevent hyperextension, facilitation for anterior WS.)  Stand to sit: Moderate Assistance(Vc's for hip flexion)  Bed to Chair: Dependent/Total(bed to w/c, w/c <> commode, w/c <> TTB; Pt requiring MAX of 1 and   : Pt given step by step instruction prior to transfer. Pt requiring facilitation for wt shifting, LE placement and advancement of transfer. Consistently pushing to the L during transfers)     OT  PT Equipment Recommendations  Equipment Needed: (Will continue to assess equipment needs pending progress)  Toilet - Technique: Squat pivot  Equipment Used: Standard toilet  Assessment        SLP  Diet Solids Recommendation: Dysphagia Soft and Bite-Sized (Dysphagia III)(with regular solids as tolerated. Per pt request, gravy/sauces to keep for moist.)       Body mass index is 38.5 kg/m². Assessment and Plan:  Javier Born a 46 y.o. female with PMH GERD p/w L sided weakness and facial droop.  CTA head/neck on 4/12 revealed occluded R cervical ICA, occluded right LEAH, near complete occlusion of right MCA.     Acute ischemic CVA, Subdural heomorrhage s/p right hemicraniectomy and subsequen SAH and IP hemorrhage  No tPA given.  MRI 4/14 showing large subacute infarct throughout R LEAH and MCA with some areas of hemorrhagic conversion.  - Neurosurgery and neurology following  --SBP <160, PRN hydralazine, scheduled Norvasc  -- Keppra 500 mg twice daily   -  SQH  - PT/OT consulted      Aphasia- SLP consulted   Depression-Patient takes fluoxetine 20 mg daily at home  Anxiety -Patient takes Ativan 0.5 mg every 8 hours as needed for anxiety- holding  MISHA-Patient uses CPAP at home   GERD- Patient takes omeprazole 40 mg daily at home- half dose  Obesity    Rehab Progress: Improving  Anticipated Dispo: home  Services/DME: LIANG  ELOS: LIANG Perdomo MD  PGY-3    Antonio Evans D.O. M.P.H  PM&R  5/3/2021  9:18 AM

## 2021-05-03 NOTE — PROGRESS NOTES
SHIFT: 0700 - 1930  Pt this shift was stable. Alert and oriented. VS were stable. Pt denied having chest pain or SOB. Pt verbalized having a headache at 1426. Pt developed stated headache got worse while In PT session. Pt was medicated with PRN Oxy-IR. Pt is also taking Scheduled Extra strength Tylenol. Medications was effective. Continent and incontinent of bladder this shift. Pt stated she did not know that she had to urinate until it was to late. Scalp incision SHEMAR and  well approximated. Pt showered with OT. Pt participated well in therapies. Pt at end of shift was resting in bed with bed in low position and call light was within reach. Bed alarm on to promote pt safety.     Electronically signed by Vini Tierney RN on 5/3/2021 at 7:05 PM

## 2021-05-04 PROCEDURE — 97110 THERAPEUTIC EXERCISES: CPT

## 2021-05-04 PROCEDURE — 97129 THER IVNTJ 1ST 15 MIN: CPT

## 2021-05-04 PROCEDURE — 97535 SELF CARE MNGMENT TRAINING: CPT

## 2021-05-04 PROCEDURE — 94660 CPAP INITIATION&MGMT: CPT

## 2021-05-04 PROCEDURE — 1280000000 HC REHAB R&B

## 2021-05-04 PROCEDURE — 97530 THERAPEUTIC ACTIVITIES: CPT

## 2021-05-04 PROCEDURE — 99232 SBSQ HOSP IP/OBS MODERATE 35: CPT | Performed by: PHYSICAL MEDICINE & REHABILITATION

## 2021-05-04 PROCEDURE — 92526 ORAL FUNCTION THERAPY: CPT

## 2021-05-04 PROCEDURE — 97130 THER IVNTJ EA ADDL 15 MIN: CPT

## 2021-05-04 PROCEDURE — 97112 NEUROMUSCULAR REEDUCATION: CPT

## 2021-05-04 PROCEDURE — 6370000000 HC RX 637 (ALT 250 FOR IP): Performed by: PHYSICAL MEDICINE & REHABILITATION

## 2021-05-04 PROCEDURE — 99232 SBSQ HOSP IP/OBS MODERATE 35: CPT | Performed by: INTERNAL MEDICINE

## 2021-05-04 PROCEDURE — 94760 N-INVAS EAR/PLS OXIMETRY 1: CPT

## 2021-05-04 PROCEDURE — 6360000002 HC RX W HCPCS: Performed by: PHYSICAL MEDICINE & REHABILITATION

## 2021-05-04 RX ADMIN — DOCUSATE SODIUM 50 MG AND SENNOSIDES 8.6 MG 2 TABLET: 8.6; 5 TABLET, FILM COATED ORAL at 21:00

## 2021-05-04 RX ADMIN — Medication 1 G: at 16:52

## 2021-05-04 RX ADMIN — FAMOTIDINE 20 MG: 20 TABLET, FILM COATED ORAL at 21:01

## 2021-05-04 RX ADMIN — Medication 1 G: at 13:41

## 2021-05-04 RX ADMIN — ASPIRIN 81 MG: 81 TABLET, CHEWABLE ORAL at 08:52

## 2021-05-04 RX ADMIN — DOCUSATE SODIUM 50 MG AND SENNOSIDES 8.6 MG 2 TABLET: 8.6; 5 TABLET, FILM COATED ORAL at 08:52

## 2021-05-04 RX ADMIN — AMLODIPINE BESYLATE 5 MG: 5 TABLET ORAL at 08:53

## 2021-05-04 RX ADMIN — FLUOXETINE 20 MG: 20 CAPSULE ORAL at 08:53

## 2021-05-04 RX ADMIN — FAMOTIDINE 20 MG: 20 TABLET, FILM COATED ORAL at 08:53

## 2021-05-04 RX ADMIN — LEVETIRACETAM 500 MG: 500 TABLET ORAL at 08:53

## 2021-05-04 RX ADMIN — THIAMINE HCL TAB 100 MG 100 MG: 100 TAB at 08:53

## 2021-05-04 RX ADMIN — ACETAMINOPHEN 1000 MG: 500 TABLET, COATED ORAL at 16:51

## 2021-05-04 RX ADMIN — ACETAMINOPHEN 1000 MG: 500 TABLET, COATED ORAL at 09:31

## 2021-05-04 RX ADMIN — HEPARIN SODIUM 5000 UNITS: 5000 INJECTION INTRAVENOUS; SUBCUTANEOUS at 05:17

## 2021-05-04 RX ADMIN — THERA TABS 1 TABLET: TAB at 08:53

## 2021-05-04 RX ADMIN — ACETAMINOPHEN 1000 MG: 500 TABLET, COATED ORAL at 05:17

## 2021-05-04 RX ADMIN — ATORVASTATIN CALCIUM 80 MG: 80 TABLET, FILM COATED ORAL at 21:01

## 2021-05-04 RX ADMIN — Medication 1 G: at 08:52

## 2021-05-04 RX ADMIN — Medication 5 MG: at 08:53

## 2021-05-04 RX ADMIN — HEPARIN SODIUM 5000 UNITS: 5000 INJECTION INTRAVENOUS; SUBCUTANEOUS at 14:46

## 2021-05-04 RX ADMIN — LEVETIRACETAM 500 MG: 500 TABLET ORAL at 21:01

## 2021-05-04 RX ADMIN — HEPARIN SODIUM 5000 UNITS: 5000 INJECTION INTRAVENOUS; SUBCUTANEOUS at 20:52

## 2021-05-04 RX ADMIN — ACETAMINOPHEN 1000 MG: 500 TABLET, COATED ORAL at 21:00

## 2021-05-04 ASSESSMENT — PAIN DESCRIPTION - ONSET
ONSET: GRADUAL
ONSET: ON-GOING
ONSET: GRADUAL

## 2021-05-04 ASSESSMENT — PAIN SCALES - GENERAL
PAINLEVEL_OUTOF10: 0
PAINLEVEL_OUTOF10: 2
PAINLEVEL_OUTOF10: 0
PAINLEVEL_OUTOF10: 5
PAINLEVEL_OUTOF10: 2
PAINLEVEL_OUTOF10: 0
PAINLEVEL_OUTOF10: 2

## 2021-05-04 ASSESSMENT — PAIN DESCRIPTION - PAIN TYPE
TYPE: ACUTE PAIN

## 2021-05-04 ASSESSMENT — PAIN DESCRIPTION - LOCATION
LOCATION: BACK
LOCATION: HEAD
LOCATION: HEAD

## 2021-05-04 ASSESSMENT — PAIN DESCRIPTION - DIRECTION: RADIATING_TOWARDS: BUTTOCKS

## 2021-05-04 ASSESSMENT — PAIN - FUNCTIONAL ASSESSMENT
PAIN_FUNCTIONAL_ASSESSMENT: ACTIVITIES ARE NOT PREVENTED

## 2021-05-04 ASSESSMENT — PAIN DESCRIPTION - PROGRESSION
CLINICAL_PROGRESSION: GRADUALLY WORSENING
CLINICAL_PROGRESSION: GRADUALLY WORSENING
CLINICAL_PROGRESSION: GRADUALLY IMPROVING

## 2021-05-04 ASSESSMENT — PAIN DESCRIPTION - ORIENTATION
ORIENTATION: ANTERIOR
ORIENTATION: LOWER;MID

## 2021-05-04 ASSESSMENT — PAIN DESCRIPTION - FREQUENCY
FREQUENCY: CONTINUOUS
FREQUENCY: INTERMITTENT
FREQUENCY: INTERMITTENT

## 2021-05-04 ASSESSMENT — PAIN DESCRIPTION - DESCRIPTORS
DESCRIPTORS: ACHING;DISCOMFORT

## 2021-05-04 NOTE — PLAN OF CARE
Problem: Pain:  Goal: Pain level will decrease  Description: Pain level will decrease  Outcome: Ongoing     Problem: Pain:  Goal: Control of acute pain  Description: Control of acute pain  5/4/2021 0141 by Janna Cabrera RN  Outcome: Ongoing     Problem: Falls - Risk of:  Goal: Will remain free from falls  Description: Will remain free from falls  5/4/2021 0141 by Janna Cabrera RN  Outcome: Ongoing  Note: Will remain free from falls     Problem: Falls - Risk of:  Goal: Absence of physical injury  Description: Absence of physical injury  Outcome: Ongoing  Note: Absence of physical injury       Problem: Skin Integrity:  Goal: Will show no infection signs and symptoms  Description: Will show no infection signs and symptoms  Outcome: Ongoing     Problem: Skin Integrity:  Goal: Absence of new skin breakdown  Description: Absence of new skin breakdown  5/4/2021 0141 by Janna Cabrera RN  Outcome: Ongoing

## 2021-05-04 NOTE — PROGRESS NOTES
Shift assessment complete. VSS, A/Ox4, fall precautions in place. Call light in reach. Pt denies pain or discomfort. Will continue to monitor until end of shift.        Vitals:    05/03/21 2130   BP: 123/80   Pulse: 71   Resp: 18   Temp: 98.3 °F (36.8 °C)   SpO2: 98%

## 2021-05-04 NOTE — PROGRESS NOTES
Department of Physical Medicine & Rehabilitation  Progress Note    Patient Identification:  Dave Monge  [id number]  : 1970  Admit date: 2021    Chief Complaint: CVA    Subjective:   No acute events overnight. Pt without complaints this am. HA less frequent and improved. No HA this am.    ROS: No f/c, n/v, cp     Objective:  Patient Vitals for the past 24 hrs:   BP Temp Temp src Pulse Resp SpO2   21 0843 108/61 98.4 °F (36.9 °C) Oral 80 18 97 %   21 2130 123/80 98.3 °F (36.8 °C) Oral 71 18 98 %     Const: Alert. No distress, pleasant. HEENT: Normocephalic, atraumatic. Normal sclera/conjunctiva. MMM. Incision healing well  CV: Regular rate and rhythm. Resp: No respiratory distress. Lungs CTAB. Abd: Soft, nontender, nondistended, NABS+   Ext: No edema. Neuro: Alert, oriented, appropriately interactive. Psych: Cooperative, appropriate mood and affect    Laboratory data: Available via EMR. Last 24 hour lab  No results found for this or any previous visit (from the past 24 hour(s)). Therapy progress:  PT  Position Activity Restriction  Other position/activity restrictions: Ambulate, Up with assist, Pt to wear helmet when OOB (no orders in chart regarding helmet wear, per chart review this was in therapy notes), Per pt HOB to elevated at 30*  Objective     Sit to Stand: Moderate Assistance, Maximum Assistance(ModA to stand , regressed to maxA to maintain standing. performed for lower body dressing)  Stand to sit: Moderate Assistance(Vc's for hip flexion)  Bed to Chair: Dependent/Total(bed to w/c, w/c <> commode, w/c <> TTB; Pt requiring MAX of 1 and   : Pt given step by step instruction prior to transfer. Pt requiring facilitation for wt shifting, LE placement and advancement of transfer.  Consistently pushing to the L during transfers)     OT  PT Equipment Recommendations  Equipment Needed: (Will continue to assess equipment needs pending progress)  Toilet - Technique: Squat pivot Equipment Used: Standard toilet  Assessment        SLP  Diet Solids Recommendation: Dysphagia Soft and Bite-Sized (Dysphagia III)(with regular solids as tolerated. Per pt request, gravy/sauces to keep for moist.)       Body mass index is 36.58 kg/m². Assessment and Plan:  Kennard Habermann a 46 y.o. female with PMH GERD p/w L sided weakness and facial droop.  CTA head/neck on 4/12 revealed occluded R cervical ICA, occluded right LEAH, near complete occlusion of right MCA.     Acute ischemic CVA, Subdural heomorrhage s/p right hemicraniectomy and subsequen SAH and IP hemorrhage  No tPA given.  MRI 4/14 showing large subacute infarct throughout R LEAH and MCA with some areas of hemorrhagic conversion.  - Neurosurgery and neurology following  --SBP <160, PRN hydralazine, scheduled Norvasc  -- Keppra 500 mg twice daily   -  SQH  - PT/OT consulted      Aphasia- SLP consulted   Depression-Patient takes fluoxetine 20 mg daily at home  Anxiety -Patient takes Ativan 0.5 mg every 8 hours as needed for anxiety- holding  MISHA-Patient uses CPAP at home   GERD- Patient takes omeprazole 40 mg daily at home- half dose  Obesity    Rehab Progress: Improving  Anticipated Dispo: home  Services/DME: TBAILEEN  ELOS: LIANG Johnson,   PGY-1    Rodolfo Arellano D.O. M.P.H  PM&R  5/4/2021  10:06 AM
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
## 2021-05-04 NOTE — PROGRESS NOTES
Occupational Therapy  Facility/Department: [place] [address] ACUTE REHAB UNIT  Daily Treatment Note  NAME: Teresa Jiménez  : 1970  MRN: [id number]    Date of Service: 2021    Discharge Recommendations:  24 hour supervision or assist, Home with Home health OT, Home with nursing aide, Continue to assess pending progress  OT Equipment Recommendations  Equipment Needed: Yes  ADL Assistive Devices: Transfer Tub Bench  Other: continue to assess    Assessment   Performance deficits / Impairments: Decreased functional mobility ; Decreased ADL status; Decreased ROM; Decreased strength;Decreased sensation;Decreased fine motor control;Decreased endurance;Decreased posture;Decreased balance;Decreased safe awareness;Decreased coordination;Decreased vision/visual deficit; Decreased cognition  Assessment: Pt progressed to mod A for toilet transfer via stand pivot but continues to require 2 person assist for toileting and LB clothing mgmt on/off hips d/t impaired dynamic standing balance, LLE knee hyperextension and L hemiplegia. Pt hypertonicity and flexion synergy of LUE more easily managed req less facilitation for elbow extension. Pt highly motivated but functioning significantly below baseline, cont per OT POC. Treatment Diagnosis: decreased independence with ADLs and decreased functional mobility 2/2 CVA  OT Education: Plan of Care;Transfer Training;Energy Conservation; ADL Adaptive Strategies  Patient Education: cont to reinforce  REQUIRES OT FOLLOW UP: Yes  Activity Tolerance  Activity Tolerance: Patient Tolerated treatment well  Safety Devices  Safety Devices in place: Yes  Type of devices: Call light within reach; Chair alarm in place; Left in chair; All fall risk precautions in place;Nurse notified         Patient Diagnosis(es): There were no encounter diagnoses. has a past medical history of GERD (gastroesophageal reflux disease), Hernia, Obesity, and Unspecified sleep apnea.    has a past surgical history that includes Splenectomy (); Total hip arthroplasty (); Lap Band (); hernia repair ();  section (); and craniotomy (Right, 2021). Restrictions  Position Activity Restriction  Other position/activity restrictions: Ambulate, Up with assist, Pt to wear helmet when OOB, OK to remove for shower, HOB to elevated at 30*  Subjective   General  Chart Reviewed: Yes  Patient assessed for rehabilitation services?: Yes  Additional Pertinent Hx: 46 y.o. female with hypertension and tobacco abuse who presented after spending a prolonged period on the ground s/p fall out of bed; found to have acute left hemiparesis and gaze deviation. CXR with no acute cardiopulmonary abnormality. CTH revealed large acute/subacute infarct in the R frontal lobe with associated mass effect and 4 mm R to L midline shift. It also noted subdural hemorrhage of 6mm along falx, although NSGY less convinced. CTA head and neck revealed occluded R cervical ICA & R anterior cerebral artery with near complete occlusion of R MCA. Outside window on TPA. Pt is now POD #1 following hemicraniectomy (). Pt admitted to ARU   Response to previous treatment: Patient with no complaints from previous session  Family / Caregiver Present: No  Referring Practitioner: Dr. Avinash Mar DO  Diagnosis: CVA  Subjective  Subjective: Pt supine in bed upon arrival, pleasant and agreeable to OT/PT cotx to maximize independence with collaboration of 2 disciplines. General Comment  Comments: [facility] Records   Vital Signs  Patient Currently in Pain: Denies     Orientation  Orientation  Overall Orientation Status: Within Functional Limits  Objective    ADL  Grooming: Supervision;Verbal cueing (oral hygiene and hand hygiene seated in w/c, VCs to locate towels on L of pt, pratik tech to open/close toothpaste)  LE Dressing: (max A to thread LEs into brief and pants, assist of one to stand assist of other to pull pants up over hips, total A to don shoes for sake of time supine) Toileting: Dependent/Total;Increased time to complete;Setup(pt continent of bladder, pericare hygiene attempted seated and in stance with mod A x 1 to maintain stance and setup of wipe into R hand, pt req total A to manage LB clothing over hips in stance)        Balance  Sitting Balance: Contact guard assistance(seated on toilet, pt with posterolateral lean to L req VCs to correct)  Standing Balance: Moderate assistance(significant facilitation to prevent LLE hyperextension)  Standing Balance  Time: ~6 minutes total  Activity: functional transfers; static and dynamic stance in // bars; in stance for toileting  Comment: pt completed x 3 trials of stands in // bars with LUE placed into elbow extension with facilitation to maintain wrist in neutral and grasp on bar, mod A to maintain stance with WBOS, prevent LLE hyperextension and upright midline posture, pt completed reaching with RUE ipsilaterally and contralaterally outside JOSETTE to facilitate WSing/increased WBing through LLE and elongation of trunk on L side, no overt LOB tolerating ~1 minute each trial  Toilet Transfers  Toilet - Technique: Stand pivot  Equipment Used: Standard toilet  Toilet Transfer: Moderate assistance  Toilet Transfers Comments: VCs for hand placement, assist for controlled descent  Bed mobility  Supine to Sit: Moderate assistance(HOB elevated, assist to manage LLE and facilitation for trunk ascension)  Transfers  Stand Pivot Transfers: Moderate assistance(EOB<w/c, cues to sequence and ant lean)  Sit to stand: Moderate assistance(from w/c in // bars, VCs for ant lean and hand placement)  Stand to sit: Moderate assistance(VCs for eccentric control)                       Cognition  Overall Cognitive Status: Exceptions  Arousal/Alertness: Appropriate responses to stimuli  Following Commands: Follows one step commands consistently; Follows one step commands with increased time  Attention Span: Appears intact  Memory: Appears intact  Safety Judgement: Decreased awareness of need for assistance;Decreased awareness of need for safety  Problem Solving: Assistance required to generate solutions;Assistance required to correct errors made;Assistance required to implement solutions;Decreased awareness of errors  Insights: Decreased awareness of deficits  Initiation: Requires cues for some  Sequencing: Requires cues for some         Second Session: Pt sitting in w/c upon arrival, c/o discomfort in tailbone, pt observed to be scooted out in w/c with LLE extended resulting in asymmetrical WBing on LLE. Pt able to scoot posteriorly with CGA and VCs/facilitation for ant lean. Pt completed w/c mobility to/from dining room with minimal assistance as pt continued to run into environmental barriers on pt's left including doorway, chairs and linen cart req VCs to attend to L visual field and turn head to identify issue as well as problem solve to correct pathway. Pt used RLE and RUE to propel self. Pt instructed to open fridge and identify all items within fridge, pt only scanning to R visual field unless cued to turn head to L. Pt engaged in wayfinding activity to locate room which is on pt's left. Pt able to identify correctly to turn L from dining room but then propelled self past room req VCs to problem solve and turn head. Pt instructed on handling technique to utilize RUE to self range LUE for elbow flex/ext, sh IR/ER and sh flex/ext. Pt completed x 3 PROM of LUE for elbow ext, sh flex and sh abduction with 10 second hold within pain free range, pt reporting discomfort in L pectoralis muscle and biceps. + time spent positioning L hand into soft resting hand splint to decrease finger and wrist flexion and decrease risk of contractures. Pt verbalized no discomfort with positioning and instructed on 2 hour on/off wear schedule. Pt left in w/c at end of session with chair alarm engaged, call light within reach and all needs met.       Plan   Plan  Times per week: 5x a week for 60 mins daily  Times per day: Daily  Current Treatment Recommendations: Strengthening, Endurance Training, Neuromuscular Re-education, Self-Care / ADL, Functional Mobility Training, Safety Education & Training, ROM, Positioning, Wheelchair Mobility Training, Balance Training, Patient/Caregiver Education & Training, Manual Therapy:  MLD, Equipment Evaluation, Education, & procurement, Home Management Training, Cognitive Reorientation    Goals  Short term goals  Time Frame for Short term goals: 2 weeks-ongoing  Short term goal 1: Pt will complete toilet transfer w/ Mod A-goal met 5/4  Short term goal 2: Pt will complete UE dressing w/ Mod A-ongoing  Short term goal 3: Pt will complete oral care w/ spvn seated at sink w/o VCs for L sided attention-ongoing  Short term goal 4: Pt will complete LE dressing w/ Mod A-ongoing  Long term goals  Time Frame for Long term goals : 4 weeks-all ongoing  Long term goal 1: Pt will complete toilet transfer w/ SBA  Long term goal 2: Pt will complete UE dressing w/ setup  Long term goal 3: Pt will complete LE dressing w/ SBA  Long term goal 4: Pt will be independent w/ tone management exercises to improve functional use of LUE  Long term goal 5: Pt will complete oral care I'ly  Patient Goals   Patient goals : \"get back to my normal self\"       Therapy Time   Individual Concurrent Group Co-treatment   Time In  1100     0730   Time Out  1130     0830   Minutes  30     60   Timed Code Treatment Minutes: 60 Minutes+ 30 Minutes  Total Treatment Time: 90 Minutes       Frankie Joel OT

## 2021-05-04 NOTE — PROGRESS NOTES
ACUTE REHAB UNIT  SPEECH/LANGUAGE PATHOLOGY      [x] Daily  [] Weekly Care Conference Note  [] Discharge    Patient:Laura Solomon      XTF:[birth year]  AJX:[id number]  Rehab Dx/Hx: Acute CVA (cerebrovascular accident) ([facility] [address] [I63.9]    Precautions: [x] Aspiration  [x] Fall risk  [] Sternal  [] Seizure [] Hip  [] Weight Bearing [] Other  ST Dx: [] Aphasia  [] Dysarthria  [] Apraxia   [x] Oropharyngeal dysphagia [x] Cognitive Impairment  [] Other:   Date of Admit: 4/23/2021  Room #: 3101/3101-01  Date: 5/4/2021          Current Diet Order:Dietary Nutrition Supplements: Low Calorie High Protein Supplement  DIET GENERAL; No Added Salt (3-4 GM); Daily Fluid Restriction: 1500 ml   Recommended Form of Meds: PO  Compensatory Swallowing Strategies: Alternate solids and liquids, Upright as possible for all oral intake, Check for pocketing of food on the Left, Small bites/sips, Lingual sweep     Subjective: Pt admitted 4/12 after fall with left sided paralysis. Pt underwent craniectomy on 4/13 per Dr. Hyun Mojica. Social/Functional History  Lives With: Spouse;Son( reported that son is diagnosed with Autism.)  Occupation: Full time employment  Type of occupation:  at [employer]"    FEES 4/14: IMPRESSIONS:   Pt presents w/ mild oropharyngeal dysphagia characterized by premature spillage of thin liquids to UES and pyriforms w/ intermittent deep penetration of laryngeal vestibule; adequate airway protection and complete clearance of all residue after swallow initiation. No aspiration w/ any trials. Timely swallow initiation w/ puree and regular solid consistencies; no oropharyngeal residue.    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 Adequate ab/adduction. Complete closure of TVFs upon adduction.      Dentition: Adequate  Vision  Vision: Impaired  Vision Exceptions: Visual field cut  Hearing  Hearing: Within within reach  [x] Chair alarm activated and connected to nurse call light system  [] Bed alarm activated   [] Other:  [x] Call light within reach  [x] Chair alarm activated and connected to nurse call light system  [] Bed alarm activated  [] Other:    Assessment:  Good working memory and divided attention this AM.  Assessment:  Min cues to read instructions on paper. Mod cues for visual scanning for specific letters d/t left neglect. Improved generative naming today. Continues to benefit from cognitive and dysphagia tx. Plan: Continue per POC.       Interventions used this date:  [] Speech/Language Treatment  [] Instruction in HEP  [x] Dysphagia Treatment [x] Cognitive Treatment   [] Other:    Discharge recommendations:  [] Home independently  [x] Home with assistance []  24 hour supervision  [] ECF [] Other  Continued Tx Upon Discharge: ? [x] Yes    [] No    [] TBD based on progress while on ARU     [] Vital Stim indicated     [] Other:   Estimated discharge date: approved through 5/4/21 by insurance, will request extension per team decision    Sarah Pierson MS-CCC-SLP [id number]  Speech-Language Pathologist

## 2021-05-04 NOTE — PLAN OF CARE
Problem: Skin Integrity:  Goal: Absence of new skin breakdown  Description: Absence of new skin breakdown  Outcome: Ongoing  Note: Patient offered to be toileted every two hours and PRN, skin barrier applied as needed. Staff assists patient with repositioning and pillow support is provided when needed. Patient educated on offloading techniques and verbalizes understanding. Problem: Falls - Risk of:  Goal: Absence of physical injury  Description: Absence of physical injury  Outcome: Ongoing  Note: Pt remains free from accidental injury during this stay on the ARU. Will continue to monitor pt and assess per schedule and prn.       Problem: Pain:  Goal: Control of acute pain  Description: Control of acute pain  Outcome: Ongoing  Note: Patient's pain is controlled with current regime

## 2021-05-05 LAB
ANION GAP SERPL CALCULATED.3IONS-SCNC: 11 MMOL/L (ref 3–16)
BASOPHILS ABSOLUTE: 0.1 K/UL (ref 0–0.2)
BASOPHILS RELATIVE PERCENT: 1 %
BUN BLDV-MCNC: 13 MG/DL (ref 7–20)
CALCIUM SERPL-MCNC: 9.9 MG/DL (ref 8.3–10.6)
CHLORIDE BLD-SCNC: 105 MMOL/L (ref 99–110)
CO2: 25 MMOL/L (ref 21–32)
CREAT SERPL-MCNC: <0.5 MG/DL (ref 0.6–1.1)
EOSINOPHILS ABSOLUTE: 0.6 K/UL (ref 0–0.6)
EOSINOPHILS RELATIVE PERCENT: 4.7 %
GFR AFRICAN AMERICAN: >60
GFR NON-AFRICAN AMERICAN: >60
GLUCOSE BLD-MCNC: 91 MG/DL (ref 70–99)
HCT VFR BLD CALC: 40.4 % (ref 36–48)
HEMOGLOBIN: 13.7 G/DL (ref 12–16)
LYMPHOCYTES ABSOLUTE: 3.7 K/UL (ref 1–5.1)
LYMPHOCYTES RELATIVE PERCENT: 31.4 %
MCH RBC QN AUTO: 35.3 PG (ref 26–34)
MCHC RBC AUTO-ENTMCNC: 34 G/DL (ref 31–36)
MCV RBC AUTO: 104 FL (ref 80–100)
MONOCYTES ABSOLUTE: 1.1 K/UL (ref 0–1.3)
MONOCYTES RELATIVE PERCENT: 9.8 %
NEUTROPHILS ABSOLUTE: 6.2 K/UL (ref 1.7–7.7)
NEUTROPHILS RELATIVE PERCENT: 53.1 %
PDW BLD-RTO: 14 % (ref 12.4–15.4)
PLATELET # BLD: 434 K/UL (ref 135–450)
PMV BLD AUTO: 8.6 FL (ref 5–10.5)
POTASSIUM REFLEX MAGNESIUM: 4.3 MMOL/L (ref 3.5–5.1)
RBC # BLD: 3.88 M/UL (ref 4–5.2)
SODIUM BLD-SCNC: 141 MMOL/L (ref 136–145)
WBC # BLD: 11.7 K/UL (ref 4–11)

## 2021-05-05 PROCEDURE — 85025 COMPLETE CBC W/AUTO DIFF WBC: CPT

## 2021-05-05 PROCEDURE — 97110 THERAPEUTIC EXERCISES: CPT

## 2021-05-05 PROCEDURE — 1280000000 HC REHAB R&B

## 2021-05-05 PROCEDURE — 6360000002 HC RX W HCPCS: Performed by: PHYSICAL MEDICINE & REHABILITATION

## 2021-05-05 PROCEDURE — 97130 THER IVNTJ EA ADDL 15 MIN: CPT

## 2021-05-05 PROCEDURE — 36415 COLL VENOUS BLD VENIPUNCTURE: CPT

## 2021-05-05 PROCEDURE — 97542 WHEELCHAIR MNGMENT TRAINING: CPT | Performed by: PHYSICAL THERAPIST

## 2021-05-05 PROCEDURE — 97535 SELF CARE MNGMENT TRAINING: CPT

## 2021-05-05 PROCEDURE — 97112 NEUROMUSCULAR REEDUCATION: CPT

## 2021-05-05 PROCEDURE — 97129 THER IVNTJ 1ST 15 MIN: CPT

## 2021-05-05 PROCEDURE — 97530 THERAPEUTIC ACTIVITIES: CPT

## 2021-05-05 PROCEDURE — 92526 ORAL FUNCTION THERAPY: CPT

## 2021-05-05 PROCEDURE — 80048 BASIC METABOLIC PNL TOTAL CA: CPT

## 2021-05-05 PROCEDURE — 97530 THERAPEUTIC ACTIVITIES: CPT | Performed by: PHYSICAL THERAPIST

## 2021-05-05 PROCEDURE — 99232 SBSQ HOSP IP/OBS MODERATE 35: CPT | Performed by: PHYSICAL MEDICINE & REHABILITATION

## 2021-05-05 PROCEDURE — 6370000000 HC RX 637 (ALT 250 FOR IP): Performed by: PHYSICAL MEDICINE & REHABILITATION

## 2021-05-05 RX ADMIN — LEVETIRACETAM 500 MG: 500 TABLET ORAL at 20:11

## 2021-05-05 RX ADMIN — DOCUSATE SODIUM 50 MG AND SENNOSIDES 8.6 MG 2 TABLET: 8.6; 5 TABLET, FILM COATED ORAL at 20:10

## 2021-05-05 RX ADMIN — ACETAMINOPHEN 1000 MG: 500 TABLET, COATED ORAL at 20:11

## 2021-05-05 RX ADMIN — ACETAMINOPHEN 1000 MG: 500 TABLET, COATED ORAL at 06:53

## 2021-05-05 RX ADMIN — FLUOXETINE 20 MG: 20 CAPSULE ORAL at 07:47

## 2021-05-05 RX ADMIN — ASPIRIN 81 MG: 81 TABLET, CHEWABLE ORAL at 07:47

## 2021-05-05 RX ADMIN — FAMOTIDINE 20 MG: 20 TABLET, FILM COATED ORAL at 20:10

## 2021-05-05 RX ADMIN — ACETAMINOPHEN 1000 MG: 500 TABLET, COATED ORAL at 09:58

## 2021-05-05 RX ADMIN — AMLODIPINE BESYLATE 5 MG: 5 TABLET ORAL at 07:47

## 2021-05-05 RX ADMIN — Medication 1 G: at 07:47

## 2021-05-05 RX ADMIN — HEPARIN SODIUM 5000 UNITS: 5000 INJECTION INTRAVENOUS; SUBCUTANEOUS at 14:06

## 2021-05-05 RX ADMIN — THERA TABS 1 TABLET: TAB at 07:47

## 2021-05-05 RX ADMIN — HEPARIN SODIUM 5000 UNITS: 5000 INJECTION INTRAVENOUS; SUBCUTANEOUS at 20:12

## 2021-05-05 RX ADMIN — LEVETIRACETAM 500 MG: 500 TABLET ORAL at 07:47

## 2021-05-05 RX ADMIN — FAMOTIDINE 20 MG: 20 TABLET, FILM COATED ORAL at 07:48

## 2021-05-05 RX ADMIN — DOCUSATE SODIUM 50 MG AND SENNOSIDES 8.6 MG 2 TABLET: 8.6; 5 TABLET, FILM COATED ORAL at 07:48

## 2021-05-05 RX ADMIN — THIAMINE HCL TAB 100 MG 100 MG: 100 TAB at 07:47

## 2021-05-05 RX ADMIN — ATORVASTATIN CALCIUM 80 MG: 80 TABLET, FILM COATED ORAL at 20:11

## 2021-05-05 RX ADMIN — HEPARIN SODIUM 5000 UNITS: 5000 INJECTION INTRAVENOUS; SUBCUTANEOUS at 06:47

## 2021-05-05 ASSESSMENT — PAIN DESCRIPTION - PAIN TYPE
TYPE: ACUTE PAIN
TYPE: ACUTE PAIN

## 2021-05-05 ASSESSMENT — PAIN DESCRIPTION - PROGRESSION: CLINICAL_PROGRESSION: NOT CHANGED

## 2021-05-05 ASSESSMENT — PAIN DESCRIPTION - LOCATION
LOCATION: HEAD
LOCATION: HEAD

## 2021-05-05 ASSESSMENT — PAIN SCALES - GENERAL
PAINLEVEL_OUTOF10: 0
PAINLEVEL_OUTOF10: 2
PAINLEVEL_OUTOF10: 0
PAINLEVEL_OUTOF10: 0
PAINLEVEL_OUTOF10: 2
PAINLEVEL_OUTOF10: 2

## 2021-05-05 ASSESSMENT — PAIN DESCRIPTION - ORIENTATION
ORIENTATION: RIGHT
ORIENTATION: ANTERIOR

## 2021-05-05 ASSESSMENT — PAIN DESCRIPTION - ONSET: ONSET: ON-GOING

## 2021-05-05 ASSESSMENT — PAIN DESCRIPTION - DESCRIPTORS
DESCRIPTORS: ACHING;DISCOMFORT
DESCRIPTORS: HEADACHE

## 2021-05-05 ASSESSMENT — PAIN DESCRIPTION - FREQUENCY
FREQUENCY: INTERMITTENT
FREQUENCY: INTERMITTENT

## 2021-05-05 ASSESSMENT — PAIN - FUNCTIONAL ASSESSMENT
PAIN_FUNCTIONAL_ASSESSMENT: ACTIVITIES ARE NOT PREVENTED
PAIN_FUNCTIONAL_ASSESSMENT: ACTIVITIES ARE NOT PREVENTED

## 2021-05-05 NOTE — PROGRESS NOTES
Department of Physical Medicine & Rehabilitation  Progress Note    Patient Identification:  Jostin Perdomo  [id number]  : 1970  Admit date: 2021    Chief Complaint: CVA    Subjective:   No acute events overnight. Pt without complaints this am. Slept great last night. She is sore from PT yesterday during which time she was walking. ROS: No f/c, n/v, cp     Objective:  Patient Vitals for the past 24 hrs:   BP Temp Temp src Pulse Resp SpO2   21 0745 120/74 97.8 °F (36.6 °C) Oral 81 16 96 %   21 0425     17 95 %   21 0134     17 96 %   21 2116     18    21 2045 114/67 98.3 °F (36.8 °C) Oral 78 18 96 %     Const: Alert. No distress, pleasant. HEENT: Normocephalic, atraumatic. Normal sclera/conjunctiva. MMM. Incision healing well  CV: Regular rate and rhythm. Resp: No respiratory distress. Lungs CTAB. Abd: Soft, nontender, nondistended, NABS+   Ext: No edema. Neuro: Alert, oriented, appropriately interactive. Psych: Cooperative, appropriate mood and affect    Laboratory data: Available via EMR.    Last 24 hour lab  Recent Results (from the past 24 hour(s))   Basic Metabolic Panel w/ Reflex to MG    Collection Time: 21  6:34 AM   Result Value Ref Range    Sodium 141 136 - 145 mmol/L    Potassium reflex Magnesium 4.3 3.5 - 5.1 mmol/L    Chloride 105 99 - 110 mmol/L    CO2 25 21 - 32 mmol/L    Anion Gap 11 3 - 16    Glucose 91 70 - 99 mg/dL    BUN 13 7 - 20 mg/dL    CREATININE <0.5 (L) 0.6 - 1.1 mg/dL    GFR Non-African American >60 >60    GFR African American >60 >60    Calcium 9.9 8.3 - 10.6 mg/dL   CBC auto differential    Collection Time: 21  6:34 AM   Result Value Ref Range    WBC 11.7 (H) 4.0 - 11.0 K/uL    RBC 3.88 (L) 4.00 - 5.20 M/uL    Hemoglobin 13.7 12.0 - 16.0 g/dL    Hematocrit 40.4 36.0 - 48.0 %    .0 (H) 80.0 - 100.0 fL    MCH 35.3 (H) 26.0 - 34.0 pg    MCHC 34.0 31.0 - 36.0 g/dL    RDW 14.0 12.4 - 15.4 %    Platelets 875 274 - 450 K/uL    MPV 8.6 5.0 - 10.5 fL    Neutrophils % 53.1 %    Lymphocytes % 31.4 %    Monocytes % 9.8 %    Eosinophils % 4.7 %    Basophils % 1.0 %    Neutrophils Absolute 6.2 1.7 - 7.7 K/uL    Lymphocytes Absolute 3.7 1.0 - 5.1 K/uL    Monocytes Absolute 1.1 0.0 - 1.3 K/uL    Eosinophils Absolute 0.6 0.0 - 0.6 K/uL    Basophils Absolute 0.1 0.0 - 0.2 K/uL       Therapy progress:  PT  Position Activity Restriction  Other position/activity restrictions: Ambulate, Up with assist, Pt to wear helmet when OOB, OK to remove for shower, HOB to elevated at 30*  Objective     Sit to Stand: Moderate Assistance, Minimal Assistance(multiple w/c <> parellel bars ModA, Hugo for last transfer. VC's for WS. therapist assisted in stabalizing L LE to prevent hyperextension.)  Stand to sit: Moderate Assistance  Bed to Chair: Moderate assistance(stand pivot to R, VC to perform hip flexion.)     OT  PT Equipment Recommendations  Equipment Needed: (Will continue to assess equipment needs pending progress)  Toilet - Technique: Stand pivot  Equipment Used: Standard toilet  Toilet Transfers Comments: VCs for hand placement, assist for controlled descent  Assessment        SLP  Diet Solids Recommendation: Dysphagia Soft and Bite-Sized (Dysphagia III)(with regular solids as tolerated. Per pt request, gravy/sauces to keep for moist.)       Body mass index is 36.58 kg/m². Assessment and Plan:  Javier Born a 46 y.o. female with PMH GERD p/w L sided weakness and facial droop.  CTA head/neck on 4/12 revealed occluded R cervical ICA, occluded right LEAH, near complete occlusion of right MCA.     Acute ischemic CVA, Subdural heomorrhage s/p right hemicraniectomy and subsequen SAH and IP hemorrhage  No tPA given.  MRI 4/14 showing large subacute infarct throughout R LEAH and MCA with some areas of hemorrhagic conversion.  - Neurosurgery and neurology following  --SBP <160, PRN hydralazine, scheduled Norvasc  -- Keppra 500 mg twice daily   - [facility] - PT/OT consulted      Aphasia- SLP consulted   Depression-Patient takes fluoxetine 20 mg daily at home  Anxiety -Patient takes Ativan 0.5 mg every 8 hours as needed for anxiety- holding  MISHA-Patient uses CPAP at home   GERD- Patient takes omeprazole 40 mg daily at home- half dose  Obesity    Rehab Progress: Improving  Anticipated Dispo: home  Services/DME: [facility]  ELOS: TBD Skeeter Gaucher, DO  PGY-1    Charlette Villa D.O. M.P.H  PM&R  5/5/2021  8:52 AM

## 2021-05-05 NOTE — PROGRESS NOTES
Shift assessment complete. VSS, A/Ox4, fall precautions in place. Call light in reach. Pt c/o slight pain or discomfort 2/10 in head (HA). Pt given scheduled Tylenol along with other nightly medication. No issues. Will continue to monitor until end of shift.       Vitals:    05/04/21 2045   BP: 114/67   Pulse: 78   Resp: 18   Temp: 98.3 °F (36.8 °C)   SpO2: 96%

## 2021-05-05 NOTE — PROGRESS NOTES
Occupational Therapy  Facility/Department: [facility] ACUTE REHAB UNIT  Daily Treatment Note  NAME: Mariano Lindo  : 1970  MRN: [id number]    Date of Service: 2021    Discharge Recommendations:  24 hour supervision or assist, Home with Home health OT, Home with nursing aide, Continue to assess pending progress  OT Equipment Recommendations  Equipment Needed: Yes  ADL Assistive Devices: Transfer Tub Bench  Other: continue to assess    Assessment   Performance deficits / Impairments: Decreased functional mobility ; Decreased ADL status; Decreased ROM; Decreased strength;Decreased sensation;Decreased fine motor control;Decreased endurance;Decreased posture;Decreased balance;Decreased safe awareness;Decreased coordination;Decreased vision/visual deficit; Decreased cognition  Assessment: Pt highly motivated and demonstrating good insight into deficits with L visual deficits however continues to req cues for compensatory strategies during item retrieval and w/c propulsion as evidenced by frequent collisions on L d/t inattention to environmental barriers. Pt demonstation limited to no active movements of LUE but is demonstrating decreased hypertonicity/less flexion synergy at elbow and wrist. Pt functioning significantly below baseline, cont per OT POC. Treatment Diagnosis: decreased independence with ADLs and decreased functional mobility 2/2 CVA  Prognosis: Fair  OT Education: Plan of Care;Transfer Training;ADL Adaptive Strategies; Home Exercise Program  Patient Education: pt educated on proper donning technique for resting hand splint and advocating for self if improperly positioned-continue to reinforce  REQUIRES OT FOLLOW UP: Yes  Activity Tolerance  Activity Tolerance: Patient Tolerated treatment well  Activity Tolerance: Pt quickly fatiguing during stance in standing table req frequent prolonged rest breaks  Safety Devices  Safety Devices in place: Yes  Type of devices: Call light within reach; Chair alarm in place; Left in chair; All fall risk precautions in place         Patient Diagnosis(es): There were no encounter diagnoses. has a past medical history of GERD (gastroesophageal reflux disease), Hernia, Obesity, and Unspecified sleep apnea. has a past surgical history that includes Splenectomy (); Total hip arthroplasty (); Lap Band (); hernia repair ();  section (); and craniotomy (Right, 2021). Restrictions  Position Activity Restriction  Other position/activity restrictions: Ambulate, Up with assist, Pt to wear helmet when OOB, OK to remove for shower, HOB to elevated at 30*     Subjective   General  Chart Reviewed: Yes  Patient assessed for rehabilitation services?: Yes  Additional Pertinent Hx: 46 y.o. female with hypertension and tobacco abuse who presented after spending a prolonged period on the ground s/p fall out of bed; found to have acute left hemiparesis and gaze deviation. CXR with no acute cardiopulmonary abnormality. CTH revealed large acute/subacute infarct in the R frontal lobe with associated mass effect and 4 mm R to L midline shift. It also noted subdural hemorrhage of 6mm along falx, although NSGY less convinced. CTA head and neck revealed occluded R cervical ICA & R anterior cerebral artery with near complete occlusion of R MCA. Outside window on TPA. Pt is now POD #1 following hemicraniectomy (). Pt admitted to ARU   Response to previous treatment: Patient with no complaints from previous session  Family / Caregiver Present: No  Referring Practitioner: Dr. Ema Durbin DO  Diagnosis: CVA  Subjective  Subjective: Pt sitting in w/c upon arrival, pleasant and agreeable to OT session  General Comment  Comments: Tim Bernard   Vital Signs  Patient Currently in Pain: Denies     Orientation  Orientation  Overall Orientation Status: Within Functional Limits  Objective       Instrumental ADL's  Instrumental ADLs: Yes  Light Housekeeping  Light Housekeeping Level: Wheelchair  Light Housekeeping Level of Assistance: Minimal assistance  Light Housekeeping: Pt completed laundry task from seated wc position. Required max A to correctly position in front of washer, as pt with difficulty managing wc in tight as well as open spaces. Pt reaching to L side with R hand and ++ verbal cueing to attend to L visual field and to retrieve all clothes vs a few shirts and stopping. Pt provided with detergent, cueing to place in correct location and cueing to push start button. Pt discussed either having her  do her laundry at home or having him move the laundry up to the first level. Cognition  Overall Cognitive Status: Exceptions  Arousal/Alertness: Appropriate responses to stimuli  Following Commands: Follows one step commands consistently; Follows one step commands with increased time  Attention Span: Appears intact  Memory: Appears intact  Safety Judgement: Decreased awareness of need for assistance;Decreased awareness of need for safety  Problem Solving: Assistance required to generate solutions;Assistance required to correct errors made;Assistance required to implement solutions;Decreased awareness of errors  Insights: Decreased awareness of deficits  Initiation: Requires cues for some  Sequencing: Requires cues for some           Positioning  Adaptations: resting hand splint donned to L hand with instruction for pt to understand proper positioning to advocate for self if positioned improperly, pt verb understanding        Type of ROM/Therapeutic Exercise  Type of ROM/Therapeutic Exercise: Self PROM  Comment: Pt LUE arm placed into air cast to promote elbow extension in attempt to isolate scapular and sh movements at tabletop  Exercises  Scapular Protraction: x 10, attempted with L arm placed into air cast placed onto cylindrical object, pt demo trunk compensation vs trace movements of scapula req facilitation  Scapular Retraction: x 10 attempted with L arm placed into air cast placed onto cylindrical object, pt demo trunk compensation vs trace movements of scapula req facilitation  Shoulder Flexion: x 3 with 10 second hold within pain free range  Shoulder ABduction: x 3 with 10 second hold within pain free range  Horizontal ABduction: x 10 attempted with L arm placed into air cast placed onto arm skate, trace movements noted but most movement as a result of hypertonicity/flexion synergy  Horizontal ADduction: x 10 attempted with L arm placed into air cast placed onto arm skate, trace/no acitvation noted                    Plan   Plan  Times per week: 5x a week for 60 mins daily  Times per day: Daily  Current Treatment Recommendations: Strengthening, Endurance Training, Neuromuscular Re-education, Self-Care / ADL, Functional Mobility Training, Safety Education & Training, ROM, Positioning, Wheelchair Mobility Training, Balance Training, Patient/Caregiver Education & Training, Manual Therapy:  MLD, Equipment Evaluation, Education, & procurement, Home Management Training, Cognitive Reorientation    Second Session: Pt seated in w/c upon arrival, pleasant and agreeable to OT/PT cotx to maximize independence with functional transfers and mobility with collaboration of 2 disciplines. Squat pivot from w/c<standing table frame with mod A and VCs for ant lean, hand placement and facilitation to prevent L knee hyperextension, required second attempt as pt demo difficulty maintaining fwd trunk flexion with transfer. Upon sitting on standing frame it was observed that pt was incontinent of urine. STS from standing frame via margo stedy with mod A. Pt able to stand from paddles of margo stedy with CGA and maintain stance ~10-30 seconds at a time to assist with LB dressing mgmt.  Total A for LB dressing to doff soiled pants and brief, as well as for eugenie hygiene, and total A to don new brief and pants with pt in stance in margo stedy with CGA and VCs to maintain midline orientation as pt tended to demo lateral lean to L, LLE in hyperextension without facilitation. Pt then completed stand<sit from [address] [facility] stedy to seated stepper with min A for facilitation at hips to ensure safe positioning. Pt req mod A to to turn into standing frame facilitate positioning of LLE. Pt stood within standing frame with VCs for upright posture, glute activation and midline orientation with use of mirror for visual feedback, with fatigue pt demo posterolateral lean to L and rotation at hips. LUE was placed in weightbearing position with facilitation at L knee to prevent hyperextension while pt used RUE to retrieve velcro letters and place onto mirror outside JOSETTE on R to encourage [address] [place], trunk elongation on L and WBing through LUE in order to spell letters from a selection of 15 letters. Pt able to form 2 words i'ly but req VCs to locate letters on L visual field, pt able to turn head to L 50% of the time without cues. Pt easily fatigued and req frequent prolonged rest breaks but able to tolerate 1 minute + 1 minute 15 seconds. Squat pivot from standing frame<w/c with Mod A + Min A with cues to sequence and maintain in squatting position vs full stance to prevent LLE hyperextension. Pt left in w/c at end of session with all needs met, chair alarm engaged, call light within reach and lunch tray provided with setup to open containers.     Goals  Short term goals  Time Frame for Short term goals: 2 weeks-ongoing  Short term goal 1: Pt will complete toilet transfer w/ Mod A-goal met 5/4  Short term goal 2: Pt will complete UE dressing w/ Mod A-ongoing  Short term goal 3: Pt will complete oral care w/ spvn seated at sink w/o VCs for L sided attention-ongoing  Short term goal 4: Pt will complete LE dressing w/ Mod A-ongoing  Long term goals  Time Frame for Long term goals : 4 weeks-all ongoing  Long term goal 1: Pt will complete toilet transfer w/ SBA  Long term goal 2: Pt will complete UE dressing w/ setup  Long term goal 3: Pt will complete LE dressing w/ SBA  Long term goal 4: Pt will be independent w/ tone management exercises to improve functional use of LUE  Long term goal 5: Pt will complete oral care I'ly  Patient Goals   Patient goals : \"get back to my normal self\"       Therapy Time   Individual Concurrent Group Co-treatment   Time In 1000     1100   Time Out 1030     1200   Minutes 30     60   Timed Code Treatment Minutes: [address] [facility], OT

## 2021-05-05 NOTE — PROGRESS NOTES
Shift assessment complete. VSS. A&Ox4. Denies pain at this time. Fall precautions in place. Patient up to chair for breakfast, chair alarm utilized, call light within reach. Helmet on out of bed. Assist x2 stand/pivot GB and wheelchair.        Vitals:    05/05/21 0745   BP: 120/74   Pulse: 81   Resp: 16   Temp: 97.8 °F (36.6 °C)   SpO2: 96%

## 2021-05-05 NOTE — PATIENT CARE CONFERENCE
Goal: Not Attempted   Walk 150 Feet CARE Score: 88 / Discharge Goal: Not Attempted   Walk 10 Feet on Uneven Surfaces CARE Score: 88 / Discharge Goal: Not Attempted   1 Step (Curb) CARE Score: 88 / Discharge Goal: Partial/moderate assistance   4 Steps CARE Score: 88 / Discharge Goal: Partial/moderate assistance   12 Steps CARE Score: 88 / Discharge Goal: Not Attempted   Picking up Object from Floor CARE Score: 88 / Discharge Goal: Partial/moderate assistance   Wheel 50 Feet with 2 Turns CARE Score: 3 / Discharge Goal: Independent   Type         [x] Manual        [] Motorized        [] N/A   Wheel 150 Feet CARE Score: 3 / Discharge Goal: Independent   Type         [x] Manual        [] Motorized        [] N/A     NURSING:  A&Ox: Level of Consciousness: Alert (0)  Orientation Level: Oriented X4  Kat Fall Risk Score: Score: 60  Admission BIMS: 15  Wounds/Incisions/Ulcers: R crani site  Medication Review: reviewed with pt  Pain: no complaints/ periodic headaches  Consultations: no  Imaging:   MRI 4/14   CT 4/21  No orders to display     Active Comorbid Conditions:  GERD   Sleep apnea  Systems Review:   Renal: wdl, Dialysis:no   Type:  Frequency:   Neurological: wdl  Musculoskeletal: x  Respiratory: wdl  Cardiac/Circulatory/Peripheral Vascular: x  Abnormal/Relevant Test Results: wbc 11.7  Abnormal/Relevant Lab Values:   CBC:   Recent Labs     05/05/21  0634   WBC 11.7*   HGB 13.7   HCT 40.4   .0*        BMP:   Recent Labs     05/05/21  0634      K 4.3      CO2 25   BUN 13   CREATININE <0.5*     PT/INR: No results for input(s): PROTIME, INR in the last 72 hours. APTT: No results for input(s): APTT in the last 72 hours.   Liver Profile:  Lab Results   Component Value Date    AST 41 04/13/2021    ALT 32 04/13/2021    BILITOT 0.7 04/13/2021    ALKPHOS 86 04/13/2021     Lab Results   Component Value Date    CHOL 251 04/13/2021    HDL 45 04/13/2021    TRIG 278 04/13/2021     Recent Labs     05/05/21 dry lower limbs and feet, total A to dry buttocks in stance with mod A x 1 to maintain stance and total A of another to dry)  UE Dressing: Moderate assistance, Verbal cueing, Setup, Increased time to complete(pt doffed shirt with CGA and VCs for technique, cues to attend to LUE, pt req setup and mod A to don shirt to assist with threading LUE and pulling down on L side)  LE Dressing: Dependent/Total(pt doffed socks with + time using reacher and min A for positioning of device on LLE, max A to thread LEs into brief and pants, Mod A x 1 to stand with assist of other to pull pants up over hips, total A to don socks for sake of time)  Toileting: Dependent/Total, Increased time to complete(pt continent of bladder, pt completed pericare hygiene seated; Mod A x 1 to maintain stance and total A of another to manage LB clothing off hips in stance)  Additional Comments: Pt completed bathing as listed above, PT assisting wtih pts sitting balance and OT assisting with ADL and bathing tasks. Pt required cueing throughout for completeness of task. Toilet Transfers: Toilet Transfers  Toilet - Technique: Stand pivot  Equipment Used: Standard toilet  Toilet Transfer: Moderate assistance  Toilet Transfers Comments: VCs for hand placement, assist for controlled descent    Tub/ShowerTransfers:  Shower Transfers  Shower - Transfer From: Wheelchair  Shower - Transfer Type: To and From  Shower - Transfer To: Transfer tub bench  Shower - Technique: Squat pivot  Shower Transfers: Dependent  Shower Transfers Comments: mod of one, + min of one for lateral scoot transfer from wc to TTB, max A x 2 to fully scoot from TTB to wc and to scoot back in wc. Noted extensor tone in L knee during transfer, impacting pts ability to scoot laterally and posteriorly. SPEECH THERAPY:  Assessment: Oral stage dysphagia due to perioral weakness and attention, executive dysfunction and left neglect.  Noted pragmatic impairment with volume increase in community reintegration task that has not been noted previously. NUTRITION:  Please see nutrition note for details. Weight: 226 lb 10.1 oz (102.8 kg) / Body mass index is 36.58 kg/m². Diet Level/Order: Dietary Nutrition Supplements: Low Calorie High Protein Supplement  DIET GENERAL; Daily Fluid Restriction: 1500 ml  Average Consumption per meal: PO Meals Eaten (%): 26 - 50%    CASE MANAGEMENT:  Psychosocial/Behavioral Issues: none noted  Assessment:  Referred to patient for d/c planning. Patient usually lives at home with . Patient is usually independent in ADLs. Will continue to follow for d/c needs. Patient/Family Education provided by team:  Role of PT/OT/ST, Compensation strategy for left sided pocketing, left inattention    Patient Goals for Rehab stay:  1. To go home  2. Dancing     Rehab Team Goals for patient for the Upcoming Week:  1. Increase left sided attention   2. Increase independence with ADLs    Barriers to Progress/Attainment of Goals & Efforts/Interventions to remove Barriers:  1. Visual neglect - ongoing cognitive therapy, education  2. L sided hemiparesis-continue OT/PT    Discharge Plan:  Estimated Length of Stay: 23 days  Destination: home health, however, pending progress pt may need an alternative level of care  Services at Discharge: [address] [facility], Occupational Therapy, Speech Therapy and Nursing 3x week  Equipment at Discharge: bath bench, w/c, will continue to assess  Community Resources:   Factors facilitating achievement of predicted outcomes:  Motivated, Cooperative, Pleasant and Sense of humor  Barriers to the achievement of predicted outcomes: Impulsivity, Limited safety awareness, Decreased endurance, Decreased proprioception, Upper extremity weakness, Lower extremity weakness, Impaired vision and Incontinence of bowel    Special Needs in the Upcoming Week:   [x] Family/Caregiver Education  [] Home visit  []Therapeutic Pass   [] Equipment  Type: [] Consults:_______   [] Family/Caregiver Training    [] Stroke Risk Factor Education     [] Other;_______     TEAM SUMMARY: Will continue with current poc & goals until anticipated d/c date of 5/16/2021.     MD: ***  Stroke Risk Factors:   [] N/A for this patient [] HTN  []  Diabetes  [] Hyperlipidemia  [x]Obesity BMI >25  [] Atrial Fibrillation [] Smoker (current)  [] Smoker (quit in last 12 months)    [x] Sleep Apnea  [] Other:    Justification for Continued Stay:   Criteria for continued IRF stay:  Based on my medical assessment of the patient and review of information from the interdisciplinary team, as part of this weekly team conference, the patient continues to meet the following criteria for IRF level of care:  [x] The patient requires 24-hour rehabilitation nursing care   [x] The patient requires an intensive rehabilitation therapy program  [x] The patient requires active and ongoing intervention of multiple therapy disciplines  [x] The patient requires continued physician supervision by a rehabilitation physician  [x] The patient requires an intensive and coordinated interdisciplinary team approach to the delivery of rehabilitative care    Medical Necessity-continued close physician medical management is required for:   [] Cardiac/Circulatory dysfunction  [] Respiratory/Pulmonary dysfunction  [] Integumentary complications  [] Peripheral Vascular dysfunction  [] Musculoskeletal dysfunction  [] Neurological dysfunction d/t:  [] CVA  [] SCI  [] TBI  [] Other: __________  [] Renal dysfunction  [] Hematologic dysfunction    [] Endocrine disorders  [] GI disorders     [] Genito-Urinary dysfunction    Assessment/Plan:  [] The patient is making good progression towards their LTG's, is actively participating in, and has a reasonable expectation to continue to benefit from the intensive rehabilitation program.  [] The estimated discharge date has been changed from initial team conference due to:   [] The estimated discharge destination has been changed from initial team conference due to:     Rehab Team Members in attendance for Team Conference:  :  ROSETTA Pearce    Therapy Manager:  Sage Earl, PT, DPT    Nursing Director:  Hailee Milton, MSN, RN, ACNS-BC    Social Work:  Randell Woodward, MSW, [facility]    Nursing:  Kristyn Juarez, BSN, RN  Cedric Vivas, RN  Shane Reyes, RN  Mahesh Tang, KIZZY    Therapy:  Josefina Du, [address] [place] [address], PT, DPT  Gavi Sanchez, PT, DPT    Kimberly Barraza, OTR/L  Radha Christensen, OTR/L    Rosa Maria Barclay MA/CCC-SLP    Nutrition:  Nacho Banks, RD LD    I approve the established interdisciplinary plan of care as documented within the medical record of Elier Flores.     MD Signature ***
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
## 2021-05-05 NOTE — PLAN OF CARE
Problem: Pain:  Goal: Control of acute pain  Description: Control of acute pain  5/4/2021 2246 by Ottoniel Ashraf RN  Outcome: Ongoing     Problem: Falls - Risk of:  Goal: Will remain free from falls  Description: Will remain free from falls  Outcome: Ongoing     Problem: Falls - Risk of:  Goal: Absence of physical injury  Description: Absence of physical injury  5/4/2021 2246 by Ottoniel Ashraf RN  Outcome: Ongoing     Problem: Skin Integrity:  Goal: Will show no infection signs and symptoms  Description: Will show no infection signs and symptoms  Outcome: Ongoing     Problem: Skin Integrity:  Goal: Absence of new skin breakdown  Description: Absence of new skin breakdown  5/4/2021 2246 by Ottoniel Ashraf RN  Outcome: Ongoing

## 2021-05-05 NOTE — PROGRESS NOTES
ACUTE REHAB UNIT  SPEECH/LANGUAGE PATHOLOGY      [x] Daily  [] Weekly Care Conference Note  [] Discharge    Patient:Laura Mccarthy      PMR:[id number]  XKD:[id number]  Rehab Dx/Hx: Acute CVA (cerebrovascular accident) ([facility] [address] [I63.9]    Precautions: [x] Aspiration  [x] Fall risk  [] Sternal  [] Seizure [] Hip  [] Weight Bearing [] Other  ST Dx: [] Aphasia  [] Dysarthria  [] Apraxia   [x] Oropharyngeal dysphagia [x] Cognitive Impairment  [] Other:   Date of Admit: 4/23/2021  Room #: 3101/3101-01  Date: 5/5/2021          Current Diet Order:Dietary Nutrition Supplements: Low Calorie High Protein Supplement  DIET GENERAL; No Added Salt (3-4 GM); Daily Fluid Restriction: 1500 ml   Recommended Form of Meds: PO  Compensatory Swallowing Strategies: Alternate solids and liquids, Upright as possible for all oral intake, Check for pocketing of food on the Left, Small bites/sips, Lingual sweep     Subjective: Pt admitted 4/12 after fall with left sided paralysis. Pt underwent craniectomy on 4/13 per Dr. Todd Quintanilla. Social/Functional History  Lives With: Spouse;Son( reported that son is diagnosed with Autism.)  Occupation: Full time employment  Type of occupation:  at [employer]"    FEES 4/14: IMPRESSIONS:   Pt presents w/ mild oropharyngeal dysphagia characterized by premature spillage of thin liquids to UES and pyriforms w/ intermittent deep penetration of laryngeal vestibule; adequate airway protection and complete clearance of all residue after swallow initiation. No aspiration w/ any trials. Timely swallow initiation w/ puree and regular solid consistencies; no oropharyngeal residue.    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 Adequate ab/adduction. Complete closure of TVFs upon adduction.      Dentition: Adequate  Vision  Vision: Impaired  Vision Exceptions: Visual field cut  Hearing  Hearing: Within functional limits  Barriers toward progress: None towards stated goals      Date: 5/5/2021       Tx session 1 Tx session 2 Tx session 3   Total Timed Code Min 10 30 15   Total Treatment Minutes 28 30 30   Individual Treatment Minutes 28 30 30   Group Treatment Minutes 0 0 0   Co-Treat Minutes 0 0 0   Brief Exception: N/A N/A N/A   Pain None indicated  None reported. None   Pain Intervention: [] RN notified  [] Repositioned  [] Intervention offered and patient declined  [x] N/A  [] Other:    [] RN notified  [] Repositioned  [] Intervention offered and patient declined  [x] N/A  [] Other:  [] RN notified  [] Repositioned  [] Intervention offered and patient declined  [x] N/A  [] Other:    Subjective:     Pt upright in chair and agreeable to therapy. Participates well in therapy session. Pt upright in wheelchair with visitor at side. The patient is agreeable to functional pathfinding task to gift shop. Pt with good sense of humor and improved affect. Pt upright in chair. Attends session alone. Objective / Goals:      Patient will consume regular solids with timely mastication and no pocketing in L buccal cavity across 2 sessions. Patient tolerated NMES via VitalStim placement 4a (targeting orbicularis oris, buccinator and superior pharyngeal constrictor) @ 3.5 mA on right x 20 minutes and 7.5 mA on left x 8 minutes, increased to 8.5 mA on left x 12 minutes (total stim time of 20 minutes). Good contraction achieved. Significant eye watering and nasal drainage on left without clear etiology. Goal not targeted this session. Completed trials of regular solids with cued thoughtful mastication on left. Pt demo's adequate and timely mastication but noted pocketing on 2/2 occasions when given prompt to use lingual sweep to assess. Patient will consume PO without anterior spillage or overt pocketing across 2 sessions. No anterior spillage of PO across session.  Pt with pocketing without management x2 during session with regular textures. Clears with verbal cue for SLP. Goal not targeted this session. Pt with anterior spillage of solids and liquids x1 each across entire snack. Pt with solids lost to labial surface without awareness and liquid lost to chin surface without awareness. Pt will complete divided/alternating attention tasks with 85% accuracy given min cues. Improved ability to divide attention between conversational exchanges and PO this date. Provided with information of mealtime constraints due to therapy scheduled immediately following therapy session. Good selective and sustained attention for using written directions for pathfinding task despite distractors (people/environmental/auditory) with cue x1 when entering main lobby (significant sensory bombardment encountered). Reduced attention to body management with pt frequently setting down right food during SLP pushing wheelchair. Goal not targeted this session. Pt will accurately complete executive functioning tasks with min cues. Goal not targeted this session. Reduced functional problem solving for return trip with mod-max cues to utilize environmental cues for return trip. Min-mod cues for overall attention to detail / task completion for visual scanning task (pt not crossing out items, not completing full [place])   Pt will attend to L visual field with min cues. Independent for locating items on tray. Ongoing reduced left gaze to speaker positioned on left but mildly improved. Required moderate direct verbal cues throughout. Moderate cues for left sided attention during environmental navigation. Unable to identify own foot impeding navigation in tight passage via w/c. Pt completed basic word search with min cues. Pt will participate in ongoing cognitive linguistic assessment.  GOAL MET - continue as appropriate GOAL PREVIOUSLY MET - continue as appropriate GOAL PREVIOUSLY MET - continue as appropriate   Other areas targeted: Education:   Education ongoing regarding rationale for skills targeted this date. Education re: feedback on pathfinding. Education ongoing regarding rationale for tasks. Safety Devices: [x] Call light within reach  [x] Chair alarm activated and connected to nurse call light system  [] Bed alarm activated   [] Other:  [x] Call light within reach  [x] Chair alarm activated and connected to nurse call light system  [] Bed alarm activated  [] Other:  [x] Call light within reach  [x] Chair alarm activated and connected to nurse call light system  [] Bed alarm activated  [] Other:    Assessment:  Oral stage dysphagia due to perioral weakness and attention, executive dysfunction and left neglect. Noted pragmatic impairment with volume increase in community reintegration task that has not been noted previously. Plan: Continue per POC.        Interventions used this date:  [] Speech/Language Treatment  [] Instruction in HEP  [x] Dysphagia Treatment [x] Cognitive Treatment   [] Other:    Discharge recommendations:  [] Home independently  [x] Home with assistance []  24 hour supervision  [] ECF [] Other  Continued Tx Upon Discharge: ? [x] Yes    [] No    [] TBD based on progress while on ARU     [] Vital Stim indicated     [] Other:   Estimated discharge date: Pending request for extension    Avery Choi M.A., [facility]  Speech-Language Pathologist

## 2021-05-05 NOTE — PROGRESS NOTES
Office : [phone]     Fax :[phone]         Renal Progress Note  Subjective:   Admit Date: 4/23/2021     HPI      Interval History:     Good UO   No Diarrhea   Na stable       DIET Dietary Nutrition Supplements: Low Calorie High Protein Supplement  DIET GENERAL; No Added Salt (3-4 GM); Daily Fluid Restriction: 1500 ml  Medications:   Scheduled Meds:   atorvastatin  80 mg Oral Nightly    acetaminophen  1,000 mg Oral 4 times per day    bisacodyl  5 mg Oral Daily    amLODIPine  5 mg Oral Daily    aspirin  81 mg Oral Daily    famotidine  20 mg Oral BID    FLUoxetine  20 mg Oral Daily    heparin (porcine)  5,000 Units Subcutaneous 3 times per day    levETIRAcetam  500 mg Oral BID    multivitamin  1 tablet Oral Daily    sennosides-docusate sodium  2 tablet Oral BID    sodium chloride  1 g Oral TID WC    thiamine mononitrate  100 mg Oral Daily     Continuous Infusions:    Labs:  CBC:   Recent Labs     05/03/21  0557   WBC 13.2*   HGB 12.5   *     BMP:    Recent Labs     05/03/21  0557      K 4.1      CO2 26   BUN 11   CREATININE 0.6   GLUCOSE 94     Ca/Mg/Phos:   Recent Labs     05/03/21  0557   CALCIUM 9.7     Hepatic: No results for input(s): AST, ALT, ALB, BILITOT, ALKPHOS in the last 72 hours. Troponin: No results for input(s): TROPONINI in the last 72 hours. BNP: No results for input(s): BNP in the last 72 hours. Lipids: No results for input(s): CHOL, TRIG, HDL, LDLCALC, LABVLDL in the last 72 hours. ABGs: No results for input(s): PHART, PO2ART, QGL1KCV in the last 72 hours.   INR: No results for input(s): INR in the last 72 hours.  UA:  No results for input(s): Willeen Paola, GLUCOSEU, BILIRUBINUR, Joanette Katie, BLOODU, PHUR, PROTEINU, UROBILINOGEN, NITRU, LEUKOCYTESUR, Antonella Busman in the last 72 hours. Urine Microscopic: No results for input(s): LABCAST, BACTERIA, COMU, HYALCAST, WBCUA, RBCUA, EPIU in the last 72 hours. Urine Culture: No results for input(s): LABURIN in the last 72 hours. Urine Chemistry: No results for input(s): Raghav Gosselin, PROTEINUR, NAUR in the last 72 hours.     Objective:   Vitals: /67   Pulse 78   Temp 98.3 °F (36.8 °C) (Oral)   Resp 18   Ht 5' 6\" (1.676 m)   Wt 226 lb 10.1 oz (102.8 kg)   SpO2 96%   BMI 36.58 kg/m²    Wt Readings from Last 3 Encounters:   05/03/21 226 lb 10.1 oz (102.8 kg)   04/15/21 238 lb 8.6 oz (108.2 kg)   04/13/21 249 lb 1.9 oz (113 kg)      24HR INTAKE/OUTPUT:      Intake/Output Summary (Last 24 hours) at 5/4/2021 2308  Last data filed at 5/4/2021 1300  Gross per 24 hour   Intake 600 ml   Output    Net 600 ml     Constitutional:  Alert, awake, no apparent distress  NECK: supple, no JVD  Cardiovascular:  S1, S2 without m/r/g  Respiratory:  CTA B without w/r/r  Abdomen: +bs, soft, nt  Ext: +,LE edema    Assessment and Plan:       IMAGING:  No orders to display       Assessment/Plan     SIADH   CVA   Electrolyte imbalance   HTN       - continue Fluid restriction   - PT/OT  - check sodium level in am         Trena Rodriguez MD  [facility] [address] [place] [address] -[phone]  GID-[phone]

## 2021-05-06 PROCEDURE — 1280000000 HC REHAB R&B

## 2021-05-06 PROCEDURE — 94660 CPAP INITIATION&MGMT: CPT

## 2021-05-06 PROCEDURE — 6370000000 HC RX 637 (ALT 250 FOR IP): Performed by: PHYSICAL MEDICINE & REHABILITATION

## 2021-05-06 PROCEDURE — 99232 SBSQ HOSP IP/OBS MODERATE 35: CPT | Performed by: PHYSICAL MEDICINE & REHABILITATION

## 2021-05-06 PROCEDURE — 97535 SELF CARE MNGMENT TRAINING: CPT

## 2021-05-06 PROCEDURE — 97112 NEUROMUSCULAR REEDUCATION: CPT

## 2021-05-06 PROCEDURE — 97129 THER IVNTJ 1ST 15 MIN: CPT

## 2021-05-06 PROCEDURE — 6360000002 HC RX W HCPCS: Performed by: PHYSICAL MEDICINE & REHABILITATION

## 2021-05-06 PROCEDURE — 97130 THER IVNTJ EA ADDL 15 MIN: CPT

## 2021-05-06 RX ADMIN — Medication 5 MG: at 08:45

## 2021-05-06 RX ADMIN — ASPIRIN 81 MG: 81 TABLET, CHEWABLE ORAL at 08:44

## 2021-05-06 RX ADMIN — LEVETIRACETAM 500 MG: 500 TABLET ORAL at 19:51

## 2021-05-06 RX ADMIN — ACETAMINOPHEN 1000 MG: 500 TABLET, COATED ORAL at 17:31

## 2021-05-06 RX ADMIN — FAMOTIDINE 20 MG: 20 TABLET, FILM COATED ORAL at 08:45

## 2021-05-06 RX ADMIN — DOCUSATE SODIUM 50 MG AND SENNOSIDES 8.6 MG 2 TABLET: 8.6; 5 TABLET, FILM COATED ORAL at 08:45

## 2021-05-06 RX ADMIN — FAMOTIDINE 20 MG: 20 TABLET, FILM COATED ORAL at 19:51

## 2021-05-06 RX ADMIN — AMLODIPINE BESYLATE 5 MG: 5 TABLET ORAL at 08:45

## 2021-05-06 RX ADMIN — HEPARIN SODIUM 5000 UNITS: 5000 INJECTION INTRAVENOUS; SUBCUTANEOUS at 15:39

## 2021-05-06 RX ADMIN — THIAMINE HCL TAB 100 MG 100 MG: 100 TAB at 08:44

## 2021-05-06 RX ADMIN — ACETAMINOPHEN 1000 MG: 500 TABLET, COATED ORAL at 09:35

## 2021-05-06 RX ADMIN — DOCUSATE SODIUM 50 MG AND SENNOSIDES 8.6 MG 2 TABLET: 8.6; 5 TABLET, FILM COATED ORAL at 19:52

## 2021-05-06 RX ADMIN — HEPARIN SODIUM 5000 UNITS: 5000 INJECTION INTRAVENOUS; SUBCUTANEOUS at 19:55

## 2021-05-06 RX ADMIN — THERA TABS 1 TABLET: TAB at 08:44

## 2021-05-06 RX ADMIN — HEPARIN SODIUM 5000 UNITS: 5000 INJECTION INTRAVENOUS; SUBCUTANEOUS at 04:44

## 2021-05-06 RX ADMIN — ATORVASTATIN CALCIUM 80 MG: 80 TABLET, FILM COATED ORAL at 19:51

## 2021-05-06 RX ADMIN — ACETAMINOPHEN 1000 MG: 500 TABLET, COATED ORAL at 04:43

## 2021-05-06 RX ADMIN — LEVETIRACETAM 500 MG: 500 TABLET ORAL at 08:45

## 2021-05-06 RX ADMIN — FLUOXETINE 20 MG: 20 CAPSULE ORAL at 08:44

## 2021-05-06 ASSESSMENT — PAIN DESCRIPTION - LOCATION
LOCATION: ARM
LOCATION: BACK

## 2021-05-06 ASSESSMENT — PAIN SCALES - GENERAL
PAINLEVEL_OUTOF10: 3
PAINLEVEL_OUTOF10: 0
PAINLEVEL_OUTOF10: 1

## 2021-05-06 ASSESSMENT — PAIN DESCRIPTION - ORIENTATION
ORIENTATION: LEFT
ORIENTATION: MID;LOWER

## 2021-05-06 ASSESSMENT — PAIN DESCRIPTION - ONSET: ONSET: ON-GOING

## 2021-05-06 ASSESSMENT — PAIN DESCRIPTION - DESCRIPTORS: DESCRIPTORS: ACHING

## 2021-05-06 ASSESSMENT — PAIN - FUNCTIONAL ASSESSMENT: PAIN_FUNCTIONAL_ASSESSMENT: ACTIVITIES ARE NOT PREVENTED

## 2021-05-06 ASSESSMENT — PAIN DESCRIPTION - FREQUENCY: FREQUENCY: INTERMITTENT

## 2021-05-06 ASSESSMENT — PAIN DESCRIPTION - PROGRESSION: CLINICAL_PROGRESSION: GRADUALLY WORSENING

## 2021-05-06 ASSESSMENT — PAIN DESCRIPTION - PAIN TYPE: TYPE: ACUTE PAIN

## 2021-05-06 NOTE — PROGRESS NOTES
Patient resting in bed at this time. Participating with Speech therapy. Visitor at bedside. Patient wears splint to left hand 2 hours on and two hours off while awake. Patient compliant with splint use. Patient wears helmet when out of bed. Transfers with assist of 2 to stand and pivot. Pleasant and cooperative with care. Bed in low position and call light within reach.

## 2021-05-06 NOTE — PROGRESS NOTES
ACUTE REHAB UNIT  SPEECH/LANGUAGE PATHOLOGY      [x] Daily  [x] Weekly Care Conference Note  [] Discharge    Patient:Laura Goins      ANDREW:[birth year]  RJQ:[id number]  Rehab Dx/Hx: Acute CVA (cerebrovascular accident) ([facility] [address] [I63.9]    Precautions: [x] Aspiration  [x] Fall risk  [] Sternal  [] Seizure [] Hip  [] Weight Bearing [] Other  ST Dx: [] Aphasia  [] Dysarthria  [] Apraxia   [x] Oropharyngeal dysphagia [x] Cognitive Impairment  [] Other:   Date of Admit: 4/23/2021  Room #: 3101/3101-01  Date: 5/6/2021          Current Diet Order:Dietary Nutrition Supplements: Low Calorie High Protein Supplement  DIET GENERAL; Daily Fluid Restriction: 1500 ml   Recommended Form of Meds: PO  Compensatory Swallowing Strategies: Alternate solids and liquids, Upright as possible for all oral intake, Check for pocketing of food on the Left, Small bites/sips, Lingual sweep     Subjective: Pt admitted 4/12 after fall with left sided paralysis. Pt underwent craniectomy on 4/13 per Dr. Alexsandra Coleman. Social/Functional History  Lives With: Spouse;Son( reported that son is diagnosed with Autism.)  Occupation: Full time employment  Type of occupation:  at [employer]"    FEES 4/14: IMPRESSIONS:   Pt presents w/ mild oropharyngeal dysphagia characterized by premature spillage of thin liquids to UES and pyriforms w/ intermittent deep penetration of laryngeal vestibule; adequate airway protection and complete clearance of all residue after swallow initiation. No aspiration w/ any trials. Timely swallow initiation w/ puree and regular solid consistencies; no oropharyngeal residue.    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 Adequate ab/adduction. Complete closure of TVFs upon adduction.      Dentition: Adequate  Vision  Vision: Impaired  Vision Exceptions: Visual field cut  Hearing  Hearing: Within functional limits above.     GOAL NOT MET - continue goal   Pt will attend to L visual field with min cues. Head turn to L towards conversational partner on L but eye gaze continued to deviate to R. Max cues to achieve consistent eye contact with conversational partner. GOAL NOT MET - continue goal   Pt will participate in ongoing cognitive linguistic assessment. GOAL MET - continue as appropriate GOAL PREVIOUSLY MET - continue as appropriate   Other areas targeted:     Education:   Educated to skills targeted, attention strategies, visual scanning strategies, CVA risk factors, CVA rehab, common sequelae of R CVA, and POC. Safety Devices: [x] Call light within reach  [x] Chair alarm activated and connected to nurse call light system  [] Bed alarm activated   [] Other:     Assessment:  Cognitive linguistic impairments with impulsivity, reduced attention, and executive dysfunction. Pragmatic impairment with monotone and reduced control of vocal volume in addition to impulsivity in conversation marked by tangential comments and reduced turn taking. Pt very motivated to improve and participatory in all tx tasks. Plan: Continue per POC.       Interventions used this date:  [] Speech/Language Treatment  [] Instruction in HEP  [] Dysphagia Treatment [x] Cognitive Treatment   [] Other:    Discharge recommendations:  [] Home independently  [x] Home with assistance []  24 hour supervision  [] ECF [] Other  Continued Tx Upon Discharge: ? [x] Yes    [] No    [] TBD based on progress while on ARU     [] Vital Stim indicated     [] Other:   Estimated discharge date: 5/8 v 5/11, Pending request for extension    Geeta Lewis MA CCC-SLP; [id number]  Speech-Language Pathologist

## 2021-05-06 NOTE — CARE COORDINATION
Referred to patient for d/c planning. Spoke to patient. Discussed she will need 24 hour supervision on d/c.  works. Patient states she will start discussing with friends and family to assist on d/c. Call placed to  and message left to discuss d/c planning. No other needs at this time.  Electronically signed by USMAN Barton LISW-S on 5/6/2021 at 4:06 PM

## 2021-05-06 NOTE — PROGRESS NOTES
Department of Physical Medicine & Rehabilitation  Progress Note    Patient Identification:  Destinee Castro  [id number]  : 1970  Admit date: 2021    Chief Complaint: CVA    Subjective:   Feeling well. She is still having trouble with left sided movement. She is agreeable to the treatment plan and participating well. ROS: No f/c, n/v, cp     Objective:  Patient Vitals for the past 24 hrs:   BP Temp Temp src Pulse Resp SpO2   21 0830 119/88 98.4 °F (36.9 °C) Oral 82 16 95 %   21 0121     16    21     16    21 (!) 143/96 98.2 °F (36.8 °C) Oral 75 16 98 %     Const: Alert. No distress, pleasant. HEENT: Normocephalic, atraumatic. Normal sclera/conjunctiva. MMM. Incision healing well  CV: Regular rate and rhythm. Resp: No respiratory distress. Lungs CTAB. Abd: Soft, nontender, nondistended, NABS+   Ext: No edema. Neuro: Alert, oriented, appropriately interactive. Psych: Cooperative, appropriate mood and affect    Laboratory data: Available via EMR. Last 24 hour lab  No results found for this or any previous visit (from the past 24 hour(s)). Therapy progress:  PT  Position Activity Restriction  Other position/activity restrictions: Ambulate, Up with assist, Pt to wear helmet when OOB, OK to remove for shower, HOB to elevated at 30*  Objective     Sit to Stand: Moderate Assistance, Minimal Assistance(multiple w/c <> parellel bars ModA, Hugo for last transfer.  VC's for WS. therapist assisted in stabalizing L LE to prevent hyperextension.)  Stand to sit: Moderate Assistance  Bed to Chair: Moderate assistance(stand pivot to R, VC to perform hip flexion.)     OT  PT Equipment Recommendations  Equipment Needed: (Will continue to assess equipment needs pending progress)  Toilet - Technique: Stand pivot  Equipment Used: Standard toilet  Toilet Transfers Comments: VCs for hand placement, assist for controlled descent  Assessment        SLP  Diet Solids Recommendation: Dysphagia Soft and Bite-Sized (Dysphagia III)(with regular solids as tolerated. Per pt request, gravy/sauces to keep for moist.)       Body mass index is 36.58 kg/m². Assessment and Plan:  Asael sevilla 46 y.o. female with PMH GERD p/w L sided weakness and facial droop.  CTA head/neck on 4/12 revealed occluded R cervical ICA, occluded right LEAH, near complete occlusion of right MCA.     Acute ischemic CVA, Subdural heomorrhage s/p right hemicraniectomy and subsequen SAH and IP hemorrhage  No tPA given.  MRI 4/14 showing large subacute infarct throughout R LEAH and MCA with some areas of hemorrhagic conversion.  - Neurosurgery and neurology following  --SBP <160, PRN hydralazine, scheduled Norvasc  -- Keppra 500 mg twice daily   -  SQH  - PT/OT consulted      Aphasia- SLP consulted   Depression-Patient takes fluoxetine 20 mg daily at home  Anxiety -Patient takes Ativan 0.5 mg every 8 hours as needed for anxiety- holding  MISHA-Patient uses CPAP at home   GERD- Patient takes omeprazole 40 mg daily at home- half dose  Obesity    Rehab Progress: Improving  Anticipated Dispo: home  Services/DME: TBD  ELOS: TBAILEEN Garza, D.O. M.P.H  PM&R  5/6/2021  9:46 AM

## 2021-05-06 NOTE — Clinical Note
ADMIT DATE: 2021     DX: <principal problem not specified>   HX:  has a past medical history of GERD (gastroesophageal reflux disease), Hernia, Obesity, and Unspecified sleep apnea. ,  has a past surgical history that includes Splenectomy (); Total hip arthroplasty (); Lap Band (); hernia repair ();  section (); and craniotomy (Right, 2021). Allergies: Patient has no known allergies. Consults: IP CONSULT TO CASE MANAGEMENT  IP CONSULT TO DIETITIAN      BP: BP: 119/88 Kat Fall Risk: Kat Fall Risk: High (45 and higher)   Resp/O2: O2 Device: None (Room air)  SpO2: 95 % Readmission Risk:     Accucheck: Lab blood glucose result: 91 Weight: Weight: 226 lb 10.1 oz (102.8 kg)   Bowel, Last BM: Incontinence: No  Stool Appearance: Formed, Soft  Stool Color: Brown  Stool Amount: Large  Stool Source: Rectum  Last BM (including prior to admit): 21 Diet: Dietary Nutrition Supplements: Low Calorie High Protein Supplement  DIET GENERAL; Daily Fluid Restriction: 1500 ml   Bowel Protocol: {YES/NO:} MEWS:  MEWS Score: 1   Bladder Urine: 0 mL Pain Meds:       C[place]allos: {YES/NO:} Dressing Change: {YES/NO:}   Bladder Protocol: {YES/NO:} Staple Removal: {YES/NO:}   NURSING:  Feeding: Needs set up, Able to feed self  Precautions: Fall risk  Position Activity Restriction  Other position/activity restrictions: Ambulate, Up with assist, Pt to wear helmet when OOB, OK to remove for shower, HOB to elevated at 30*  Compensatory Swallowing Strategies: Alternate solids and liquids, Upright as possible for all oral intake, Check for pocketing of food on the Left, Small bites/sips, Lingual sweep  Recommended Form of Meds: PO   PT:    Sit to Stand:  Moderate Assistance(at w/c to OT assist, attempt to faciliattion at L LE to decrease hyperextension, other therapist assisted with lower body dressing)  Bed to Chair: Moderate assistance(stand pivot to R, VC to perform hip flexion.) Stand Pivot Transfers: Moderate Assistance, Minimal Assistance(w/c>seated stepper, VC's for sequencing and hand placement)  Lateral Transfers: Minimal Assistance(scooting in sitting on EOM, assist for repositioning L LE.)  Comment: Multi step scoot transfer to R tub bench to w/c. MinAx2. Vc's for hand placment and hip flexion      OT:  Feeding: Setup(to open containers and VCs to locate utensils placed on L of tray)  UE Bathing: Minimal assistance(seated on TTB, VCs for adaptive technique to wash RUE, assist to position LUE)  LE Bathing: Dependent/Total, Verbal cueing, Setup, Increased time to complete(seated on TTB, use of LHS to wash lower limbs and feet, assist to wash buttocks with fwd trunk flexion, assist to dry lower limbs and feet, total A to dry buttocks in stance with mod A x 1 to maintain stance and total A of another to dry)  UE Dressing: Moderate assistance, Verbal cueing, Setup, Increased time to complete(pt doffed shirt with CGA and VCs for technique, cues to attend to LUE, pt req setup and mod A to don shirt to assist with threading LUE and pulling down on L side)  LE Dressing: Dependent/Total(pt doffed socks with + time using reacher and min A for positioning of device on LLE, max A to thread LEs into brief and pants, Mod A x 1 to stand with assist of other to pull pants up over hips, total A to don socks for sake of time)  Toileting: Dependent/Total, Increased time to complete(pt continent of bladder, pt completed pericare hygiene seated;  Mod A x 1 to maintain stance and total A of another to manage LB clothing off hips in stance)  Additional Comments: Pt attempted to doff pants while supine in bed however pt uncomfortable with LLE hypertonicity extension synergy, pt doffed Pants with max A with lateral lean bilaterally with cues and assist to facilitate, assist to manage off L hip and R hip, partial stance with Mod A to fully remove off hips with pt assisting using RUE, assist to unthread LLE; Pt completed bathing as listed above, PT assisting wtih pts sitting balance and OT assisting with ADL and bathing tasks. Pt required cueing throughout for completeness of task. Toilet - Technique: Stand pivot  Equipment Used: Standard toilet  Toilet Transfers Comments: VCs for hand placement, assist for controlled descent        SLP:  Current Diet : Dysphagia Soft and Bite-Sized (Dysphagia III)  Current Liquid Diet : Thin  Diet Solids Recommendation: Dysphagia Soft and Bite-Sized (Dysphagia III)(with regular solids as tolerated. Per pt request, gravy/sauces to keep for moist.)      SOCIAL WORK:  /Social Work Whiteboard Notes  /Social Work Whiteboard: 5/5 ins.  approved to 5/11, frm home with , Ind. PTA. kr

## 2021-05-06 NOTE — PROGRESS NOTES
Occupational Therapy  Facility/Department: [facility] ACUTE REHAB UNIT  Daily Treatment Note  NAME: Danita Hernández  : 1970  MRN: [id number]    Date of Service: 2021    Discharge Recommendations:  24 hour supervision or assist, Home with Home health OT, Home with nursing aide, Continue to assess pending progress  OT Equipment Recommendations  ADL Assistive Devices: Transfer Tub Bench;Grab Bars - toilet  Other: continue to assess    Assessment   Performance deficits / Impairments: Decreased functional mobility ; Decreased ADL status; Decreased ROM; Decreased strength;Decreased sensation;Decreased fine motor control;Decreased endurance;Decreased posture;Decreased balance;Decreased safe awareness;Decreased coordination;Decreased vision/visual deficit; Decreased cognition  Assessment: Pt progressed to Mod A/Min A x 2 for tub transfers via stand pivot and lateral scoot respectively. Pt completed toilet transfer with mod A x 1 however continues to require 2 person assist for toileting to maintain stance for LB clothing mgmt. Pt limited by L sided hemiparesis and hypertonicity as well as LLE knee hyperextension in stance. Cont per OT POC. Treatment Diagnosis: decreased independence with ADLs and decreased functional mobility 2/2 CVA  Prognosis: Fair  OT Education: Plan of Care;Transfer Training;ADL Adaptive Strategies; Home Exercise Program  Patient Education: pt educated on hemidressing technique and use of AE to increase independence with LB dressing-pt verb and demo understanding, continue to reinforce  REQUIRES OT FOLLOW UP: Yes  Activity Tolerance  Activity Tolerance: Patient Tolerated treatment well  Safety Devices  Safety Devices in place: Yes  Type of devices: Call light within reach; Chair alarm in place; Left in chair; All fall risk precautions in place;Nurse notified         Patient Diagnosis(es): There were no encounter diagnoses.       has a past medical history of GERD (gastroesophageal reflux disease), Hernia, Obesity, and Unspecified sleep apnea. has a past surgical history that includes Splenectomy (); Total hip arthroplasty (); Lap Band (); hernia repair ();  section (); and craniotomy (Right, 2021). Restrictions  Position Activity Restriction  Other position/activity restrictions: Ambulate, Up with assist, Pt to wear helmet when OOB, OK to remove for shower, HOB to elevated at 30*  Subjective   General  Chart Reviewed: Yes  Patient assessed for rehabilitation services?: Yes  Additional Pertinent Hx: 46 y.o. female with hypertension and tobacco abuse who presented after spending a prolonged period on the ground s/p fall out of bed; found to have acute left hemiparesis and gaze deviation. CXR with no acute cardiopulmonary abnormality. CTH revealed large acute/subacute infarct in the R frontal lobe with associated mass effect and 4 mm R to L midline shift. It also noted subdural hemorrhage of 6mm along falx, although NSGY less convinced. CTA head and neck revealed occluded R cervical ICA & R anterior cerebral artery with near complete occlusion of R MCA. Outside window on TPA. Pt is now POD #1 following hemicraniectomy (). Pt admitted to ARU   Response to previous treatment: Patient with no complaints from previous session  Family / Caregiver Present: No  Referring Practitioner: Dr. Gonzalez See DO  Diagnosis: CVA  Subjective  Subjective: Pt supine asleep upon arrival but easily aroused with VCs, pleasant and agreeable to OT/PT cotx to maximize independence with functional mobility, pt requesting to shower. General Comment  Comments: Heriberto Hoffman Vital Signs  Patient Currently in Pain: Denies     Orientation  Orientation  Overall Orientation Status: Within Functional Limits  Objective    ADL  UE Bathing: Minimal assistance(seated on TTB, VCs for adaptive technique to wash RUE, assist to position LUE)  LE Bathing: Dependent/Total;Verbal cueing;Setup; Increased time to complete(seated on TTB, use of LHS to wash lower limbs and feet, assist to wash buttocks with fwd trunk flexion, assist to dry lower limbs and feet, total A to dry buttocks in stance with mod A x 1 to maintain stance and total A of another to dry)  UE Dressing: Moderate assistance;Verbal cueing;Setup; Increased time to complete(pt doffed shirt with CGA and VCs for technique, cues to attend to LUE, pt req setup and mod A to don shirt to assist with threading LUE and pulling down on L side)  LE Dressing: Dependent/Total(pt doffed socks with + time using reacher and min A for positioning of device on LLE, max A to thread LEs into brief and pants, Mod A x 1 to stand with assist of other to pull pants up over hips, total A to don socks for sake of time)  Toileting: Dependent/Total;Increased time to complete(pt continent of bladder, pt completed pericare hygiene seated; Mod A x 1 to maintain stance and total A of another to manage LB clothing off hips in stance)  Additional Comments: Pt attempted to doff pants while supine in bed however pt uncomfortable with LLE hypertonicity extension synergy, pt doffed Pants with max A with lateral lean bilaterally with cues and assist to facilitate, assist to manage off L hip and R hip, partial stance with Mod A to fully remove off hips with pt assisting using RUE, assist to unthread LLE; Pt completed bathing as listed above, PT assisting wtih pts sitting balance and OT assisting with ADL and bathing tasks. Pt required cueing throughout for completeness of task. Standing Balance  Time: ~1 minute total  Activity: functional transfers; in stance for toileting and LB dressing  Comment: LLE hyperextension  Shower Transfers  Shower - Transfer From: Wheelchair  Shower - Transfer Type: To and From  Shower - Transfer To: Transfer tub bench  Shower - Technique: Lateral;Stand pivot  Shower Transfers: Dependent; Moderate assistance  Shower Transfers Comments:  Mod A x 1 from w/c<TTB with use of GB, Min A x 2 lateral scoot leading to R, cues to sequence  Bed mobility  Supine to Sit: Minimal assistance(HOB elevated to 30* per orders, assist with trunk ascension)  Transfers  Stand Pivot Transfers: Moderate assistance(EOB<w/c, cues for ant lean and hand placement)                       Cognition  Overall Cognitive Status: Exceptions  Arousal/Alertness: Appropriate responses to stimuli  Following Commands: Follows one step commands consistently  Attention Span: Appears intact  Memory: Appears intact  Safety Judgement: Decreased awareness of need for assistance;Decreased awareness of need for safety  Problem Solving: Assistance required to generate solutions;Assistance required to correct errors made;Assistance required to implement solutions;Decreased awareness of errors  Insights: Decreased awareness of deficits  Initiation: Requires cues for some  Sequencing: Requires cues for some           Second Session: Pt sitting in w/c upon arrival, pleasant and agreeable to OT session spent educating pt on adaptive dressing techniques using AE to increase independence with LB dressing. Pt doffed socks using reacher with spvn assist and VCs to utilize RLE to unweight and position LLE. Pt instructed on use of sock aid, pt req assist to setup sock onto device, pt able to utilize in pratik adaptive fashion with RUE to don sock to RLE with spvn assist. Pt then req setup and mod A to don L sock with cues to utilize RLE to assist with positioning LLE however pt req assist to place L foot into sock aid. Pt then simulated LB dressing threading with reacher using a theraband loop to simulate pants, pt provided with demonstration and demo understanding req + time to thread LLE utilizing RLE to position LLE. Pt donned R shoe with spvn using [place] without untying laces, total A to don L shoe. Pt LUE placed into soft resting hand splint to decrease flexion synergy and optimize neutral hand positioning.  Pt left in w/c at end of session with chair alarm engaged, call light within reach and all needs met.        Plan   Plan  Times per week: 5x a week for 60 mins daily  Times per day: Daily  Current Treatment Recommendations: Strengthening, Endurance Training, Neuromuscular Re-education, Self-Care / ADL, Functional Mobility Training, Safety Education & Training, ROM, Positioning, Wheelchair Mobility Training, Balance Training, Patient/Caregiver Education & Training, Manual Therapy:  MLD, Equipment Evaluation, Education, & procurement, Home Management Training, Cognitive Reorientation    Goals  Short term goals  Time Frame for Short term goals: 2 weeks-ongoing  Short term goal 1: Pt will complete toilet transfer w/ Mod A-goal met 5/4  Short term goal 2: Pt will complete UE dressing w/ Mod A-ongoing  Short term goal 3: Pt will complete oral care w/ spvn seated at sink w/o VCs for L sided attention-ongoing  Short term goal 4: Pt will complete LE dressing w/ Mod A-ongoing  Long term goals  Time Frame for Long term goals : 4 weeks-all ongoing  Long term goal 1: Pt will complete toilet transfer w/ SBA  Long term goal 2: Pt will complete UE dressing w/ setup  Long term goal 3: Pt will complete LE dressing w/ SBA  Long term goal 4: Pt will be independent w/ tone management exercises to improve functional use of LUE  Long term goal 5: Pt will complete oral care I'ly  Patient Goals   Patient goals : \"get back to my normal self\"       Therapy Time   Individual Concurrent Group Co-treatment   Time In  0930     0735   Time Out  1000     0835   Minutes  30     60   Timed Code Treatment Minutes: 60 Minutes+ 30 Minutes  Total Treatment Time: 80 Minutes       Ryan Mensah OT

## 2021-05-06 NOTE — PATIENT CARE CONFERENCE
Inpatient Rehabilitation  Weekly Team Conference Note  [facility]  [address]  [phone]  Patient Name: Shell Singh        MRN: [id number]    : 1970  (46 y.o.)  Gender: female   Referring Practitioner: Beth Winter  The team conference for this patient was held on 2021 at 10:30am by:  Kim Frye, DO    Current/Goal QM SCORES  QM Current/Goal Score   Eating CARE Score: 5 / Discharge Goal: Set-up or clean-up assistance   Oral Hygiene CARE Score: 4 / Discharge Goal: Independent   Shower/Bathing CARE Score: 1 / Discharge Goal: Supervision or touching assistance   UB Dressing CARE Score: 3 / Discharge Goal: Set-up or clean-up assistance   LB Dressing CARE Score: 1 / Discharge Goal: Supervision or touching assistance   Putting on/off Footwear CARE Score: 3 / Discharge Goal: Supervision or touching assistance   Toileting Hygiene CARE Score: 1 / Discharge Goal: Supervision or touching assistance   Bladder Continence Bladder Continence: Incontinent daily    Bowel Continence Bowel Continence: Occassionally incontinent    Toilet Transfers CARE Score: 3 / Discharge Goal: Supervision or touching assistance   Shower/Bathe Self  CARE Score: 1 / Discharge Goal: Supervision or touching assistance   Rolling Left and Right CARE Score: 3 / Discharge Goal: Supervision or touching assistance   Sit to Lying CARE Score: 3 / Discharge Goal: Supervision or touching assistance   Lying to Sitting on Bedside CARE Score: 3 / Discharge Goal: Supervision or touching assistance   Sit to Stand CARE Score: 3 / Discharge Goal: Supervision or touching assistance   Chair/Bed to Chair Transfer CARE Score: 3 / Discharge Goal: Supervision or touching assistance   Car Transfers CARE Score: 1 / Discharge Goal: Supervision or touching assistance   Walk 10 Feet CARE Score: 88 / Discharge Goal: Partial/moderate assistance   Walk 50 Feet with Two Turns CARE Score: 88 / Discharge Goal: Not Attempted   Walk 150 Feet CARE Score: 88 / Discharge Goal: Not Attempted   Walk 10 Feet on Uneven Surfaces CARE Score: 88 / Discharge Goal: Not Attempted   1 Step (Curb) CARE Score: 88 / Discharge Goal: Partial/moderate assistance   4 Steps CARE Score: 88 / Discharge Goal: Partial/moderate assistance   12 Steps CARE Score: 88 / Discharge Goal: Not Attempted   Picking up Object from Floor CARE Score: 88 / Discharge Goal: Partial/moderate assistance   Wheel 50 Feet with 2 Turns CARE Score: 3 / Discharge Goal: Independent   Type         [x] Manual        [] Motorized        [] N/A   Wheel 150 Feet CARE Score: 3 / Discharge Goal: Independent   Type         [x] Manual        [] Motorized        [] N/A     NURSING:  A&Ox: Level of Consciousness: Alert (0)  Orientation Level: Oriented X4  Kat Fall Risk Score: Score: 60  Admission BIMS: 15  Wounds/Incisions/Ulcers: R crani site  Medication Review: reviewed with pt  Pain: no complaints/ periodic headaches  Consultations: no  Imaging:   MRI 4/14   CT 4/21  No orders to display     Active Comorbid Conditions:  GERD   Sleep apnea  Systems Review:   Renal: wdl, Dialysis:no   Type:  Frequency:   Neurological: wdl  Musculoskeletal: x  Respiratory: wdl  Cardiac/Circulatory/Peripheral Vascular: x  Abnormal/Relevant Test Results: wbc 11.7  Abnormal/Relevant Lab Values:   CBC:   Recent Labs     05/05/21  0634   WBC 11.7*   HGB 13.7   HCT 40.4   .0*        BMP:   Recent Labs     05/05/21  0634      K 4.3      CO2 25   BUN 13   CREATININE <0.5*     PT/INR: No results for input(s): PROTIME, INR in the last 72 hours. APTT: No results for input(s): APTT in the last 72 hours.   Liver Profile:  Lab Results   Component Value Date    AST 41 04/13/2021    ALT 32 04/13/2021    BILITOT 0.7 04/13/2021    ALKPHOS 86 04/13/2021     Lab Results   Component Value Date    CHOL 251 04/13/2021    HDL 45 04/13/2021    TRIG 278 04/13/2021     Recent Labs     05/05/21 0634   WBC 11.7*   HGB 13.7   HCT 40.4      .0*     Recent Labs     05/05/21  0634      K 4.3      CO2 25   BUN 13   CREATININE <0.5*     No results for input(s): AST, ALT, ALB, BILIDIR, BILITOT, ALKPHOS in the last 72 hours. No results for input(s): MG in the last 72 hours. Recent Labs     05/05/21  0634   WBC 11.7*   HGB 13.7   HCT 40.4        Recent Labs     05/05/21  0634      K 4.3      CO2 25   BUN 13   CREATININE <0.5*   CALCIUM 9.9     No results for input(s): AST, ALT, BILIDIR, BILITOT, ALKPHOS in the last 72 hours. No results for input(s): INR in the last 72 hours. No results for input(s): Liz Castor in the last 72 hours. PHYSICAL THERAPY:  Bed Mobility: Scooting: Minimal assistance(pt req VC for ant lean to scoot posteriorly in chair)    Transfers:  Sit to Stand: Moderate Assistance, Minimal Assistance(multiple w/c <> parellel bars ModA, Hugo for last transfer. VC's for WS. therapist assisted in stabalizing L LE to prevent hyperextension.)  Stand to sit: Moderate Assistance  Bed to Chair: Moderate assistance(stand pivot to R, VC to perform hip flexion.)  Squat Pivot Transfers: Moderate Assistance(seated stepper >w/c)  Comment: Therapy assisted pt with using toilet , hand hygiene, and lower body dressing. Pt was able to maintin balance on commode without support from UE with SBA. See Ot note for hand and toilet hygeine and dressing status.     OCCUPATIONAL THERAPY:  ADL  Feeding: Setup(to open containers and VCs to locate utensils placed on L of tray)  Grooming: Supervision, Verbal cueing(hand hygiene seated in w/c, VCs to locate towels on L of pt)  UE Bathing: Minimal assistance(seated on TTB, VCs for adaptive technique to wash RUE, assist to position LUE)  LE Bathing: Dependent/Total, Verbal cueing, Setup, Increased time to complete(seated on TTB, use of LHS to wash lower limbs and feet, assist to wash buttocks with fwd trunk flexion, assist to dry lower limbs and feet, total A to dry buttocks in stance with mod A x 1 to maintain stance and total A of another to dry)  UE Dressing: Moderate assistance, Verbal cueing, Setup, Increased time to complete(pt doffed shirt with CGA and VCs for technique, cues to attend to LUE, pt req setup and mod A to don shirt to assist with threading LUE and pulling down on L side)  LE Dressing: Dependent/Total(pt doffed socks with + time using reacher and min A for positioning of device on LLE, max A to thread LEs into brief and pants, Mod A x 1 to stand with assist of other to pull pants up over hips, total A to don socks for sake of time)  Toileting: Dependent/Total, Increased time to complete(pt continent of bladder, pt completed pericare hygiene seated; Mod A x 1 to maintain stance and total A of another to manage LB clothing off hips in stance)  Additional Comments: Pt completed bathing as listed above, PT assisting wtih pts sitting balance and OT assisting with ADL and bathing tasks. Pt required cueing throughout for completeness of task. Toilet Transfers: Toilet Transfers  Toilet - Technique: Stand pivot  Equipment Used: Standard toilet  Toilet Transfer: Moderate assistance  Toilet Transfers Comments: VCs for hand placement, assist for controlled descent    Tub/ShowerTransfers:  Shower Transfers  Shower - Transfer From: Wheelchair  Shower - Transfer Type: To and From  Shower - Transfer To: Transfer tub bench  Shower - Technique: Squat pivot  Shower Transfers: Dependent  Shower Transfers Comments: mod of one, + min of one for lateral scoot transfer from wc to TTB, max A x 2 to fully scoot from TTB to wc and to scoot back in wc. Noted extensor tone in L knee during transfer, impacting pts ability to scoot laterally and posteriorly. SPEECH THERAPY:  Assessment: Oral stage dysphagia due to perioral weakness and attention, executive dysfunction and left neglect.  Noted pragmatic impairment with volume increase in community reintegration task that has not been noted previously. NUTRITION:  Please see nutrition note for details. Weight: 226 lb 10.1 oz (102.8 kg) / Body mass index is 36.58 kg/m². Diet Level/Order: Dietary Nutrition Supplements: Low Calorie High Protein Supplement  DIET GENERAL; Daily Fluid Restriction: 1500 ml  Average Consumption per meal: PO Meals Eaten (%): 76 - 100%    CASE MANAGEMENT:  Psychosocial/Behavioral Issues: none noted  Assessment:  Referred to patient for d/c planning. Patient usually lives at home with . Patient is usually independent in ADLs. Will continue to follow for d/c needs. Patient/Family Education provided by team:  Role of PT/OT/ST, Compensation strategy for left sided pocketing, left inattention    Patient Goals for Rehab stay:  1. To go home  2. Dancing     Rehab Team Goals for patient for the Upcoming Week:  1. Increase left sided attention   2. Increase independence with ADLs    Barriers to Progress/Attainment of Goals & Efforts/Interventions to remove Barriers:  1. Visual neglect - ongoing cognitive therapy, education  2. L sided hemiparesis-continue OT/PT    Discharge Plan:  Estimated Length of Stay: 23 days  Destination: home health, however, pending progress pt may need an alternative level of care  Services at Discharge: [address] [facility], Occupational Therapy, Speech Therapy and Nursing 3x week  Equipment at Discharge: bath bench, w/c, will continue to assess  Community Resources:   Factors facilitating achievement of predicted outcomes:  Motivated, Cooperative, Pleasant and Sense of humor  Barriers to the achievement of predicted outcomes: Impulsivity, Limited safety awareness, Decreased endurance, Decreased proprioception, Upper extremity weakness, Lower extremity weakness, Impaired vision and Incontinence of bowel    Special Needs in the Upcoming Week:   [x] Family/Caregiver Education  [] Home visit  []Therapeutic Pass   [] Equipment  Type: [] Consults:_______   [] Family/Caregiver Training    [] Stroke Risk Factor Education     [] Other;_______     TEAM SUMMARY: Will continue with current poc & goals until anticipated d/c date of 5/16/2021.     MD:   Stroke Risk Factors:   [] N/A for this patient [] HTN  []  Diabetes  [] Hyperlipidemia  [x]Obesity BMI >25  [] Atrial Fibrillation [] Smoker (current)  [] Smoker (quit in last 12 months)    [x] Sleep Apnea  [] Other:    Justification for Continued Stay:   Criteria for continued IRF stay:  Based on my medical assessment of the patient and review of information from the interdisciplinary team, as part of this weekly team conference, the patient continues to meet the following criteria for IRF level of care:  [x] The patient requires 24-hour rehabilitation nursing care   [x] The patient requires an intensive rehabilitation therapy program  [x] The patient requires active and ongoing intervention of multiple therapy disciplines  [x] The patient requires continued physician supervision by a rehabilitation physician  [x] The patient requires an intensive and coordinated interdisciplinary team approach to the delivery of rehabilitative care    Medical Necessity-continued close physician medical management is required for:   [] Cardiac/Circulatory dysfunction  [] Respiratory/Pulmonary dysfunction  [] Integumentary complications  [] Peripheral Vascular dysfunction  [] Musculoskeletal dysfunction  [x] Neurological dysfunction d/t:  [x] CVA  [] SCI  [] TBI  [] Other: __________  [] Renal dysfunction  [] Hematologic dysfunction    [] Endocrine disorders  [] GI disorders     [] Genito-Urinary dysfunction    Assessment/Plan:  [x] The patient is making good progression towards their LTG's, is actively participating in, and has a reasonable expectation to continue to benefit from the intensive rehabilitation program.  [] The estimated discharge date has been changed from initial team conference due to:   [] The estimated discharge destination has been changed from initial team conference due to:     Rehab Team Members in attendance for Team Conference:  :  ROSETTA Pereira    Therapy Manager:  Iliana Chambers, PT, DPT    Nursing Director:  Walter Armstrong, MSN, RN, ACNS-BC    Social Work:  Satinder Pacheco, MSW, LISW-S    Nursing:  Parish Rader, PATRICIAN, RN  Estefani Camejo, RN  Tiffanie Payne, KIZZY Callahan, RN    Therapy:  Yuliya Jack, [address] [place] [address], PT, DPT  Jenna Mejía, PT, DPT    Sydnee Villa, OTR/L  Andres Arriaga, OTR/L    Lonnie Kaur MA/CCC-SLP    Nutrition:  Mario Alberto Thompson, [facility] LD    I approve the established interdisciplinary plan of care as documented within the medical record of Shola Cid.     MD Signature Charlette Villa, AILEEN.O. M.P.H  PM&R  5/6/2021  12:09 PM
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
## 2021-05-06 NOTE — PLAN OF CARE
Problem: Pain:  Goal: Control of acute pain  Description: Control of acute pain  Outcome: Ongoing  Note: Patient medicated with scheduled acetaminophen for pain rating of a 3 on a 0/10 scale. States she is satisfied with current pain medication regimen. Problem: Falls - Risk of:  Goal: Will remain free from falls  Description: Will remain free from falls  Outcome: Ongoing  Note: Patient has remained free of falls this shift. Room free of clutter, bed in low position and call light within reach     Problem: Skin Integrity:  Goal: Absence of new skin breakdown  Description: Absence of new skin breakdown  Outcome: Ongoing  Note: No new skin breakdown noted this shift.  Will continue to monitor

## 2021-05-07 LAB
ANION GAP SERPL CALCULATED.3IONS-SCNC: 14 MMOL/L (ref 3–16)
BASOPHILS ABSOLUTE: 0.1 K/UL (ref 0–0.2)
BASOPHILS RELATIVE PERCENT: 1.1 %
BUN BLDV-MCNC: 12 MG/DL (ref 7–20)
CALCIUM SERPL-MCNC: 10 MG/DL (ref 8.3–10.6)
CHLORIDE BLD-SCNC: 104 MMOL/L (ref 99–110)
CO2: 23 MMOL/L (ref 21–32)
CREAT SERPL-MCNC: 0.5 MG/DL (ref 0.6–1.1)
EOSINOPHILS ABSOLUTE: 0.5 K/UL (ref 0–0.6)
EOSINOPHILS RELATIVE PERCENT: 4.6 %
GFR AFRICAN AMERICAN: >60
GFR NON-AFRICAN AMERICAN: >60
GLUCOSE BLD-MCNC: 98 MG/DL (ref 70–99)
HCT VFR BLD CALC: 40.7 % (ref 36–48)
HEMOGLOBIN: 13.9 G/DL (ref 12–16)
LYMPHOCYTES ABSOLUTE: 3.5 K/UL (ref 1–5.1)
LYMPHOCYTES RELATIVE PERCENT: 29.2 %
MCH RBC QN AUTO: 35.4 PG (ref 26–34)
MCHC RBC AUTO-ENTMCNC: 34.2 G/DL (ref 31–36)
MCV RBC AUTO: 103.6 FL (ref 80–100)
MONOCYTES ABSOLUTE: 1 K/UL (ref 0–1.3)
MONOCYTES RELATIVE PERCENT: 8.8 %
NEUTROPHILS ABSOLUTE: 6.7 K/UL (ref 1.7–7.7)
NEUTROPHILS RELATIVE PERCENT: 56.3 %
PDW BLD-RTO: 13.5 % (ref 12.4–15.4)
PLATELET # BLD: 451 K/UL (ref 135–450)
PMV BLD AUTO: 8.6 FL (ref 5–10.5)
POTASSIUM REFLEX MAGNESIUM: 4.2 MMOL/L (ref 3.5–5.1)
RBC # BLD: 3.93 M/UL (ref 4–5.2)
SODIUM BLD-SCNC: 141 MMOL/L (ref 136–145)
WBC # BLD: 11.9 K/UL (ref 4–11)

## 2021-05-07 PROCEDURE — 97129 THER IVNTJ 1ST 15 MIN: CPT

## 2021-05-07 PROCEDURE — 85025 COMPLETE CBC W/AUTO DIFF WBC: CPT

## 2021-05-07 PROCEDURE — 97130 THER IVNTJ EA ADDL 15 MIN: CPT

## 2021-05-07 PROCEDURE — 36415 COLL VENOUS BLD VENIPUNCTURE: CPT

## 2021-05-07 PROCEDURE — 97530 THERAPEUTIC ACTIVITIES: CPT

## 2021-05-07 PROCEDURE — 80048 BASIC METABOLIC PNL TOTAL CA: CPT

## 2021-05-07 PROCEDURE — 92526 ORAL FUNCTION THERAPY: CPT

## 2021-05-07 PROCEDURE — 94660 CPAP INITIATION&MGMT: CPT

## 2021-05-07 PROCEDURE — 99232 SBSQ HOSP IP/OBS MODERATE 35: CPT | Performed by: PHYSICAL MEDICINE & REHABILITATION

## 2021-05-07 PROCEDURE — 1280000000 HC REHAB R&B

## 2021-05-07 PROCEDURE — 6370000000 HC RX 637 (ALT 250 FOR IP): Performed by: PHYSICAL MEDICINE & REHABILITATION

## 2021-05-07 PROCEDURE — 97116 GAIT TRAINING THERAPY: CPT

## 2021-05-07 PROCEDURE — 6360000002 HC RX W HCPCS: Performed by: PHYSICAL MEDICINE & REHABILITATION

## 2021-05-07 RX ADMIN — ACETAMINOPHEN 1000 MG: 500 TABLET, COATED ORAL at 16:16

## 2021-05-07 RX ADMIN — LEVETIRACETAM 500 MG: 500 TABLET ORAL at 07:53

## 2021-05-07 RX ADMIN — FAMOTIDINE 20 MG: 20 TABLET, FILM COATED ORAL at 21:05

## 2021-05-07 RX ADMIN — ACETAMINOPHEN 1000 MG: 500 TABLET, COATED ORAL at 21:04

## 2021-05-07 RX ADMIN — THERA TABS 1 TABLET: TAB at 07:53

## 2021-05-07 RX ADMIN — FLUOXETINE 20 MG: 20 CAPSULE ORAL at 07:53

## 2021-05-07 RX ADMIN — AMLODIPINE BESYLATE 5 MG: 5 TABLET ORAL at 07:52

## 2021-05-07 RX ADMIN — HEPARIN SODIUM 5000 UNITS: 5000 INJECTION INTRAVENOUS; SUBCUTANEOUS at 05:45

## 2021-05-07 RX ADMIN — HEPARIN SODIUM 5000 UNITS: 5000 INJECTION INTRAVENOUS; SUBCUTANEOUS at 21:06

## 2021-05-07 RX ADMIN — THIAMINE HCL TAB 100 MG 100 MG: 100 TAB at 07:53

## 2021-05-07 RX ADMIN — FAMOTIDINE 20 MG: 20 TABLET, FILM COATED ORAL at 07:53

## 2021-05-07 RX ADMIN — DOCUSATE SODIUM 50 MG AND SENNOSIDES 8.6 MG 2 TABLET: 8.6; 5 TABLET, FILM COATED ORAL at 07:52

## 2021-05-07 RX ADMIN — LEVETIRACETAM 500 MG: 500 TABLET ORAL at 21:05

## 2021-05-07 RX ADMIN — ATORVASTATIN CALCIUM 80 MG: 80 TABLET, FILM COATED ORAL at 21:05

## 2021-05-07 RX ADMIN — ACETAMINOPHEN 1000 MG: 500 TABLET, COATED ORAL at 05:45

## 2021-05-07 RX ADMIN — ACETAMINOPHEN 1000 MG: 500 TABLET, COATED ORAL at 10:30

## 2021-05-07 RX ADMIN — ASPIRIN 81 MG: 81 TABLET, CHEWABLE ORAL at 07:52

## 2021-05-07 RX ADMIN — Medication 5 MG: at 07:52

## 2021-05-07 RX ADMIN — HEPARIN SODIUM 5000 UNITS: 5000 INJECTION INTRAVENOUS; SUBCUTANEOUS at 14:37

## 2021-05-07 ASSESSMENT — PAIN DESCRIPTION - DESCRIPTORS
DESCRIPTORS: DISCOMFORT

## 2021-05-07 ASSESSMENT — PAIN DESCRIPTION - FREQUENCY
FREQUENCY: INTERMITTENT

## 2021-05-07 ASSESSMENT — PAIN DESCRIPTION - PAIN TYPE
TYPE: ACUTE PAIN

## 2021-05-07 ASSESSMENT — PAIN SCALES - GENERAL
PAINLEVEL_OUTOF10: 0
PAINLEVEL_OUTOF10: 3
PAINLEVEL_OUTOF10: 0
PAINLEVEL_OUTOF10: 3
PAINLEVEL_OUTOF10: 0
PAINLEVEL_OUTOF10: 2
PAINLEVEL_OUTOF10: 2

## 2021-05-07 ASSESSMENT — PAIN - FUNCTIONAL ASSESSMENT
PAIN_FUNCTIONAL_ASSESSMENT: ACTIVITIES ARE NOT PREVENTED

## 2021-05-07 ASSESSMENT — PAIN DESCRIPTION - PROGRESSION
CLINICAL_PROGRESSION: GRADUALLY WORSENING
CLINICAL_PROGRESSION: NOT CHANGED
CLINICAL_PROGRESSION: NOT CHANGED
CLINICAL_PROGRESSION: GRADUALLY WORSENING

## 2021-05-07 ASSESSMENT — PAIN DESCRIPTION - ONSET
ONSET: ON-GOING

## 2021-05-07 ASSESSMENT — PAIN DESCRIPTION - LOCATION
LOCATION: BACK

## 2021-05-07 ASSESSMENT — PAIN DESCRIPTION - ORIENTATION
ORIENTATION: LOWER
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
## 2021-05-07 NOTE — PROGRESS NOTES
Pt takes her home CPAP off and on at will.      05/07/21 0028   NIV Type   $NIV $Daily Charge   Skin Assessment Clean, dry, & intact   NIV Started/Stopped Off   Equipment Type home unit   Mode CPAP   Mask Type Nasal pillows   Mask Size Medium   Settings/Measurements   CPAP/EPAP 12 cmH2O   Resp 16   FiO2  21 %   Comfort Level Good   Using Accessory Muscles No   SpO2 95   Breath Sounds   Right Upper Lobe Clear   Right Middle Lobe Clear   Right Lower Lobe Diminished   Left Upper Lobe Clear   Left Lower Lobe Diminished   Patient Observation   Observations pt asleep and comfortable

## 2021-05-07 NOTE — PROGRESS NOTES
Pt resting with no complaints at this time. RR easy and unlabored. VSS. Bed locked and in lowest position. Pt updated on care plan/status. Will continue to monitor.

## 2021-05-07 NOTE — PLAN OF CARE
Problem: Pain:  Goal: Control of acute pain  Description: Control of acute pain  Outcome: Ongoing  Note: Pain being managed with scheduled tylenol. Non-pharm measures of repositioning and heat therapy encouraged throughout shift. Problem: Falls - Risk of:  Goal: Will remain free from falls  Description: Will remain free from falls  Outcome: Ongoing  Note: Remains free from falls. Fall precautions in place. Bed/chair alarm utilized throughout shift. Calls appropriately for assistance, call light within reach. Problem: Skin Integrity:  Goal: Absence of new skin breakdown  Description: Absence of new skin breakdown  Outcome: Ongoing  Note: Skin kept clean and dry throughout shift. Repositioned throughout shift. Showered today.

## 2021-05-07 NOTE — PROGRESS NOTES
Occupational Therapy  Facility/Department: [facility] ACUTE REHAB UNIT  Daily Treatment Note  NAME: Colton Sutherland  : 1970  MRN: [id number]    Date of Service: 2021    Discharge Recommendations:  24 hour supervision or assist, Home with Home health OT, Home with nursing aide, Continue to assess pending progress  OT Equipment Recommendations  Equipment Needed: Yes  ADL Assistive Devices: Transfer Tub Bench;Grab Bars - toilet  Other: continue to assess    Assessment   Performance deficits / Impairments: Decreased functional mobility ; Decreased ADL status; Decreased ROM; Decreased strength;Decreased sensation;Decreased fine motor control;Decreased endurance;Decreased posture;Decreased balance;Decreased safe awareness;Decreased coordination;Decreased vision/visual deficit; Decreased cognition  Assessment: Pt demonstrated good tolerance during litegait functional mobility this date requiring decreased assist to manage LLE with increased familiarity of task, however pt required significant facilitation of weight shifting to advance BLEs. Pt demonstrated decreased LLE hyperextension throughout task and in static stance after bodyweight supported ambulation. Pt highly motivated to return to PLOF however continues to function significantly below baseline, Cont per OT POC. Treatment Diagnosis: decreased independence with ADLs and decreased functional mobility 2/2 CVA  Prognosis: Fair  OT Education: Plan of Care;Transfer Training;ADL Adaptive Strategies  Patient Education: cont to reinforce  REQUIRES OT FOLLOW UP: Yes  Activity Tolerance  Activity Tolerance: Patient Tolerated treatment well  Activity Tolerance: Pt fatigued from litegait req prolonged rest breaks however pt demo good participation  Safety Devices  Safety Devices in place: Yes  Type of devices: Call light within reach; Chair alarm in place; Left in chair; All fall risk precautions in place         Patient Diagnosis(es): There were no encounter diagnoses.       has a past medical history of GERD (gastroesophageal reflux disease), Hernia, Obesity, and Unspecified sleep apnea. has a past surgical history that includes Splenectomy (); Total hip arthroplasty (); Lap Band (); hernia repair ();  section (); and craniotomy (Right, 2021). Restrictions  Position Activity Restriction  Other position/activity restrictions: Ambulate, Up with assist, Pt to wear helmet when OOB, OK to remove for shower, HOB to elevated at 30*  Subjective   General  Chart Reviewed: Yes  Patient assessed for rehabilitation services?: Yes  Additional Pertinent Hx: 46 y.o. female with hypertension and tobacco abuse who presented after spending a prolonged period on the ground s/p fall out of bed; found to have acute left hemiparesis and gaze deviation. CXR with no acute cardiopulmonary abnormality. CTH revealed large acute/subacute infarct in the R frontal lobe with associated mass effect and 4 mm R to L midline shift. It also noted subdural hemorrhage of 6mm along falx, although NSGY less convinced. CTA head and neck revealed occluded R cervical ICA & R anterior cerebral artery with near complete occlusion of R MCA. Outside window on TPA. Pt is now POD #1 following hemicraniectomy (). Pt admitted to ARU   Response to previous treatment: Patient with no complaints from previous session  Family / Caregiver Present: No  Referring Practitioner: Dr. Ann Samano DO  Diagnosis: CVA  Subjective  Subjective: Pt sitting in w/c with PT in hallway setup to initiate litegait training to increase functional mobility with collaboration of 2 disciplines. General Comment  Comments: Delmon Green   Vital Signs  Patient Currently in Pain: Denies     Orientation  Orientation  Overall Orientation Status: Within Functional Limits  Objective             Balance  Standing Balance: Minimal assistance  Standing Balance  Time: ~45 minutes total  Activity: functional mobility via litegait, static stance, WSing in stance, functional transfers  Functional Mobility  Functional Mobility Comments: Pt was assisted in use of bodyweight supported ambulation via litegait in order to increase independence with functional mobility and decrease LLE hyperextension; sock was applied to forefoot of L foot to decrease friction with floor for LLE advancement; Pt performed multiple sit to stands from w/c <> litegait and ambulation with litegait completing 3 trials tolerating 10', 20' (with 180 turn) and 30' respectively; Mod A initially provided to assist with management of LLE and intermittent facilitation to prevent L knee hyperextension progressing to Min A on last 2 bouts of ambulation; pt req VCs for erect posture, glute activation and anterior pelvic tilt throughout with therapist facilitating weight shift with contralateral limb advancement while controlling L knee hyperextension intermittently; Another therapist maintained facilitation of LUE support on device while guiding litegait. Pt needed VC's for sequencing, WS and for erect posture throughout. Each bout was limited by pt reporting being \"woozy\" (possibly from exertion but pt reporting could be due to tight fitting harness shortly after eating breakfast). req prolonged rest breaks between trials. Pt \"wooziness\" subsided with seated rest break. Transfers  Sit to stand: Minimal assistance(from w/c)  Stand to sit: Minimal assistance  Transfer Comments: Pt completed prolonged static stance ~1 minute with reaching outside JOSETTE to R with no LLE knee hyperextension noted after multiple bouts with litegait, difficulty maintaining midline and lateral lean to L                       Cognition  Overall Cognitive Status: Exceptions  Arousal/Alertness: Appropriate responses to stimuli  Following Commands:  Follows one step commands consistently  Attention Span: Appears intact  Memory: Appears intact  Safety Judgement: Decreased awareness of need for assistance;Decreased awareness of need for safety  Problem Solving: Assistance required to generate solutions;Assistance required to correct errors made;Assistance required to implement solutions;Decreased awareness of errors  Insights: Decreased awareness of deficits  Initiation: Requires cues for some  Sequencing: Requires cues for some                                         Plan   Plan  Times per week: 5x a week for 60 mins daily  Times per day: Daily  Current Treatment Recommendations: Strengthening, Endurance Training, Neuromuscular Re-education, Self-Care / ADL, Functional Mobility Training, Safety Education & Training, ROM, Positioning, Wheelchair Mobility Training, Balance Training, Patient/Caregiver Education & Training, Manual Therapy:  MLD, Equipment Evaluation, Education, & procurement, Home Management Training, Cognitive Reorientation    Goals  Short term goals  Time Frame for Short term goals: 2 weeks-ongoing  Short term goal 1: Pt will complete toilet transfer w/ Mod A-goal met 5/4  Short term goal 2: Pt will complete UE dressing w/ Mod A-ongoing  Short term goal 3: Pt will complete oral care w/ spvn seated at sink w/o VCs for L sided attention-ongoing  Short term goal 4: Pt will complete LE dressing w/ Mod A-ongoing  Long term goals  Time Frame for Long term goals : 4 weeks-all ongoing  Long term goal 1: Pt will complete toilet transfer w/ SBA  Long term goal 2: Pt will complete UE dressing w/ setup  Long term goal 3: Pt will complete LE dressing w/ SBA  Long term goal 4: Pt will be independent w/ tone management exercises to improve functional use of LUE  Long term goal 5: Pt will complete oral care I'ly  Patient Goals   Patient goals : \"get back to my normal self\"       Therapy Time   Individual Concurrent Group Co-treatment   Time In       0850   Time Out       1000   Minutes       70   Timed Code Treatment Minutes: Ashlie 8, OT

## 2021-05-07 NOTE — PROGRESS NOTES
ACUTE REHAB UNIT  SPEECH/LANGUAGE PATHOLOGY      [x] Daily  [x] Weekly Care Conference Note  [] Discharge    Patient:Laura Koch      IO:9/86/[birth year]201  CLARKE:[id number]  Rehab Dx/Hx: Acute CVA (cerebrovascular accident) ([facility] [address] [I63.9]    Precautions: [x] Aspiration  [x] Fall risk  [] Sternal  [] Seizure [] Hip  [] Weight Bearing [] Other  ST Dx: [] Aphasia  [] Dysarthria  [] Apraxia   [x] Oropharyngeal dysphagia [x] Cognitive Impairment  [] Other:   Date of Admit: 4/23/2021  Room #: 3101/3101-01  Date: 5/7/2021          Current Diet Order:Dietary Nutrition Supplements: Low Calorie High Protein Supplement  DIET GENERAL; Daily Fluid Restriction: 1500 ml   Recommended Form of Meds: PO  Compensatory Swallowing Strategies: Alternate solids and liquids, Upright as possible for all oral intake, Check for pocketing of food on the Left, Small bites/sips, Lingual sweep     Subjective: Pt admitted 4/12 after fall with left sided paralysis. Pt underwent craniectomy on 4/13 per Dr. Sung Hodge. Social/Functional History  Lives With: Spouse;Son( reported that son is diagnosed with Autism.)  Occupation: Full time employment  Type of occupation:  at [employer]"    FEES 4/14: IMPRESSIONS:   Pt presents w/ mild oropharyngeal dysphagia characterized by premature spillage of thin liquids to UES and pyriforms w/ intermittent deep penetration of laryngeal vestibule; adequate airway protection and complete clearance of all residue after swallow initiation. No aspiration w/ any trials. Timely swallow initiation w/ puree and regular solid consistencies; no oropharyngeal residue.    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 Adequate ab/adduction. Complete closure of TVFs upon adduction.      Dentition: Adequate  Vision  Vision: Impaired  Vision Exceptions: Visual field cut  Hearing  Hearing: Within functional limits Barriers toward progress: None towards stated goals      Date: 5/7/2021      Tx session 1 Tx session 2   Total Timed Code Min 20 60   Total Treatment Minutes 35 60   Individual Treatment Minutes 35 60   Group Treatment Minutes 0 0   Co-Treat Minutes 0 0   Brief Exception: N/A N/A   Pain Endorsed tailbone pain; RN aware and provided heat pack. None indicated   Pain Intervention: [] RN notified  [x] Repositioned  [] Intervention offered and patient declined  [] N/A  [x] Other: RN aware. [] RN notified  [] Repositioned  [] Intervention offered and patient declined  [x] N/A  [] Other:    Subjective:     Pt upright in chair leaning forward due to c/o pain. Completed oral care at end of session. Pt in w/c and agreeable to tx session. Completed tx in kitchen area for functional cooking task. Objective / Goals:     Patient will consume regular solids with timely mastication and no pocketing in L buccal cavity across 2 sessions. Patient tolerated NMES via VitalStim placement 4a (targeting orbicularis oris, buccinator and superior pharyngeal constrictor) @ 3.5 mA on right x 22 minutes and 7.0 mA on left x 18 minutes, 8.0 mA x 4 minutes (total stimulation time of 22). Good contraction throughout. Goal not targeted. Patient will consume PO without anterior spillage or overt pocketing across 2 sessions. Anterior spillage of solids during speaking x1. Goal not targeted. Pt will complete divided/alternating attention tasks with 85% accuracy given min cues. Mod-max cues for sustained attention this date. Mod cues required for selective and sustained attention with multiple distractions present. Pt will accurately complete executive functioning tasks with min cues. Reduced overall awareness with pt bringing hand to mouth without grasping cup first.  Overall mod cues required for organization, planning, and problem solving for cooking tasks. Pt will attend to L visual field with min cues.    Min cue for locating items on left. Mod cues for maintaining attention to speaker at midline. Min-mod cues for navigating in restroom to perform oral care. Min cues to attend to conversational partner on L. Spontaneously made eye contact with novel conversational partner situated far L of midline. Max cues to attend to L of midline for food prep during cooking activity. Pt verbalized L neglect consistently but poor carryover of strategies despite cues. Pt will participate in ongoing cognitive linguistic assessment. GOAL MET GOAL MET   Other areas targeted:     Education:   Education ongoing regarding dysphagia strategies and anticipated attention / visual spatial processing challenges in upcoming session. Educated to skills targeted, deficits noted, and rationale for tasks completed. Safety Devices: [x] Call light within reach  [x] Chair alarm activated and connected to nurse call light system  [] Bed alarm activated   [] Other:  [] Call light within reach  [] Chair alarm activated and connected to nurse call light system  [] Bed alarm activated   [] Other:    Assessment:  Oral stage dysphagia with attention and carryover interfering. Executive function impairment with persistent left neglect. Reduced volume control noted this date with off topic comments consistent with known acute pragmatic impairment; pt perez well with humor. Pt with good insight into deficits and can verbalize when errors occur but poor self-monitoring and carryover of strategies noted within the moment. Plan: Continue per POC.       Interventions used this date:  [] Speech/Language Treatment  [] Instruction in HEP  [x] Dysphagia Treatment [x] Cognitive Treatment   [] Other:    Discharge recommendations:  [] Home independently  [x] Home with assistance []  24 hour supervision  [] ECF [] Other  Continued Tx Upon Discharge: ? [x] Yes    [] No    [] TBD based on progress while on ARU     [] Vital Stim indicated     [] Other:   Estimated

## 2021-05-07 NOTE — PROGRESS NOTES
Shift assessment complete. VSS. A&Ox4. Patient reports discomfort to lower back. Patient repositioned and provided heat pack, scheduled tylenol utilized throughout shift. Fall precautions in place. Patient up to chair for breakfast, chair alarm utilized, call light within reach.        Vitals:    05/07/21 0750   BP: 123/72   Pulse: 88   Resp: 16   Temp: 97.4 °F (36.3 °C)   SpO2: 96%

## 2021-05-07 NOTE — PROGRESS NOTES
Department of Physical Medicine & Rehabilitation  Progress Note    Patient Identification:  Jostin Perdomo  [id number]  : 1970  Admit date: 2021    Chief Complaint: CVA    Subjective:   Doing well today. She is walking in the gait  with max assist. Improving each therapy session. ROS: No f/c, n/v, cp     Objective:  Patient Vitals for the past 24 hrs:   BP Temp Temp src Pulse Resp SpO2   21 0750 123/72 97.4 °F (36.3 °C) Oral 88 16 96 %   21 0505     16 96 %   21 0028     16    21 2254     16    21 1900 121/82 98.1 °F (36.7 °C) Oral 80 16 96 %     Const: Alert. No distress, pleasant. HEENT: Normocephalic, atraumatic. Normal sclera/conjunctiva. MMM. Incision healing well  CV: Regular rate and rhythm. Resp: No respiratory distress. Lungs CTAB. Abd: Soft, nontender, nondistended, NABS+   Ext: No edema. Neuro: Alert, oriented, appropriately interactive. Psych: Cooperative, appropriate mood and affect    Laboratory data: Available via EMR.    Last 24 hour lab  Recent Results (from the past 24 hour(s))   Basic Metabolic Panel w/ Reflex to MG    Collection Time: 21  7:03 AM   Result Value Ref Range    Sodium 141 136 - 145 mmol/L    Potassium reflex Magnesium 4.2 3.5 - 5.1 mmol/L    Chloride 104 99 - 110 mmol/L    CO2 23 21 - 32 mmol/L    Anion Gap 14 3 - 16    Glucose 98 70 - 99 mg/dL    BUN 12 7 - 20 mg/dL    CREATININE 0.5 (L) 0.6 - 1.1 mg/dL    GFR Non-African American >60 >60    GFR African American >60 >60    Calcium 10.0 8.3 - 10.6 mg/dL   CBC auto differential    Collection Time: 21  7:03 AM   Result Value Ref Range    WBC 11.9 (H) 4.0 - 11.0 K/uL    RBC 3.93 (L) 4.00 - 5.20 M/uL    Hemoglobin 13.9 12.0 - 16.0 g/dL    Hematocrit 40.7 36.0 - 48.0 %    .6 (H) 80.0 - 100.0 fL    MCH 35.4 (H) 26.0 - 34.0 pg    MCHC 34.2 31.0 - 36.0 g/dL    RDW 13.5 12.4 - 15.4 %    Platelets 367 (H) 269 - 450 K/uL    MPV 8.6 5.0 - 10.5 fL Neutrophils % 56.3 %    Lymphocytes % 29.2 %    Monocytes % 8.8 %    Eosinophils % 4.6 %    Basophils % 1.1 %    Neutrophils Absolute 6.7 1.7 - 7.7 K/uL    Lymphocytes Absolute 3.5 1.0 - 5.1 K/uL    Monocytes Absolute 1.0 0.0 - 1.3 K/uL    Eosinophils Absolute 0.5 0.0 - 0.6 K/uL    Basophils Absolute 0.1 0.0 - 0.2 K/uL       Therapy progress:  PT  Position Activity Restriction  Other position/activity restrictions: Ambulate, Up with assist, Pt to wear helmet when OOB, OK to remove for shower, HOB to elevated at 30*  Objective     Sit to Stand: Moderate Assistance(at w/c to OT assist, attempt to faciliattion at L LE to decrease hyperextension, other therapist assisted with lower body dressing)  Stand to sit: Moderate Assistance  Bed to Chair: Moderate assistance(stand pivot to R, VC to perform hip flexion.)     OT  PT Equipment Recommendations  Equipment Needed: (Will continue to assess equipment needs pending progress)  Toilet - Technique: Stand pivot  Equipment Used: Standard toilet  Toilet Transfers Comments: VCs for hand placement, assist for controlled descent  Assessment        SLP  Diet Solids Recommendation: Dysphagia Soft and Bite-Sized (Dysphagia III)(with regular solids as tolerated. Per pt request, gravy/sauces to keep for moist.)       Body mass index is 36.58 kg/m². Assessment and Plan:  Evelin Farley a 46 y.o. female with PMH GERD p/w L sided weakness and facial droop.  CTA head/neck on 4/12 revealed occluded R cervical ICA, occluded right LEAH, near complete occlusion of right MCA.     Acute ischemic CVA, Subdural heomorrhage s/p right hemicraniectomy and subsequen SAH and IP hemorrhage  No tPA given.  MRI 4/14 showing large subacute infarct throughout R LEAH and MCA with some areas of hemorrhagic conversion.  - Neurosurgery and neurology following  --SBP <160, PRN hydralazine, scheduled Norvasc  -- Keppra 500 mg twice daily   -  SQH  - PT/OT consulted      Aphasia- SLP consulted Depression-Patient takes fluoxetine 20 mg daily at home  Anxiety -Patient takes Ativan 0.5 mg every 8 hours as needed for anxiety- holding  MISHA-Patient uses CPAP at home   GERD- Patient takes omeprazole 40 mg daily at home- half dose  Obesity    Rehab Progress: Improving  Anticipated Dispo: home  Services/DME: LIANG  ELOS: TITO HerringP.H  PM&R  5/7/2021  10:11 AM

## 2021-05-08 PROCEDURE — 6370000000 HC RX 637 (ALT 250 FOR IP): Performed by: PHYSICAL MEDICINE & REHABILITATION

## 2021-05-08 PROCEDURE — 6360000002 HC RX W HCPCS: Performed by: PHYSICAL MEDICINE & REHABILITATION

## 2021-05-08 PROCEDURE — 1280000000 HC REHAB R&B

## 2021-05-08 RX ADMIN — Medication 5 MG: at 10:27

## 2021-05-08 RX ADMIN — ATORVASTATIN CALCIUM 80 MG: 80 TABLET, FILM COATED ORAL at 21:45

## 2021-05-08 RX ADMIN — FLUOXETINE 20 MG: 20 CAPSULE ORAL at 10:27

## 2021-05-08 RX ADMIN — FAMOTIDINE 20 MG: 20 TABLET, FILM COATED ORAL at 21:45

## 2021-05-08 RX ADMIN — LEVETIRACETAM 500 MG: 500 TABLET ORAL at 10:27

## 2021-05-08 RX ADMIN — OXYCODONE HYDROCHLORIDE 5 MG: 5 TABLET ORAL at 06:17

## 2021-05-08 RX ADMIN — HEPARIN SODIUM 5000 UNITS: 5000 INJECTION INTRAVENOUS; SUBCUTANEOUS at 14:16

## 2021-05-08 RX ADMIN — FAMOTIDINE 20 MG: 20 TABLET, FILM COATED ORAL at 10:26

## 2021-05-08 RX ADMIN — HEPARIN SODIUM 5000 UNITS: 5000 INJECTION INTRAVENOUS; SUBCUTANEOUS at 21:45

## 2021-05-08 RX ADMIN — ACETAMINOPHEN 1000 MG: 500 TABLET, COATED ORAL at 06:00

## 2021-05-08 RX ADMIN — THIAMINE HCL TAB 100 MG 100 MG: 100 TAB at 10:27

## 2021-05-08 RX ADMIN — ACETAMINOPHEN 1000 MG: 500 TABLET, COATED ORAL at 10:26

## 2021-05-08 RX ADMIN — THERA TABS 1 TABLET: TAB at 10:26

## 2021-05-08 RX ADMIN — DOCUSATE SODIUM 50 MG AND SENNOSIDES 8.6 MG 2 TABLET: 8.6; 5 TABLET, FILM COATED ORAL at 10:26

## 2021-05-08 RX ADMIN — AMLODIPINE BESYLATE 5 MG: 5 TABLET ORAL at 10:27

## 2021-05-08 RX ADMIN — ASPIRIN 81 MG: 81 TABLET, CHEWABLE ORAL at 10:27

## 2021-05-08 RX ADMIN — ACETAMINOPHEN 1000 MG: 500 TABLET, COATED ORAL at 21:45

## 2021-05-08 RX ADMIN — HEPARIN SODIUM 5000 UNITS: 5000 INJECTION INTRAVENOUS; SUBCUTANEOUS at 06:17

## 2021-05-08 RX ADMIN — LEVETIRACETAM 500 MG: 500 TABLET ORAL at 21:45

## 2021-05-08 ASSESSMENT — PAIN DESCRIPTION - ORIENTATION
ORIENTATION: MID;LOWER
ORIENTATION: LEFT

## 2021-05-08 ASSESSMENT — PAIN DESCRIPTION - ONSET
ONSET: ON-GOING
ONSET: ON-GOING

## 2021-05-08 ASSESSMENT — PAIN DESCRIPTION - PROGRESSION
CLINICAL_PROGRESSION: GRADUALLY WORSENING
CLINICAL_PROGRESSION: GRADUALLY WORSENING

## 2021-05-08 ASSESSMENT — PAIN DESCRIPTION - PAIN TYPE
TYPE: ACUTE PAIN
TYPE: ACUTE PAIN

## 2021-05-08 ASSESSMENT — PAIN DESCRIPTION - DESCRIPTORS
DESCRIPTORS: BURNING
DESCRIPTORS: ACHING

## 2021-05-08 ASSESSMENT — PAIN SCALES - GENERAL
PAINLEVEL_OUTOF10: 6
PAINLEVEL_OUTOF10: 1
PAINLEVEL_OUTOF10: 3
PAINLEVEL_OUTOF10: 0
PAINLEVEL_OUTOF10: 3
PAINLEVEL_OUTOF10: 0

## 2021-05-08 ASSESSMENT — PAIN DESCRIPTION - LOCATION
LOCATION: HIP
LOCATION: BACK

## 2021-05-08 ASSESSMENT — PAIN DESCRIPTION - FREQUENCY
FREQUENCY: INTERMITTENT
FREQUENCY: INTERMITTENT

## 2021-05-08 NOTE — PLAN OF CARE
Problem: Pain:  Goal: Pain level will decrease  Description: Pain level will decrease  Outcome: Ongoing  Note: Patients pain level is being monitored. Pain scale is used to assess pain and interventions are implemented as needed. Problem: Falls - Risk of:  Goal: Will remain free from falls  Description: Will remain free from falls  5/7/2021 2240 by Abe Grey RN  Outcome: Ongoing  Note: Patient has remained free from falls. Fall precautions in place, call light within reach, bed alarm on, bed in lowest position, and non skid socks on. Problem: Falls - Risk of:  Goal: Absence of physical injury  Description: Absence of physical injury  Outcome: Ongoing  Note: Patient has remained free from physical injury. Fall precautions are in place and patients needs are being met.

## 2021-05-08 NOTE — PLAN OF CARE
Problem: Falls - Risk of:  Goal: Will remain free from falls  Description: Will remain free from falls  Outcome: Ongoing  Note: Patient remains free of falls this shift. Room free of clutter, bed in low position, call light and personal items are within reach. Problem: Skin Integrity:  Goal: Will show no infection signs and symptoms  Description: Will show no infection signs and symptoms  Outcome: Ongoing  Note: Patient shows no signs or symptoms of infection noted     Problem: Skin Integrity:  Goal: Absence of new skin breakdown  Description: Absence of new skin breakdown  Outcome: Ongoing  Note: Patient exhibits no signs or symptoms of skin breakdown this shift. Encouraged to change positions often.

## 2021-05-08 NOTE — PROGRESS NOTES
Patient alert and oriented x4. Up x2 pivot to chair. C/o mild back pain, medicated per MAR. Surgical site CDI. Fall precautions in place, call light within reach, bed alarm on, bed in lowest position, and non skid socks on. VSS. Denies needs at this time.

## 2021-05-08 NOTE — PROGRESS NOTES
Patient alert and oriented x4. Up x2 pivot to chair. C/o hip pain, medicated per MAR. Surgical site CDI. neuro checks unchanged. Fall precautions in place, call light within reach, bed alarm on, bed in lowest position, and non skid socks on. VSS. Denies needs at this time.

## 2021-05-09 PROCEDURE — 99232 SBSQ HOSP IP/OBS MODERATE 35: CPT | Performed by: PHYSICAL MEDICINE & REHABILITATION

## 2021-05-09 PROCEDURE — 1280000000 HC REHAB R&B

## 2021-05-09 PROCEDURE — 6370000000 HC RX 637 (ALT 250 FOR IP): Performed by: PHYSICAL MEDICINE & REHABILITATION

## 2021-05-09 PROCEDURE — 94660 CPAP INITIATION&MGMT: CPT

## 2021-05-09 PROCEDURE — 6360000002 HC RX W HCPCS: Performed by: PHYSICAL MEDICINE & REHABILITATION

## 2021-05-09 RX ADMIN — AMLODIPINE BESYLATE 5 MG: 5 TABLET ORAL at 09:08

## 2021-05-09 RX ADMIN — HEPARIN SODIUM 5000 UNITS: 5000 INJECTION INTRAVENOUS; SUBCUTANEOUS at 14:26

## 2021-05-09 RX ADMIN — FLUOXETINE 20 MG: 20 CAPSULE ORAL at 09:07

## 2021-05-09 RX ADMIN — ACETAMINOPHEN 1000 MG: 500 TABLET, COATED ORAL at 09:07

## 2021-05-09 RX ADMIN — ATORVASTATIN CALCIUM 80 MG: 80 TABLET, FILM COATED ORAL at 21:00

## 2021-05-09 RX ADMIN — ACETAMINOPHEN 1000 MG: 500 TABLET, COATED ORAL at 16:45

## 2021-05-09 RX ADMIN — FAMOTIDINE 20 MG: 20 TABLET, FILM COATED ORAL at 21:00

## 2021-05-09 RX ADMIN — LEVETIRACETAM 500 MG: 500 TABLET ORAL at 09:07

## 2021-05-09 RX ADMIN — ACETAMINOPHEN 1000 MG: 500 TABLET, COATED ORAL at 23:00

## 2021-05-09 RX ADMIN — OXYCODONE HYDROCHLORIDE 5 MG: 5 TABLET ORAL at 23:24

## 2021-05-09 RX ADMIN — ASPIRIN 81 MG: 81 TABLET, CHEWABLE ORAL at 09:08

## 2021-05-09 RX ADMIN — THERA TABS 1 TABLET: TAB at 09:07

## 2021-05-09 RX ADMIN — THIAMINE HCL TAB 100 MG 100 MG: 100 TAB at 09:08

## 2021-05-09 RX ADMIN — HEPARIN SODIUM 5000 UNITS: 5000 INJECTION INTRAVENOUS; SUBCUTANEOUS at 06:29

## 2021-05-09 RX ADMIN — DEXTRAN 70, AND HYPROMELLOSE 2910 1 DROP: 1; 3 SOLUTION/ DROPS OPHTHALMIC at 21:00

## 2021-05-09 RX ADMIN — ACETAMINOPHEN 1000 MG: 500 TABLET, COATED ORAL at 05:00

## 2021-05-09 RX ADMIN — FAMOTIDINE 20 MG: 20 TABLET, FILM COATED ORAL at 09:07

## 2021-05-09 RX ADMIN — LEVETIRACETAM 500 MG: 500 TABLET ORAL at 21:00

## 2021-05-09 RX ADMIN — HEPARIN SODIUM 5000 UNITS: 5000 INJECTION INTRAVENOUS; SUBCUTANEOUS at 22:00

## 2021-05-09 ASSESSMENT — PAIN DESCRIPTION - FREQUENCY
FREQUENCY: INTERMITTENT
FREQUENCY: INTERMITTENT

## 2021-05-09 ASSESSMENT — PAIN SCALES - GENERAL
PAINLEVEL_OUTOF10: 0
PAINLEVEL_OUTOF10: 2
PAINLEVEL_OUTOF10: 1
PAINLEVEL_OUTOF10: 7
PAINLEVEL_OUTOF10: 2
PAINLEVEL_OUTOF10: 4

## 2021-05-09 ASSESSMENT — PAIN DESCRIPTION - ONSET
ONSET: ON-GOING
ONSET: ON-GOING

## 2021-05-09 ASSESSMENT — PAIN DESCRIPTION - LOCATION
LOCATION: BACK
LOCATION: SHOULDER

## 2021-05-09 ASSESSMENT — PAIN DESCRIPTION - DESCRIPTORS
DESCRIPTORS: ACHING
DESCRIPTORS: ACHING

## 2021-05-09 ASSESSMENT — PAIN DESCRIPTION - ORIENTATION
ORIENTATION: MID
ORIENTATION: LEFT

## 2021-05-09 ASSESSMENT — PAIN DESCRIPTION - PROGRESSION
CLINICAL_PROGRESSION: GRADUALLY WORSENING
CLINICAL_PROGRESSION: GRADUALLY WORSENING

## 2021-05-09 ASSESSMENT — PAIN DESCRIPTION - PAIN TYPE
TYPE: ACUTE PAIN
TYPE: ACUTE PAIN

## 2021-05-09 NOTE — PLAN OF CARE
Problem: Pain:  Goal: Control of acute pain  Description: Control of acute pain  Outcome: Ongoing  Note: Patient denies any pain or discomfort at this time. Receives acetaminophen on a scheduled basis and states she is satisfied with pain medication regimen. Problem: Falls - Risk of:  Goal: Will remain free from falls  Description: Will remain free from falls  Outcome: Ongoing  Note: Patient has remained free of falls this shift. Room free of clutter, bed in low position, call light and personal items are within reach     Problem: Skin Integrity:  Goal: Absence of new skin breakdown  Description: Absence of new skin breakdown  Outcome: Ongoing  Note: No evidence of new skin breakdown noted this shift. Patient assisted with shower with no new issues noted.

## 2021-05-09 NOTE — PROGRESS NOTES
Assessment complete. VSS and patient is afebrile. Alert and oriented X 4. Pleasant and cooperative with care. Continent of bladder this shift. Patient assisted in shower and was a maximum assist with bathing and dressing. Splint on at left hand this time. Patient compliant with splint schedule and helmet use when out of bed. Denies any pain or discomfort at this time. Bed in low position and call light within reach.

## 2021-05-09 NOTE — PROGRESS NOTES
Patient alert and oriented x4. Up x1-2 pivot to chair. No c/o pain at this time. Surgical site CDI. Neuro checks unchanged. Fall precautions in place, call light within reach, bed alarm on, bed in lowest position, and non skid socks on. VSS. Denies needs at this time.

## 2021-05-09 NOTE — PROGRESS NOTES
Department of Physical Medicine & Rehabilitation  Progress Note    Patient Identification:  Abelino Ashraf  [id number]  : 1970  Admit date: 2021    Chief Complaint: CVA    Subjective:   No new complaints over the weekend. She is able to have 1/5 movement in her left ARM. Improving! ROS: No f/c, n/v, cp     Objective:  Patient Vitals for the past 24 hrs:   BP Temp Temp src Pulse Resp SpO2   21 0103     18    21 2231     18    21 2143 101/67 98.4 °F (36.9 °C) Oral 73 18 96 %   21 1015 119/78 98.2 °F (36.8 °C) Oral 85 18 95 %     Const: Alert. No distress, pleasant. HEENT: Normocephalic, atraumatic. Normal sclera/conjunctiva. MMM. Incision healing well  CV: Regular rate and rhythm. Resp: No respiratory distress. Lungs CTAB. Abd: Soft, nontender, nondistended, NABS+   Ext: No edema. Neuro: Alert, oriented, appropriately interactive. Psych: Cooperative, appropriate mood and affect    Laboratory data: Available via EMR. Last 24 hour lab  No results found for this or any previous visit (from the past 24 hour(s)). Therapy progress:  PT  Position Activity Restriction  Other position/activity restrictions: Ambulate, Up with assist, Pt to wear helmet when OOB, OK to remove for shower, HOB to elevated at 30*  Objective     Sit to Stand: (RW <> standing with CGA from therapist)  Stand to sit: Moderate Assistance  Bed to Chair: Moderate assistance(stand pivot to R, VC to perform hip flexion.)     OT  PT Equipment Recommendations  Equipment Needed: Yes(Will continue to assess equipment needs pending progress)  Other: 20\" hemiheight w/c with drop arm and standard 90 degree leg rest  Toilet - Technique: Stand pivot  Equipment Used: Standard toilet  Toilet Transfers Comments: VCs for hand placement, assist for controlled descent  Assessment        SLP  Diet Solids Recommendation: Dysphagia Soft and Bite-Sized (Dysphagia III)(with regular solids as tolerated.  Per pt request, gravy/sauces to keep for moist.)       Body mass index is 36.58 kg/m². Assessment and Plan:  Mikayla Martínez a 46 y.o. female with PMH GERD p/w L sided weakness and facial droop.  CTA head/neck on 4/12 revealed occluded R cervical ICA, occluded right LEAH, near complete occlusion of right MCA.     Acute ischemic CVA, Subdural heomorrhage s/p right hemicraniectomy and subsequen SAH and IP hemorrhage  No tPA given.  MRI 4/14 showing large subacute infarct throughout R LEAH and MCA with some areas of hemorrhagic conversion.  - Neurosurgery and neurology following  --SBP <160, PRN hydralazine, scheduled Norvasc  -- Keppra 500 mg twice daily   -  SQH  - PT/OT consulted      Aphasia- SLP consulted   Depression-Patient takes fluoxetine 20 mg daily at home  Anxiety -Patient takes Ativan 0.5 mg every 8 hours as needed for anxiety- holding  MISHA-Patient uses CPAP at home   GERD- Patient takes omeprazole 40 mg daily at home- half dose  Obesity    Rehab Progress: Improving  Anticipated Dispo: home  Services/DME: [facility]  ELOS: LIANG Ca D.O. M.P.H  PM&R  5/9/2021  7:37 AM

## 2021-05-10 LAB
ANION GAP SERPL CALCULATED.3IONS-SCNC: 13 MMOL/L (ref 3–16)
BASOPHILS ABSOLUTE: 0.1 K/UL (ref 0–0.2)
BASOPHILS RELATIVE PERCENT: 1.2 %
BUN BLDV-MCNC: 11 MG/DL (ref 7–20)
CALCIUM SERPL-MCNC: 9.8 MG/DL (ref 8.3–10.6)
CHLORIDE BLD-SCNC: 103 MMOL/L (ref 99–110)
CO2: 23 MMOL/L (ref 21–32)
CREAT SERPL-MCNC: 0.5 MG/DL (ref 0.6–1.1)
EOSINOPHILS ABSOLUTE: 0.5 K/UL (ref 0–0.6)
EOSINOPHILS RELATIVE PERCENT: 4.2 %
GFR AFRICAN AMERICAN: >60
GFR NON-AFRICAN AMERICAN: >60
GLUCOSE BLD-MCNC: 85 MG/DL (ref 70–99)
HCT VFR BLD CALC: 40.2 % (ref 36–48)
HEMOGLOBIN: 13.7 G/DL (ref 12–16)
LYMPHOCYTES ABSOLUTE: 4.8 K/UL (ref 1–5.1)
LYMPHOCYTES RELATIVE PERCENT: 37.1 %
MCH RBC QN AUTO: 35.2 PG (ref 26–34)
MCHC RBC AUTO-ENTMCNC: 34 G/DL (ref 31–36)
MCV RBC AUTO: 103.6 FL (ref 80–100)
MONOCYTES ABSOLUTE: 1.2 K/UL (ref 0–1.3)
MONOCYTES RELATIVE PERCENT: 9.5 %
NEUTROPHILS ABSOLUTE: 6.2 K/UL (ref 1.7–7.7)
NEUTROPHILS RELATIVE PERCENT: 48 %
PDW BLD-RTO: 14 % (ref 12.4–15.4)
PLATELET # BLD: 394 K/UL (ref 135–450)
PMV BLD AUTO: 8.6 FL (ref 5–10.5)
POTASSIUM REFLEX MAGNESIUM: 4.2 MMOL/L (ref 3.5–5.1)
RBC # BLD: 3.88 M/UL (ref 4–5.2)
SODIUM BLD-SCNC: 139 MMOL/L (ref 136–145)
WBC # BLD: 12.9 K/UL (ref 4–11)

## 2021-05-10 PROCEDURE — 6370000000 HC RX 637 (ALT 250 FOR IP): Performed by: PHYSICAL MEDICINE & REHABILITATION

## 2021-05-10 PROCEDURE — 97110 THERAPEUTIC EXERCISES: CPT

## 2021-05-10 PROCEDURE — 94660 CPAP INITIATION&MGMT: CPT

## 2021-05-10 PROCEDURE — 99232 SBSQ HOSP IP/OBS MODERATE 35: CPT | Performed by: PHYSICAL MEDICINE & REHABILITATION

## 2021-05-10 PROCEDURE — 36415 COLL VENOUS BLD VENIPUNCTURE: CPT

## 2021-05-10 PROCEDURE — 97129 THER IVNTJ 1ST 15 MIN: CPT

## 2021-05-10 PROCEDURE — 97530 THERAPEUTIC ACTIVITIES: CPT

## 2021-05-10 PROCEDURE — 97535 SELF CARE MNGMENT TRAINING: CPT

## 2021-05-10 PROCEDURE — 6360000002 HC RX W HCPCS: Performed by: PHYSICAL MEDICINE & REHABILITATION

## 2021-05-10 PROCEDURE — 92526 ORAL FUNCTION THERAPY: CPT

## 2021-05-10 PROCEDURE — 80048 BASIC METABOLIC PNL TOTAL CA: CPT

## 2021-05-10 PROCEDURE — 85025 COMPLETE CBC W/AUTO DIFF WBC: CPT

## 2021-05-10 PROCEDURE — 97130 THER IVNTJ EA ADDL 15 MIN: CPT

## 2021-05-10 PROCEDURE — 1280000000 HC REHAB R&B

## 2021-05-10 RX ADMIN — ATORVASTATIN CALCIUM 80 MG: 80 TABLET, FILM COATED ORAL at 20:46

## 2021-05-10 RX ADMIN — ACETAMINOPHEN 1000 MG: 500 TABLET, COATED ORAL at 11:15

## 2021-05-10 RX ADMIN — HEPARIN SODIUM 5000 UNITS: 5000 INJECTION INTRAVENOUS; SUBCUTANEOUS at 14:38

## 2021-05-10 RX ADMIN — HEPARIN SODIUM 5000 UNITS: 5000 INJECTION INTRAVENOUS; SUBCUTANEOUS at 20:46

## 2021-05-10 RX ADMIN — ACETAMINOPHEN 1000 MG: 500 TABLET, COATED ORAL at 05:30

## 2021-05-10 RX ADMIN — FAMOTIDINE 20 MG: 20 TABLET, FILM COATED ORAL at 20:46

## 2021-05-10 RX ADMIN — HEPARIN SODIUM 5000 UNITS: 5000 INJECTION INTRAVENOUS; SUBCUTANEOUS at 06:34

## 2021-05-10 RX ADMIN — ACETAMINOPHEN 1000 MG: 500 TABLET, COATED ORAL at 20:45

## 2021-05-10 RX ADMIN — LEVETIRACETAM 500 MG: 500 TABLET ORAL at 07:59

## 2021-05-10 RX ADMIN — LEVETIRACETAM 500 MG: 500 TABLET ORAL at 20:46

## 2021-05-10 RX ADMIN — ASPIRIN 81 MG: 81 TABLET, CHEWABLE ORAL at 07:59

## 2021-05-10 RX ADMIN — AMLODIPINE BESYLATE 5 MG: 5 TABLET ORAL at 07:59

## 2021-05-10 RX ADMIN — FAMOTIDINE 20 MG: 20 TABLET, FILM COATED ORAL at 07:59

## 2021-05-10 RX ADMIN — OXYCODONE 10 MG: 5 TABLET ORAL at 20:46

## 2021-05-10 RX ADMIN — THERA TABS 1 TABLET: TAB at 07:59

## 2021-05-10 RX ADMIN — FLUOXETINE 20 MG: 20 CAPSULE ORAL at 07:59

## 2021-05-10 RX ADMIN — THIAMINE HCL TAB 100 MG 100 MG: 100 TAB at 07:59

## 2021-05-10 ASSESSMENT — PAIN DESCRIPTION - DESCRIPTORS
DESCRIPTORS: SHARP;SHOOTING
DESCRIPTORS: DISCOMFORT

## 2021-05-10 ASSESSMENT — PAIN DESCRIPTION - PROGRESSION
CLINICAL_PROGRESSION: NOT CHANGED
CLINICAL_PROGRESSION: GRADUALLY WORSENING
CLINICAL_PROGRESSION: GRADUALLY IMPROVING

## 2021-05-10 ASSESSMENT — PAIN DESCRIPTION - ORIENTATION
ORIENTATION: MID;LOWER
ORIENTATION: LEFT
ORIENTATION: LEFT

## 2021-05-10 ASSESSMENT — PAIN SCALES - GENERAL
PAINLEVEL_OUTOF10: 0
PAINLEVEL_OUTOF10: 8
PAINLEVEL_OUTOF10: 1
PAINLEVEL_OUTOF10: 0
PAINLEVEL_OUTOF10: 1

## 2021-05-10 ASSESSMENT — PAIN DESCRIPTION - FREQUENCY
FREQUENCY: INTERMITTENT
FREQUENCY: INTERMITTENT

## 2021-05-10 ASSESSMENT — PAIN DESCRIPTION - PAIN TYPE: TYPE: CHRONIC PAIN

## 2021-05-10 ASSESSMENT — PAIN DESCRIPTION - LOCATION
LOCATION: BUTTOCKS
LOCATION: HIP
LOCATION: BACK

## 2021-05-10 ASSESSMENT — PAIN DESCRIPTION - ONSET
ONSET: ON-GOING
ONSET: ON-GOING

## 2021-05-10 ASSESSMENT — PAIN - FUNCTIONAL ASSESSMENT: PAIN_FUNCTIONAL_ASSESSMENT: ACTIVITIES ARE NOT PREVENTED

## 2021-05-10 NOTE — PROGRESS NOTES
Physical Therapy  Facility/Department: [facility] ACUTE REHAB UNIT  Daily Treatment Note  NAME: Neno Peterson  : 1970  MRN: [id number]    Date of Service: 5/10/2021    Discharge Recommendations:  24 hour supervision or assist, Home with Home health PT   PT Equipment Recommendations  Equipment Needed: Yes(Will continue to assess equipment needs pending progress)  Other: 20\" hemiheight w/c with drop arm and standard 90 degree leg rest    Assessment   Body structures, Functions, Activity limitations: Decreased functional mobility ; Decreased sensation;Decreased ROM; Decreased strength;Decreased endurance;Decreased balance;Decreased posture;Decreased vision/visual deficit; Decreased coordination;Decreased cognition;Decreased fine motor control;Decreased safe awareness  Assessment: Pt is progressing in ability to perform sit to stand noted by less L LE hyperextension than previous sessions. Pt's sensation in L LE is changing noted by report and pt increased pain in L groin region. Pt is well below baseline and would benefit from continued skilled therapy to maximize her potential and  increase her functional mobility  towards Ind to  allow for a safer d/c to home. Treatment Diagnosis: Decreased independence with functional mobility. Prognosis: Good  Decision Making: High Complexity  PT Education: Transfer Training;Weight-bearing Education; Functional Mobility Training; Adaptive Device Training;Energy Conservation;General Safety  Barriers to Learning: Cognition  REQUIRES PT FOLLOW UP: Yes  Activity Tolerance  Activity Tolerance: Patient limited by fatigue;Patient limited by endurance     Patient Diagnosis(es): There were no encounter diagnoses. has a past medical history of GERD (gastroesophageal reflux disease), Hernia, Obesity, and Unspecified sleep apnea. has a past surgical history that includes Splenectomy (); Total hip arthroplasty (); Lap Band (); hernia repair ();   section (); and R this session. Pt needed  Min-Mod A req VCs for hand placement, scooting to edge of w/c and to perform B hip flexion. Pt performed 2 prolonged stands at [employer] walker. Pt remained in stance for ~ 5 minutes each bout. One therapist assisted with supporting L UE on MEGAN walker platform. Pt L LE was stabilized by PT to decrease hyperextension. Pt was facilitated to perform WS to R and L by PT in preparation for stepping. Pt stepped forward and backwards with L and R LE during stance. Pt needed VC's for erect trunk posture, stabilization at L Le and facilitation to perform WS. Pt was also assisted with L LE advancement by therapist and through application of a tenso sock to decrease L forefoot friction. Pt reported difficulty with the task. Pt needed VC's to manage L UE with R UE for stand to sit transfer Ciera (MEGAN > w/c). Pt was assisted with stand pivot transfer (w/c > bed ) with Littie Sera Cari Nba, Vc's to perform B hip flexion. Pt performed Sit to supine transfer , with Vc's for sequencing and assist to manage L LE, Ciera. Pt was left in bed with bed alarm on, call light with reach and all needs met. G-Code     OutComes Score                                                     AM-PAC Score             Goals  Short term goals  Time Frame for Short term goals: 2 weeks  Short term goal 1: Pt will complete bed mobility with MIN A. Ongoing  Short term goal 2: Pt will transfer sit <> stand with MIN A, met 4/30/2021  Short term goal 3: Pt will transfer bed <> chair with MIN A. Ongoing  Short term goal 4: Pt will propel the w/c 48' with SBA. met 4/30/2021  Short term goal 5: Pt will ambulate 5' with LRAD and MOD A. Ongoing  Long term goals  Time Frame for Long term goals : 4 Weeks  Long term goal 1: Pt will complete bed mobility with SBA. Ongoing  Long term goal 2: Pt will transfer sit <> stand with SBA. Ongoing  Long term goal 3: Pt will transfer bed <> chair with SBA.  Ongoing  Long term goal 4: Pt will propel the w/c 150' independently. Ongoing  Long term goal 5: Pt will ambulate 22' with LRAD and MIN A.  Ongoing  Patient Goals   Patient goals : Return home and back to caring for her son    Plan    Plan  Times per week: 5x/wk for 60 minutes/day  Times per day: Daily  Current Treatment Recommendations: Strengthening, ROM, Balance Training, Endurance Training, Functional Mobility Training, Transfer Training, Gait Training, Safety Education & Training, Home Exercise Program, Patient/Caregiver Education & Training, Neuromuscular Re-education, Stair training  Safety Devices  Type of devices: Call light within reach, Chair alarm in place, Left in chair, All fall risk precautions in place     Therapy Time   Individual Concurrent Group Co-treatment   Time In 1000         Time Out 1030         Minutes 30              Second Session Therapy Time:   Individual Concurrent Group Co-treatment   Time In      1245   Time Out      1355   Minutes      70     Timed Code Treatment Minutes:  30 + 70    Total Treatment Minutes:  [id number] [facility] [place], [facility]

## 2021-05-10 NOTE — PROGRESS NOTES
Occupational Therapy  Facility/Department: [facility] ACUTE REHAB UNIT  Daily Treatment Note  NAME: Colton Sutherland  : 1970  MRN: [id number]    Date of Service: 5/10/2021    Discharge Recommendations:  24 hour supervision or assist, Home with Home health OT, Home with nursing aide, Continue to assess pending progress  OT Equipment Recommendations  Equipment Needed: Yes  ADL Assistive Devices: Transfer Tub Bench;Grab Bars - toilet  Other: continue to assess    Assessment   Performance deficits / Impairments: Decreased functional mobility ; Decreased ADL status; Decreased ROM; Decreased strength;Decreased sensation;Decreased fine motor control;Decreased endurance;Decreased posture;Decreased balance;Decreased safe awareness;Decreased coordination;Decreased vision/visual deficit; Decreased cognition  Assessment: Pt performed multiple bouts of prolonged stance to promote WSing and advancement of BLEs in order to increase independence with functional mobility. Pt required signigicant assist with WSing and to maintain neutral midline standing position as pt tends to rotate at hips. Pt continues to req VCs and facilitation for ant lean with STS transfers but is improving with eccentric control upon stand<sit transfers. Pt functioning significantly below baseline, cont per OT POC. Treatment Diagnosis: decreased independence with ADLs and decreased functional mobility 2/2 CVA  Prognosis: Fair  OT Education: Plan of Care;Transfer Training;Precautions  Patient Education: visual scanning to L with w/c propulsion-cont to reinforce  Barriers to Learning: cognition  REQUIRES OT FOLLOW UP: Yes  Activity Tolerance  Activity Tolerance: Patient Tolerated treatment well  Safety Devices  Safety Devices in place: Yes  Type of devices: Call light within reach; Chair alarm in place; Left in chair; All fall risk precautions in place         Patient Diagnosis(es): There were no encounter diagnoses.       has a past medical history of GERD (gastroesophageal reflux disease), Hernia, Obesity, and Unspecified sleep apnea. has a past surgical history that includes Splenectomy (); Total hip arthroplasty (); Lap Band (); hernia repair ();  section (); and craniotomy (Right, 2021). Restrictions  Position Activity Restriction  Other position/activity restrictions: Ambulate, Up with assist, Pt to wear helmet when OOB, OK to remove for shower, HOB to elevated at 30*  Subjective   General  Chart Reviewed: Yes  Patient assessed for rehabilitation services?: Yes  Additional Pertinent Hx: 46 y.o. female with hypertension and tobacco abuse who presented after spending a prolonged period on the ground s/p fall out of bed; found to have acute left hemiparesis and gaze deviation. CXR with no acute cardiopulmonary abnormality. CTH revealed large acute/subacute infarct in the R frontal lobe with associated mass effect and 4 mm R to L midline shift. It also noted subdural hemorrhage of 6mm along falx, although NSGY less convinced. CTA head and neck revealed occluded R cervical ICA & R anterior cerebral artery with near complete occlusion of R MCA. Outside window on TPA. Pt is now POD #1 following hemicraniectomy (). Pt admitted to ARU   Response to previous treatment: Patient with no complaints from previous session  Family / Caregiver Present: Yes(friend, cousin and nephew (two separate instances))  Referring Practitioner: Dr. Rosana Garcia DO  Diagnosis: CVA  Subjective  Subjective: Pt sitting in w/c upon arrival with friend present, pleasant and agreeable to OT session. General Comment  Comments: Sueellen Payment   Vital Signs  Patient Currently in Pain: Denies     Orientation  Orientation  Overall Orientation Status: Within Functional Limits  Objective    ADL  Feeding: Setup(to open containers)  Instrumental ADL's  Instrumental ADLs: Yes  Light Housekeeping  Light Housekeeping Level: Wheelchair  Light Housekeeping Level of Assistance: Contact guard assistance  Light Housekeeping: Pt completed item retrieval from fridge to retrieve personal meal that had  and transport to the trash to empty the contents and then transport to the sink in order to wash the container; pt able to locate fridge and sink via 180 degree turn as pt not i'ly turning head to L, pt able to locate and retrieve container from fridge using RUE with spvn asssist from w/c level; pt then transported to trashcan with + time to locate, pt then washed container with setup to open dish detergent, pt able to complete using washcloth with RUE and dry with setup and use of RUE     Functional Mobility  Functional - Mobility Device: Wheelchair  Activity: Other(to/from dining room)  Assist Level: Minimal assistance  Functional Mobility Comments: Pt completed wayfinding task to/from dining room without cues to locate room on way back from dining room; pt ran into door frame on L with minimal attempts to problem solve of look L to identify barrier until cued, pt req VCs to sequence how to fix error, pt propelled self using RUE and RLE but went passed room located on pt's left despite cues to read and scan all signs, pt demo no attempt to turn head to L, once approached end of hallway pt realized she needed to turn around and identified that she missed it because it was on her left                             Cognition  Overall Cognitive Status: Exceptions  Arousal/Alertness: Appropriate responses to stimuli  Following Commands:  Follows one step commands consistently  Attention Span: Appears intact  Memory: Appears intact  Safety Judgement: Decreased awareness of need for assistance;Decreased awareness of need for safety  Problem Solving: Assistance required to generate solutions;Assistance required to correct errors made;Assistance required to implement solutions;Decreased awareness of errors  Insights: Decreased awareness of deficits  Initiation: Requires cues for some  Sequencing: Requires cues for some  Cognition Comment: Pt with difficulty i'ly initiating head turns to L during visual scanning and wayfinding activity req VCs and assist to problem solve when having difficulty with navigating environmental barriers                    Type of ROM/Therapeutic Exercise  Type of ROM/Therapeutic Exercise: PROM  Exercises  Shoulder Flexion: x 3 with 10 second hold within pain free range  Shoulder ABduction: x 3 with 10 second hold within pain free range  Elbow Extension: x 5 PROM  Wrist Flexion: x5  Finger Extension: x5 to all digits       Second Session: Pt sitting in bedside recliner upon arrival, pleasant and agreeable to OT/PT cotx session pt requesting to use bathroom. Stand pivot from w/c to toilet with Mod A and total A of another to manage LB clothing off hips. Pt observed to be incontinent of urine req total A to doff soiled brief and to don new brief with pt seated on toilet. + time in attempt to have BM however pt unsuccessful. Mod A to maintain stance with pt assisting to manage LB clothing on R side however req assist of another to manage over hips in back and L side. Pt washed hands seated in w/ with CGA to assist with positioning of LUE and setup of paper towels. Pt completed multiple STS transfers from w/ to [facility] and to [facility] on R side with Min-Mod A req VCs for hand placement, scooting to edge of w and for ant lean. Pt performed 2 x prolonged stance at [facility] with assist to position LUE on platform of [facility] and Min A to maintain stance. Pt req significant facilitation at hips and LLE to promote WSing to assist with advancement of LLE to step fwd and backwards with use of sock placed on forefoot of LLE. Pt demonstrated difficulty maintaining neutral positioning of hips as pt tended to rotate towards L, multiple tactics performed encouraging pt to WS and rotate towards R with scanning to R and behind pt. Cues req throughout for upright posture and sh retraction.  Pt tolerated ~5 minutes in stance for each trial and reported significant challenge with task. Pt completed one bout of WSing and stepping fwd/bkwd with RUE placed on R side handrail again req facilitation for WSing and to advance LLE and prevent hyperextension. Pt was instructed to utilize RUE to manage LUE prior to sitting, cues fo rhand placement and eccentric control, pt completing with min A. Pt requesting to get back to bed. Stand pivot from w/c<EOB with Min-Mod A and cues for ant lean. Sit<supine with step by step VCs to sequence, assist to manage LLE. Pt able to reposition self in bed with cues and assist at trunk. Pt left in bed at end of session with bed alarm engaged, call light within reach and all needs met.        Plan   Plan  Times per week: 5x a week for 60 mins daily  Times per day: Daily  Current Treatment Recommendations: Strengthening, Endurance Training, Neuromuscular Re-education, Self-Care / ADL, Functional Mobility Training, Safety Education & Training, ROM, Positioning, Wheelchair Mobility Training, Balance Training, Patient/Caregiver Education & Training, Manual Therapy:  MLD, Equipment Evaluation, Education, & procurement, Home Management Training, Cognitive Reorientation    Goals  Short term goals  Time Frame for Short term goals: 2 weeks  Short term goal 1: Pt will complete toilet transfer w/ Mod A-goal met 5/4  Short term goal 2: Pt will complete UE dressing w/ Mod A-ongoing  Short term goal 3: Pt will complete oral care w/ spvn seated at sink w/o VCs for L sided attention-ongoing  Short term goal 4: Pt will complete LE dressing w/ Mod A-ongoing  Long term goals  Time Frame for Long term goals : 4 weeks-all ongoing  Long term goal 1: Pt will complete toilet transfer w/ SBA  Long term goal 2: Pt will complete UE dressing w/ setup  Long term goal 3: Pt will complete LE dressing w/ SBA  Long term goal 4: Pt will be independent w/ tone management exercises to improve functional use of LUE  Long

## 2021-05-10 NOTE — PROGRESS NOTES
Department of Physical Medicine & Rehabilitation  Progress Note    Patient Identification:  Destinee Castro  [id number]  : 1970  Admit date: 2021    Chief Complaint: CVA    Subjective:   Doing well today. She is participating well in therapy and wants to continue to get better. Very motivated. Improving with therapy. ROS: No f/c, n/v, cp     Objective:  Patient Vitals for the past 24 hrs:   BP Temp Temp src Pulse Resp SpO2 Weight   05/10/21 0757 114/68 98 °F (36.7 °C) Oral 83 18 95 %    05/10/21 0530       226 lb 3.1 oz (102.6 kg)   05/10/21 0132     18     21 1959 117/70 98.7 °F (37.1 °C) Oral 85 18 96 %      Const: Alert. No distress, pleasant. HEENT: Normocephalic, atraumatic. Normal sclera/conjunctiva. MMM. Incision healing well  CV: Regular rate and rhythm. Resp: No respiratory distress. Lungs CTAB. Abd: Soft, nontender, nondistended, NABS+   Ext: No edema. Neuro: Alert, oriented, appropriately interactive. Psych: Cooperative, appropriate mood and affect    Laboratory data: Available via EMR.    Last 24 hour lab  Recent Results (from the past 24 hour(s))   Basic Metabolic Panel w/ Reflex to MG    Collection Time: 05/10/21  6:08 AM   Result Value Ref Range    Sodium 139 136 - 145 mmol/L    Potassium reflex Magnesium 4.2 3.5 - 5.1 mmol/L    Chloride 103 99 - 110 mmol/L    CO2 23 21 - 32 mmol/L    Anion Gap 13 3 - 16    Glucose 85 70 - 99 mg/dL    BUN 11 7 - 20 mg/dL    CREATININE 0.5 (L) 0.6 - 1.1 mg/dL    GFR Non-African American >60 >60    GFR African American >60 >60    Calcium 9.8 8.3 - 10.6 mg/dL   CBC auto differential    Collection Time: 05/10/21  6:08 AM   Result Value Ref Range    WBC 12.9 (H) 4.0 - 11.0 K/uL    RBC 3.88 (L) 4.00 - 5.20 M/uL    Hemoglobin 13.7 12.0 - 16.0 g/dL    Hematocrit 40.2 36.0 - 48.0 %    .6 (H) 80.0 - 100.0 fL    MCH 35.2 (H) 26.0 - 34.0 pg    MCHC 34.0 31.0 - 36.0 g/dL    RDW 14.0 12.4 - 15.4 %    Platelets 428 752 - 198 K/uL MPV 8.6 5.0 - 10.5 fL    Neutrophils % 48.0 %    Lymphocytes % 37.1 %    Monocytes % 9.5 %    Eosinophils % 4.2 %    Basophils % 1.2 %    Neutrophils Absolute 6.2 1.7 - 7.7 K/uL    Lymphocytes Absolute 4.8 1.0 - 5.1 K/uL    Monocytes Absolute 1.2 0.0 - 1.3 K/uL    Eosinophils Absolute 0.5 0.0 - 0.6 K/uL    Basophils Absolute 0.1 0.0 - 0.2 K/uL       Therapy progress:  PT  Position Activity Restriction  Other position/activity restrictions: Ambulate, Up with assist, Pt to wear helmet when OOB, OK to remove for shower, HOB to elevated at 30*  Objective     Sit to Stand: (RW <> standing with CGA from therapist)  Stand to sit: Moderate Assistance  Bed to Chair: Moderate assistance(stand pivot to R, VC to perform hip flexion.)     OT  PT Equipment Recommendations  Equipment Needed: Yes(Will continue to assess equipment needs pending progress)  Other: 20\" hemiheight w/c with drop arm and standard 90 degree leg rest  Toilet - Technique: Stand pivot  Equipment Used: Standard toilet  Toilet Transfers Comments: VCs for hand placement, assist for controlled descent  Assessment        SLP  Diet Solids Recommendation: Dysphagia Soft and Bite-Sized (Dysphagia III)(with regular solids as tolerated. Per pt request, gravy/sauces to keep for moist.)       Body mass index is 36.51 kg/m². Assessment and Plan:  Karen Kasper a 46 y.o. female with PMH GERD p/w L sided weakness and facial droop.  CTA head/neck on 4/12 revealed occluded R cervical ICA, occluded right LEAH, near complete occlusion of right MCA.     Acute ischemic CVA, Subdural heomorrhage s/p right hemicraniectomy and subsequen SAH and IP hemorrhage  No tPA given.  MRI 4/14 showing large subacute infarct throughout R LEAH and MCA with some areas of hemorrhagic conversion.  - Neurosurgery and neurology following  --SBP <160, PRN hydralazine, scheduled Norvasc  -- Keppra 500 mg twice daily   -  SQH  - PT/OT consulted      Aphasia- SLP consulted   Depression-Patient takes fluoxetine 20 mg daily at home  Anxiety -Patient takes Ativan 0.5 mg every 8 hours as needed for anxiety- holding  MISHA-Patient uses CPAP at home   GERD- Patient takes omeprazole 40 mg daily at home- half dose  Obesity    Rehab Progress: Improving  Anticipated Dispo: home  Services/DME: [facility]  ELOS: LIANG Cheatham D.O. MEdgarP.H  PM&R  5/10/2021  9:45 AM

## 2021-05-10 NOTE — PROGRESS NOTES
Shift assessment complete. VSS. A&Ox4. Denies pain at this time. Fall precautions in place. Patient up to chair for breakfast, chair alarm utilized, call light within reach, bedside table within reach.     Vitals:    05/10/21 0757   BP: 114/68   Pulse: 83   Resp: 18   Temp: 98 °F (36.7 °C)   SpO2: 95%

## 2021-05-10 NOTE — PROGRESS NOTES
ACUTE REHAB UNIT  SPEECH/LANGUAGE PATHOLOGY      [x] Daily  [] Weekly Care Conference Note  [] Discharge    Patient:Laura Mccarthy      VCV:[birth year]  [facility]:[id number]  Rehab Dx/Hx: Acute CVA (cerebrovascular accident) ([facility] [address] [I63.9]    Precautions: [x] Aspiration  [x] Fall risk  [] Sternal  [] Seizure [] Hip  [] Weight Bearing [] Other  ST Dx: [] Aphasia  [] Dysarthria  [] Apraxia   [x] Oropharyngeal dysphagia [x] Cognitive Impairment  [] Other:   Date of Admit: 4/23/2021  Room #: 3101/3101-01  Date: 5/10/2021          Current Diet Order:Dietary Nutrition Supplements: Low Calorie High Protein Supplement  DIET GENERAL; Daily Fluid Restriction: 1500 ml   Recommended Form of Meds: PO  Compensatory Swallowing Strategies: Alternate solids and liquids, Upright as possible for all oral intake, Check for pocketing of food on the Left, Small bites/sips, Lingual sweep     Subjective: Pt admitted 4/12 after fall with left sided paralysis. Pt underwent craniectomy on 4/13 per Dr. Todd Quintanilla. Social/Functional History  Lives With: Spouse;Son( reported that son is diagnosed with Autism.)  Occupation: Full time employment  Type of occupation:  at [employer]"    FEES 4/14: IMPRESSIONS:   Pt presents w/ mild oropharyngeal dysphagia characterized by premature spillage of thin liquids to UES and pyriforms w/ intermittent deep penetration of laryngeal vestibule; adequate airway protection and complete clearance of all residue after swallow initiation. No aspiration w/ any trials. Timely swallow initiation w/ puree and regular solid consistencies; no oropharyngeal residue.    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 Adequate ab/adduction. Complete closure of TVFs upon adduction.      Dentition: Adequate  Vision  Vision: Impaired  Vision Exceptions: Visual field cut  Hearing  Hearing: Within functional limits Barriers toward progress: None towards stated goals      Date: 5/10/2021     Tx session 1   Total Timed Code Min 45   Total Treatment Minutes 68   Individual Treatment Minutes 68   Group Treatment Minutes 0   Co-Treat Minutes 0   Brief Exception: N/A   Pain None. Pain Intervention: [] RN notified  [] Repositioned  [] Intervention offered and patient declined  [x] N/A  [] Other:       Subjective:     Pt upright in chair and agreeable to therapy. Pt with helmet sitting on top of head; repositioned. Noted to have slight redness on forehead near hairline and along front of incision. RN present and aware. Oral care completed after meal.    Objective / Goals:    Patient will consume regular solids with timely mastication and no pocketing in L buccal cavity across 2 sessions. Chart review does not reveal any documented difficulty on current recommended diet level. Patient tolerated NMES via VitalStim placement 4a (targeting orbicularis oris, buccinator and superior pharyngeal constrictor) @ 2.5 mA on right, @ 6.5 x 8 minutes on left increased to 7.5 mA on left x 17 minutes (25 minutes total). At end of meal, SLP prompted pt to assess for pocketing and regular solid remained in left buccal region. Patient will consume PO without anterior spillage or overt pocketing across 2 sessions. Anterior spillage during talking only; noted x2. Pt with difficulty self feeding x2 during session with complete labial loss to chin with awareness. Pt will complete divided/alternating attention tasks with 85% accuracy given min cues. Min - mod cues to continue with PO; pt unable to adequately alternate between meal and conversational partner. Pt will accurately complete executive functioning tasks with min cues. Reduced attention to detail and cognitive planning/organization requiring moderate cues (visual and verbal). When another staff member entered room on right, pt was dependent for attending to task at hand.     Pt will attend to L visual field with min cues. Mod-max cues for left to right scanning for task completion despite rehearsal. Pt requiring only min cues during mealtime task for eye gaze to conversational partner. The patient required no cues to locate items on tray or on bathroom sink for functional tasks. Pt will participate in ongoing cognitive linguistic assessment. GOAL MET   Other areas targeted:    Education:   Education ongoing regarding rationale for tasks this date. Safety Devices: [x] Call light within reach  [x] Chair alarm activated and connected to nurse call light system  [] Bed alarm activated   [] Other:    Assessment:  Excellent participation in therapy tasks. Ongoing mild oral stage dysphagia complicated by attention impairments. Executive dysfunction with left neglect (with associated right head turn) persist.       Plan: Continue per POC.       Interventions used this date:  [] Speech/Language Treatment  [] Instruction in HEP  [x] Dysphagia Treatment [x] Cognitive Treatment   [] Other:    Discharge recommendations:  [] Home independently  [x] Home with assistance []  24 hour supervision  [] ECF [] Other  Continued Tx Upon Discharge: ? [x] Yes    [] No    [] TBD based on progress while on ARU     [] Vital Stim indicated     [] Other:   Estimated discharge date: Request for extension - approved through 5/11    Stevie Conner M.A., Dejuan  Speech-Language Pathologist

## 2021-05-11 PROCEDURE — 99233 SBSQ HOSP IP/OBS HIGH 50: CPT | Performed by: PHYSICAL MEDICINE & REHABILITATION

## 2021-05-11 PROCEDURE — 1280000000 HC REHAB R&B

## 2021-05-11 PROCEDURE — 97530 THERAPEUTIC ACTIVITIES: CPT

## 2021-05-11 PROCEDURE — 97129 THER IVNTJ 1ST 15 MIN: CPT

## 2021-05-11 PROCEDURE — 94660 CPAP INITIATION&MGMT: CPT

## 2021-05-11 PROCEDURE — 6360000002 HC RX W HCPCS: Performed by: PHYSICAL MEDICINE & REHABILITATION

## 2021-05-11 PROCEDURE — 92526 ORAL FUNCTION THERAPY: CPT

## 2021-05-11 PROCEDURE — 6370000000 HC RX 637 (ALT 250 FOR IP): Performed by: PHYSICAL MEDICINE & REHABILITATION

## 2021-05-11 PROCEDURE — 97130 THER IVNTJ EA ADDL 15 MIN: CPT

## 2021-05-11 RX ADMIN — HEPARIN SODIUM 5000 UNITS: 5000 INJECTION INTRAVENOUS; SUBCUTANEOUS at 13:46

## 2021-05-11 RX ADMIN — ACETAMINOPHEN 1000 MG: 500 TABLET, COATED ORAL at 21:19

## 2021-05-11 RX ADMIN — HEPARIN SODIUM 5000 UNITS: 5000 INJECTION INTRAVENOUS; SUBCUTANEOUS at 21:18

## 2021-05-11 RX ADMIN — FLUOXETINE 20 MG: 20 CAPSULE ORAL at 08:26

## 2021-05-11 RX ADMIN — ASPIRIN 81 MG: 81 TABLET, CHEWABLE ORAL at 08:25

## 2021-05-11 RX ADMIN — ATORVASTATIN CALCIUM 80 MG: 80 TABLET, FILM COATED ORAL at 21:18

## 2021-05-11 RX ADMIN — OXYCODONE 10 MG: 5 TABLET ORAL at 23:48

## 2021-05-11 RX ADMIN — AMLODIPINE BESYLATE 5 MG: 5 TABLET ORAL at 08:25

## 2021-05-11 RX ADMIN — THERA TABS 1 TABLET: TAB at 08:25

## 2021-05-11 RX ADMIN — HEPARIN SODIUM 5000 UNITS: 5000 INJECTION INTRAVENOUS; SUBCUTANEOUS at 06:52

## 2021-05-11 RX ADMIN — ACETAMINOPHEN 1000 MG: 500 TABLET, COATED ORAL at 10:40

## 2021-05-11 RX ADMIN — LEVETIRACETAM 500 MG: 500 TABLET ORAL at 21:18

## 2021-05-11 RX ADMIN — LEVETIRACETAM 500 MG: 500 TABLET ORAL at 08:25

## 2021-05-11 RX ADMIN — THIAMINE HCL TAB 100 MG 100 MG: 100 TAB at 08:25

## 2021-05-11 RX ADMIN — FAMOTIDINE 20 MG: 20 TABLET, FILM COATED ORAL at 21:18

## 2021-05-11 RX ADMIN — ACETAMINOPHEN 1000 MG: 500 TABLET, COATED ORAL at 16:40

## 2021-05-11 RX ADMIN — FAMOTIDINE 20 MG: 20 TABLET, FILM COATED ORAL at 08:25

## 2021-05-11 ASSESSMENT — PAIN DESCRIPTION - LOCATION
LOCATION: HIP
LOCATION: ARM
LOCATION: HIP
LOCATION: HEAD

## 2021-05-11 ASSESSMENT — PAIN DESCRIPTION - ORIENTATION
ORIENTATION: LEFT
ORIENTATION: ANTERIOR
ORIENTATION: LEFT
ORIENTATION: LEFT

## 2021-05-11 ASSESSMENT — PAIN SCALES - GENERAL
PAINLEVEL_OUTOF10: 0
PAINLEVEL_OUTOF10: 2
PAINLEVEL_OUTOF10: 0
PAINLEVEL_OUTOF10: 8
PAINLEVEL_OUTOF10: 0
PAINLEVEL_OUTOF10: 0
PAINLEVEL_OUTOF10: 2

## 2021-05-11 ASSESSMENT — PAIN DESCRIPTION - ONSET
ONSET: GRADUAL

## 2021-05-11 ASSESSMENT — PAIN DESCRIPTION - FREQUENCY
FREQUENCY: INTERMITTENT

## 2021-05-11 ASSESSMENT — PAIN - FUNCTIONAL ASSESSMENT
PAIN_FUNCTIONAL_ASSESSMENT: ACTIVITIES ARE NOT PREVENTED

## 2021-05-11 ASSESSMENT — PAIN DESCRIPTION - DIRECTION: RADIATING_TOWARDS: DOWN LEG

## 2021-05-11 ASSESSMENT — PAIN DESCRIPTION - DESCRIPTORS
DESCRIPTORS: SHOOTING;THROBBING
DESCRIPTORS: ACHING
DESCRIPTORS: ACHING

## 2021-05-11 ASSESSMENT — PAIN DESCRIPTION - PROGRESSION
CLINICAL_PROGRESSION: GRADUALLY WORSENING

## 2021-05-11 ASSESSMENT — PAIN DESCRIPTION - PAIN TYPE
TYPE: ACUTE PAIN

## 2021-05-11 NOTE — PROGRESS NOTES
ACUTE REHAB UNIT  SPEECH/LANGUAGE PATHOLOGY      [x] Daily  [x] Weekly Care Conference Note  [] Discharge    Patient:Laura Ozuna      GCB:[birth year]  CHI:[id number]  Rehab Dx/Hx: Acute CVA (cerebrovascular accident) ([facility] [address] [I63.9]    Precautions: [x] Aspiration  [x] Fall risk  [] Sternal  [] Seizure [] Hip  [] Weight Bearing [] Other  ST Dx: [] Aphasia  [] Dysarthria  [] Apraxia   [x] Oropharyngeal dysphagia [x] Cognitive Impairment  [] Other:   Date of Admit: 4/23/2021  Room #: 3101/3101-01  Date: 5/11/2021          Current Diet Order:Dietary Nutrition Supplements: Low Calorie High Protein Supplement  DIET GENERAL; Daily Fluid Restriction: 1500 ml   Recommended Form of Meds: PO  Compensatory Swallowing Strategies: Alternate solids and liquids, Upright as possible for all oral intake, Check for pocketing of food on the Left, Small bites/sips, Lingual sweep     Subjective: Pt admitted 4/12 after fall with left sided paralysis. Pt underwent craniectomy on 4/13 per Dr. Lisandra Buckley. Social/Functional History  Lives With: Spouse;Son( reported that son is diagnosed with Autism.)  Occupation: Full time employment  Type of occupation:  at [employer]"    FEES 4/14: IMPRESSIONS:   Pt presents w/ mild oropharyngeal dysphagia characterized by premature spillage of thin liquids to UES and pyriforms w/ intermittent deep penetration of laryngeal vestibule; adequate airway protection and complete clearance of all residue after swallow initiation. No aspiration w/ any trials. Timely swallow initiation w/ puree and regular solid consistencies; no oropharyngeal residue.    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 Adequate ab/adduction. Complete closure of TVFs upon adduction.      Dentition: Adequate  Vision  Vision: Impaired  Vision Exceptions: Visual field cut  Hearing  Hearing: Within functional limits Barriers toward progress: None towards stated goals      Date: 5/11/2021      Tx session 1 Tx session 2   Total Timed Code Min 45 0   Total Treatment Minutes 60 0   Individual Treatment Minutes 60 0   Group Treatment Minutes 0 0   Co-Treat Minutes 0 0   Brief Exception: N/A N/A   Pain None stated N/A   Pain Intervention: [] RN notified  [] Repositioned  [] Intervention offered and patient declined  [] N/A  [] Other:    [] RN notified  [] Repositioned  [] Intervention offered and patient declined  [] N/A  [] Other:    Subjective:     Pt upright in chair, alert, and agreeable. Friend x 2 intermittently present throughout the session. Objective / Goals:     Patient will consume regular solids with timely mastication and no pocketing in L buccal cavity across 2 sessions. Patient consumed durán with bread x 3 bites with sips of thin via cup in between. Mild-moderate L buccal residue post 1st bite when placed on pt's L side (pt utilized lingual sweeps/liqiud rinse to attempt oral clearance). Pt reports that she has always chewed on the L side of her mouth. Cued pt to place bite x 2 and 3 on the R side. No residue observed. Patient will consume PO without anterior spillage or overt pocketing across 2 sessions. No anterior spillage occurred with bites of regular and thin liquids by cup. Pt will complete divided/alternating attention tasks with 85% accuracy given min cues. Mod cues for sustained attention this date. Pt will accurately complete executive functioning tasks with min cues. Deductive reasoning puzzle completed with mod cues for deductive reasoning. Mod cues to cross off clues once solved to assist with improved organization of the information. Pt will attend to L visual field with min cues. Occ cues to read directions on paper tasks x 2. Mod cues for maintaining attention to speaker at midline.    Mod cues to connect the dots (paper task)      Pt will participate in ongoing cognitive linguistic assessment. GOAL MET    Other areas targeted:     Education:   Education ongoing regarding dysphagia strategies and visual spatial strategies. Safety Devices: [x] Call light within reach  [x] Chair alarm activated and connected to nurse call light system  [] Bed alarm activated   [] Other:  [] Call light within reach  [] Chair alarm activated and connected to nurse call light system  [] Bed alarm activated   [] Other:    Assessment:  Oral stage dysphagia with attention and carryover interfering. Executive function impairment with persistent left neglect. Off topic comments (greater towards the end of the session) consistent with known acute pragmatic impairment; pt perez well with humor. Pt with good insight into deficits and can verbalize when errors occur but poor self-monitoring and carryover of strategies noted within the moment. Plan: Continue per POC.       Interventions used this date:  [] Speech/Language Treatment  [] Instruction in HEP  [x] Dysphagia Treatment [x] Cognitive Treatment   [] Other:    Discharge recommendations:  [] Home independently  [x] Home with assistance []  24 hour supervision  [] ECF [] Other  Continued Tx Upon Discharge: ? [x] Yes    [] No    [] TBD based on progress while on ARU     [] Vital Stim indicated     [] Other:   Estimated discharge date: 5/15 with plans to request for extension     Treatment session 1:   Miguel Dumont MS-CCC-SLP [address]

## 2021-05-11 NOTE — PROGRESS NOTES
No new issues today. VSS on RA. Alert and oriented. C/O intermittent head and left arm pain at 2/10. Resolved with scheduled tylenol. Remains a 2 person assist pivot transfer for the BR, one person from chair to bed. Improving with transfers and stated that she feels she is getting stronger. Still flaccid on LUE/LLE. Helmet and left hand brace in place when up and tolerating both. Urine with strong odor, clear yellow. Asymptomatic. Dr Donny Ruiz aware. No new orders at this time. Participated in all therapies today.

## 2021-05-11 NOTE — CARE COORDINATION
Referred to patient for d/c planning. Met with friend Zohra Vaca. Long discussion about d/c planning. Amparo Tariq is currently trying to manage affairs for patient as  is managing their son and working full time. Zohra Vaca is planning to move in three weeks to [place]. Currently team is recommending 24 hour supervision on d/c. Discussed private duty vs. Possible SNF if needed. List of SNF facilities in network given including: [facility], [address], [facility]. Discussed tentative length of stay with planned extension. All questions answered. Will continue to follow for d/c needs.  Electronically signed by USMAN Warner, MASTER on 5/11/2021 at 12:10 PM

## 2021-05-11 NOTE — PLAN OF CARE
Problem: Pain:  Goal: Control of acute pain  Description: Control of acute pain  Outcome: Ongoing     Problem: Falls - Risk of:  Goal: Will remain free from falls  Description: Will remain free from falls  Outcome: Ongoing     Problem: Skin Integrity:  Goal: Will show no infection signs and symptoms  Description: Will show no infection signs and symptoms  Outcome: Ongoing     Problem: Skin Integrity:  Goal: Absence of new skin breakdown  Description: Absence of new skin breakdown  Outcome: Ongoing

## 2021-05-11 NOTE — PROGRESS NOTES
Department of Physical Medicine & Rehabilitation  Progress Note    Patient Identification:  Shell Singh  [id number]  : 1970  Admit date: 2021    Chief Complaint: CVA    Subjective:   Doing well today. Family here with multiple questions. Improving daily in gait . Left leg strength slowly showing improvement. ROS: No f/c, n/v, cp     Objective:  Patient Vitals for the past 24 hrs:   BP Temp Temp src Pulse Resp SpO2   21 0815 120/67 98.5 °F (36.9 °C) Oral 91 16 97 %   21 0015     16    05/10/21 2318     16    05/10/21 2028 123/65 98.1 °F (36.7 °C) Oral 87 16 97 %     Const: Alert. No distress, pleasant. HEENT: Normocephalic, atraumatic. Normal sclera/conjunctiva. MMM. Incision healing well  CV: Regular rate and rhythm. Resp: No respiratory distress. Lungs CTAB. Abd: Soft, nontender, nondistended, NABS+   Ext: No edema. Neuro: Alert, oriented, appropriately interactive. Psych: Cooperative, appropriate mood and affect    Laboratory data: Available via EMR. Last 24 hour lab  No results found for this or any previous visit (from the past 24 hour(s)).     Therapy progress:  PT  Position Activity Restriction  Other position/activity restrictions: Ambulate, Up with assist, Pt to wear helmet when OOB, OK to remove for shower, HOB to elevated at 30*  Objective     Sit to Stand: Minimal Assistance(mat table to standing with therapist, Vc's for sequencing, and orientation to midline)  Stand to sit: Minimal Assistance(assist for eccentric control, VC's to perform anterior trunk lean)  Bed to Chair: Moderate assistance(stand pivot to R, VC to perform hip flexion.)     OT  PT Equipment Recommendations  Equipment Needed: Yes(Will continue to assess equipment needs pending progress)  Other: 20\" hemiheight w/c with drop arm and standard 90 degree leg rest  Toilet - Technique: Stand pivot  Equipment Used: Standard toilet  Toilet Transfers Comments: VCs for hand placement,

## 2021-05-11 NOTE — PROGRESS NOTES
Occupational Therapy  Facility/Department: [facility] ACUTE REHAB UNIT  Daily Treatment Note  NAME: Benedict Allison  : 1970  MRN: [id number]    Date of Service: 2021    Discharge Recommendations:  24 hour supervision or assist, Home with Home health OT, Continue to assess pending progress, Home with nursing aide  OT Equipment Recommendations  Equipment Needed: Yes  ADL Assistive Devices: Transfer Tub Bench;Grab Bars - toilet  Other: continue to assess    Assessment   Performance deficits / Impairments: Decreased functional mobility ; Decreased ADL status; Decreased ROM; Decreased strength;Decreased sensation;Decreased fine motor control;Decreased endurance;Decreased posture;Decreased balance;Decreased safe awareness;Decreased coordination;Decreased vision/visual deficit; Decreased cognition  Assessment: Pt demonstrated significant improvement in [address] [place] [address] and advancement of LLE during litegait this date but continues to be dependent for functional mobility. Pt progressed to CGA for STS transfers multiple times this date with cues for setup and ant lean. Pt functioning below baseline, cont per OT POC. Treatment Diagnosis: decreased independence with ADLs and decreased functional mobility 2/2 CVA  Prognosis: Fair  OT Education: Plan of Care;Transfer Training;Precautions  Patient Education: pt educated on functional transfer setup-cont to reinforce  REQUIRES OT FOLLOW UP: Yes  Activity Tolerance  Activity Tolerance: Patient Tolerated treatment well  Activity Tolerance: Pt fatigued from litegait req prolonged rest breaks however pt demo good participation  Safety Devices  Safety Devices in place: Yes  Type of devices: Call light within reach; Chair alarm in place; Left in chair; All fall risk precautions in place         Patient Diagnosis(es): There were no encounter diagnoses. has a past medical history of GERD (gastroesophageal reflux disease), Hernia, Obesity, and Unspecified sleep apnea.    has a past surgical history that includes Splenectomy (); Total hip arthroplasty (); Lap Band (); hernia repair ();  section (); and craniotomy (Right, 2021). Restrictions  Position Activity Restriction  Other position/activity restrictions: Ambulate, Up with assist, Pt to wear helmet when OOB, OK to remove for shower, HOB to elevated at 30*     Subjective   General  Chart Reviewed: Yes  Patient assessed for rehabilitation services?: Yes  Additional Pertinent Hx: 46 y.o. female with hypertension and tobacco abuse who presented after spending a prolonged period on the ground s/p fall out of bed; found to have acute left hemiparesis and gaze deviation. CXR with no acute cardiopulmonary abnormality. CTH revealed large acute/subacute infarct in the R frontal lobe with associated mass effect and 4 mm R to L midline shift. It also noted subdural hemorrhage of 6mm along falx, although NSGY less convinced. CTA head and neck revealed occluded R cervical ICA & R anterior cerebral artery with near complete occlusion of R MCA. Outside window on TPA. Pt is now POD #1 following hemicraniectomy (). Pt admitted to ARU   Response to previous treatment: Patient with no complaints from previous session  Family / Caregiver Present: Yes(friend Orlando and Jesse Robbins)  Referring Practitioner: Dr. Alessandra Hopkins DO  Diagnosis: CVA  Subjective  Subjective: Pt sitting in w/c upon arrival with friend present, pleasant and agreeable to OT/PT cotx session to maximize independence with functional mobility with collaboration of 2 disciplines. General Comment  Comments: Gayle Yang   Vital Signs  Patient Currently in Pain: Yes(2/10 L hip pain)     Orientation  Orientation  Overall Orientation Status: Within Functional Limits  Objective    ADL  LE Dressing: Dependent/Total(to don shoes)        Standing Balance  Time: ~45 minutes total  Activity: functional mobility via litegait, static stance, functional mobility with use of handrail on R  Comment: Pt completed x 2 consecutive STS transfers from w/c initially with min A and progressing to CGA with cues for ant lean and BLE positioning, pt req Min A to scoot fwd in chair L>R hip  Functional Mobility  Functional - Mobility Device: Other(litegait)  Activity: Other  Functional Mobility Comments: Pt was assisted in use of bodyweight supported ambulation via litegait in order to increase independence with functional mobility and decrease LLE hyperextension; sock was applied to forefoot of L foot to decrease friction with floor for LLE advancement; Pt performed multiple sit to stands from w/c <> litegait with Min A and VCs for ant lean and hand placement; pt completed ambulation with litegait completing 2 trials tolerating 25' and 35' requiring min A to assist with [address] and maintaining placement of LUE on device and CGA-Mod A for LLE advancement; on second attempt a gait belt was placed on LLE to assist with LLE advancement requiring Min A consistently throughout; pt req VCs for erect posture, glute activation throughout with PT controlling L knee hyperextension intermittently. Pt needed VC's for sequencing, WS and for erect posture throughout. Pt required prolonged rest breaks between each bout and verbalized pain in L hip and L hand at times but did not rate. Transfers  Sit to stand: Minimal assistance;Contact guard assistance(from w/c, VCs for hand placement and ant lean, pt progressed to CGA on second and third attempt)  Stand to sit: Minimal assistance(VCs for hand placement and trunk flexion, decreased eccentric control initially, progressed to CGA 2nd and 3rd attempt to w/c)                       Cognition  Overall Cognitive Status: Exceptions  Arousal/Alertness: Appropriate responses to stimuli  Following Commands:  Follows one step commands consistently  Attention Span: Appears intact  Memory: Appears intact  Safety Judgement: Decreased awareness of need for assistance;Decreased awareness of need for safety

## 2021-05-11 NOTE — PROGRESS NOTES
Physical Therapy  Facility/Department: [facility] ACUTE REHAB UNIT  Daily Treatment Note  NAME: Shell Singh  : 1970  MRN: [id number]    Date of Service: 2021    Discharge Recommendations:  24 hour supervision or assist, Home with Home health PT   PT Equipment Recommendations  Equipment Needed: Yes(Will continue to assess equipment needs pending progress)  Other: 20\" hemiheight w/c with drop arm and standard 90 degree leg rest    Assessment   Body structures, Functions, Activity limitations: Decreased functional mobility ; Decreased sensation;Decreased ROM; Decreased strength;Decreased endurance;Decreased balance;Decreased posture;Decreased vision/visual deficit; Decreased coordination;Decreased cognition;Decreased fine motor control;Decreased safe awareness  Assessment: Pt progressing in ability to ambulate notedy being able to ambulate further with litegait than previous sessions. Pt progressing in transfer noted by pt being able to perform sit to stand transfer with CGA , previously was Ciera. Pt is well below baseline and would benefit from continued skilled therapy to maximize her potential and  increase her functional mobility  towards Ind to  allow for a safer d/c to home. Treatment Diagnosis: Decreased independence with functional mobility. Prognosis: Good  Decision Making: High Complexity  PT Education: Transfer Training;Weight-bearing Education; Functional Mobility Training; Adaptive Device Training;Energy Conservation;General Safety  Barriers to Learning: Cognition  REQUIRES PT FOLLOW UP: Yes  Activity Tolerance  Activity Tolerance: Patient limited by fatigue;Patient limited by endurance   This session was a Cotreatment session with PT and OT to maximize pt safety and therapeutic effect of session. .  Patient Diagnosis(es): There were no encounter diagnoses. has a past medical history of GERD (gastroesophageal reflux disease), Hernia, Obesity, and Unspecified sleep apnea.    has a past surgical Surface: level tile  Device: (hallway rail on R)  Other Apparatus: Wheelchair follow(do to decreased stability)  Assistance: dependent (MinAx1 for L LE advancment, faciliation to decrease L LE knee hyperextension and to decrease L LE ER. MinAx1/CGA for facilitation for pt to perform WS)  Neuromuscular Education  Functional Movement Patterns: Pt was assisted in use of litegait this morning. Preparation for using litegait required donning harness, sit to stand was required (MinAx1) facilitation to decrease L knee hyperextension  Tone inhibiting techniques were performed to position L UE on AD. Pt performed 2 sit to stands from w/c <> litegait and ambulation with litegait. Intermittent facilitation to prevent L knee hyperextension. VC's for erect posture. Pt ambulated 2 times with lite gait  Distances : 1st bout-25', 2nd bout-35'   A sock was applied to forefoot of L foot to decrease friction with floor for L LE advancement. Before walking pt was instructed in performing stepping forward and backwards with L and R LE to facilitate WS and LE limb advancement. For the first bout one therapist  facilitated weight shift with contralateral limb advancement while controlling L knee hyperextension intermittently when walking. Another therapist maintained L hand support on device while guiding litegait. Pt initially needed [address] with L LE advancement for ambulation with litegait. As pt ambulated further pt progressed to Hugo with intermittent CGA for L LE limb advancement. For second bout one therapist was [address] [place] [address] for L LE limb advancement while stabilizing L LE to decrease hyperextension and guide litegate. Another therapist was CGA/ Hugo for facilitating WS for contralateral LE advancement. Pt needed VC's for sequencing, WS and for erect posture. Each walk was limited by pt reporting being \"not feeling with it\". Pt also reported she believed this was from wearing the mask. G-Code     OutComes Score                                                     AM-PAC Score             Goals  Short term goals  Time Frame for Short term goals: 2 weeks  Short term goal 1: Pt will complete bed mobility with MIN A. Ongoing  Short term goal 2: Pt will transfer sit <> stand with MIN A, met 4/30/2021  Short term goal 3: Pt will transfer bed <> chair with MIN A. Ongoing  Short term goal 4: Pt will propel the w/c 48' with SBA. met 4/30/2021  Short term goal 5: Pt will ambulate 5' with LRAD and MOD A. Ongoing  Long term goals  Time Frame for Long term goals : 4 Weeks  Long term goal 1: Pt will complete bed mobility with SBA. Ongoing  Long term goal 2: Pt will transfer sit <> stand with SBA. Ongoing  Long term goal 3: Pt will transfer bed <> chair with SBA. Ongoing  Long term goal 4: Pt will propel the w/c 150' independently. Ongoing  Long term goal 5: Pt will ambulate 22' with LRAD and MIN A.  Ongoing  Patient Goals   Patient goals : Return home and back to caring for her son    Plan    Plan  Times per week: 5x/wk for 60 minutes/day  Times per day: Daily  Current Treatment Recommendations: Strengthening, ROM, Balance Training, Endurance Training, Functional Mobility Training, Transfer Training, Gait Training, Safety Education & Training, Home Exercise Program, Patient/Caregiver Education & Training, Neuromuscular Re-education, Stair training  Safety Devices  Type of devices: Call light within reach, Chair alarm in place, Left in chair, All fall risk precautions in place     Therapy Time   Individual Concurrent Group Co-treatment   Time In       0830   Time Out       1000   Minutes       [address]

## 2021-05-12 ENCOUNTER — APPOINTMENT (OUTPATIENT)
Dept: GENERAL RADIOLOGY | Age: 51
DRG: 057 | End: 2021-05-12
Attending: PHYSICAL MEDICINE & REHABILITATION
Payer: COMMERCIAL

## 2021-05-12 LAB
ANION GAP SERPL CALCULATED.3IONS-SCNC: 11 MMOL/L (ref 3–16)
BASOPHILS ABSOLUTE: 0.2 K/UL (ref 0–0.2)
BASOPHILS RELATIVE PERCENT: 1.3 %
BUN BLDV-MCNC: 13 MG/DL (ref 7–20)
CALCIUM SERPL-MCNC: 9.6 MG/DL (ref 8.3–10.6)
CHLORIDE BLD-SCNC: 104 MMOL/L (ref 99–110)
CO2: 25 MMOL/L (ref 21–32)
CREAT SERPL-MCNC: 0.5 MG/DL (ref 0.6–1.1)
EOSINOPHILS ABSOLUTE: 0.5 K/UL (ref 0–0.6)
EOSINOPHILS RELATIVE PERCENT: 4.7 %
GFR AFRICAN AMERICAN: >60
GFR NON-AFRICAN AMERICAN: >60
GLUCOSE BLD-MCNC: 89 MG/DL (ref 70–99)
HCT VFR BLD CALC: 38.9 % (ref 36–48)
HEMOGLOBIN: 13.1 G/DL (ref 12–16)
LYMPHOCYTES ABSOLUTE: 5 K/UL (ref 1–5.1)
LYMPHOCYTES RELATIVE PERCENT: 43.9 %
MCH RBC QN AUTO: 34.7 PG (ref 26–34)
MCHC RBC AUTO-ENTMCNC: 33.7 G/DL (ref 31–36)
MCV RBC AUTO: 103.1 FL (ref 80–100)
MONOCYTES ABSOLUTE: 1.3 K/UL (ref 0–1.3)
MONOCYTES RELATIVE PERCENT: 11.6 %
NEUTROPHILS ABSOLUTE: 4.4 K/UL (ref 1.7–7.7)
NEUTROPHILS RELATIVE PERCENT: 38.5 %
PDW BLD-RTO: 13.9 % (ref 12.4–15.4)
PLATELET # BLD: 374 K/UL (ref 135–450)
PMV BLD AUTO: 8.7 FL (ref 5–10.5)
POTASSIUM REFLEX MAGNESIUM: 4.1 MMOL/L (ref 3.5–5.1)
RBC # BLD: 3.78 M/UL (ref 4–5.2)
SODIUM BLD-SCNC: 140 MMOL/L (ref 136–145)
WBC # BLD: 11.4 K/UL (ref 4–11)

## 2021-05-12 PROCEDURE — 97112 NEUROMUSCULAR REEDUCATION: CPT

## 2021-05-12 PROCEDURE — 36415 COLL VENOUS BLD VENIPUNCTURE: CPT

## 2021-05-12 PROCEDURE — 85025 COMPLETE CBC W/AUTO DIFF WBC: CPT

## 2021-05-12 PROCEDURE — 6370000000 HC RX 637 (ALT 250 FOR IP): Performed by: PHYSICAL MEDICINE & REHABILITATION

## 2021-05-12 PROCEDURE — 97535 SELF CARE MNGMENT TRAINING: CPT

## 2021-05-12 PROCEDURE — 99232 SBSQ HOSP IP/OBS MODERATE 35: CPT | Performed by: PHYSICAL MEDICINE & REHABILITATION

## 2021-05-12 PROCEDURE — 97530 THERAPEUTIC ACTIVITIES: CPT

## 2021-05-12 PROCEDURE — 6360000002 HC RX W HCPCS: Performed by: PHYSICAL MEDICINE & REHABILITATION

## 2021-05-12 PROCEDURE — 97130 THER IVNTJ EA ADDL 15 MIN: CPT

## 2021-05-12 PROCEDURE — 1280000000 HC REHAB R&B

## 2021-05-12 PROCEDURE — 92526 ORAL FUNCTION THERAPY: CPT

## 2021-05-12 PROCEDURE — 97129 THER IVNTJ 1ST 15 MIN: CPT

## 2021-05-12 PROCEDURE — 80048 BASIC METABOLIC PNL TOTAL CA: CPT

## 2021-05-12 PROCEDURE — 73502 X-RAY EXAM HIP UNI 2-3 VIEWS: CPT

## 2021-05-12 RX ADMIN — FAMOTIDINE 20 MG: 20 TABLET, FILM COATED ORAL at 22:14

## 2021-05-12 RX ADMIN — ASPIRIN 81 MG: 81 TABLET, CHEWABLE ORAL at 07:53

## 2021-05-12 RX ADMIN — FLUOXETINE 20 MG: 20 CAPSULE ORAL at 07:53

## 2021-05-12 RX ADMIN — LEVETIRACETAM 500 MG: 500 TABLET ORAL at 07:53

## 2021-05-12 RX ADMIN — ACETAMINOPHEN 1000 MG: 500 TABLET, COATED ORAL at 15:33

## 2021-05-12 RX ADMIN — HEPARIN SODIUM 5000 UNITS: 5000 INJECTION INTRAVENOUS; SUBCUTANEOUS at 22:15

## 2021-05-12 RX ADMIN — HEPARIN SODIUM 5000 UNITS: 5000 INJECTION INTRAVENOUS; SUBCUTANEOUS at 06:27

## 2021-05-12 RX ADMIN — THIAMINE HCL TAB 100 MG 100 MG: 100 TAB at 07:53

## 2021-05-12 RX ADMIN — HEPARIN SODIUM 5000 UNITS: 5000 INJECTION INTRAVENOUS; SUBCUTANEOUS at 14:24

## 2021-05-12 RX ADMIN — FAMOTIDINE 20 MG: 20 TABLET, FILM COATED ORAL at 07:53

## 2021-05-12 RX ADMIN — LEVETIRACETAM 500 MG: 500 TABLET ORAL at 22:14

## 2021-05-12 RX ADMIN — ATORVASTATIN CALCIUM 80 MG: 80 TABLET, FILM COATED ORAL at 22:15

## 2021-05-12 RX ADMIN — DOCUSATE SODIUM 50 MG AND SENNOSIDES 8.6 MG 2 TABLET: 8.6; 5 TABLET, FILM COATED ORAL at 22:14

## 2021-05-12 RX ADMIN — THERA TABS 1 TABLET: TAB at 07:53

## 2021-05-12 RX ADMIN — AMLODIPINE BESYLATE 5 MG: 5 TABLET ORAL at 07:53

## 2021-05-12 RX ADMIN — ACETAMINOPHEN 1000 MG: 500 TABLET, COATED ORAL at 04:13

## 2021-05-12 RX ADMIN — Medication 5 MG: at 07:53

## 2021-05-12 RX ADMIN — ACETAMINOPHEN 1000 MG: 500 TABLET, COATED ORAL at 22:13

## 2021-05-12 RX ADMIN — OXYCODONE 10 MG: 5 TABLET ORAL at 22:14

## 2021-05-12 RX ADMIN — DOCUSATE SODIUM 50 MG AND SENNOSIDES 8.6 MG 2 TABLET: 8.6; 5 TABLET, FILM COATED ORAL at 07:53

## 2021-05-12 ASSESSMENT — PAIN SCALES - GENERAL
PAINLEVEL_OUTOF10: 0
PAINLEVEL_OUTOF10: 2
PAINLEVEL_OUTOF10: 0
PAINLEVEL_OUTOF10: 6
PAINLEVEL_OUTOF10: 0
PAINLEVEL_OUTOF10: 0
PAINLEVEL_OUTOF10: 1
PAINLEVEL_OUTOF10: 7

## 2021-05-12 ASSESSMENT — PAIN DESCRIPTION - PROGRESSION
CLINICAL_PROGRESSION: GRADUALLY WORSENING
CLINICAL_PROGRESSION: NOT CHANGED

## 2021-05-12 ASSESSMENT — PAIN DESCRIPTION - LOCATION
LOCATION: HAND
LOCATION: HIP;SHOULDER
LOCATION: HEAD
LOCATION: BACK;BUTTOCKS

## 2021-05-12 ASSESSMENT — PAIN DESCRIPTION - ORIENTATION
ORIENTATION: LEFT

## 2021-05-12 ASSESSMENT — PAIN DESCRIPTION - PAIN TYPE
TYPE: ACUTE PAIN

## 2021-05-12 ASSESSMENT — PAIN DESCRIPTION - DIRECTION: RADIATING_TOWARDS: DOWN LEG

## 2021-05-12 ASSESSMENT — PAIN - FUNCTIONAL ASSESSMENT
PAIN_FUNCTIONAL_ASSESSMENT: ACTIVITIES ARE NOT PREVENTED
PAIN_FUNCTIONAL_ASSESSMENT: ACTIVITIES ARE NOT PREVENTED
PAIN_FUNCTIONAL_ASSESSMENT: PREVENTS OR INTERFERES SOME ACTIVE ACTIVITIES AND ADLS

## 2021-05-12 ASSESSMENT — PAIN DESCRIPTION - ONSET
ONSET: ON-GOING
ONSET: ON-GOING

## 2021-05-12 ASSESSMENT — PAIN DESCRIPTION - FREQUENCY
FREQUENCY: INTERMITTENT
FREQUENCY: INTERMITTENT

## 2021-05-12 ASSESSMENT — PAIN DESCRIPTION - DESCRIPTORS
DESCRIPTORS: ACHING;DISCOMFORT
DESCRIPTORS: HEADACHE
DESCRIPTORS: SHOOTING

## 2021-05-12 NOTE — PATIENT CARE CONFERENCE
Inpatient Rehabilitation  Weekly Team Conference Note  The [address]  [phone]  Patient Name: Navi Vaughan        MRN: [id number]    : 1970  (46 y.o.)  Gender: female   Referring Practitioner: Darryle Anderson  The team conference for this patient was held on 2021 at 10:30am by:  Torrie Flores.  Melva, DO    Current/Goal QM SCORES  QM Current/Goal Score   Eating CARE Score: 5 / Discharge Goal: Set-up or clean-up assistance   Oral Hygiene CARE Score: 4 / Discharge Goal: Independent   Shower/Bathing CARE Score: 3 / Discharge Goal: Supervision or touching assistance   UB Dressing CARE Score: 3 / Discharge Goal: Set-up or clean-up assistance   LB Dressing CARE Score: 1 / Discharge Goal: Supervision or touching assistance   Putting on/off Footwear CARE Score: 3 / Discharge Goal: Supervision or touching assistance   Toileting Hygiene CARE Score: 1 / Discharge Goal: Supervision or touching assistance   Bladder Continence Bladder Continence: Incontinent daily    Bowel Continence Bowel Continence: Occassionally incontinent    Toilet Transfers CARE Score: 3 / Discharge Goal: Supervision or touching assistance   Shower/Bathe Self  CARE Score: 3 / Discharge Goal: Supervision or touching assistance   Rolling Left and Right CARE Score: 3 / Discharge Goal: Supervision or touching assistance   Sit to Lying CARE Score: 3 / Discharge Goal: Supervision or touching assistance   Lying to Sitting on Bedside CARE Score: 3 / Discharge Goal: Supervision or touching assistance   Sit to Stand CARE Score: 4 / Discharge Goal: Supervision or touching assistance   Chair/Bed to Chair Transfer CARE Score: 3 / Discharge Goal: Supervision or touching assistance   Car Transfers CARE Score: 1 / Discharge Goal: Supervision or touching assistance   Walk 10 Feet CARE Score: 1 / Discharge Goal: Partial/moderate assistance   Walk 50 Feet with Two Turns CARE Score: 88 / Discharge Goal: Not Attempted   Walk 150 Feet CARE Score: 88 / Discharge Goal: Not Attempted   Walk 10 Feet on Uneven Surfaces CARE Score: 88 / Discharge Goal: Not Attempted   1 Step (Curb) CARE Score: 88 / Discharge Goal: Partial/moderate assistance   4 Steps CARE Score: 88 / Discharge Goal: Partial/moderate assistance   12 Steps CARE Score: 88 / Discharge Goal: Not Attempted   Picking up Object from Floor CARE Score: 88 / Discharge Goal: Partial/moderate assistance   Wheel 50 Feet with 2 Turns CARE Score: 3 / Discharge Goal: Independent   Type         [x] Manual        [] Motorized        [] N/A   Wheel 150 Feet CARE Score: 3 / Discharge Goal: Independent   Type         [x] Manual        [] Motorized        [] N/A     NURSING:  A&Ox: Level of Consciousness: Alert (0)  Orientation Level: Oriented X4  Kat Fall Risk Score: Score: 50  Admission BIMS: 15  Wounds/Incisions/Ulcers: surgical incision on scalp, open to air   Medication Review: Reviewed meds   Pain: 7/10 Shoulder/hip pain   Consultations:   Imaging: L Hip  Left hip arthroplasty with interval lucency noted about the femoral component indeterminate. Findings may relate to particle disease and/or may relate to loosening. Clinical correlation suggested. Infection is not excluded. Lucency about the supra-acetabular region adjacent to the acetabular component present previously indeterminate. No fracture. XR HIP 2-3 VW W PELVIS LEFT   Final Result   Left hip arthroplasty with interval lucency noted about the femoral component indeterminate. Findings may relate to particle disease and/or may relate to loosening. Clinical correlation suggested. Infection is not excluded. Lucency about the supra-acetabular region adjacent to the acetabular component present previously indeterminate. No fracture.      Active Comorbid Conditions:Sleep apnea, GERD  Systems Review:   Renal: , Dialysis:  Type: , Frequency:   Neurological: A&O x 4, LUE and LLE are flaccid, LLE has decreased sensation  Musculoskeletal: Minimal movement with LLE and LUE   Respiratory: Diminished   Cardiac/Circulatory/Peripheral Vascular: WDL  Abnormal/Relevant Test Results:   Abnormal/Relevant Lab Values:   CBC:   Recent Labs     05/12/21  0537   WBC 11.4*   HGB 13.1   HCT 38.9   .1*        BMP:   Recent Labs     05/12/21  0537      K 4.1      CO2 25   BUN 13   CREATININE 0.5*     PT/INR: No results for input(s): PROTIME, INR in the last 72 hours. APTT: No results for input(s): APTT in the last 72 hours. Liver Profile:  Lab Results   Component Value Date    AST 41 04/13/2021    ALT 32 04/13/2021    BILITOT 0.7 04/13/2021    ALKPHOS 86 04/13/2021     Lab Results   Component Value Date    CHOL 251 04/13/2021    HDL 45 04/13/2021    TRIG 278 04/13/2021     Recent Labs     05/12/21 0537   WBC 11.4*   HGB 13.1   HCT 38.9      .1*     Recent Labs     05/12/21  0537      K 4.1      CO2 25   BUN 13   CREATININE 0.5*     No results for input(s): AST, ALT, ALB, BILIDIR, BILITOT, ALKPHOS in the last 72 hours. No results for input(s): MG in the last 72 hours. Recent Labs     05/12/21 0537   WBC 11.4*   HGB 13.1   HCT 38.9        Recent Labs     05/12/21  0537      K 4.1      CO2 25   BUN 13   CREATININE 0.5*   CALCIUM 9.6     No results for input(s): AST, ALT, BILIDIR, BILITOT, ALKPHOS in the last 72 hours. No results for input(s): INR in the last 72 hours. No results for input(s): Lilliam Meltonh in the last 72 hours. PHYSICAL THERAPY:  Bed Mobility: Scooting: Minimal assistance(pt req VC for ant lean to scoot posteriorly in chair)    Transfers:  Sit to Stand: Minimal Assistance, Contact guard assistance((W/c <> standing with R UE support on rail)VC's for scooting forward in chair, Assist for scooting L hip forward in chair, Vc's for anterior.  Sit to stand at shower bench for pants managment in preperation for shower Hugo + ModA)  Stand to sit: Minimal Assistance, Contact guard assistance(Vc's for eccentric control and to perform anterior trunk lean)  Bed to Chair: Moderate assistance(stand pivot to R, VC to perform hip flexion.)  Squat Pivot Transfers: Minimal Assistance(w/c> mat table, VC's for anterior trunk lean)  Comment: Pt was assisted with strarting a shower this session. See OT note for upper and lower body dressing status. Shower was completed with pt during OT one on one treament time    Ambulation 1  Surface: level tile  Device: (hallway rail on R)  Other Apparatus: Wheelchair follow(do to decreased stability)  Assistance: (MinAx1 for L LE advancment, faciliation to decrease L Le knee hyperextension and to decrease L Le ER. MinAx1/CGA for facilitation for pt to perform WS)    OCCUPATIONAL THERAPY:  ADL  Feeding: Setup(to open containers)  Grooming: Supervision, Verbal cueing, Setup, Increased time to complete(oral hygiene seated in w/c, + time to open and close toothpaste, attempted incorporation of LUE with assist, VCs to attend to L side of sink to locate water to rinse, cues for ant lean; pt applied deodorant with assist to position LUE)  UE Bathing: Minimal assistance, Verbal cueing, Setup(seated on TTB, VCs for adaptive technique to wash RUE, assist to position LUE)  LE Bathing: Moderate assistance(seated on TTB; use of long handled sponge to wash lower limbs and feet, assist to wash buttocks; assist to dry buttocks)  UE Dressing:  Moderate assistance(pt doffed shirt with CGA and VCs for technique, cues to attend to LUE, pt req setup and mod A to don shirt to assist with threading LUE and pulling down on L side)  LE Dressing: Verbal cueing, Increased time to complete, Setup, Moderate assistance(pt doffed socks using reacher and VCs for tech with spvn assist, use of RLE to position LLE; pt donned R sock using sock aid with setup of sock on device and spvn, max A for L sock, pt donned R shoe with setup and LHSH, L shoe with total A) Toileting: Dependent/Total, Increased time to complete(pt continent of bladder, pt attempted pericare but unable to reach seated, total A for pericare hygiene in stance and LB clothing on/off hips with min A to maintain stance)  Additional Comments: Pt attempted to doff pants while supine in bed however pt uncomfortable with LLE hypertonicity extension synergy, pt doffed Pants with max A with lateral lean bilaterally with cues and assist to facilitate, assist to manage off L hip and R hip, partial stance with Mod A to fully remove off hips with pt assisting using RUE, assist to unthread LLE; Pt completed bathing as listed above, PT assisting wtih pts sitting balance and OT assisting with ADL and bathing tasks. Pt required cueing throughout for completeness of task. Toilet Transfers: Toilet Transfers  Toilet - Technique: Stand pivot  Equipment Used: Raised toilet seat with rails  Toilet Transfer: Moderate assistance  Toilet Transfers Comments: VCs for hand placement, difficulty pivoting LLE    Tub/ShowerTransfers:  Shower Transfers  Shower - Transfer From: Wheelchair  Shower - Transfer Type: To and From  Shower - Transfer To: Transfer tub bench  Shower - Technique: Lateral, Stand pivot  Shower Transfers: Dependent  Shower Transfers Comments: Mod A x 1 + min  x 1 stand pivot, use of GB, cues to sequence, difficulty pivoting to L; scoot pivot leading to R with mod A x 1 and CGA of another step by step VCs to sequence, assist with LLE positioning    SPEECH THERAPY:  Assessment: Oral stage dysphagia complicated by attention and reduced sensation. The patient demonstrated increased disinhibition during second session with inappropriate topics and behaviors and also motoric agitation. Pt continues to demonstrate left inattention, executive function impairments and attention impairments. Improved ability to identify errors and patterned behavior but difficulty with execution and carryover.      NUTRITION:  Please see nutrition note for details. Weight: 226 lb 3.1 oz (102.6 kg) / Body mass index is 36.51 kg/m². Diet Level/Order: Dietary Nutrition Supplements: Low Calorie High Protein Supplement  DIET GENERAL; Daily Fluid Restriction: 1500 ml  Average Consumption per meal: PO Meals Eaten (%): 76 - 100%    CASE MANAGEMENT:  Psychosocial/Behavioral Issues: none noted  Assessment:  Referred to patient for d/c planning. Spoke to patient, friend Lakshmi Noriega and . All updated. Patient currently needing 24 hour assist.  Discussed possible SNF with list of providers in network with insurance given vs. Home with private duty for 24 hour assist.  Will continue to follow for d/c needs. Patient/Family Education provided by team:  Role of PT/OT, tone mgmt, transfer training, pratik-dressing techniques, splint schedule, strict strategies for oral management of solid bolus, left sided inattention, cognitive goal areas     Patient Goals for Rehab stay:  1. To be independent and get back to normal    Rehab Team Goals for patient for the Upcoming Week:  1. Increase independence with ADLs and toilet transfer  2. Eliminate pocketing / staff assist during mealtime or following  3. Improve left sided attention    Barriers to Progress/Attainment of Goals & Efforts/Interventions to remove Barriers:  1. Decreased functional mobility and transfers continue OT/PT  2. Attention impairments and left sided facial weakness / sensory impairments - ongoing dysphagia therapy  3.  Left neglect - ongoing cognitive therapy    Discharge Plan:  Estimated Length of Stay: 23 days  Destination: home health  Services at Discharge: [address] [place] [address], Occupational Therapy, Speech Therapy and Nursing 3x week  Equipment at Discharge: wheelchair, TTB  Community Resources:   Factors facilitating achievement of predicted outcomes: Family support, Friend support, Motivated, Cooperative, Pleasant and Sense of humor  Barriers to the achievement of predicted outcomes: Pain, Cognitive deficit, Impulsivity, Limited safety awareness, Decreased endurance, Decreased proprioception, Upper extremity weakness, Lower extremity weakness, Impaired vision, Incontinence of bowel and Incontinence of bladder    Special Needs in the Upcoming Week:   [x] Family/Caregiver Education  [] Home visit  []Therapeutic Pass [] Equipment  Type:   [] Consults:_______   [] Family/Caregiver Training    [] Stroke Risk Factor Education     [] Other;_______     TEAM SUMMARY: Will continue with current poc & goals until anticipated d/c date of 5/20/2021. Hopeful to apply for another extension due to pt's continued progress.     MD:   Stroke Risk Factors:   [] N/A for this patient [] HTN  []  Diabetes  [] Hyperlipidemia  [x]Obesity BMI >25  [] Atrial Fibrillation [] Smoker (current)  [] Smoker (quit in last 12 months)   [x] Sleep Apnea [] Other    Justification for Continued Stay:   Criteria for continued IRF stay:  Based on my medical assessment of the patient and review of information from the interdisciplinary team, as part of this weekly team conference, the patient continues to meet the following criteria for IRF level of care:  [x] The patient requires 24-hour rehabilitation nursing care   [x] The patient requires an intensive rehabilitation therapy program  [x] The patient requires active and ongoing intervention of multiple therapy disciplines  [x] The patient requires continued physician supervision by a rehabilitation physician  [x] The patient requires an intensive and coordinated interdisciplinary team approach to the delivery of rehabilitative care    Medical Necessity-continued close physician medical management is required for:   [] Cardiac/Circulatory dysfunction  [] Respiratory/Pulmonary dysfunction  [] Integumentary complications  [] Peripheral Vascular dysfunction  [] Musculoskeletal dysfunction  [x] Neurological dysfunction d/t:  [x] CVA  [] SCI  [] TBI  [] Other: __________  [] Renal dysfunction  [] Hematologic dysfunction    [] Endocrine disorders  [] GI disorders     [] Genito-Urinary dysfunction    Assessment/Plan:  [x] The patient is making good progression towards their LTG's, is actively participating in, and has a reasonable expectation to continue to benefit from the intensive rehabilitation program.  [] The estimated discharge date has been changed from initial team conference due to:   [] The estimated discharge destination has been changed from initial team conference due to:     Rehab Team Members in attendance for Team Conference:  :  ROSETTA Pereira    Therapy Manager:  Iliana Chambers, PT, DPT    Nursing Director:  Walter Armstrong, MSN, RN, ACNS-BC    Social Work:  Satinder Pacheco, MSW, LISW-S    Nursing:  PATRICIA BelloN, RN  Vanessa Mairn, KIZZY Vilchis, KIZZY Lincoln, KIZZY    Therapy:  Yuliya Jack, PT  Supriya Carvajal, PT, DPT  Jenna Mejía, PT, DPT    Sydnee Villa, OTR/L  Andres Arriaga, OTR/L    Marifer Linder MA/CCC-SLP    Nutrition:  Mario Alberto Thompson RD LD    I approve the established interdisciplinary plan of care as documented within the medical record of Shola Cid.     MD Signature Charlette Villa D.O. M.P.H  PM&R  5/13/2021  10:58 AM

## 2021-05-12 NOTE — PROGRESS NOTES
Department of Physical Medicine & Rehabilitation  Progress Note    Patient Identification:  Ronda Duran  [id number]  : 1970  Admit date: 2021    Chief Complaint: CVA    Subjective:   Complains of left hip pain. Likely related to therapy but she is concerned about a previous hip replacement. ROS: No f/c, n/v, cp     Objective:  Patient Vitals for the past 24 hrs:   BP Temp Temp src Pulse Resp SpO2   21 0750 122/69 97.8 °F (36.6 °C) Oral 75 16 96 %   21 2108 117/67 98.2 °F (36.8 °C) Oral 72 16 96 %     Const: Alert. No distress, pleasant. HEENT: Normocephalic, atraumatic. Normal sclera/conjunctiva. MMM. Incision healing well  CV: Regular rate and rhythm. Resp: No respiratory distress. Lungs CTAB. Abd: Soft, nontender, nondistended, NABS+   Ext: No edema. Neuro: Alert, oriented, appropriately interactive. Psych: Cooperative, appropriate mood and affect    Laboratory data: Available via EMR.    Last 24 hour lab  Recent Results (from the past 24 hour(s))   Basic Metabolic Panel w/ Reflex to MG    Collection Time: 21  5:37 AM   Result Value Ref Range    Sodium 140 136 - 145 mmol/L    Potassium reflex Magnesium 4.1 3.5 - 5.1 mmol/L    Chloride 104 99 - 110 mmol/L    CO2 25 21 - 32 mmol/L    Anion Gap 11 3 - 16    Glucose 89 70 - 99 mg/dL    BUN 13 7 - 20 mg/dL    CREATININE 0.5 (L) 0.6 - 1.1 mg/dL    GFR Non-African American >60 >60    GFR African American >60 >60    Calcium 9.6 8.3 - 10.6 mg/dL   CBC auto differential    Collection Time: 21  5:37 AM   Result Value Ref Range    WBC 11.4 (H) 4.0 - 11.0 K/uL    RBC 3.78 (L) 4.00 - 5.20 M/uL    Hemoglobin 13.1 12.0 - 16.0 g/dL    Hematocrit 38.9 36.0 - 48.0 %    .1 (H) 80.0 - 100.0 fL    MCH 34.7 (H) 26.0 - 34.0 pg    MCHC 33.7 31.0 - 36.0 g/dL    RDW 13.9 12.4 - 15.4 %    Platelets 303 731 - 466 K/uL    MPV 8.7 5.0 - 10.5 fL    Neutrophils % 38.5 %    Lymphocytes % 43.9 %    Monocytes % 11.6 %    Eosinophils % 4.7 % Basophils % 1.3 %    Neutrophils Absolute 4.4 1.7 - 7.7 K/uL    Lymphocytes Absolute 5.0 1.0 - 5.1 K/uL    Monocytes Absolute 1.3 0.0 - 1.3 K/uL    Eosinophils Absolute 0.5 0.0 - 0.6 K/uL    Basophils Absolute 0.2 0.0 - 0.2 K/uL       Therapy progress:  PT  Position Activity Restriction  Other position/activity restrictions: Ambulate, Up with assist, Pt to wear helmet when OOB, OK to remove for shower, HOB to elevated at 30*  Objective     Sit to Stand: Minimal Assistance, Contact guard assistance  Stand to sit: Minimal Assistance, Contact guard assistance(assist for eccentric control, VC's to perform anterior trunk lean)  Bed to Chair: Moderate assistance(stand pivot to R, VC to perform hip flexion.)  Device: (hallway rail on R)  Other Apparatus: Wheelchair follow(do to decreased stability)  Assistance: (MinAx1 for L LE advancment, faciliation to decrease L Le knee hyperextension and to decrease L Le ER. MinAx1/CGA for facilitation for pt to perform WS)  OT  PT Equipment Recommendations  Equipment Needed: Yes(Will continue to assess equipment needs pending progress)  Other: 20\" hemiheight w/c with drop arm and standard 90 degree leg rest  Toilet - Technique: Stand pivot  Equipment Used: Standard toilet  Toilet Transfers Comments: VCs for hand placement, assist for controlled descent  Assessment        SLP  Diet Solids Recommendation: Dysphagia Soft and Bite-Sized (Dysphagia III)(with regular solids as tolerated. Per pt request, gravy/sauces to keep for moist.)       Body mass index is 36.51 kg/m². Assessment and Plan:  Luis Enrique Woodies a 46 y.o. female with PMH GERD p/w L sided weakness and facial droop.  CTA head/neck on 4/12 revealed occluded R cervical ICA, occluded right LEAH, near complete occlusion of right MCA.     Acute ischemic CVA, Subdural heomorrhage s/p right hemicraniectomy and subsequen SAH and IP hemorrhage  No tPA given.  MRI 4/14 showing large subacute infarct throughout R LEAH and MCA with some areas of hemorrhagic conversion.  - Neurosurgery and neurology following  --SBP <160, PRN hydralazine, scheduled Norvasc  -- Keppra 500 mg twice daily   -  SQH  - PT/OT consulted      Aphasia- SLP consulted   Depression-Patient takes fluoxetine 20 mg daily at home  Anxiety -Patient takes Ativan 0.5 mg every 8 hours as needed for anxiety- holding  MISHA-Patient uses CPAP at home   GERD- Patient takes omeprazole 40 mg daily at home- half dose  Obesity    Rehab Progress: Improving  Anticipated Dispo: home  Services/DME: LIANG  ELOS: LIANG Marin D.O. M.P.H  PM&R  5/12/2021  10:25 AM

## 2021-05-12 NOTE — PROGRESS NOTES
apnea.   has a past surgical history that includes Splenectomy (); Total hip arthroplasty (); Lap Band (); hernia repair ();  section (); and craniotomy (Right, 2021). Restrictions  Position Activity Restriction  Other position/activity restrictions: Ambulate, Up with assist, Pt to wear helmet when OOB, OK to remove for shower, HOB to elevated at 30*  Subjective   General  Chart Reviewed: Yes  Patient assessed for rehabilitation services?: Yes  Additional Pertinent Hx: 46 y.o. female with hypertension and tobacco abuse who presented after spending a prolonged period on the ground s/p fall out of bed; found to have acute left hemiparesis and gaze deviation. CXR with no acute cardiopulmonary abnormality. CTH revealed large acute/subacute infarct in the R frontal lobe with associated mass effect and 4 mm R to L midline shift. It also noted subdural hemorrhage of 6mm along falx, although NSGY less convinced. CTA head and neck revealed occluded R cervical ICA & R anterior cerebral artery with near complete occlusion of R MCA. Outside window on TPA. Pt is now POD #1 following hemicraniectomy (). Pt admitted to ARU   Response to previous treatment: Patient with no complaints from previous session  Family / Caregiver Present: Yes(friend Jaida Garcia and Kj Espino)  Referring Practitioner: Dr. Ann Samano DO  Diagnosis: CVA  Subjective  Subjective: Pt sitting in w/c upon arrival with friend present, pleasant and agreeable to OT/PT cotx session to maximize independence with functional mobility with collaboration of 2 disciplines. General Comment  Comments: . Objective    ADL  UE Bathing: Minimal assistance;Verbal cueing;Setup(seated on TTB, VCs for adaptive technique to wash RUE, assist to position LUE)  LE Bathing: Moderate assistance(seated on TTB; use of long handled sponge to wash lower limbs and feet, assist to wash buttocks; assist to dry buttocks)  UE Dressing:  Moderate assistance(pt doffed shirt with CGA and VCs for technique, cues to attend to LUE, pt req setup and mod A to don shirt to assist with threading LUE and pulling down on L side)  LE Dressing: Dependent/Total;Verbal cueing;Setup; Increased time to complete(pt assisted with doffing pants and brief in stance on R side with Min A for stance, use of reacher to doff, total A to don brief, pants and shoes for sake of time, min A to maintain stance with assist of another to manage LB clothing over hips)        Shower Transfers  Shower - Transfer From: Wheelchair  Shower - Transfer Type: To and From  Shower - Transfer To: Transfer tub bench  Shower - Technique: Lateral;Stand pivot  Shower Transfers: Dependent  Shower Transfers Comments: Mod A x 1 + min  x 1 stand pivot, use of GB, cues to sequence, difficulty pivoting to L; scoot pivot leading to R with mod A x 1 and CGA of another step by step VCs to sequence, assist with LLE positioning  Bed mobility  Rolling to Right: Minimal assistance  Supine to Sit: Minimal assistance(management of LLE)  Sit to Supine: Minimal assistance(assist for trunk ascension, VCs to sequence)  Transfers  Sit to stand: Minimal assistance(from w/c and TTB, VCs for hand placement)  Stand to sit: Minimal assistance(for eccentric control)                 Neuromuscular Education  Neuromuscular education: Yes  Weight Bearing  Response To Weight Bearing Technique: Pt was assisted in performing quadruped exerises on the mat with a red SB underneath of trunk for support along with WB through all 4 extemities and Ciera progressed to CGA to maintain position/ balance. Pt was assisted into quadruped position via roll to the R from pt R sidelying. L UE was placed across red swiss therapy ball for WB once in quadruped. Pt L LE was assisted into a 90/90 position. Pt was assisted at the hips Ciera for roll to the R into quadruped position. Pt maintained quadruped for ~ 10 minutes. Pt was assisted in performing WS in standing with CGA/ Hugo to maintain balance and R UE support on hallway rail. Pt maintained standing for ~ 5 minutes. Pt was instructed in stepping anteriorly and posteriorly with R LE one time. One therapist facilitated WS and decreased L knee hyperextension. Another therapist was present to assist in facilitating WS with CGA. Cognition  Overall Cognitive Status: Exceptions  Arousal/Alertness: Appropriate responses to stimuli  Following Commands:  Follows one step commands consistently  Attention Span: Appears intact  Memory: Appears intact  Safety Judgement: Decreased awareness of need for assistance;Decreased awareness of need for safety  Problem Solving: Assistance required to generate solutions;Assistance required to correct errors made;Assistance required to implement solutions;Decreased awareness of errors  Insights: Decreased awareness of deficits  Initiation: Requires cues for some  Sequencing: Requires cues for some  Cognition Comment: Pt with difficulty i'ly initiating head turns to L during visual scanning and wayfinding activity req VCs and assist to problem solve when having difficulty with navigating environmental barriers                                         Plan   Plan  Times per week: 5x a week for 60 mins daily  Times per day: Daily  Current Treatment Recommendations: Strengthening, Endurance Training, Neuromuscular Re-education, Self-Care / ADL, Functional Mobility Training, Safety Education & Training, ROM, Positioning, Wheelchair Mobility Training, Balance Training, Patient/Caregiver Education & Training, Manual Therapy:  MLD, Equipment Evaluation, Education, & procurement, Home Management Training, Cognitive Reorientation    Goals  Short term goals  Time Frame for Short term goals: 2 weeks  Short term goal 1: Pt will complete toilet transfer w/ Mod A-goal met 5/4  Short term goal 2: Pt will complete UE dressing w/ Mod A-goal met 5/12  Short term goal 3: Pt will complete oral care w/ spvn

## 2021-05-12 NOTE — PROGRESS NOTES
Physical Therapy  Facility/Department: [facility] ACUTE REHAB UNIT  Daily Treatment Note  NAME: Destinee Castro  : 1970  MRN: [id number]    Date of Service: 2021    Discharge Recommendations:  24 hour supervision or assist, Home with Home health PT   PT Equipment Recommendations  Equipment Needed: Yes(Will continue to assess equipment needs pending progress)  Other: 20\" hemiheight w/c with drop arm and standard 90 degree leg rest    Assessment   Body structures, Functions, Activity limitations: Decreased functional mobility ; Decreased sensation;Decreased ROM; Decreased strength;Decreased endurance;Decreased balance;Decreased posture;Decreased vision/visual deficit; Decreased coordination;Decreased cognition;Decreased fine motor control;Decreased safe awareness  Assessment: Pt tolerated quadruped supported by red SB exercises well today. Pt actively able to perform WS in quadruped position supported by re SB. Pt has progressed in L LE AROM. Pt is well below baseline and would benefit from continued skilled therapy to maximize her potential and  increase her functional mobility  towards Ind to  allow for a safer d/c to home. Treatment Diagnosis: Decreased independence with functional mobility. Prognosis: Good  Decision Making: High Complexity  PT Education: Transfer Training;Weight-bearing Education; Functional Mobility Training; Adaptive Device Training;Energy Conservation;General Safety  Barriers to Learning: Cognition  REQUIRES PT FOLLOW UP: Yes  Activity Tolerance  Activity Tolerance: Patient limited by fatigue;Patient limited by endurance   This session was a Co treatment session with PT and OT to maximize pt safety and therapeutic effect of session. Patient Diagnosis(es): There were no encounter diagnoses. has a past medical history of GERD (gastroesophageal reflux disease), Hernia, Obesity, and Unspecified sleep apnea. has a past surgical history that includes Splenectomy ();  Total hip term goal 3: Pt will transfer bed <> chair with SBA. Ongoing  Long term goal 4: Pt will propel the w/c 150' independently. Ongoing  Long term goal 5: Pt will ambulate 22' with LRAD and MIN A.  Ongoing  Patient Goals   Patient goals : Return home and back to caring for her son    Plan    Plan  Times per week: 5x/wk for 60 minutes/day  Times per day: Daily  Current Treatment Recommendations: Strengthening, ROM, Balance Training, Endurance Training, Functional Mobility Training, Transfer Training, Gait Training, Safety Education & Training, Home Exercise Program, Patient/Caregiver Education & Training, Neuromuscular Re-education, Stair training  Safety Devices  Type of devices: (Pt was left with OT in shower)     Therapy Time   Individual Concurrent Group Co-treatment   Time In       [phone]   Time Out       0935   Minutes       60        Second Session Therapy Time:   Individual Concurrent Group Co-treatment   Time In 1330        Time Out 1420        Minutes 50          Timed Code Treatment Minutes:  60 + 50    Total Treatment Minutes:  [address], SPT

## 2021-05-12 NOTE — PROGRESS NOTES
ACUTE REHAB UNIT  SPEECH/LANGUAGE PATHOLOGY      [x] Daily  [] Weekly Care Conference Note  [] Discharge    Patient:Laura Gomez      HPF:2/27/[birth year]  IAY:[id number]  Rehab Dx/Hx: Acute CVA (cerebrovascular accident) ([facility] [address] [I63.9]    Precautions: [x] Aspiration  [x] Fall risk  [] Sternal  [] Seizure [] Hip  [] Weight Bearing [] Other  ST Dx: [] Aphasia  [] Dysarthria  [] Apraxia   [x] Oropharyngeal dysphagia [x] Cognitive Impairment  [] Other:   Date of Admit: 4/23/2021  Room #: 3101/3101-01  Date: 5/12/2021          Current Diet Order:Dietary Nutrition Supplements: Low Calorie High Protein Supplement  DIET GENERAL; Daily Fluid Restriction: 1500 ml   Recommended Form of Meds: PO  Compensatory Swallowing Strategies: Alternate solids and liquids, Upright as possible for all oral intake, Check for pocketing of food on the Left, Small bites/sips, Lingual sweep     Subjective: Pt admitted 4/12 after fall with left sided paralysis. Pt underwent craniectomy on 4/13 per Dr. Bella Khalil. Social/Functional History  Lives With: Spouse;Son( reported that son is diagnosed with Autism.)  Occupation: Full time employment  Type of occupation:  at [employer]"    FEES 4/14: IMPRESSIONS:   Pt presents w/ mild oropharyngeal dysphagia characterized by premature spillage of thin liquids to UES and pyriforms w/ intermittent deep penetration of laryngeal vestibule; adequate airway protection and complete clearance of all residue after swallow initiation. No aspiration w/ any trials. Timely swallow initiation w/ puree and regular solid consistencies; no oropharyngeal residue.    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 Adequate ab/adduction. Complete closure of TVFs upon adduction.      Dentition: Adequate  Vision  Vision: Impaired  Vision Exceptions: Visual field cut  Hearing  Hearing: Within functional limits Barriers toward progress: None towards stated goals    Date: 5/12/2021       Tx session 1 Tx session 2 Tx session 3   Total Timed Code Min 15 30 24   Total Treatment Minutes 33 30 38   Individual Treatment Minutes 33 30 38   Group Treatment Minutes 0 0 0   Co-Treat Minutes 0 0 0   Brief Exception: N/A N/A N/A   Pain 8/10 back pain. Continues to endorse left leg pain as unchanged. None indicated. Pain Intervention: [] RN notified  [] Repositioned  [x] Intervention offered and patient declined  [] N/A  [x] Other: Heating pad applied . RN aware and present. [] RN notified  [x] Repositioned  [] Intervention offered and patient declined  [] N/A  [] Other:  [] RN notified  [] Repositioned  [] Intervention offered and patient declined  [x] N/A  [] Other:    Subjective:     Pt upright in chair. Pt upright in chair and participates well in therapy. Pt with some motoric agitation, press of speech, increased disinhibition and inappropriate behaviors. Pt with some delayed awareness and appropriately apologizes. When given direct feedback pt reports she feels this is baseline behavior. Pt supine in bed completing Physical Therapy. Participates in therapy session well. Objective / Goals:      Patient will consume regular solids with timely mastication and no pocketing in L buccal cavity across 2 sessions. Patient tolerated NMES via VitalStim placement 4a (targeting orbicularis oris, buccinator and superior pharyngeal constrictor) @ 3.0 mA on right x 25 minutes, 7.0 mA on left x 7 minutes, increased to 8.0 mA x 18 minutes (total 25 minutes). Good contraction throughout session. Transitioned treatment to compensation as pocketing has not improved over past week. Utilized placement on right and lingual sweep following every bite via errorless learning strategy. Bolus noted with each trial but patient demonstrated increased carryover as session continued. Goal not targeted this session.   Targeted via bolus control exercise - lateralization x10  Lingual range of motion - x5-10 bidirectionally; utilized mirror and tactile cues to facilitate. Patient will consume PO without anterior spillage or overt pocketing across 2 sessions. No overt anterior spillage was noted with exception of x1 during attempting to use lingual sweep to remove PO pocketed and pt appeared to demo discoordination with bolus expulsion vs posterior propulsion. Goal not targeted this session. Goal not targeted this session. Pt will complete divided/alternating attention tasks with 85% accuracy given min cues. Mod cues to consume PO and have conversational exchanges. Attention impairments noted with mod-max cues for task continuation and to reduce off topic comments. Continues to require cues to redirect back to task after conversational exchanges - ~min-mod cues required. Pt will accurately complete executive functioning tasks with min cues. Goal not targeted this session. Visual sequencing - 70% accuracy for task completion (min-mod cues) Min cues for visual reasoning. Pt will attend to L visual field with min cues. Pt with mod cues for eye contact to conversational partner. 57% accuracy (min-mod cues) for appropriate left to right scanning. Min cues for eye contact to communication partner. Pt will participate in ongoing cognitive linguistic assessment. GOAL MET GOAL MET GOAL MET   Other areas targeted:      Education:   Educated pt on rationale for tasks this date, improved carryover noted, indications for VitalStim. Education regarding performance on given tasks and direct feedback on disinhibition. Education regarding goal areas and progress. Educated on team conference tomorrow and that SLP representative will be present but will not be a familiar therapist. Pt denied any questions and felt that her family did not have any physical concerns.     Safety Devices: [x] Call light within reach  [x] Chair alarm activated and connected to nurse call light system  [] Bed alarm activated   [] Other:  [x] Call light within reach  [x] Chair alarm activated and connected to nurse call light system  [] Bed alarm activated   [] Other:  [x] Call light within reach  [x] Chair alarm activated and connected to nurse call light system  [] Bed alarm activated   [] Other:    Assessment:  Oral stage dysphagia complicated by attention and awareness. The patient demonstrated increased disinhibition during second session with inappropriate topics and behaviors and also motoric agitation. Pt continues to demonstrate left inattention, executive function impairments and attention impairments. Improved ability to identify errors and patterned behavior but difficulty with execution and carryover. Plan: Continue per POC.        Interventions used this date:  [] Speech/Language Treatment  [] Instruction in HEP  [x] Dysphagia Treatment [x] Cognitive Treatment   [] Other:    Discharge recommendations:  [] Home independently  [x] Home with assistance []  24 hour supervision  [] ECF [] Other  Continued Tx Upon Discharge: ? [x] Yes    [] No    [] TBD based on progress while on ARU     [] Vital Stim indicated     [] Other:   Estimated discharge date: 5/15 with plans to request for extension     Neeta Ross M.A., [place]  Speech-Language Pathologist

## 2021-05-12 NOTE — CARE COORDINATION
Call placed to patient's , Petey Roth. He is updated on plan of care. Judit Clemente to attend Care Conference in am.  Team updated. No other needs at this time.   Electronically signed by USMAN Guan, MASTER on 5/12/2021 at 1:51 PM

## 2021-05-12 NOTE — PLAN OF CARE
Problem: Pain:  Goal: Control of acute pain  Description: Control of acute pain  5/12/2021 1409 by Deena Jensen RN  Outcome: Ongoing  Note: Pain being managed with PRN and scheduled medications. Non-pharm measures of heat therapy and repositioning encouraged throughout shift. Problem: Falls - Risk of:  Goal: Will remain free from falls  Description: Will remain free from falls  5/12/2021 1409 by Deena Jensen RN  Outcome: Ongoing  Note: Remains free from falls. Fall precautions in place. Bed/chair alarms utilized throughout shift. Calls out appropriately for assistance, call light within reach.       Problem: Skin Integrity:  Goal: Absence of new skin breakdown  Description: Absence of new skin breakdown  5/12/2021 1409 by Deena Jensen RN  Outcome: Ongoing

## 2021-05-13 PROCEDURE — 97110 THERAPEUTIC EXERCISES: CPT

## 2021-05-13 PROCEDURE — 97542 WHEELCHAIR MNGMENT TRAINING: CPT

## 2021-05-13 PROCEDURE — 97530 THERAPEUTIC ACTIVITIES: CPT

## 2021-05-13 PROCEDURE — 92526 ORAL FUNCTION THERAPY: CPT

## 2021-05-13 PROCEDURE — 6370000000 HC RX 637 (ALT 250 FOR IP): Performed by: PHYSICAL MEDICINE & REHABILITATION

## 2021-05-13 PROCEDURE — 1280000000 HC REHAB R&B

## 2021-05-13 PROCEDURE — 6360000002 HC RX W HCPCS: Performed by: PHYSICAL MEDICINE & REHABILITATION

## 2021-05-13 PROCEDURE — 99232 SBSQ HOSP IP/OBS MODERATE 35: CPT | Performed by: PHYSICAL MEDICINE & REHABILITATION

## 2021-05-13 PROCEDURE — 97116 GAIT TRAINING THERAPY: CPT

## 2021-05-13 PROCEDURE — 94660 CPAP INITIATION&MGMT: CPT

## 2021-05-13 PROCEDURE — 97130 THER IVNTJ EA ADDL 15 MIN: CPT

## 2021-05-13 PROCEDURE — 97129 THER IVNTJ 1ST 15 MIN: CPT

## 2021-05-13 PROCEDURE — 97535 SELF CARE MNGMENT TRAINING: CPT

## 2021-05-13 RX ADMIN — ACETAMINOPHEN 1000 MG: 500 TABLET, COATED ORAL at 04:00

## 2021-05-13 RX ADMIN — FLUOXETINE 20 MG: 20 CAPSULE ORAL at 08:05

## 2021-05-13 RX ADMIN — LEVETIRACETAM 500 MG: 500 TABLET ORAL at 08:05

## 2021-05-13 RX ADMIN — OXYCODONE 10 MG: 5 TABLET ORAL at 08:05

## 2021-05-13 RX ADMIN — ACETAMINOPHEN 1000 MG: 500 TABLET, COATED ORAL at 09:20

## 2021-05-13 RX ADMIN — ASPIRIN 81 MG: 81 TABLET, CHEWABLE ORAL at 08:04

## 2021-05-13 RX ADMIN — OXYCODONE HYDROCHLORIDE 5 MG: 5 TABLET ORAL at 14:15

## 2021-05-13 RX ADMIN — THERA TABS 1 TABLET: TAB at 08:04

## 2021-05-13 RX ADMIN — ATORVASTATIN CALCIUM 80 MG: 80 TABLET, FILM COATED ORAL at 21:01

## 2021-05-13 RX ADMIN — HEPARIN SODIUM 5000 UNITS: 5000 INJECTION INTRAVENOUS; SUBCUTANEOUS at 22:41

## 2021-05-13 RX ADMIN — FAMOTIDINE 20 MG: 20 TABLET, FILM COATED ORAL at 21:02

## 2021-05-13 RX ADMIN — HEPARIN SODIUM 5000 UNITS: 5000 INJECTION INTRAVENOUS; SUBCUTANEOUS at 06:28

## 2021-05-13 RX ADMIN — AMLODIPINE BESYLATE 5 MG: 5 TABLET ORAL at 08:05

## 2021-05-13 RX ADMIN — DOCUSATE SODIUM 50 MG AND SENNOSIDES 8.6 MG 2 TABLET: 8.6; 5 TABLET, FILM COATED ORAL at 21:01

## 2021-05-13 RX ADMIN — THIAMINE HCL TAB 100 MG 100 MG: 100 TAB at 08:05

## 2021-05-13 RX ADMIN — FAMOTIDINE 20 MG: 20 TABLET, FILM COATED ORAL at 08:04

## 2021-05-13 RX ADMIN — LEVETIRACETAM 500 MG: 500 TABLET ORAL at 21:01

## 2021-05-13 RX ADMIN — DOCUSATE SODIUM 50 MG AND SENNOSIDES 8.6 MG 2 TABLET: 8.6; 5 TABLET, FILM COATED ORAL at 08:05

## 2021-05-13 RX ADMIN — ACETAMINOPHEN 1000 MG: 500 TABLET, COATED ORAL at 22:40

## 2021-05-13 RX ADMIN — Medication 5 MG: at 08:05

## 2021-05-13 RX ADMIN — ACETAMINOPHEN 1000 MG: 500 TABLET, COATED ORAL at 16:33

## 2021-05-13 RX ADMIN — HEPARIN SODIUM 5000 UNITS: 5000 INJECTION INTRAVENOUS; SUBCUTANEOUS at 14:07

## 2021-05-13 ASSESSMENT — PAIN DESCRIPTION - LOCATION
LOCATION: HIP
LOCATION: SHOULDER
LOCATION: HIP
LOCATION: HEAD
LOCATION: HIP

## 2021-05-13 ASSESSMENT — PAIN DESCRIPTION - DESCRIPTORS
DESCRIPTORS: SHARP
DESCRIPTORS: ACHING
DESCRIPTORS: SORE
DESCRIPTORS: SHOOTING
DESCRIPTORS: DULL
DESCRIPTORS: SHARP
DESCRIPTORS: DULL;ACHING
DESCRIPTORS: DULL

## 2021-05-13 ASSESSMENT — PAIN DESCRIPTION - FREQUENCY
FREQUENCY: INTERMITTENT

## 2021-05-13 ASSESSMENT — PAIN SCALES - GENERAL
PAINLEVEL_OUTOF10: 1
PAINLEVEL_OUTOF10: 0
PAINLEVEL_OUTOF10: 8
PAINLEVEL_OUTOF10: 1
PAINLEVEL_OUTOF10: 2
PAINLEVEL_OUTOF10: 8
PAINLEVEL_OUTOF10: 1
PAINLEVEL_OUTOF10: 0
PAINLEVEL_OUTOF10: 3
PAINLEVEL_OUTOF10: 1
PAINLEVEL_OUTOF10: 0
PAINLEVEL_OUTOF10: 4

## 2021-05-13 ASSESSMENT — PAIN DESCRIPTION - ORIENTATION
ORIENTATION: LEFT
ORIENTATION: LEFT
ORIENTATION: RIGHT
ORIENTATION: LEFT

## 2021-05-13 ASSESSMENT — PAIN DESCRIPTION - PAIN TYPE
TYPE: ACUTE PAIN
TYPE: SURGICAL PAIN

## 2021-05-13 ASSESSMENT — PAIN DESCRIPTION - PROGRESSION
CLINICAL_PROGRESSION: GRADUALLY IMPROVING
CLINICAL_PROGRESSION: GRADUALLY WORSENING
CLINICAL_PROGRESSION: GRADUALLY WORSENING
CLINICAL_PROGRESSION: GRADUALLY IMPROVING
CLINICAL_PROGRESSION: GRADUALLY IMPROVING
CLINICAL_PROGRESSION: GRADUALLY WORSENING

## 2021-05-13 ASSESSMENT — PAIN DESCRIPTION - ONSET
ONSET: ON-GOING

## 2021-05-13 ASSESSMENT — PAIN - FUNCTIONAL ASSESSMENT
PAIN_FUNCTIONAL_ASSESSMENT: PREVENTS OR INTERFERES SOME ACTIVE ACTIVITIES AND ADLS
PAIN_FUNCTIONAL_ASSESSMENT: ACTIVITIES ARE NOT PREVENTED
PAIN_FUNCTIONAL_ASSESSMENT: PREVENTS OR INTERFERES SOME ACTIVE ACTIVITIES AND ADLS

## 2021-05-13 NOTE — PROGRESS NOTES
NUTRITION NOTE   Admission Date: 4/23/2021     Type and Reason for Visit: Reassess    NUTRITION RECOMMENDATIONS:   1. PO Diet: Continue general diet, 1500 ml FR per MD    2. ONS: Low Hugo, HP Ensure BID    NUTRITION ASSESSMENT:  Pt continues with good po intakes, >75% of meals and Ensure. Pt requesting that RD return closer to dc to answer nutritional questions. Will continue to follow. Patient admitted d/t GERD, depression/anxiety     PMH significant for: CVA    MALNUTRITION ASSESSMENT  Context of Malnutrition: Acute Illness   Malnutrition Status: No malnutrition    NUTRITION DIAGNOSIS No nutrition diagnosis at this time. NUTRITION INTERVENTION  Food and/or Nutrient Delivery: Continue Current Diet, Continue Oral Nutrition Supplement   Nutrition Education/Counseling: No recommendation at this time Coordination of Nutrition Care: Continue to monitor while inpatient     NUTRITION RISK LEVEL: Risk Level: Low        The patient will still be monitored per nutrition standards of care. Consult dietitian if nutrition interventions essential to patient care is needed.      Basim Ayon, [address] [place] [address], [address] [place] [address] Drive:  [phone]  Office:  [phone]

## 2021-05-13 NOTE — PLAN OF CARE
Problem: Pain:  Goal: Pain level will decrease  Description: Pain level will decrease  Outcome: Ongoing  Note: Patient complains of pain at level 7/10. Patient describes pain as shooting. Patient requests pain medication. Patient medicated with PRN oxycodone and scheduled Tylenol. Problem: Pain:  Goal: Control of acute pain  Description: Control of acute pain  5/13/2021 0321 by Malena Rivera RN  Outcome: Ongoing     Problem: Pain:  Goal: Control of chronic pain  Description: Control of chronic pain  Outcome: Ongoing     Problem: Falls - Risk of:  Goal: Will remain free from falls  Description: Will remain free from falls  5/13/2021 0321 by Taylor Ramsay RN  Outcome: Ongoing  Note: Patient is a fall risk. Patient is a x 2 stand pivot if going to the bathroom and x 1 stand pivot if going to bed. See Fall Risk assessment for details. Bed is in low, lock position; call light/belongings within reach. No attempts to get out of bed have been made, calls appropriately when assistance is needed. Bed alarm and hourly rounds in place for safety.        Problem: Falls - Risk of:  Goal: Absence of physical injury  Description: Absence of physical injury  Outcome: Ongoing     Problem: Skin Integrity:  Goal: Will show no infection signs and symptoms  Description: Will show no infection signs and symptoms  Note:

## 2021-05-13 NOTE — PROGRESS NOTES
w/c)  Stand Pivot Transfers: Moderate Assistance(mat<>wc x2)  Comment: Pt requires assist at L side to keep hips extended and decreased knee hyperextension/buckling, mod A for balance throughout stand pivot transfer. Pt unable to move/progress LLE throughout transfer, L knee hyperextension noted. Ambulation  Ambulation?: No  Wheelchair Activities  Wheelchair Size: 20\"  Wheelchair Type: Standard  Wheelchair Cushion: Pressure Relieving  Wheelchair Parts Management: Yes  Left Brakes Level of Assistance: Independent  Right Brakes Level of Assistance: Independent  Propulsion: Yes  Propulsion 1  Propulsion: Manual  Level: Level Tile  Method: RUE;RLE  Level of Assistance: Stand by assistance;Minimal assistance(occasional min A to keep pt from running into walls or objects in moya)  Description/ Details: VC's needed for keeping a linear path, avoiding obstacles, and attending to L side of hallway  Distance: 125' + 225'     Balance  Comments: Pt performed standing 3x1-2' at rail in hallway x1 and // bars x2 performing weight shifts with min A and unilateral UE support from RUE. TC's at hips for extension and increasing weight bearing to LLE. Exercises  Comments: Pt performed the following exericses on RLE in seated x15: heel raises, toe raises, LAQ, marches, hip add, hip abd. PROM to LLE x15: DF/PF, knee extension/flexion, hip flexion (VC's to stay on task throughout exercises)     Second session: Pt was greeted supine in bed and agreeable to PT/OT co treatment. Pt performed supine>sit with min A for moving LLE and increased time. Pt requires min VC's to scoot to edge of bed especially to weight shift to the R and protract the L hip. Pt performed stand pivot transfer bed>w/c with mod A but requires VC's/TC's to move hips towards w/c and reach back for arm rest. Pt unable to take a step with either LE d/t impaired balance and LLE strength.  Pt ambulated 6-8'x2 in // bars with min A at hips for weight shifting and balance and Training, Gait Training, Safety Education & Training, Home Exercise Program, Patient/Caregiver Education & Training, Neuromuscular Re-education, Stair training  Safety Devices  Type of devices:  All fall risk precautions in place, Left in chair, Chair alarm in place, Call light within reach, Nurse notified     Therapy Time   Individual Concurrent Group Co-treatment   Time In 0930         Time Out 1030         Minutes 60         Timed Code Treatment Minutes: 60 Minutes     Second Session Therapy Time:   Individual Concurrent Group Co-treatment   Time In      1330   Time Out      1400   Minutes      30     Timed Code Treatment Minutes:  60 + 30    Total Treatment Minutes:  [address], PT, DPT

## 2021-05-13 NOTE — PROGRESS NOTES
Occupational Therapy  Facility/Department: [facility] ACUTE REHAB UNIT  Daily Treatment Note  NAME: Jorge Coleman  : 1970  MRN: [id number]    Date of Service: 2021    Discharge Recommendations:  24 hour supervision or assist, Home with Home health OT, Continue to assess pending progress, Home with nursing aide  OT Equipment Recommendations  ADL Assistive Devices: Transfer Tub Bench;Grab Bars - toilet  Other: continue to assess    Assessment   Performance deficits / Impairments: Decreased functional mobility ; Decreased ADL status; Decreased ROM; Decreased strength;Decreased sensation;Decreased fine motor control;Decreased endurance;Decreased posture;Decreased balance;Decreased safe awareness;Decreased coordination;Decreased vision/visual deficit; Decreased cognition  Assessment: Pt completed toilet transfer with mod A using RTS with handles, pt reported increased comfort and decreased anxiety when pivoting and standing to toilet with a surface to push up from and is interested in acquiring one for her bathroom upon d/c. Pt demonstrating improved ability to incorporate use of AE to don/doff footwear but requires assist with LLE d/t hypertonicity and hemiplegia. Cont per OT POC. Treatment Diagnosis: decreased independence with ADLs and decreased functional mobility 2/2 CVA  Prognosis: Fair  OT Education: Plan of Care;Transfer Training;Precautions; ADL Adaptive Strategies  Patient Education: pt educated on use of RTS to increase independence with toilet transfers, pt verb and demo understanding, cont to reinforce  Activity Tolerance  Activity Tolerance: Patient Tolerated treatment well  Safety Devices  Type of devices: Call light within reach; Chair alarm in place; Left in chair; All fall risk precautions in place;Nurse notified         Patient Diagnosis(es): There were no encounter diagnoses. has a past medical history of GERD (gastroesophageal reflux disease), Hernia, Obesity, and Unspecified sleep apnea.    has a past surgical history that includes Splenectomy (); Total hip arthroplasty (); Lap Band (); hernia repair ();  section (); and craniotomy (Right, 2021). Restrictions  Position Activity Restriction  Other position/activity restrictions: Ambulate, Up with assist, Pt to wear helmet when OOB, OK to remove for shower, HOB to elevated at 30*  Subjective   General  Chart Reviewed: Yes  Patient assessed for rehabilitation services?: Yes  Additional Pertinent Hx: 46 y.o. female with hypertension and tobacco abuse who presented after spending a prolonged period on the ground s/p fall out of bed; found to have acute left hemiparesis and gaze deviation. CXR with no acute cardiopulmonary abnormality. CTH revealed large acute/subacute infarct in the R frontal lobe with associated mass effect and 4 mm R to L midline shift. It also noted subdural hemorrhage of 6mm along falx, although NSGY less convinced. CTA head and neck revealed occluded R cervical ICA & R anterior cerebral artery with near complete occlusion of R MCA. Outside window on TPA. Pt is now POD #1 following hemicraniectomy (). Pt admitted to ARU   Response to previous treatment: Patient with no complaints from previous session  Family / Caregiver Present: No  Referring Practitioner: Dr. Kamaljit Shields DO  Diagnosis: CVA  Subjective  Subjective: Pt supine in bed upon arrival, pleasant and agreeable to OT session requesting to use bathroom. General Comment  Comments: Clement Fly Vital Signs  Patient Currently in Pain: Yes   Orientation  Orientation  Overall Orientation Status: Within Functional Limits  Objective    ADL  Grooming: Supervision;Verbal cueing;Setup; Increased time to complete(oral hygiene seated in w/c, + time to open and close toothpaste, attempted incorporation of LUE with assist, VCs to attend to L side of sink to locate water to rinse, cues for ant lean; pt applied deodorant with assist to position LUE)  LE Dressing: Verbal cueing; Increased time to complete;Setup; Moderate assistance(pt doffed socks using reacher and VCs for tech with spvn assist, use of RLE to position LLE; pt donned R sock using sock aid with setup of sock on device and spvn, max A for L sock, pt donned R shoe with setup and [place] [address], L shoe with total A)  Toileting: Dependent/Total;Increased time to complete(pt continent of bladder, pt attempted pericare but unable to reach seated, total A for pericare hygiene in stance and LB clothing on/off hips with min A to maintain stance)        Toilet Transfers  Toilet - Technique: Stand pivot  Equipment Used: Raised toilet seat with rails  Toilet Transfer: Moderate assistance  Toilet Transfers Comments: VCs for hand placement, difficulty pivoting LLE  Bed mobility  Supine to Sit: Minimal assistance(pt refused log roll tech d/t pain in L hip, assist for trunk ascension)  Transfers  Stand Pivot Transfers: Moderate assistance(EOB<W/C leading to L)                       Cognition  Overall Cognitive Status: Exceptions  Arousal/Alertness: Appropriate responses to stimuli  Following Commands:  Follows one step commands consistently  Attention Span: Appears intact  Memory: Appears intact  Safety Judgement: Decreased awareness of need for assistance;Decreased awareness of need for safety  Problem Solving: Assistance required to generate solutions;Assistance required to correct errors made;Assistance required to implement solutions;Decreased awareness of errors  Insights: Decreased awareness of deficits  Initiation: Requires cues for some  Sequencing: Requires cues for some        Plan   Plan  Times per week: 5x a week for 60 mins daily  Times per day: Daily  Current Treatment Recommendations: Strengthening, Endurance Training, Neuromuscular Re-education, Self-Care / ADL, Functional Mobility Training, Safety Education & Training, ROM, Positioning, Wheelchair Mobility Training, Balance Training, Patient/Caregiver Education & Training, Manual Therapy:  MLD, Equipment Evaluation, Education, & procurement, Home Management Training, Cognitive Reorientation    Second Session: Pt was supine in bed upon arrival and agreeable to PT/OT co treatment to increase independence with functional mobility with collaboration of 2 disciplines. Pt performed supine>sit with min A for mgmt of LLE, HOB elevated to 30* per orders. Pt requires min VC's to scoot to edge of bed L>R. Pt performed stand pivot transfer bed>w/c with mod A but requires VC's/TC's for hand placement and pivoting d/t LLE hyperextension. Pt ambulated 6-8'x2 in // bars with min A from OT to assist with weight shifting and balance and total A at LLE for knee control and to advance LLE. Pt required max VC's for first bout of gait for weight shifting and foot progression but only required min VC's during the second bout. Pt unable to assist with LLE progressing. Pt performed sit<>stand x2 prior to ambulation with min A and VC's to push from armrests rather than pull from // bar. Pt required seated rest breaks between bouts of gait. Pt performed stand pivot transfer w/c>bed with mod A and difficulty pivoting d/t leading to L. Pt performed sit>supine with max A for assist at LLE and trunk. Total A x 2 to scoot superiorly in bed. Pt was left supine in bed with bed alarm on, call light in reach, and all needs met.      Goals  Short term goals  Time Frame for Short term goals: 2 weeks  Short term goal 1: Pt will complete toilet transfer w/ Mod A-goal met 5/4  Short term goal 2: Pt will complete UE dressing w/ Mod A-goal met 5/12  Short term goal 3: Pt will complete oral care w/ spvn seated at sink w/o VCs for L sided attention-ongoing  Short term goal 4: Pt will complete LE dressing w/ Mod A-ongoing  Long term goals  Time Frame for Long term goals : 4 weeks-all ongoing  Long term goal 1: Pt will complete toilet transfer w/ SBA  Long term goal 2: Pt will complete UE dressing w/ setup  Long term goal 3: Pt will complete LE dressing w/ SBA  Long term goal 4: Pt will be independent w/ tone management exercises to improve functional use of LUE  Long term goal 5: Pt will complete oral care I'ly  Patient Goals   Patient goals : \"get back to my normal self\"       Therapy Time   Individual Concurrent Group Co-treatment   Time In 0730      1330   Time Out 0830      1400   Minutes 60      30   Timed Code Treatment Minutes: 60 Minutes+ 30 Minutes  Total Treatment Time: 90 Minutes       Xiomy Marlow, OT

## 2021-05-13 NOTE — PLAN OF CARE
Problem: Pain:  Goal: Control of acute pain  Description: Control of acute pain  5/13/2021 1049 by Perry Jasso RN  Outcome: Ongoing   Pt verbalized having left hip pain. Dr Alessandra Hopkins reviewed hip x-ray with pt and he ordered an orthopedic consult. Pt was medicated with PRN Oxy-IR and scheduled Extra Strength Tylenol. Medication regimen, positioning and rest was effective. Problem: Falls - Risk of:  Goal: Will remain free from falls  Description: Will remain free from falls  5/13/2021 1049 by Perry Jasso RN  Outcome: Ongoing   Pt instructed on use of call light and the importance of safety. Pt is able to use call light to call for nursing assistance appropriately. No falls this shift. Bed and chair alarms in use to promote pt safety. Problem: Skin Integrity:  Goal: Absence of new skin breakdown  Description: Absence of new skin breakdown  Outcome: Ongoing   No new skin issues this shift. Pt assisted to shift her wt when up in [facility] and when in bed.  Pt refused shower this shift states she had one on 5/12/21    Electronically signed by Perry Jasso RN on 5/13/2021 at 10:55 AM

## 2021-05-13 NOTE — PROGRESS NOTES
Barriers toward progress: None towards stated goals    Date: 5/13/2021        Tx session 1 Tx session 2 Tx session 3 Weekly Summary   Total Timed Code Min 15 30 15    Total Treatment Minutes 30 30 15    Individual Treatment Minutes 30 30 15    Group Treatment Minutes 0 0 0    Co-Treat Minutes 0 0 0    Brief Exception: N/A N/A N/A    Pain denies Denies Denies    Pain Intervention: [] RN notified  [] Repositioned  [] Intervention offered and patient declined  [x] N/A  [] Other:    [] RN notified  [] Repositioned  [] Intervention offered and patient declined  [x] N/A  [] Other:  [] RN notified  [] Repositioned  [] Intervention offered and patient declined  [x] N/A  [] Other:     Subjective:     Patient awake, alert, pleasant, and cooperative throughout session. Participated well throughout tx session. Patient fatigued, but agreeable to 15 minute therapy session prior to family arrival.    Objective / Goals:       Patient will consume regular solids with timely mastication and no pocketing in L buccal cavity across 2 sessions. Patient with timely and adequate mastication of durán with liquid wash and min cues for lingual sweep. Did not target. Did not target. PROGRESSING; [address] GOAL   Patient will consume PO without anterior spillage or overt pocketing across 2 sessions. No anterior spillage noted this session, minimal pocketing, promptly managed. Goal not targeted this session. Goal not targeted this session. PROGRESSING; CONTINUE GOAL   Pt will complete divided/alternating attention tasks with 85% accuracy given min cues. Patient required min-mod cues for attention. Min-mod cues for attention during verbal organization task. Min cues during divergent naming/generation task. PROGRESSING; CONTINUE GOAL   Pt will accurately complete executive functioning tasks with min cues. Patient required mod cues for executive functioning during word-editing/error finding task.  Mod cues during alphabetical sequencing of three words. Mod decreasing to min cues during sequencing of 4 items by attributes. Min cues during divergent naming/generation task. PROGRESSING; CONTINUE GOAL   Pt will attend to L visual field with min cues. Min-mod cues for L visual field during written task Did not directly target. Min cues to maintain visual engagement with therapist seated on left. PROGRESSING; CONTINUE GOAL   Pt will participate in ongoing cognitive linguistic assessment. GOAL MET GOAL MET GOAL MET GOAL MET; D/C GOAL   Other areas targeted:       Education:   Ongoing re: purpose of visit, role of SLP, therapy tasks/rationale, strategies Ongoing re: purpose of visit, role of SLP, therapy tasks/rationale, strategies Ongoing re: purpose of visit, role of SLP, therapy tasks/rationale, strategies    Safety Devices: [x] Call light within reach  [x] Chair alarm activated and connected to nurse call light system  [] Bed alarm activated   [] Other:  [x] Call light within reach  [x] Chair alarm activated and connected to nurse call light system  [] Bed alarm activated   [] Other:  [x] Call light within reach  [x] Chair alarm activated and connected to nurse call light system  [x] Bed alarm activated   [] Other:     Assessment:  Oral stage dysphagia. Cognitive-linguistic deficits including attention, awareness, executive functioning impairments. Plan: Continue per POC.         Interventions used this date:  [] Speech/Language Treatment  [] Instruction in HEP  [x] Dysphagia Treatment [x] Cognitive Treatment   [] Other:    Discharge recommendations:  [] Home independently  [x] Home with assistance []  24 hour supervision  [] ECF [] Other  Continued Tx Upon Discharge: ? [x] Yes    [] No    [] TBD based on progress while on ARU     [] Vital Stim indicated     [] Other:   Estimated discharge date: 5/15 with plans to request for extension     Marifer Linder M.A., Jinny Ramachandran  Speech-Language Pathologist
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
## 2021-05-13 NOTE — CARE COORDINATION
Referred to patient for d/c planning. Spoke to patient and family. Insurance approved to 5/19. Patient and family notified and agreeable. Family plans to attend care conference 5/20. Will continue to follow for needs.  Electronically signed by USMAN Scott LISW-S on 5/13/2021 at 4:07 PM

## 2021-05-13 NOTE — PROGRESS NOTES
Patient is in bed and resting at this time, vitals stable, pain was 7/10 with L shoulder and hip down to leg, gave PRN oxycodone and schedule Tylenol. A&O x 4, Call light with in reach and bed alarm on.

## 2021-05-13 NOTE — PROGRESS NOTES
SHIFT: 0700 - 1930  Pt upon assessment was stable. Alert and oriented. Denied having chest pain or SOB. Pt verbalized having pain in her left hip. Pt was medicated with PRN Oxy-IR and scheduled Tylenol. Medication rest and position was effective for pain control. Pt left hip xray reviewed by Dr. Vera Magaña and results were reviewed with pt.  ordered an Orthopedic consult. Dr Brianna Peace rounded on pt at 1210. Medications given this shift were reviewed with pt regarding use, dose and side effects. Pt verbalized understanding of education given. Continent of bladder this shift with toilieting being offered frequently. Pt refused shower this shift. Pt participated well with therapy. Pt at end of shift was resting in bed with bed in low position and call light was within reach. Pt denied having any further needs.     Electronically signed by Star Villafana RN on 5/13/2021 at 6:43 PM

## 2021-05-13 NOTE — PROGRESS NOTES
Department of Physical Medicine & Rehabilitation  Progress Note    Patient Identification:  Héctor Cruz  [id number]  : 1970  Admit date: 2021    Chief Complaint: CVA    Subjective:   Continues to have some left hip pain. Replacement 25 years ago. XR showed possible loosening of replacement. Ortho consulted. No other complaints. ROS: No f/c, n/v, cp     Objective:  Patient Vitals for the past 24 hrs:   BP Temp Temp src Pulse Resp SpO2   21 0743 (!) 133/90 97.8 °F (36.6 °C) Oral 77 17 97 %   21 2303     16    21 2205 124/78 98.1 °F (36.7 °C) Oral 78 16 97 %     Const: Alert. No distress, pleasant. HEENT: Normocephalic, atraumatic. Normal sclera/conjunctiva. MMM. Incision healing well  CV: Regular rate and rhythm. Resp: No respiratory distress. Lungs CTAB. Abd: Soft, nontender, nondistended, NABS+   Ext: No edema. Neuro: Alert, oriented, appropriately interactive. Psych: Cooperative, appropriate mood and affect    Laboratory data: Available via EMR. Last 24 hour lab  No results found for this or any previous visit (from the past 24 hour(s)). Therapy progress:  PT  Position Activity Restriction  Other position/activity restrictions: Ambulate, Up with assist, Pt to wear helmet when OOB, OK to remove for shower, HOB to elevated at 30*  Objective     Sit to Stand: Minimal Assistance, Contact guard assistance((W/c <> standing with R UE support on rail)VC's for scooting forward in chair, Assist for scooting L hip forward in chair, Vc's for anterior.  Sit to stand at shower bench for pants managment in preperation for shower Hugo + ModA)  Stand to sit: Minimal Assistance, Contact guard assistance(Vc's for eccentric control and to perform anterior trunk lean)  Bed to Chair: Moderate assistance(stand pivot to R, VC to perform hip flexion.)  Device: (hallway rail on R)  Other Apparatus: Wheelchair follow(do to decreased stability)  Assistance: (MinAx1 for L LE advancment, faciliation to decrease L Le knee hyperextension and to decrease L Le ER. MinAx1/CGA for facilitation for pt to perform WS)  OT  PT Equipment Recommendations  Equipment Needed: Yes(Will continue to assess equipment needs pending progress)  Other: 20\" hemiheight w/c with drop arm and standard 90 degree leg rest  Toilet - Technique: Stand pivot  Equipment Used: Raised toilet seat with rails  Toilet Transfers Comments: VCs for hand placement, difficulty pivoting LLE  Assessment        SLP  Diet Solids Recommendation: Dysphagia Soft and Bite-Sized (Dysphagia III)(with regular solids as tolerated. Per pt request, gravy/sauces to keep for moist.)       Body mass index is 36.51 kg/m². Assessment and Plan:  Mikayla Martínez a 46 y.o. female with PMH GERD p/w L sided weakness and facial droop.  CTA head/neck on 4/12 revealed occluded R cervical ICA, occluded right LEAH, near complete occlusion of right MCA.     Acute ischemic CVA, Subdural heomorrhage s/p right hemicraniectomy and subsequen SAH and IP hemorrhage  No tPA given.  MRI 4/14 showing large subacute infarct throughout R LEAH and MCA with some areas of hemorrhagic conversion.  - Neurosurgery and neurology following  --SBP <160, PRN hydralazine, scheduled Norvasc  -- Keppra 500 mg twice daily   -  SQH  - PT/OT consulted      Aphasia- SLP consulted   Depression-Patient takes fluoxetine 20 mg daily at home  Anxiety -Patient takes Ativan 0.5 mg every 8 hours as needed for anxiety- holding  MISHA-Patient uses CPAP at home   GERD- Patient takes omeprazole 40 mg daily at home- half dose  Obesity    Rehab Progress: Improving  Anticipated Dispo: home  Services/DME: LIANG  ELOS: LIANG Ca D.O. M.P.H  PM&R  5/13/2021  10:04 AM

## 2021-05-14 LAB
ANION GAP SERPL CALCULATED.3IONS-SCNC: 9 MMOL/L (ref 3–16)
BASOPHILS ABSOLUTE: 0.1 K/UL (ref 0–0.2)
BASOPHILS RELATIVE PERCENT: 1.2 %
BUN BLDV-MCNC: 15 MG/DL (ref 7–20)
CALCIUM SERPL-MCNC: 9.7 MG/DL (ref 8.3–10.6)
CHLORIDE BLD-SCNC: 106 MMOL/L (ref 99–110)
CO2: 29 MMOL/L (ref 21–32)
CREAT SERPL-MCNC: 0.6 MG/DL (ref 0.6–1.1)
EOSINOPHILS ABSOLUTE: 0.4 K/UL (ref 0–0.6)
EOSINOPHILS RELATIVE PERCENT: 3.8 %
GFR AFRICAN AMERICAN: >60
GFR NON-AFRICAN AMERICAN: >60
GLUCOSE BLD-MCNC: 90 MG/DL (ref 70–99)
HCT VFR BLD CALC: 38.1 % (ref 36–48)
HEMOGLOBIN: 12.9 G/DL (ref 12–16)
LYMPHOCYTES ABSOLUTE: 4.7 K/UL (ref 1–5.1)
LYMPHOCYTES RELATIVE PERCENT: 43.7 %
MCH RBC QN AUTO: 35.2 PG (ref 26–34)
MCHC RBC AUTO-ENTMCNC: 33.8 G/DL (ref 31–36)
MCV RBC AUTO: 104.2 FL (ref 80–100)
MONOCYTES ABSOLUTE: 1.1 K/UL (ref 0–1.3)
MONOCYTES RELATIVE PERCENT: 10.7 %
NEUTROPHILS ABSOLUTE: 4.3 K/UL (ref 1.7–7.7)
NEUTROPHILS RELATIVE PERCENT: 40.6 %
PDW BLD-RTO: 13.6 % (ref 12.4–15.4)
PLATELET # BLD: 388 K/UL (ref 135–450)
PMV BLD AUTO: 8.3 FL (ref 5–10.5)
POTASSIUM REFLEX MAGNESIUM: 4.2 MMOL/L (ref 3.5–5.1)
RBC # BLD: 3.66 M/UL (ref 4–5.2)
SODIUM BLD-SCNC: 144 MMOL/L (ref 136–145)
WBC # BLD: 10.7 K/UL (ref 4–11)

## 2021-05-14 PROCEDURE — 97542 WHEELCHAIR MNGMENT TRAINING: CPT | Performed by: PHYSICAL THERAPIST

## 2021-05-14 PROCEDURE — 97130 THER IVNTJ EA ADDL 15 MIN: CPT

## 2021-05-14 PROCEDURE — 85025 COMPLETE CBC W/AUTO DIFF WBC: CPT

## 2021-05-14 PROCEDURE — 36415 COLL VENOUS BLD VENIPUNCTURE: CPT

## 2021-05-14 PROCEDURE — 97535 SELF CARE MNGMENT TRAINING: CPT

## 2021-05-14 PROCEDURE — 97129 THER IVNTJ 1ST 15 MIN: CPT

## 2021-05-14 PROCEDURE — 6360000002 HC RX W HCPCS: Performed by: PHYSICAL MEDICINE & REHABILITATION

## 2021-05-14 PROCEDURE — 97530 THERAPEUTIC ACTIVITIES: CPT | Performed by: PHYSICAL THERAPIST

## 2021-05-14 PROCEDURE — 6370000000 HC RX 637 (ALT 250 FOR IP): Performed by: PHYSICAL MEDICINE & REHABILITATION

## 2021-05-14 PROCEDURE — 80048 BASIC METABOLIC PNL TOTAL CA: CPT

## 2021-05-14 PROCEDURE — 97530 THERAPEUTIC ACTIVITIES: CPT

## 2021-05-14 PROCEDURE — 97110 THERAPEUTIC EXERCISES: CPT | Performed by: PHYSICAL THERAPIST

## 2021-05-14 PROCEDURE — 94660 CPAP INITIATION&MGMT: CPT

## 2021-05-14 PROCEDURE — 1280000000 HC REHAB R&B

## 2021-05-14 PROCEDURE — 99232 SBSQ HOSP IP/OBS MODERATE 35: CPT | Performed by: PHYSICAL MEDICINE & REHABILITATION

## 2021-05-14 RX ADMIN — OXYCODONE HYDROCHLORIDE 5 MG: 5 TABLET ORAL at 16:28

## 2021-05-14 RX ADMIN — LEVETIRACETAM 500 MG: 500 TABLET ORAL at 20:55

## 2021-05-14 RX ADMIN — DOCUSATE SODIUM 50 MG AND SENNOSIDES 8.6 MG 2 TABLET: 8.6; 5 TABLET, FILM COATED ORAL at 20:55

## 2021-05-14 RX ADMIN — DOCUSATE SODIUM 50 MG AND SENNOSIDES 8.6 MG 2 TABLET: 8.6; 5 TABLET, FILM COATED ORAL at 08:15

## 2021-05-14 RX ADMIN — OXYCODONE 10 MG: 5 TABLET ORAL at 20:56

## 2021-05-14 RX ADMIN — ATORVASTATIN CALCIUM 80 MG: 80 TABLET, FILM COATED ORAL at 20:55

## 2021-05-14 RX ADMIN — Medication 5 MG: at 08:15

## 2021-05-14 RX ADMIN — ACETAMINOPHEN 1000 MG: 500 TABLET, COATED ORAL at 22:42

## 2021-05-14 RX ADMIN — OXYCODONE 10 MG: 5 TABLET ORAL at 08:15

## 2021-05-14 RX ADMIN — OXYCODONE 10 MG: 5 TABLET ORAL at 00:05

## 2021-05-14 RX ADMIN — ACETAMINOPHEN 1000 MG: 500 TABLET, COATED ORAL at 04:39

## 2021-05-14 RX ADMIN — ACETAMINOPHEN 1000 MG: 500 TABLET, COATED ORAL at 10:13

## 2021-05-14 RX ADMIN — HEPARIN SODIUM 5000 UNITS: 5000 INJECTION INTRAVENOUS; SUBCUTANEOUS at 06:40

## 2021-05-14 RX ADMIN — HEPARIN SODIUM 5000 UNITS: 5000 INJECTION INTRAVENOUS; SUBCUTANEOUS at 22:42

## 2021-05-14 RX ADMIN — ASPIRIN 81 MG: 81 TABLET, CHEWABLE ORAL at 08:15

## 2021-05-14 RX ADMIN — THIAMINE HCL TAB 100 MG 100 MG: 100 TAB at 08:16

## 2021-05-14 RX ADMIN — HEPARIN SODIUM 5000 UNITS: 5000 INJECTION INTRAVENOUS; SUBCUTANEOUS at 14:19

## 2021-05-14 RX ADMIN — AMLODIPINE BESYLATE 5 MG: 5 TABLET ORAL at 08:16

## 2021-05-14 RX ADMIN — THERA TABS 1 TABLET: TAB at 08:16

## 2021-05-14 RX ADMIN — FAMOTIDINE 20 MG: 20 TABLET, FILM COATED ORAL at 08:15

## 2021-05-14 RX ADMIN — FAMOTIDINE 20 MG: 20 TABLET, FILM COATED ORAL at 20:56

## 2021-05-14 RX ADMIN — LEVETIRACETAM 500 MG: 500 TABLET ORAL at 08:15

## 2021-05-14 RX ADMIN — FLUOXETINE 20 MG: 20 CAPSULE ORAL at 08:15

## 2021-05-14 RX ADMIN — OXYCODONE HYDROCHLORIDE 5 MG: 5 TABLET ORAL at 12:26

## 2021-05-14 ASSESSMENT — PAIN DESCRIPTION - ORIENTATION
ORIENTATION: LEFT

## 2021-05-14 ASSESSMENT — PAIN DESCRIPTION - PROGRESSION
CLINICAL_PROGRESSION: GRADUALLY WORSENING
CLINICAL_PROGRESSION: GRADUALLY WORSENING
CLINICAL_PROGRESSION: GRADUALLY IMPROVING
CLINICAL_PROGRESSION: GRADUALLY WORSENING
CLINICAL_PROGRESSION: GRADUALLY IMPROVING
CLINICAL_PROGRESSION: GRADUALLY WORSENING
CLINICAL_PROGRESSION: NOT CHANGED
CLINICAL_PROGRESSION: GRADUALLY WORSENING
CLINICAL_PROGRESSION: GRADUALLY IMPROVING
CLINICAL_PROGRESSION: GRADUALLY WORSENING
CLINICAL_PROGRESSION: GRADUALLY IMPROVING
CLINICAL_PROGRESSION: GRADUALLY IMPROVING
CLINICAL_PROGRESSION: GRADUALLY WORSENING

## 2021-05-14 ASSESSMENT — PAIN - FUNCTIONAL ASSESSMENT

## 2021-05-14 ASSESSMENT — PAIN DESCRIPTION - LOCATION
LOCATION: GENERALIZED
LOCATION: SHOULDER;ARM
LOCATION: HIP
LOCATION: SHOULDER
LOCATION: SHOULDER
LOCATION: HIP;SHOULDER
LOCATION: SHOULDER

## 2021-05-14 ASSESSMENT — PAIN DESCRIPTION - DESCRIPTORS
DESCRIPTORS: ACHING
DESCRIPTORS: ACHING
DESCRIPTORS: ACHING;SORE
DESCRIPTORS: ACHING
DESCRIPTORS: ACHING;DULL
DESCRIPTORS: ACHING
DESCRIPTORS: ACHING

## 2021-05-14 ASSESSMENT — PAIN SCALES - GENERAL
PAINLEVEL_OUTOF10: 0
PAINLEVEL_OUTOF10: 2
PAINLEVEL_OUTOF10: 2
PAINLEVEL_OUTOF10: 0
PAINLEVEL_OUTOF10: 7
PAINLEVEL_OUTOF10: 2
PAINLEVEL_OUTOF10: 4
PAINLEVEL_OUTOF10: 7
PAINLEVEL_OUTOF10: 2
PAINLEVEL_OUTOF10: 4
PAINLEVEL_OUTOF10: 7
PAINLEVEL_OUTOF10: 3
PAINLEVEL_OUTOF10: 7
PAINLEVEL_OUTOF10: 5
PAINLEVEL_OUTOF10: 2
PAINLEVEL_OUTOF10: 3

## 2021-05-14 ASSESSMENT — PAIN DESCRIPTION - ONSET
ONSET: ON-GOING

## 2021-05-14 ASSESSMENT — PAIN DESCRIPTION - PAIN TYPE
TYPE: ACUTE PAIN

## 2021-05-14 ASSESSMENT — PAIN DESCRIPTION - FREQUENCY
FREQUENCY: CONTINUOUS
FREQUENCY: CONTINUOUS
FREQUENCY: INTERMITTENT
FREQUENCY: CONTINUOUS
FREQUENCY: INTERMITTENT

## 2021-05-14 NOTE — PROGRESS NOTES
Occupational Therapy  Facility/Department: [facility] ACUTE REHAB UNIT  Daily Treatment Note  NAME: Colton Sutherland  : 1970  MRN: [id number]    Date of Service: 2021    Discharge Recommendations:  24 hour supervision or assist, Home with Home health OT, Continue to assess pending progress, Home with nursing aide  OT Equipment Recommendations  Equipment Needed: Yes  ADL Assistive Devices: Transfer Tub Bench;Grab Bars - toilet; Toileting - Raised Toilet Seat with arms  Other: continue to assess    Assessment   Performance deficits / Impairments: Decreased functional mobility ; Decreased ADL status; Decreased ROM; Decreased strength;Decreased sensation;Decreased fine motor control;Decreased endurance;Decreased posture;Decreased balance;Decreased safe awareness;Decreased coordination;Decreased vision/visual deficit; Decreased cognition  Assessment: Pt continues to demonstrate significant R gaze preference and does not i'ly turn head to L or attend to L visual field i'ly during community reintegration task in gift shop. Pt will require 24 hour supervision upon d/c if going home d/t safety concerns with w/c propulsion d/t challenge with visual scanning and navigating environment. Pt also limited by pain in L hip. Pt functioning below baseline, cont per OT POC. Treatment Diagnosis: decreased independence with ADLs and decreased functional mobility 2/2 CVA  Prognosis: Fair  OT Education: Plan of Care;Transfer Training;Precautions; ADL Adaptive Strategies  Patient Education: cont to reinforce  Barriers to Learning: cognition  REQUIRES OT FOLLOW UP: Yes  Activity Tolerance  Activity Tolerance: Patient Tolerated treatment well  Safety Devices  Safety Devices in place: Yes  Type of devices: Call light within reach; Chair alarm in place; Left in chair; All fall risk precautions in place;Nurse notified         Patient Diagnosis(es): There were no encounter diagnoses.       has a past medical history of GERD (gastroesophageal reflux reacher with CGA for positioning of LLE, max A to don L shoe, total A to don compression socks to BLEs)  Additional Comments: Pt provided with elastic shoe laces to increase independence with donning/doffing footwear  Instrumental ADL's  Instrumental ADLs: Yes  Commmunity Re-entry  Community Re-Entry Level: Wheelchair  Community Re-entry Level of Assistance: Moderate assistance  Community Re-Entry: Pt completed community reintegration task of long distance bout of w/c ambulation to gift shop as well as navigating gift shop to challenge visual scanning to L side and assess awareness of environmental barriers. Pt verbalized shopping as a meaningful activity that pt thoroughly enjoys. Pt req CGA for w/c ambulation as pt had multiple collisions on L side req VCs to turn head L and identify the barrier, pt able to problem solve to reposition w/c with min cues, pt utilized RUE and RLE to propel w/c ~250 feet including turns req mod VCs. Pt req assist to manuever over elevator threshold. Once in gift shop pt req min-mod A for w/c propulsion as pt demo difficulty on carpeted surface and significant cues to navigate tight spaces of aisles to prevent collision on L side. Pt with difficulty turning in gift shop d/t turnign radius and use of L leg rest. Pt only attended to items on R side of pt unless cued to turn head to L side. Pt picked out an item to purchase and was able to problem solve use of iPhone to use [employer] pay to purchase item from [employer]. Cognition  Overall Cognitive Status: Exceptions  Arousal/Alertness: Appropriate responses to stimuli  Following Commands:  Follows one step commands consistently  Attention Span: Appears intact  Memory: Appears intact  Safety Judgement: Decreased awareness of need for assistance;Decreased awareness of need for safety  Problem Solving: Assistance required to generate solutions;Assistance required to correct errors made;Assistance required to implement solutions;Decreased awareness of errors  Insights: Decreased awareness of deficits  Initiation: Requires cues for some  Sequencing: Requires cues for some  Cognition Comment: Pt with difficulty i'ly initiating head turns to L during visual scanning when navigating gift shop                                         Plan   Plan  Times per week: 5x a week for 60 mins daily  Times per day: Daily  Current Treatment Recommendations: Strengthening, Endurance Training, Neuromuscular Re-education, Self-Care / ADL, Functional Mobility Training, Safety Education & Training, ROM, Positioning, Wheelchair Mobility Training, Balance Training, Patient/Caregiver Education & Training, Manual Therapy:  MLD, Equipment Evaluation, Education, & procurement, Home Management Training, Cognitive Reorientation  G-Code     OutComes Score                                                  AM-PAC Score             Goals  Short term goals  Time Frame for Short term goals: 2 weeks  Short term goal 1: Pt will complete toilet transfer w/ Mod A-goal met 5/4  Short term goal 2: Pt will complete UE dressing w/ Mod A-goal met 5/12  Short term goal 3: Pt will complete oral care w/ spvn seated at sink w/o VCs for L sided attention-ongoing  Short term goal 4: Pt will complete LE dressing w/ Mod A-ongoing  Long term goals  Time Frame for Long term goals : 4 weeks-all ongoing  Long term goal 1: Pt will complete toilet transfer w/ SBA  Long term goal 2: Pt will complete UE dressing w/ setup  Long term goal 3: Pt will complete LE dressing w/ SBA  Long term goal 4: Pt will be independent w/ tone management exercises to improve functional use of LUE  Long term goal 5: Pt will complete oral care I'ly  Patient Goals   Patient goals : \"get back to my normal self\"       Therapy Time   Individual Concurrent Group Co-treatment   Time In 0930         Time Out 1030         Minutes 60         Timed Code Treatment Minutes: 60 Minutes       Tang Plasencia, OT

## 2021-05-14 NOTE — PLAN OF CARE
Problem: Pain:  Goal: Control of acute pain  Description: Control of acute pain  5/14/2021 0956 by Telma Witt RN  Outcome: Ongoing   Pt was instructed on pain and comfort management and current pain medication regimen. Pt verbalized having pain in her left hip and shoulder. Pt was medicated with PRN Oxy-IR and scheduled Tylenol. Current pain medication regimen was effective. Problem: Falls - Risk of:  Goal: Will remain free from falls  Description: Will remain free from falls  5/14/2021 0956 by Telma Witt RN  Outcome: Ongoing   No falls at this time. Pt uses call light. Pt instructed on safety with transfers from [facility] to [facility] and toilet transfers. Problem: Skin Integrity:  Goal: Will show no infection signs and symptoms  Description: Will show no infection signs and symptoms  5/14/2021 0956 by Telma Witt RN  Outcome: Ongoing   Surgical incision was without signs of infection. Problem: Skin Integrity:  Goal: Absence of new skin breakdown  Description: Absence of new skin breakdown  5/14/2021 0956 by Telma Witt RN  Outcome: Ongoing   No new skin issues noted. Pt repositioned when in bed and up in chair.     Electronically signed by Telma Witt RN on 5/14/2021 at 10:01 AM

## 2021-05-14 NOTE — PLAN OF CARE
Problem: Pain:  Goal: Control of acute pain  Description: Control of acute pain  Outcome: Ongoing  Note: Pt complains of pain to mild pain to surgical incision on right sculp and pain to left shoulder. Pt receiving schedule Tylenol and prn Oxycodone for pain. Will continue to assess pain and notify MD of increase in pain or ineffectiveness of pain medication. Pt asleep after receiving pain medication. No further complaints of pain thus far during night shift. Problem: Falls - Risk of:  Goal: Will remain free from falls  Description: Will remain free from falls  Outcome: Ongoing  Note: Pt with history of falls. Up with moderate assistance, gait belt and stand pivot to chair. LUE & LLE flaccid. Fall precautions maintained. Fall risk armband on. Non skid footwear on. Bed in lowest position. Bed alarm in use. Reminded pt to call for assistance with any needs. Call light within reach. Pt uses call light appropriately. No falls thus far during shift. Problem: Skin Integrity:  Goal: Will show no infection signs and symptoms  Description: Will show no infection signs and symptoms  Outcome: Ongoing  Note: Pt with surgical site to right scalp. Incision open to air, pink with no drainage. Incision cleanse with CHG swabs. Pt afebrile. Surgical site shows no s/sx of infection at this time. Will continue to assess q shift and prn and provide interventions as needed. Problem: Skin Integrity:  Goal: Absence of new skin breakdown  Description: Absence of new skin breakdown  Outcome: Ongoing  Note: Pt with abrasion to LUE with scabbing in place. Blanchable redness to buttocks. Pt repositioned every 2 hours. Will continue to assess skin integrity for breakdown. Skin care per protocol.

## 2021-05-14 NOTE — PROGRESS NOTES
SHIFT: 0700 - 1930  Pt upon assessment was stable. Alert and oriented. Denied having chest pain or SOB. Pt verbalized having pain in her left hip and left shoulder this shift. Pt was medicated with PRN Oxy-IR and scheduled Tylenol. Medication, rest, heat and position was effective for pain control. Scalp incision cleansed with soap and water this shift. Incision remains with scabs. Medications given this shift were reviewed with pt regarding use, dose and side effects. Pt verbalized understanding of education given. Continent of bladder this shift with toilieting being offered frequently. Pt showered this shift with assistance. Transfer to shower bench x2. Pt tolerated shower well. Pt participated well with therapies. Pt at end of shift was resting in bed with bed in low position and call light was within reach. Pt denied having any further needs.     Electronically signed by Perry Jasso RN on 5/14/2021 at 6:47 PM

## 2021-05-14 NOTE — PROGRESS NOTES
Pt awake in bed watching television. Complains of mild pain to surgical incision site. No pain medication required at this time. Vitals and assessment completed. Nighttime medications given, pt tolerated well. Pt repositioned in bed. Reminded pt to call for assistance with needs. Call light within reach. Safety measures in place.
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
## 2021-05-14 NOTE — PROGRESS NOTES
Department of Physical Medicine & Rehabilitation  Progress Note    Patient Identification:  Destinee Castro  [id number]  : 1970  Admit date: 2021    Chief Complaint: CVA    Subjective:   Doing well today. She continues to improve in therapy. Ortho has seen the patient and will follow up outpatient. She did get an extension for ARU for another week. ROS: No f/c, n/v, cp     Objective:  Patient Vitals for the past 24 hrs:   BP Temp Temp src Pulse Resp SpO2   21 0724 118/77 98.3 °F (36.8 °C) Oral 75 17 96 %   21 0244     18    21 2310     18    21 2045 114/75 97.7 °F (36.5 °C) Oral 74 18 96 %     Const: Alert. No distress, pleasant. HEENT: Normocephalic, atraumatic. Normal sclera/conjunctiva. MMM. Incision healing well  CV: Regular rate and rhythm. Resp: No respiratory distress. Lungs CTAB. Abd: Soft, nontender, nondistended, NABS+   Ext: No edema. Neuro: Alert, oriented, appropriately interactive. Psych: Cooperative, appropriate mood and affect    Laboratory data: Available via EMR.    Last 24 hour lab  Recent Results (from the past 24 hour(s))   Basic Metabolic Panel w/ Reflex to MG    Collection Time: 21  6:38 AM   Result Value Ref Range    Sodium 144 136 - 145 mmol/L    Potassium reflex Magnesium 4.2 3.5 - 5.1 mmol/L    Chloride 106 99 - 110 mmol/L    CO2 29 21 - 32 mmol/L    Anion Gap 9 3 - 16    Glucose 90 70 - 99 mg/dL    BUN 15 7 - 20 mg/dL    CREATININE 0.6 0.6 - 1.1 mg/dL    GFR Non-African American >60 >60    GFR African American >60 >60    Calcium 9.7 8.3 - 10.6 mg/dL   CBC auto differential    Collection Time: 21  6:38 AM   Result Value Ref Range    WBC 10.7 4.0 - 11.0 K/uL    RBC 3.66 (L) 4.00 - 5.20 M/uL    Hemoglobin 12.9 12.0 - 16.0 g/dL    Hematocrit 38.1 36.0 - 48.0 %    .2 (H) 80.0 - 100.0 fL    MCH 35.2 (H) 26.0 - 34.0 pg    MCHC 33.8 31.0 - 36.0 g/dL    RDW 13.6 12.4 - 15.4 %    Platelets 903 933 - 152 K/uL    MPV 8.3 5.0 - 10.5 fL    Neutrophils % 40.6 %    Lymphocytes % 43.7 %    Monocytes % 10.7 %    Eosinophils % 3.8 %    Basophils % 1.2 %    Neutrophils Absolute 4.3 1.7 - 7.7 K/uL    Lymphocytes Absolute 4.7 1.0 - 5.1 K/uL    Monocytes Absolute 1.1 0.0 - 1.3 K/uL    Eosinophils Absolute 0.4 0.0 - 0.6 K/uL    Basophils Absolute 0.1 0.0 - 0.2 K/uL       Therapy progress:  PT  Position Activity Restriction  Other position/activity restrictions: Ambulate, Up with assist, Pt to wear helmet when OOB, OK to remove for shower, HOB to elevated at 30*  Objective     Sit to Stand: Minimal Assistance(to rail in hallway x1, to parallel bars x2)  Stand to sit: Minimal Assistance(assist at hips to keep them centered in w/c)  Bed to Chair: Moderate assistance(stand pivot to R, VC to perform hip flexion.)  Device: (hallway rail on R)  Other Apparatus: Wheelchair follow(do to decreased stability)  Assistance: (MinAx1 for L LE advancment, faciliation to decrease L Le knee hyperextension and to decrease L Le ER. MinAx1/CGA for facilitation for pt to perform WS)  OT  PT Equipment Recommendations  Equipment Needed: Yes(continue to assess)  Other: 20\" hemiheight w/c with drop arm and standard 90 degree leg rest  Toilet - Technique: Stand pivot  Equipment Used: Raised toilet seat with rails  Toilet Transfers Comments: VCs for hand placement, difficulty pivoting LLE  Assessment        SLP  Diet Solids Recommendation: Dysphagia Soft and Bite-Sized (Dysphagia III)(with regular solids as tolerated. Per pt request, gravy/sauces to keep for moist.)       Body mass index is 36.51 kg/m². Assessment and Plan:  Salena Gonzales a 46 y.o. female with PMH GERD p/w L sided weakness and facial droop.  CTA head/neck on 4/12 revealed occluded R cervical ICA, occluded right LEAH, near complete occlusion of right MCA.     Acute ischemic CVA, Subdural heomorrhage s/p right hemicraniectomy and subsequen SAH and IP hemorrhage  No tPA given.  MRI 4/14 showing large subacute infarct throughout R LEAH and MCA with some areas of hemorrhagic conversion.  - Neurosurgery and neurology following  --SBP <160, PRN hydralazine, scheduled Norvasc  -- Keppra 500 mg twice daily   -  SQH  - PT/OT consulted      Aphasia- SLP consulted   Depression-Patient takes fluoxetine 20 mg daily at home  Anxiety -Patient takes Ativan 0.5 mg every 8 hours as needed for anxiety- holding  MISHA-Patient uses CPAP at home   GERD- Patient takes omeprazole 40 mg daily at home- half dose  Obesity    Rehab Progress: Improving  Anticipated Dispo: home  Services/DME: [facility]  ELOS: LIANG Marin D.O. M.P.H  PM&R  5/14/2021  9:53 AM

## 2021-05-14 NOTE — PROGRESS NOTES
ACUTE REHAB UNIT  SPEECH/LANGUAGE PATHOLOGY      [x] Daily  [] Weekly Care Conference Note  [] Discharge    Patient:Laura Vasquez      UIB:[birth year]05[birth year]  XEL:[id number]  Rehab Dx/Hx: Acute CVA (cerebrovascular accident) ([facility] [address] [I63.9]    Precautions: [x] Aspiration  [x] Fall risk  [] Sternal  [] Seizure [] Hip  [] Weight Bearing [] Other  ST Dx: [] Aphasia  [] Dysarthria  [] Apraxia   [x] Oropharyngeal dysphagia [x] Cognitive Impairment  [] Other:   Date of Admit: 4/23/2021  Room #: 3101/3101-01  Date: 5/14/2021          Current Diet Order:Dietary Nutrition Supplements: Low Calorie High Protein Supplement  DIET GENERAL; Daily Fluid Restriction: 1500 ml   Recommended Form of Meds: PO  Compensatory Swallowing Strategies: Alternate solids and liquids, Upright as possible for all oral intake, Check for pocketing of food on the Left, Small bites/sips, Lingual sweep     Subjective: Pt admitted 4/12 after fall with left sided paralysis. Pt underwent craniectomy on 4/13 per Dr. Jesus Leahy. Social/Functional History  Lives With: Spouse;Son( reported that son is diagnosed with Autism.)  Occupation: Full time employment  Type of occupation:  at [employer]"    FEES 4/14: IMPRESSIONS:   Pt presents w/ mild oropharyngeal dysphagia characterized by premature spillage of thin liquids to UES and pyriforms w/ intermittent deep penetration of laryngeal vestibule; adequate airway protection and complete clearance of all residue after swallow initiation. No aspiration w/ any trials. Timely swallow initiation w/ puree and regular solid consistencies; no oropharyngeal residue.    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 Adequate ab/adduction. Complete closure of TVFs upon adduction.      Dentition: Adequate  Vision  Vision: Impaired  Vision Exceptions: Visual field cut  Hearing  Hearing: Within functional limits Barriers toward progress: None towards stated goals    Date: 5/14/2021      Tx session 1 Tx session 2   Total Timed Code Min 25 50   Total Treatment Minutes 25 50   Individual Treatment Minutes 25 50   Group Treatment Minutes 0 0   Co-Treat Minutes 0 0   Brief Exception: N/A N/A   Pain Endorsing back pain; RN present and aware None indicated during session   Pain Intervention: [x] RN present and aware  [x] Repositioned  [] Intervention offered and patient declined  [] N/A  [x] Other: Provided with heat pack and called RN @ end of session as discussed to allow pt to transfer to bed   [] RN notified  [] Repositioned  [] Intervention offered and patient declined  [x] N/A  [] Other:    Subjective:     Pt upright in chair and agreeable to treatment although mildly motorically agitated at start, easily redirectable. Pt family friend, Charo De Anda, present for duration of session. Pt participated in therapy after returning from Physical Therapy. Pt with mildly increased flat affect noted consistent with potential lethargy given time of day. Ongoing excellent participation in therapy sessions. Objective / Goals:     Patient will consume regular solids with timely mastication and no pocketing in L buccal cavity across 2 sessions. Goal not targeted this session. Goal not targeted this session. Patient will consume PO without anterior spillage or overt pocketing across 2 sessions. Goal not targeted this session. Goal not targeted this session. Pt will complete divided/alternating attention tasks with 85% accuracy given min cues. Poor attention to task and conversation with mod cues to redirect to task by pts visitor and SLP. Pt will accurately complete executive functioning tasks with min cues. Completed functional simulated medication management task - Absent self monitoring noted. When pt cued, demo's ability to ID and correct all errors with incidental to min cues.  Reduced planning/organization complicated by left neglect; pt stimulable by use of errorless learning with scaffolding to increase organization. Reduced awareness overall with mild-mod impulsivity which pt does not acknowledge without cues(i.e. picked up laminated paper sending faux pills all over floor without indication of awareness but with cue pt able to ID error and impact). Spontaneous ID of excessive volume only noted x1. Improved self correction / ability to execute correcting with computer based task only requiring min cues. Mild impulsivity noted with unfamiliar tasks and pt verbalizing unfamiliarity. Pt will attend to L visual field with min cues. Left neglect negatively impacting ability to fill medication organizer complicated by reduced executive function. Mod to max cues for left sided attention on computer screen for task completion    Pt will participate in ongoing cognitive linguistic assessment. GOAL MET GOAL MET   Other areas targeted:     Education:   Education ongoing regarding rationale for task and strategy to ID left border. Education regarding rationale for tasks this session and improvement noted. Pt apologizing for disinhibition on Wednesday; education provided that apology is not necessary and only reason for SLP's direct feedback is to identify differences from baseline and bring awareness so that behavior modification can be implemented. Safety Devices: [x] Call light within reach  [x] Chair alarm activated and connected to nurse call light system  [] Bed alarm activated   [] Other:  [x] Call light within reach  [x] Chair alarm activated and connected to nurse call light system  [] Bed alarm activated   [x] Other: RN present. Assessment: Executive dysfunction, left neglect, reduced attention. Excellent participation but ongoing reduced carryover. Plan: Continue per POC.       Interventions used this date:  [] Speech/Language Treatment  [] Instruction in HEP  [] Dysphagia Treatment [x] Cognitive Treatment   [] Other:    Discharge recommendations:  [] Home independently  [x] Home with assistance []  24 hour supervision  [] ECF [] Other  Continued Tx Upon Discharge: ? [x] Yes    [] No    [] TBD based on progress while on ARU     [] Vital Stim indicated     [] Other:   Estimated discharge date: 5/15 with plans to request for extension     Shawn Forman M.A., [facility]  Speech-Language Pathologist

## 2021-05-15 PROCEDURE — 94660 CPAP INITIATION&MGMT: CPT

## 2021-05-15 PROCEDURE — 6360000002 HC RX W HCPCS: Performed by: PHYSICAL MEDICINE & REHABILITATION

## 2021-05-15 PROCEDURE — 6370000000 HC RX 637 (ALT 250 FOR IP): Performed by: PHYSICAL MEDICINE & REHABILITATION

## 2021-05-15 PROCEDURE — 1280000000 HC REHAB R&B

## 2021-05-15 PROCEDURE — 99232 SBSQ HOSP IP/OBS MODERATE 35: CPT | Performed by: PHYSICAL MEDICINE & REHABILITATION

## 2021-05-15 RX ADMIN — AMLODIPINE BESYLATE 5 MG: 5 TABLET ORAL at 09:50

## 2021-05-15 RX ADMIN — HEPARIN SODIUM 5000 UNITS: 5000 INJECTION INTRAVENOUS; SUBCUTANEOUS at 06:56

## 2021-05-15 RX ADMIN — FAMOTIDINE 20 MG: 20 TABLET, FILM COATED ORAL at 09:50

## 2021-05-15 RX ADMIN — Medication 5 MG: at 09:49

## 2021-05-15 RX ADMIN — ACETAMINOPHEN 1000 MG: 500 TABLET, COATED ORAL at 09:49

## 2021-05-15 RX ADMIN — THERA TABS 1 TABLET: TAB at 09:50

## 2021-05-15 RX ADMIN — ASPIRIN 81 MG: 81 TABLET, CHEWABLE ORAL at 09:49

## 2021-05-15 RX ADMIN — ATORVASTATIN CALCIUM 80 MG: 80 TABLET, FILM COATED ORAL at 21:04

## 2021-05-15 RX ADMIN — FLUOXETINE 20 MG: 20 CAPSULE ORAL at 09:50

## 2021-05-15 RX ADMIN — ACETAMINOPHEN 1000 MG: 500 TABLET, COATED ORAL at 17:03

## 2021-05-15 RX ADMIN — DOCUSATE SODIUM 50 MG AND SENNOSIDES 8.6 MG 2 TABLET: 8.6; 5 TABLET, FILM COATED ORAL at 21:04

## 2021-05-15 RX ADMIN — FAMOTIDINE 20 MG: 20 TABLET, FILM COATED ORAL at 21:04

## 2021-05-15 RX ADMIN — OXYCODONE 10 MG: 5 TABLET ORAL at 18:50

## 2021-05-15 RX ADMIN — HEPARIN SODIUM 5000 UNITS: 5000 INJECTION INTRAVENOUS; SUBCUTANEOUS at 15:07

## 2021-05-15 RX ADMIN — DOCUSATE SODIUM 50 MG AND SENNOSIDES 8.6 MG 2 TABLET: 8.6; 5 TABLET, FILM COATED ORAL at 09:49

## 2021-05-15 RX ADMIN — ACETAMINOPHEN 1000 MG: 500 TABLET, COATED ORAL at 05:00

## 2021-05-15 RX ADMIN — HEPARIN SODIUM 5000 UNITS: 5000 INJECTION INTRAVENOUS; SUBCUTANEOUS at 21:12

## 2021-05-15 RX ADMIN — LEVETIRACETAM 500 MG: 500 TABLET ORAL at 09:50

## 2021-05-15 RX ADMIN — THIAMINE HCL TAB 100 MG 100 MG: 100 TAB at 09:50

## 2021-05-15 RX ADMIN — LEVETIRACETAM 500 MG: 500 TABLET ORAL at 21:12

## 2021-05-15 ASSESSMENT — PAIN SCALES - GENERAL
PAINLEVEL_OUTOF10: 0
PAINLEVEL_OUTOF10: 8
PAINLEVEL_OUTOF10: 1
PAINLEVEL_OUTOF10: 2
PAINLEVEL_OUTOF10: 2

## 2021-05-15 ASSESSMENT — PAIN - FUNCTIONAL ASSESSMENT
PAIN_FUNCTIONAL_ASSESSMENT: ACTIVITIES ARE NOT PREVENTED

## 2021-05-15 ASSESSMENT — PAIN DESCRIPTION - FREQUENCY
FREQUENCY: INTERMITTENT
FREQUENCY: INTERMITTENT
FREQUENCY: CONTINUOUS
FREQUENCY: INTERMITTENT

## 2021-05-15 ASSESSMENT — PAIN DESCRIPTION - PAIN TYPE
TYPE: ACUTE PAIN

## 2021-05-15 ASSESSMENT — PAIN DESCRIPTION - LOCATION
LOCATION: SHOULDER
LOCATION: HIP

## 2021-05-15 ASSESSMENT — PAIN DESCRIPTION - ONSET
ONSET: GRADUAL
ONSET: ON-GOING
ONSET: GRADUAL
ONSET: ON-GOING

## 2021-05-15 ASSESSMENT — PAIN DESCRIPTION - ORIENTATION
ORIENTATION: LEFT

## 2021-05-15 ASSESSMENT — PAIN DESCRIPTION - DESCRIPTORS
DESCRIPTORS: ACHING;DISCOMFORT
DESCRIPTORS: DISCOMFORT
DESCRIPTORS: ACHING;DISCOMFORT
DESCRIPTORS: DISCOMFORT

## 2021-05-15 ASSESSMENT — PAIN DESCRIPTION - PROGRESSION
CLINICAL_PROGRESSION: GRADUALLY WORSENING
CLINICAL_PROGRESSION: NOT CHANGED
CLINICAL_PROGRESSION: GRADUALLY WORSENING
CLINICAL_PROGRESSION: GRADUALLY WORSENING
CLINICAL_PROGRESSION: NOT CHANGED
CLINICAL_PROGRESSION: GRADUALLY IMPROVING

## 2021-05-15 NOTE — PROGRESS NOTES
Pt in bed, awake watching television. Pt complains of pain to left shoulder. Vitals and assessment completed. Nighttime medications given including Oxycodone for pain. Repositioned pt in bed. Reminded pt to call for assistance with any needs. Call light within reach.

## 2021-05-15 NOTE — PLAN OF CARE
Problem: Pain:  Goal: Control of acute pain  Description: Control of acute pain  Outcome: Ongoing  Note: Patient's pain is controlled with current regime      Problem: Skin Integrity:  Goal: Will show no infection signs and symptoms  Description: Will show no infection signs and symptoms  Outcome: Ongoing  Note: Surgical site shows no s/sx of infection at this time. Will continue to assess q shift and prn and provide interventions as needed. Problem: Skin Integrity:  Goal: Absence of new skin breakdown  Description: Absence of new skin breakdown  Outcome: Ongoing  Note: Patient offered to be toileted every two hours and PRN, skin barrier applied as needed. Staff assists patient with repositioning and pillow support is provided when needed. Patient educated on offloading techniques and verbalizes understanding.

## 2021-05-15 NOTE — PROGRESS NOTES
Department of Physical Medicine & Rehabilitation  Progress Note    Patient Identification:  Stalin Mar  [id number]  : 1970  Admit date: 2021    Chief Complaint: CVA    Subjective:   Doing well. No new complaints overnight. She is resting this weekend without PT/OT. ROS: No f/c, n/v, cp     Objective:  Patient Vitals for the past 24 hrs:   BP Temp Temp src Pulse Resp SpO2   05/15/21 0949 121/65 98 °F (36.7 °C) Oral 87 16 95 %   05/15/21 0058     16    21 2140     16    21 1930 124/82 98 °F (36.7 °C) Oral 89 18 95 %     Const: Alert. No distress, pleasant. HEENT: Normocephalic, atraumatic. Normal sclera/conjunctiva. MMM. Incision healing well  CV: Regular rate and rhythm. Resp: No respiratory distress. Lungs CTAB. Abd: Soft, nontender, nondistended, NABS+   Ext: No edema. Neuro: Alert, oriented, appropriately interactive. Psych: Cooperative, appropriate mood and affect    Laboratory data: Available via EMR. Last 24 hour lab  No results found for this or any previous visit (from the past 24 hour(s)). Therapy progress:  PT  Position Activity Restriction  Other position/activity restrictions: Ambulate, Up with assist, Pt to wear helmet when OOB, OK to remove for shower, HOB to elevated at 30*  Objective     Sit to Stand: Minimal Assistance (STS from  bed, w/c, commode and and mat table . Min A for ant WS and stabilizing LLE)  Stand to sit: Minimal Assistance (VC for nhand placement and to guide hips when descending into chair)  Bed to Chair: Minimal assistance, Moderate assistance (Pt tried to do a SPT in which LLE hyperext and pt presented with decreased trunk control)  Device:  (hallway rail on R)  Other Apparatus: Wheelchair follow (do to decreased stability)  Assistance:  (MinAx1 for L LE advancment, faciliation to decrease L Le knee hyperextension and to decrease L Le ER.  MinAx1/CGA for facilitation for pt to perform WS)  OT  PT Equipment Recommendations Equipment Needed:  (Will continue to assess equipment needs pending progress)  Other: 20\" hemiheight w/c with drop arm and standard 90 degree leg rest  Toilet - Technique: Stand pivot  Equipment Used: Raised toilet seat with rails  Toilet Transfers Comments: VCs for hand placement, difficulty pivoting LLE  Assessment        SLP  Diet Solids Recommendation: Dysphagia Soft and Bite-Sized (Dysphagia III) (with regular solids as tolerated. Per pt request, gravy/sauces to keep for moist.)       Body mass index is 36.51 kg/m². Assessment and Plan:  Kaleigh Ford a 46 y.o. female with PMH GERD p/w L sided weakness and facial droop.  CTA head/neck on 4/12 revealed occluded R cervical ICA, occluded right LEAH, near complete occlusion of right MCA.     Acute ischemic CVA, Subdural heomorrhage s/p right hemicraniectomy and subsequen SAH and IP hemorrhage  No tPA given.  MRI 4/14 showing large subacute infarct throughout R LEAH and MCA with some areas of hemorrhagic conversion.  - Neurosurgery and neurology following  --SBP <160, PRN hydralazine, scheduled Norvasc  -- Keppra 500 mg twice daily   -  SQH  - PT/OT consulted      Aphasia- SLP consulted   Depression-Patient takes fluoxetine 20 mg daily at home  Anxiety -Patient takes Ativan 0.5 mg every 8 hours as needed for anxiety- holding  MISHA-Patient uses CPAP at home   GERD- Patient takes omeprazole 40 mg daily at home- half dose  Obesity    Rehab Progress: Improving  Anticipated Dispo: home  Services/DME: [facility]  ELOS: LIANG Schneider, D.O. M.P.H  PM&R  5/15/2021  11:52 AM

## 2021-05-15 NOTE — PROGRESS NOTES
Alert and oriented. LUE and Left hip pain well controlled controlled with tylenol. R scalp incision well approximated with small amount of dry scabbed over area. SHEMAR. No drainage. Cristina-wound area pink and intact. Cleansed and treated with CHG swab. Explained to patient that this should be done daily. She understood and was appreciative. Remains a 1-2 person pivot transfer with gait belt. Remains flaccid on LUE/LLE. VSS on RA.

## 2021-05-16 PROCEDURE — 6370000000 HC RX 637 (ALT 250 FOR IP): Performed by: PHYSICAL MEDICINE & REHABILITATION

## 2021-05-16 PROCEDURE — 1280000000 HC REHAB R&B

## 2021-05-16 PROCEDURE — 6360000002 HC RX W HCPCS: Performed by: PHYSICAL MEDICINE & REHABILITATION

## 2021-05-16 PROCEDURE — 94660 CPAP INITIATION&MGMT: CPT

## 2021-05-16 RX ADMIN — ACETAMINOPHEN 1000 MG: 500 TABLET, COATED ORAL at 09:10

## 2021-05-16 RX ADMIN — DOCUSATE SODIUM 50 MG AND SENNOSIDES 8.6 MG 2 TABLET: 8.6; 5 TABLET, FILM COATED ORAL at 09:09

## 2021-05-16 RX ADMIN — HEPARIN SODIUM 5000 UNITS: 5000 INJECTION INTRAVENOUS; SUBCUTANEOUS at 20:41

## 2021-05-16 RX ADMIN — FLUOXETINE 20 MG: 20 CAPSULE ORAL at 09:09

## 2021-05-16 RX ADMIN — HEPARIN SODIUM 5000 UNITS: 5000 INJECTION INTRAVENOUS; SUBCUTANEOUS at 05:05

## 2021-05-16 RX ADMIN — Medication 5 MG: at 09:09

## 2021-05-16 RX ADMIN — FAMOTIDINE 20 MG: 20 TABLET, FILM COATED ORAL at 09:10

## 2021-05-16 RX ADMIN — THERA TABS 1 TABLET: TAB at 09:10

## 2021-05-16 RX ADMIN — FAMOTIDINE 20 MG: 20 TABLET, FILM COATED ORAL at 20:40

## 2021-05-16 RX ADMIN — ACETAMINOPHEN 1000 MG: 500 TABLET, COATED ORAL at 05:04

## 2021-05-16 RX ADMIN — AMLODIPINE BESYLATE 5 MG: 5 TABLET ORAL at 09:10

## 2021-05-16 RX ADMIN — ACETAMINOPHEN 1000 MG: 500 TABLET, COATED ORAL at 17:04

## 2021-05-16 RX ADMIN — OXYCODONE HYDROCHLORIDE 5 MG: 5 TABLET ORAL at 05:05

## 2021-05-16 RX ADMIN — OXYCODONE HYDROCHLORIDE 5 MG: 5 TABLET ORAL at 20:41

## 2021-05-16 RX ADMIN — LEVETIRACETAM 500 MG: 500 TABLET ORAL at 09:10

## 2021-05-16 RX ADMIN — THIAMINE HCL TAB 100 MG 100 MG: 100 TAB at 09:09

## 2021-05-16 RX ADMIN — ATORVASTATIN CALCIUM 80 MG: 80 TABLET, FILM COATED ORAL at 20:40

## 2021-05-16 RX ADMIN — LEVETIRACETAM 500 MG: 500 TABLET ORAL at 20:40

## 2021-05-16 RX ADMIN — DOCUSATE SODIUM 50 MG AND SENNOSIDES 8.6 MG 2 TABLET: 8.6; 5 TABLET, FILM COATED ORAL at 20:40

## 2021-05-16 RX ADMIN — ACETAMINOPHEN 1000 MG: 500 TABLET, COATED ORAL at 20:40

## 2021-05-16 RX ADMIN — ASPIRIN 81 MG: 81 TABLET, CHEWABLE ORAL at 09:09

## 2021-05-16 RX ADMIN — HEPARIN SODIUM 5000 UNITS: 5000 INJECTION INTRAVENOUS; SUBCUTANEOUS at 17:29

## 2021-05-16 ASSESSMENT — PAIN SCALES - GENERAL
PAINLEVEL_OUTOF10: 0
PAINLEVEL_OUTOF10: 4
PAINLEVEL_OUTOF10: 6
PAINLEVEL_OUTOF10: 4
PAINLEVEL_OUTOF10: 4
PAINLEVEL_OUTOF10: 0
PAINLEVEL_OUTOF10: 6
PAINLEVEL_OUTOF10: 4

## 2021-05-16 ASSESSMENT — PAIN DESCRIPTION - LOCATION
LOCATION: HIP
LOCATION: BACK
LOCATION: BACK
LOCATION: HIP
LOCATION: HIP

## 2021-05-16 ASSESSMENT — PAIN DESCRIPTION - FREQUENCY
FREQUENCY: CONTINUOUS
FREQUENCY: INTERMITTENT
FREQUENCY: INTERMITTENT

## 2021-05-16 ASSESSMENT — PAIN - FUNCTIONAL ASSESSMENT
PAIN_FUNCTIONAL_ASSESSMENT: PREVENTS OR INTERFERES SOME ACTIVE ACTIVITIES AND ADLS
PAIN_FUNCTIONAL_ASSESSMENT: ACTIVITIES ARE NOT PREVENTED
PAIN_FUNCTIONAL_ASSESSMENT: ACTIVITIES ARE NOT PREVENTED
PAIN_FUNCTIONAL_ASSESSMENT: PREVENTS OR INTERFERES SOME ACTIVE ACTIVITIES AND ADLS
PAIN_FUNCTIONAL_ASSESSMENT: ACTIVITIES ARE NOT PREVENTED

## 2021-05-16 ASSESSMENT — PAIN DESCRIPTION - PAIN TYPE
TYPE: ACUTE PAIN

## 2021-05-16 ASSESSMENT — PAIN DESCRIPTION - PROGRESSION
CLINICAL_PROGRESSION: NOT CHANGED
CLINICAL_PROGRESSION: GRADUALLY WORSENING
CLINICAL_PROGRESSION: GRADUALLY WORSENING
CLINICAL_PROGRESSION: NOT CHANGED
CLINICAL_PROGRESSION: GRADUALLY WORSENING
CLINICAL_PROGRESSION: GRADUALLY WORSENING

## 2021-05-16 ASSESSMENT — PAIN DESCRIPTION - ORIENTATION
ORIENTATION: LEFT
ORIENTATION: MID
ORIENTATION: MID

## 2021-05-16 ASSESSMENT — PAIN DESCRIPTION - DESCRIPTORS
DESCRIPTORS: DISCOMFORT
DESCRIPTORS: ACHING;DISCOMFORT
DESCRIPTORS: ACHING;DISCOMFORT
DESCRIPTORS: DISCOMFORT
DESCRIPTORS: ACHING;DISCOMFORT

## 2021-05-16 ASSESSMENT — PAIN DESCRIPTION - ONSET
ONSET: ON-GOING

## 2021-05-16 NOTE — PROGRESS NOTES
Pt A & O. VSS. Pt x 1-2 st pivot - transfer to right side. All safety precautions in place. Bed alarm activated. LUE/LLE flaccid. Surgical incision to scalp SHEMAR. No complaints of pain or nausea voiced. Tolerates diet. Will cont to monitor.

## 2021-05-16 NOTE — PROGRESS NOTES
Alert and oriented. VSS on RA. Rating LUE/LLE aching pain at 4/10 and tolerable at this time. Up in chair for breakfast, 1 person assist with WC and gait when transferring from bed to chair. 2 person assist for BR needs. Refusing shower. To get shower with therapy tomorrow. L hand splint in place this morning for two hours and tolerated well. Resting in bed for the remainder of the day.

## 2021-05-16 NOTE — PLAN OF CARE
Problem: Pain:  Goal: Pain level will decrease  Description: Pain level will decrease  Outcome: Ongoing  Note: No complaints of pain. Problem: Falls - Risk of:  Goal: Will remain free from falls  Description: Will remain free from falls  5/15/2021 2342 by Carmen Phillips RN  Outcome: Ongoing  Note: Pt is a High fall risk. Explained fall risk precautions to pt and rationale behind their use to keep the patient safe. Belongings are in reach. Pt encouraged to notify staff for any and all assistance. Staff present in tx suite throughout entirety of pts treatment to monitor and protect from falls. Assistance provided when ambulating to restroom utilizing Stay With Me. Problem: Skin Integrity:  Goal: Absence of new skin breakdown  Description: Absence of new skin breakdown  5/15/2021 2342 by Carmen Phillips RN  Outcome: Ongoing  Note: No evidence of skin breakdown.

## 2021-05-16 NOTE — PLAN OF CARE
Problem: Pain:  Goal: Control of acute pain  Description: Control of acute pain  Outcome: Ongoing  Note: Patient's pain is controlled with current regime      Problem: Falls - Risk of:  Goal: Will remain free from falls  Description: Will remain free from falls  5/16/2021 1326 by Nettie Beckwith RN  Outcome: Ongoing  Note: Pt remains free of falls thus far this shift. All fall precautions in place and functioning properly. 5/15/2021 2342 by Dionicio Rossi RN  Outcome: Ongoing  Note: Pt is a High fall risk. Explained fall risk precautions to pt and rationale behind their use to keep the patient safe. Belongings are in reach. Pt encouraged to notify staff for any and all assistance. Staff present in tx suite throughout entirety of pts treatment to monitor and protect from falls. Assistance provided when ambulating to restroom utilizing Stay With Me. Problem: Skin Integrity:  Goal: Will show no infection signs and symptoms  Description: Will show no infection signs and symptoms  Outcome: Ongoing  Note: Surgical site shows no s/sx of infection at this time. Will continue to assess q shift and prn and provide interventions as needed.

## 2021-05-17 LAB
ANION GAP SERPL CALCULATED.3IONS-SCNC: 12 MMOL/L (ref 3–16)
BASOPHILS ABSOLUTE: 0.1 K/UL (ref 0–0.2)
BASOPHILS RELATIVE PERCENT: 0.8 %
BUN BLDV-MCNC: 13 MG/DL (ref 7–20)
CALCIUM SERPL-MCNC: 10 MG/DL (ref 8.3–10.6)
CHLORIDE BLD-SCNC: 105 MMOL/L (ref 99–110)
CO2: 25 MMOL/L (ref 21–32)
CREAT SERPL-MCNC: 0.5 MG/DL (ref 0.6–1.1)
EOSINOPHILS ABSOLUTE: 0.5 K/UL (ref 0–0.6)
EOSINOPHILS RELATIVE PERCENT: 4 %
FUNGUS (MYCOLOGY) CULTURE: NORMAL
FUNGUS STAIN: NORMAL
GFR AFRICAN AMERICAN: >60
GFR NON-AFRICAN AMERICAN: >60
GLUCOSE BLD-MCNC: 92 MG/DL (ref 70–99)
HCT VFR BLD CALC: 39.6 % (ref 36–48)
HEMOGLOBIN: 13.3 G/DL (ref 12–16)
LYMPHOCYTES ABSOLUTE: 4.3 K/UL (ref 1–5.1)
LYMPHOCYTES RELATIVE PERCENT: 35.1 %
MCH RBC QN AUTO: 34.8 PG (ref 26–34)
MCHC RBC AUTO-ENTMCNC: 33.6 G/DL (ref 31–36)
MCV RBC AUTO: 103.4 FL (ref 80–100)
MONOCYTES ABSOLUTE: 1.3 K/UL (ref 0–1.3)
MONOCYTES RELATIVE PERCENT: 10.9 %
NEUTROPHILS ABSOLUTE: 6.1 K/UL (ref 1.7–7.7)
NEUTROPHILS RELATIVE PERCENT: 49.2 %
PDW BLD-RTO: 14.1 % (ref 12.4–15.4)
PLATELET # BLD: 406 K/UL (ref 135–450)
PMV BLD AUTO: 8.8 FL (ref 5–10.5)
POTASSIUM REFLEX MAGNESIUM: 4 MMOL/L (ref 3.5–5.1)
RBC # BLD: 3.83 M/UL (ref 4–5.2)
SODIUM BLD-SCNC: 142 MMOL/L (ref 136–145)
WBC # BLD: 12.3 K/UL (ref 4–11)

## 2021-05-17 PROCEDURE — 80048 BASIC METABOLIC PNL TOTAL CA: CPT

## 2021-05-17 PROCEDURE — 97542 WHEELCHAIR MNGMENT TRAINING: CPT | Performed by: PHYSICAL THERAPIST

## 2021-05-17 PROCEDURE — 36415 COLL VENOUS BLD VENIPUNCTURE: CPT

## 2021-05-17 PROCEDURE — 6370000000 HC RX 637 (ALT 250 FOR IP): Performed by: PHYSICAL MEDICINE & REHABILITATION

## 2021-05-17 PROCEDURE — 1280000000 HC REHAB R&B

## 2021-05-17 PROCEDURE — 97530 THERAPEUTIC ACTIVITIES: CPT

## 2021-05-17 PROCEDURE — 97530 THERAPEUTIC ACTIVITIES: CPT | Performed by: PHYSICAL THERAPIST

## 2021-05-17 PROCEDURE — 99232 SBSQ HOSP IP/OBS MODERATE 35: CPT | Performed by: PHYSICAL MEDICINE & REHABILITATION

## 2021-05-17 PROCEDURE — 6360000002 HC RX W HCPCS: Performed by: PHYSICAL MEDICINE & REHABILITATION

## 2021-05-17 PROCEDURE — 97110 THERAPEUTIC EXERCISES: CPT | Performed by: PHYSICAL THERAPIST

## 2021-05-17 PROCEDURE — 97535 SELF CARE MNGMENT TRAINING: CPT

## 2021-05-17 PROCEDURE — 97129 THER IVNTJ 1ST 15 MIN: CPT

## 2021-05-17 PROCEDURE — 92526 ORAL FUNCTION THERAPY: CPT

## 2021-05-17 PROCEDURE — 85025 COMPLETE CBC W/AUTO DIFF WBC: CPT

## 2021-05-17 PROCEDURE — 97130 THER IVNTJ EA ADDL 15 MIN: CPT

## 2021-05-17 PROCEDURE — 94660 CPAP INITIATION&MGMT: CPT

## 2021-05-17 RX ADMIN — THIAMINE HCL TAB 100 MG 100 MG: 100 TAB at 10:23

## 2021-05-17 RX ADMIN — OXYCODONE HYDROCHLORIDE 5 MG: 5 TABLET ORAL at 16:21

## 2021-05-17 RX ADMIN — DOCUSATE SODIUM 50 MG AND SENNOSIDES 8.6 MG 2 TABLET: 8.6; 5 TABLET, FILM COATED ORAL at 10:22

## 2021-05-17 RX ADMIN — HEPARIN SODIUM 5000 UNITS: 5000 INJECTION INTRAVENOUS; SUBCUTANEOUS at 13:44

## 2021-05-17 RX ADMIN — ACETAMINOPHEN 1000 MG: 500 TABLET, COATED ORAL at 20:53

## 2021-05-17 RX ADMIN — FAMOTIDINE 20 MG: 20 TABLET, FILM COATED ORAL at 20:53

## 2021-05-17 RX ADMIN — ATORVASTATIN CALCIUM 80 MG: 80 TABLET, FILM COATED ORAL at 20:53

## 2021-05-17 RX ADMIN — OXYCODONE HYDROCHLORIDE 5 MG: 5 TABLET ORAL at 06:06

## 2021-05-17 RX ADMIN — ACETAMINOPHEN 1000 MG: 500 TABLET, COATED ORAL at 10:21

## 2021-05-17 RX ADMIN — Medication 5 MG: at 10:21

## 2021-05-17 RX ADMIN — OXYCODONE HYDROCHLORIDE 5 MG: 5 TABLET ORAL at 10:21

## 2021-05-17 RX ADMIN — FLUOXETINE 20 MG: 20 CAPSULE ORAL at 10:22

## 2021-05-17 RX ADMIN — LEVETIRACETAM 500 MG: 500 TABLET ORAL at 20:53

## 2021-05-17 RX ADMIN — ACETAMINOPHEN 1000 MG: 500 TABLET, COATED ORAL at 06:06

## 2021-05-17 RX ADMIN — HEPARIN SODIUM 5000 UNITS: 5000 INJECTION INTRAVENOUS; SUBCUTANEOUS at 06:07

## 2021-05-17 RX ADMIN — FAMOTIDINE 20 MG: 20 TABLET, FILM COATED ORAL at 10:22

## 2021-05-17 RX ADMIN — HEPARIN SODIUM 5000 UNITS: 5000 INJECTION INTRAVENOUS; SUBCUTANEOUS at 20:54

## 2021-05-17 RX ADMIN — OXYCODONE HYDROCHLORIDE 5 MG: 5 TABLET ORAL at 20:53

## 2021-05-17 RX ADMIN — AMLODIPINE BESYLATE 5 MG: 5 TABLET ORAL at 10:22

## 2021-05-17 RX ADMIN — THERA TABS 1 TABLET: TAB at 10:22

## 2021-05-17 RX ADMIN — LEVETIRACETAM 500 MG: 500 TABLET ORAL at 10:22

## 2021-05-17 RX ADMIN — ASPIRIN 81 MG: 81 TABLET, CHEWABLE ORAL at 10:22

## 2021-05-17 ASSESSMENT — PAIN SCALES - GENERAL
PAINLEVEL_OUTOF10: 0
PAINLEVEL_OUTOF10: 0
PAINLEVEL_OUTOF10: 1
PAINLEVEL_OUTOF10: 0
PAINLEVEL_OUTOF10: 0
PAINLEVEL_OUTOF10: 5
PAINLEVEL_OUTOF10: 2
PAINLEVEL_OUTOF10: 0
PAINLEVEL_OUTOF10: 6
PAINLEVEL_OUTOF10: 6
PAINLEVEL_OUTOF10: 3
PAINLEVEL_OUTOF10: 0
PAINLEVEL_OUTOF10: 5

## 2021-05-17 ASSESSMENT — PAIN DESCRIPTION - LOCATION
LOCATION: HAND
LOCATION: HAND;SHOULDER
LOCATION: BACK
LOCATION: SHOULDER
LOCATION: SHOULDER
LOCATION: HIP
LOCATION: BACK

## 2021-05-17 ASSESSMENT — PAIN DESCRIPTION - ONSET
ONSET: ON-GOING

## 2021-05-17 ASSESSMENT — PAIN DESCRIPTION - PROGRESSION
CLINICAL_PROGRESSION: NOT CHANGED

## 2021-05-17 ASSESSMENT — PAIN DESCRIPTION - ORIENTATION
ORIENTATION: MID
ORIENTATION: LEFT
ORIENTATION: MID
ORIENTATION: LEFT
ORIENTATION: LEFT

## 2021-05-17 ASSESSMENT — PAIN DESCRIPTION - PAIN TYPE
TYPE: ACUTE PAIN

## 2021-05-17 ASSESSMENT — PAIN DESCRIPTION - FREQUENCY
FREQUENCY: INTERMITTENT

## 2021-05-17 ASSESSMENT — PAIN DESCRIPTION - DESCRIPTORS
DESCRIPTORS: ACHING
DESCRIPTORS: ACHING
DESCRIPTORS: DISCOMFORT
DESCRIPTORS: DISCOMFORT
DESCRIPTORS: SORE
DESCRIPTORS: ACHING
DESCRIPTORS: SORE

## 2021-05-17 NOTE — PLAN OF CARE
Problem: Pain:  Goal: Control of acute pain  Description: Control of acute pain  Outcome: Ongoing   Pt verbalized having pain in left arm, shoulder and hand this shift and in her left hip in therapy. Pt was medicated with scheduled Tylenol and PRN Oxy-IR. Medication, rest and positioning was effective for pain control. Ongoing assessment of pain continued. Problem: Falls - Risk of:  Goal: Will remain free from falls  Description: Will remain free from falls  Outcome: Ongoing   No fall this shift. Pt use call light. Bed and chair alarm on to promote pt safety. Problem: Skin Integrity:  Goal: Will show no infection signs and symptoms  Description: Will show no infection signs and symptoms  Outcome: Ongoing   No new skin issues noted.     Electronically signed by Mauricio Parham RN on 5/17/2021 at 5:59 PM

## 2021-05-17 NOTE — PROGRESS NOTES
10.9 %    Eosinophils % 4.0 %    Basophils % 0.8 %    Neutrophils Absolute 6.1 1.7 - 7.7 K/uL    Lymphocytes Absolute 4.3 1.0 - 5.1 K/uL    Monocytes Absolute 1.3 0.0 - 1.3 K/uL    Eosinophils Absolute 0.5 0.0 - 0.6 K/uL    Basophils Absolute 0.1 0.0 - 0.2 K/uL       Therapy progress:  PT  Position Activity Restriction  Other position/activity restrictions: Ambulate, Up with assist, Pt to wear helmet when OOB, OK to remove for shower, HOB to elevated at 30*  Objective     Sit to Stand: Minimal Assistance (STS from  bed, w/c, commode and and mat table . Min A for ant WS and stabilizing LLE)  Stand to sit: Minimal Assistance (VC for nhand placement and to guide hips when descending into chair)  Bed to Chair: Minimal assistance, Moderate assistance (Pt tried to do a SPT in which LLE hyperext and pt presented with decreased trunk control)  Device:  (hallway rail on R)  Other Apparatus: Wheelchair follow (do to decreased stability)  Assistance:  (MinAx1 for L LE advancment, faciliation to decrease L Le knee hyperextension and to decrease L Le ER. MinAx1/CGA for facilitation for pt to perform WS)  OT  PT Equipment Recommendations  Equipment Needed:  (Will continue to assess equipment needs pending progress)  Other: 20\" hemiheight w/c with drop arm and standard 90 degree leg rest  Toilet - Technique: Stand pivot  Equipment Used: Raised toilet seat with rails  Toilet Transfers Comments: VCs for hand placement, difficulty pivoting LLE  Assessment        SLP  Diet Solids Recommendation: Dysphagia Soft and Bite-Sized (Dysphagia III) (with regular solids as tolerated. Per pt request, gravy/sauces to keep for moist.)       Body mass index is 36.86 kg/m².     Assessment and Plan:  Gina Abdullahi a 46 y.o. female with PMH GERD p/w L sided weakness and facial droop.  CTA head/neck on 4/12 revealed occluded R cervical ICA, occluded right LEAH, near complete occlusion of right MCA.     Acute ischemic CVA, Subdural heomorrhage s/p right hemicraniectomy and subsequen SAH and IP hemorrhage  No tPA given.  MRI 4/14 showing large subacute infarct throughout R LEAH and MCA with some areas of hemorrhagic conversion.  - Neurosurgery and neurology following  --SBP <160, PRN hydralazine, scheduled Norvasc  -- Keppra 500 mg twice daily   -  SQH  - PT/OT consulted      Aphasia- SLP consulted   Depression-Patient takes fluoxetine 20 mg daily at home  Anxiety -Patient takes Ativan 0.5 mg every 8 hours as needed for anxiety- holding  MISHA-Patient uses CPAP at home   GERD- Patient takes omeprazole 40 mg daily at home- half dose  Obesity    Rehab Progress: Improving  Anticipated Dispo: home  Services/DME: [facility]  ELOS: LIANG Ca D.O. M.P.H  PM&R  5/17/2021  10:09 AM

## 2021-05-17 NOTE — PROGRESS NOTES
ACUTE REHAB UNIT  SPEECH/LANGUAGE PATHOLOGY      [x] Daily  [] Weekly Care Conference Note  [] Discharge    Patient:Laura Pacheco June      LYQ:[id number]  OBB:[id number]  Rehab Dx/Hx: Acute CVA (cerebrovascular accident) ([facility] [address] [I63.9]    Precautions: [x] Aspiration  [x] Fall risk  [] Sternal  [] Seizure [] Hip  [] Weight Bearing [] Other  ST Dx: [] Aphasia  [] Dysarthria  [] Apraxia   [x] Oropharyngeal dysphagia [x] Cognitive Impairment  [] Other:   Date of Admit: 4/23/2021  Room #: 3101/3101-01  Date: 5/17/2021          Current Diet Order:Dietary Nutrition Supplements: Low Calorie High Protein Supplement  DIET GENERAL; Daily Fluid Restriction: 1500 ml   Recommended Form of Meds: PO  Compensatory Swallowing Strategies: Alternate solids and liquids, Upright as possible for all oral intake, Check for pocketing of food on the Left, Small bites/sips, Lingual sweep     Subjective: Pt admitted 4/12 after fall with left sided paralysis. Pt underwent craniectomy on 4/13 per Dr. Patricia Fernández. Social/Functional History  Lives With: Spouse;Son( reported that son is diagnosed with Autism.)  Occupation: Full time employment  Type of occupation:  at [employer]"    FEES 4/14: IMPRESSIONS:   Pt presents w/ mild oropharyngeal dysphagia characterized by premature spillage of thin liquids to UES and pyriforms w/ intermittent deep penetration of laryngeal vestibule; adequate airway protection and complete clearance of all residue after swallow initiation. No aspiration w/ any trials. Timely swallow initiation w/ puree and regular solid consistencies; no oropharyngeal residue.    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 Adequate ab/adduction. Complete closure of TVFs upon adduction.      Dentition: Adequate  Vision  Vision: Impaired  Vision Exceptions: Visual field cut  Hearing  Hearing: Within functional limits Barriers toward progress: None towards stated goals    Date: 5/17/2021       Tx session 1 Tx session 2 Tx session 3   Total Timed Code Min 15 30 40   Total Treatment Minutes 26 30 40   Individual Treatment Minutes 26 30 40   Group Treatment Minutes 0 0 0   Co-Treat Minutes 0 0 0   Brief Exception: N/A N/A N/A   Pain None indicated  None indicated Discomfort in bottom from wheelchair and limited ability to weightshift   Pain Intervention: [] RN present and aware  [] Repositioned  [] Intervention offered and patient declined  [x] N/A  [] Other:    [] RN notified  [] Repositioned  [] Intervention offered and patient declined  [x] N/A  [] Other:  [] RN notified  [] Repositioned  [x] Intervention offered and patient declined  [] N/A  [x] Other: Notified PCA at end of session of desire to transfer. Subjective:     Pt upright in chair and agreeable to therapy. Pt upright in chair and ready for therapy. Splint removed at end of session per schedule Pt upright in chair and participates well in therapy session. Splint donned per splinting schedule. Objective / Goals:      Patient will consume regular solids with timely mastication and no pocketing in L buccal cavity across 2 sessions. Patient tolerated NMES via VitalStim placement 4a (targeting orbicularis oris, buccinator and superior pharyngeal constrictor) @ 6.5 mA on left x12 minutes, @2.0 mA on right x 12 minutes, increased to 7.5 mA on left x 8 minutes, @3.0 mA on right x 8 minute (total stimulation time of 20 minutes). Pt only assessed with puree without overt pocketing. Goal not targeted this session. Goal not targeted this session. Patient will consume PO without anterior spillage or overt pocketing across 2 sessions. Spillage during self feeding x1 without apparent awareness. Goal not targeted this session. Goal not targeted this session. Pt will complete divided/alternating attention tasks with 85% accuracy given min cues.     Ongoing cues for sustaining attention to eating vs conversation and cell phone. Min-mod cues for sustained / selective attention this date; pt distracted by off topic comments and inanimate objects. Suspected reduced attention as pt required cues to initiate identifying differences and would state that she had forgotten what she was doing. Pt will accurately complete executive functioning tasks with min cues. Goal not targeted this session. Functional partially familiar computer task completed - mod-max cues; pt with significantly increased impulsivity motorically for task completion. Reduced planning and initiation of requests from staff; pt had been endorsing discomfort in bottom from prolonged chair sitting but when RN rounded on pt she denied any needs. Pt with mildly reduced insight and recognition of physical assist required for tasks; min cues required to identify. Pt will attend to L visual field with min cues. Mod cues for visual attention to left. Max cues for left visual attention  Max cues and physical assist for navigating unfamiliar path in wheelchair; pt physically running into things and dragging objects without awareness. Difficult to fully assess if pt was completely unable to see / feel objects as pt was stimulable for head turn but did not terminate behavior. Visual reasoning - min-mod cues for completion of task. Identifying differences between pictures (presented vertically): Mod cues   Pt will participate in ongoing cognitive linguistic assessment. GOAL MET GOAL MET GOAL MET   Other areas targeted:      Education:   Education re: rationale for tasks this date. Educated on performance this date; pt self identified distractability as a barrier to performance on task. Education on skills targeted this date.     Safety Devices: [x] Call light within reach  [x] Chair alarm activated and connected to nurse call light system  [] Bed alarm activated   [] Other:  [x] Call light within reach  [x] Chair alarm activated and connected to nurse call light system  [] Bed alarm activated   [] Other: [x] Call light within reach  [x] Chair alarm activated and connected to nurse call light system  [] Bed alarm activated   [x] Other: PCA notified of pt desire to transfer to bed   Assessment: Progressing towards goals. Pt continues to demonstrate left sided facial weakness and pocketing of solids (per own report; not observed this date). Pt with left sided inattention / right gaze preference and executive dysfunction. Attention remains barrier for carryover. Pt would benefit from ongoing inpatient rehab to maximize safety and independence at home. Plan: Continue per POC.        Interventions used this date:  [] Speech/Language Treatment  [] Instruction in HEP  [x] Dysphagia Treatment [x] Cognitive Treatment   [] Other:    Discharge recommendations:  [] Home independently  [x] Home with assistance []  24 hour supervision  [] ECF [] Other  Continued Tx Upon Discharge: ? [x] Yes    [] No    [] TBD based on progress while on ARU     [] Vital Stim indicated     [] Other:   Estimated discharge date: Pending request for extension    Tita Esqueda M.A. [facility]  Speech-Language Pathologist

## 2021-05-17 NOTE — PROGRESS NOTES
NUTRITION NOTE   Admission Date: 4/23/2021     Type and Reason for Visit: Reassess    NUTRITION RECOMMENDATIONS:   1. PO Diet: Continue general diet, 1500 ml FR per MD    2. ONS: Low Hugo, HP Ensure BID    NUTRITION ASSESSMENT:  Pt with no nutritional concerns at this time, consuming >75% of meals. Provided pt with diet education at time of visit for ways to lower cholesterol. All questions answered. Will follow per [facility]. Patient admitted d/t GERD, depression/anxiety     PMH significant for: CVA    DIET EDUCATION:   Provided pt with written and verbal instruction Lowering Cholesterol nutrition therapy. Discussed low vs high fat foods, lean meats/decreasing red meat consumption, increasing fruit/veggie intakes, and limiting processed foods/candy. Pt voiced understanding. MALNUTRITION ASSESSMENT  Context of Malnutrition: Acute Illness   Malnutrition Status: No malnutrition    NUTRITION DIAGNOSIS No nutrition diagnosis at this time. NUTRITION INTERVENTION  Food and/or Nutrient Delivery: Continue Current Diet, Continue Oral Nutrition Supplement   Nutrition Education/Counseling: No recommendation at this time Coordination of Nutrition Care: Continue to monitor while inpatient     NUTRITION RISK LEVEL: Risk Level: Low        The patient will still be monitored per nutrition standards of care. Consult dietitian if nutrition interventions essential to patient care is needed.      Gautam Werner, [address] [place] [address], [address]:  [phone]  Office:  [phone]

## 2021-05-17 NOTE — PROGRESS NOTES
SHIFT: 0700 - 1930  Pt upon assessment was stable. Alert and oriented. Denied having chest pain or SOB. Pt verbalized having pain in her left hip and left shoulder this shift. Pt was medicated with PRN Oxy-IR and scheduled Tylenol. Medication, rest and position was effective for pain control. Scalp incision cleansed with soap and water this shift. Incision remains with scabs. Medications given this shift were reviewed with pt regarding use, dose and side effects. Pt verbalized understanding of education given. Continent of bladder this shift with toilieting being offered frequently. Pt participated well with therapies. Pt at end of shift was resting in bed with bed in low position and call light was within reach. Pt denied having any further needs.     Electronically signed by Peter Chavez RN on 5/17/2021 at 7:29 PM

## 2021-05-17 NOTE — PROGRESS NOTES
Occupational Therapy  Facility/Department: [facility] ACUTE REHAB UNIT  Daily Treatment Note  NAME: Jacek Powers  : 1970  MRN: [id number]    Date of Service: 2021    Discharge Recommendations:  24 hour supervision or assist, Home with Home health OT, Continue to assess pending progress, Home with nursing aide  OT Equipment Recommendations  Equipment Needed: Yes  ADL Assistive Devices: Transfer Tub Bench;Grab Bars - toilet; Toileting - Raised Toilet Seat with arms  Other: continue to assess    Assessment   Performance deficits / Impairments: Decreased functional mobility ; Decreased ADL status; Decreased ROM; Decreased strength;Decreased sensation;Decreased fine motor control;Decreased endurance;Decreased posture;Decreased balance;Decreased safe awareness;Decreased coordination;Decreased vision/visual deficit; Decreased cognition  Assessment: Pt progressed to one person assist for LB dressing and toileting as pt improving to min A to maintain balance and is able to assist with pants mgmt in stance mostly on R side. Pt improved with head turns i'ly to left when scanning sink during oral hygiene. Pt continues to be limited by hypertonicity in LUE and pain in thenar eminence although pt tolerates resting hand splint  in 2 hour increments. Pt functioning significantly below baseline, cont per OT POC. Treatment Diagnosis: decreased independence with ADLs and decreased functional mobility 2/2 CVA  Prognosis: Fair  OT Education: Plan of Care;Transfer Training;Precautions; ADL Adaptive Strategies  Patient Education: hemidressing tech for LB dressing-cont to reinforce  Barriers to Learning: cognition  REQUIRES OT FOLLOW UP: Yes  Activity Tolerance  Activity Tolerance: Patient Tolerated treatment well  Safety Devices  Safety Devices in place: Yes  Type of devices: Call light within reach; Chair alarm in place; Left in chair; All fall risk precautions in place         Patient Diagnosis(es): There were no encounter diagnoses. i'ly for 2/3 items, cues for ant lean when rinsing)  LE Dressing: Maximum assistance (pt donned brief and pants seated on RTS using reacher, assist to thread LLE and positioning of LLE, pt assisted with managing pants over hips on R side with min A for standing balance, max A to manage over L side and to tie drawstring)  Toileting: Maximum assistance (pt continent of bladder, + time to void, pericare hygiene seated, assist to manage LB clothing on/off hips L>R side with min A to maintain stance)  Additional Comments: total A to doff socks and don shoes for sake of time        Balance  Sitting Balance: Supervision (seated EOB and on toilet)  Standing Balance: Minimal assistance  Standing Balance  Time: ~2 minutes total  Activity: LB clothing/toileting  Toilet Transfers  Toilet - Technique: Stand pivot  Equipment Used: Raised toilet seat with rails  Toilet Transfer: Moderate assistance  Toilet Transfers Comments: VCs for hand placement, difficulty pivoting LLE  Bed mobility  Supine to Sit: Minimal assistance (assist for trunk ascension, use of bed rail)  Scooting: Minimal assistance (L hip while seated EOB)  Transfers  Stand Pivot Transfers: Minimal assistance (EOB<w/c leading to R)  Sit to stand: Minimal assistance (from w/c to GB, cues for ant lean)  Stand to sit: Minimal assistance (for eccentric control)                       Cognition  Overall Cognitive Status: Exceptions  Arousal/Alertness: Appropriate responses to stimuli  Following Commands:  Follows one step commands consistently  Attention Span: Appears intact  Memory: Appears intact  Safety Judgement: Decreased awareness of need for assistance;Decreased awareness of need for safety  Problem Solving: Assistance required to generate solutions;Assistance required to correct errors made;Assistance required to implement solutions;Decreased awareness of errors  Insights: Decreased awareness of deficits  Initiation: Requires cues for some  Sequencing: Requires cues for some  Cognition Comment: Pt demo improved head turns to L when scanning sink during oral hygiene           Second Session: Pt sitting in w/c upon arrival with PT present, pleasant and agreeable to OT/PT cotx to maximize independence with functional mobility with collaboration of 2 disciplines. Pt completed w/c propulsion using RUE and RLE in hallway ~150 feet with SBA and occasional VCs to attend to L visual field with L head turn to scan environmental barriers. Pt completed multiple STS transfers from w/c to 6 inch staircase with min A and VCs for hand placement to push up from w/c vs pull from handrail and multiple stand<sit transfers with min A for controlled descent and cues for hand placement and eccentric control. Pt completed x 10 toe raises on RLE with LLE placed on 6 inch step with RUE on handrail req min A of one to maintain standing balance and min-mod A of another to control LLE and prevent hyperextension. Pt completed 2 bouts of 10 step ups with RLE onto 6 inch step with LLE placed onto 6 inch step with RUE on handrail with mostly Min A but flucutuating between CGA-Mod A (with fatigue towards end) in order to control descent of RLE and Min-Mod A of another to maintain positioning of LLE and prevent extension synergy. Prolonged rest breaks req between each activity as pt verbalized challenge. Occasional cues throughout activity to maintain midline in stance as pt tend to lean to R. No overt LOB noted. Pt propeled self back to room ~150 feet in w/c with SBA and one collision in room with bed which was positioned on pt's L. Pt left in w/c at end of session with lunch provided, pt eating with setup with chair alarm engaged, call light within reach and all needs met.         Plan   Plan  Times per week: 5x a week for 60 mins daily  Times per day: Daily  Current Treatment Recommendations: Strengthening, Endurance Training, Neuromuscular Re-education, Self-Care / ADL, Functional Mobility Training, Safety Education & Training, ROM, Positioning, Wheelchair Mobility Training, Balance Training, Patient/Caregiver Education & Training, Manual Therapy:  MLD, Equipment Evaluation, Education, & procurement, Home Management Training, Cognitive Reorientation      Goals  Short term goals  Time Frame for Short term goals: 2 weeks  Short term goal 1: Pt will complete toilet transfer w/ Mod A-goal met 5/4  Short term goal 2: Pt will complete UE dressing w/ Mod A-goal met 5/12  Short term goal 3: Pt will complete oral care w/ spvn seated at sink w/o VCs for L sided attention-goal met 5/17  Short term goal 4: Pt will complete LE dressing w/ Mod A-ongoing  Long term goals  Time Frame for Long term goals : 4 weeks-all ongoing  Long term goal 1: Pt will complete toilet transfer w/ SBA  Long term goal 2: Pt will complete UE dressing w/ setup  Long term goal 3: Pt will complete LE dressing w/ SBA  Long term goal 4: Pt will be independent w/ tone management exercises to improve functional use of LUE  Long term goal 5: Pt will complete oral care I'ly  Patient Goals   Patient goals : \"get back to my normal self\"       Therapy Time   Individual Concurrent Group Co-treatment   Time In 0735      1130   Time Out 0835      1200   Minutes 60      30   Timed Code Treatment Minutes: 60 Minutes+ 30 Minutes  Total Treatment Time: 90 Minutes       Tang Plasencia OT
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
## 2021-05-17 NOTE — PLAN OF CARE
Problem: Pain:  Goal: Pain level will decrease  Description: Pain level will decrease  Outcome: Ongoing  Note: Pt c/o mid back pain managed with PRN pain medication. Problem: Falls - Risk of:  Goal: Will remain free from falls  Description: Will remain free from falls  5/16/2021 2326 by Joyce Portillo RN  Outcome: Ongoing  Note: Pt is a High fall risk. Explained fall risk precautions to pt and rationale behind their use to keep the patient safe. Belongings are in reach. Pt encouraged to notify staff for any and all assistance. Staff present in tx suite throughout entirety of pts treatment to monitor and protect from falls. Assistance provided when ambulating to restroom utilizing Stay With Me. Problem: Skin Integrity:  Goal: Absence of new skin breakdown  Description: Absence of new skin breakdown  Outcome: Ongoing  Note: No evidence of skin breakdown.

## 2021-05-18 PROCEDURE — 97530 THERAPEUTIC ACTIVITIES: CPT

## 2021-05-18 PROCEDURE — 6360000002 HC RX W HCPCS: Performed by: PHYSICAL MEDICINE & REHABILITATION

## 2021-05-18 PROCEDURE — 97129 THER IVNTJ 1ST 15 MIN: CPT

## 2021-05-18 PROCEDURE — 97130 THER IVNTJ EA ADDL 15 MIN: CPT

## 2021-05-18 PROCEDURE — 92526 ORAL FUNCTION THERAPY: CPT

## 2021-05-18 PROCEDURE — 99232 SBSQ HOSP IP/OBS MODERATE 35: CPT | Performed by: PHYSICAL MEDICINE & REHABILITATION

## 2021-05-18 PROCEDURE — 97535 SELF CARE MNGMENT TRAINING: CPT

## 2021-05-18 PROCEDURE — 97530 THERAPEUTIC ACTIVITIES: CPT | Performed by: PHYSICAL THERAPIST

## 2021-05-18 PROCEDURE — 97542 WHEELCHAIR MNGMENT TRAINING: CPT | Performed by: PHYSICAL THERAPIST

## 2021-05-18 PROCEDURE — 6370000000 HC RX 637 (ALT 250 FOR IP): Performed by: PHYSICAL MEDICINE & REHABILITATION

## 2021-05-18 PROCEDURE — 1280000000 HC REHAB R&B

## 2021-05-18 PROCEDURE — 94660 CPAP INITIATION&MGMT: CPT

## 2021-05-18 PROCEDURE — 94761 N-INVAS EAR/PLS OXIMETRY MLT: CPT

## 2021-05-18 RX ORDER — METHYLPHENIDATE HYDROCHLORIDE 5 MG/1
5 TABLET ORAL EVERY MORNING
Status: DISCONTINUED | OUTPATIENT
Start: 2021-05-18 | End: 2021-05-27

## 2021-05-18 RX ADMIN — DOCUSATE SODIUM 50 MG AND SENNOSIDES 8.6 MG 2 TABLET: 8.6; 5 TABLET, FILM COATED ORAL at 10:09

## 2021-05-18 RX ADMIN — OXYCODONE HYDROCHLORIDE 5 MG: 5 TABLET ORAL at 12:27

## 2021-05-18 RX ADMIN — FAMOTIDINE 20 MG: 20 TABLET, FILM COATED ORAL at 10:09

## 2021-05-18 RX ADMIN — LEVETIRACETAM 500 MG: 500 TABLET ORAL at 22:33

## 2021-05-18 RX ADMIN — ACETAMINOPHEN 1000 MG: 500 TABLET, COATED ORAL at 10:09

## 2021-05-18 RX ADMIN — HEPARIN SODIUM 5000 UNITS: 5000 INJECTION INTRAVENOUS; SUBCUTANEOUS at 15:02

## 2021-05-18 RX ADMIN — LEVETIRACETAM 500 MG: 500 TABLET ORAL at 10:09

## 2021-05-18 RX ADMIN — ACETAMINOPHEN 1000 MG: 500 TABLET, COATED ORAL at 22:32

## 2021-05-18 RX ADMIN — METHYLPHENIDATE HYDROCHLORIDE 5 MG: 5 TABLET ORAL at 10:34

## 2021-05-18 RX ADMIN — THIAMINE HCL TAB 100 MG 100 MG: 100 TAB at 10:08

## 2021-05-18 RX ADMIN — HEPARIN SODIUM 5000 UNITS: 5000 INJECTION INTRAVENOUS; SUBCUTANEOUS at 06:25

## 2021-05-18 RX ADMIN — ASPIRIN 81 MG: 81 TABLET, CHEWABLE ORAL at 10:09

## 2021-05-18 RX ADMIN — AMLODIPINE BESYLATE 5 MG: 5 TABLET ORAL at 10:09

## 2021-05-18 RX ADMIN — Medication 5 MG: at 10:08

## 2021-05-18 RX ADMIN — FLUOXETINE 20 MG: 20 CAPSULE ORAL at 10:09

## 2021-05-18 RX ADMIN — OXYCODONE HYDROCHLORIDE 5 MG: 5 TABLET ORAL at 06:25

## 2021-05-18 RX ADMIN — ACETAMINOPHEN 1000 MG: 500 TABLET, COATED ORAL at 06:25

## 2021-05-18 RX ADMIN — ACETAMINOPHEN 1000 MG: 500 TABLET, COATED ORAL at 16:18

## 2021-05-18 RX ADMIN — THERA TABS 1 TABLET: TAB at 10:08

## 2021-05-18 RX ADMIN — ATORVASTATIN CALCIUM 80 MG: 80 TABLET, FILM COATED ORAL at 22:32

## 2021-05-18 RX ADMIN — FAMOTIDINE 20 MG: 20 TABLET, FILM COATED ORAL at 22:33

## 2021-05-18 RX ADMIN — HEPARIN SODIUM 5000 UNITS: 5000 INJECTION INTRAVENOUS; SUBCUTANEOUS at 22:29

## 2021-05-18 ASSESSMENT — PAIN SCALES - GENERAL
PAINLEVEL_OUTOF10: 3
PAINLEVEL_OUTOF10: 0
PAINLEVEL_OUTOF10: 3
PAINLEVEL_OUTOF10: 0
PAINLEVEL_OUTOF10: 0
PAINLEVEL_OUTOF10: 6
PAINLEVEL_OUTOF10: 0
PAINLEVEL_OUTOF10: 0
PAINLEVEL_OUTOF10: 4
PAINLEVEL_OUTOF10: 0
PAINLEVEL_OUTOF10: 2
PAINLEVEL_OUTOF10: 1
PAINLEVEL_OUTOF10: 0
PAINLEVEL_OUTOF10: 0
PAINLEVEL_OUTOF10: 2
PAINLEVEL_OUTOF10: 0

## 2021-05-18 ASSESSMENT — PAIN DESCRIPTION - PROGRESSION
CLINICAL_PROGRESSION: GRADUALLY IMPROVING
CLINICAL_PROGRESSION: NOT CHANGED
CLINICAL_PROGRESSION: GRADUALLY WORSENING
CLINICAL_PROGRESSION: NOT CHANGED
CLINICAL_PROGRESSION: RAPIDLY IMPROVING

## 2021-05-18 ASSESSMENT — PAIN DESCRIPTION - LOCATION
LOCATION: HEAD
LOCATION: HEAD;SHOULDER
LOCATION: HIP
LOCATION: BACK
LOCATION: BACK;SHOULDER
LOCATION: HIP

## 2021-05-18 ASSESSMENT — PAIN DESCRIPTION - ONSET
ONSET: ON-GOING
ONSET: ON-GOING
ONSET: GRADUAL
ONSET: ON-GOING
ONSET: ON-GOING

## 2021-05-18 ASSESSMENT — PAIN DESCRIPTION - PAIN TYPE
TYPE: ACUTE PAIN

## 2021-05-18 ASSESSMENT — PAIN DESCRIPTION - DESCRIPTORS
DESCRIPTORS: DULL
DESCRIPTORS: HEADACHE
DESCRIPTORS: DISCOMFORT

## 2021-05-18 ASSESSMENT — PAIN DESCRIPTION - FREQUENCY
FREQUENCY: CONTINUOUS
FREQUENCY: INTERMITTENT

## 2021-05-18 ASSESSMENT — PAIN DESCRIPTION - ORIENTATION
ORIENTATION: MID
ORIENTATION: LEFT
ORIENTATION: MID
ORIENTATION: LEFT

## 2021-05-18 NOTE — PLAN OF CARE
Problem: Pain:  Goal: Control of acute pain  Description: Control of acute pain  Outcome: Ongoing   Pt verbalized having left hip and shoulder pain this shift. Pt was medicated with PRN Oxy-IR and scheduled Tylenol. Medication, rest and positioning was effective. Problem: Falls - Risk of:  Goal: Will remain free from falls  Description: Will remain free from falls  Outcome: Ongoing   No falls this shift. Pt uses call light. Bed and chair alarm in use to promote pt safety. Problem: Skin Integrity:  Goal: Absence of new skin breakdown  Description: Absence of new skin breakdown  Outcome: Ongoing   No new skin issues noted this shift. Pt repositioned frequently. Pt scalp incision is healing and with scabbed areas at this time.     Electronically signed by Doris Rivas RN on 5/18/2021 at 6:23 PM

## 2021-05-18 NOTE — PROGRESS NOTES
Department of Physical Medicine & Rehabilitation  Progress Note    Patient Identification:  Jacek Powers  [id number]  : 1970  Admit date: 2021    Chief Complaint: CVA    Subjective:   No new complaints overnight. Limited improvement with SLP today. Likely requires neuro stimulant. Discussed with patient. ROS: No f/c, n/v, cp     Objective:  Patient Vitals for the past 24 hrs:   BP Temp Temp src Pulse Resp SpO2   21 0746 108/71 97.4 °F (36.3 °C) Oral 82 16 94 %   21 0333     15    21 0031     15    21 2318     16    21 105/66 98.3 °F (36.8 °C) Oral 84 17 95 %     Const: Alert. No distress, pleasant. HEENT: Normocephalic, atraumatic. Normal sclera/conjunctiva. MMM. Incision healing well  CV: Regular rate and rhythm. Resp: No respiratory distress. Lungs CTAB. Abd: Soft, nontender, nondistended, NABS+   Ext: No edema. Neuro: Alert, oriented, appropriately interactive. Psych: Cooperative, appropriate mood and affect    Laboratory data: Available via EMR. Last 24 hour lab  No results found for this or any previous visit (from the past 24 hour(s)). Therapy progress:  PT  Position Activity Restriction  Other position/activity restrictions: Ambulate, Up with assist, Pt to wear helmet when OOB, OK to remove for shower, HOB to elevated at 30*  Objective     Sit to Stand: Minimal Assistance (STS from  w/c and mat table . Min A for ant WS and stabilizing LLE to maintain a neutral position)  Stand to sit: Minimal Assistance (VC for nhand placement and to guide hips when descending into chair)  Bed to Chair: Minimal assistance, Moderate assistance (Pt tried to do a SPT in which LLE hyperext and pt presented with decreased trunk control)  Device:  (hallway rail on R)  Other Apparatus: Wheelchair follow (do to decreased stability)  Assistance:  (MinAx1 for L LE advancment, faciliation to decrease L Le knee hyperextension and to decrease L Le ER. MinAx1/CGA for facilitation for pt to perform WS)  OT  PT Equipment Recommendations  Equipment Needed:  (Will continue to assess equipment needs pending progress)  Other: 20\" hemiheight w/c with drop arm and standard 90 degree leg rest  Toilet - Technique: Stand pivot  Equipment Used: Raised toilet seat with rails  Toilet Transfers Comments: VCs for hand placement, difficulty pivoting LLE  Assessment        SLP  Diet Solids Recommendation: Dysphagia Soft and Bite-Sized (Dysphagia III) (with regular solids as tolerated. Per pt request, gravy/sauces to keep for moist.)       Body mass index is 36.86 kg/m². Assessment and Plan:  Renaldo Adams a 46 y.o. female with PMH GERD p/w L sided weakness and facial droop.  CTA head/neck on 4/12 revealed occluded R cervical ICA, occluded right LEAH, near complete occlusion of right MCA.     Acute ischemic CVA, Subdural heomorrhage s/p right hemicraniectomy and subsequen SAH and IP hemorrhage  No tPA given.  MRI 4/14 showing large subacute infarct throughout R LEAH and MCA with some areas of hemorrhagic conversion.  - Neurosurgery and neurology following  --SBP <160, PRN hydralazine, scheduled Norvasc  -- Keppra 500 mg twice daily   -  SQH  - PT/OT consulted      Aphasia- SLP consulted   Depression-Patient takes fluoxetine 20 mg daily at home  Anxiety -Patient takes Ativan 0.5 mg every 8 hours as needed for anxiety- holding  MISHA-Patient uses CPAP at home   GERD- Patient takes omeprazole 40 mg daily at home- half dose  Obesity    Rehab Progress: Improving  Anticipated Dispo: home  Services/DME: LIANG  ELOS: LIANG Tejada D.O. M.P.H  PM&R  5/18/2021  12:11 PM
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
## 2021-05-18 NOTE — PROGRESS NOTES
ACUTE REHAB UNIT  SPEECH/LANGUAGE PATHOLOGY      [x] Daily  [] Weekly Care Conference Note  [] Discharge    Patient:Laura Vasquez      PTB:[birth year]  POC:[id number]  Rehab Dx/Hx: Acute CVA (cerebrovascular accident) ([facility] [address] [I63.9]    Precautions: [x] Aspiration  [x] Fall risk  [] Sternal  [] Seizure [] Hip  [] Weight Bearing [] Other  ST Dx: [] Aphasia  [] Dysarthria  [] Apraxia   [x] Oropharyngeal dysphagia [x] Cognitive Impairment  [] Other:   Date of Admit: 4/23/2021  Room #: 3101/3101-01  Date: 5/18/2021          Current Diet Order:Dietary Nutrition Supplements: Low Calorie High Protein Supplement  DIET GENERAL; Daily Fluid Restriction: 1500 ml   Recommended Form of Meds: PO  Compensatory Swallowing Strategies: Alternate solids and liquids, Upright as possible for all oral intake, Check for pocketing of food on the Left, Small bites/sips, Lingual sweep     Subjective: Pt admitted 4/12 after fall with left sided paralysis. Pt underwent craniectomy on 4/13 per Dr. Jesus Leahy. Social/Functional History  Lives With: Spouse;Son( reported that son is diagnosed with Autism.)  Occupation: Full time employment  Type of occupation:  at [employer]"    FEES 4/14: IMPRESSIONS:   Pt presents w/ mild oropharyngeal dysphagia characterized by premature spillage of thin liquids to UES and pyriforms w/ intermittent deep penetration of laryngeal vestibule; adequate airway protection and complete clearance of all residue after swallow initiation. No aspiration w/ any trials. Timely swallow initiation w/ puree and regular solid consistencies; no oropharyngeal residue.    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 Adequate ab/adduction. Complete closure of TVFs upon adduction.      Dentition: Adequate  Vision  Vision: Impaired  Vision Exceptions: Visual field cut  Hearing  Hearing: Within functional limits Barriers toward progress: None towards stated goals    Date: 5/18/2021      Tx session 1 Tx session 2   Total Timed Code Min 20 30   Total Treatment Minutes 30 30   Individual Treatment Minutes 30 30   Group Treatment Minutes 0 0   Co-Treat Minutes 0 0   Brief Exception: N/A N/A   Pain None indicated  General discomfort and pain in L hand and leg   Pain Intervention: [] RN present and aware  [] Repositioned  [] Intervention offered and patient declined  [x] N/A  [] Other:    [] RN notified  [x] Repositioned  [] Intervention offered and patient declined  [] N/A  [] Other:    Subjective:     Pt in w/c with visitors at side. Pt seen with lunch tray. Frequent external distractions and interruptions during session. Pt in bed upon entry, agreeable to tx. Endorsed tasks to be difficult during session. Frequent repositioning required d/t discomfort. Hand splint placed per schedule. Pt requesting drink at end of session - confirmed w/ RN prior to providing pt with 120 mLs. Objective / Goals:     Patient will consume regular solids with timely mastication and no pocketing in L buccal cavity across 2 sessions. Pt independently cleared L buccal cavity with lingual or finger sweep. Min-no residue exhibited with regular solids when prompted to check L buccal cavity. Instructed pt in purposeful mastication on L side to engage musculature t/o meal.   Goal not targeted. Patient will consume PO without anterior spillage or overt pocketing across 2 sessions. No anterior spillage or significant pocketing noted with regular consistency solids. Residue on L labial seal d/t reduced precision and ROM of labial seal. Eliminated when instructed in strategies. Goal not targeted. Pt will complete divided/alternating attention tasks with 85% accuracy given min cues. Adequate sustained and selective attention with only single attention break x 1 during session.  Category naming with alternating attention component and initial Home with assistance []  24 hour supervision  [] ECF [] Other  Continued Tx Upon Discharge: ? [x] Yes    [] No    [] TBD based on progress while on ARU     [] Vital Stim indicated     [] Other:   Estimated discharge date: Pending request for extension      Dalila Townsend CCC-SLP; [id number]  Speech-Language Pathologist

## 2021-05-18 NOTE — PLAN OF CARE
Problem: Pain:  Goal: Pain level will decrease  Description: Pain level will decrease  Outcome: Ongoing  Note: C/o back pain managed with PRN pain medication. Problem: Falls - Risk of:  Goal: Will remain free from falls  Description: Will remain free from falls  5/18/2021 0415 by Clark Hu RN  Outcome: Ongoing  Note: Pt is a High fall risk. Explained fall risk precautions to pt and rationale behind their use to keep the patient safe. Belongings are in reach. Pt encouraged to notify staff for any and all assistance. Staff present in tx suite throughout entirety of pts treatment to monitor and protect from falls. Assistance provided when ambulating to restroom utilizing Stay With Me. Problem: Skin Integrity:  Goal: Absence of new skin breakdown  Description: Absence of new skin breakdown  Outcome: Ongoing  Note: Pt cont on low air loss mattress, turned and repositioned, continent of bowel and bladder.

## 2021-05-18 NOTE — PROGRESS NOTES
Occupational Therapy  Facility/Department: [facility] ACUTE REHAB UNIT  Daily Treatment Note  NAME: Colton Sutherland  : 1970  MRN: [id number]    Date of Service: 2021    Discharge Recommendations:  24 hour supervision or assist, Home with Home health OT, Continue to assess pending progress, Home with nursing aide  OT Equipment Recommendations  Equipment Needed: Yes  ADL Assistive Devices: Transfer Tub Bench;Grab Bars - toilet; Toileting - Raised Toilet Seat with arms  Other: continue to assess    Assessment   Performance deficits / Impairments: Decreased functional mobility ; Decreased ADL status; Decreased ROM; Decreased strength;Decreased sensation;Decreased fine motor control;Decreased endurance;Decreased posture;Decreased balance;Decreased safe awareness;Decreased coordination;Decreased vision/visual deficit; Decreased cognition  Assessment: Pt progressed to min A for toilet transfer via stand pivot and able to maintain prolonged stance with unilateral UE support at GB requiring only one person assist for LB dressing. Pt continues to be limited by LUE hypertonicity and minimal active movement. Pt continues to have pain in L hand but tolerates resting hand splint. Cont per OT POC. Treatment Diagnosis: decreased independence with ADLs and decreased functional mobility 2/2 CVA  Prognosis: Fair  OT Education: Plan of Care;Transfer Training;Precautions; ADL Adaptive Strategies  Patient Education: hemidressing technique-continue to reinforce  REQUIRES OT FOLLOW UP: Yes  Activity Tolerance  Activity Tolerance: Patient Tolerated treatment well  Safety Devices  Safety Devices in place: Yes  Type of devices: Call light within reach; Chair alarm in place; Left in chair; All fall risk precautions in place         Patient Diagnosis(es): There were no encounter diagnoses. has a past medical history of GERD (gastroesophageal reflux disease), Hernia, Obesity, and Unspecified sleep apnea.    has a past surgical history that handled sponge to wash lower limbs and feet, assist to wash buttocks; assist to dry buttocks in stance with min A to maintain stance with unilateral UE support at )  UE Dressing: Minimal assistance; Increased time to complete;Setup;Verbal cueing (assist to thread LUE, VCs for hemidressing tech)  LE Dressing: Maximum assistance;Setup;Verbal cueing; Increased time to complete (to don brief and pants, pt req assist to thread and position LLE, total A to manage LB clothing over hips in stance with pt maintaining unilateral UE support at  with CGA-min A, total A to don shoes for sake of time)  Toileting: Moderate assistance (pt continent of bladder, pt assisted with pants mgmt off hips on R side, assist to manaeg off L side in stance with min A for standing balance using GB, pt completed pericare hygiene seated)  Additional Comments: Pt doffed LB clothing while seated on commode using reacher with min A to unthread from L heel and max cues to utilize RLE to position LLE        Balance  Sitting Balance: Supervision (seated on TTB)  Standing Balance: Minimal assistance (with unilateral UE support at )  Standing Balance  Time: ~3 minutes total  Activity: LB ADLs, toileting, functional transfers  Toilet Transfers  Toilet - Technique: Stand pivot  Equipment Used: Raised toilet seat with rails  Toilet Transfer: Minimal assistance  Toilet Transfers Comments: VCs for hand placement, use of GB leading to L onto toilet  Shower Transfers  Shower - Transfer From: Wheelchair  Shower - Transfer Type: To and From  Shower - Transfer To: Transfer tub bench  Shower - Technique: Stand pivot  Shower Transfers:  Moderate assistance  Shower Transfers Comments: use of GB, difficulty pivoting to L to sit on TTB, use of GB in stance, cues for hand placement     Transfers  Sit to stand: Minimal assistance (from w/c and TTB)  Stand to sit: Minimal assistance (for eccentric control)                       Cognition  Overall Cognitive Status: Exceptions  Arousal/Alertness: Appropriate responses to stimuli  Following Commands: Follows one step commands consistently  Attention Span: Appears intact  Memory: Appears intact  Safety Judgement: Decreased awareness of need for assistance;Decreased awareness of need for safety  Problem Solving: Assistance required to generate solutions;Assistance required to correct errors made;Assistance required to implement solutions;Decreased awareness of errors  Insights: Decreased awareness of deficits  Initiation: Requires cues for some  Sequencing: Requires cues for some           Positioning  Adaptations: resting hand splint donned to L hand     Second Session: Pt sitting in w/c upon arrival, pleasant and agreeable to OT session with friend Latesha Kisha present. Pt requesting to don sports bra as her friend brought some in from home. Pt doffed shirt with min A req step by step VCs for hemidressing technique and assist to fully unthread from RUE. Pt req Max A to don sports bra to thread LUE, position over chest on L side and to pull down in back. Pt req min A to don shirt with setup and assist to thread LUE and cues to reposition over L shoulder and in back. Pt completed w/c mobility provided with multiple destinations in order to assess independence with visual scanning and compensatory strategies to avoid environmental obstacles in pt's L visual field. Pt completed w/c propulsion using RUE and RLE and had 5 collisions including pt's door, door of dining room, door of therapy gym and caution wet floor sign in hallway, pt able to self correct to problem solve readjusting w/c however throughout w/c propulsion task pt req constant VCs to scan (using lighthouse metaphor). Pt requesting to get back into bed. Pt provided with cues to perform squat pivot from w/c to EOB to prevent LLE hyperextension as pt tends to have difficulty pivoting LLE however pt completed full stand req min A to pivot to bed.  Sit<supine with min A to manage ROBE. Pt left in bed at end of session with bed alarm engaged, call light within reach and all needs met.        Plan   Plan  Times per week: 5x a week for 60 mins daily  Times per day: Daily  Current Treatment Recommendations: Strengthening, Endurance Training, Neuromuscular Re-education, Self-Care / ADL, Functional Mobility Training, Safety Education & Training, ROM, Positioning, Wheelchair Mobility Training, Balance Training, Patient/Caregiver Education & Training, Manual Therapy:  MLD, Equipment Evaluation, Education, & procurement, Home Management Training, Cognitive Reorientation    Goals  Short term goals  Time Frame for Short term goals: 2 weeks  Short term goal 1: Pt will complete toilet transfer w/ Mod A-goal met 5/4  Short term goal 2: Pt will complete UE dressing w/ Mod A-goal met 5/12  Short term goal 3: Pt will complete oral care w/ spvn seated at sink w/o VCs for L sided attention-goal met 5/17  Short term goal 4: Pt will complete LE dressing w/ Mod A-ongoing  Long term goals  Time Frame for Long term goals : 4 weeks-all ongoing  Long term goal 1: Pt will complete toilet transfer w/ SBA  Long term goal 2: Pt will complete UE dressing w/ setup  Long term goal 3: Pt will complete LE dressing w/ SBA  Long term goal 4: Pt will be independent w/ tone management exercises to improve functional use of LUE  Long term goal 5: Pt will complete oral care I'ly  Patient Goals   Patient goals : \"get back to my normal self\"       Therapy Time   Individual Second Session Group Co-treatment   Time In 0730  1250       Time Out 0830  1330       Minutes 60  40       Timed Code Treatment Minutes: 60 Minutes+ 40 Minutes  Total Treatment Time: 100 Minutes       Frankie Joel OT

## 2021-05-18 NOTE — CARE COORDINATION
Spoke to patient and friend Abi Matthews and [facility]. Discussed plan to ask for insurance extension. Discussed continued stay on ARU. Will continue to follow for d/c needs.   Electronically signed by USMAN Cullen LISW-S on 5/18/2021 at 3:16 PM

## 2021-05-18 NOTE — PROGRESS NOTES
SHIFT: 0700 - 1930  Pt upon assessment was stable. Alert and oriented. Denied having chest pain or SOB. Pt verbalized having pain in her left hip and left shoulder this shift. Pt was medicated with PRN Oxy-IR and scheduled Tylenol. Medication, rest and position was effective for pain control. Pt showered this shift with OT. Scalp incision cleansed with soap and water this shift. Incision remains with scabs. Medications given this shift were reviewed with pt regarding use, dose and side effects. Pt verbalized understanding of education given. Continent of bladder this shift with toilieting being offered frequently. Pt participated well with therapies. Pt at end of shift was resting in bed with bed in low position and call light was within reach. Pt denied having any further needs.     Electronically signed by Harleen Lugo RN on 5/18/2021 at 6:38 PM

## 2021-05-19 LAB
ANION GAP SERPL CALCULATED.3IONS-SCNC: 12 MMOL/L (ref 3–16)
BASOPHILS ABSOLUTE: 0.2 K/UL (ref 0–0.2)
BASOPHILS RELATIVE PERCENT: 1.5 %
BUN BLDV-MCNC: 12 MG/DL (ref 7–20)
CALCIUM SERPL-MCNC: 9.9 MG/DL (ref 8.3–10.6)
CHLORIDE BLD-SCNC: 105 MMOL/L (ref 99–110)
CO2: 25 MMOL/L (ref 21–32)
CREAT SERPL-MCNC: <0.5 MG/DL (ref 0.6–1.1)
EOSINOPHILS ABSOLUTE: 0.4 K/UL (ref 0–0.6)
EOSINOPHILS RELATIVE PERCENT: 3.6 %
GFR AFRICAN AMERICAN: >60
GFR NON-AFRICAN AMERICAN: >60
GLUCOSE BLD-MCNC: 104 MG/DL (ref 70–99)
HCT VFR BLD CALC: 39.5 % (ref 36–48)
HEMOGLOBIN: 13.4 G/DL (ref 12–16)
LYMPHOCYTES ABSOLUTE: 3.5 K/UL (ref 1–5.1)
LYMPHOCYTES RELATIVE PERCENT: 30.9 %
MCH RBC QN AUTO: 34.9 PG (ref 26–34)
MCHC RBC AUTO-ENTMCNC: 34 G/DL (ref 31–36)
MCV RBC AUTO: 102.7 FL (ref 80–100)
MONOCYTES ABSOLUTE: 0.9 K/UL (ref 0–1.3)
MONOCYTES RELATIVE PERCENT: 7.7 %
NEUTROPHILS ABSOLUTE: 6.3 K/UL (ref 1.7–7.7)
NEUTROPHILS RELATIVE PERCENT: 56.3 %
PDW BLD-RTO: 13.7 % (ref 12.4–15.4)
PLATELET # BLD: 401 K/UL (ref 135–450)
PMV BLD AUTO: 9 FL (ref 5–10.5)
POTASSIUM REFLEX MAGNESIUM: 3.9 MMOL/L (ref 3.5–5.1)
RBC # BLD: 3.84 M/UL (ref 4–5.2)
SODIUM BLD-SCNC: 142 MMOL/L (ref 136–145)
WBC # BLD: 11.3 K/UL (ref 4–11)

## 2021-05-19 PROCEDURE — 80048 BASIC METABOLIC PNL TOTAL CA: CPT

## 2021-05-19 PROCEDURE — 1280000000 HC REHAB R&B

## 2021-05-19 PROCEDURE — 92526 ORAL FUNCTION THERAPY: CPT

## 2021-05-19 PROCEDURE — 97530 THERAPEUTIC ACTIVITIES: CPT

## 2021-05-19 PROCEDURE — 99232 SBSQ HOSP IP/OBS MODERATE 35: CPT | Performed by: PHYSICAL MEDICINE & REHABILITATION

## 2021-05-19 PROCEDURE — 97535 SELF CARE MNGMENT TRAINING: CPT

## 2021-05-19 PROCEDURE — 6360000002 HC RX W HCPCS: Performed by: PHYSICAL MEDICINE & REHABILITATION

## 2021-05-19 PROCEDURE — 97129 THER IVNTJ 1ST 15 MIN: CPT

## 2021-05-19 PROCEDURE — 85025 COMPLETE CBC W/AUTO DIFF WBC: CPT

## 2021-05-19 PROCEDURE — 97110 THERAPEUTIC EXERCISES: CPT | Performed by: PHYSICAL THERAPIST

## 2021-05-19 PROCEDURE — 36415 COLL VENOUS BLD VENIPUNCTURE: CPT

## 2021-05-19 PROCEDURE — 6370000000 HC RX 637 (ALT 250 FOR IP): Performed by: PHYSICAL MEDICINE & REHABILITATION

## 2021-05-19 PROCEDURE — 97130 THER IVNTJ EA ADDL 15 MIN: CPT

## 2021-05-19 PROCEDURE — 97542 WHEELCHAIR MNGMENT TRAINING: CPT | Performed by: PHYSICAL THERAPIST

## 2021-05-19 PROCEDURE — 97530 THERAPEUTIC ACTIVITIES: CPT | Performed by: PHYSICAL THERAPIST

## 2021-05-19 PROCEDURE — 97110 THERAPEUTIC EXERCISES: CPT

## 2021-05-19 RX ADMIN — THIAMINE HCL TAB 100 MG 100 MG: 100 TAB at 08:05

## 2021-05-19 RX ADMIN — HEPARIN SODIUM 5000 UNITS: 5000 INJECTION INTRAVENOUS; SUBCUTANEOUS at 14:14

## 2021-05-19 RX ADMIN — LEVETIRACETAM 500 MG: 500 TABLET ORAL at 22:31

## 2021-05-19 RX ADMIN — LEVETIRACETAM 500 MG: 500 TABLET ORAL at 08:05

## 2021-05-19 RX ADMIN — ASPIRIN 81 MG: 81 TABLET, CHEWABLE ORAL at 08:04

## 2021-05-19 RX ADMIN — Medication 5 MG: at 08:04

## 2021-05-19 RX ADMIN — OXYCODONE HYDROCHLORIDE 5 MG: 5 TABLET ORAL at 08:04

## 2021-05-19 RX ADMIN — AMLODIPINE BESYLATE 5 MG: 5 TABLET ORAL at 08:05

## 2021-05-19 RX ADMIN — ACETAMINOPHEN 1000 MG: 500 TABLET, COATED ORAL at 15:58

## 2021-05-19 RX ADMIN — ATORVASTATIN CALCIUM 80 MG: 80 TABLET, FILM COATED ORAL at 22:23

## 2021-05-19 RX ADMIN — FLUOXETINE 20 MG: 20 CAPSULE ORAL at 08:04

## 2021-05-19 RX ADMIN — ACETAMINOPHEN 1000 MG: 500 TABLET, COATED ORAL at 22:23

## 2021-05-19 RX ADMIN — HEPARIN SODIUM 5000 UNITS: 5000 INJECTION INTRAVENOUS; SUBCUTANEOUS at 06:36

## 2021-05-19 RX ADMIN — THERA TABS 1 TABLET: TAB at 08:04

## 2021-05-19 RX ADMIN — FAMOTIDINE 20 MG: 20 TABLET, FILM COATED ORAL at 08:05

## 2021-05-19 RX ADMIN — HEPARIN SODIUM 5000 UNITS: 5000 INJECTION INTRAVENOUS; SUBCUTANEOUS at 22:31

## 2021-05-19 RX ADMIN — ACETAMINOPHEN 1000 MG: 500 TABLET, COATED ORAL at 09:46

## 2021-05-19 RX ADMIN — FAMOTIDINE 20 MG: 20 TABLET, FILM COATED ORAL at 22:23

## 2021-05-19 RX ADMIN — METHYLPHENIDATE HYDROCHLORIDE 5 MG: 5 TABLET ORAL at 08:04

## 2021-05-19 ASSESSMENT — PAIN DESCRIPTION - ORIENTATION
ORIENTATION: LEFT
ORIENTATION: MID
ORIENTATION: MID

## 2021-05-19 ASSESSMENT — PAIN DESCRIPTION - LOCATION
LOCATION: BACK
LOCATION: HIP
LOCATION: HEAD
LOCATION: BACK

## 2021-05-19 ASSESSMENT — PAIN - FUNCTIONAL ASSESSMENT
PAIN_FUNCTIONAL_ASSESSMENT: ACTIVITIES ARE NOT PREVENTED

## 2021-05-19 ASSESSMENT — PAIN DESCRIPTION - PAIN TYPE
TYPE: ACUTE PAIN

## 2021-05-19 ASSESSMENT — PAIN SCALES - GENERAL
PAINLEVEL_OUTOF10: 5
PAINLEVEL_OUTOF10: 8
PAINLEVEL_OUTOF10: 0
PAINLEVEL_OUTOF10: 0
PAINLEVEL_OUTOF10: 3
PAINLEVEL_OUTOF10: 2

## 2021-05-19 ASSESSMENT — PAIN DESCRIPTION - DESCRIPTORS
DESCRIPTORS: ACHING
DESCRIPTORS: HEADACHE
DESCRIPTORS: ACHING
DESCRIPTORS: ACHING

## 2021-05-19 ASSESSMENT — PAIN DESCRIPTION - FREQUENCY
FREQUENCY: INTERMITTENT

## 2021-05-19 ASSESSMENT — PAIN DESCRIPTION - PROGRESSION
CLINICAL_PROGRESSION: NOT CHANGED
CLINICAL_PROGRESSION: GRADUALLY WORSENING
CLINICAL_PROGRESSION: NOT CHANGED

## 2021-05-19 ASSESSMENT — PAIN DESCRIPTION - ONSET
ONSET: GRADUAL

## 2021-05-19 NOTE — PROGRESS NOTES
Patient sitting up in w/c. Medicated for back pain this am. Patient stated pain was a 5 on a 0-10 pain scale. Alert and oriented X4. Offered and declined shower this am. Dr. Arlin Kellogg in to visit. Fluid restriction discontinued as patients sodium level was 142. Patient informed of same. Bed in low position and call light within reach.

## 2021-05-19 NOTE — PROGRESS NOTES
Patient is in bed and resting at this time, vitals stable, pain was 4/10 with a headache gave scheduled Tylenol. Scalp incision is open to air, scabbing. When up needs to wear helmet to protect scalp, is a x 2 stand pivot to wheelchair. Call light with in reach and bed alarm on.

## 2021-05-19 NOTE — PROGRESS NOTES
% 3.6 %    Basophils % 1.5 %    Neutrophils Absolute 6.3 1.7 - 7.7 K/uL    Lymphocytes Absolute 3.5 1.0 - 5.1 K/uL    Monocytes Absolute 0.9 0.0 - 1.3 K/uL    Eosinophils Absolute 0.4 0.0 - 0.6 K/uL    Basophils Absolute 0.2 0.0 - 0.2 K/uL       Therapy progress:  PT  Position Activity Restriction  Other position/activity restrictions: Ambulate, Up with assist, Pt to wear helmet when OOB, OK to remove for shower, HOB to elevated at 30*  Objective     Sit to Stand: Contact guard assistance, Minimal Assistance (STS from bed and mat table . CGA /Min A for ant WS and stabilizing LLE to maintain a neutral position)  Stand to sit: Minimal Assistance, Contact guard assistance (VC for hand placement and to guide hips when descending into chair or on mat)  Bed to Chair: Minimal assistance, Moderate assistance (Pt tried to do a SPT in which LLE hyperext and pt presented with decreased trunk control)  Device:  (hallway rail on R)  Other Apparatus: Wheelchair follow (do to decreased stability)  Assistance:  (MinAx1 for L LE advancment, faciliation to decrease L Le knee hyperextension and to decrease L Le ER. MinAx1/CGA for facilitation for pt to perform WS)  OT  PT Equipment Recommendations  Equipment Needed: Yes (continue to assess)  Other: 20\" hemiheight w/c with drop arm and standard 90 degree leg rest  Toilet - Technique: Stand pivot  Equipment Used: Raised toilet seat with rails  Toilet Transfers Comments: VCs for hand placement, use of GB leading to L onto toilet  Assessment        SLP  Diet Solids Recommendation: Dysphagia Soft and Bite-Sized (Dysphagia III) (with regular solids as tolerated. Per pt request, gravy/sauces to keep for moist.)       Body mass index is 36.86 kg/m².     Assessment and Plan:  Tally Glen Allan a 46 y.o. female with PMH GERD p/w L sided weakness and facial droop.  CTA head/neck on 4/12 revealed occluded R cervical ICA, occluded right LEAH, near complete occlusion of right MCA.    Acute ischemic CVA, Subdural heomorrhage s/p right hemicraniectomy and subsequen SAH and IP hemorrhage  No tPA given.  MRI 4/14 showing large subacute infarct throughout R LEAH and MCA with some areas of hemorrhagic conversion.  - Neurosurgery and neurology following  --SBP <160, PRN hydralazine, scheduled Norvasc  -- Keppra 500 mg twice daily   -  SQH  - PT/OT consulted      Aphasia- SLP consulted   Depression-Patient takes fluoxetine 20 mg daily at home  Anxiety -Patient takes Ativan 0.5 mg every 8 hours as needed for anxiety- holding  MISHA-Patient uses CPAP at home   GERD- Patient takes omeprazole 40 mg daily at home- half dose  Obesity    Rehab Progress: Improving  Anticipated Dispo: home  Services/DME: LIANG  ELOS: LIANG Bangura D.O. M.P.H  PM&R  5/19/2021  9:58 AM

## 2021-05-19 NOTE — PROGRESS NOTES
Occupational Therapy  Facility/Department: [facility] ACUTE REHAB UNIT  Daily Treatment Note  NAME: Kari Patiño  : 1970  MRN: [id number]    Date of Service: 2021    Discharge Recommendations:  24 hour supervision or assist, Home with Home health OT, Continue to assess pending progress, Home with nursing aide  OT Equipment Recommendations  ADL Assistive Devices: Transfer Tub Bench;Grab Bars - toilet; Toileting - Raised Toilet Seat with arms  Other: continue to assess    Assessment   Performance deficits / Impairments: Decreased functional mobility ; Decreased ADL status; Decreased ROM; Decreased strength;Decreased sensation;Decreased fine motor control;Decreased endurance;Decreased posture;Decreased balance;Decreased safe awareness;Decreased coordination;Decreased vision/visual deficit; Decreased cognition  Assessment: Pt progressing with toileting assisting with pants mgmt with CGA-min A in stance as patient used to require assist of 2 people for toileting to maintain standing balance and LB clothing mgmt. Pt continues to have difficulty carrying over techniques for squat pivot as pt is \"all or nothing\" during transfers with tendency to stand and has difficulty advancing/maneuvering LLE once fully extended. Pt limited PROM of LUE d/t pain and hypertonicity and has decreased carryover of self-range techniques d/t pain. Cont per OT POC. Treatment Diagnosis: decreased independence with ADLs and decreased functional mobility 2/2 CVA  Prognosis: Fair  OT Education: Plan of Care;Transfer Training;Precautions; ADL Adaptive Strategies; Home Exercise Program  Patient Education: pt educated on partial stance to improve squat pivot transfers however pt with poor carryover-cont to reinforce  REQUIRES OT FOLLOW UP: Yes  Activity Tolerance  Activity Tolerance: Patient Tolerated treatment well  Safety Devices  Safety Devices in place: Yes  Type of devices: Chair alarm in place; Left in chair; All fall risk precautions in place;Call light within reach;Nurse notified         Patient Diagnosis(es): There were no encounter diagnoses. has a past medical history of GERD (gastroesophageal reflux disease), Hernia, Obesity, and Unspecified sleep apnea. has a past surgical history that includes Splenectomy (); Total hip arthroplasty (); Lap Band (); hernia repair ();  section (); and craniotomy (Right, 2021). Restrictions  Position Activity Restriction  Other position/activity restrictions: Ambulate, Up with assist, Pt to wear helmet when OOB, OK to remove for shower, HOB to elevated at 30*  Subjective   General  Chart Reviewed: Yes  Patient assessed for rehabilitation services?: Yes  Additional Pertinent Hx: 46 y.o. female with hypertension and tobacco abuse who presented after spending a prolonged period on the ground s/p fall out of bed; found to have acute left hemiparesis and gaze deviation. CXR with no acute cardiopulmonary abnormality. CTH revealed large acute/subacute infarct in the R frontal lobe with associated mass effect and 4 mm R to L midline shift. It also noted subdural hemorrhage of 6mm along falx, although NSGY less convinced. CTA head and neck revealed occluded R cervical ICA & R anterior cerebral artery with near complete occlusion of R MCA. Outside window on TPA. Pt is now POD #1 following hemicraniectomy (). Pt admitted to ARU   Response to previous treatment: Patient with no complaints from previous session  Family / Caregiver Present: No  Referring Practitioner: Dr. Sonu Gagnon DO  Diagnosis: CVA  Subjective  Subjective: Pt sitting in w/c upon arrival, pleasant and agreeable to OT session. General Comment  Comments: Delia Arcos   Vital Signs  Patient Currently in Pain: Yes (did not rate pain but stating, \"my body is sore all over from PT yesterday\")   Orientation  Orientation  Overall Orientation Status: Within Functional Limits  Objective    ADL  Feeding: Setup (to open containers and prep ascension)  Sit to Supine: Minimal assistance (VCs for step by step sequencing, assist to manage LLE)  Comment: bed mobility performed on mat  Transfers  Stand Pivot Transfers: Minimal assistance (from EOM<w/c leading to R, despite cues to attempt squat pivot, pt demo \"all or nothing\" when initiating transfer)  Sit Pivot Transfers: Contact guard assistance;Minimal assistance (from w/c<EOM with step by step VCs and facilitation for ant lean leading to R)  Stand to sit: Minimal assistance (to control descent)                       Cognition  Overall Cognitive Status: Exceptions  Arousal/Alertness: Appropriate responses to stimuli  Following Commands: Follows one step commands consistently  Attention Span: Appears intact  Memory: Appears intact  Safety Judgement: Decreased awareness of need for assistance;Decreased awareness of need for safety  Problem Solving: Assistance required to generate solutions;Assistance required to correct errors made;Assistance required to implement solutions;Decreased awareness of errors  Insights: Decreased awareness of deficits  Initiation: Requires cues for some  Sequencing: Requires cues for some                    Type of ROM/Therapeutic Exercise  Type of ROM/Therapeutic Exercise: PROM  Exercises  Shoulder Flexion: x 8 within pain free range in sidelying gravity eliminated plane  Shoulder Extension: x 5 within pain free range in sidelying  Horizontal ABduction: x 8 within pain free range in sidelying  Elbow Extension: x 8 gravity assisted in sidelying  Wrist Flexion: x 8  Finger Extension: x 8 to all digits                 Interventions completed over two sessions completed with times denoted below.    Plan   Plan  Times per week: 5x a week for 60 mins daily  Times per day: Daily  Current Treatment Recommendations: Strengthening, Endurance Training, Neuromuscular Re-education, Self-Care / ADL, Functional Mobility Training, Safety Education & Training, ROM, Positioning, Wheelchair

## 2021-05-19 NOTE — PLAN OF CARE
Problem: Pain:  Goal: Pain level will decrease  Description: Pain level will decrease  Outcome: Ongoing  Note: Patient complains of pain at level 4/10. Patient describes pain as a headache. Patient requests pain medication. Patient medicated with Tylenol. Problem: Pain:  Goal: Control of acute pain  Description: Control of acute pain  5/19/2021 0259 by Peewee Rivera RN  Outcome: Ongoing     Problem: Pain:  Goal: Control of chronic pain  Description: Control of chronic pain  Outcome: Ongoing     Problem: Falls - Risk of:  Goal: Will remain free from falls  Description: Will remain free from falls  5/19/2021 0259 by Emiliano Watson RN  Outcome: Ongoing  Note: Patient is a fall risk. Patient is a x 2 stand pivot. See Fall Risk assessment for details. Bed is in low, lock position; call light/belongings within reach. No attempts to get out of bed have been made, calls appropriately when assistance is needed. Bed alarm and hourly rounds in place for safety. Problem: Falls - Risk of:  Goal: Absence of physical injury  Description: Absence of physical injury  Outcome: Ongoing     Problem: Skin Integrity:  Goal: Will show no infection signs and symptoms  Description: Will show no infection signs and symptoms  Outcome: Ongoing  Note: Pt at risk for skin breakdown. Patient has surgical incision mid scalp, open to air, well approximated. Skin assessment as documented. Pts skin cleansed with inc cleanser as needed. Pt repositioned in bed with pillow support as needed.         Problem: Skin Integrity:  Goal: Absence of new skin breakdown  Description: Absence of new skin breakdown  5/19/2021 0259 by Emiliano Watson RN  Outcome: Ongoing     Problem: Nutrition  Goal: Optimal nutrition therapy  Outcome: Ongoing
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
## 2021-05-19 NOTE — PROGRESS NOTES
ACUTE REHAB UNIT  SPEECH/LANGUAGE PATHOLOGY      [x] Daily  [] Weekly Care Conference Note  [] Discharge    Patient:Laura Brush      HJP:[birth year]  [facility]:[id number]  Rehab Dx/Hx: Acute CVA (cerebrovascular accident) ([facility] [address] [I63.9]    Precautions: [x] Aspiration  [x] Fall risk  [] Sternal  [] Seizure [] Hip  [] Weight Bearing [] Other  ST Dx: [] Aphasia  [] Dysarthria  [] Apraxia   [x] Oropharyngeal dysphagia [x] Cognitive Impairment  [] Other:   Date of Admit: 4/23/2021  Room #: 3101/3101-01  Date: 5/19/2021          Current Diet Order:Dietary Nutrition Supplements: Low Calorie High Protein Supplement  DIET GENERAL; Daily Fluid Restriction: 1500 ml   Recommended Form of Meds: PO  Compensatory Swallowing Strategies: Alternate solids and liquids, Upright as possible for all oral intake, Check for pocketing of food on the Left, Small bites/sips, Lingual sweep     Subjective: Pt admitted 4/12 after fall with left sided paralysis. Pt underwent craniectomy on 4/13 per Dr. Lauren Tellez. Social/Functional History  Lives With: Spouse;Son( reported that son is diagnosed with Autism.)  Occupation: Full time employment  Type of occupation:  at [employer]"    FEES 4/14: IMPRESSIONS:   Pt presents w/ mild oropharyngeal dysphagia characterized by premature spillage of thin liquids to UES and pyriforms w/ intermittent deep penetration of laryngeal vestibule; adequate airway protection and complete clearance of all residue after swallow initiation. No aspiration w/ any trials. Timely swallow initiation w/ puree and regular solid consistencies; no oropharyngeal residue.    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 Adequate ab/adduction. Complete closure of TVFs upon adduction.      Dentition: Adequate  Vision  Vision: Impaired  Vision Exceptions: Visual field cut  Hearing  Hearing: Within functional limits Barriers toward progress: None towards stated goals    Date: 5/19/2021      Tx session 1 Tx session 2   Total Timed Code Min 15 30   Total Treatment Minutes 30 30   Individual Treatment Minutes 30 30   Group Treatment Minutes 0 0   Co-Treat Minutes 0 0   Brief Exception: N/A N/A   Pain Reports her body hurts today from therapy; RN present and aware and provided pain medication. 5/10 overall. None indicated    Pain Intervention: [x] RN notified  [x] Repositioned  [] Intervention offered and patient declined  [] N/A  [] Other:    [] RN notified  [] Repositioned  [] Intervention offered and patient declined  [x] N/A  [] Other:    Subjective:     Pt upright in chair and agreeable to therapy. Pt upright in chair and participates well in therapy session as is her usual.    Objective / Goals:     Patient will consume regular solids with timely mastication and no pocketing in L buccal cavity across 2 sessions. Patient tolerated NMES via VitalStim placement 4a (targeting orbicularis oris, buccinator and superior pharyngeal constrictor) @ 3.0 mA on right x 23 minutes, 7.5 mA on left x 16 minutes, increased to 8.0 mA on left x 7 minutes (total stimulation = 23 minutes). Pt with mod cues for pocketing with few instances of absent residue found with lingual sweep. Requires encouragement to utilize lingual sweep prior to finger sweep. Educated on use of pressure to buccal region to remove prior to finger placement in oral cavity if possible. Goal not targeted this session. Patient will consume PO without anterior spillage or overt pocketing across 2 sessions. No anterior spillage; difficult with self feeding x1 with placement on labial surface without apparent awareness. Goal not targeted this session. Pt will complete divided/alternating attention tasks with 85% accuracy given min cues. Mod cues for divided / alternating attention between PO and conversations.   Pt appropriately able to maintain attention to task and conversational exchanges without cues this date. Pt will accurately complete executive functioning tasks with min cues. Goal not targeted this session. Impulsivity noted for leisure activity requiring min-mod cues. Schedule planning from paragraph - mod cues for initiation / execution   Pt will attend to L visual field with min cues. Max cues to attention to speaker on far left. x1 spontaneously looked to left visual field during conversational exchange. Familiar leisure activity facilitating left to right scanning - cue x1 at start and pt was independent ongoing   Pt will participate in ongoing cognitive linguistic assessment. GOAL MET GOAL MET   Other areas targeted:     Education:   Education ongoing regarding rationale for NMES, sequence of oral phase and attention. Education ongoing regarding improved attention this session. Safety Devices: [x] Call light within reach  [x] Chair alarm activated and connected to nurse call light system  [] Bed alarm activated   [x] Other: OT present for scheduled session. [x] Call light within reach  [x] Chair alarm activated and connected to nurse call light system  [] Bed alarm activated   [] Other:    Assessment: Oral stage dysphagia complicated by reduced attention and carryover of strategy. Ongoing executive dysfunction, left visual field neglect, reduced attention although improved during second session. Plan: Continue per POC.       Interventions used this date:  [] Speech/Language Treatment  [] Instruction in HEP  [x] Dysphagia Treatment [x] Cognitive Treatment   [] Other:    Discharge recommendations:  [] Home independently  [x] Home with assistance []  24 hour supervision  [] ECF [] Other  Continued Tx Upon Discharge: ? [x] Yes    [] No    [] TBD based on progress while on ARU     [] Vital Stim indicated     [] Other:   Estimated discharge date: Pending request for extension    Viki Avila M.A., [place]  Speech-Language Pathologist

## 2021-05-19 NOTE — PLAN OF CARE
Problem: Pain:  Goal: Control of acute pain  Description: Control of acute pain  5/19/2021 1929 by Augustus Barraza RN  Outcome: Ongoing  Note: Pt voices pain needs appropriately, pain is assessed during shift. Problem: Falls - Risk of:  Goal: Will remain free from falls  Description: Will remain free from falls  5/19/2021 1929 by Augustus Barraza RN  Outcome: Ongoing  Note: Client remains free from falls, bed/chair alarm in place, door open, encouraged to use call light for needs, call light is within reach, bed locked in lowest position,  Will continue to monitor. Problem: Skin Integrity:  Goal: Will show no infection signs and symptoms  Description: Will show no infection signs and symptoms  Outcome: Ongoing  Note: Surgical site observed for signs/symptoms of infection. Vitals performed routinely, labs observed.  Will monitor pt while on ARU

## 2021-05-19 NOTE — PLAN OF CARE
Problem: Pain:  Goal: Control of acute pain  Description: Control of acute pain  5/19/2021 1237 by Elaine Bowen  Note: Patient medicated for pain rating of a 5/10 scale this am. Received Oxycodone 5mg and patient was satisfied with pain regimen. Problem: Falls - Risk of:  Goal: Will remain free from falls  Description: Will remain free from falls  5/19/2021 1237 by Elaine Bowen  Outcome: Ongoing  Note: Patient remains free of falls this shift. Room free of clutter, bed in low position, call light and personal items are within reach     Problem: Skin Integrity:  Goal: Absence of new skin breakdown  Description: Absence of new skin breakdown  5/19/2021 1237 by Elaine Bowen  Outcome: Ongoing  Note: No evidence of skin breakdown noted this shift.  Will continue to monitor

## 2021-05-20 PROCEDURE — 97129 THER IVNTJ 1ST 15 MIN: CPT

## 2021-05-20 PROCEDURE — 99232 SBSQ HOSP IP/OBS MODERATE 35: CPT | Performed by: PHYSICAL MEDICINE & REHABILITATION

## 2021-05-20 PROCEDURE — 1280000000 HC REHAB R&B

## 2021-05-20 PROCEDURE — 97116 GAIT TRAINING THERAPY: CPT | Performed by: PHYSICAL THERAPIST

## 2021-05-20 PROCEDURE — 6370000000 HC RX 637 (ALT 250 FOR IP): Performed by: PHYSICAL MEDICINE & REHABILITATION

## 2021-05-20 PROCEDURE — 6360000002 HC RX W HCPCS: Performed by: PHYSICAL MEDICINE & REHABILITATION

## 2021-05-20 PROCEDURE — 97530 THERAPEUTIC ACTIVITIES: CPT

## 2021-05-20 PROCEDURE — 97530 THERAPEUTIC ACTIVITIES: CPT | Performed by: PHYSICAL THERAPIST

## 2021-05-20 PROCEDURE — 97535 SELF CARE MNGMENT TRAINING: CPT

## 2021-05-20 PROCEDURE — 97130 THER IVNTJ EA ADDL 15 MIN: CPT

## 2021-05-20 RX ADMIN — THIAMINE HCL TAB 100 MG 100 MG: 100 TAB at 08:28

## 2021-05-20 RX ADMIN — HEPARIN SODIUM 5000 UNITS: 5000 INJECTION INTRAVENOUS; SUBCUTANEOUS at 23:25

## 2021-05-20 RX ADMIN — HEPARIN SODIUM 5000 UNITS: 5000 INJECTION INTRAVENOUS; SUBCUTANEOUS at 05:10

## 2021-05-20 RX ADMIN — LEVETIRACETAM 500 MG: 500 TABLET ORAL at 08:28

## 2021-05-20 RX ADMIN — ACETAMINOPHEN 1000 MG: 500 TABLET, COATED ORAL at 18:14

## 2021-05-20 RX ADMIN — METHYLPHENIDATE HYDROCHLORIDE 5 MG: 5 TABLET ORAL at 08:21

## 2021-05-20 RX ADMIN — DOCUSATE SODIUM 50 MG AND SENNOSIDES 8.6 MG 2 TABLET: 8.6; 5 TABLET, FILM COATED ORAL at 08:21

## 2021-05-20 RX ADMIN — THERA TABS 1 TABLET: TAB at 08:29

## 2021-05-20 RX ADMIN — FAMOTIDINE 20 MG: 20 TABLET, FILM COATED ORAL at 23:22

## 2021-05-20 RX ADMIN — ACETAMINOPHEN 1000 MG: 500 TABLET, COATED ORAL at 23:22

## 2021-05-20 RX ADMIN — LEVETIRACETAM 500 MG: 500 TABLET ORAL at 23:21

## 2021-05-20 RX ADMIN — ASPIRIN 81 MG: 81 TABLET, CHEWABLE ORAL at 08:26

## 2021-05-20 RX ADMIN — OXYCODONE 10 MG: 5 TABLET ORAL at 08:21

## 2021-05-20 RX ADMIN — LEVETIRACETAM 500 MG: 500 TABLET ORAL at 08:21

## 2021-05-20 RX ADMIN — ACETAMINOPHEN 1000 MG: 500 TABLET, COATED ORAL at 05:10

## 2021-05-20 RX ADMIN — ASPIRIN 81 MG: 81 TABLET, CHEWABLE ORAL at 08:20

## 2021-05-20 RX ADMIN — DOCUSATE SODIUM 50 MG AND SENNOSIDES 8.6 MG 2 TABLET: 8.6; 5 TABLET, FILM COATED ORAL at 08:26

## 2021-05-20 RX ADMIN — OXYCODONE 10 MG: 5 TABLET ORAL at 13:26

## 2021-05-20 RX ADMIN — FLUOXETINE 20 MG: 20 CAPSULE ORAL at 08:21

## 2021-05-20 RX ADMIN — Medication 5 MG: at 08:28

## 2021-05-20 RX ADMIN — FAMOTIDINE 20 MG: 20 TABLET, FILM COATED ORAL at 08:35

## 2021-05-20 RX ADMIN — ATORVASTATIN CALCIUM 80 MG: 80 TABLET, FILM COATED ORAL at 23:21

## 2021-05-20 RX ADMIN — HEPARIN SODIUM 5000 UNITS: 5000 INJECTION INTRAVENOUS; SUBCUTANEOUS at 15:08

## 2021-05-20 RX ADMIN — AMLODIPINE BESYLATE 5 MG: 5 TABLET ORAL at 08:28

## 2021-05-20 RX ADMIN — OXYCODONE 10 MG: 5 TABLET ORAL at 17:35

## 2021-05-20 ASSESSMENT — PAIN DESCRIPTION - PAIN TYPE
TYPE: ACUTE PAIN
TYPE: ACUTE PAIN;SURGICAL PAIN
TYPE: ACUTE PAIN;SURGICAL PAIN
TYPE: ACUTE PAIN

## 2021-05-20 ASSESSMENT — PAIN SCALES - GENERAL
PAINLEVEL_OUTOF10: 5
PAINLEVEL_OUTOF10: 2
PAINLEVEL_OUTOF10: 2
PAINLEVEL_OUTOF10: 5
PAINLEVEL_OUTOF10: 2
PAINLEVEL_OUTOF10: 2
PAINLEVEL_OUTOF10: 7
PAINLEVEL_OUTOF10: 7
PAINLEVEL_OUTOF10: 5

## 2021-05-20 ASSESSMENT — PAIN DESCRIPTION - DESCRIPTORS
DESCRIPTORS: HEADACHE
DESCRIPTORS: HEADACHE
DESCRIPTORS: ACHING
DESCRIPTORS: ACHING;HEADACHE
DESCRIPTORS: ACHING;HEADACHE
DESCRIPTORS: HEADACHE
DESCRIPTORS: ACHING

## 2021-05-20 ASSESSMENT — PAIN DESCRIPTION - PROGRESSION
CLINICAL_PROGRESSION: NOT CHANGED

## 2021-05-20 ASSESSMENT — PAIN DESCRIPTION - ONSET
ONSET: ON-GOING
ONSET: OTHER (COMMENT)

## 2021-05-20 ASSESSMENT — PAIN DESCRIPTION - FREQUENCY
FREQUENCY: INTERMITTENT

## 2021-05-20 ASSESSMENT — PAIN - FUNCTIONAL ASSESSMENT
PAIN_FUNCTIONAL_ASSESSMENT: PREVENTS OR INTERFERES SOME ACTIVE ACTIVITIES AND ADLS

## 2021-05-20 ASSESSMENT — PAIN DESCRIPTION - LOCATION
LOCATION: HAND
LOCATION: HEAD
LOCATION: HEAD;HAND
LOCATION: HEAD

## 2021-05-20 ASSESSMENT — PAIN DESCRIPTION - ORIENTATION
ORIENTATION: LEFT
ORIENTATION: LEFT

## 2021-05-20 NOTE — PATIENT CARE CONFERENCE
Inpatient Rehabilitation  Weekly Team Conference Note  The [address]  [phone]  Patient Name: Carmelo Lagunas        MRN: [id number]    : 1970  (46 y.o.)  Gender: female   Referring Practitioner: Anastasia Rollins     The team conference for this patient was held on 2021 at 10:30am by:  Leonora Frye DO    Current/Goal QM SCORES  QM Current/Goal Score   Eating CARE Score: 5 / Discharge Goal: Set-up or clean-up assistance   Oral Hygiene CARE Score: 5 / Discharge Goal: Independent   Shower/Bathing CARE Score: 3 / Discharge Goal: Supervision or touching assistance   UB Dressing CARE Score: 3 / Discharge Goal: Set-up or clean-up assistance   LB Dressing CARE Score: 2 / Discharge Goal: Supervision or touching assistance   Putting on/off Footwear CARE Score: 3 / Discharge Goal: Supervision or touching assistance   Toileting Hygiene CARE Score: 3 / Discharge Goal: Supervision or touching assistance   Bladder Continence Bladder Continence: Always continent    Bowel Continence Bowel Continence: Always continent    Toilet Transfers CARE Score: 3 / Discharge Goal: Supervision or touching assistance   Shower/Bathe Self  CARE Score: 3 / Discharge Goal: Supervision or touching assistance   Rolling Left and Right CARE Score: 3 / Discharge Goal: Supervision or touching assistance   Sit to Lying CARE Score: 3 / Discharge Goal: Supervision or touching assistance   Lying to Sitting on Bedside CARE Score: 3 / Discharge Goal: Supervision or touching assistance   Sit to Stand CARE Score: 3 / Discharge Goal: Supervision or touching assistance   Chair/Bed to Chair Transfer CARE Score: 3 / Discharge Goal: Supervision or touching assistance   Car Transfers CARE Score: 1 / Discharge Goal: Supervision or touching assistance   Walk 10 Feet CARE Score: 1 / Discharge Goal: Partial/moderate assistance   Walk 50 Feet with Two Turns CARE Score: 88 / Discharge Goal: 142   K 3.9      CO2 25   BUN 12   CREATININE <0.5*     PT/INR: No results for input(s): PROTIME, INR in the last 72 hours. APTT: No results for input(s): APTT in the last 72 hours. Liver Profile:  Lab Results   Component Value Date    AST 41 04/13/2021    ALT 32 04/13/2021    BILITOT 0.7 04/13/2021    ALKPHOS 86 04/13/2021     Lab Results   Component Value Date    CHOL 251 04/13/2021    HDL 45 04/13/2021    TRIG 278 04/13/2021     Recent Labs     05/19/21  0755   WBC 11.3*   HGB 13.4   HCT 39.5      .7*     Recent Labs     05/19/21  0755      K 3.9      CO2 25   BUN 12   CREATININE <0.5*     No results for input(s): AST, ALT, ALB, BILIDIR, BILITOT, ALKPHOS in the last 72 hours. No results for input(s): MG in the last 72 hours. Recent Labs     05/19/21  0755   WBC 11.3*   HGB 13.4   HCT 39.5        Recent Labs     05/19/21  0755      K 3.9      CO2 25   BUN 12   CREATININE <0.5*   CALCIUM 9.9     No results for input(s): AST, ALT, BILIDIR, BILITOT, ALKPHOS in the last 72 hours. No results for input(s): INR in the last 72 hours. No results for input(s): Roxi Elder in the last 72 hours. PHYSICAL THERAPY:  Bed Mobility: Scooting: Contact guard assistance (VC/ TC in short sitting to advance hips to EOS in prep for transf)    Transfers:  Sit to Stand: Contact guard assistance, Minimal Assistance (STS from bed and mat table . CGA /Min A for ant WS and stabilizing LLE to maintain a neutral position)  Stand to sit: Minimal Assistance, Contact guard assistance (VC for hand placement and to guide hips when descending into chair or on mat)  Bed to Chair: Minimal assistance, Moderate assistance (Pt tried to do a SPT in which LLE hyperext and pt presented with decreased trunk control)  Squat Pivot Transfers: Contact guard assistance (w/c <> mat table leading with both sides with focus on eccentric control of lES to allow for a safe transition.  Practiced  transf in both directionsperformed multiple times)  Comment: Pt's friends present and asked \"How they are able to assist pt?\" PT instructed pt with bridges and LTRS educating pt and friends on the purops and benefits of both. Ambulation 1  Surface: level tile  Device:  (hallway rail on R)  Other Apparatus: Wheelchair follow (do to decreased stability)  Assistance:  (MinAx1 for L LE advancment, faciliation to decrease L Le knee hyperextension and to decrease L Le ER. MinAx1/CGA for facilitation for pt to perform WS)    OCCUPATIONAL THERAPY:  ADL  Feeding: Setup (to open containers and prep butter onto bread)  Grooming: Supervision (oral hygiene seated in w/c, + time for pt to identify items on L of sink, cues for ant lean while rinsing)  UE Bathing: Minimal assistance, Verbal cueing, Setup (seated on TTB, VCs for adaptive technique to wash RUE, assist to position LUE)  LE Bathing: Moderate assistance (seated on TTB; use of long handled sponge to wash lower limbs and feet, assist to wash buttocks; assist to dry buttocks in stance with min A to maintain stance with unilateral UE support at GB)  UE Dressing: Minimal assistance, Increased time to complete, Setup, Verbal cueing (assist to thread LUE, VCs for hemidressing tech)  LE Dressing: Moderate assistance (pt doffed R sock mod I w reacher, pt doffed L sock with spvn and VCs to incorporate RLE to position LLE while using reacher and ++time, R compression sock with CGA and + time via figure 4, total A to don L sock, R shoe with setup, total A to don L shoe)  Toileting:  Moderate assistance (pt continent of bladder, pt assisted with pants mgmt off hips on R side, assist to manage off L side and over hips in stance with CGA-min A for standing balance using GB/handle of RTS, pt completed pericare hygiene seated)  Additional Comments: Pt doffed LB clothing while seated on commode using reacher with min A to unthread from L heel and max cues to utilize RLE to position LLE Toilet Transfers: Toilet Transfers  Toilet - Technique: Stand pivot  Equipment Used: Raised toilet seat with rails  Toilet Transfer: Minimal assistance  Toilet Transfers Comments: VCs for hand placement, use of GB leading to L onto toilet    Tub/ShowerTransfers:  Shower Transfers  Shower - Transfer From: Wheelchair  Shower - Transfer Type: To and From  Shower - Transfer To: Transfer tub bench  Shower - Technique: Stand pivot  Shower Transfers: Moderate assistance  Shower Transfers Comments: use of GB, difficulty pivoting to L to sit on TTB, use of GB in stance, cues for hand placement    SPEECH THERAPY:  Assessment: Speech Therapy Diagnosis  Cognitive Diagnosis: Pt presents with moderate-severe cognitive linguistic impairment. NUTRITION:  Please see nutrition note for details. Weight: 228 lb 6.3 oz (103.6 kg) / Body mass index is 36.86 kg/m². Current diet order: Dietary Nutrition Supplements: Low Calorie High Protein Supplement  DIET GENERAL; Feeding: Able to feed self, Needs set up  Room Service: Selective   NSG Recorded PO: PO Meals Eaten (%): 76 - 100% PO Supplement (%): 76 - 100%  Malnutrition Status Malnutrition Status: No malnutrition    CASE MANAGEMENT:  Psychosocial/Behavioral Issues: none noted  Assessment: Spoke to patient and friend Humberto Lee. Discussed plan to ask for insurance extension. Discussed continued stay on ARU. Will continue to follow for d/c needs. Patient/Family Education provided by team:  Role of PT/OT, transfer training, pratik dressing technique    Patient Goals for Rehab stay:  1. get back to normal     Rehab Team Goals for patient for the Upcoming Week:  1. Increase independence with ADLs    Barriers to Progress/Attainment of Goals & Efforts/Interventions to remove Barriers:  1.  Hypertonicity and impaired standing balance-continue OT/PT    Discharge Plan:  Estimated Length of Stay: 23 days  Destination: home health  Services at Discharge: [address] Therapy, Occupational Therapy, Speech Therapy and Nursing 3x week  Equipment at Discharge: wheelchair, TTB, reacher  Community Resources:   Factors facilitating achievement of predicted outcomes: Friend support, Motivated, Cooperative, Pleasant and Sense of humor  Barriers to the achievement of predicted outcomes: Pain, Impulsivity, Limited safety awareness, Depression, Limited insight into deficits, Unrealistic expectations, Decreased endurance, Decreased proprioception, Upper extremity weakness, Lower extremity weakness and Incontinence of bladder    Special Needs in the Upcoming Week:   [x] Family/Caregiver Education  [] Home visit  []Therapeutic Pass [] Equipment  Type:   [] Consults:_______   [] Family/Caregiver Training    [] Stroke Risk Factor Education     [] Other;_______     TEAM SUMMARY: Will continue with current poc & goals until anticipated d/c date of 5/27/2021, but hopeful for continued progress and another extension.     MD:   Stroke Risk Factors:   [] N/A for this patient [] HTN  []  Diabetes  [] Hyperlipidemia  []Obesity BMI >25  [] Atrial Fibrillation [] Smoker (current)  [] Smoker (quit in last 12 months)  [x] Sleep Apnea [] Other    Justification for Continued Stay:   Criteria for continued IRF stay:  Based on my medical assessment of the patient and review of information from the interdisciplinary team, as part of this weekly team conference, the patient continues to meet the following criteria for IRF level of care:  [x] The patient requires 24-hour rehabilitation nursing care   [x] The patient requires an intensive rehabilitation therapy program  [x] The patient requires active and ongoing intervention of multiple therapy disciplines  [x] The patient requires continued physician supervision by a rehabilitation physician  [x] The patient requires an intensive and coordinated interdisciplinary team approach to the delivery of rehabilitative care    Medical Necessity-continued close physician medical management is required for:   [] Cardiac/Circulatory dysfunction  [] Respiratory/Pulmonary dysfunction  [] Integumentary complications  [] Peripheral Vascular dysfunction  [] Musculoskeletal dysfunction  [x] Neurological dysfunction d/t:  [x] CVA  [] SCI  [] TBI  [] Other: __________  [] Renal dysfunction  [] Hematologic dysfunction    [] Endocrine disorders  [] GI disorders     [] Genito-Urinary dysfunction    Assessment/Plan:  [x] The patient is making good progression towards their LTG's, is actively participating in, and has a reasonable expectation to continue to benefit from the intensive rehabilitation program.  [] The estimated discharge date has been changed from initial team conference due to:   [] The estimated discharge destination has been changed from initial team conference due to:     Rehab Team Members in attendance for Team Conference:  :  ROSETTA Ritchie    Therapy Manager:  Chago Dotson, PT, DPT    Nursing Director:  Diane Monae, MSN, RN, ACNS-BC    Social Work:  Ana Schmitz, MSW, LISW-S    Nursing: Claudia Lawson, KIZZY Saravia, KIZZY Park, KIZZY Benson, RN    Therapy:  Chino Temple, PT  Oz Richardson, PT, DPT  Daniela Ward, PT, DPT    Bruce Mustafa, OTR/L  Augusto Mccoy, OTR/L  Britney, OTR/L    Guille Hall MA/CCC-SLP    Nutrition:  Basim Ayon, RD LD    I approve the established interdisciplinary plan of care as documented within the medical record of H[facility]ctor Cruz.     MD Javi Murillo D.O. M.P.H  PM&R  5/20/2021  11:02 AM

## 2021-05-20 NOTE — PROGRESS NOTES
Occupational Therapy  Facility/Department: [facility] ACUTE REHAB UNIT  Daily Treatment Note  NAME: Jorge Coleman  : 1970  MRN: [id number]    Date of Service: 2021    Discharge Recommendations:  24 hour supervision or assist, Home with Home health OT, Continue to assess pending progress, Home with nursing aide  OT Equipment Recommendations  Equipment Needed: Yes  ADL Assistive Devices: Transfer Tub Bench;Grab Bars - toilet; Toileting - Raised Toilet Seat with arms  Other: continue to assess    Assessment   Performance deficits / Impairments: Decreased functional mobility ; Decreased ADL status; Decreased ROM; Decreased strength;Decreased sensation;Decreased fine motor control;Decreased endurance;Decreased posture;Decreased balance;Decreased safe awareness;Decreased coordination;Decreased vision/visual deficit; Decreased cognition  Assessment: Pt improved with distance and advancement of LLE during ambulation using litegait with min A for WSing and ankle stability with use of L AFO. Pt highly motivated to return to PLOF but continues to be limited by L sided hypertonicity in LUE. Pt functioning significantly below baseline, cont per OT POC. Treatment Diagnosis: decreased independence with ADLs and decreased functional mobility 2/2 CVA  Prognosis: Fair  OT Education: Plan of Care;Transfer Training;Precautions; ADL Adaptive Strategies  Patient Education: cont to reinforce  REQUIRES OT FOLLOW UP: Yes  Activity Tolerance  Activity Tolerance: Patient Tolerated treatment well  Safety Devices  Safety Devices in place: Yes  Type of devices: Chair alarm in place; Left in chair; All fall risk precautions in place;Call light within reach         Patient Diagnosis(es): There were no encounter diagnoses. has a past medical history of GERD (gastroesophageal reflux disease), Hernia, Obesity, and Unspecified sleep apnea. has a past surgical history that includes Splenectomy (); Total hip arthroplasty ();  Lap Band (); hernia repair ();  section (); and craniotomy (Right, 2021). Restrictions  Position Activity Restriction  Other position/activity restrictions: Ambulate, Up with assist, Pt to wear helmet when OOB, OK to remove for shower, HOB to elevated at 30*  Subjective   General  Chart Reviewed: Yes  Patient assessed for rehabilitation services?: Yes  Additional Pertinent Hx: 46 y.o. female with hypertension and tobacco abuse who presented after spending a prolonged period on the ground s/p fall out of bed; found to have acute left hemiparesis and gaze deviation. CXR with no acute cardiopulmonary abnormality. CTH revealed large acute/subacute infarct in the R frontal lobe with associated mass effect and 4 mm R to L midline shift. It also noted subdural hemorrhage of 6mm along falx, although NSGY less convinced. CTA head and neck revealed occluded R cervical ICA & R anterior cerebral artery with near complete occlusion of R MCA. Outside window on TPA. Pt is now POD #1 following hemicraniectomy (). Pt admitted to ARU   Response to previous treatment: Patient with no complaints from previous session  Family / Caregiver Present: No  Referring Practitioner: Dr. Walter Whitney DO  Diagnosis: CVA  Subjective  Subjective: Pt sitting in w/c upon arrival, pleasant and agreeable to OT/PT cotx session to maximize independence with functional mobility with collaboration of 2 disciplines. General Comment  Comments: Sheridan Trimble Vital Signs  Patient Currently in Pain: Yes (L hand)     Orientation  Orientation  Overall Orientation Status: Within Functional Limits  Objective    ADL  Grooming: Supervision (hand hygiene seated in w/c)  LE Dressing: Minimal assistance;Verbal cueing; Increased time to complete;Setup (Pt doffed R sock i'ly using reacher, pt doffed L sock with spvn and + time req VCs to utilize RLE to assist with positioning of LLE, pt donned R shoe with setup and total A to don L shoe despite attempt to use [facility])  Toileting: Moderate assistance (pt continent of bladder, pt assisted with pants mgmt off hips on R side, assist to manage off L side and over hips in stance with min A for standing balance using handle of RTS, pt completed pericare hygiene seated)        Balance  Sitting Balance: Supervision (seated on toilet)  Standing Balance: Minimal assistance  Standing Balance  Time: ~45 minutes total  Activity: functional transfers; functional mobility via bodyweight supported litegait system, functional mobility using hallway handrail; attempted ambulation via Eddye Rist; stance for toileting  Comment: Pt completed functional mobility ~8 feet with unilateral UE support at handrail on R req Min-Mod A for weight shifting and stabilization of L knee and ankle and CGA-min A of another to maintain standing balance, pt unable to advance LLE without assist WSing  Functional Mobility  Functional - Mobility Device: Wheelchair  Activity: To/From therapy gym  Assist Level: Stand by assistance  Functional Mobility Comments: Pt completed timed w/c propulsion using RUE and RLE completing ~184 feet in 1:15 seconds, pt with improved ability to maintain linear pathway with slight deviations, however no collisions d/t clear pathway in hallway  Toilet Transfers  Toilet - Technique: Stand pivot  Equipment Used: Raised toilet seat with rails  Toilet Transfer: Moderate assistance  Toilet Transfers Comments: VCs for hand placement, LLE ankle instability during pivot     Transfers  Sit to stand: Minimal assistance; Moderate assistance (from w/c)  Stand to sit: Minimal assistance (to control descent)                 Neuromuscular Education  Neuromuscular education: Yes  NDT Treatment: Gait   Functional Movement Patterns: Pt completed ~30 feet using bodyweight supported litegait with assist of OT to maintain positioning of LUE and to steer device while PT provided Min A for WSing and facilitation of L ankle to maintain neutral positioning; VCs for upright posture and sequencing; further attempts/distance limited as pt had urgent need to urinate     Cognition  Overall Cognitive Status: Exceptions  Arousal/Alertness: Appropriate responses to stimuli  Following Commands:  Follows one step commands consistently  Attention Span: Appears intact  Memory: Appears intact  Safety Judgement: Decreased awareness of need for assistance;Decreased awareness of need for safety  Problem Solving: Assistance required to generate solutions;Assistance required to correct errors made;Assistance required to implement solutions;Decreased awareness of errors  Insights: Decreased awareness of deficits  Initiation: Requires cues for some  Sequencing: Requires cues for some                 Upper Extremity Function  NDT Treatment: Gait                        Plan   Plan  Times per week: 5x a week for 60 mins daily  Times per day: Daily  Current Treatment Recommendations: Strengthening, Endurance Training, Neuromuscular Re-education, Self-Care / ADL, Functional Mobility Training, Safety Education & Training, ROM, Positioning, Wheelchair Mobility Training, Balance Training, Patient/Caregiver Education & Training, Manual Therapy:  MLD, Equipment Evaluation, Education, & procurement, Home Management Training, Cognitive Reorientation  G-Code     OutComes Score                                                  AM-PAC Score             Goals  Short term goals  Time Frame for Short term goals: 2 weeks  Short term goal 1: Pt will complete toilet transfer w/ Mod A-goal met 5/4  Short term goal 2: Pt will complete UE dressing w/ Mod A-goal met 5/12  Short term goal 3: Pt will complete oral care w/ spvn seated at sink w/o VCs for L sided attention-goal met 5/17  Short term goal 4: Pt will complete LE dressing w/ Mod A-ongoing  Long term goals  Time Frame for Long term goals : 4 weeks-all ongoing  Long term goal 1: Pt will complete toilet transfer w/ SBA  Long term goal 2: Pt will complete UE

## 2021-05-20 NOTE — CARE COORDINATION
Referred to patient for d/c planning. Insurance approvied to 5/26. Patient and friend Gato Tovar notified. Will check to see if tube bench and raised toilet seat are covered through patient's insurance. Will continue to follow for d/c needs.   Electronically signed by USMAN Marrero LISW-S on 5/20/2021 at 11:50 AM
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
## 2021-05-20 NOTE — PROGRESS NOTES
RN assessment complete per flow sheet. All data consistent with baseline, NAD. Pt states her source of pain is her head, not necessarily incisional.  Plan for rest periods in bed w/o helmet are planned for today.

## 2021-05-20 NOTE — PROGRESS NOTES
ACUTE REHAB UNIT  SPEECH/LANGUAGE PATHOLOGY      [x] Daily  [x] Weekly Care Conference Note  [] Discharge    Patient:Laura Conley      IXN:[id number]  GEU:[id number]  Rehab Dx/Hx: Acute CVA (cerebrovascular accident) ([facility] [address] [I63.9]    Precautions: [x] Aspiration  [x] Fall risk  [] Sternal  [] Seizure [] Hip  [] Weight Bearing [] Other  ST Dx: [] Aphasia  [] Dysarthria  [] Apraxia   [x] Oropharyngeal dysphagia [x] Cognitive Impairment  [] Other:   Date of Admit: 4/23/2021  Room #: 3101/3101-01  Date: 5/20/2021          Current Diet Order:Dietary Nutrition Supplements: Low Calorie High Protein Supplement  DIET GENERAL;   Recommended Form of Meds: PO  Compensatory Swallowing Strategies: Alternate solids and liquids, Upright as possible for all oral intake, Check for pocketing of food on the Left, Small bites/sips, Lingual sweep     Subjective: Pt admitted 4/12 after fall with left sided paralysis. Pt underwent craniectomy on 4/13 per Dr. Kanika Schumacher. Social/Functional History  Lives With: Spouse;Son( reported that son is diagnosed with Autism.)  Occupation: Full time employment  Type of occupation:  at [employer]"    FEES 4/14: IMPRESSIONS:   Pt presents w/ mild oropharyngeal dysphagia characterized by premature spillage of thin liquids to UES and pyriforms w/ intermittent deep penetration of laryngeal vestibule; adequate airway protection and complete clearance of all residue after swallow initiation. No aspiration w/ any trials. Timely swallow initiation w/ puree and regular solid consistencies; no oropharyngeal residue.    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 Adequate ab/adduction. Complete closure of TVFs upon adduction.      Dentition: Adequate  Vision  Vision: Impaired  Vision Exceptions: Visual field cut  Hearing  Hearing: Within functional limits  Barriers toward progress: None towards stated goals    Date: 5/20/2021       Tx session 1 Tx session 2 Weekly Summary   Total Timed Code Min 30 30    Total Treatment Minutes 30 30    Individual Treatment Minutes 30 30    Group Treatment Minutes 0 0    Co-Treat Minutes 0 0    Brief Exception: N/A N/A    Pain None indicated None indicated    Pain Intervention: [] RN notified  [] Repositioned  [] Intervention offered and patient declined  [x] N/A  [] Other:    [] RN notified  [] Repositioned  [] Intervention offered and patient declined  [x] N/A  [] Other:     Subjective:     Pt in w/c, RN at side - endorsed holding breakfast tray d/t scheduled on board indicating ST with meal. Reviewed with pt and RN that schedule was for previous day. Pt consumed portion of breakfast tray during session. Pleasant and cooperative, endorsed \"I took my Ritalin today. \"   Pt in bed upon entry - \"I tried to sit up as long as I could. \" Pleasant as usual.    Objective / Goals:      Patient will consume regular solids with timely mastication and no pocketing in L buccal cavity across 2 sessions. Goal not targeted directly and did not assess for pocketing after consumption of regular solids. Goal not targeted. GOAL PARTIALLY MET - continue to target   Patient will consume PO without anterior spillage or overt pocketing across 2 sessions. Goal not targeted directly. Anterior loss of eggs noted x 1 d/t impulsivity with overstuffing of oral cavity and attempting to converse while masticating large quantities of PO. Goal not targeted. GOAL PARTIALLY MET - continue to target   Pt will complete divided/alternating attention tasks with 85% accuracy given min cues. Max fading to mod cues for alternating attention with all tasks Goal not directly targeted. GOAL NOT MET - continue to targtet   Pt will accurately complete executive functioning tasks with min cues.  Checkbook register organization: max fading to mod cues for appropriate attention to detail, self-monitoring of errors, and propulsion through task. Categorization grid: min cues    Trail making - connecting letters to generate new words: min cues for use of strategy and attention to detail. Pt demonstrated impulsivity x 2 but indicated awareness and appropriate self-monitoring. GOAL NOT MET - continue to target   Pt will attend to L visual field with min cues. No cues to attend to conversational partner on L side    Min cues to attend to L side for checkbook register task   No cues required for visual scanning for categorization grid    Min cues for trail making task GOAL MET this date - continue to target   Pt will participate in ongoing cognitive linguistic assessment. GOAL MET GOAL MET GOAL MET   Other areas targeted:      Education:   Educated to skills targeted, improvements noted, and ongoing deficits noted. Educated to skills targeted with tx tasks, executive function, and improvements noted. Safety Devices: [x] Call light within reach  [x] Chair alarm activated and connected to nurse call light system  [] Bed alarm activated   [x] Other: PT / OT at side for scheduled tx session [x] Call light within reach  [] Chair alarm activated and connected to nurse call light system  [x] Bed alarm activated   [x] Other: reinforced use of call light     Assessment: Cognitive linguistic impairment. Oral dysphagia. Progressing towards goals. Improved attention and self-monitoring demonstrated in PM session this date. Plan: Continue per POC.        Interventions used this date:  [] Speech/Language Treatment  [] Instruction in HEP  [] Dysphagia Treatment [x] Cognitive Treatment   [] Other:    Discharge recommendations:  [] Home independently  [x] Home with assistance []  24 hour supervision  [] ECF [] Other  Continued Tx Upon Discharge: ? [x] Yes    [] No    [] TBD based on progress while on ARU     [] Vital Stim indicated     [] Other:   Estimated discharge date: 5/27/21      Dalila Liao CCC-SLP; [phone] Speech-Language Pathologist

## 2021-05-20 NOTE — PLAN OF CARE
Problem: Pain:  Goal: Control of acute pain  Outcome: Ongoing  Pt verbalizes use of pain scale, continues to notify nurse of pain level increase early.      Problem: Falls - Risk of:  Goal: Will remain free from falls  Outcome: Ongoing  Pt observes fall precautions, demonstrates use of call light for assistance

## 2021-05-20 NOTE — PROGRESS NOTES
Department of Physical Medicine & Rehabilitation  Progress Note    Patient Identification:  Gisele Prather  [id number]  : 1970  Admit date: 2021    Chief Complaint: CVA    Subjective:   No new complaints overnight. She is doing better with left leg motion. Improving slowly. Extension until 21. ROS: No f/c, n/v, cp     Objective:  Patient Vitals for the past 24 hrs:   BP Temp Temp src Pulse Resp SpO2   21 0821 125/86 98.4 °F (36.9 °C) Oral 85 20 98 %   21 0419      95 %   21 2221 117/72 97.6 °F (36.4 °C) Oral 77 16 95 %     Const: Alert. No distress, pleasant. HEENT: Normocephalic, atraumatic. Normal sclera/conjunctiva. MMM. Incision healing well  CV: Regular rate and rhythm. Resp: No respiratory distress. Lungs CTAB. Abd: Soft, nontender, nondistended, NABS+   Ext: No edema. Neuro: Alert, oriented, appropriately interactive. Psych: Cooperative, appropriate mood and affect    Laboratory data: Available via EMR. Last 24 hour lab  No results found for this or any previous visit (from the past 24 hour(s)). Therapy progress:  PT  Position Activity Restriction  Other position/activity restrictions: Ambulate, Up with assist, Pt to wear helmet when OOB, OK to remove for shower, HOB to elevated at 30*  Objective     Sit to Stand: Contact guard assistance, Minimal Assistance (STS from bed and mat table . CGA /Min A for ant WS and stabilizing LLE to maintain a neutral position)  Stand to sit: Minimal Assistance, Contact guard assistance (VC for hand placement and to guide hips when descending into chair or on mat)  Bed to Chair: Minimal assistance, Moderate assistance (Pt tried to do a SPT in which LLE hyperext and pt presented with decreased trunk control)  Device:  (hallway rail on R)  Other Apparatus: Wheelchair follow (do to decreased stability)  Assistance:  (MinAx1 for L LE advancment, faciliation to decrease L Le knee hyperextension and to decrease L Le ER.

## 2021-05-20 NOTE — PLAN OF CARE
Problem: Pain:  Goal: Control of acute pain  Description: Control of acute pain  5/20/2021 1908 by Kylie Ferguson RN  Outcome: Ongoing  Note: Pt voices pain needs appropriately, pain is assessed during shift. Problem: Falls - Risk of:  Goal: Will remain free from falls  Description: Will remain free from falls  5/20/2021 1908 by Kylie Ferguson RN  Outcome: Ongoing  Note: Client remains free from falls, bed/chair alarm in place, door open, encouraged to use call light for needs, call light is within reach, bed locked in lowest position,  Will continue to monitor. Problem: Skin Integrity:  Goal: Will show no infection signs and symptoms  Description: Will show no infection signs and symptoms  5/20/2021 1908 by Kylie Ferguson RN  Outcome: Ongoing  Note: Surgical site observed for signs/symptoms of infection. Vitals performed routinely, labs observed.  Will monitor pt while on ARU

## 2021-05-21 LAB
ANION GAP SERPL CALCULATED.3IONS-SCNC: 10 MMOL/L (ref 3–16)
BASOPHILS ABSOLUTE: 0 K/UL (ref 0–0.2)
BASOPHILS RELATIVE PERCENT: 0 %
BUN BLDV-MCNC: 11 MG/DL (ref 7–20)
CALCIUM SERPL-MCNC: 9.6 MG/DL (ref 8.3–10.6)
CHLORIDE BLD-SCNC: 105 MMOL/L (ref 99–110)
CO2: 23 MMOL/L (ref 21–32)
CREAT SERPL-MCNC: <0.5 MG/DL (ref 0.6–1.1)
EOSINOPHILS ABSOLUTE: 0.3 K/UL (ref 0–0.6)
EOSINOPHILS RELATIVE PERCENT: 2 %
GFR AFRICAN AMERICAN: >60
GFR NON-AFRICAN AMERICAN: >60
GLUCOSE BLD-MCNC: 125 MG/DL (ref 70–99)
HCT VFR BLD CALC: 38.5 % (ref 36–48)
HEMOGLOBIN: 13 G/DL (ref 12–16)
LYMPHOCYTES ABSOLUTE: 1.9 K/UL (ref 1–5.1)
LYMPHOCYTES RELATIVE PERCENT: 15 %
MCH RBC QN AUTO: 34.8 PG (ref 26–34)
MCHC RBC AUTO-ENTMCNC: 33.7 G/DL (ref 31–36)
MCV RBC AUTO: 103.2 FL (ref 80–100)
MONOCYTES ABSOLUTE: 0.4 K/UL (ref 0–1.3)
MONOCYTES RELATIVE PERCENT: 3 %
NEUTROPHILS ABSOLUTE: 10.2 K/UL (ref 1.7–7.7)
NEUTROPHILS RELATIVE PERCENT: 80 %
PDW BLD-RTO: 13.7 % (ref 12.4–15.4)
PLATELET # BLD: 383 K/UL (ref 135–450)
PMV BLD AUTO: 8.3 FL (ref 5–10.5)
POTASSIUM REFLEX MAGNESIUM: 4.1 MMOL/L (ref 3.5–5.1)
RBC # BLD: 3.73 M/UL (ref 4–5.2)
SODIUM BLD-SCNC: 138 MMOL/L (ref 136–145)
WBC # BLD: 12.8 K/UL (ref 4–11)

## 2021-05-21 PROCEDURE — 97542 WHEELCHAIR MNGMENT TRAINING: CPT | Performed by: PHYSICAL THERAPIST

## 2021-05-21 PROCEDURE — 85025 COMPLETE CBC W/AUTO DIFF WBC: CPT

## 2021-05-21 PROCEDURE — 97535 SELF CARE MNGMENT TRAINING: CPT

## 2021-05-21 PROCEDURE — 1280000000 HC REHAB R&B

## 2021-05-21 PROCEDURE — 97110 THERAPEUTIC EXERCISES: CPT

## 2021-05-21 PROCEDURE — 92526 ORAL FUNCTION THERAPY: CPT

## 2021-05-21 PROCEDURE — 97530 THERAPEUTIC ACTIVITIES: CPT | Performed by: PHYSICAL THERAPIST

## 2021-05-21 PROCEDURE — 94660 CPAP INITIATION&MGMT: CPT

## 2021-05-21 PROCEDURE — 97129 THER IVNTJ 1ST 15 MIN: CPT

## 2021-05-21 PROCEDURE — 36415 COLL VENOUS BLD VENIPUNCTURE: CPT

## 2021-05-21 PROCEDURE — 80048 BASIC METABOLIC PNL TOTAL CA: CPT

## 2021-05-21 PROCEDURE — 97110 THERAPEUTIC EXERCISES: CPT | Performed by: PHYSICAL THERAPIST

## 2021-05-21 PROCEDURE — 6360000002 HC RX W HCPCS: Performed by: PHYSICAL MEDICINE & REHABILITATION

## 2021-05-21 PROCEDURE — 97130 THER IVNTJ EA ADDL 15 MIN: CPT

## 2021-05-21 PROCEDURE — 6370000000 HC RX 637 (ALT 250 FOR IP): Performed by: PHYSICAL MEDICINE & REHABILITATION

## 2021-05-21 RX ADMIN — ACETAMINOPHEN 1000 MG: 500 TABLET, COATED ORAL at 05:15

## 2021-05-21 RX ADMIN — DOCUSATE SODIUM 50 MG AND SENNOSIDES 8.6 MG 2 TABLET: 8.6; 5 TABLET, FILM COATED ORAL at 09:49

## 2021-05-21 RX ADMIN — FAMOTIDINE 20 MG: 20 TABLET, FILM COATED ORAL at 22:28

## 2021-05-21 RX ADMIN — FAMOTIDINE 20 MG: 20 TABLET, FILM COATED ORAL at 09:49

## 2021-05-21 RX ADMIN — ATORVASTATIN CALCIUM 80 MG: 80 TABLET, FILM COATED ORAL at 22:28

## 2021-05-21 RX ADMIN — ACETAMINOPHEN 1000 MG: 500 TABLET, COATED ORAL at 22:11

## 2021-05-21 RX ADMIN — ACETAMINOPHEN 1000 MG: 500 TABLET, COATED ORAL at 09:49

## 2021-05-21 RX ADMIN — METHYLPHENIDATE HYDROCHLORIDE 5 MG: 5 TABLET ORAL at 09:49

## 2021-05-21 RX ADMIN — OXYCODONE 10 MG: 5 TABLET ORAL at 22:11

## 2021-05-21 RX ADMIN — LEVETIRACETAM 500 MG: 500 TABLET ORAL at 09:49

## 2021-05-21 RX ADMIN — THIAMINE HCL TAB 100 MG 100 MG: 100 TAB at 09:49

## 2021-05-21 RX ADMIN — ASPIRIN 81 MG: 81 TABLET, CHEWABLE ORAL at 09:49

## 2021-05-21 RX ADMIN — AMLODIPINE BESYLATE 5 MG: 5 TABLET ORAL at 09:49

## 2021-05-21 RX ADMIN — THERA TABS 1 TABLET: TAB at 09:49

## 2021-05-21 RX ADMIN — HEPARIN SODIUM 5000 UNITS: 5000 INJECTION INTRAVENOUS; SUBCUTANEOUS at 05:15

## 2021-05-21 RX ADMIN — FLUOXETINE 20 MG: 20 CAPSULE ORAL at 09:49

## 2021-05-21 RX ADMIN — LEVETIRACETAM 500 MG: 500 TABLET ORAL at 22:28

## 2021-05-21 RX ADMIN — HEPARIN SODIUM 5000 UNITS: 5000 INJECTION INTRAVENOUS; SUBCUTANEOUS at 14:13

## 2021-05-21 RX ADMIN — HEPARIN SODIUM 5000 UNITS: 5000 INJECTION INTRAVENOUS; SUBCUTANEOUS at 22:06

## 2021-05-21 ASSESSMENT — PAIN DESCRIPTION - PAIN TYPE
TYPE: ACUTE PAIN

## 2021-05-21 ASSESSMENT — PAIN DESCRIPTION - ORIENTATION
ORIENTATION: LEFT

## 2021-05-21 ASSESSMENT — PAIN DESCRIPTION - LOCATION
LOCATION: HEAD;HAND
LOCATION: HIP
LOCATION: FACE
LOCATION: HIP

## 2021-05-21 ASSESSMENT — PAIN DESCRIPTION - FREQUENCY
FREQUENCY: INTERMITTENT

## 2021-05-21 ASSESSMENT — PAIN SCALES - GENERAL
PAINLEVEL_OUTOF10: 3
PAINLEVEL_OUTOF10: 0
PAINLEVEL_OUTOF10: 0
PAINLEVEL_OUTOF10: 2
PAINLEVEL_OUTOF10: 5
PAINLEVEL_OUTOF10: 0
PAINLEVEL_OUTOF10: 2
PAINLEVEL_OUTOF10: 2
PAINLEVEL_OUTOF10: 0
PAINLEVEL_OUTOF10: 7
PAINLEVEL_OUTOF10: 0

## 2021-05-21 ASSESSMENT — PAIN DESCRIPTION - DESCRIPTORS
DESCRIPTORS: ACHING

## 2021-05-21 ASSESSMENT — PAIN DESCRIPTION - PROGRESSION
CLINICAL_PROGRESSION: NOT CHANGED

## 2021-05-21 ASSESSMENT — PAIN DESCRIPTION - ONSET
ONSET: ON-GOING
ONSET: ON-GOING

## 2021-05-21 NOTE — PROGRESS NOTES
(gastroesophageal reflux disease), Hernia, Obesity, and Unspecified sleep apnea. has a past surgical history that includes Splenectomy (); Total hip arthroplasty (); Lap Band (); hernia repair ();  section (); and craniotomy (Right, 2021). Restrictions  Position Activity Restriction  Other position/activity restrictions: Ambulate, Up with assist, Pt to wear helmet when OOB, OK to remove for shower, HOB to elevated at 30*  Subjective   General  Chart Reviewed: Yes  Patient assessed for rehabilitation services?: Yes  Additional Pertinent Hx: 46 y.o. female with hypertension and tobacco abuse who presented after spending a prolonged period on the ground s/p fall out of bed; found to have acute left hemiparesis and gaze deviation. CXR with no acute cardiopulmonary abnormality. CTH revealed large acute/subacute infarct in the R frontal lobe with associated mass effect and 4 mm R to L midline shift. It also noted subdural hemorrhage of 6mm along falx, although NSGY less convinced. CTA head and neck revealed occluded R cervical ICA & R anterior cerebral artery with near complete occlusion of R MCA. Outside window on TPA. Pt is now POD #1 following hemicraniectomy (). Pt admitted to ARU   Response to previous treatment: Patient with no complaints from previous session  Family / Caregiver Present: No  Referring Practitioner: Dr. Alessandra Hopkins DO  Diagnosis: CVA  Subjective  Subjective: Pt sitting in w/c upon arrival, pleasant and agreeable to OT session excited to shower. General Comment  Comments: Gayle Yang Vital Signs  Patient Currently in Pain: Yes (2/10 head pain)     Orientation  Orientation  Overall Orientation Status: Within Functional Limits  Objective    ADL  Grooming: Minimal assistance;Setup (application of deodorant, assist to position LUE)  UE Bathing: Setup;Verbal cueing; Increased time to complete;Minimal assistance (seated on TTB, VCs for adaptive technique to wash RUE, assist to position LUE)  LE Bathing: Moderate assistance;Setup;Verbal cueing (seated on TTB; use of long handled sponge to wash lower limbs and feet, assist to wash/dry buttocks in stance with CGA to maintain stance with unilateral UE support at )  UE Dressing: Setup;Verbal cueing; Increased time to complete (pt doffed shirt i'ly and doffed sports bra with min A to unthread RUE, pt donned shirt with min A to thread LUE, pt with difficulty pulling to elbow d/t impulsivity VCs to pull down on L side and in back)  LE Dressing: Maximum assistance;Setup;Verbal cueing; Increased time to complete  Toileting: Dependent/Total (pt incontinent of bladder upon stance after shower req total A for pericare hygiene in stance, total A to doff soiled brief and don new brief)  Additional Comments: Pt doffed R shoe i'ly and total A to doff L shoe, Pt req total A to don L shoe and setup to don R shoe        Balance  Sitting Balance: Supervision (seated on TTB)  Standing Balance: Contact guard assistance (with unilateral UE support at )  Standing Balance  Time: ~3 minutes total  Activity: functional transfers; in stance for LB ADLs  Shower Transfers  Shower - Transfer From: Wheelchair  Shower - Transfer Type: To and From  Shower - Transfer To: Transfer tub bench  Shower - Technique: Stand pivot  Shower Transfers: Minimal assistance (use of GB, VCs to sequence)     Transfers  Sit to stand: Minimal assistance (from TTB with use of GB)  Stand to sit: Minimal assistance (to control descent)                       Cognition  Overall Cognitive Status: Exceptions  Arousal/Alertness: Appropriate responses to stimuli  Following Commands:  Follows one step commands consistently  Attention Span: Appears intact  Memory: Appears intact  Safety Judgement: Decreased awareness of need for assistance;Decreased awareness of need for safety  Problem Solving: Assistance required to generate solutions;Assistance required to correct errors made;Assistance required to implement solutions;Decreased awareness of errors  Insights: Decreased awareness of deficits  Initiation: Requires cues for some  Sequencing: Requires cues for some         Second Session: Pt supine in bed upon arrival pleasantly requesting to complete therapy session from bed as pt reporting discomfort in buttocks from prolonged sitting in w/c. Pt participated well and tolerated AAROM and PROM despite discomfort from hypertonicity in L shoulder, pecs and hand. LUE placed into air cast for ~20 minutes in order to complete the following to increase LUE motor function, ROM and decrease risk of contractures: x 10 PROM sh flexion with pt progressing to ~160 degrees and tolerating for 5 second holds with cues for PLB, x 10 PROM sh abduction, PROM x 10 elbow extension (without air cast), PROM x 10 wrist/forearm supination, PROM x 10 digit extension to all digits. Pt demonstrated trace activation in shoulder to perform sh eccentric flexion x 5 with facilitation. Pt instructed on attempting to complete sh abduction when supine in bed over weekend in gravity eliminated plane, pt verb understanding. \"I always try and think really hard to move my arm but I can't even tell if it is working like my leg it is more obvious. \" Pt req min A to scoot superiorly in bed via bridging with assist to position and facilitate LLE knee flexion with use of bed rail with RUE and HOB flat. Pt left in bed at end of session with bed alarm engaged, call light within reach and all needs met.        Plan   Plan  Times per week: 5x a week for 60 mins daily  Times per day: Daily  Current Treatment Recommendations: Strengthening, Endurance Training, Neuromuscular Re-education, Self-Care / ADL, Functional Mobility Training, Safety Education & Training, ROM, Positioning, Wheelchair Mobility Training, Balance Training, Patient/Caregiver Education & Training, Manual Therapy:  MLD, Equipment Evaluation, Education, & procurement, Home Management Training, Cognitive Reorientation    Goals  Short term goals  Time Frame for Short term goals: 2 weeks  Short term goal 1: Pt will complete toilet transfer w/ Mod A-goal met 5/4  Short term goal 2: Pt will complete UE dressing w/ Mod A-goal met 5/12  Short term goal 3: Pt will complete oral care w/ spvn seated at sink w/o VCs for L sided attention-goal met 5/17  Short term goal 4: Pt will complete LE dressing w/ Mod A-ongoing  Long term goals  Time Frame for Long term goals : 4 weeks-all ongoing  Long term goal 1: Pt will complete toilet transfer w/ SBA  Long term goal 2: Pt will complete UE dressing w/ setup  Long term goal 3: Pt will complete LE dressing w/ SBA  Long term goal 4: Pt will be independent w/ tone management exercises to improve functional use of LUE  Long term goal 5: Pt will complete oral care I'ly  Patient Goals   Patient goals : \"get back to my normal self\"       Therapy Time   Individual Second Session Group Co-treatment   Time In 1100  1315       Time Out 1200  1345       Minutes 60  30       Timed Code Treatment Minutes: 60 Minutes+ [facility], OT

## 2021-05-21 NOTE — PROGRESS NOTES
SHIFT: 0700 - 1930  Pt upon assessment was stable. Alert and oriented. Denied having chest pain or SOB. Pt verbalized having pain in her left face and left shoulder this shift. Pt was medicated with PRN scheduled Tylenol. Medication, rest and position was effective for pain control. Pt showered this shift with OT. Scalp incision cleansed with soap and water this shift. Incision remains with scabs. Medications given this shift were reviewed with pt regarding use, dose and side effects. Pt verbalized understanding of education given. Continent and incontinent of bladder this shift with toilieting being offered frequently. Pt had one incontinent bladder episode post shower. Pt participated well with therapies. Pt at end of shift was resting in bed with bed in low position and call light was within reach. Pt denied having any further needs.     Electronically signed by Mitchell Rodriguez RN on 5/21/2021 at 6:32 PM

## 2021-05-21 NOTE — PLAN OF CARE
Problem: Pain:  Goal: Control of acute pain  Description: Control of acute pain  5/21/2021 1949 by Kylie Ferguson RN  Outcome: Ongoing  Note: Pt voices pain needs appropriately, pain is assessed during shift. Problem: Falls - Risk of:  Goal: Will remain free from falls  Description: Will remain free from falls  5/21/2021 1949 by Kylie Ferguson RN  Outcome: Ongoing  Note: Client remains free from falls, bed/chair alarm in place, door open, encouraged to use call light for needs, call light is within reach, bed locked in lowest position,  Will continue to monitor. Problem: Skin Integrity:  Goal: Will show no infection signs and symptoms  Description: Will show no infection signs and symptoms  Outcome: Ongoing  Note: Surgical site observed for signs/symptoms of infection. Vitals performed routinely, labs observed.  Will monitor pt while on ARU

## 2021-05-21 NOTE — PROGRESS NOTES
Dorsiflex Assist (Prefabricated AFO with a w/c follow 2/2 to unsteadiness)  Assistance: Dependent/Total  Distance: 10'  OT  PT Equipment Recommendations  Equipment Needed: Yes (continue to assess)  Other: 20\" hemiheight w/c with drop arm and standard 90 degree leg rest  Toilet - Technique: Stand pivot  Equipment Used: Raised toilet seat with rails  Toilet Transfers Comments: VCs for hand placement, LLE ankle instability during pivot  Assessment        SLP  Diet Solids Recommendation: Dysphagia Soft and Bite-Sized (Dysphagia III) (with regular solids as tolerated. Per pt request, gravy/sauces to keep for moist.)       Body mass index is 36.86 kg/m². Assessment and Plan:  Juni Aranda a 46 y.o. female with PMH GERD p/w L sided weakness and facial droop.  CTA head/neck on 4/12 revealed occluded R cervical ICA, occluded right LEAH, near complete occlusion of right MCA.     Acute ischemic CVA, Subdural heomorrhage s/p right hemicraniectomy and subsequen SAH and IP hemorrhage  No tPA given.  MRI 4/14 showing large subacute infarct throughout R LEAH and MCA with some areas of hemorrhagic conversion.  - Neurosurgery and neurology following  --SBP <160, PRN hydralazine, scheduled Norvasc  -- Keppra 500 mg twice daily   -  SQH  - PT/OT      Aphasia- SLP consulted   Depression-Patient takes fluoxetine 20 mg daily at home  Anxiety -Patient takes Ativan 0.5 mg every 8 hours as needed for anxiety- holding  MISHA-Patient uses CPAP at home   GERD- Patient takes omeprazole 40 mg daily at home- half dose  Obesity    Rehab Progress: Improving  Anticipated Dispo: home  Services/DME: LIANG  ELOS: LIANG Alan MD  PM&R  5/21/2021  8:16 AM

## 2021-05-21 NOTE — CARE COORDINATION
Referred to patient for d/c planning. [facility] and RTS with arms, neither covered by insurance. [facility] notified and to obtain. [facility] also planning to purchase bed for patient. Discussed height of bed with therapy of 26 inches, Cassie aware. No other needs at this time.   Electronically signed by USMAN Shields LISW-S on 5/21/2021 at 12:43 PM

## 2021-05-21 NOTE — PLAN OF CARE
Problem: Pain:  Goal: Control of acute pain  Description: Control of acute pain  Outcome: Ongoing   Pt instructed on pain and comfort management. Pt was medicated with scheduled Tylenol. Medication, rest and position was effective. Problem: Falls - Risk of:  Goal: Will remain free from falls  Description: Will remain free from falls  Outcome: Ongoing   No falls this shift. Bed and chair alarms in use to promote safety. Video monitor in use as well. Ongoing assessments for safety and fall interventions.     Electronically signed by Peter Chavez RN on 5/21/2021 at 1:24 PM

## 2021-05-21 NOTE — PROGRESS NOTES
stated goals    Date: 5/21/2021       Tx session 1 Tx session 2 Tx session 3   Total Timed Code Min 30 10 30   Total Treatment Minutes 30 30 30   Individual Treatment Minutes 30 30 30   Group Treatment Minutes 0 0 0   Co-Treat Minutes 0 0 0   Brief Exception: N/A N/A N/A   Pain None indicated  None indicated  Reported discomfort in her bottom due to positioning; repositioned self after bed depressed and verbal cues given. Pain Intervention: [] RN notified  [] Repositioned  [] Intervention offered and patient declined  [x] N/A  [] Other:    [] RN notified  [] Repositioned  [] Intervention offered and patient declined  [x] N/A  [] Other:  [] RN notified  [x] Repositioned  [] Intervention offered and patient declined  [] N/A  [] Other:    Subjective:     Pt upright in chair and agreeable to therapy. Pt in chair finishing OT session with visitors present. Increased press of speech noted during session; suspect related to visitors presence. Pt upright in bed and agreeable to additional treatment session. Objective / Goals:      Patient will consume regular solids with timely mastication and no pocketing in L buccal cavity across 2 sessions. Goal not targeted this session. Patient tolerated NMES via VitalStim placement 4a (targeting orbicularis oris, buccinator and superior pharyngeal constrictor) @ 2.5 mA on right x 23 minutes, 6.5 mA on left x 5, increased to 7.5 mA on left x 12 minutes, increased to 8.5 mA on left x 5 minutes (total stimulation of 23 minutes). Goal not targeted this session. Patient will consume PO without anterior spillage or overt pocketing across 2 sessions. Goal not targeted this session. Anterior spillage intermittently during speaking tasks. Goal not targeted this session. Pt will complete divided/alternating attention tasks with 85% accuracy given min cues. Pt required only min cues to maintain attention to task; pt with many off task comments but appropriately redirected. Improved attention to task with pt redirecting self back to meal tray appropriately Trailmaking task: 94% accuracy   Pt with task termination / pause x1 with delay in verbal input from SLP as only cue effectively utilized and pt redirected back to task. Pt will accurately complete executive functioning tasks with min cues. 4 step photograph sequencing: Min cues Goal not targeted this session. Figural design grid: 90% accuracy for attention to detail / impulsivity. Visual closure: 83% accuracy for attention to detail / reducing impulsivity. Pt will attend to L visual field with min cues. Improved eye contact to speaker on left with min cues required. x1 cue during manipulation task for item placd on left without apparent awareness. Min verbal cues x2 during session for attention to items on left side of tray. Improved visual attention to speakers on left but pt continues to demo right   Figural design grid: 68% accuracy. Mirror Image design completion: 67% accuracy; impairments in left lower quadrant only. Trailmakin% accuracy; x3 errors for items on far left    Pt will participate in ongoing cognitive linguistic assessment. GOAL MET GOAL MET GOAL MET   Other areas targeted:      Education:   Education ongoing regarding rationale for tasks this date. Education ongoing regarding skills targeted, rationale for NMES, nature of cognitive deficits and brain specificity, improvements in attention despite visitors present. Education ongoing regarding rationale for tasks and performance on given tasks.     Safety Devices: [x] Call light within reach  [x] Chair alarm activated and connected to nurse call light system  [] Bed alarm activated   [] Other:  [x] Call light within reach  [x] Chair alarm activated and connected to nurse call light system  [] Bed alarm activated   [] Other:  [x] Call light within reach  [] Chair alarm activated and connected to nurse call light system  [x] Bed alarm activated
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
## 2021-05-22 LAB
BILIRUBIN URINE: NEGATIVE
BLOOD, URINE: NEGATIVE
CLARITY: CLEAR
COLOR: YELLOW
GLUCOSE URINE: NEGATIVE MG/DL
KETONES, URINE: NEGATIVE MG/DL
LEUKOCYTE ESTERASE, URINE: NEGATIVE
MICROSCOPIC EXAMINATION: NORMAL
NITRITE, URINE: NEGATIVE
PH UA: 6 (ref 5–8)
PROTEIN UA: NEGATIVE MG/DL
SPECIFIC GRAVITY UA: 1.02 (ref 1–1.03)
URINE TYPE: NORMAL
UROBILINOGEN, URINE: 0.2 E.U./DL

## 2021-05-22 PROCEDURE — 94660 CPAP INITIATION&MGMT: CPT

## 2021-05-22 PROCEDURE — 6370000000 HC RX 637 (ALT 250 FOR IP): Performed by: PHYSICAL MEDICINE & REHABILITATION

## 2021-05-22 PROCEDURE — 87086 URINE CULTURE/COLONY COUNT: CPT

## 2021-05-22 PROCEDURE — 6360000002 HC RX W HCPCS: Performed by: PHYSICAL MEDICINE & REHABILITATION

## 2021-05-22 PROCEDURE — 81003 URINALYSIS AUTO W/O SCOPE: CPT

## 2021-05-22 PROCEDURE — 1280000000 HC REHAB R&B

## 2021-05-22 RX ADMIN — FAMOTIDINE 20 MG: 20 TABLET, FILM COATED ORAL at 20:38

## 2021-05-22 RX ADMIN — LEVETIRACETAM 500 MG: 500 TABLET ORAL at 20:37

## 2021-05-22 RX ADMIN — HEPARIN SODIUM 5000 UNITS: 5000 INJECTION INTRAVENOUS; SUBCUTANEOUS at 20:38

## 2021-05-22 RX ADMIN — ACETAMINOPHEN 1000 MG: 500 TABLET, COATED ORAL at 05:44

## 2021-05-22 RX ADMIN — ACETAMINOPHEN 1000 MG: 500 TABLET, COATED ORAL at 09:38

## 2021-05-22 RX ADMIN — AMLODIPINE BESYLATE 5 MG: 5 TABLET ORAL at 09:38

## 2021-05-22 RX ADMIN — DOCUSATE SODIUM 50 MG AND SENNOSIDES 8.6 MG 2 TABLET: 8.6; 5 TABLET, FILM COATED ORAL at 09:38

## 2021-05-22 RX ADMIN — THIAMINE HCL TAB 100 MG 100 MG: 100 TAB at 09:38

## 2021-05-22 RX ADMIN — THERA TABS 1 TABLET: TAB at 09:38

## 2021-05-22 RX ADMIN — HEPARIN SODIUM 5000 UNITS: 5000 INJECTION INTRAVENOUS; SUBCUTANEOUS at 15:03

## 2021-05-22 RX ADMIN — LEVETIRACETAM 500 MG: 500 TABLET ORAL at 09:37

## 2021-05-22 RX ADMIN — ACETAMINOPHEN 1000 MG: 500 TABLET, COATED ORAL at 16:30

## 2021-05-22 RX ADMIN — ASPIRIN 81 MG: 81 TABLET, CHEWABLE ORAL at 09:37

## 2021-05-22 RX ADMIN — FLUOXETINE 20 MG: 20 CAPSULE ORAL at 09:38

## 2021-05-22 RX ADMIN — FAMOTIDINE 20 MG: 20 TABLET, FILM COATED ORAL at 09:38

## 2021-05-22 RX ADMIN — OXYCODONE HYDROCHLORIDE 5 MG: 5 TABLET ORAL at 20:38

## 2021-05-22 RX ADMIN — METHYLPHENIDATE HYDROCHLORIDE 5 MG: 5 TABLET ORAL at 09:37

## 2021-05-22 RX ADMIN — HEPARIN SODIUM 5000 UNITS: 5000 INJECTION INTRAVENOUS; SUBCUTANEOUS at 05:44

## 2021-05-22 RX ADMIN — ATORVASTATIN CALCIUM 80 MG: 80 TABLET, FILM COATED ORAL at 20:37

## 2021-05-22 RX ADMIN — Medication 5 MG: at 09:39

## 2021-05-22 ASSESSMENT — PAIN DESCRIPTION - ONSET
ONSET: PROGRESSIVE
ONSET: ON-GOING

## 2021-05-22 ASSESSMENT — PAIN DESCRIPTION - DESCRIPTORS
DESCRIPTORS: ACHING
DESCRIPTORS: ACHING
DESCRIPTORS: DISCOMFORT

## 2021-05-22 ASSESSMENT — PAIN SCALES - GENERAL
PAINLEVEL_OUTOF10: 0
PAINLEVEL_OUTOF10: 0
PAINLEVEL_OUTOF10: 2
PAINLEVEL_OUTOF10: 2
PAINLEVEL_OUTOF10: 0
PAINLEVEL_OUTOF10: 5

## 2021-05-22 ASSESSMENT — PAIN DESCRIPTION - ORIENTATION
ORIENTATION: MID
ORIENTATION: LEFT

## 2021-05-22 ASSESSMENT — PAIN DESCRIPTION - FREQUENCY
FREQUENCY: INTERMITTENT
FREQUENCY: INTERMITTENT

## 2021-05-22 ASSESSMENT — PAIN DESCRIPTION - PAIN TYPE
TYPE: ACUTE PAIN

## 2021-05-22 ASSESSMENT — PAIN - FUNCTIONAL ASSESSMENT: PAIN_FUNCTIONAL_ASSESSMENT: PREVENTS OR INTERFERES SOME ACTIVE ACTIVITIES AND ADLS

## 2021-05-22 ASSESSMENT — PAIN DESCRIPTION - PROGRESSION
CLINICAL_PROGRESSION: GRADUALLY WORSENING
CLINICAL_PROGRESSION: NOT CHANGED

## 2021-05-22 ASSESSMENT — PAIN DESCRIPTION - LOCATION
LOCATION: GENERALIZED
LOCATION: BACK
LOCATION: ARM

## 2021-05-22 NOTE — PROGRESS NOTES
Pt is awake, A&O,to bathroom to void, small BM then  returned to bed after lunch Denies pain or discomfort at this time. Left wrist/hand brace removed. Assessment completed as documented in flow sheet; Plan of care reviewed with patient who verbalized understanding. Meds reviewed with each interaction of medication administration and patient verbalized good understanding of med. Helmet is worn when patient is out of bed; VSS; Educated patient on fall risk prevention and general safety; Patient ate 100% of breakfast and lunch and tolerated well. Requires set up with meals; Fresh water given, Fall precautions in place, bed alarm is active, wheels are locked;  Call light, over bed table and personal items positioned to the patient's right side and in reach. Patient denies other needs at this time. Will continue to monitor.  Yuliana Cancino MSN, MA, RN

## 2021-05-22 NOTE — PLAN OF CARE
Problem: Pain:  Goal: Control of acute pain  Description: Control of acute pain  Outcome: Met This Shift  Note: Patient denies acute pain      Problem: Falls - Risk of:  Goal: Will remain free from falls  Description: Will remain free from falls  Outcome: Ongoing  Note: Fall precautions in place, chair alarm active, wheels locked, personal items and table in reach, hourly checks, offer trip to bathroom every 2 hours      Problem: Falls - Risk of:  Goal: Absence of physical injury  Description: Absence of physical injury  Outcome: Met This Shift  Note: No evidence of physical injury during this shift     Problem: Skin Integrity:  Goal: Absence of new skin breakdown  Description: Absence of new skin breakdown  Outcome: Met This Shift  Note: No new skin breakdown during this shift, patient reminded to shift positions every hour,      Problem: Nutrition  Goal: Optimal nutrition therapy  Outcome: Ongoing  Note: ATe 100% of breakfast and lunch, tolerating food and fluids well     Will continue all plans of care at this time, until discharge.    Patient is progressing well toward goals    Silvia Hoff MSN, MA, RN

## 2021-05-23 PROCEDURE — 99232 SBSQ HOSP IP/OBS MODERATE 35: CPT | Performed by: INTERNAL MEDICINE

## 2021-05-23 PROCEDURE — 6360000002 HC RX W HCPCS: Performed by: PHYSICAL MEDICINE & REHABILITATION

## 2021-05-23 PROCEDURE — 94660 CPAP INITIATION&MGMT: CPT

## 2021-05-23 PROCEDURE — 6370000000 HC RX 637 (ALT 250 FOR IP): Performed by: PHYSICAL MEDICINE & REHABILITATION

## 2021-05-23 PROCEDURE — 1280000000 HC REHAB R&B

## 2021-05-23 RX ADMIN — DOCUSATE SODIUM 50 MG AND SENNOSIDES 8.6 MG 2 TABLET: 8.6; 5 TABLET, FILM COATED ORAL at 09:53

## 2021-05-23 RX ADMIN — ATORVASTATIN CALCIUM 80 MG: 80 TABLET, FILM COATED ORAL at 21:12

## 2021-05-23 RX ADMIN — FAMOTIDINE 20 MG: 20 TABLET, FILM COATED ORAL at 09:53

## 2021-05-23 RX ADMIN — HEPARIN SODIUM 5000 UNITS: 5000 INJECTION INTRAVENOUS; SUBCUTANEOUS at 21:13

## 2021-05-23 RX ADMIN — ACETAMINOPHEN 1000 MG: 500 TABLET, COATED ORAL at 09:52

## 2021-05-23 RX ADMIN — OXYCODONE HYDROCHLORIDE 5 MG: 5 TABLET ORAL at 06:16

## 2021-05-23 RX ADMIN — LEVETIRACETAM 500 MG: 500 TABLET ORAL at 21:12

## 2021-05-23 RX ADMIN — METHYLPHENIDATE HYDROCHLORIDE 5 MG: 5 TABLET ORAL at 09:52

## 2021-05-23 RX ADMIN — OXYCODONE 10 MG: 5 TABLET ORAL at 18:54

## 2021-05-23 RX ADMIN — HEPARIN SODIUM 5000 UNITS: 5000 INJECTION INTRAVENOUS; SUBCUTANEOUS at 15:17

## 2021-05-23 RX ADMIN — THERA TABS 1 TABLET: TAB at 09:53

## 2021-05-23 RX ADMIN — AMLODIPINE BESYLATE 5 MG: 5 TABLET ORAL at 09:53

## 2021-05-23 RX ADMIN — Medication 5 MG: at 09:53

## 2021-05-23 RX ADMIN — ACETAMINOPHEN 1000 MG: 500 TABLET, COATED ORAL at 06:16

## 2021-05-23 RX ADMIN — FAMOTIDINE 20 MG: 20 TABLET, FILM COATED ORAL at 21:11

## 2021-05-23 RX ADMIN — ACETAMINOPHEN 1000 MG: 500 TABLET, COATED ORAL at 21:11

## 2021-05-23 RX ADMIN — FLUOXETINE 20 MG: 20 CAPSULE ORAL at 09:53

## 2021-05-23 RX ADMIN — ACETAMINOPHEN 1000 MG: 500 TABLET, COATED ORAL at 15:16

## 2021-05-23 RX ADMIN — LEVETIRACETAM 500 MG: 500 TABLET ORAL at 09:52

## 2021-05-23 RX ADMIN — THIAMINE HCL TAB 100 MG 100 MG: 100 TAB at 09:53

## 2021-05-23 RX ADMIN — HEPARIN SODIUM 5000 UNITS: 5000 INJECTION INTRAVENOUS; SUBCUTANEOUS at 06:17

## 2021-05-23 RX ADMIN — ASPIRIN 81 MG: 81 TABLET, CHEWABLE ORAL at 09:53

## 2021-05-23 ASSESSMENT — PAIN DESCRIPTION - ONSET
ONSET: ON-GOING

## 2021-05-23 ASSESSMENT — PAIN SCALES - GENERAL
PAINLEVEL_OUTOF10: 0
PAINLEVEL_OUTOF10: 4
PAINLEVEL_OUTOF10: 0
PAINLEVEL_OUTOF10: 7
PAINLEVEL_OUTOF10: 6

## 2021-05-23 ASSESSMENT — PAIN DESCRIPTION - LOCATION
LOCATION: BACK
LOCATION: HAND;ARM
LOCATION: ARM;HAND

## 2021-05-23 ASSESSMENT — PAIN DESCRIPTION - PAIN TYPE
TYPE: ACUTE PAIN

## 2021-05-23 ASSESSMENT — PAIN - FUNCTIONAL ASSESSMENT
PAIN_FUNCTIONAL_ASSESSMENT: PREVENTS OR INTERFERES SOME ACTIVE ACTIVITIES AND ADLS

## 2021-05-23 ASSESSMENT — PAIN DESCRIPTION - ORIENTATION
ORIENTATION: LEFT
ORIENTATION: LEFT
ORIENTATION: LOWER;MID

## 2021-05-23 ASSESSMENT — PAIN DESCRIPTION - DESCRIPTORS
DESCRIPTORS: DISCOMFORT
DESCRIPTORS: DISCOMFORT;ACHING
DESCRIPTORS: DISCOMFORT;ACHING

## 2021-05-23 ASSESSMENT — PAIN DESCRIPTION - FREQUENCY
FREQUENCY: INTERMITTENT

## 2021-05-23 ASSESSMENT — PAIN DESCRIPTION - PROGRESSION
CLINICAL_PROGRESSION: NOT CHANGED

## 2021-05-23 NOTE — PLAN OF CARE
Problem: Pain:  Goal: Pain level will decrease  Description: Pain level will decrease  Outcome: Ongoing  Note: C/o back pain managed with PRN pain medication. Problem: Falls - Risk of:  Goal: Will remain free from falls  Description: Will remain free from falls  5/23/2021 0116 by Skip Mora RN  Outcome: Ongoing  Note: Pt is a High fall risk. Explained fall risk precautions to pt and rationale behind their use to keep the patient safe. Belongings are in reach. Pt encouraged to notify staff for any and all assistance. Staff present in tx suite throughout entirety of pts treatment to monitor and protect from falls. Assistance provided when ambulating to restroom utilizing Stay With Me. Problem: Skin Integrity:  Goal: Absence of new skin breakdown  Description: Absence of new skin breakdown  5/23/2021 0116 by Skip Mora RN  Outcome: Ongoing  Note: Pt turned and repositioned, continent of bowel and bladder, cont on low air loss mattress.

## 2021-05-23 NOTE — PROGRESS NOTES
Office : [phone]     Fax :[phone]         Renal Progress Note  Subjective:   Admit Date: 4/23/2021     HPI      Interval History:     Good UO   No Diarrhea   Na stable       DIET Dietary Nutrition Supplements: Low Calorie High Protein Supplement  DIET GENERAL;  Medications:   Scheduled Meds:   methylphenidate  5 mg Oral QAM    atorvastatin  80 mg Oral Nightly    acetaminophen  1,000 mg Oral 4 times per day    bisacodyl  5 mg Oral Daily    amLODIPine  5 mg Oral Daily    aspirin  81 mg Oral Daily    famotidine  20 mg Oral BID    FLUoxetine  20 mg Oral Daily    heparin (porcine)  5,000 Units Subcutaneous 3 times per day    levETIRAcetam  500 mg Oral BID    multivitamin  1 tablet Oral Daily    sennosides-docusate sodium  2 tablet Oral BID    thiamine mononitrate  100 mg Oral Daily     Continuous Infusions:    Labs:  CBC:   Recent Labs     05/21/21  0946   WBC 12.8*   HGB 13.0        BMP:    Recent Labs     05/21/21  0946      K 4.1      CO2 23   BUN 11   CREATININE <0.5*   GLUCOSE 125*     Ca/Mg/Phos:   Recent Labs     05/21/21  0946   CALCIUM 9.6     Hepatic: No results for input(s): AST, ALT, ALB, BILITOT, ALKPHOS in the last 72 hours. Troponin: No results for input(s): TROPONINI in the last 72 hours. BNP: No results for input(s): BNP in the last 72 hours. Lipids: No results for input(s): CHOL, TRIG, HDL, LDLCALC, LABVLDL in the last 72 hours. ABGs: No results for input(s): PHART, PO2ART, QVQ7YMT in the last 72 hours. INR: No results for input(s): INR in the last 72 hours.   UA:  Recent Labs     05/22/21  0715   COLORU Yellow

## 2021-05-23 NOTE — PROGRESS NOTES
Pt A & O. VSS. Pt x 1-2 st pivot - transfer to right side. All safety precautions in place. Bed alarm activated. LUE/LLE flaccid. Surgical incision to scalp SHEMAR. C/o mid back pain managed with PRN pain medication. No complaints of nausea voiced. Tolerates diet. Will cont to monitor.

## 2021-05-23 NOTE — PROGRESS NOTES
Shift assessment complete. VSS. A&Ox4. Denies pain at this time. Fall precautions in place. Patient up to chair for breakfast, call light within reach, bedside table within reach. No complaints at this time. Assist x1-2 GB, wheelchair.        Vitals:    05/23/21 0945   BP: 137/86   Pulse: 74   Resp: 16   Temp: 98 °F (36.7 °C)   SpO2: 97%

## 2021-05-24 LAB
ANION GAP SERPL CALCULATED.3IONS-SCNC: 10 MMOL/L (ref 3–16)
BASOPHILS ABSOLUTE: 0.1 K/UL (ref 0–0.2)
BASOPHILS RELATIVE PERCENT: 0.6 %
BUN BLDV-MCNC: 13 MG/DL (ref 7–20)
CALCIUM SERPL-MCNC: 9.6 MG/DL (ref 8.3–10.6)
CHLORIDE BLD-SCNC: 104 MMOL/L (ref 99–110)
CO2: 25 MMOL/L (ref 21–32)
CREAT SERPL-MCNC: 0.5 MG/DL (ref 0.6–1.1)
EOSINOPHILS ABSOLUTE: 0.5 K/UL (ref 0–0.6)
EOSINOPHILS RELATIVE PERCENT: 4.6 %
GFR AFRICAN AMERICAN: >60
GFR NON-AFRICAN AMERICAN: >60
GLUCOSE BLD-MCNC: 106 MG/DL (ref 70–99)
HCT VFR BLD CALC: 38.5 % (ref 36–48)
HEMOGLOBIN: 13 G/DL (ref 12–16)
LYMPHOCYTES ABSOLUTE: 3.9 K/UL (ref 1–5.1)
LYMPHOCYTES RELATIVE PERCENT: 35.4 %
MCH RBC QN AUTO: 34.8 PG (ref 26–34)
MCHC RBC AUTO-ENTMCNC: 33.8 G/DL (ref 31–36)
MCV RBC AUTO: 102.8 FL (ref 80–100)
MONOCYTES ABSOLUTE: 1.3 K/UL (ref 0–1.3)
MONOCYTES RELATIVE PERCENT: 11.4 %
NEUTROPHILS ABSOLUTE: 5.3 K/UL (ref 1.7–7.7)
NEUTROPHILS RELATIVE PERCENT: 48 %
PDW BLD-RTO: 13.7 % (ref 12.4–15.4)
PLATELET # BLD: 386 K/UL (ref 135–450)
PMV BLD AUTO: 8.9 FL (ref 5–10.5)
POTASSIUM REFLEX MAGNESIUM: 4.2 MMOL/L (ref 3.5–5.1)
RBC # BLD: 3.75 M/UL (ref 4–5.2)
SODIUM BLD-SCNC: 139 MMOL/L (ref 136–145)
WBC # BLD: 11.1 K/UL (ref 4–11)

## 2021-05-24 PROCEDURE — 97110 THERAPEUTIC EXERCISES: CPT

## 2021-05-24 PROCEDURE — 6370000000 HC RX 637 (ALT 250 FOR IP): Performed by: PHYSICAL MEDICINE & REHABILITATION

## 2021-05-24 PROCEDURE — 85025 COMPLETE CBC W/AUTO DIFF WBC: CPT

## 2021-05-24 PROCEDURE — 97129 THER IVNTJ 1ST 15 MIN: CPT

## 2021-05-24 PROCEDURE — 36415 COLL VENOUS BLD VENIPUNCTURE: CPT

## 2021-05-24 PROCEDURE — 97530 THERAPEUTIC ACTIVITIES: CPT | Performed by: PHYSICAL THERAPIST

## 2021-05-24 PROCEDURE — 97130 THER IVNTJ EA ADDL 15 MIN: CPT

## 2021-05-24 PROCEDURE — 99232 SBSQ HOSP IP/OBS MODERATE 35: CPT | Performed by: PHYSICAL MEDICINE & REHABILITATION

## 2021-05-24 PROCEDURE — 6360000002 HC RX W HCPCS: Performed by: PHYSICAL MEDICINE & REHABILITATION

## 2021-05-24 PROCEDURE — 97535 SELF CARE MNGMENT TRAINING: CPT

## 2021-05-24 PROCEDURE — 97542 WHEELCHAIR MNGMENT TRAINING: CPT | Performed by: PHYSICAL THERAPIST

## 2021-05-24 PROCEDURE — 97530 THERAPEUTIC ACTIVITIES: CPT

## 2021-05-24 PROCEDURE — 97110 THERAPEUTIC EXERCISES: CPT | Performed by: PHYSICAL THERAPIST

## 2021-05-24 PROCEDURE — 80048 BASIC METABOLIC PNL TOTAL CA: CPT

## 2021-05-24 PROCEDURE — 1280000000 HC REHAB R&B

## 2021-05-24 RX ADMIN — LEVETIRACETAM 500 MG: 500 TABLET ORAL at 09:17

## 2021-05-24 RX ADMIN — AMLODIPINE BESYLATE 5 MG: 5 TABLET ORAL at 09:17

## 2021-05-24 RX ADMIN — METHYLPHENIDATE HYDROCHLORIDE 5 MG: 5 TABLET ORAL at 09:16

## 2021-05-24 RX ADMIN — ASPIRIN 81 MG: 81 TABLET, CHEWABLE ORAL at 09:16

## 2021-05-24 RX ADMIN — HEPARIN SODIUM 5000 UNITS: 5000 INJECTION INTRAVENOUS; SUBCUTANEOUS at 07:16

## 2021-05-24 RX ADMIN — FLUOXETINE 20 MG: 20 CAPSULE ORAL at 09:17

## 2021-05-24 RX ADMIN — THERA TABS 1 TABLET: TAB at 09:17

## 2021-05-24 RX ADMIN — FAMOTIDINE 20 MG: 20 TABLET, FILM COATED ORAL at 09:17

## 2021-05-24 RX ADMIN — OXYCODONE HYDROCHLORIDE 5 MG: 5 TABLET ORAL at 22:06

## 2021-05-24 RX ADMIN — OXYCODONE 10 MG: 5 TABLET ORAL at 17:45

## 2021-05-24 RX ADMIN — ACETAMINOPHEN 1000 MG: 500 TABLET, COATED ORAL at 16:04

## 2021-05-24 RX ADMIN — ACETAMINOPHEN 1000 MG: 500 TABLET, COATED ORAL at 22:06

## 2021-05-24 RX ADMIN — OXYCODONE HYDROCHLORIDE 5 MG: 5 TABLET ORAL at 05:49

## 2021-05-24 RX ADMIN — ACETAMINOPHEN 1000 MG: 500 TABLET, COATED ORAL at 09:16

## 2021-05-24 RX ADMIN — OXYCODONE 10 MG: 5 TABLET ORAL at 00:29

## 2021-05-24 RX ADMIN — HEPARIN SODIUM 5000 UNITS: 5000 INJECTION INTRAVENOUS; SUBCUTANEOUS at 22:06

## 2021-05-24 RX ADMIN — OXYCODONE HYDROCHLORIDE 5 MG: 5 TABLET ORAL at 10:37

## 2021-05-24 RX ADMIN — Medication 5 MG: at 09:17

## 2021-05-24 RX ADMIN — ATORVASTATIN CALCIUM 80 MG: 80 TABLET, FILM COATED ORAL at 22:06

## 2021-05-24 RX ADMIN — FAMOTIDINE 20 MG: 20 TABLET, FILM COATED ORAL at 22:05

## 2021-05-24 RX ADMIN — HEPARIN SODIUM 5000 UNITS: 5000 INJECTION INTRAVENOUS; SUBCUTANEOUS at 14:24

## 2021-05-24 RX ADMIN — THIAMINE HCL TAB 100 MG 100 MG: 100 TAB at 09:17

## 2021-05-24 RX ADMIN — DOCUSATE SODIUM 50 MG AND SENNOSIDES 8.6 MG 2 TABLET: 8.6; 5 TABLET, FILM COATED ORAL at 09:16

## 2021-05-24 RX ADMIN — LEVETIRACETAM 500 MG: 500 TABLET ORAL at 22:05

## 2021-05-24 ASSESSMENT — PAIN DESCRIPTION - ONSET
ONSET: ON-GOING

## 2021-05-24 ASSESSMENT — PAIN SCALES - GENERAL
PAINLEVEL_OUTOF10: 3
PAINLEVEL_OUTOF10: 7
PAINLEVEL_OUTOF10: 5
PAINLEVEL_OUTOF10: 3
PAINLEVEL_OUTOF10: 6
PAINLEVEL_OUTOF10: 7
PAINLEVEL_OUTOF10: 0
PAINLEVEL_OUTOF10: 5
PAINLEVEL_OUTOF10: 0
PAINLEVEL_OUTOF10: 6
PAINLEVEL_OUTOF10: 5
PAINLEVEL_OUTOF10: 5
PAINLEVEL_OUTOF10: 3
PAINLEVEL_OUTOF10: 5
PAINLEVEL_OUTOF10: 0

## 2021-05-24 ASSESSMENT — PAIN DESCRIPTION - PROGRESSION
CLINICAL_PROGRESSION: NOT CHANGED

## 2021-05-24 ASSESSMENT — PAIN DESCRIPTION - FREQUENCY
FREQUENCY: INTERMITTENT

## 2021-05-24 ASSESSMENT — PAIN DESCRIPTION - LOCATION
LOCATION: HIP;HAND
LOCATION: HIP
LOCATION: HIP
LOCATION: HIP;HAND
LOCATION: ABDOMEN
LOCATION: HIP
LOCATION: HIP;SHOULDER
LOCATION: HIP;SHOULDER
LOCATION: HIP

## 2021-05-24 ASSESSMENT — PAIN DESCRIPTION - ORIENTATION
ORIENTATION: LEFT
ORIENTATION: RIGHT
ORIENTATION: LEFT

## 2021-05-24 ASSESSMENT — PAIN DESCRIPTION - PAIN TYPE
TYPE: ACUTE PAIN

## 2021-05-24 ASSESSMENT — PAIN DESCRIPTION - DESCRIPTORS
DESCRIPTORS: DULL;ACHING;SHOOTING
DESCRIPTORS: DISCOMFORT
DESCRIPTORS: ACHING
DESCRIPTORS: ACHING;DULL
DESCRIPTORS: DISCOMFORT
DESCRIPTORS: ACHING;DULL;SHARP
DESCRIPTORS: DISCOMFORT
DESCRIPTORS: ACHING;DULL;SHARP
DESCRIPTORS: SHARP

## 2021-05-24 ASSESSMENT — PAIN - FUNCTIONAL ASSESSMENT
PAIN_FUNCTIONAL_ASSESSMENT: PREVENTS OR INTERFERES SOME ACTIVE ACTIVITIES AND ADLS
PAIN_FUNCTIONAL_ASSESSMENT: PREVENTS OR INTERFERES WITH MANY ACTIVE NOT PASSIVE ACTIVITIES
PAIN_FUNCTIONAL_ASSESSMENT: PREVENTS OR INTERFERES SOME ACTIVE ACTIVITIES AND ADLS

## 2021-05-24 NOTE — PROGRESS NOTES
Pt A & O. VSS. Pt x 1-2 st pivot - transfer to right side. All safety precautions in place. Bed alarm activated. LUE/LLE flaccid. Surgical incision to scalp SHEMAR. C/o left hand and hip pain managed with PRN pain medication. No complaints of nausea voiced. Tolerates diet. Will cont to monitor.

## 2021-05-24 NOTE — PROGRESS NOTES
ACUTE REHAB UNIT  SPEECH/LANGUAGE PATHOLOGY      [x] Daily  [] Weekly Care Conference Note  [] Discharge    Patient:Laura Nieves      ZTW:[birth year]  XJS:[id number]  Rehab Dx/Hx: Acute CVA (cerebrovascular accident) ([facility] [address] [I63.9]    Precautions: [x] Aspiration  [x] Fall risk  [] Sternal  [] Seizure [] Hip  [] Weight Bearing [] Other  ST Dx: [] Aphasia  [] Dysarthria  [] Apraxia   [x] Oropharyngeal dysphagia [x] Cognitive Impairment  [] Other:   Date of Admit: 4/23/2021  Room #: 3101/3101-01  Date: 5/24/2021          Current Diet Order:Dietary Nutrition Supplements: Low Calorie High Protein Supplement  DIET GENERAL;   Recommended Form of Meds: PO  Compensatory Swallowing Strategies: Alternate solids and liquids, Upright as possible for all oral intake, Check for pocketing of food on the Left, Small bites/sips, Lingual sweep     Subjective: Pt admitted 4/12 after fall with left sided paralysis. Pt underwent craniectomy on 4/13 per Dr. Aleksandr Burden. Social/Functional History  Lives With: Spouse;Son( reported that son is diagnosed with Autism.)  Occupation: Full time employment  Type of occupation:  at [employer]"    FEES 4/14: IMPRESSIONS:   Pt presents w/ mild oropharyngeal dysphagia characterized by premature spillage of thin liquids to UES and pyriforms w/ intermittent deep penetration of laryngeal vestibule; adequate airway protection and complete clearance of all residue after swallow initiation. No aspiration w/ any trials. Timely swallow initiation w/ puree and regular solid consistencies; no oropharyngeal residue.    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 Adequate ab/adduction. Complete closure of TVFs upon adduction.      Dentition: Adequate  Vision  Vision: Impaired  Vision Exceptions: Visual field cut  Hearing  Hearing: Within functional limits  Barriers toward progress: None towards stated goals    Date: 5/24/2021       Tx session 1 Tx session 2 Tx session 3   Total Timed Code Min 30 23 30   Total Treatment Minutes 30 23 30   Individual Treatment Minutes 30 23 30   Group Treatment Minutes 0 0 0   Co-Treat Minutes 0 0 0   Brief Exception: N/A N/A N/A   Pain Endorsed in LUE when placing splint, resolved with time None indicated None indicated   Pain Intervention: [] RN notified  [] Repositioned  [] Intervention offered and patient declined  [x] N/A  [] Other:    [] RN notified  [] Repositioned  [] Intervention offered and patient declined  [x] N/A  [] Other:  [] RN notified  [] Repositioned  [] Intervention offered and patient declined  [x] N/A  [] Other:    Subjective:     Pt in bed upon entry with finished breakfast tray - all PO consumed with no indication of items on L being neglected. Pt pleasant and cooperative. RN at side to provide medication / take vitals. Pt up in w/c and in good spirits - \"I'm eating cheesecake! \" Agreeable to tx session and cooperative. Pt in w/c upon entry, depressed affect noted throughout session. Pt endorsed \"tired already\" and distracted 2/2 stress r/t d/c planning. Pt also reported frustration r/t ongoing physical deficits and difficulty experienced during PT session. Emotional support and education provided. Objective / Goals:      Patient will consume regular solids with timely mastication and no pocketing in L buccal cavity across 2 sessions. Goal not targeted. Goal not targeted. Goal not targeted. Patient will consume PO without anterior spillage or overt pocketing across 2 sessions. Goal not targeted. Goal not targeted. Goal not targeted. Pt will complete divided/alternating attention tasks with 85% accuracy given min cues. Mod-max cues to divide attention between completing tasks with RN and initiating response for ST tasks.  Alternating attention for information transfer: independent Alternating attention for information transfer: min cues Pt will accurately complete executive functioning tasks with min cues. Sequencing picture scenes: 88% accuracy with min cues improving to 100% accuracy with mod cues x 1 trial, 100% accuracy independently x 1 trial.    Min cues for attention to detail for temporal and inferential reasoning    Identifying pertinent details on Rx labels: 100% accuracy with min cues    Filling pillbox in mock medication management task: 71% accuracy independently (5/7 days correct)     Identifying pertinent details on Rx labels: 100% accuracy with min-mod cues    Filling pillbox in mock medication management task: 14% accuracy independently, improving to 57% accuracy with mod cues. Pt will attend to L visual field with min cues. Min cues to attend to stimuli in L visual field for picture sequencing task. Min cues to attend to L visual field but pt with disorganized scanning for pillbox task. Noted R->L pattern frequently. Max cues for use of L->R scanning pattern. Reduced attention to L side to conversational partner - mod cues. Pt will participate in ongoing cognitive linguistic assessment. GOAL MET GOAL MET GOAL MET   Other areas targeted:      Education:   Educated to skills targeted and executive function Educated to typical scanning pattern for [place] speakers, rationale for practicing L->R scanning, and results of session as well as deficits noted. Educated to skills targeted, decline in performance, impact internal vs external distractions have on attention, rehab s/p CVA, progress made, and prognosis for ongoing improvement.      Safety Devices: [x] Call light within reach  [] Chair alarm activated and connected to nurse call light system  [x] Bed alarm activated   [] Other:  [x] Call light within reach  [x] Chair alarm activated and connected to nurse call light system  [] Bed alarm activated   [] Other:  [x] Call light within reach  [x] Chair alarm activated and connected to nurse call light system  [] Bed alarm

## 2021-05-24 NOTE — FLOWSHEET NOTE
05/24/21 1113   Encounter Summary   Services provided to: Patient   Referral/Consult From: Rounding   Continue Visiting   (es 5/24)   Complexity of Encounter Moderate   Length of Encounter 15 minutes   Routine   Type Follow up   Assessment Approachable   Intervention Active listening   Outcome Engaged in conversation;Receptive

## 2021-05-24 NOTE — PLAN OF CARE
Problem: Pain:  Goal: Control of acute pain  Description: Control of acute pain  Outcome: Ongoing   Pt verbalized having left shoulder, hand and hip pain today. Pt was medicated with PRN Oxy-IR and scheduled Tylenol. Medication rest and positioning was effective. Problem: Falls - Risk of:  Goal: Will remain free from falls  Description: Will remain free from falls  Outcome: Ongoing   Pt uses call light to call for nursing assistance. Pt uses call light and awaits nursing assistance with transfers. Problem: Skin Integrity:  Goal: Will show no infection signs and symptoms  Description: Will show no infection signs and symptoms  Outcome: Ongoing   No new skin issues noted this shift. Scalp incision WA and with scabbing. Helmet on when up.     Electronically signed by Jorge Douglas RN on 5/24/2021 at 7:43 PM

## 2021-05-24 NOTE — PROGRESS NOTES
SHIFT: 0700 - 1930  Pt upon assessment was stable. Alert and oriented. Denied having chest pain or SOB. Pt verbalized having pain in left shoulder, hand and hip. Pt was medicated with PRN scheduled Tylenol and PRN Oxy IR. Medication, rest and position was effective for pain control. Scalp incision remains with scabs. Medications given this shift were reviewed with pt regarding use, dose and side effects. Pt verbalized understanding of education given. Continent and incontinent of bladder this shift with toilieting being offered frequently. Pt had one incontinent bladder episode. Pt participated well with therapies. Pt at end of shift was resting in bed with bed in low position and call light was within reach. Pt denied having any further needs.     Electronically signed by Eula Sandifer, RN on 5/24/2021 at 7:45 PM

## 2021-05-24 NOTE — CARE COORDINATION
Case Management Daily Note                    Date: 5/24/2021     Patient Name: Neno Peterson    Date of Admission: 4/23/2021  6:23 PM  YOB: 1970    Length of Stay: 32            Patient Admission Status: Inpatient  Diagnosis:Acute CVA (cerebrovascular accident) [facility]) [I63.9]     ________________________________________________________________________________________  Discharge Plan: Home with [address] [place] [address] Way: pending   Bathroom DME not covered by insurance     Insurance: Payor: Medardo Frey / Plan: Medardo Frey / Product Type: *No Product type* /   Is pre-cert/notification needed: only for SNF level of care     Tentative discharge date: 5/27 (pending possible extension)     Current barriers: Extension pending     Referrals completed: Not Applicable    Resources/ information provided: Not indicated at this time _________________________________________  Notes/Plan of Care:   ASHWINI participated in the case conference on this date. Discharge is tentative for 5/27, however the team has submitted for an additional extension as patient has continued to make progress. ASHWINI team following for discharge needs. Cherie Mcmullen and/or her family were provided with choice of provider; she and/or her family are in agreement with the discharge plan at this time.     Care Transition Patient: No    Tony Banker Yadira, MSW  [facility] [address] [facility]   Case Management Department  Ph: [phone]

## 2021-05-24 NOTE — PROGRESS NOTES
Department of Physical Medicine & Rehabilitation  Progress Note    Patient Identification:  Yelena Fuentes  [id number]  : 1970  Admit date: 2021    Chief Complaint: CVA    Subjective:   No acute events overnight. She is doing better with LLE motion, all mobility appears to be getting easier and less energy consuming for pt. Extension until 21. Pt feels pretty good otherwise, no new complaints. ROS: No f/c, n/v, cp     Objective:  Patient Vitals for the past 24 hrs:   BP Temp Temp src Pulse Resp SpO2   21 2210     18    21 2110 115/71 98.2 °F (36.8 °C) Oral 82 17 95 %   21 0945 137/86 98 °F (36.7 °C) Oral 74 16 97 %     Const: Alert. No distress, pleasant. HEENT: Normocephalic, atraumatic. Normal sclera/conjunctiva. MMM. Incision healing well  CV: Regular rate and rhythm. Resp: No respiratory distress. Lungs CTAB. Abd: Soft, nontender, nondistended, NABS+   Ext: No edema. Neuro: Alert, oriented, appropriately interactive. Psych: Cooperative, appropriate mood and affect    Laboratory data: Available via EMR.    Last 24 hour lab  Recent Results (from the past 24 hour(s))   CBC auto differential    Collection Time: 21  7:02 AM   Result Value Ref Range    WBC 11.1 (H) 4.0 - 11.0 K/uL    RBC 3.75 (L) 4.00 - 5.20 M/uL    Hemoglobin 13.0 12.0 - 16.0 g/dL    Hematocrit 38.5 36.0 - 48.0 %    .8 (H) 80.0 - 100.0 fL    MCH 34.8 (H) 26.0 - 34.0 pg    MCHC 33.8 31.0 - 36.0 g/dL    RDW 13.7 12.4 - 15.4 %    Platelets 704 113 - 295 K/uL    MPV 8.9 5.0 - 10.5 fL    Neutrophils % 48.0 %    Lymphocytes % 35.4 %    Monocytes % 11.4 %    Eosinophils % 4.6 %    Basophils % 0.6 %    Neutrophils Absolute 5.3 1.7 - 7.7 K/uL    Lymphocytes Absolute 3.9 1.0 - 5.1 K/uL    Monocytes Absolute 1.3 0.0 - 1.3 K/uL    Eosinophils Absolute 0.5 0.0 - 0.6 K/uL    Basophils Absolute 0.1 0.0 - 0.2 K/uL   Basic Metabolic Panel w/ Reflex to MG    Collection Time: 21  7:03 AM   Result Value Ref Range    Sodium 139 136 - 145 mmol/L    Potassium reflex Magnesium 4.2 3.5 - 5.1 mmol/L    Chloride 104 99 - 110 mmol/L    CO2 25 21 - 32 mmol/L    Anion Gap 10 3 - 16    Glucose 106 (H) 70 - 99 mg/dL    BUN 13 7 - 20 mg/dL    CREATININE 0.5 (L) 0.6 - 1.1 mg/dL    GFR Non-African American >60 >60    GFR African American >60 >60    Calcium 9.6 8.3 - 10.6 mg/dL       Therapy progress:  PT  Position Activity Restriction  Other position/activity restrictions: Ambulate, Up with assist, Pt to wear helmet when OOB, OK to remove for shower, HOB to elevated at 30*  Objective     Sit to Stand: Contact guard assistance, Minimal Assistance (STS from bed and mat table . CGA /Min A for ant WS and stabilizing LLE to maintain a neutral position)  Stand to sit: Minimal Assistance, Contact guard assistance (VC for hand placement and to guide hips when descending into chair or on mat)  Bed to Chair: Minimal assistance, Moderate assistance (Pt tried to do a SPT in which LLE hyperext and pt presented with decreased trunk control)  Device: Ro rail  Other Apparatus: Dorsiflex Assist (Prefabricated AFO with a w/c follow 2/2 to unsteadiness)  Assistance: Dependent/Total  Distance: 10'  OT  PT Equipment Recommendations  Equipment Needed: Yes (continue to assess)  Other: 20\" hemiheight w/c with drop arm and standard 90 degree leg rest  Toilet - Technique: Stand pivot  Equipment Used: Raised toilet seat with rails  Toilet Transfers Comments: VCs for hand placement, LLE ankle instability during pivot  Assessment        SLP  Diet Solids Recommendation: Dysphagia Soft and Bite-Sized (Dysphagia III) (with regular solids as tolerated. Per pt request, gravy/sauces to keep for moist.)       Body mass index is 36.86 kg/m².     Assessment and Plan:  Stephen Hinds a 46 y.o. female with PMH GERD p/w L sided weakness and facial droop.  CTA head/neck on 4/12 revealed occluded R cervical ICA, occluded right LEAH, near complete occlusion of right
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
## 2021-05-25 LAB — URINE CULTURE, ROUTINE: NORMAL

## 2021-05-25 PROCEDURE — 92526 ORAL FUNCTION THERAPY: CPT

## 2021-05-25 PROCEDURE — 97530 THERAPEUTIC ACTIVITIES: CPT

## 2021-05-25 PROCEDURE — 6370000000 HC RX 637 (ALT 250 FOR IP): Performed by: PHYSICAL MEDICINE & REHABILITATION

## 2021-05-25 PROCEDURE — 97110 THERAPEUTIC EXERCISES: CPT

## 2021-05-25 PROCEDURE — 6360000002 HC RX W HCPCS: Performed by: PHYSICAL MEDICINE & REHABILITATION

## 2021-05-25 PROCEDURE — 97530 THERAPEUTIC ACTIVITIES: CPT | Performed by: PHYSICAL THERAPIST

## 2021-05-25 PROCEDURE — 97542 WHEELCHAIR MNGMENT TRAINING: CPT | Performed by: PHYSICAL THERAPIST

## 2021-05-25 PROCEDURE — 97535 SELF CARE MNGMENT TRAINING: CPT

## 2021-05-25 PROCEDURE — 97129 THER IVNTJ 1ST 15 MIN: CPT

## 2021-05-25 PROCEDURE — 1280000000 HC REHAB R&B

## 2021-05-25 PROCEDURE — 97130 THER IVNTJ EA ADDL 15 MIN: CPT

## 2021-05-25 PROCEDURE — 99232 SBSQ HOSP IP/OBS MODERATE 35: CPT | Performed by: PHYSICAL MEDICINE & REHABILITATION

## 2021-05-25 RX ADMIN — FAMOTIDINE 20 MG: 20 TABLET, FILM COATED ORAL at 20:39

## 2021-05-25 RX ADMIN — ACETAMINOPHEN 1000 MG: 500 TABLET, COATED ORAL at 09:27

## 2021-05-25 RX ADMIN — ASPIRIN 81 MG: 81 TABLET, CHEWABLE ORAL at 09:27

## 2021-05-25 RX ADMIN — FAMOTIDINE 20 MG: 20 TABLET, FILM COATED ORAL at 09:27

## 2021-05-25 RX ADMIN — ATORVASTATIN CALCIUM 80 MG: 80 TABLET, FILM COATED ORAL at 20:39

## 2021-05-25 RX ADMIN — HEPARIN SODIUM 5000 UNITS: 5000 INJECTION INTRAVENOUS; SUBCUTANEOUS at 22:05

## 2021-05-25 RX ADMIN — LEVETIRACETAM 500 MG: 500 TABLET ORAL at 09:27

## 2021-05-25 RX ADMIN — AMLODIPINE BESYLATE 5 MG: 5 TABLET ORAL at 09:27

## 2021-05-25 RX ADMIN — METHYLPHENIDATE HYDROCHLORIDE 5 MG: 5 TABLET ORAL at 09:27

## 2021-05-25 RX ADMIN — FLUOXETINE 20 MG: 20 CAPSULE ORAL at 09:27

## 2021-05-25 RX ADMIN — HEPARIN SODIUM 5000 UNITS: 5000 INJECTION INTRAVENOUS; SUBCUTANEOUS at 14:39

## 2021-05-25 RX ADMIN — LEVETIRACETAM 500 MG: 500 TABLET ORAL at 20:39

## 2021-05-25 RX ADMIN — ACETAMINOPHEN 1000 MG: 500 TABLET, COATED ORAL at 17:03

## 2021-05-25 RX ADMIN — OXYCODONE 10 MG: 5 TABLET ORAL at 10:33

## 2021-05-25 RX ADMIN — HEPARIN SODIUM 5000 UNITS: 5000 INJECTION INTRAVENOUS; SUBCUTANEOUS at 06:34

## 2021-05-25 RX ADMIN — OXYCODONE HYDROCHLORIDE 5 MG: 5 TABLET ORAL at 19:40

## 2021-05-25 RX ADMIN — THIAMINE HCL TAB 100 MG 100 MG: 100 TAB at 09:27

## 2021-05-25 RX ADMIN — ACETAMINOPHEN 1000 MG: 500 TABLET, COATED ORAL at 06:33

## 2021-05-25 RX ADMIN — THERA TABS 1 TABLET: TAB at 09:27

## 2021-05-25 RX ADMIN — OXYCODONE 10 MG: 5 TABLET ORAL at 06:33

## 2021-05-25 ASSESSMENT — PAIN DESCRIPTION - ONSET
ONSET: ON-GOING

## 2021-05-25 ASSESSMENT — PAIN DESCRIPTION - PROGRESSION
CLINICAL_PROGRESSION: NOT CHANGED

## 2021-05-25 ASSESSMENT — PAIN DESCRIPTION - LOCATION
LOCATION: HIP

## 2021-05-25 ASSESSMENT — PAIN DESCRIPTION - ORIENTATION
ORIENTATION: LEFT

## 2021-05-25 ASSESSMENT — PAIN DESCRIPTION - DESCRIPTORS
DESCRIPTORS: ACHING
DESCRIPTORS: ACHING
DESCRIPTORS: DISCOMFORT;ACHING
DESCRIPTORS: ACHING
DESCRIPTORS: ACHING
DESCRIPTORS: DISCOMFORT

## 2021-05-25 ASSESSMENT — PAIN SCALES - GENERAL
PAINLEVEL_OUTOF10: 0
PAINLEVEL_OUTOF10: 0
PAINLEVEL_OUTOF10: 7
PAINLEVEL_OUTOF10: 0
PAINLEVEL_OUTOF10: 0
PAINLEVEL_OUTOF10: 3
PAINLEVEL_OUTOF10: 0
PAINLEVEL_OUTOF10: 6
PAINLEVEL_OUTOF10: 3
PAINLEVEL_OUTOF10: 7

## 2021-05-25 ASSESSMENT — PAIN DESCRIPTION - PAIN TYPE
TYPE: ACUTE PAIN

## 2021-05-25 ASSESSMENT — PAIN SCALES - WONG BAKER
WONGBAKER_NUMERICALRESPONSE: 0
WONGBAKER_NUMERICALRESPONSE: 2

## 2021-05-25 ASSESSMENT — PAIN DESCRIPTION - FREQUENCY
FREQUENCY: INTERMITTENT

## 2021-05-25 ASSESSMENT — PAIN - FUNCTIONAL ASSESSMENT
PAIN_FUNCTIONAL_ASSESSMENT: PREVENTS OR INTERFERES SOME ACTIVE ACTIVITIES AND ADLS
PAIN_FUNCTIONAL_ASSESSMENT: ACTIVITIES ARE NOT PREVENTED
PAIN_FUNCTIONAL_ASSESSMENT: ACTIVITIES ARE NOT PREVENTED
PAIN_FUNCTIONAL_ASSESSMENT: PREVENTS OR INTERFERES SOME ACTIVE ACTIVITIES AND ADLS
PAIN_FUNCTIONAL_ASSESSMENT: PREVENTS OR INTERFERES SOME ACTIVE ACTIVITIES AND ADLS
PAIN_FUNCTIONAL_ASSESSMENT: ACTIVITIES ARE NOT PREVENTED

## 2021-05-25 NOTE — PLAN OF CARE
Problem: Pain:  Description: Pain management should include both nonpharmacologic and pharmacologic interventions. Goal: Control of acute pain  Description: Control of acute pain  Outcome: Ongoing  Note: Patient complaining of 7/10 hip pain from therapy. Medicated with PRN oxycodone. Patient satisfied. Problem: Falls - Risk of:  Goal: Will remain free from falls  Description: Will remain free from falls  5/25/2021 1324 by Juan Manuel Portillo RN  Outcome: Ongoing  Note: No falls since admission. Patient A/Ox4. Fall precautions in place. Call light in reach. Calls out for assistance. Chair alarm activated. Non skid footwear on. Problem: Skin Integrity:  Goal: Will show no infection signs and symptoms  Description: Will show no infection signs and symptoms  Outcome: Ongoing  Note: No signs of infection. Patient remains afebrile.

## 2021-05-25 NOTE — CARE COORDINATION
Per team, patient will need 24/7 supervision on d/c. Family notified and will not be able to provide. Plan for SNF on d/c. Family interested in [facility] as first choice, [facility] as second, and third is [facility]. Referral made to [facility]. Patient also notified of plan and is agreeable. Spoke to  in regards to family training.  to come in on Friday. Will continue to follow for d/c needs.   Electronically signed by USMAN Rae, ESVIN-S on 5/25/2021 at 2:46 PM

## 2021-05-25 NOTE — PROGRESS NOTES
Pt A & O. VSS. Pt x 1-2 st pivot - transfer to right side. All safety precautions in place. Bed alarm activated. LUE/LLE flaccid. Surgical incision to scalp SHEMAR. C/o left hip pain managed with PRN pain medication. No complaints of nausea voiced. Tolerates diet. Will cont to monitor.

## 2021-05-25 NOTE — PROGRESS NOTES
Occupational Therapy  Facility/Department: [facility] ACUTE REHAB UNIT  Daily Treatment Note  NAME: Sydnee Moore  : 1970  MRN: [id number]    Date of Service: 2021    Discharge Recommendations:  24 hour supervision or assist, Home with Home health OT, Continue to assess pending progress, Home with nursing aide  OT Equipment Recommendations  Equipment Needed: Yes  ADL Assistive Devices: Transfer Tub Bench;Grab Bars - toilet; Toileting - Raised Toilet Seat with arms  Other: continue to assess    Assessment   Performance deficits / Impairments: Decreased functional mobility ; Decreased ADL status; Decreased ROM; Decreased strength;Decreased sensation;Decreased fine motor control;Decreased endurance;Decreased posture;Decreased balance;Decreased safe awareness;Decreased coordination;Decreased vision/visual deficit; Decreased cognition  Assessment: Pt able to maintain stance fluctuating between 0-1 UE support with CGA to simulate pants mgmt and no overt LOB. Pt completed tub transfer with min A to manage LLE in/out of tub with pt assisting and visually activating LLE. Pt struggling emotionally with length of stay at hospital but is motivated to continue progressing for safer d/c. Pt limited by pain in L hemibody and requires + time for tone mgmt to don splint to L hand. Pt continues to progress, cont per OT POC. Treatment Diagnosis: decreased independence with ADLs and decreased functional mobility 2/2 CVA  Prognosis: Fair  OT Education: Plan of Care;Transfer Training;Precautions; ADL Adaptive Strategies  Patient Education: Pt demonstrated improved sequencing with dry tub transfer this date, able to recall all steps  REQUIRES OT FOLLOW UP: Yes  Activity Tolerance  Activity Tolerance: Patient Tolerated treatment well;Patient limited by pain  Activity Tolerance: Pt demonstrated significantly increased pain in L hand and hip requiring increased rest breaks between bouts of therex/PROM  Safety Devices  Safety Devices in place: Yes  Type of devices: Chair alarm in place; Left in chair; All fall risk precautions in place;Call light within reach;Nurse notified         Patient Diagnosis(es): There were no encounter diagnoses. has a past medical history of GERD (gastroesophageal reflux disease), Hernia, Obesity, and Unspecified sleep apnea. has a past surgical history that includes Splenectomy (); Total hip arthroplasty (); Lap Band (); hernia repair ();  section (); and craniotomy (Right, 2021). Restrictions  Position Activity Restriction  Other position/activity restrictions: Ambulate, Up with assist, Pt to wear helmet when OOB, OK to remove for shower, HOB to elevated at 30*  Subjective   General  Chart Reviewed: Yes  Patient assessed for rehabilitation services?: Yes  Additional Pertinent Hx: 46 y.o. female with hypertension and tobacco abuse who presented after spending a prolonged period on the ground s/p fall out of bed; found to have acute left hemiparesis and gaze deviation. CXR with no acute cardiopulmonary abnormality. CTH revealed large acute/subacute infarct in the R frontal lobe with associated mass effect and 4 mm R to L midline shift. It also noted subdural hemorrhage of 6mm along falx, although NSGY less convinced. CTA head and neck revealed occluded R cervical ICA & R anterior cerebral artery with near complete occlusion of R MCA. Outside window on TPA. Pt is now POD #1 following hemicraniectomy (). Pt admitted to ARU   Response to previous treatment: Patient with no complaints from previous session  Family / Caregiver Present: Yes (friends Humberto Wetzel and [place])  Referring Practitioner: Dr. Jnae Dawson DO  Diagnosis: CVA  Subjective  Subjective: Pt sitting in w/c upon arrival, pleasant and agreeable to OT session. General Comment  Comments: Angela Kwok   Vital Signs  Patient Currently in Pain: Yes (7/10 in L hand and hip, requesting pain medication, RN aware)     Orientation  Orientation Overall Orientation Status: Within Functional Limits  Objective    ADL  UE Bathing: Setup;Minimal assistance; Increased time to complete;Verbal cueing (seated in w/c pt completed sponge bathing of UEs and chest req assist to wash RUE and positional assistance for LUE while pt washed underarms, cues to sequence)  UE Dressing: Increased time to complete;Verbal cueing;Setup;Contact guard assistance (pt doffed shirt with + time and VCs for hemidressing tech, spvn overall; pt donned short sleeve shirt with CGA and + time to thread LUE and pull up to elbow, positional assist to decrease elbow flexion, VCs to pull down on L side/back)        Functional Mobility  Functional - Mobility Device: Wheelchair  Activity: Other (~100 feet in hallway)  Assist Level: Stand by assistance  Functional Mobility Comments: VCs to scan to L visual field to increase awareness of environmental barriers, pt ran into door frame on L exiting room, pt utilized RUE and RLE to propel self                             Cognition  Overall Cognitive Status: Exceptions  Arousal/Alertness: Appropriate responses to stimuli  Following Commands:  Follows one step commands consistently  Attention Span: Appears intact  Memory: Appears intact  Safety Judgement: Decreased awareness of need for assistance;Decreased awareness of need for safety  Problem Solving: Assistance required to generate solutions;Assistance required to correct errors made;Assistance required to implement solutions;Decreased awareness of errors  Insights: Decreased awareness of deficits  Initiation: Requires cues for some  Sequencing: Requires cues for some  Cognition Comment: Pt notably more depressed during morning session despite presence of 2 friends, pt requesting to go outside, \"I'm in a funk\"           Positioning  Adaptations: resting hand splint donned to L hand        Type of ROM/Therapeutic Exercise  Type of ROM/Therapeutic Exercise: PROM;AROM  Comment: Pt completed the following exercises seated at tabletop using arm skate with LUE in order to decrease friction and improve motor recovery and ROM  Exercises  Scapular Protraction: x 10 PROM, no activation noted, any movement elicited was from trunk compensation  Scapular Retraction: x 10 PROM, no activation noted, any movement elicited was from trunk compensation  Shoulder ABduction: x 3 with 5  second hold within pain free range pt tolerated to ~100 degrees  Horizontal ABduction: x10 PROM with AROM noted for ~5 degrees likely related to tone vs muscle activation/contraction  Horizontal ADduction: x 10 PROM                    Plan   Plan  Times per week: 5x a week for 60 mins daily  Times per day: Daily  Current Treatment Recommendations: Strengthening, Endurance Training, Neuromuscular Re-education, Self-Care / ADL, Functional Mobility Training, Safety Education & Training, ROM, Positioning, Wheelchair Mobility Training, Balance Training, Patient/Caregiver Education & Training, Manual Therapy:  MLD, Equipment Evaluation, Education, & procurement, Home Management Training, Cognitive Reorientation    Second Session: Pt sitting in w/c upon arrival with splint donned, in better spirits, pleasant and agreeable to OT session. OT doffed splint per wear schedule. Pt propelled self in w/c using RUE and RLE to therapy gym with SBA and VCs to scan to L as pt demo decreased insight to i'ly scan and was nearly colliding with environmental barrier on L, pt self corrected when prompted to scan to L. Pt completed dry tub transfer to assess carryover of sequencing from yesterday's session. Pt able to complete squat pivot leading to R with use of GB to TTB with CGA. Pt then required assist to manage LLE into tub. Pt able to scoot laterally with SBA on TTB. Pt completed STS from TTB using GB with CGA. Pt maintained stance for ~2 minutes with CGA and no overt LOB with unilateral  UE support.  Pt instructed to doff pants off hips to simulate toileting, pt able to doff off R hip with CGA and req assist to manage off L side of hip. Pt stand<sit to TTB with CGA and good eccentric control. Pt req A to manage LLE out of tub and pt completed squat pivot from TTB leading to L to w/c with CGA and good eccentric control. Pt stating feeling more confident about tub transfers and wants to make sure that her  gets trained before d/c home. Pt completed stand pivot transfer leading to R from w/c to EOB with min A, however pt demonstrated improved insight as pt initially completed STS from w/c with CGA but sat back down stating, \"I need to retry I didn't have it\" demonstrating improved safety awareness. Pt laterally scooted to L while seated EOB with CGA. Sit<supine with CGA and + time to manage LLE into bed. Pt demonstrated significant extensor tone in LLE once supine. L digits swollen and red and will benefit from edema glove, plan to provide after SLP session. Pt left in bed at end of session with bed alarm engaged, call light within reach and all needs met and SLP present to initiate therapy session. Third Session: Pt supine in bed upon arrival asleep but easily aroused by presence of OT without VCs. Total A to doff L shoe. Pt educated on benefit of edema glove to L hand as pt demo swelling and redness to digits. Significant time spent on donning glove with pt assisting using RUE to adjust over fingers. Pt RUE positioned in abduction and ER with cylindrical towel placed over hand to decrease contractures and hypertonicity of flexion to digits. Pt denied discomfort and educated on wearing edema glove at night when sleeping as pt does not utilize resting hand splint overnight. Pt agreeable. RN notified and verb understanding. Plan to continue 2 hour wear schedule of resting hand splint during day as pt tolerates. Pt left in bed at end of session with bed alarm engaged, call light within reach and all needs met.         Goals  Short term goals  Time Frame for Short term goals: 2 weeks  Short term goal 1: Pt will complete toilet transfer w/ Mod A-goal met 5/4  Short term goal 2: Pt will complete UE dressing w/ Mod A-goal met 5/12  Short term goal 3: Pt will complete oral care w/ spvn seated at sink w/o VCs for L sided attention-goal met 5/17  Short term goal 4: Pt will complete LE dressing w/ Mod A-ongoing  Long term goals  Time Frame for Long term goals : 4 weeks-ongoing  Long term goal 1: Pt will complete toilet transfer w/ SBA-ongoing  Long term goal 2: Pt will complete UE dressing w/ setup-ongoing  Long term goal 3: Pt will complete LE dressing w/ SBA-ongoing  Long term goal 4: Pt will be independent w/ tone management exercises to improve functional use of LUE-ongoing  Long term goal 5: Pt will complete oral care I'ly-goal met 5/24  Patient Goals   Patient goals : \"get back to my normal self\"       Therapy Time   Individual Second Session Group Co-treatment   Time In 0930  1315       Time Out 7252  1958       Minutes 60  35       Timed Code Treatment Minutes: 60 Minutes+ 35 Minutes  Total Treatment Time: 95 Minutes     Third Session Therapy Time:   Individual Concurrent Group Co-treatment   Time In 3696        Time Out 9984        Minutes 10     90     Timed Code Treatment Minutes:  95 + 5    Total Treatment Minutes:  [address], OT

## 2021-05-25 NOTE — PROGRESS NOTES
ACUTE REHAB UNIT  SPEECH/LANGUAGE PATHOLOGY      [x] Daily  [] Weekly Care Conference Note  [] Discharge    Patient:Laura Rivera      GZL:[birth year]  PTJ:[id number]  Rehab Dx/Hx: Acute CVA (cerebrovascular accident) ([facility] [address] [I63.9]    Precautions: [x] Aspiration  [x] Fall risk  [] Sternal  [] Seizure [] Hip  [] Weight Bearing [] Other  ST Dx: [] Aphasia  [] Dysarthria  [] Apraxia   [x] Oropharyngeal dysphagia [x] Cognitive Impairment  [] Other:   Date of Admit: 4/23/2021  Room #: 3101/3101-01  Date: 5/25/2021          Current Diet Order:Dietary Nutrition Supplements: Low Calorie High Protein Supplement  DIET GENERAL;   Recommended Form of Meds: PO  Compensatory Swallowing Strategies: Alternate solids and liquids, Upright as possible for all oral intake, Check for pocketing of food on the Left, Small bites/sips, Lingual sweep     Subjective: Pt admitted 4/12 after fall with left sided paralysis. Pt underwent craniectomy on 4/13 per Dr. Brianna Morocho. Social/Functional History  Lives With: Spouse;Son( reported that son is diagnosed with Autism.)  Occupation: Full time employment  Type of occupation:  at [employer]"    FEES 4/14: IMPRESSIONS:   Pt presents w/ mild oropharyngeal dysphagia characterized by premature spillage of thin liquids to UES and pyriforms w/ intermittent deep penetration of laryngeal vestibule; adequate airway protection and complete clearance of all residue after swallow initiation. No aspiration w/ any trials. Timely swallow initiation w/ puree and regular solid consistencies; no oropharyngeal residue.    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 Adequate ab/adduction. Complete closure of TVFs upon adduction.      Dentition: Adequate  Vision  Vision: Impaired  Vision Exceptions: Visual field cut  Hearing  Hearing: Within functional limits  Barriers toward progress: None towards stated goals    Date: 5/25/2021      Tx session 1 Tx session 2   Total Timed Code Min 20 30   Total Treatment Minutes 30 30   Individual Treatment Minutes 30 30   Group Treatment Minutes 0 0   Co-Treat Minutes 0 0   Brief Exception: N/A N/A   Pain None indicated None indicated   Pain Intervention: [] RN notified  [] Repositioned  [] Intervention offered and patient declined  [x] N/A  [] Other:    [] RN notified  [] Repositioned  [] Intervention offered and patient declined  [x] N/A  [] Other:    Subjective:     Pt in w/c and cooperative with tx. \"I've been practicing [looking to the left]. \" Pt endorsed feeling like swallow returned to baseline and stated no longer biting lingual surface or having pocketing. Fatigued after OT session but pleased with performance in tub transfer tasks. Cooperative throughout. Objective / Goals:     Patient will consume regular solids with timely mastication and no pocketing in L buccal cavity across 2 sessions. Analyzed pt with regular solids from breakfast tray. Mastication timely and functional with no significant oral residue and no pocketed material in L buccal cavity. Goal not targeted. Patient will consume PO without anterior spillage or overt pocketing across 2 sessions. No anterior spillage observed with solids or thin liquids. No overt pocketing exhibited. Goal not targeted. Pt will complete divided/alternating attention tasks with 85% accuracy given min cues. Alternating attention naming task with initial letter constraint: min cues    No cues to alternate attention between conversation and self-feeding tasks   Mod cues fading to min cues for selective attention and for attention for turn taking. Pt with external distraction (TV) with no initiation to eliminate. Pt endorsed at end of session \"I was going to ask you to turn it off. \" Suspect exacerbated by fatigue. Pt will accurately complete executive functioning tasks with min cues.  Goal not directly targeted. Min-mod cues for cognitive organization for generative naming task with initial letter constraint. Mod fading to no cues for self-monitoring and attention to detail to describe items with specific vocabulary constraints. Pt will attend to L visual field with min cues. Min-mod fading to min cues Goal not directly targeted. No cues given during tx for L attention to conversational partner. Pt will participate in ongoing cognitive linguistic assessment. GOAL MET GOAL MET   Other areas targeted:     Education:   Educated pt to skills targeted and improvements noted. Educated to rationale for tx tasks and executive function skills targeted   Safety Devices: [x] Call light within reach  [x] Chair alarm activated and connected to nurse call light system  [] Bed alarm activated   [x] Other: reinforced use of call light [x] Call light within reach  [] Chair alarm activated and connected to nurse call light system  [x] Bed alarm activated   [x] Other: reinforced use of call light    Assessment:   Progressing towards goals. Resolving oropharyngeal dysphagia. Cognitive linguistic impairment with L inattention. Motivated to improve and responds well to therapeutic intervention - good prognosis for ongoing improvement with skilled ST. Plan: Continue per POC.       Interventions used this date:  [] Speech/Language Treatment  [] Instruction in HEP  [] Dysphagia Treatment [x] Cognitive Treatment   [] Other:    Discharge recommendations:  [] Home independently  [x] Home with assistance []  24 hour supervision  [] ECF [] Other  Continued Tx Upon Discharge: ? [x] Yes    [] No    [] TBD based on progress while on ARU     [] Vital Stim indicated     [] Other:   Estimated discharge date: 5/27/21      Sera Li MA CCC-SLP; [id number]  Speech-Language Pathologist

## 2021-05-25 NOTE — PLAN OF CARE
Problem: Pain:  Goal: Pain level will decrease  Description: Pain level will decrease  Outcome: Ongoing  Note: C/o left hip pain managed with PRN pain medication. Problem: Falls - Risk of:  Goal: Will remain free from falls  Description: Will remain free from falls  5/25/2021 0050 by Flores Crawford RN  Outcome: Ongoing  Note: Pt is a High fall risk. Explained fall risk precautions to pt and rationale behind their use to keep the patient safe. Belongings are in reach. Pt encouraged to notify staff for any and all assistance. Staff present in tx suite throughout entirety of pts treatment to monitor and protect from falls. Assistance provided when ambulating to restroom utilizing Stay With Me. Problem: Skin Integrity:  Goal: Absence of new skin breakdown  Description: Absence of new skin breakdown  Outcome: Ongoing  Note: Pt cont on low air loss mattress. Turned and repositioned. Continent of bowel and bladder.

## 2021-05-25 NOTE — PROGRESS NOTES
Department of Physical Medicine & Rehabilitation  Progress Note    Patient Identification:  Benedict Allison  [id number]  : 1970  Admit date: 2021    Chief Complaint: CVA    Subjective:   Feels well today but does complain of some burning in her left arm. Continues to show slight improvement in therapy and is participating very well. ROS: No f/c, n/v, cp     Objective:  Patient Vitals for the past 24 hrs:   BP Temp Temp src Pulse Resp SpO2 Weight   21 0924 119/70 97.9 °F (36.6 °C) Oral 85 16 97 %    21 2203 124/81 97.6 °F (36.4 °C) Oral 87 17 96 % 228 lb 9.9 oz (103.7 kg)     Const: Alert. No distress, pleasant. HEENT: Normocephalic, atraumatic. Normal sclera/conjunctiva. MMM. Incision healing well  CV: Regular rate and rhythm. Resp: No respiratory distress. Lungs CTAB. Abd: Soft, nontender, nondistended, NABS+   Ext: No edema. Neuro: Alert, oriented, appropriately interactive. Psych: Cooperative, appropriate mood and affect    Laboratory data: Available via EMR. Last 24 hour lab  No results found for this or any previous visit (from the past 24 hour(s)). Therapy progress:  PT  Position Activity Restriction  Other position/activity restrictions: Ambulate, Up with assist, Pt to wear helmet when OOB, OK to remove for shower, HOB to elevated at 30*  Objective     Sit to Stand: Contact guard assistance, Minimal Assistance (STS from mat table to a RW with a hand adaptor .  CGA /Min A for ant WS and stabilizing LLE to maintain a neutral position)  Stand to sit: Minimal Assistance, Contact guard assistance (VC for hand placement and to guide hips when descending on mat)  Bed to Chair: Contact guard assistance (SPT)  Device: Cameron bullard  Other Apparatus: Dorsiflex Assist (Prefabricated AFO with a w/c follow 2/2 to unsteadiness)  Assistance: Dependent/Total  Distance: 10'  OT  PT Equipment Recommendations  Equipment Needed: Yes (continue to assess)  Other: 20\" hemiheight w/c with drop arm and standard 90 degree leg rest  Toilet - Technique: Stand pivot  Equipment Used: Raised toilet seat with rails  Toilet Transfers Comments: VCs for hand placement, LLE ankle instability during pivot  Assessment        SLP  Diet Solids Recommendation: Dysphagia Soft and Bite-Sized (Dysphagia III) (with regular solids as tolerated. Per pt request, gravy/sauces to keep for moist.)       Body mass index is 36.9 kg/m². Assessment and Plan:  Gina Abdullahi a 46 y.o. female with PMH GERD p/w L sided weakness and facial droop.  CTA head/neck on 4/12 revealed occluded R cervical ICA, occluded right LEAH, near complete occlusion of right MCA.     Acute ischemic CVA, Subdural heomorrhage s/p right hemicraniectomy and subsequen SAH and IP hemorrhage  No tPA given.  MRI 4/14 showing large subacute infarct throughout R LEAH and MCA with some areas of hemorrhagic conversion.  - Neurosurgery and neurology following  - SBP <160, PRN hydralazine, scheduled Norvasc 5 mg daily  - Keppra 500 mg twice daily   - SQH  - PT/OT      Aphasia - SLP   Depression - Home fluoxetine 20 mg daily at home  Anxiety - Hold home Ativan 0.5 mg every 8 hours PRN for anxiety  MISHA - Patient uses CPAP at home  GERD - omeprazole 40 mg daily at home- substituted for pepcid 20 mg BID  Obesity    Rehab Progress: Improving  Anticipated Dispo: home  Services/DME: TTB and RTS (neither covered by insurance, friend to obtain)  ELOS: LIANG Gunter D.O. M.P.H.  PM&R  5/25/2021  10:19 AM
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
## 2021-05-26 LAB
ANION GAP SERPL CALCULATED.3IONS-SCNC: 10 MMOL/L (ref 3–16)
BASOPHILS ABSOLUTE: 0.1 K/UL (ref 0–0.2)
BASOPHILS RELATIVE PERCENT: 0.8 %
BUN BLDV-MCNC: 11 MG/DL (ref 7–20)
CALCIUM SERPL-MCNC: 9.8 MG/DL (ref 8.3–10.6)
CHLORIDE BLD-SCNC: 104 MMOL/L (ref 99–110)
CO2: 25 MMOL/L (ref 21–32)
CREAT SERPL-MCNC: <0.5 MG/DL (ref 0.6–1.1)
EOSINOPHILS ABSOLUTE: 0.5 K/UL (ref 0–0.6)
EOSINOPHILS RELATIVE PERCENT: 3.9 %
GFR AFRICAN AMERICAN: >60
GFR NON-AFRICAN AMERICAN: >60
GLUCOSE BLD-MCNC: 94 MG/DL (ref 70–99)
HCT VFR BLD CALC: 39.4 % (ref 36–48)
HEMOGLOBIN: 13.2 G/DL (ref 12–16)
LYMPHOCYTES ABSOLUTE: 3.6 K/UL (ref 1–5.1)
LYMPHOCYTES RELATIVE PERCENT: 28.5 %
MCH RBC QN AUTO: 34.6 PG (ref 26–34)
MCHC RBC AUTO-ENTMCNC: 33.4 G/DL (ref 31–36)
MCV RBC AUTO: 103.6 FL (ref 80–100)
MONOCYTES ABSOLUTE: 1.2 K/UL (ref 0–1.3)
MONOCYTES RELATIVE PERCENT: 9.3 %
NEUTROPHILS ABSOLUTE: 7.3 K/UL (ref 1.7–7.7)
NEUTROPHILS RELATIVE PERCENT: 57.5 %
PDW BLD-RTO: 13.7 % (ref 12.4–15.4)
PLATELET # BLD: 392 K/UL (ref 135–450)
PMV BLD AUTO: 8.7 FL (ref 5–10.5)
POTASSIUM REFLEX MAGNESIUM: 4.6 MMOL/L (ref 3.5–5.1)
RBC # BLD: 3.8 M/UL (ref 4–5.2)
SODIUM BLD-SCNC: 139 MMOL/L (ref 136–145)
WBC # BLD: 12.7 K/UL (ref 4–11)

## 2021-05-26 PROCEDURE — 97130 THER IVNTJ EA ADDL 15 MIN: CPT

## 2021-05-26 PROCEDURE — 6360000002 HC RX W HCPCS: Performed by: PHYSICAL MEDICINE & REHABILITATION

## 2021-05-26 PROCEDURE — 92526 ORAL FUNCTION THERAPY: CPT

## 2021-05-26 PROCEDURE — 1280000000 HC REHAB R&B

## 2021-05-26 PROCEDURE — 97110 THERAPEUTIC EXERCISES: CPT | Performed by: PHYSICAL THERAPIST

## 2021-05-26 PROCEDURE — 97530 THERAPEUTIC ACTIVITIES: CPT | Performed by: PHYSICAL THERAPIST

## 2021-05-26 PROCEDURE — 6370000000 HC RX 637 (ALT 250 FOR IP): Performed by: PHYSICAL MEDICINE & REHABILITATION

## 2021-05-26 PROCEDURE — 97542 WHEELCHAIR MNGMENT TRAINING: CPT | Performed by: PHYSICAL THERAPIST

## 2021-05-26 PROCEDURE — 36415 COLL VENOUS BLD VENIPUNCTURE: CPT

## 2021-05-26 PROCEDURE — 97535 SELF CARE MNGMENT TRAINING: CPT

## 2021-05-26 PROCEDURE — 85025 COMPLETE CBC W/AUTO DIFF WBC: CPT

## 2021-05-26 PROCEDURE — 80048 BASIC METABOLIC PNL TOTAL CA: CPT

## 2021-05-26 PROCEDURE — 97129 THER IVNTJ 1ST 15 MIN: CPT

## 2021-05-26 PROCEDURE — 99232 SBSQ HOSP IP/OBS MODERATE 35: CPT | Performed by: PHYSICAL MEDICINE & REHABILITATION

## 2021-05-26 RX ADMIN — HEPARIN SODIUM 5000 UNITS: 5000 INJECTION INTRAVENOUS; SUBCUTANEOUS at 14:55

## 2021-05-26 RX ADMIN — LEVETIRACETAM 500 MG: 500 TABLET ORAL at 09:04

## 2021-05-26 RX ADMIN — OXYCODONE 10 MG: 5 TABLET ORAL at 11:01

## 2021-05-26 RX ADMIN — ACETAMINOPHEN 1000 MG: 500 TABLET, COATED ORAL at 21:46

## 2021-05-26 RX ADMIN — ASPIRIN 81 MG: 81 TABLET, CHEWABLE ORAL at 09:04

## 2021-05-26 RX ADMIN — LEVETIRACETAM 500 MG: 500 TABLET ORAL at 21:46

## 2021-05-26 RX ADMIN — DOCUSATE SODIUM 50 MG AND SENNOSIDES 8.6 MG 2 TABLET: 8.6; 5 TABLET, FILM COATED ORAL at 09:04

## 2021-05-26 RX ADMIN — DOCUSATE SODIUM 50 MG AND SENNOSIDES 8.6 MG 2 TABLET: 8.6; 5 TABLET, FILM COATED ORAL at 21:46

## 2021-05-26 RX ADMIN — HEPARIN SODIUM 5000 UNITS: 5000 INJECTION INTRAVENOUS; SUBCUTANEOUS at 06:37

## 2021-05-26 RX ADMIN — AMLODIPINE BESYLATE 5 MG: 5 TABLET ORAL at 09:04

## 2021-05-26 RX ADMIN — FLUOXETINE 20 MG: 20 CAPSULE ORAL at 09:04

## 2021-05-26 RX ADMIN — ATORVASTATIN CALCIUM 80 MG: 80 TABLET, FILM COATED ORAL at 21:46

## 2021-05-26 RX ADMIN — METHYLPHENIDATE HYDROCHLORIDE 5 MG: 5 TABLET ORAL at 09:04

## 2021-05-26 RX ADMIN — HEPARIN SODIUM 5000 UNITS: 5000 INJECTION INTRAVENOUS; SUBCUTANEOUS at 21:46

## 2021-05-26 RX ADMIN — FAMOTIDINE 20 MG: 20 TABLET, FILM COATED ORAL at 21:46

## 2021-05-26 RX ADMIN — THERA TABS 1 TABLET: TAB at 09:04

## 2021-05-26 RX ADMIN — ACETAMINOPHEN 1000 MG: 500 TABLET, COATED ORAL at 09:04

## 2021-05-26 RX ADMIN — FAMOTIDINE 20 MG: 20 TABLET, FILM COATED ORAL at 09:04

## 2021-05-26 RX ADMIN — ACETAMINOPHEN 1000 MG: 500 TABLET, COATED ORAL at 04:39

## 2021-05-26 RX ADMIN — THIAMINE HCL TAB 100 MG 100 MG: 100 TAB at 09:04

## 2021-05-26 RX ADMIN — Medication 5 MG: at 09:04

## 2021-05-26 RX ADMIN — ACETAMINOPHEN 1000 MG: 500 TABLET, COATED ORAL at 14:55

## 2021-05-26 ASSESSMENT — PAIN - FUNCTIONAL ASSESSMENT
PAIN_FUNCTIONAL_ASSESSMENT: ACTIVITIES ARE NOT PREVENTED
PAIN_FUNCTIONAL_ASSESSMENT: ACTIVITIES ARE NOT PREVENTED
PAIN_FUNCTIONAL_ASSESSMENT: PREVENTS OR INTERFERES SOME ACTIVE ACTIVITIES AND ADLS
PAIN_FUNCTIONAL_ASSESSMENT: ACTIVITIES ARE NOT PREVENTED

## 2021-05-26 ASSESSMENT — PAIN DESCRIPTION - PAIN TYPE
TYPE: ACUTE PAIN

## 2021-05-26 ASSESSMENT — PAIN SCALES - GENERAL
PAINLEVEL_OUTOF10: 1
PAINLEVEL_OUTOF10: 0
PAINLEVEL_OUTOF10: 2
PAINLEVEL_OUTOF10: 0
PAINLEVEL_OUTOF10: 0
PAINLEVEL_OUTOF10: 4
PAINLEVEL_OUTOF10: 8
PAINLEVEL_OUTOF10: 2
PAINLEVEL_OUTOF10: 2
PAINLEVEL_OUTOF10: 0
PAINLEVEL_OUTOF10: 3
PAINLEVEL_OUTOF10: 8
PAINLEVEL_OUTOF10: 2

## 2021-05-26 ASSESSMENT — PAIN DESCRIPTION - ORIENTATION
ORIENTATION: RIGHT
ORIENTATION: RIGHT
ORIENTATION: LEFT
ORIENTATION: RIGHT

## 2021-05-26 ASSESSMENT — PAIN DESCRIPTION - DESCRIPTORS
DESCRIPTORS: ACHING

## 2021-05-26 ASSESSMENT — PAIN SCALES - WONG BAKER
WONGBAKER_NUMERICALRESPONSE: 0
WONGBAKER_NUMERICALRESPONSE: 0

## 2021-05-26 ASSESSMENT — PAIN DESCRIPTION - PROGRESSION
CLINICAL_PROGRESSION: NOT CHANGED

## 2021-05-26 ASSESSMENT — PAIN DESCRIPTION - LOCATION
LOCATION: HIP
LOCATION: HIP
LOCATION: ARM
LOCATION: HIP
LOCATION: ARM

## 2021-05-26 ASSESSMENT — PAIN DESCRIPTION - ONSET
ONSET: ON-GOING

## 2021-05-26 ASSESSMENT — PAIN DESCRIPTION - FREQUENCY
FREQUENCY: INTERMITTENT

## 2021-05-26 NOTE — PLAN OF CARE
Problem: Pain:  Goal: Pain level will decrease  Description: Pain level will decrease  Outcome: Met This Shift   Pt. Voiced satisfaction and decreased pain to left hip. Scheduled and PRN pain management in place to treat. Will continue to evaluate and treat per protocol. Problem: Falls - Risk of:  Goal: Will remain free from falls  Description: Will remain free from falls  Outcome: Met This Shift   Fall prevention measures ongoing to promote safety and decrease the risk of falls: Safe transfer X2 s/p to wheel chair for toileting, bed wheels locked and in lowest position, call light and over bed table within reach, video surveillance in place. No falls reported thus far this shift. Problem: Skin Integrity:  Goal: Will show no infection signs and symptoms  Description: Will show no infection signs and symptoms  Outcome: Met This Shift   Skin is kept clean and dry pt. Turned and repositioned Q 2 hrs and PRN. Surgical site clean, dry and intact. No s/sx of infection noted.

## 2021-05-26 NOTE — PROGRESS NOTES
Patient resting in bed at this time. States pain in hip is much improved and she is satisfied with pain medication.

## 2021-05-26 NOTE — PROGRESS NOTES
ACUTE REHAB UNIT  SPEECH/LANGUAGE PATHOLOGY      [x] Daily  [] Weekly Care Conference Note  [] Discharge    Patient:Laura Nieves      BFE:[birth year]  [facility]:[id number]  Rehab Dx/Hx: Acute CVA (cerebrovascular accident) ([facility] [address] [I63.9]    Precautions: [x] Aspiration  [x] Fall risk  [] Sternal  [] Seizure [] Hip  [] Weight Bearing [] Other  ST Dx: [] Aphasia  [] Dysarthria  [] Apraxia   [x] Oropharyngeal dysphagia [x] Cognitive Impairment  [] Other:   Date of Admit: 4/23/2021  Room #: 3101/3101-01  Date: 5/26/2021          Current Diet Order:Dietary Nutrition Supplements: Low Calorie High Protein Supplement  DIET GENERAL;   Recommended Form of Meds: PO  Compensatory Swallowing Strategies: Alternate solids and liquids, Upright as possible for all oral intake, Check for pocketing of food on the Left, Small bites/sips, Lingual sweep     Subjective: Pt admitted 4/12 after fall with left sided paralysis. Pt underwent craniectomy on 4/13 per Dr. Aleksandr Burden. Social/Functional History  Lives With: Spouse;Son( reported that son is diagnosed with Autism.)  Occupation: Full time employment  Type of occupation:  at [employer]"    FEES 4/14: IMPRESSIONS:   Pt presents w/ mild oropharyngeal dysphagia characterized by premature spillage of thin liquids to UES and pyriforms w/ intermittent deep penetration of laryngeal vestibule; adequate airway protection and complete clearance of all residue after swallow initiation. No aspiration w/ any trials. Timely swallow initiation w/ puree and regular solid consistencies; no oropharyngeal residue.    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 Adequate ab/adduction. Complete closure of TVFs upon adduction.      Dentition: Adequate  Vision  Vision: Impaired  Vision Exceptions: Visual field cut  Hearing  Hearing: Within functional limits  Barriers toward progress: None towards stated goals    Date: 5/26/2021       Tx session 1 Tx session 2 Tx session 3    Total Timed Code Min 10 30 30   Total Treatment Minutes 30 30 30   Individual Treatment Minutes 30 30 30   Group Treatment Minutes 0 0 0   Co-Treat Minutes 0 0 0   Brief Exception: N/A N/A N/A   Pain None identified. None identified. Discomfort x1 in L great toe; requested pressure which relieved it. Pain Intervention: [] RN notified  [] Repositioned  [] Intervention offered and patient declined  [x] N/A  [] Other:    [] RN notified  [] Repositioned  [] Intervention offered and patient declined  [x] N/A  [] Other:  [] RN notified  [] Repositioned  [] Intervention offered and patient declined  [x] N/A  [] Other:    Subjective:     Pt upright in chair and agreeable to therapy. Discussed NMES and pt still agreeable. Pt upright in chair and agreeable to therapy. Pt supine in bed due to prior pain. Objective / Goals:      Patient will consume regular solids with timely mastication and no pocketing in L buccal cavity across 2 sessions. No pocketing across session with regular and mixed textures. Pt reports decreased pocketing overall recently. Patient tolerated NMES via VitalStim placement 4a (targeting orbicularis oris, buccinator and superior pharyngeal constrictor) @ 3.0 mA on right x 21 minutes, 7.0 mA on left x 7 minutes and up to 8.0 mA on L x 14 minutes (total stimulation time of 21 minutes). Goal not targeted this session. Goal not targeted this session. Patient will consume PO without anterior spillage or overt pocketing across 2 sessions. No anterior spillage across session. Goal not targeted this session. Goal not targeted this session. Pt will complete divided/alternating attention tasks with 85% accuracy given min cues.    Pt independently turned TV off when SLP initiated session and pt self reported/identified \"Well I'm not eating much\" and initiated meal. Pt with few instances of suspected unintentional breaks from meal with independent redirection eventually back to task. Goal not targeted this session. No cues for attention; spontaneous redirection. Off topic comments frequently. Pt will accurately complete executive functioning tasks with min cues. Goal not targeted this session. Independent for pathfinding to location with written directions with self corrections for errors. Mod-max cues for return visit with using environmental aids and recall. Deduction puzzle - min-mod cues. Pt will attend to L visual field with min cues. Improved eye contact with speaker on left with verbalization from this author only with sustained eye contact on multiple occasions. Pt with spontaneous eye gaze to left in period of absent communication exchanges. Min cues for attention to conversational speaker positioned on far left. Provided verbal cue for crossing out items left to right; pt with independent carryover throughout. Pt will participate in ongoing cognitive linguistic assessment. GOAL MET GOAL MET GOAL MET   Other areas targeted:      Education:   Education ongoing regarding indications for VitalStim therapy. Education ongoing regarding rationale for tasks. Education ongoing regarding rationale for tasks and improved execution. Safety Devices: [x] Call light within reach  [x] Chair alarm activated and connected to nurse call light system  [] Bed alarm activated   [] Other: [x] Call light within reach  [x] Chair alarm activated and connected to nurse call light system  [] Bed alarm activated   [] Other:  [x] Call light within reach  [] Chair alarm activated and connected to nurse call light system  [x] Bed alarm activated   [] Other:    Assessment: Oral stage dysphagia, executive function impairments, left visual field neglect / right gaze preference, attention impairments. Plan: Continue per POC.        Interventions used this date:  [] Speech/Language Treatment  [] Instruction in HEP  [] Dysphagia Treatment [x] Cognitive Treatment   [] Other:    Discharge recommendations:  [] Home independently  [x] Home with assistance []  24 hour supervision  [] ECF [] Other  Continued Tx Upon Discharge: ? [x] Yes    [] No    [] TBD based on progress while on ARU     [] Vital Stim indicated     [] Other:   Estimated discharge date: 5/27/21    Illa Lax, Lazarus Arbour., Dejuan  Speech-Language Pathologist

## 2021-05-26 NOTE — PLAN OF CARE
Problem: Pain:  Goal: Pain level will decrease  Description: Pain level will decrease  5/26/2021 1334 by Mariela Locke  Outcome: Ongoing  Note: Patient medicated for pain in right hip. Rated pain a 8/10. Patient satisfied with her current pain medication regimen. Problem: Falls - Risk of:  Goal: Will remain free from falls  Description: Will remain free from falls  5/26/2021 1334 by Mariela Locke  Outcome: Ongoing  Note: Patient remains free of falls this shift.  Room free of clutter, bed in low position, call light and personal items are within reach
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
## 2021-05-26 NOTE — PATIENT CARE CONFERENCE
Inpatient Rehabilitation  Weekly Team Conference Note  The [address]  [phone]  Patient Name: Leora Fofana        MRN: [id number]    : 1970  (46 y.o.)  Gender: female   Referring Practitioner: Estrellita Greenfield  The team conference for this patient was held on 2021 at 10:30am by:  Ninfa Frye DO    Current/Goal QM SCORES  QM Current/Goal Score   Eating CARE Score: 5 / Discharge Goal: Set-up or clean-up assistance   Oral Hygiene CARE Score: 5 / Discharge Goal: Independent   Shower/Bathing CARE Score: 3 / Discharge Goal: Supervision or touching assistance   UB Dressing CARE Score: 4 / Discharge Goal: Set-up or clean-up assistance   LB Dressing CARE Score: 3 / Discharge Goal: Supervision or touching assistance   Putting on/off Footwear CARE Score: 3 / Discharge Goal: Supervision or touching assistance   Toileting Hygiene CARE Score: 3 / Discharge Goal: Supervision or touching assistance   Bladder Continence Bladder Continence: Always continent    Bowel Continence Bowel Continence: Not rated    Toilet Transfers CARE Score: 3 / Discharge Goal: Supervision or touching assistance   Shower/Bathe Self  CARE Score: 3 / Discharge Goal: Supervision or touching assistance   Rolling Left and Right CARE Score: 3 / Discharge Goal: Supervision or touching assistance   Sit to Lying CARE Score: 3 / Discharge Goal: Supervision or touching assistance   Lying to Sitting on Bedside CARE Score: 3 / Discharge Goal: Supervision or touching assistance   Sit to Stand CARE Score: 3 / Discharge Goal: Supervision or touching assistance   Chair/Bed to Chair Transfer CARE Score: 3 / Discharge Goal: Supervision or touching assistance   Car Transfers CARE Score: 1 / Discharge Goal: Supervision or touching assistance   Walk 10 Feet CARE Score: 1 / Discharge Goal: Partial/moderate assistance   Walk 50 Feet with Two Turns CARE Score: 88 / Discharge Goal: Not Attempted   Walk 150 Feet CARE Score: 88 / Discharge Goal: Not Attempted   Walk 10 Feet on Uneven Surfaces CARE Score: 88 / Discharge Goal: Not Attempted   1 Step (Curb) CARE Score: 88 / Discharge Goal: Partial/moderate assistance   4 Steps CARE Score: 88 / Discharge Goal: Partial/moderate assistance   12 Steps CARE Score: 88 / Discharge Goal: Not Attempted   Picking up Object from Floor CARE Score: 88 / Discharge Goal: Partial/moderate assistance   Wheel 50 Feet with 2 Turns CARE Score: 4 / Discharge Goal: Independent   Type         [x] Manual        [] Motorized        [] N/A   Wheel 150 Feet CARE Score: 3 / Discharge Goal: Independent   Type         [x] Manual        [] Motorized        [] N/A     NURSING:  A&Ox: Level of Consciousness: Alert (0)  Orientation Level: Oriented X4, Oriented to place, Oriented to time, Oriented to situation, Oriented to person  Lawn [place]wn Risk Score: Score: 60  Admission BIMS: 15  Wounds/Incisions/Ulcers: Surgical incision on head  Medication Review: Reviewed Meds  Pain: Pain 4/10 with R arm gave Scheduled Tylenol  Consultations: n/a  Imaging:   XR HIP 2-3 VW W PELVIS LEFT   Final Result      Left hip arthroplasty with interval lucency noted about the femoral component indeterminate. Findings may relate to particle disease and/or may relate to loosening. Clinical correlation suggested. Infection is not excluded. Lucency about the supra-acetabular region adjacent to the acetabular component present previously indeterminate. No fracture.      Active Comorbid Conditions: MISHA, dysphagia  Systems Review:   Renal: WNL, Dialysis:  Type: , Frequency:   Neurological: WNL  Musculoskeletal: Flaccid L side, Weakness  Respiratory: Clear/ diminished   Cardiac/Circulatory/Peripheral Vascular: x  Abnormal/Relevant Test Results:   Abnormal/Relevant Lab Values:   CBC:   Recent Labs     05/26/21  0714   WBC 12.7*   HGB 13.2   HCT 39.4   .6*        BMP:   Recent Labs     05/26/21 0714      K 4.6      CO2 25   BUN 11   CREATININE <0.5*     PT/INR: No results for input(s): PROTIME, INR in the last 72 hours. APTT: No results for input(s): APTT in the last 72 hours. Liver Profile:  Lab Results   Component Value Date    AST 41 04/13/2021    ALT 32 04/13/2021    BILITOT 0.7 04/13/2021    ALKPHOS 86 04/13/2021     Lab Results   Component Value Date    CHOL 251 04/13/2021    HDL 45 04/13/2021    TRIG 278 04/13/2021     Recent Labs     05/26/21  0714   WBC 12.7*   HGB 13.2   HCT 39.4      .6*     Recent Labs     05/26/21  0714      K 4.6      CO2 25   BUN 11   CREATININE <0.5*     No results for input(s): AST, ALT, ALB, BILIDIR, BILITOT, ALKPHOS in the last 72 hours. No results for input(s): MG in the last 72 hours. Recent Labs     05/26/21  0714   WBC 12.7*   HGB 13.2   HCT 39.4        Recent Labs     05/26/21  0714      K 4.6      CO2 25   BUN 11   CREATININE <0.5*   CALCIUM 9.8     No results for input(s): AST, ALT, BILIDIR, BILITOT, ALKPHOS in the last 72 hours. No results for input(s): INR in the last 72 hours. No results for input(s): Ana Sreedharock in the last 72 hours. PHYSICAL THERAPY:  Bed Mobility: Scooting: Contact guard assistance (VC/ TC in short sitting to advance hips to EOS in prep for transf. Req additional time appearing as though it is a motor planning problem.)    Transfers:  Sit to Stand: Minimal Assistance (Min A for ant WS and stabilizing LLE to maintain a neutral position.  Transf from w/c , commode)  Stand to sit: Minimal Assistance, Contact guard assistance (VC for hand placement and to guide hips when descending on mat)  Bed to Chair: Contact guard assistance (SPT leading with the R side)  Squat Pivot Transfers:  (w/c <> mat table leading with both sides with focus on eccentric control of LES to allow for a safe transition.)  Comment: Therapy assisted pt with using toilet , hand hygiene, and lower body dressing. Pt was able to maintin balance on commode without support from UE and with S. For time management, PT assisted pt with donning briefs, pants, and shoes. Pt sat on commode whle PT assisted. Pt req min A for LB clothing management following the use of the commode. Pt performed pericare Indly. WB Status: No WB restrictions  Ambulation 1  Surface: level tile  Device: Moya rail  Other Apparatus: Dorsiflex Assist (Prefabricated AFO with a w/c follow 2/2 to unsteadiness)  Assistance: Dependent/Total  Quality of Gait: Pt req manual contcats to facilitate a neutal hip osistion and assist pt with initiating L swing through  Distance: 10'  Comments: Pt attempted walking w/ moya rain following the use of the lite gait and following bathroom    OCCUPATIONAL THERAPY:  ADL  Feeding: Setup (to open containers and prep butter onto bread)  Grooming: Modified independent  (pt completed hand hygiene seated in w/c with RUE only d/t edema glove on LUE)  UE Bathing: Setup, Increased time to complete, Verbal cueing, Contact guard assistance (seated on TTB, positional assist of LUE to wash underarms thoroughly; VCs for adaptive pratik technique to wash RUE)  LE Bathing: Moderate assistance, Setup, Verbal cueing, Increased time to complete (seated on TTB; use of long handled sponge to wash lower limbs and feet, assist to wash/dry buttocks in stance with CGA to maintain stance with unilateral UE support at GB)  UE Dressing: Verbal cueing, Setup, Contact guard assistance (pt doffed shirt i'ly, pt donned shirt with + time to thread LUE and cues to pull up to elbow)  LE Dressing:  (OT donned compression socks and shoes for sake of time with pt supine in bed)  Toileting:  Moderate assistance (pt continent of bladder, pericare hygiene completed seated, pt assisted with pants and brief off hips on R side req assist on L side on/off hips, CGA to maintain stance with unilateral UE support on handle of RTS)  Additional Comments: Pt doffed L shoe with CGA propping against wall and utilizing RLE, pt doffed R shoe i'ly, total A to don L shoe, setup to don R shoe    Toilet Transfers: Toilet Transfers  Toilet - Technique: Stand pivot  Equipment Used: Raised toilet seat with rails  Toilet Transfer: Minimal assistance  Toilet Transfers Comments: use of GB upon stance from w/c<RTS, assist to WS and advance/position LLE d/t difficulty pivoting, cues to reach for handle of RTS with RUE, pants mgmt completed after sitting on toilet to ensure safe positioning    Tub/ShowerTransfers:  Shower Transfers  Shower - Transfer From: Wheelchair  Shower - Transfer Type: To and From  Shower - Transfer To: Transfer tub bench  Shower - Technique: Stand pivot  Shower Transfers: Minimal assistance  Shower Transfers Comments: use of GB, stand pivot from w/c to TTB and scoot pivot from TTB to w/c leading to R with use of GB and min A    SPEECH THERAPY:  Assessment: Speech Therapy Diagnosis  Cognitive Diagnosis: Oral stage dysphagia, executive function impairments, left visual field neglect / right gaze preference, attention impairments. NUTRITION:  Please see nutrition note for details. Weight: 228 lb 9.9 oz (103.7 kg) / Body mass index is 36.9 kg/m². Current diet order Dietary Nutrition Supplements: Low Calorie High Protein Supplement  DIET GENERAL; Feeding: Able to feed self  Room Service: Selective   NSG Recorded PO: PO Meals Eaten (%): 76 - 100% PO Supplement (%): 0%  Malnutrition Status Malnutrition Status: No malnutrition    CASE MANAGEMENT:  Psychosocial/Behavioral Issues: none noted  Assessment:  Referred to patient for d/c planning. Continue to work with patient,  and friends for d/c planning. Plan to ask for insurance extension. Have discussed SNF with patient and family/friends. They are agreeable as patient will not have 24/7 at home. Will continue to follow for needs.     Patient/Family Education provided by team:  Role of PT/OT/ST, left neglect strategies, hemidressing tech, use of AE, transfer training    Patient Goals for Rehab stay:  1. Get back to normal       Rehab Team Goals for patient for the Upcoming Week:  1. Increase independence with ADLs      Barriers to Progress/Attainment of Goals & Efforts/Interventions to remove Barriers:  1. L hemiparesis-continue OT/PT  2. Pain-med mgmt      Discharge Plan:  Estimated Length of Stay: 35 days  Destination: discharge home with supervision  Services at Discharge: [address] [place] [address], Occupational Therapy and Speech Therapy 3x week  Equipment at Discharge: bath bench, raised toilet seat  Community Resources: n/a  Factors facilitating achievement of predicted outcomes: Friend support, Motivated, Cooperative, Pleasant and Sense of humor  Barriers to the achievement of predicted outcomes: Pain, Cognitive deficit, Impulsivity, Limited safety awareness, Unrealistic expectations, Decreased endurance, Decreased proprioception, Upper extremity weakness, Lower extremity weakness and Impaired vision    Special Needs in the Upcoming Week:   [x] Family/Caregiver Education  [] Home visit  []Therapeutic Pass [] Equipment  Type:   [] Consults:_______   [x] Family/Caregiver Training    [] Stroke Risk Factor Education     [] Other;_______     TEAM SUMMARY: Will continue with current poc & goals until anticipated d/c date of 6/3/2021.     MD:   Stroke Risk Factors:   [] N/A for this patient [] HTN  []  Diabetes  [] Hyperlipidemia  [x]Obesity BMI >25  [] Atrial Fibrillation [] Smoker (current)  [] Smoker (quit in last 12 months)  [x] Sleep Apnea [] Other    Risk for Readmission: Low (0-9), Moderate (10-19), High (20 or greater) - 20% High     Justification for Continued Stay:   Criteria for continued IRF stay:  Based on my medical assessment of the patient and review of information from the interdisciplinary team, as part of this weekly team conference, the patient continues to meet the following criteria for PM&R  5/27/2021  11:00 AM

## 2021-05-26 NOTE — PROGRESS NOTES
10.5 fL    Neutrophils % 57.5 %    Lymphocytes % 28.5 %    Monocytes % 9.3 %    Eosinophils % 3.9 %    Basophils % 0.8 %    Neutrophils Absolute 7.3 1.7 - 7.7 K/uL    Lymphocytes Absolute 3.6 1.0 - 5.1 K/uL    Monocytes Absolute 1.2 0.0 - 1.3 K/uL    Eosinophils Absolute 0.5 0.0 - 0.6 K/uL    Basophils Absolute 0.1 0.0 - 0.2 K/uL       Therapy progress:  PT  Position Activity Restriction  Other position/activity restrictions: Ambulate, Up with assist, Pt to wear helmet when OOB, OK to remove for shower, HOB to elevated at 30*  Objective     Sit to Stand: Contact guard assistance, Minimal Assistance (CGA /Min A for ant WS and stabilizing LLE to maintain a neutral position. Transf from mat table and from bed)  Stand to sit: Minimal Assistance, Contact guard assistance (VC for hand placement and to guide hips when descending on mat)  Bed to Chair: Contact guard assistance (SPT leading with the R side)  Device: Ro rail  Other Apparatus: Dorsiflex Assist (Prefabricated AFO with a w/c follow 2/2 to unsteadiness)  Assistance: Dependent/Total  Distance: 10'  OT  PT Equipment Recommendations  Equipment Needed: Yes (continue to assess)  Other: 20\" hemiheight w/c with drop arm and standard 90 degree leg rest  Toilet - Technique: Stand pivot  Equipment Used: Raised toilet seat with rails  Toilet Transfers Comments: VCs for hand placement, LLE ankle instability during pivot  Assessment        SLP  Diet Solids Recommendation: Dysphagia Soft and Bite-Sized (Dysphagia III) (with regular solids as tolerated. Per pt request, gravy/sauces to keep for moist.)       Body mass index is 36.9 kg/m².     Assessment and Plan:  Gina Abdullahi a 46 y.o. female with PMH GERD p/w L sided weakness and facial droop.  CTA head/neck on 4/12 revealed occluded R cervical ICA, occluded right LEAH, near complete occlusion of right MCA.     Acute ischemic CVA, Subdural heomorrhage s/p right hemicraniectomy and subsequen SAH and IP hemorrhage  No tPA given. MRI 4/14 showing large subacute infarct throughout R LEAH and MCA with some areas of hemorrhagic conversion.  - Neurosurgery and neurology following  - SBP <160, PRN hydralazine, scheduled Norvasc 5 mg daily  - Keppra 500 mg twice daily   - SQH  - PT/OT      Aphasia - SLP   Depression - Home fluoxetine 20 mg daily at home  Anxiety - Hold home Ativan 0.5 mg every 8 hours PRN for anxiety  MISHA - Patient uses CPAP at home  GERD - omeprazole 40 mg daily at home- substituted for pepcid 20 mg BID  Obesity    Rehab Progress: Improving  Anticipated Dispo: home  Services/DME: TTB and RTS (neither covered by insurance, friend to obtain)  ELOS: - extension needed       Emily Schneider D.O. M.P.H.  PM&R  5/26/2021  8:49 AM

## 2021-05-26 NOTE — PROGRESS NOTES
Occupational Therapy  Facility/Department: [facility] ACUTE REHAB UNIT  Daily Treatment Note  NAME: Jacek Powers  : 1970  MRN: [id number]    Date of Service: 2021    Discharge Recommendations:  24 hour supervision or assist, Home with Home health OT, Continue to assess pending progress, Home with nursing aide  OT Equipment Recommendations  Equipment Needed: Yes  ADL Assistive Devices: Transfer Tub Bench;Grab Bars - toilet; Toileting - Raised Toilet Seat with arms  Other: continue to assess    Assessment   Performance deficits / Impairments: Decreased functional mobility ; Decreased ADL status; Decreased ROM; Decreased strength;Decreased sensation;Decreased fine motor control;Decreased endurance;Decreased posture;Decreased balance;Decreased safe awareness;Decreased coordination;Decreased vision/visual deficit; Decreased cognition  Assessment: Pt met STG of LB dressing with mod A this date and demonstrating ability to utilize reacher to thread LLE for first time successfully d/t improving LLE activation. Pt completed UB dressing with CGA to don shirt with + time to thread LUE. Pt demosntrating improved mood and decreased pain this date compared to yesterday. Occasionally pt requiring + time to initiate transfers but no decline in functional ability to perform transfers. Cont per OT POC. Treatment Diagnosis: decreased independence with ADLs and decreased functional mobility 2/2 CVA  Prognosis: Fair  OT Education: Plan of Care;Transfer Training;Precautions; ADL Adaptive Strategies  Patient Education: education on use of reacher for LB dressing-pt verb and demo understanding, continue to reinforce  REQUIRES OT FOLLOW UP: Yes  Activity Tolerance  Activity Tolerance: Patient Tolerated treatment well  Safety Devices  Safety Devices in place: Yes  Type of devices: Chair alarm in place; Left in chair; All fall risk precautions in place;Call light within reach;Nurse notified         Patient Diagnosis(es): There were no encounter diagnoses. has a past medical history of GERD (gastroesophageal reflux disease), Hernia, Obesity, and Unspecified sleep apnea. has a past surgical history that includes Splenectomy (); Total hip arthroplasty (); Lap Band (); hernia repair ();  section (); and craniotomy (Right, 2021). Restrictions  Position Activity Restriction  Other position/activity restrictions: Ambulate, Up with assist, Pt to wear helmet when OOB, OK to remove for shower, HOB to elevated at 30*  Subjective   General  Chart Reviewed: Yes  Patient assessed for rehabilitation services?: Yes  Additional Pertinent Hx: 46 y.o. female with hypertension and tobacco abuse who presented after spending a prolonged period on the ground s/p fall out of bed; found to have acute left hemiparesis and gaze deviation. CXR with no acute cardiopulmonary abnormality. CTH revealed large acute/subacute infarct in the R frontal lobe with associated mass effect and 4 mm R to L midline shift. It also noted subdural hemorrhage of 6mm along falx, although NSGY less convinced. CTA head and neck revealed occluded R cervical ICA & R anterior cerebral artery with near complete occlusion of R MCA. Outside window on TPA. Pt is now POD #1 following hemicraniectomy (). Pt admitted to ARU   Response to previous treatment: Patient with no complaints from previous session  Family / Caregiver Present: No  Referring Practitioner: Dr. Sincere Nolasco DO  Diagnosis: CVA  Subjective  Subjective: Pt supine in bed upon arrivalm pleasant and agreeable to OT ADL session. General Comment  Comments: Excell Mickey Vital Signs  Patient Currently in Pain: Yes (2/10 L hip and hand)     Orientation  Orientation  Overall Orientation Status: Within Functional Limits  Objective    ADL  Grooming: Setup (pt completed oral hygiene in shower with assist to place toothpaste onto toothbrush d/t location of pt as pt was bathing)  UE Bathing: Setup; Increased time to complete;Verbal cueing;Contact guard assistance (seated on TTB, positional assist of LUE to wash underarms thoroughly; VCs for adaptive pratik technique to wash RUE)  LE Bathing: Moderate assistance;Setup;Verbal cueing; Increased time to complete (seated on TTB; use of long handled sponge to wash lower limbs and feet, assist to wash/dry buttocks in stance with CGA to maintain stance with unilateral UE support at GB)  UE Dressing: Verbal cueing;Setup;Contact guard assistance (pt doffed shirt i'ly, pt donned shirt with + time to thread LUE and cues to pull up to elbow)  LE Dressing: Moderate assistance;Setup;Verbal cueing; Increased time to complete (pt donned brief and pants w reacher and utilized RLE to position LLE, A to position brief over toes getting caught on toes, pt req setup assist of L pant leg, pt able to use reacher to thread with + time and VCs, A to pull over hips in stance with CGA)  Toileting: Moderate assistance (pt continent of bladder, pt completed pericare hygiene seated, pt managed brief and pants off R side assist on L with steady assist)  Additional Comments: Pt doffed L shoe with CGA propping against wall and utilizing RLE, pt doffed R shoe i'ly, total A to don L shoe, setup to don R shoe        Balance  Sitting Balance: Modified independent  (seated on TTB)  Standing Balance: Minimal assistance (CGA statically Min A dynamically for LB dressing)  Standing Balance  Time: ~3 minutes total  Activity: functional transfers; stance for LB ADLs toileting and LB dressing, multiple stands in attempt for LB clothing mgmt over hips in stance req 2-3 stands from w/c with use of GB  Toilet Transfers  Toilet - Technique: Stand pivot  Equipment Used: Raised toilet seat with rails  Toilet Transfer: Minimal assistance  Toilet Transfers Comments: VCs for hand placement, LLE ankle instability during pivot leading to L  Shower Transfers  Shower - Transfer From: Wheelchair  Shower - Transfer Type:  To and From  [employer] - Transfer To: Transfer tub bench  Shower - Technique: Stand pivot  Shower Transfers: Minimal assistance  Shower Transfers Comments: use of GB, stand pivot from w/c to TTB and scoot pivot from TTB to w/c leading to R with use of GB and min A  Bed mobility  Supine to Sit: Minimal assistance (in long sitting with HOB elevated, assist to manage LLE towards EOB, use of bed rail)  Transfers  Stand Pivot Transfers: Minimal assistance (EOB<w/c leading to L)  Sit to stand: Minimal assistance;Contact guard assistance (from w/c, use of GB x 3)  Stand to sit: Minimal assistance (to control descent)                       Cognition  Overall Cognitive Status: Exceptions  Arousal/Alertness: Appropriate responses to stimuli  Following Commands:  Follows one step commands consistently  Attention Span: Appears intact  Memory: Appears intact  Safety Judgement: Decreased awareness of need for assistance;Decreased awareness of need for safety  Problem Solving: Assistance required to generate solutions;Assistance required to correct errors made;Assistance required to implement solutions;Decreased awareness of errors  Insights: Decreased awareness of deficits  Initiation: Requires cues for some  Sequencing: Requires cues for some                                         Plan   Plan  Times per week: 5x a week for 60 mins daily  Times per day: Daily  Current Treatment Recommendations: Strengthening, Endurance Training, Neuromuscular Re-education, Self-Care / ADL, Functional Mobility Training, Safety Education & Training, ROM, Positioning, Wheelchair Mobility Training, Balance Training, Patient/Caregiver Education & Training, Manual Therapy:  MLD, Equipment Evaluation, Education, & procurement, Home Management Training, Cognitive Reorientation    Goals  Short term goals  Time Frame for Short term goals: 2 weeks  Short term goal 1: Pt will complete toilet transfer w/ Mod A-goal met 5/4  Short term goal 2: Pt will complete UE dressing w/ Mod A-goal met 5/12  Short term goal 3: Pt will complete oral care w/ spvn seated at sink w/o VCs for L sided attention-goal met 5/17  Short term goal 4: Pt will complete LE dressing w/ Mod A-goal met 5/26  Long term goals  Time Frame for Long term goals : 4 weeks  Long term goal 1: Pt will complete toilet transfer w/ SBA-ongoing  Long term goal 2: Pt will complete UE dressing w/ setup-ongoing  Long term goal 3: Pt will complete LE dressing w/ SBA-ongoing  Long term goal 4: Pt will be independent w/ tone management exercises to improve functional use of LUE-ongoing  Long term goal 5: Pt will complete oral care I'ly-goal met 5/24  Patient Goals   Patient goals : \"get back to my normal self\"       Therapy Time   Individual Concurrent Group Co-treatment   Time In 5000         Time Out 1445         Minutes 90         Timed Code Treatment Minutes: Marybel Reveles 47, OT

## 2021-05-26 NOTE — PROGRESS NOTES
Assessment complete, VSS, afebrile, pt. Voiced pain to left hip, PRN oxycodone administered per protocol, see flow sheet. Pt. Remains A & O X4. Helmet off while in bed. Edema glove in place on left hand. No other care needed at this time. Call light and over bed within reach. Hourly rounding and frequent visual checks continues.

## 2021-05-27 PROCEDURE — 97530 THERAPEUTIC ACTIVITIES: CPT

## 2021-05-27 PROCEDURE — 92526 ORAL FUNCTION THERAPY: CPT

## 2021-05-27 PROCEDURE — 97535 SELF CARE MNGMENT TRAINING: CPT

## 2021-05-27 PROCEDURE — 97130 THER IVNTJ EA ADDL 15 MIN: CPT

## 2021-05-27 PROCEDURE — 97129 THER IVNTJ 1ST 15 MIN: CPT

## 2021-05-27 PROCEDURE — 97110 THERAPEUTIC EXERCISES: CPT

## 2021-05-27 PROCEDURE — 99233 SBSQ HOSP IP/OBS HIGH 50: CPT | Performed by: PHYSICAL MEDICINE & REHABILITATION

## 2021-05-27 PROCEDURE — 1280000000 HC REHAB R&B

## 2021-05-27 PROCEDURE — 94660 CPAP INITIATION&MGMT: CPT

## 2021-05-27 PROCEDURE — 97542 WHEELCHAIR MNGMENT TRAINING: CPT | Performed by: PHYSICAL THERAPIST

## 2021-05-27 PROCEDURE — 97530 THERAPEUTIC ACTIVITIES: CPT | Performed by: PHYSICAL THERAPIST

## 2021-05-27 PROCEDURE — 6370000000 HC RX 637 (ALT 250 FOR IP): Performed by: PHYSICAL MEDICINE & REHABILITATION

## 2021-05-27 PROCEDURE — 97112 NEUROMUSCULAR REEDUCATION: CPT

## 2021-05-27 PROCEDURE — 97110 THERAPEUTIC EXERCISES: CPT | Performed by: PHYSICAL THERAPIST

## 2021-05-27 PROCEDURE — 6360000002 HC RX W HCPCS: Performed by: PHYSICAL MEDICINE & REHABILITATION

## 2021-05-27 RX ORDER — METHYLPHENIDATE HYDROCHLORIDE 10 MG/1
10 TABLET ORAL EVERY MORNING
Status: DISCONTINUED | OUTPATIENT
Start: 2021-05-28 | End: 2021-06-17

## 2021-05-27 RX ADMIN — DOCUSATE SODIUM 50 MG AND SENNOSIDES 8.6 MG 2 TABLET: 8.6; 5 TABLET, FILM COATED ORAL at 09:16

## 2021-05-27 RX ADMIN — ACETAMINOPHEN 1000 MG: 500 TABLET, COATED ORAL at 21:17

## 2021-05-27 RX ADMIN — OXYCODONE 10 MG: 5 TABLET ORAL at 16:33

## 2021-05-27 RX ADMIN — FAMOTIDINE 20 MG: 20 TABLET, FILM COATED ORAL at 09:17

## 2021-05-27 RX ADMIN — FAMOTIDINE 20 MG: 20 TABLET, FILM COATED ORAL at 21:18

## 2021-05-27 RX ADMIN — OXYCODONE 10 MG: 5 TABLET ORAL at 02:49

## 2021-05-27 RX ADMIN — ATORVASTATIN CALCIUM 80 MG: 80 TABLET, FILM COATED ORAL at 21:18

## 2021-05-27 RX ADMIN — LEVETIRACETAM 500 MG: 500 TABLET ORAL at 09:17

## 2021-05-27 RX ADMIN — THERA TABS 1 TABLET: TAB at 09:17

## 2021-05-27 RX ADMIN — ASPIRIN 81 MG: 81 TABLET, CHEWABLE ORAL at 09:16

## 2021-05-27 RX ADMIN — THIAMINE HCL TAB 100 MG 100 MG: 100 TAB at 09:17

## 2021-05-27 RX ADMIN — LEVETIRACETAM 500 MG: 500 TABLET ORAL at 21:18

## 2021-05-27 RX ADMIN — DOCUSATE SODIUM 50 MG AND SENNOSIDES 8.6 MG 2 TABLET: 8.6; 5 TABLET, FILM COATED ORAL at 21:18

## 2021-05-27 RX ADMIN — ACETAMINOPHEN 1000 MG: 500 TABLET, COATED ORAL at 09:17

## 2021-05-27 RX ADMIN — HEPARIN SODIUM 5000 UNITS: 5000 INJECTION INTRAVENOUS; SUBCUTANEOUS at 16:32

## 2021-05-27 RX ADMIN — ACETAMINOPHEN 1000 MG: 500 TABLET, COATED ORAL at 16:33

## 2021-05-27 RX ADMIN — METHYLPHENIDATE HYDROCHLORIDE 5 MG: 5 TABLET ORAL at 09:16

## 2021-05-27 RX ADMIN — FLUOXETINE 20 MG: 20 CAPSULE ORAL at 09:17

## 2021-05-27 RX ADMIN — HEPARIN SODIUM 5000 UNITS: 5000 INJECTION INTRAVENOUS; SUBCUTANEOUS at 05:39

## 2021-05-27 RX ADMIN — OXYCODONE 10 MG: 5 TABLET ORAL at 09:22

## 2021-05-27 RX ADMIN — AMLODIPINE BESYLATE 5 MG: 5 TABLET ORAL at 09:17

## 2021-05-27 RX ADMIN — HEPARIN SODIUM 5000 UNITS: 5000 INJECTION INTRAVENOUS; SUBCUTANEOUS at 21:18

## 2021-05-27 ASSESSMENT — PAIN DESCRIPTION - ONSET
ONSET: ON-GOING

## 2021-05-27 ASSESSMENT — PAIN - FUNCTIONAL ASSESSMENT
PAIN_FUNCTIONAL_ASSESSMENT: ACTIVITIES ARE NOT PREVENTED

## 2021-05-27 ASSESSMENT — PAIN SCALES - GENERAL
PAINLEVEL_OUTOF10: 8
PAINLEVEL_OUTOF10: 7
PAINLEVEL_OUTOF10: 0
PAINLEVEL_OUTOF10: 4
PAINLEVEL_OUTOF10: 5
PAINLEVEL_OUTOF10: 0
PAINLEVEL_OUTOF10: 4
PAINLEVEL_OUTOF10: 8

## 2021-05-27 ASSESSMENT — PAIN DESCRIPTION - PAIN TYPE
TYPE: ACUTE PAIN

## 2021-05-27 ASSESSMENT — PAIN DESCRIPTION - PROGRESSION
CLINICAL_PROGRESSION: GRADUALLY WORSENING
CLINICAL_PROGRESSION: NOT CHANGED
CLINICAL_PROGRESSION: GRADUALLY WORSENING
CLINICAL_PROGRESSION: NOT CHANGED
CLINICAL_PROGRESSION: GRADUALLY IMPROVING
CLINICAL_PROGRESSION: GRADUALLY IMPROVING

## 2021-05-27 ASSESSMENT — PAIN DESCRIPTION - DESCRIPTORS
DESCRIPTORS: DISCOMFORT
DESCRIPTORS: DISCOMFORT
DESCRIPTORS: ACHING;DISCOMFORT
DESCRIPTORS: DISCOMFORT
DESCRIPTORS: DISCOMFORT
DESCRIPTORS: ACHING

## 2021-05-27 ASSESSMENT — PAIN DESCRIPTION - LOCATION
LOCATION: HIP;SHOULDER
LOCATION: BACK
LOCATION: HIP
LOCATION: HIP;SHOULDER
LOCATION: HIP
LOCATION: ARM;HIP

## 2021-05-27 ASSESSMENT — PAIN DESCRIPTION - FREQUENCY
FREQUENCY: INTERMITTENT

## 2021-05-27 ASSESSMENT — PAIN DESCRIPTION - ORIENTATION
ORIENTATION: LEFT
ORIENTATION: LOWER

## 2021-05-27 NOTE — PLAN OF CARE
Problem: Falls - Risk of:  Goal: Will remain free from falls  Description: Will remain free from falls  Outcome: Met This Shift  Note: Pt remains free of falls thus far this shift. All fall precautions in place and functioning properly. For patient safety, Visual Surveillance is in place, reviewed with patient/family, verbalized understanding.        Problem: Nutrition  Goal: Optimal nutrition therapy  Outcome: Ongoing  Note: Pt continues to eat % of every meal.

## 2021-05-27 NOTE — PROGRESS NOTES
Patient is in bed and resting at this time, vitals stable, pain was 4/10 with L arm, gave scheduled Tylenol. Took patient to bathroom with x 2 assist stand pivot, had a BM and urinated. Call light with in reach and bed alarm on.
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
## 2021-05-27 NOTE — PROGRESS NOTES
Pt up in chair, reports pain to L hip and shoulder, prn pain meds given at this time. All meds taken without difficulty. Splint applied. Call light within reach and chair alarm engaged. For patient safety, Visual Surveillance is in place, reviewed with patient/family, verbalized understanding.    Vitals:    05/27/21 0900   BP: 119/87   Pulse: 75   Resp: 16   Temp: 98.1 °F (36.7 °C)   SpO2: 96%

## 2021-05-27 NOTE — PROGRESS NOTES
Department of Physical Medicine & Rehabilitation  Progress Note    Patient Identification:  Ronda Duran  [id number]  : 1970  Admit date: 2021    Chief Complaint: CVA    Subjective:   Feels well today. She is frustrated that her left arm and leg are not improving faster. She was able to stand today in therapy. Participating well. ROS: No f/c, n/v, cp     Objective:  Patient Vitals for the past 24 hrs:   BP Temp Temp src Pulse Resp SpO2   21 0900 119/87 98.1 °F (36.7 °C) Oral 75 16 96 %   21 0004     16    21 2137 113/73 98.4 °F (36.9 °C) Oral 84 16 96 %     Const: Alert. No distress, pleasant. HEENT: Normocephalic, atraumatic. Normal sclera/conjunctiva. MMM. Incision healing well  CV: Regular rate and rhythm. Resp: No respiratory distress. Lungs CTAB. Abd: Soft, nontender, nondistended, NABS+   Ext: No edema. Neuro: Alert, oriented, appropriately interactive. Psych: Cooperative, appropriate mood and affect    Laboratory data: Available via EMR. Last 24 hour lab  No results found for this or any previous visit (from the past 24 hour(s)). Therapy progress:  PT  Position Activity Restriction  Other position/activity restrictions: Ambulate, Up with assist, Pt to wear helmet when OOB, OK to remove for shower, HOB to elevated at 30*  Objective     Sit to Stand: Minimal Assistance (Min A for ant WS and stabilizing LLE to maintain a neutral position.  Transf from w/c , commode)  Stand to sit: Minimal Assistance, Contact guard assistance (VC for hand placement and to guide hips when descending on mat)  Bed to Chair: Contact guard assistance (SPT leading with the R side)  Device: Ro rail  Other Apparatus: Dorsiflex Assist (Prefabricated AFO with a w/c follow 2/2 to unsteadiness)  Assistance: Dependent/Total  Distance: 10'  OT  PT Equipment Recommendations  Equipment Needed: Yes (continue to assess)  Other: 20\" hemiheight w/c with drop arm and standard 90 degree leg rest  Toilet - Technique: Stand pivot  Equipment Used: Raised toilet seat with rails  Toilet Transfers Comments: use of GB upon stance from w/c<RTS, assist to WS and advance/position LLE d/t difficulty pivoting, cues to reach for handle of RTS with RUE, pants mgmt completed after sitting on toilet to ensure safe positioning  Assessment        SLP  Diet Solids Recommendation: Dysphagia Soft and Bite-Sized (Dysphagia III) (with regular solids as tolerated. Per pt request, gravy/sauces to keep for moist.)       Body mass index is 36.9 kg/m². Assessment and Plan:  Luis Enrique Remedies a 46 y.o. female with PMH GERD p/w L sided weakness and facial droop.  CTA head/neck on 4/12 revealed occluded R cervical ICA, occluded right LEAH, near complete occlusion of right MCA.     Acute ischemic CVA, Subdural heomorrhage s/p right hemicraniectomy and subsequen SAH and IP hemorrhage  No tPA given.  MRI 4/14 showing large subacute infarct throughout R LEAH and MCA with some areas of hemorrhagic conversion.  - Neurosurgery and neurology following  - SBP <160, PRN hydralazine, scheduled Norvasc 5 mg daily  - Keppra 500 mg twice daily   - SQH  - PT/OT      Aphasia - SLP   Depression - Home fluoxetine 20 mg daily at home  Anxiety - Hold home Ativan 0.5 mg every 8 hours PRN for anxiety  MISHA - Patient uses CPAP at home  GERD - omeprazole 40 mg daily at home- substituted for pepcid 20 mg BID  Obesity    Rehab Progress: Improving  Anticipated Dispo: home  Services/DME: TTB and RTS (neither covered by insurance, friend to obtain)  ELOS: - extension needed       Joel Merlin, D.O. M.P.H.  PM&R  5/27/2021  10:23 AM

## 2021-05-27 NOTE — PROGRESS NOTES
Occupational Therapy  Facility/Department: [facility] ACUTE REHAB UNIT  Daily Treatment Note  NAME: Abelino Ashraf  : 1970  MRN: [id number]    Date of Service: 2021    Discharge Recommendations:  24 hour supervision or assist, Home with Home health OT, Continue to assess pending progress, Home with nursing aide  OT Equipment Recommendations  Equipment Needed: Yes  ADL Assistive Devices: Transfer Tub Bench;Grab Bars - toilet; Toileting - Raised Toilet Seat with arms; Reacher  Other: continue to assess    Assessment   Performance deficits / Impairments: Decreased functional mobility ; Decreased ADL status; Decreased ROM; Decreased strength;Decreased sensation;Decreased fine motor control;Decreased endurance;Decreased posture;Decreased balance;Decreased safe awareness;Decreased coordination;Decreased vision/visual deficit; Decreased cognition  Assessment: Pt demonstrating trace activation at scapula for slight elevation and protraction in sidelying with facilitation and + time. Pt demonstrating initiation deficits with transfers however has good insight and increased safety awareness as demonstratd by wanting to reattempt transfer if feeling unsteady or having sequencing challenges. Pt functioning below baseline but making slow steady progress. Cont per OT POC. Treatment Diagnosis: decreased independence with ADLs and decreased functional mobility 2/2 CVA  Prognosis: Fair  OT Education: Plan of Care;Transfer Training;Precautions; ADL Adaptive Strategies  Patient Education: pt educated on PNF patterns and scapular involvement to increase sh ROM  REQUIRES OT FOLLOW UP: Yes  Activity Tolerance  Activity Tolerance: Patient limited by fatigue;Patient Tolerated treatment well  Activity Tolerance: Pt req + time to initiate therapy session and agree to OOB activity d/t fatigue  Safety Devices  Safety Devices in place: Yes         Patient Diagnosis(es): There were no encounter diagnoses.       has a past medical history of GERD (gastroesophageal reflux disease), Hernia, Obesity, and Unspecified sleep apnea. has a past surgical history that includes Splenectomy (); Total hip arthroplasty (); Lap Band (); hernia repair ();  section (); and craniotomy (Right, 2021). Restrictions  Position Activity Restriction  Other position/activity restrictions: Ambulate, Up with assist, Pt to wear helmet when OOB, OK to remove for shower, HOB to elevated at 30*  Subjective   General  Chart Reviewed: Yes  Patient assessed for rehabilitation services?: Yes  Additional Pertinent Hx: 46 y.o. female with hypertension and tobacco abuse who presented after spending a prolonged period on the ground s/p fall out of bed; found to have acute left hemiparesis and gaze deviation. CXR with no acute cardiopulmonary abnormality. CTH revealed large acute/subacute infarct in the R frontal lobe with associated mass effect and 4 mm R to L midline shift. It also noted subdural hemorrhage of 6mm along falx, although NSGY less convinced. CTA head and neck revealed occluded R cervical ICA & R anterior cerebral artery with near complete occlusion of R MCA. Outside window on TPA. Pt is now POD #1 following hemicraniectomy (). Pt admitted to ARU   Response to previous treatment: Patient with no complaints from previous session  Family / Caregiver Present: No  Referring Practitioner: Dr. Manjeet Bay DO  Diagnosis: CVA  Subjective  Subjective: Pt supine in bed upon arrival asleep with CPAP donned requiring + time to arouse but agreeable to therapy. General Comment  Comments: Radha Aguilar   Vital Signs  Patient Currently in Pain: Denies (while supine in bed without movement)     Orientation  Orientation  Overall Orientation Status: Within Functional Limits  Objective    ADL  Grooming: Modified independent  (pt completed hand hygiene seated in w/c with RUE only d/t edema glove on LUE)  LE Dressing:  (OT donned compression socks and shoes for sake of time with pt supine in bed)  Toileting:  Moderate assistance (pt continent of bladder, pericare hygiene completed seated, pt assisted with pants and brief off hips on R side req assist on L side on/off hips, CGA to maintain stance with unilateral UE support on handle of RTS)        Balance  Sitting Balance: Modified independent  (seated EOB and on toilet)  Standing Balance: Minimal assistance (CGA statically min A dynamically to pull up pants)  Standing Balance  Time: ~2 minutes total  Activity: functional transfers, in stance for toileting  Toilet Transfers  Toilet - Technique: Stand pivot  Equipment Used: Raised toilet seat with rails  Toilet Transfer: Minimal assistance  Toilet Transfers Comments: use of GB upon stance from w/c<RTS, assist to WS and advance/position LLE d/t difficulty pivoting, cues to reach for handle of RTS with RUE, pants mgmt completed after sitting on toilet to ensure safe positioning  Bed mobility  Supine to Sit: Minimal assistance (assist for trunk ascension, effortful for pt and requested assist stating, \"I feel stuck\", pt able to manage BLEs to EOB without cues)  Transfers  Stand Pivot Transfers: Minimal assistance (attempted leading to L from EOB to w/c but pt stating LLE \"feels wobbly\" requesting to transfer to R, cues to sequence)  Sit to stand: Minimal assistance;Contact guard assistance (from w/c with cues for ant lean)  Stand to sit: Minimal assistance (to control descent)                 Neuromuscular Education  Neuromuscular education: Yes  Functional Movement Patterns: Pt engaged in scapular PNF patterns while sidelying on R in order to promote scapular elevation, depression, protraction and retraction in LUE for both D1 flex/ext and D2 flex/ext; x 10 PROM, x 10 with quick stretch in attempt to elicit contractions and engagement, pt tolerated well with trace activation noted with scpaular elevation and protraction inconsistently, pt with no c/o discomfort     Cognition  Overall Cognitive Status: Exceptions  Following Commands: Follows one step commands consistently  Attention Span: Appears intact  Memory: Appears intact  Safety Judgement: Decreased awareness of need for assistance;Decreased awareness of need for safety  Problem Solving: Assistance required to generate solutions;Assistance required to correct errors made;Assistance required to implement solutions;Decreased awareness of errors  Insights: Decreased awareness of deficits  Initiation: Requires cues for some  Sequencing: Requires cues for some                    Type of ROM/Therapeutic Exercise  Type of ROM/Therapeutic Exercise: PROM  Exercises  Shoulder Flexion: x PROM 5 in sidelying gravity eliminated plane with air splint donned to maintain L elbow in extension, pt tolerated ~120 degrees  Shoulder ABduction: PROM x 3 supine in gravity eliminated plane with use of air splint pain free range pt tolerated to ~100 degrees  Horizontal ADduction: PROM x 5 with air splint donned, pt tolerated full range with c/o discomfort     Second Session: Pt sitting in w/c upon arrival with LLE observed to be off of w/c footrest and extended, pt with notable extensor tone when attempting to place back onto foot rest. Pt requesting additional time to finish ice cream. OT placed bowl to L of patient's table in order to promote visual scanning to left visual field. Pt able to scan and locate bowl appropriately eating i'ly (not including setup for snack). A green theraband was utilized around pt's LUE to maximize elbow flexion with proprioceptive input with the intent to cause antagonist triceps to engage when theraband was removed. Pt tolerated ~8 minutes with theraband however pt with minimal extension response when removed, however pt only required moderate approximation to position LUE into elbow extension. Pt completed w/c mobility to dining room utilizing RUE and RLE with SBA and cues for visual scanning to left visual field.  Pt with no setup-ongoing  Long term goal 3: Pt will complete LE dressing w/ SBA-ongoing  Long term goal 4: Pt will be independent w/ tone management exercises to improve functional use of LUE-ongoing  Long term goal 5: Pt will complete oral care I'ly-goal met 5/24  Patient Goals   Patient goals : \"get back to my normal self\"       Therapy Time   Individual Second Session Group Co-treatment   Time In  730  1315       Time Out  830  1345       Minutes  60  30       Timed Code Treatment Minutes: 60 Minutes+ 30 Minutes  Total Treatment Time: 90 Minutes       Lázaro Reveles, OT

## 2021-05-27 NOTE — PROGRESS NOTES
ACUTE REHAB UNIT  SPEECH/LANGUAGE PATHOLOGY      [x] Daily  [x] Weekly Care Conference Note  [] Discharge    Patient:Laura Solomon      HLD:2[birth year]91[birth year]90  JPD:[id number]  Rehab Dx/Hx: Acute CVA (cerebrovascular accident) ([facility] [address] [I63.9]    Precautions: [x] Aspiration  [x] Fall risk  [] Sternal  [] Seizure [] Hip  [] Weight Bearing [] Other  ST Dx: [] Aphasia  [] Dysarthria  [] Apraxia   [x] Oropharyngeal dysphagia [x] Cognitive Impairment  [] Other:   Date of Admit: 4/23/2021  Room #: 3101/3101-01  Date: 5/27/2021          Current Diet Order:Dietary Nutrition Supplements: Low Calorie High Protein Supplement  DIET GENERAL;   Recommended Form of Meds: PO  Compensatory Swallowing Strategies: Alternate solids and liquids, Upright as possible for all oral intake, Check for pocketing of food on the Left, Small bites/sips, Lingual sweep     Subjective: Pt admitted 4/12 after fall with left sided paralysis. Pt underwent craniectomy on 4/13 per Dr. Hyun Mojica. Social/Functional History  Lives With: Spouse;Son( reported that son is diagnosed with Autism.)  Occupation: Full time employment  Type of occupation:  at [employer]"    FEES 4/14: IMPRESSIONS:   Pt presents w/ mild oropharyngeal dysphagia characterized by premature spillage of thin liquids to UES and pyriforms w/ intermittent deep penetration of laryngeal vestibule; adequate airway protection and complete clearance of all residue after swallow initiation. No aspiration w/ any trials. Timely swallow initiation w/ puree and regular solid consistencies; no oropharyngeal residue.    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 Adequate ab/adduction. Complete closure of TVFs upon adduction.      Dentition: Adequate  Vision  Vision: Impaired  Vision Exceptions: Visual field cut  Hearing  Hearing: Within functional limits  Barriers toward progress: None L visual field with min cues. Pt verbalizes awareness of left visual inattention and goal/strategies to improve left attention. Pt requires minimal cues to attend to objects on L (on meal tray). Pt demonstrates min-mod appropriate gaze shifting to make eye contact with a conversation partner on left. No cues required to attend to  [place] MET   Pt will participate in ongoing cognitive linguistic assessment. GOAL PREVIOUSLY MET GOAL MET GOAL MET   Other areas targeted:      Education:   SLP educated pt re: role of SLP and rationale for treatment tasks. Pt verbalized comprehension and application. Educated to improvements noted and skills targeted with tx. Pt endorsed attempting to improve L attention outside of tx. Safety Devices: [x] Call light within reach  [x] Chair alarm activated and connected to nurse call light system  [] Bed alarm activated   [] Other: [x] Call light within reach  [] Chair alarm activated and connected to nurse call light system  [x] Bed alarm activated   [x] Other:  Reinforced use of call light    Assessment: Mild oral dysphagia, moderate left visual inattention although improved in second session. Increasing cues for sequencing t/f in PM session - suspect d/t fatigue. Pt with good insight and motivation to improve with excellent progress towards goals. Pt continues to be below baseline functioning and would benefit from ongoing skilled ST on an inpatient basis. Plan: Continue per POC.        Interventions used this date:  [] Speech/Language Treatment  [] Instruction in HEP  [x] Dysphagia Treatment [x] Cognitive Treatment   [] Other:    Discharge recommendations:  [] Home independently  [x] Home with assistance []  24 hour supervision  [] ECF [] Other  Continued Tx Upon Discharge: ? [x] Yes    [] No    [] TBD based on progress while on ARU     [] Vital Stim indicated     [] Other:   Estimated discharge date: 5/27/21    Tx Session 1:  Electronically Signed by:  Yue Malik

## 2021-05-28 ENCOUNTER — TELEPHONE (OUTPATIENT)
Dept: CARDIOLOGY CLINIC | Age: 51
End: 2021-05-28

## 2021-05-28 LAB
ANION GAP SERPL CALCULATED.3IONS-SCNC: 10 MMOL/L (ref 3–16)
BASOPHILS ABSOLUTE: 0.1 K/UL (ref 0–0.2)
BASOPHILS RELATIVE PERCENT: 0.9 %
BUN BLDV-MCNC: 11 MG/DL (ref 7–20)
CALCIUM SERPL-MCNC: 9.7 MG/DL (ref 8.3–10.6)
CHLORIDE BLD-SCNC: 106 MMOL/L (ref 99–110)
CO2: 25 MMOL/L (ref 21–32)
CREAT SERPL-MCNC: 0.5 MG/DL (ref 0.6–1.1)
EOSINOPHILS ABSOLUTE: 0.6 K/UL (ref 0–0.6)
EOSINOPHILS RELATIVE PERCENT: 5.2 %
GFR AFRICAN AMERICAN: >60
GFR NON-AFRICAN AMERICAN: >60
GLUCOSE BLD-MCNC: 91 MG/DL (ref 70–99)
HCT VFR BLD CALC: 38.1 % (ref 36–48)
HEMOGLOBIN: 13 G/DL (ref 12–16)
LYMPHOCYTES ABSOLUTE: 4.3 K/UL (ref 1–5.1)
LYMPHOCYTES RELATIVE PERCENT: 39.8 %
MCH RBC QN AUTO: 35.2 PG (ref 26–34)
MCHC RBC AUTO-ENTMCNC: 34 G/DL (ref 31–36)
MCV RBC AUTO: 103.5 FL (ref 80–100)
MONOCYTES ABSOLUTE: 1.3 K/UL (ref 0–1.3)
MONOCYTES RELATIVE PERCENT: 11.9 %
NEUTROPHILS ABSOLUTE: 4.6 K/UL (ref 1.7–7.7)
NEUTROPHILS RELATIVE PERCENT: 42.2 %
PDW BLD-RTO: 13.5 % (ref 12.4–15.4)
PLATELET # BLD: 365 K/UL (ref 135–450)
PMV BLD AUTO: 8.9 FL (ref 5–10.5)
POTASSIUM REFLEX MAGNESIUM: 4.3 MMOL/L (ref 3.5–5.1)
RBC # BLD: 3.68 M/UL (ref 4–5.2)
SODIUM BLD-SCNC: 141 MMOL/L (ref 136–145)
WBC # BLD: 10.8 K/UL (ref 4–11)

## 2021-05-28 PROCEDURE — 80048 BASIC METABOLIC PNL TOTAL CA: CPT

## 2021-05-28 PROCEDURE — 6370000000 HC RX 637 (ALT 250 FOR IP): Performed by: PHYSICAL MEDICINE & REHABILITATION

## 2021-05-28 PROCEDURE — 97542 WHEELCHAIR MNGMENT TRAINING: CPT | Performed by: PHYSICAL THERAPIST

## 2021-05-28 PROCEDURE — 99232 SBSQ HOSP IP/OBS MODERATE 35: CPT | Performed by: PHYSICAL MEDICINE & REHABILITATION

## 2021-05-28 PROCEDURE — 85025 COMPLETE CBC W/AUTO DIFF WBC: CPT

## 2021-05-28 PROCEDURE — 1280000000 HC REHAB R&B

## 2021-05-28 PROCEDURE — 36415 COLL VENOUS BLD VENIPUNCTURE: CPT

## 2021-05-28 PROCEDURE — 97129 THER IVNTJ 1ST 15 MIN: CPT

## 2021-05-28 PROCEDURE — 97530 THERAPEUTIC ACTIVITIES: CPT

## 2021-05-28 PROCEDURE — 97116 GAIT TRAINING THERAPY: CPT | Performed by: PHYSICAL THERAPIST

## 2021-05-28 PROCEDURE — 6360000002 HC RX W HCPCS: Performed by: PHYSICAL MEDICINE & REHABILITATION

## 2021-05-28 PROCEDURE — 92526 ORAL FUNCTION THERAPY: CPT

## 2021-05-28 PROCEDURE — 97130 THER IVNTJ EA ADDL 15 MIN: CPT

## 2021-05-28 PROCEDURE — 97530 THERAPEUTIC ACTIVITIES: CPT | Performed by: PHYSICAL THERAPIST

## 2021-05-28 PROCEDURE — 97535 SELF CARE MNGMENT TRAINING: CPT

## 2021-05-28 RX ORDER — CETIRIZINE HYDROCHLORIDE 10 MG/1
10 TABLET ORAL DAILY
Status: DISCONTINUED | OUTPATIENT
Start: 2021-05-28 | End: 2021-06-24 | Stop reason: HOSPADM

## 2021-05-28 RX ORDER — GUAIFENESIN 600 MG/1
600 TABLET, EXTENDED RELEASE ORAL 2 TIMES DAILY
Status: DISCONTINUED | OUTPATIENT
Start: 2021-05-28 | End: 2021-06-17

## 2021-05-28 RX ADMIN — LEVETIRACETAM 500 MG: 500 TABLET ORAL at 09:11

## 2021-05-28 RX ADMIN — CETIRIZINE HYDROCHLORIDE 10 MG: 10 TABLET, FILM COATED ORAL at 14:38

## 2021-05-28 RX ADMIN — HEPARIN SODIUM 5000 UNITS: 5000 INJECTION INTRAVENOUS; SUBCUTANEOUS at 22:23

## 2021-05-28 RX ADMIN — HEPARIN SODIUM 5000 UNITS: 5000 INJECTION INTRAVENOUS; SUBCUTANEOUS at 14:39

## 2021-05-28 RX ADMIN — OXYCODONE 10 MG: 5 TABLET ORAL at 09:11

## 2021-05-28 RX ADMIN — ASPIRIN 81 MG: 81 TABLET, CHEWABLE ORAL at 09:11

## 2021-05-28 RX ADMIN — ATORVASTATIN CALCIUM 80 MG: 80 TABLET, FILM COATED ORAL at 22:24

## 2021-05-28 RX ADMIN — AMLODIPINE BESYLATE 5 MG: 5 TABLET ORAL at 09:11

## 2021-05-28 RX ADMIN — ACETAMINOPHEN 1000 MG: 500 TABLET, COATED ORAL at 22:23

## 2021-05-28 RX ADMIN — DOCUSATE SODIUM 50 MG AND SENNOSIDES 8.6 MG 2 TABLET: 8.6; 5 TABLET, FILM COATED ORAL at 09:12

## 2021-05-28 RX ADMIN — FAMOTIDINE 20 MG: 20 TABLET, FILM COATED ORAL at 22:24

## 2021-05-28 RX ADMIN — METHYLPHENIDATE HYDROCHLORIDE 10 MG: 10 TABLET ORAL at 09:11

## 2021-05-28 RX ADMIN — GUAIFENESIN 600 MG: 600 TABLET, EXTENDED RELEASE ORAL at 22:24

## 2021-05-28 RX ADMIN — HEPARIN SODIUM 5000 UNITS: 5000 INJECTION INTRAVENOUS; SUBCUTANEOUS at 06:30

## 2021-05-28 RX ADMIN — LEVETIRACETAM 500 MG: 500 TABLET ORAL at 22:23

## 2021-05-28 RX ADMIN — ACETAMINOPHEN 1000 MG: 500 TABLET, COATED ORAL at 04:00

## 2021-05-28 RX ADMIN — DICLOFENAC SODIUM 4 G: 10 GEL TOPICAL at 23:58

## 2021-05-28 RX ADMIN — GUAIFENESIN 600 MG: 600 TABLET, EXTENDED RELEASE ORAL at 09:20

## 2021-05-28 RX ADMIN — DOCUSATE SODIUM 50 MG AND SENNOSIDES 8.6 MG 2 TABLET: 8.6; 5 TABLET, FILM COATED ORAL at 22:23

## 2021-05-28 RX ADMIN — THIAMINE HCL TAB 100 MG 100 MG: 100 TAB at 09:11

## 2021-05-28 RX ADMIN — OXYCODONE 10 MG: 5 TABLET ORAL at 22:23

## 2021-05-28 RX ADMIN — FAMOTIDINE 20 MG: 20 TABLET, FILM COATED ORAL at 09:12

## 2021-05-28 RX ADMIN — FLUOXETINE 20 MG: 20 CAPSULE ORAL at 09:12

## 2021-05-28 RX ADMIN — THERA TABS 1 TABLET: TAB at 09:11

## 2021-05-28 ASSESSMENT — PAIN DESCRIPTION - PROGRESSION
CLINICAL_PROGRESSION: GRADUALLY IMPROVING
CLINICAL_PROGRESSION: GRADUALLY WORSENING

## 2021-05-28 ASSESSMENT — PAIN DESCRIPTION - PAIN TYPE
TYPE: ACUTE PAIN

## 2021-05-28 ASSESSMENT — PAIN DESCRIPTION - ORIENTATION
ORIENTATION: LEFT

## 2021-05-28 ASSESSMENT — PAIN SCALES - GENERAL
PAINLEVEL_OUTOF10: 7
PAINLEVEL_OUTOF10: 0
PAINLEVEL_OUTOF10: 5
PAINLEVEL_OUTOF10: 7
PAINLEVEL_OUTOF10: 8

## 2021-05-28 ASSESSMENT — PAIN DESCRIPTION - ONSET
ONSET: ON-GOING
ONSET: ON-GOING

## 2021-05-28 ASSESSMENT — PAIN DESCRIPTION - LOCATION
LOCATION: ARM;HIP
LOCATION: HIP
LOCATION: HIP

## 2021-05-28 ASSESSMENT — PAIN SCALES - WONG BAKER: WONGBAKER_NUMERICALRESPONSE: 0

## 2021-05-28 ASSESSMENT — PAIN DESCRIPTION - FREQUENCY
FREQUENCY: INTERMITTENT
FREQUENCY: INTERMITTENT

## 2021-05-28 ASSESSMENT — PAIN DESCRIPTION - DESCRIPTORS
DESCRIPTORS: DISCOMFORT
DESCRIPTORS: DISCOMFORT

## 2021-05-28 NOTE — PLAN OF CARE
Problem: Pain:  Goal: Pain level will decrease  Description: Pain level will decrease  Outcome: Ongoing  Note: Patient complains of pain at level 4/10. Patient describes pain as discomfort. Patient requests pain medication. Patient medicated with scheduled Tylenol. Goal: Control of acute pain  Description: Control of acute pain  Outcome: Ongoing  Goal: Control of chronic pain  Description: Control of chronic pain  Outcome: Ongoing     Problem: Falls - Risk of:  Goal: Will remain free from falls  Description: Will remain free from falls  5/28/2021 0404 by Ravindra Buckner RN  Outcome: Ongoing  Note: Patient is a fall risk. Patient is x 1 stand pivot to chair and x 2 stand pivot to bathroom. See Fall Risk assessment for details. Bed is in low, lock position; call light/belongings within reach. No attempts to get out of bed have been made, calls appropriately when assistance is needed. Bed alarm and hourly rounds in place for safety. 5/27/2021 1728 by Nils Mendez RN  Outcome: Met This Shift  Note: Pt remains free of falls thus far this shift. All fall precautions in place and functioning properly. For patient safety, Visual Surveillance is in place, reviewed with patient/family, verbalized understanding. Goal: Absence of physical injury  Description: Absence of physical injury  Outcome: Ongoing     Problem: Skin Integrity:  Goal: Will show no infection signs and symptoms  Description: Will show no infection signs and symptoms  Outcome: Ongoing  Note: Patient has surgical incision on top of scalp, clean with soap and water daily.    Goal: Absence of new skin breakdown  Description: Absence of new skin breakdown  Outcome: Ongoing     Problem: Nutrition  Goal: Optimal nutrition therapy  5/28/2021 0404 by Ravindra Buckner RN  Outcome: Ongoing  5/27/2021 1728 by Nils Mendez RN  Outcome: Ongoing  Note: Pt continues to eat % of every meal.

## 2021-05-28 NOTE — PROGRESS NOTES
ACUTE REHAB UNIT  SPEECH/LANGUAGE PATHOLOGY      [x] Daily  [x] Weekly Care Conference Note  [] Discharge    Patient:Laura Ozuna      VXD:[birth year]  TRISTA:[id number]  Rehab Dx/Hx: Acute CVA (cerebrovascular accident) ([facility] [address] [I63.9]    Precautions: [x] Aspiration  [x] Fall risk  [] Sternal  [] Seizure [] Hip  [] Weight Bearing [] Other  ST Dx: [] Aphasia  [] Dysarthria  [] Apraxia   [x] Oropharyngeal dysphagia [x] Cognitive Impairment  [] Other:   Date of Admit: 4/23/2021  Room #: 3101/3101-01  Date: 5/28/2021          Current Diet Order:Dietary Nutrition Supplements: Low Calorie High Protein Supplement  DIET GENERAL;   Recommended Form of Meds: PO  Compensatory Swallowing Strategies: Alternate solids and liquids, Upright as possible for all oral intake, Check for pocketing of food on the Left, Small bites/sips, Lingual sweep     Subjective: Pt admitted 4/12 after fall with left sided paralysis. Pt underwent craniectomy on 4/13 per Dr. Lisandra Buckley. Social/Functional History  Lives With: Spouse;Son( reported that son is diagnosed with Autism.)  Occupation: Full time employment  Type of occupation:  at [employer]"    FEES 4/14: IMPRESSIONS:   Pt presents w/ mild oropharyngeal dysphagia characterized by premature spillage of thin liquids to UES and pyriforms w/ intermittent deep penetration of laryngeal vestibule; adequate airway protection and complete clearance of all residue after swallow initiation. No aspiration w/ any trials. Timely swallow initiation w/ puree and regular solid consistencies; no oropharyngeal residue.    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 Adequate ab/adduction. Complete closure of TVFs upon adduction.      Dentition: Adequate  Vision  Vision: Impaired  Vision Exceptions: Visual field cut  Hearing  Hearing: Within functional limits  Barriers toward progress: None towards stated goals    Date: 5/28/2021      Tx session 1 Tx session 2   Total Timed Code Min 15 30   Total Treatment Minutes 30 30   Individual Treatment Minutes 30 30   Group Treatment Minutes 0 0   Co-Treat Minutes 0 0   Brief Exception: N/A N/A   Pain 6 or 7 in L hip and L hand. None indicated    Pain Intervention: [] RN notified  [] Repositioned  [] Intervention offered and patient declined  [] N/A  [] Other:  [] RN notified  [] Repositioned  [] Intervention offered and patient declined  [x] N/A  [] Other:    Subjective:     Pt upright in chair and ready for therapy. Pt endorsing anxiety this date and identifies source as upcoming discharge. Pt upright in wheelchair returning from OT. Pt spouse present for education. Objective / Goals:     Patient will consume regular solids with timely mastication and no pocketing in L buccal cavity across 2 sessions. Patient tolerated NMES via VitalStim placement 4a (targeting orbicularis oris, buccinator and superior pharyngeal constrictor) @ 2.0 mA on right x 20 minutes, 9.0 mA on left x 20 minutes. Good contraction throughout. Pt with pocketing x1 when cued to utilize lingual sweep ; pt deferred to finger sweep. Pt assessed with regular and mixed consistency  Goal not targeted this session. Patient will consume PO without anterior spillage or overt pocketing across 2 sessions. GOAL MET  Anterior spillage of mixed consistency. Pt sensate but had spillage bilaterally so unclear adequacy. GOAL MET   Pt will complete divided/alternating attention tasks with 85% accuracy given min cues. Mod cues for attention to meal this date + conversational exchanges. No attention impairments. Pt will accurately complete executive functioning tasks with min cues. Identifying anticipated difficulty at discharge with min-mod cues. ID activity from abstract description: Independent.  Impulsivity for reading task when given abstract or unexpected instructions (I.e. \"Completed numbers 4 and 7\")   Pt will attend to L visual field with min cues. Right gaze preference with only fleeting eye contact to communication partner seated left of midline. No cues for paragraph level reading. Pt will participate in ongoing cognitive linguistic assessment. GOAL MET GOAL MET   Other areas targeted:     Education:   Education on increased agitation with sensory bombardment (pt c/o NMES + helmet discomfort + hip pain + SLP verbalizations) Educated on areas of treatment and general plan of care. Educated on recommendations for appropriate positioning to facilitate visual attention while maintaining safety. Educated on monitoring for visual signs of agitation and decreasing stimulation if these are noted. Education on attention concerns and barriers to daily tasks noted in therapy (I.e. eating in the presence of distractors). Pts spouse educated on recommendation for supervision for higher level iADLs at discharge (medications and finances). No questions for this SLP following education. Safety Devices: [x] Call light within reach  [x] Chair alarm activated and connected to nurse call light system  [] Bed alarm activated   [] Other: [x] Call light within reach  [x] Chair alarm activated and connected to nurse call light system  [] Bed alarm activated   [] Other:     Assessment: Exacerbation of cognitive impairments during AM session consistent with pt self reports of increased anxiety and anxiousness regarding family training this date and upcoming discharge. Significantly improved during second session. Plan: Continue per POC.       Interventions used this date:  [] Speech/Language Treatment  [] Instruction in HEP  [x] Dysphagia Treatment [x] Cognitive Treatment   [] Other:    Discharge recommendations:  [] Home independently  [x] Home with assistance []  24 hour supervision  [] ECF [] Other  Continued Tx Upon Discharge: ? [x] Yes    [] No    [] TBD based on progress while on ARU     [] Vital

## 2021-05-28 NOTE — PROGRESS NOTES
Patient is in bed and resting at this time, vitals stable, pain was 4/10 with lower back, gave scheduled Tylenol. Patient is A & O x 4, Call light with in reach and bed alarm.

## 2021-05-28 NOTE — PLAN OF CARE
Problem: Falls - Risk of:  Goal: Will remain free from falls  Description: Will remain free from falls  5/28/2021 1300 by Caroll Felty, RN  Outcome: Ongoing  Note: Pt remains free of falls thus far this shift. All fall precautions in place and functioning properly. For patient safety, Visual Surveillance is in place, reviewed with patient/family, verbalized understanding. Problem: Skin Integrity:  Goal: Will show no infection signs and symptoms  Description: Will show no infection signs and symptoms  5/28/2021 1300 by Caroll Felty, RN  Outcome: Ongoing  Note: Patient offered to be toileted every two hours and PRN, skin barrier applied as needed. Staff assists patient with repositioning and pillow support is provided when needed. Patient educated on offloading techniques and verbalizes understanding.
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
## 2021-05-28 NOTE — PROGRESS NOTES
Pt up in chair, reports pain prn pain med. All meds taken without difficulty. Call light within reach and chair alarm engaged.    Vitals:    05/28/21 0900   BP: 128/76   Pulse: 87   Resp: 16   Temp: 98.4 °F (36.9 °C)   SpO2: 96%

## 2021-05-28 NOTE — PROGRESS NOTES
Department of Physical Medicine & Rehabilitation  Progress Note    Patient Identification:  Stalin Mar  [id number]  : 1970  Admit date: 2021    Chief Complaint: CVA    Subjective:   Family training today. She is doing well but could use some more time with therapy. Extension is pending. ROS: No f/c, n/v, cp     Objective:  Patient Vitals for the past 24 hrs:   BP Temp Temp src Pulse Resp SpO2   21 0900 128/76 98.4 °F (36.9 °C) Oral 87 16 96 %   21 2102 128/84 98.3 °F (36.8 °C) Oral 79 16 96 %     Const: Alert. No distress, pleasant. HEENT: Normocephalic, atraumatic. Normal sclera/conjunctiva. MMM. Incision healing well  CV: Regular rate and rhythm. Resp: No respiratory distress. Lungs CTAB. Abd: Soft, nontender, nondistended, NABS+   Ext: No edema. Neuro: Alert, oriented, appropriately interactive. Psych: Cooperative, appropriate mood and affect    Laboratory data: Available via EMR.    Last 24 hour lab  Recent Results (from the past 24 hour(s))   Basic Metabolic Panel w/ Reflex to MG    Collection Time: 21  7:11 AM   Result Value Ref Range    Sodium 141 136 - 145 mmol/L    Potassium reflex Magnesium 4.3 3.5 - 5.1 mmol/L    Chloride 106 99 - 110 mmol/L    CO2 25 21 - 32 mmol/L    Anion Gap 10 3 - 16    Glucose 91 70 - 99 mg/dL    BUN 11 7 - 20 mg/dL    CREATININE 0.5 (L) 0.6 - 1.1 mg/dL    GFR Non-African American >60 >60    GFR African American >60 >60    Calcium 9.7 8.3 - 10.6 mg/dL   CBC auto differential    Collection Time: 21  7:11 AM   Result Value Ref Range    WBC 10.8 4.0 - 11.0 K/uL    RBC 3.68 (L) 4.00 - 5.20 M/uL    Hemoglobin 13.0 12.0 - 16.0 g/dL    Hematocrit 38.1 36.0 - 48.0 %    .5 (H) 80.0 - 100.0 fL    MCH 35.2 (H) 26.0 - 34.0 pg    MCHC 34.0 31.0 - 36.0 g/dL    RDW 13.5 12.4 - 15.4 %    Platelets 658 523 - 246 K/uL    MPV 8.9 5.0 - 10.5 fL    Neutrophils % 42.2 %    Lymphocytes % 39.8 %    Monocytes % 11.9 %    Eosinophils % 5.2 % subsequen SAH and IP hemorrhage  No tPA given.  MRI 4/14 showing large subacute infarct throughout R LEAH and MCA with some areas of hemorrhagic conversion.  - Neurosurgery and neurology following  - SBP <160, PRN hydralazine, scheduled Norvasc 5 mg daily  - Keppra 500 mg twice daily   - SQH  - PT/OT      Aphasia - SLP   Depression - Home fluoxetine 20 mg daily at home  Anxiety - Hold home Ativan 0.5 mg every 8 hours PRN for anxiety  MISHA - Patient uses CPAP at home  GERD - omeprazole 40 mg daily at home- substituted for pepcid 20 mg BID  Obesity    Rehab Progress: Improving  Anticipated Dispo: home  Services/DME: TTB and RTS (neither covered by insurance, friend to obtain)  ELOS: - extension needed       Emily Schneider D.O. M.P.H.  PM&R  5/28/2021  10:23 AM

## 2021-05-28 NOTE — PROGRESS NOTES
Occupational Therapy  Facility/Department: [facility] ACUTE REHAB UNIT  Daily Treatment Note  NAME: Emy Connor  : 1970  MRN: [id number]    Date of Service: 2021    Discharge Recommendations:  24 hour supervision or assist, Home with Home health OT, Continue to assess pending progress, Home with nursing aide  OT Equipment Recommendations  ADL Assistive Devices: Transfer Tub Bench;Grab Bars - toilet; Toileting - Raised Toilet Seat with arms; Reacher  Other: continue to assess    Assessment   Performance deficits / Impairments: Decreased functional mobility ; Decreased ADL status; Decreased ROM; Decreased strength;Decreased sensation;Decreased fine motor control;Decreased endurance;Decreased posture;Decreased balance;Decreased safe awareness;Decreased coordination;Decreased vision/visual deficit; Decreased cognition  Assessment: Pt demonstrated increased participation in pratik dressing, cueing to initiate however. Pt reaching for grab bars during transfer, requiring increased assist to manage LEs during stand pivot. Pt demonstrated increased L UE tenderness with light touch, MD aware. Pt tolerated L scapular PROM to reduce msucle tightness. Pt would benefit from continued OT while in ARU, continue OT POC. Treatment Diagnosis: decreased independence with ADLs and decreased functional mobility 2/2 CVA  OT Education: Plan of Care;Transfer Training;Precautions; ADL Adaptive Strategies  Patient Education: Ed pt on pratik technique for UB dressing, verb understanding however continue to ed for consistency. REQUIRES OT FOLLOW UP: Yes  Activity Tolerance  Activity Tolerance: Patient Tolerated treatment well;Treatment limited secondary to decreased cognition  Activity Tolerance: pt required cueing for initiation for ADLs  Safety Devices  Safety Devices in place: Yes  Type of devices: Chair alarm in place; Left in chair; All fall risk precautions in place;Call light within reach         Patient Diagnosis(es): There were no encounter diagnoses. has a past medical history of GERD (gastroesophageal reflux disease), Hernia, Obesity, and Unspecified sleep apnea. has a past surgical history that includes Splenectomy (); Total hip arthroplasty (); Lap Band (); hernia repair ();  section (); and craniotomy (Right, 2021). Restrictions  Position Activity Restriction  Other position/activity restrictions: Ambulate, Up with assist, Pt to wear helmet when OOB, OK to remove for shower, HOB to elevated at 30*  Subjective   General  Chart Reviewed: Yes  Patient assessed for rehabilitation services?: Yes  Additional Pertinent Hx: 46 y.o. female with hypertension and tobacco abuse who presented after spending a prolonged period on the ground s/p fall out of bed; found to have acute left hemiparesis and gaze deviation. CXR with no acute cardiopulmonary abnormality. CTH revealed large acute/subacute infarct in the R frontal lobe with associated mass effect and 4 mm R to L midline shift. It also noted subdural hemorrhage of 6mm along falx, although NSGY less convinced. CTA head and neck revealed occluded R cervical ICA & R anterior cerebral artery with near complete occlusion of R MCA. Outside window on TPA. Pt is now POD #1 following hemicraniectomy (). Pt admitted to ARU   Response to previous treatment: Patient with no complaints from previous session  Family / Caregiver Present: No  Referring Practitioner: Dr. Donny Ruiz DO  Diagnosis: CVA  Subjective  Subjective: Pt semi supine in bed upon arrival, agreeable to OT session for ADL and transfers. General Comment  Comments: . Orientation  Orientation  Overall Orientation Status: Within Functional Limits  Objective    ADL  Grooming: Minimal assistance (pt able to open/ close mouthwash container using one handed technique. Able to wash B hands with assist to doff glove and to bring L UE into sink-- pt reporting increased pain with light touch on LUE.  MD aware.)  UE Dressing: Moderate assistance;Verbal cueing; Increased time to complete (cueing to doff shirt with pratik tech. Pt donned sports bra with mod A 2/2 tight fit. Donned long sleeve sweatshirt with increased time and min A to pull down over trunk)  LE Dressing: Maximum assistance (Assist to pull pants down over hips in stance- using reacher and increased time to unthread from LEs. Assist to lift L LE to place into pants, cueing to pull up over leg. Assist to pull up hips)  Toileting: Moderate assistance        Balance  Sitting Balance: Independent (sitting EOB for dressing)  Standing Balance: Minimal assistance (min A during clothing management for toileting)  Standing Balance  Time: 1+2+2 mins  Activity: stand pivot transfer, standing for toileting and clothing mgmt  Toilet Transfers  Toilet - Technique: Stand pivot  Equipment Used: Raised toilet seat with rails  Toilet Transfer: Minimal assistance  Toilet Transfers Comments: ++ cueing for LE placement prior to transfer, hand placement during pivot  Bed mobility  Rolling to Left: Supervision (+ increased time to complete/ cueing to reach for bed rails)  Supine to Sit: Contact guard assistance (increased time to come to sidelying, CGA to safely guide LEs over edge of bed)  Transfers  Stand Pivot Transfers: Minimal assistance (increaed assistance to safely place wc near bed/ LE placement prior to transfers)  Sit to stand: Minimal assistance  Stand to sit: Minimal assistance                       Cognition  Overall Cognitive Status: Exceptions  Arousal/Alertness: Appropriate responses to stimuli  Following Commands:  Follows one step commands consistently  Attention Span: Appears intact  Memory: Appears intact  Safety Judgement: Decreased awareness of need for assistance;Decreased awareness of need for safety  Problem Solving: Assistance required to generate solutions;Assistance required to correct errors made;Assistance required to implement solutions;Decreased awareness of errors  Insights: Decreased awareness of deficits  Initiation: Requires cues for some  Sequencing: Requires cues for some                    Type of ROM/Therapeutic Exercise  Type of ROM/Therapeutic Exercise: PROM  Exercises  Scapular Retraction: x 10 PROM-- therapist facilitated scapular adduction with active trunk rotation/ R UE reaching behind for prep activity prior to transfer/ ADL. Elbow Extension: x 10 PROM  Finger Extension: x 5 reps extension- pt reporting increased sensitivity with light touch, MD aware. Second session: Pt  present during session for family training. Educated  on how to assist pt with pratik technique, and to provide cueing to initiate tasks and to cue for pratik technique. Pt  verb understanding. Pt completed toilet transfer with min A, reaching to grab bar to stand however pt reporting not having grab bars in that location next to toilet at home. Pt declining attempting without using grab bar. Pt completed toileting with min A to pull pants up/ down, able to complete eugenie care from seated position. Pt completed grooming seated in wc with assist to bring L UE into sink to wash hand. Pt  was educated on donning/ doffing isotoner compression glove, and proper technique to wash/ dry glove at home- ed was verb. Pt completed mobility in wc with R UE and LE from room to gym. Pt completed wc to TTB transfer initially using grab bar in front and mod A, however pt reporting at home she does not have this grab bar. Pt able to use side grab bar (which she does have at home) to reposition/ scoot to R on TTB. Pt transferred out of TTB using front grab bar with min A. Pt eventually agreeable to attempting transfer again without use of front grab bar, completed with technique to push up from R wc armrest with min A and to pivot using therapist arm for stability with min A for pivot. TTB to wc with therapist arm again with min A.  Pt  declined attempting Individual Concurrent Group Co-treatment   Time In 1315        Time Out 1345        Minutes 30          Timed Code Treatment Minutes:  60+30    Total Treatment Minutes:  kipa 36  [address] [facility], OTR/L [id number]

## 2021-05-29 PROCEDURE — 97130 THER IVNTJ EA ADDL 15 MIN: CPT

## 2021-05-29 PROCEDURE — 97110 THERAPEUTIC EXERCISES: CPT

## 2021-05-29 PROCEDURE — 94660 CPAP INITIATION&MGMT: CPT

## 2021-05-29 PROCEDURE — 1280000000 HC REHAB R&B

## 2021-05-29 PROCEDURE — 97530 THERAPEUTIC ACTIVITIES: CPT

## 2021-05-29 PROCEDURE — 92526 ORAL FUNCTION THERAPY: CPT

## 2021-05-29 PROCEDURE — 6370000000 HC RX 637 (ALT 250 FOR IP): Performed by: PHYSICAL MEDICINE & REHABILITATION

## 2021-05-29 PROCEDURE — 97112 NEUROMUSCULAR REEDUCATION: CPT

## 2021-05-29 PROCEDURE — 97129 THER IVNTJ 1ST 15 MIN: CPT

## 2021-05-29 PROCEDURE — 6360000002 HC RX W HCPCS: Performed by: PHYSICAL MEDICINE & REHABILITATION

## 2021-05-29 PROCEDURE — 99232 SBSQ HOSP IP/OBS MODERATE 35: CPT | Performed by: PHYSICAL MEDICINE & REHABILITATION

## 2021-05-29 PROCEDURE — 97535 SELF CARE MNGMENT TRAINING: CPT

## 2021-05-29 PROCEDURE — 97116 GAIT TRAINING THERAPY: CPT

## 2021-05-29 RX ADMIN — DOCUSATE SODIUM 50 MG AND SENNOSIDES 8.6 MG 2 TABLET: 8.6; 5 TABLET, FILM COATED ORAL at 21:13

## 2021-05-29 RX ADMIN — ATORVASTATIN CALCIUM 80 MG: 80 TABLET, FILM COATED ORAL at 21:13

## 2021-05-29 RX ADMIN — OXYCODONE HYDROCHLORIDE 5 MG: 5 TABLET ORAL at 19:15

## 2021-05-29 RX ADMIN — DOCUSATE SODIUM 50 MG AND SENNOSIDES 8.6 MG 2 TABLET: 8.6; 5 TABLET, FILM COATED ORAL at 09:14

## 2021-05-29 RX ADMIN — ASPIRIN 81 MG: 81 TABLET, CHEWABLE ORAL at 09:15

## 2021-05-29 RX ADMIN — HEPARIN SODIUM 5000 UNITS: 5000 INJECTION INTRAVENOUS; SUBCUTANEOUS at 14:00

## 2021-05-29 RX ADMIN — ACETAMINOPHEN 1000 MG: 500 TABLET, COATED ORAL at 15:49

## 2021-05-29 RX ADMIN — OXYCODONE 10 MG: 5 TABLET ORAL at 10:32

## 2021-05-29 RX ADMIN — FLUOXETINE 20 MG: 20 CAPSULE ORAL at 09:15

## 2021-05-29 RX ADMIN — LEVETIRACETAM 500 MG: 500 TABLET ORAL at 21:13

## 2021-05-29 RX ADMIN — Medication 5 MG: at 09:15

## 2021-05-29 RX ADMIN — LEVETIRACETAM 500 MG: 500 TABLET ORAL at 09:15

## 2021-05-29 RX ADMIN — DICLOFENAC SODIUM 4 G: 10 GEL TOPICAL at 21:13

## 2021-05-29 RX ADMIN — ACETAMINOPHEN 1000 MG: 500 TABLET, COATED ORAL at 05:19

## 2021-05-29 RX ADMIN — ACETAMINOPHEN 1000 MG: 500 TABLET, COATED ORAL at 10:32

## 2021-05-29 RX ADMIN — THIAMINE HCL TAB 100 MG 100 MG: 100 TAB at 09:15

## 2021-05-29 RX ADMIN — CETIRIZINE HYDROCHLORIDE 10 MG: 10 TABLET, FILM COATED ORAL at 09:15

## 2021-05-29 RX ADMIN — GUAIFENESIN 600 MG: 600 TABLET, EXTENDED RELEASE ORAL at 09:15

## 2021-05-29 RX ADMIN — METHYLPHENIDATE HYDROCHLORIDE 10 MG: 10 TABLET ORAL at 09:15

## 2021-05-29 RX ADMIN — THERA TABS 1 TABLET: TAB at 09:15

## 2021-05-29 RX ADMIN — FAMOTIDINE 20 MG: 20 TABLET, FILM COATED ORAL at 09:15

## 2021-05-29 RX ADMIN — AMLODIPINE BESYLATE 5 MG: 5 TABLET ORAL at 09:15

## 2021-05-29 RX ADMIN — DICLOFENAC SODIUM 4 G: 10 GEL TOPICAL at 09:15

## 2021-05-29 RX ADMIN — FAMOTIDINE 20 MG: 20 TABLET, FILM COATED ORAL at 21:13

## 2021-05-29 RX ADMIN — HEPARIN SODIUM 5000 UNITS: 5000 INJECTION INTRAVENOUS; SUBCUTANEOUS at 05:19

## 2021-05-29 RX ADMIN — ACETAMINOPHEN 1000 MG: 500 TABLET, COATED ORAL at 22:28

## 2021-05-29 RX ADMIN — GUAIFENESIN 600 MG: 600 TABLET, EXTENDED RELEASE ORAL at 21:13

## 2021-05-29 RX ADMIN — HEPARIN SODIUM 5000 UNITS: 5000 INJECTION INTRAVENOUS; SUBCUTANEOUS at 21:32

## 2021-05-29 ASSESSMENT — PAIN DESCRIPTION - PROGRESSION
CLINICAL_PROGRESSION: NOT CHANGED

## 2021-05-29 ASSESSMENT — PAIN SCALES - WONG BAKER
WONGBAKER_NUMERICALRESPONSE: 0
WONGBAKER_NUMERICALRESPONSE: 2
WONGBAKER_NUMERICALRESPONSE: 0
WONGBAKER_NUMERICALRESPONSE: 2
WONGBAKER_NUMERICALRESPONSE: 0

## 2021-05-29 ASSESSMENT — PAIN DESCRIPTION - DIRECTION
RADIATING_TOWARDS: FINGERS
RADIATING_TOWARDS: FINGERS

## 2021-05-29 ASSESSMENT — PAIN DESCRIPTION - PAIN TYPE
TYPE: ACUTE PAIN

## 2021-05-29 ASSESSMENT — PAIN DESCRIPTION - LOCATION
LOCATION: HIP
LOCATION: ARM;HIP
LOCATION: HIP
LOCATION: HAND
LOCATION: HAND
LOCATION: HIP

## 2021-05-29 ASSESSMENT — PAIN - FUNCTIONAL ASSESSMENT
PAIN_FUNCTIONAL_ASSESSMENT: ACTIVITIES ARE NOT PREVENTED

## 2021-05-29 ASSESSMENT — PAIN DESCRIPTION - FREQUENCY
FREQUENCY: INTERMITTENT

## 2021-05-29 ASSESSMENT — PAIN SCALES - GENERAL
PAINLEVEL_OUTOF10: 6
PAINLEVEL_OUTOF10: 0
PAINLEVEL_OUTOF10: 5
PAINLEVEL_OUTOF10: 0
PAINLEVEL_OUTOF10: 2
PAINLEVEL_OUTOF10: 5
PAINLEVEL_OUTOF10: 2
PAINLEVEL_OUTOF10: 5
PAINLEVEL_OUTOF10: 0
PAINLEVEL_OUTOF10: 0
PAINLEVEL_OUTOF10: 7

## 2021-05-29 ASSESSMENT — PAIN DESCRIPTION - ONSET
ONSET: ON-GOING

## 2021-05-29 ASSESSMENT — PAIN DESCRIPTION - ORIENTATION
ORIENTATION: LEFT

## 2021-05-29 ASSESSMENT — PAIN DESCRIPTION - DESCRIPTORS
DESCRIPTORS: ACHING;DISCOMFORT
DESCRIPTORS: ACHING
DESCRIPTORS: ACHING;DISCOMFORT
DESCRIPTORS: ACHING

## 2021-05-29 NOTE — PROGRESS NOTES
Department of Physical Medicine & Rehabilitation  Progress Note    Patient Identification:  Valeriy Mendosa  [id number]  : 1970  Admit date: 2021    Chief Complaint: CVA    Subjective:   Doing well. Left wrist is feeling better with voltaren gel. Slept well overnight. Participating well in therapy. ROS: No f/c, n/v, cp     Objective:  Patient Vitals for the past 24 hrs:   BP Temp Temp src Pulse Resp SpO2   21 0900 104/64 97.3 °F (36.3 °C) Oral 87 16 98 %   21 2215 121/79 98.8 °F (37.1 °C) Oral 81 16 95 %     Const: Alert. No distress, pleasant. HEENT: Normocephalic, atraumatic. Normal sclera/conjunctiva. MMM. Incision healing well  CV: Regular rate and rhythm. Resp: No respiratory distress. Lungs CTAB. Abd: Soft, nontender, nondistended, NABS+   Ext: No edema. Neuro: Alert, oriented, appropriately interactive. Psych: Cooperative, appropriate mood and affect    Laboratory data: Available via EMR. Last 24 hour lab  No results found for this or any previous visit (from the past 24 hour(s)). Therapy progress:  PT  Position Activity Restriction  Other position/activity restrictions: Ambulate, Up with assist, Pt to wear helmet when OOB, OK to remove for shower, HOB to elevated at 30*  Objective     Sit to Stand: Contact guard assistance (VC and maintaining midline position for an ant WS and stabilizing LLE to maintain a neutral position.  Transf from w/c and bed)  Stand to sit: Contact guard assistance (VC for hand placement and to maintain a midline position)  Bed to Chair: Contact guard assistance (SPT leading with the R side)  Device: Single point cane  Other Apparatus:  (sleeve on L shoe to faciitate forward movement with making it a frictionless surface)  Assistance: Minimal assistance, Moderate assistance  Distance: 5' , 25,' 40'  OT  PT Equipment Recommendations  Equipment Needed: Yes (continue to assess)  Other: 20\" hemiheight w/c with drop arm and standard 90 degree leg rest  Toilet - Technique: Stand pivot  Equipment Used: Raised toilet seat with rails  Toilet Transfers Comments: ++ cueing for LE placement prior to transfer, hand placement during pivot  Assessment        SLP  Diet Solids Recommendation: Dysphagia Soft and Bite-Sized (Dysphagia III) (with regular solids as tolerated. Per pt request, gravy/sauces to keep for moist.)       Body mass index is 36.9 kg/m². Assessment and Plan:  Evelin Farley a 46 y.o. female with PMH GERD p/w L sided weakness and facial droop.  CTA head/neck on 4/12 revealed occluded R cervical ICA, occluded right LEAH, near complete occlusion of right MCA.     Acute ischemic CVA, Subdural heomorrhage s/p right hemicraniectomy and subsequen SAH and IP hemorrhage  No tPA given.  MRI 4/14 showing large subacute infarct throughout R LEAH and MCA with some areas of hemorrhagic conversion.  - Neurosurgery and neurology following  - SBP <160, PRN hydralazine, scheduled Norvasc 5 mg daily  - Keppra 500 mg twice daily   - SQH  - PT/OT      Aphasia - SLP   Depression - Home fluoxetine 20 mg daily at home  Anxiety - Hold home Ativan 0.5 mg every 8 hours PRN for anxiety  MISHA - Patient uses CPAP at home  GERD - omeprazole 40 mg daily at home- substituted for pepcid 20 mg BID  Obesity    Rehab Progress: Improving  Anticipated Dispo: home  Services/DME: TTB and RTS (neither covered by insurance, friend to obtain)  ELOS: - extension needed       Cj Murillo D.O. M.P.H.  PM&R  5/29/2021  9:58 AM
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
## 2021-05-29 NOTE — PLAN OF CARE
Problem: Falls - Risk of:  Goal: Will remain free from falls  Description: Will remain free from falls  5/29/2021 1022 by Amena Wheat  Outcome: Ongoing  Note: Patient remains free of falls this shift. Room free of clutter, bed in low position and call light and personal items are within reach     Problem: Skin Integrity:  Goal: Will show no infection signs and symptoms  Description: Will show no infection signs and symptoms  Outcome: Ongoing  Note: No signs or symptoms of infection noted. VSS and patient is afebrile. Problem: Skin Integrity:  Goal: Absence of new skin breakdown  Description: Absence of new skin breakdown  Outcome: Ongoing  Note: No evidence of new skin breakdown noted this shift. Will continue to monitor.

## 2021-05-29 NOTE — PROGRESS NOTES
Surgical incision site cleansed with CHG wipes. Area pink with some scabbed areas noted. Patient tolerated well.  Bed in low position and call light within reach

## 2021-05-29 NOTE — PROGRESS NOTES
stated goals    Date: 5/29/2021      Tx session 1 Tx session 2   Total Timed Code Min 15 30   Total Treatment Minutes 30 30   Individual Treatment Minutes 30 30   Group Treatment Minutes 0 0   Co-Treat Minutes 0 0   Brief Exception: N/A N/A   Pain None indicated None indicated    Pain Intervention: [] RN notified  [] Repositioned  [] Intervention offered and patient declined  [x] N/A  [] Other:  [] RN notified  [] Repositioned  [] Intervention offered and patient declined  [x] N/A  [] Other:    Subjective:     Pt upright in chair and ready for therapy. Pt upright in wheelchair after just receiving pain meds from RN. Objective / Goals:     Patient will consume regular solids with timely mastication and no pocketing in L buccal cavity across 2 sessions. Patient seen with breakfast tray this AM (cheerios with milk, durán and coffee). Pt able to identify left buccal residue when cued throughout meal.  Pt independently utilized lingual sweep as well as finger sweep to clear buccal residue. Goal not targeted this session. Patient will consume PO without anterior spillage or overt pocketing across 2 sessions. GOAL MET GOAL MET   Pt will complete divided/alternating attention tasks with 85% accuracy given min cues. Mod cues for attention to meal this date + conversational exchanges. Mod fading to min cues for alternating attention with all tasks   Pt will accurately complete executive functioning tasks with min cues. Goal not targeted this session Sequencing picture scenes: 87% accuracy with min cues improving to 100% accuracy with mod cues x 1 trial    Categorization grid: completed with 90% accuracy, min cues    Creating a Schedule: completed with 80% accuracy, min cues required for initiation   Pt will attend to L visual field with min cues. Mod cues to attend to food items on L side of tray.  Min cues required throughout above mentioned activities   Pt will participate in ongoing cognitive linguistic assessment. GOAL MET GOAL MET   Other areas targeted:     Education:   Education provided re: skills targeted by tx activities Education provided re: skills targeted by tx activities   Safety Devices: [x] Call light within reach  [x] Chair alarm activated and connected to nurse call light system  [] Bed alarm activated   [] Other: [x] Call light within reach  [x] Chair alarm activated and connected to nurse call light system  [] Bed alarm activated   [] Other:     Assessment: Oral stage dysphagia, executive function impairments, left visual field neglect / right gaze preference, attention impairments. Plan: Continue per POC.       Interventions used this date:  [] Speech/Language Treatment  [] Instruction in HEP  [x] Dysphagia Treatment [x] Cognitive Treatment   [] Other:    Discharge recommendations:  [] Home independently  [x] Home with assistance []  24 hour supervision  [] ECF [] Other  Continued Tx Upon Discharge: ? [x] Yes    [] No    [] TBD based on progress while on ARU     [] Vital Stim indicated     [] Other:   Estimated discharge date: 5/27/21    Mikki Cook [address], [address] [place] [id number]   Speech-Language Pathologist

## 2021-05-29 NOTE — PROGRESS NOTES
Occupational Therapy  Facility/Department: [facility] ACUTE REHAB UNIT  Daily Treatment Note  NAME: Destinee Castro  : 1970  MRN: [id number]    Date of Service: 2021    Discharge Recommendations:  24 hour supervision or assist, Home with Home health OT, Continue to assess pending progress, Home with nursing aide  OT Equipment Recommendations  Equipment Needed: Yes  ADL Assistive Devices: Transfer Tub Bench;Grab Bars - toilet; Toileting - Raised Toilet Seat with arms; Reacher  Other: continue to assess    Assessment   Performance deficits / Impairments: Decreased functional mobility ; Decreased ADL status; Decreased ROM; Decreased strength;Decreased sensation;Decreased fine motor control;Decreased endurance;Decreased posture;Decreased balance;Decreased safe awareness;Decreased coordination;Decreased vision/visual deficit; Decreased cognition  Assessment: Pt demonstrated improved STS and stand pivot transfers this date with multiple req only CGA and maintaining good midline orientation for extended periods. Pt able to simulate doffing pants from L side of hip with CGA and no UE support via theraband, pt slowly gaining more confidence with dynamic standing tasks. Pt functioning below baseline, cont per OT POC. Treatment Diagnosis: decreased independence with ADLs and decreased functional mobility 2/2 CVA  Prognosis: Fair  OT Education: Plan of Care;Transfer Training;Precautions; ADL Adaptive Strategies  Patient Education: pt ed on dry tub transfer with use of SPC-pt demo understanding, cont to reinforce  REQUIRES OT FOLLOW UP: Yes  Activity Tolerance  Activity Tolerance: Patient Tolerated treatment well  Safety Devices  Safety Devices in place: Yes  Type of devices: Chair alarm in place; Left in chair; All fall risk precautions in place;Call light within reach;Nurse notified         Patient Diagnosis(es): There were no encounter diagnoses.       has a past medical history of GERD (gastroesophageal reflux disease), Hernia, Obesity, and Unspecified sleep apnea. has a past surgical history that includes Splenectomy (); Total hip arthroplasty (); Lap Band (); hernia repair ();  section (); and craniotomy (Right, 2021). Restrictions  Position Activity Restriction  Other position/activity restrictions: Ambulate, Up with assist, Pt to wear helmet when OOB, OK to remove for shower, HOB to elevated at 30*  Subjective   General  Chart Reviewed: Yes  Patient assessed for rehabilitation services?: Yes  Additional Pertinent Hx: 46 y.o. female with hypertension and tobacco abuse who presented after spending a prolonged period on the ground s/p fall out of bed; found to have acute left hemiparesis and gaze deviation. CXR with no acute cardiopulmonary abnormality. CTH revealed large acute/subacute infarct in the R frontal lobe with associated mass effect and 4 mm R to L midline shift. It also noted subdural hemorrhage of 6mm along falx, although NSGY less convinced. CTA head and neck revealed occluded R cervical ICA & R anterior cerebral artery with near complete occlusion of R MCA. Outside window on TPA. Pt is now POD #1 following hemicraniectomy (). Pt admitted to ARU   Response to previous treatment: Patient with no complaints from previous session  Family / Caregiver Present: No  Referring Practitioner: Dr. Sung Galicia DO  Diagnosis: CVA  Subjective  Subjective: Pt sitting in w/c upon arrival, pleasant and agreeable to OT session. General Comment  Comments: Sharon Suarez   Vital Signs  Patient Currently in Pain: Denies   Orientation  Orientation  Overall Orientation Status: Within Functional Limits  Objective    ADL  Grooming: Modified independent  (oral hygiene seated, pt i'ly looked to L to identify items on L of sink)        Balance  Sitting Balance: Independent  Standing Balance: Contact guard assistance (CGA-Min A)  Standing Balance  Time: ~8 minutes total  Activity: functional transfers; in stance to simulate Appropriate responses to stimuli  Following Commands: Follows one step commands consistently  Attention Span: Appears intact  Memory: Appears intact  Safety Judgement: Decreased awareness of need for assistance;Decreased awareness of need for safety  Problem Solving: Assistance required to generate solutions;Assistance required to correct errors made;Assistance required to implement solutions;Decreased awareness of errors  Insights: Decreased awareness of deficits  Initiation: Requires cues for some  Sequencing: Requires cues for some  Cognition Comment: Pt with inconsistent initiation of transfers but verb insight, \"I'm overthinking it\"       Second Session Note: Pt sitting in w/c upon arrival, pleasant and agreeable to OT session requesting to complete laundry. Pt completed w/c mobility to dining room using RUE and RLE with SBA and no collisions with environmental barriers, pt demo improved scanning to L with decreased need for cues. Pt placed laundry items into below waist level dryer from w/c level req pt to lean anteriorly outside JOSETTE to place with SBA and good trunk control. Pt completed w/c propulsion back to room with spvn assist and i'ly located room on L without cues. Pt educated on assisting with donning L resting hand splint in order to improve anticipation of pain with increased control over positioning. Pt assisted with placement of digits while OT positioned LUE and fastened straps. This process req + time d/t pt discomfort in L thenar eminance. Pt requested voltaren gel to hand and wrist, OT provided setup and RN notified. Pt left in w/c at end of session with chair alarm engaged, call light within reach and all needs met.        Plan   Plan  Times per week: 5x a week for 60 mins daily  Times per day: Daily  Current Treatment Recommendations: Strengthening, Endurance Training, Neuromuscular Re-education, Self-Care / ADL, Functional Mobility Training, Safety Education & Training, ROM, Positioning, Wheelchair Mobility Training, Balance Training, Patient/Caregiver Education & Training, Manual Therapy:  MLD, Equipment Evaluation, Education, & procurement, Home Management Training, Cognitive Reorientation  G-Code     OutComes Score                                                  AM-PAC Score             Goals  Short term goals  Time Frame for Short term goals: 2 weeks  Short term goal 1: Pt will complete toilet transfer w/ Mod A-goal met 5/4  Short term goal 2: Pt will complete UE dressing w/ Mod A-goal met 5/12  Short term goal 3: Pt will complete oral care w/ spvn seated at sink w/o VCs for L sided attention-goal met 5/17  Short term goal 4: Pt will complete LE dressing w/ Mod A-goal met 5/26  Long term goals  Time Frame for Long term goals : 4 weeks  Long term goal 1: Pt will complete toilet transfer w/ SBA-ongoing  Long term goal 2: Pt will complete UE dressing w/ setup-ongoing  Long term goal 3: Pt will complete LE dressing w/ SBA-ongoing  Long term goal 4: Pt will be independent w/ tone management exercises to improve functional use of LUE-ongoing  Long term goal 5: Pt will complete oral care I'ly-goal met 5/24  Patient Goals   Patient goals : \"get back to my normal self\"       Therapy Time   Individual Second Session Group Co-treatment   Time In 0939  1130       Time Out 1039  1200       Minutes 60  30       Timed Code Treatment Minutes: 60 Minutes+ 30 minutes  Total Treatment time: 90 Minutes       Bossman Maravilla OT

## 2021-05-29 NOTE — PROGRESS NOTES
Patient sitting in w/c at this time. Assessment completed. VSS and patient is afebrile. Denies any pain or discomfort at this time. Brace applied to left hand at 0930 per therapy recommendation. Patient states left hand does hurt when touched or when applying brace. Voltaren gel applied to left hand prior to brace application. Bed in low posiiton and call light within reach.

## 2021-05-30 PROCEDURE — 97110 THERAPEUTIC EXERCISES: CPT

## 2021-05-30 PROCEDURE — 6360000002 HC RX W HCPCS: Performed by: PHYSICAL MEDICINE & REHABILITATION

## 2021-05-30 PROCEDURE — 97129 THER IVNTJ 1ST 15 MIN: CPT

## 2021-05-30 PROCEDURE — 92526 ORAL FUNCTION THERAPY: CPT

## 2021-05-30 PROCEDURE — 97530 THERAPEUTIC ACTIVITIES: CPT

## 2021-05-30 PROCEDURE — 1280000000 HC REHAB R&B

## 2021-05-30 PROCEDURE — 6370000000 HC RX 637 (ALT 250 FOR IP): Performed by: PHYSICAL MEDICINE & REHABILITATION

## 2021-05-30 PROCEDURE — 97130 THER IVNTJ EA ADDL 15 MIN: CPT

## 2021-05-30 PROCEDURE — 97535 SELF CARE MNGMENT TRAINING: CPT

## 2021-05-30 PROCEDURE — 97112 NEUROMUSCULAR REEDUCATION: CPT

## 2021-05-30 PROCEDURE — 99232 SBSQ HOSP IP/OBS MODERATE 35: CPT | Performed by: PHYSICAL MEDICINE & REHABILITATION

## 2021-05-30 PROCEDURE — 94660 CPAP INITIATION&MGMT: CPT

## 2021-05-30 RX ADMIN — Medication 5 MG: at 09:26

## 2021-05-30 RX ADMIN — FLUOXETINE 20 MG: 20 CAPSULE ORAL at 09:26

## 2021-05-30 RX ADMIN — DOCUSATE SODIUM 50 MG AND SENNOSIDES 8.6 MG 2 TABLET: 8.6; 5 TABLET, FILM COATED ORAL at 09:26

## 2021-05-30 RX ADMIN — LEVETIRACETAM 500 MG: 500 TABLET ORAL at 20:13

## 2021-05-30 RX ADMIN — GUAIFENESIN 600 MG: 600 TABLET, EXTENDED RELEASE ORAL at 09:26

## 2021-05-30 RX ADMIN — GUAIFENESIN 600 MG: 600 TABLET, EXTENDED RELEASE ORAL at 20:13

## 2021-05-30 RX ADMIN — HEPARIN SODIUM 5000 UNITS: 5000 INJECTION INTRAVENOUS; SUBCUTANEOUS at 14:45

## 2021-05-30 RX ADMIN — ACETAMINOPHEN 1000 MG: 500 TABLET, COATED ORAL at 20:13

## 2021-05-30 RX ADMIN — THIAMINE HCL TAB 100 MG 100 MG: 100 TAB at 09:28

## 2021-05-30 RX ADMIN — DICLOFENAC SODIUM 4 G: 10 GEL TOPICAL at 09:27

## 2021-05-30 RX ADMIN — ACETAMINOPHEN 1000 MG: 500 TABLET, COATED ORAL at 16:42

## 2021-05-30 RX ADMIN — OXYCODONE 10 MG: 5 TABLET ORAL at 23:37

## 2021-05-30 RX ADMIN — FAMOTIDINE 20 MG: 20 TABLET, FILM COATED ORAL at 20:13

## 2021-05-30 RX ADMIN — CETIRIZINE HYDROCHLORIDE 10 MG: 10 TABLET, FILM COATED ORAL at 09:26

## 2021-05-30 RX ADMIN — HEPARIN SODIUM 5000 UNITS: 5000 INJECTION INTRAVENOUS; SUBCUTANEOUS at 06:13

## 2021-05-30 RX ADMIN — AMLODIPINE BESYLATE 5 MG: 5 TABLET ORAL at 09:26

## 2021-05-30 RX ADMIN — LEVETIRACETAM 500 MG: 500 TABLET ORAL at 09:26

## 2021-05-30 RX ADMIN — METHYLPHENIDATE HYDROCHLORIDE 10 MG: 10 TABLET ORAL at 09:26

## 2021-05-30 RX ADMIN — FAMOTIDINE 20 MG: 20 TABLET, FILM COATED ORAL at 09:26

## 2021-05-30 RX ADMIN — HEPARIN SODIUM 5000 UNITS: 5000 INJECTION INTRAVENOUS; SUBCUTANEOUS at 22:06

## 2021-05-30 RX ADMIN — ATORVASTATIN CALCIUM 80 MG: 80 TABLET, FILM COATED ORAL at 20:13

## 2021-05-30 RX ADMIN — ACETAMINOPHEN 1000 MG: 500 TABLET, COATED ORAL at 09:25

## 2021-05-30 RX ADMIN — DICLOFENAC SODIUM 4 G: 10 GEL TOPICAL at 20:14

## 2021-05-30 RX ADMIN — OXYCODONE HYDROCHLORIDE 5 MG: 5 TABLET ORAL at 06:22

## 2021-05-30 RX ADMIN — THERA TABS 1 TABLET: TAB at 09:26

## 2021-05-30 RX ADMIN — ASPIRIN 81 MG: 81 TABLET, CHEWABLE ORAL at 09:25

## 2021-05-30 ASSESSMENT — PAIN DESCRIPTION - ONSET
ONSET: ON-GOING

## 2021-05-30 ASSESSMENT — PAIN DESCRIPTION - PROGRESSION
CLINICAL_PROGRESSION: NOT CHANGED

## 2021-05-30 ASSESSMENT — PAIN DESCRIPTION - DESCRIPTORS
DESCRIPTORS: ACHING
DESCRIPTORS: ACHING;SORE
DESCRIPTORS: ACHING
DESCRIPTORS: ACHING;SORE

## 2021-05-30 ASSESSMENT — PAIN SCALES - GENERAL
PAINLEVEL_OUTOF10: 7
PAINLEVEL_OUTOF10: 6
PAINLEVEL_OUTOF10: 2
PAINLEVEL_OUTOF10: 5
PAINLEVEL_OUTOF10: 0
PAINLEVEL_OUTOF10: 4
PAINLEVEL_OUTOF10: 3
PAINLEVEL_OUTOF10: 2
PAINLEVEL_OUTOF10: 0
PAINLEVEL_OUTOF10: 3
PAINLEVEL_OUTOF10: 2
PAINLEVEL_OUTOF10: 2
PAINLEVEL_OUTOF10: 3

## 2021-05-30 ASSESSMENT — PAIN DESCRIPTION - LOCATION
LOCATION: HAND;HIP
LOCATION: HAND;HIP
LOCATION: HAND

## 2021-05-30 ASSESSMENT — PAIN - FUNCTIONAL ASSESSMENT
PAIN_FUNCTIONAL_ASSESSMENT: ACTIVITIES ARE NOT PREVENTED

## 2021-05-30 ASSESSMENT — PAIN DESCRIPTION - PAIN TYPE
TYPE: ACUTE PAIN

## 2021-05-30 ASSESSMENT — PAIN DESCRIPTION - ORIENTATION
ORIENTATION: LEFT

## 2021-05-30 ASSESSMENT — PAIN SCALES - WONG BAKER
WONGBAKER_NUMERICALRESPONSE: 2
WONGBAKER_NUMERICALRESPONSE: 0
WONGBAKER_NUMERICALRESPONSE: 2

## 2021-05-30 ASSESSMENT — PAIN DESCRIPTION - FREQUENCY
FREQUENCY: INTERMITTENT

## 2021-05-30 NOTE — PLAN OF CARE
Problem: Pain:  Goal: Control of acute pain  Description: Control of acute pain  Outcome: Ongoing   Pt verbalized having left hand and hip pain. Pt was medicated with scheduled Tylenol. Medication and position was effective. Problem: Falls - Risk of:  Goal: Will remain free from falls  Description: Will remain free from falls  Outcome: Ongoing   No falls this shift. Uses call light. Bed and chair alarm in use to promote pt safety. Problem: Skin Integrity:  Goal: Absence of new skin breakdown  Description: Absence of new skin breakdown  Outcome: Ongoing   No new skin issues this shift. Pt shifted wt when up in [facility] and repositioned when in bed. OT provided pt with shower.     Electronically signed by Rahat Coronado RN on 5/30/2021 at 5:53 PM

## 2021-05-30 NOTE — PROGRESS NOTES
Department of Physical Medicine & Rehabilitation  Progress Note    Patient Identification:  Anatoly Silva  [id number]  : 1970  Admit date: 2021    Chief Complaint: CVA    Subjective:   No new complaints overnight. She was slightly concerned about left leg swelling but it has not worsened and is not painful. Participating well in therapy. ROS: No f/c, n/v, cp     Objective:  Patient Vitals for the past 24 hrs:   BP Temp Temp src Pulse Resp SpO2   21 0738 118/65 98.1 °F (36.7 °C) Oral 75 17 94 %   21 2240     16    21 2110 123/87 98 °F (36.7 °C) Oral 78 16 95 %     Const: Alert. No distress, pleasant. HEENT: Normocephalic, atraumatic. Normal sclera/conjunctiva. MMM. Incision healing well  CV: Regular rate and rhythm. Resp: No respiratory distress. Lungs CTAB. Abd: Soft, nontender, nondistended, NABS+   Ext: No edema. Neuro: Alert, oriented, appropriately interactive. Psych: Cooperative, appropriate mood and affect    Laboratory data: Available via EMR. Last 24 hour lab  No results found for this or any previous visit (from the past 24 hour(s)). Therapy progress:  PT  Position Activity Restriction  Other position/activity restrictions: Ambulate, Up with assist, Pt to wear helmet when OOB, OK to remove for shower, HOB to elevated at 30*  Objective     Sit to Stand: Minimal Assistance, Moderate Assistance (from bed min A, from w/c in //bars mod A and then min A x 3.   Pt with difficulty initiating movement and leaning forward to be able to transfer with less assist)  Stand to sit: Minimal Assistance, Contact guard assistance  Bed to Chair: Minimal assistance (for balance with transfer)  Device: Parallel Bars  Other Apparatus:  (sleeve on L shoe to faciitate forward movement with making it a frictionless surface)  Assistance: Minimal assistance, Moderate assistance  Distance: 8' x 2  OT  PT Equipment Recommendations  Equipment Needed: Yes  Other: 20\" hemiheight w/c with drop arm and standard 90 degree leg rest  Toilet - Technique: Stand pivot  Equipment Used: Raised toilet seat with rails  Toilet Transfers Comments: ++ cueing for LE placement prior to transfer, hand placement during pivot  Assessment        SLP  Diet Solids Recommendation: Dysphagia Soft and Bite-Sized (Dysphagia III) (with regular solids as tolerated. Per pt request, gravy/sauces to keep for moist.)       Body mass index is 36.9 kg/m². Assessment and Plan:  Dalila Foy a 46 y.o. female with PMH GERD p/w L sided weakness and facial droop.  CTA head/neck on 4/12 revealed occluded R cervical ICA, occluded right LEAH, near complete occlusion of right MCA.     Acute ischemic CVA, Subdural heomorrhage s/p right hemicraniectomy and subsequen SAH and IP hemorrhage  No tPA given.  MRI 4/14 showing large subacute infarct throughout R LEAH and MCA with some areas of hemorrhagic conversion.  - Neurosurgery and neurology following  - SBP <160, PRN hydralazine, scheduled Norvasc 5 mg daily  - Keppra 500 mg twice daily   - SQH  - PT/OT      Aphasia - SLP   Depression - Home fluoxetine 20 mg daily at home  Anxiety - Hold home Ativan 0.5 mg every 8 hours PRN for anxiety  MISHA - Patient uses CPAP at home  GERD - omeprazole 40 mg daily at home- substituted for pepcid 20 mg BID  Obesity    Rehab Progress: Improving  Anticipated Dispo: home  Services/DME: TTB and RTS (neither covered by insurance, friend to obtain)  ELOS: - extension needed       Kimberly Wallace D.O. M.P.H.  PM&R  5/30/2021  11:32 AM

## 2021-05-30 NOTE — PROGRESS NOTES
Occupational Therapy  Facility/Department: [facility] ACUTE REHAB UNIT  Daily Treatment Note  NAME: Leora Fofana  : 1970  MRN: [id number]    Date of Service: 2021    Discharge Recommendations:  24 hour supervision or assist, Home with Home health OT, Continue to assess pending progress, Home with nursing aide  OT Equipment Recommendations  ADL Assistive Devices: Transfer Tub Bench;Grab Bars - toilet; Toileting - Raised Toilet Seat with arms; Reacher  Other: continue to assess    Assessment   Performance deficits / Impairments: Decreased functional mobility ; Decreased ADL status; Decreased ROM; Decreased strength;Decreased sensation;Decreased fine motor control;Decreased endurance;Decreased posture;Decreased balance;Decreased safe awareness;Decreased coordination;Decreased vision/visual deficit; Decreased cognition  Assessment: Pt demonstrated increased participation in ADL transfers. Cueing to initiate correct demonstration of pratik technique. Pt demonstrated increased activity tolerance participating in full ADL routine with min fatigue. Increased extension tone noted in R LE during transfers. Pt would benefit from continued OT while in ARU. Treatment Diagnosis: decreased independence with ADLs and decreased functional mobility 2/2 CVA  OT Education: Plan of Care;Transfer Training;Precautions; ADL Adaptive Strategies  REQUIRES OT FOLLOW UP: Yes  Activity Tolerance  Activity Tolerance: Patient Tolerated treatment well  Safety Devices  Safety Devices in place: Yes  Type of devices: Chair alarm in place; Left in chair; All fall risk precautions in place;Call light within reach;Nurse notified         Patient Diagnosis(es): There were no encounter diagnoses. has a past medical history of GERD (gastroesophageal reflux disease), Hernia, Obesity, and Unspecified sleep apnea. has a past surgical history that includes Splenectomy (); Total hip arthroplasty (); Lap Band (); hernia repair ();   section (2004); and craniotomy (Right, 4/13/2021). Restrictions  Position Activity Restriction  Other position/activity restrictions: Ambulate, Up with assist, Pt to wear helmet when OOB, OK to remove for shower, HOB to elevated at 30*  Subjective   General  Chart Reviewed: Yes  Patient assessed for rehabilitation services?: Yes  Additional Pertinent Hx: 46 y.o. female with hypertension and tobacco abuse who presented after spending a prolonged period on the ground s/p fall out of bed; found to have acute left hemiparesis and gaze deviation. CXR with no acute cardiopulmonary abnormality. CTH revealed large acute/subacute infarct in the R frontal lobe with associated mass effect and 4 mm R to L midline shift. It also noted subdural hemorrhage of 6mm along falx, although NSGY less convinced. CTA head and neck revealed occluded R cervical ICA & R anterior cerebral artery with near complete occlusion of R MCA. Outside window on TPA. Pt is now POD #1 following hemicraniectomy (4/13). Pt admitted to ARU 4/23  Response to previous treatment: Patient with no complaints from previous session  Family / Caregiver Present: No  Referring Practitioner: Dr. She Saravia DO  Diagnosis: CVA  Subjective  Subjective: Pt semi supine in bed upon arrival, agreeable to showering. General Comment  Comments: . Orientation  Orientation  Overall Orientation Status: Within Functional Limits  Objective    ADL  Grooming: Setup (washing face while seated on TTB)  UE Bathing: Minimal assistance (assist to lift L LE to wash underarm, assist to wash R UE)  LE Bathing: Minimal assistance (pt able to wash upper LEs and eugenie area independently, weight shifting to wash buttocks and pt required min A for completeness. Cueing for pt to use LHS to wash lower LEs.)  UE Dressing:  Moderate assistance (assist to pull shirt over L UE, cueing to pull over head next vs R UE)  LE Dressing: Maximum assistance (pt able to doff pants/ socks with use of reacher, pulling down over hips with encouragement. Donning pants with assist to thread L LE, pt able to pull up over hips. Max A to don L shoe)  Toileting: Minimal assistance (encouragement to pull pants up over hips, pt able to complete eugenie care seated on RTS)        Balance  Sitting Balance: Supervision (seated on TTB)  Standing Balance: Contact guard assistance (during transfers)  Standing Balance  Time: ~10 minutes total  Activity: functional transfers; in stance to simulate LB clothing mgmt /toileting  Comment: . Toilet Transfers  Toilet - Technique: Stand pivot  Equipment Used: Raised toilet seat with rails (+ GB for stability during pivot)  Toilet Transfer: Minimal assistance  Toilet Transfers Comments: CGA for wc to RTS transfer, min A for RTS to wc transfer  Shower Transfers  Shower - Transfer From: Wheelchair  Shower - Transfer Type: To and From  Shower - Transfer To: Transfer tub bench  Shower - Technique: Stand pivot  Shower Transfers: Contact Guard  Shower Transfers Comments: use of grab bar on R. Noted L LE extension tone during transfers. Bed mobility  Supine to Sit: Contact guard assistance (HOB elevated)  Transfers  Stand Pivot Transfers: Minimal assistance (bed to chair)  Sit to stand: Contact guard assistance  Stand to sit: Minimal assistance (for eccentric control)                       Cognition  Overall Cognitive Status: Exceptions  Arousal/Alertness: Appropriate responses to stimuli  Following Commands:  Follows one step commands consistently  Attention Span: Appears intact  Memory: Appears intact  Safety Judgement: Decreased awareness of need for assistance;Decreased awareness of need for safety  Problem Solving: Assistance required to generate solutions;Assistance required to correct errors made;Assistance required to implement solutions;Decreased awareness of errors  Insights: Decreased awareness of deficits  Initiation: Requires cues for some  Sequencing: Requires cues for some

## 2021-05-30 NOTE — PROGRESS NOTES
SHIFT: 0700 - 1930  Pt upon assessment was stable. Alert and oriented. Denied having chest pain or SOB. Pt verbalized having pain in left hand and hip. Pt was medicated scheduled Tylenol. Medication, rest and position was effective for pain control. Scalp incision remains with scabs. Medications given this shift were reviewed with pt regarding use, dose and side effects. Pt verbalized understanding of education given. Continent of bladder and bowel this shift with toilieting being offered frequently. Pt showered with OT. Pt at end of shift was up in [facility] with call light within reach.  Pt denied having any further needs.      Electronically signed by Perry Jasso RN on 5/30/2021 at 7:02 PM

## 2021-05-30 NOTE — PROGRESS NOTES
Pt. Lying quietly in bed. Assessment complete, VSS, afebrile, medications taken without difficulty. Pt. Voiced left  hand pain which increases with movement and touch. Edema glove placed, hand elevated, PRN and scheduled pain medication in place to treat. Pt. remamins A & O X 4, no s/sx of distress noted. Call light and over bed table within reach, no other care needed at this time. Hourly rounding and frequent visual checks in place.
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
## 2021-05-30 NOTE — PROGRESS NOTES
ACUTE REHAB UNIT  SPEECH/LANGUAGE PATHOLOGY      [x] Daily  [] Weekly Care Conference Note  [] Discharge    Patient:Laura Alcazar      NPZ:[birth year]  ABM:[id number]  Rehab Dx/Hx: Acute CVA (cerebrovascular accident) ([facility] [address] [I63.9]    Precautions: [x] Aspiration  [x] Fall risk  [] Sternal  [] Seizure [] Hip  [] Weight Bearing [] Other  ST Dx: [] Aphasia  [] Dysarthria  [] Apraxia   [x] Oropharyngeal dysphagia [x] Cognitive Impairment  [] Other:   Date of Admit: 4/23/2021  Room #: 3101/3101-01  Date: 5/30/2021        Current Diet Order:Dietary Nutrition Supplements: Low Calorie High Protein Supplement  DIET GENERAL;   Recommended Form of Meds: PO  Compensatory Swallowing Strategies: Alternate solids and liquids, Upright as possible for all oral intake, Check for pocketing of food on the Left, Small bites/sips, Lingual sweep   Subjective: Pt admitted 4/12 after fall with left sided paralysis. Pt underwent craniectomy on 4/13 per Dr. Mindi Guthrie. Social/Functional History  Lives With: Spouse;Son( reported that son is diagnosed with Autism.)  Occupation: Full time employment  Type of occupation:  at [employer]"    FEES 4/14: IMPRESSIONS:   Pt presents w/ mild oropharyngeal dysphagia characterized by premature spillage of thin liquids to UES and pyriforms w/ intermittent deep penetration of laryngeal vestibule; adequate airway protection and complete clearance of all residue after swallow initiation. No aspiration w/ any trials. Timely swallow initiation w/ puree and regular solid consistencies; no oropharyngeal residue.    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 Adequate ab/adduction. Complete closure of TVFs upon adduction.      Dentition: Adequate  Vision  Vision: Impaired  Vision Exceptions: Visual field cut  Hearing  Hearing: Within functional limits  Barriers toward progress: None towards stated goals    Date: 5/30/2021      Tx session 1 Tx session 2   Total Timed Code Min 10 30   Total Treatment Minutes 33 30   Individual Treatment Minutes 33 30   Group Treatment Minutes 0 0   Co-Treat Minutes 0 0   Brief Exception: N/A N/A   Pain None indicated Discomfort in L shoulder   Pain Intervention: [] RN notified  [] Repositioned  [] Intervention offered and patient declined  [x] N/A  [] Other:  [] RN notified  [x] Repositioned  [] Intervention offered and patient declined  [] N/A  [x] Other: pillow provided    Subjective:     Pt in w/c with breakfast tray in front of her - endorsed holding on breakfast d/t shower. Pleasant and cooperative. Endorsed \"I've been trying\" with eye contact on L. Pt in w/c upon entry, pleasant and cooperative. Objective / Goals:     Patient will consume regular solids with timely mastication and no pocketing in L buccal cavity across 2 sessions. Mastication WFL for regular solids and mixed consistencies from breakfast tray. Pt endorsed minor residue in L buccal cavity but independently cleared and no significant residue noted upon inspection by SLP. Targeted via therapeutic exercises:  -Bolus lateralization with toothette x 10 reps each side with min cues to move through complete ROM for exercise  -Oral strengthening with device - pulling against resistance with occluded straw: 10 reps sustained for 3-5 seconds each. Goal not targeted. Patient will consume PO without anterior spillage or overt pocketing across 2 sessions. GOAL MET  Pt w/o true anterior spillage but residue noted on L upper lip d/t reduced ROM around cup edge with liquids. Unable to eliminate at this time despite cues for change in movement of labial seal on cup edge. GOAL MET   Pt will complete divided/alternating attention tasks with 85% accuracy given min cues. Min cues required for alternating attention and to redirect to task after tangential comments.  Alternating attention for information transfer: min cues   Pt will accurately complete executive functioning tasks with min cues. Goal not directly targeted. Schedule plannin% accuracy with min-mod cues for attention to detail. Errors with visual scanning / reading noted 2/2 impulsivity. Improved attention to detail with reduced visual field and reduced verbal cueing required. Unscrambling sentences: mod cues required for attention to detail and self-monitoring of errors. Reduced planning noted with writing responses with mod-max cues to start at far L of page with longer answers to avoid running out of room on page. Pt will attend to L visual field with min cues. No cues required across session except for initial positive reinforcement when pt noted to spontaneously make eye contact with conversational partner on L. Min cues overall to attend to L side of page for tasks detailed above. Pt frequently began writing responses midline but appeared r/t impulsivity + reduced planning vs L neglect. Pt will participate in ongoing cognitive linguistic assessment. GOAL MET GOAL MET   Other areas targeted:     Education:   Educated to improvements noted, rationale for tx tasks, and swallow strategies for oral dysphagia. Educated to skills targeted, deficits noted, and rationale for tx tasks. Safety Devices: [x] Call light within reach  [x] Chair alarm activated and connected to nurse call light system  [] Bed alarm activated   [] Other: [x] Call light within reach  [x] Chair alarm activated and connected to nurse call light system  [] Bed alarm activated   [x] Other: reinforced use of call light   Assessment: Resolving oral dysphagia. Cognitive linguistic impairment with executive dysfunction and improving L attention. Good progress towards goals. Plan: Continue per POC.       Interventions used this date:  [] Speech/Language Treatment  [] Instruction in HEP  [x] Dysphagia Treatment [x] Cognitive Treatment   [] Other:    Discharge recommendations:  [] Home independently  [x] Home with assistance []  24 hour supervision  [] ECF [] Other  Continued Tx Upon Discharge: ? [x] Yes    [] No    [] TBD based on progress while on ARU     [] Vital Stim indicated     [] Other:   Estimated discharge date: 6/3/21    Leora Flores MA CCC-SLP; [id number]  Speech-Language Pathologist

## 2021-05-30 NOTE — PLAN OF CARE
Problem: Falls - Risk of:  Goal: Will remain free from falls  Description: Will remain free from falls  Outcome: Met This Shift   Pt. Remains at risk falls due to impaired gait and weakness. Fall prevention measures remains in place: Fall sign posted at the door, video surveillance in place, bed wheels locked and in lowest position, safe transfer X2 GB/SP, call light and over bed table within reach. Hourly rounding and frequent  visual checks in place. No falls reported thus far this shift. Problem: Skin Integrity:  Goal: Will show no infection signs and symptoms  Description: Will show no infection signs and symptoms  Outcome: Met This Shift   Pt. Continent of bowel and bladder, able to turn and reposition in bed. Surgical site clean, dry and intact. No new skin issues found thus far this shift. Problem: Pain:  Goal: Pain level will decrease  Description: Pain level will decrease  Outcome: Ongoing   Patient continues to voice complaint of left hand. Voltaren gel applies, edema glove placed, scheduled and PRN pain medication ongoing. Pain level will be decreased by discharge.

## 2021-05-31 LAB
ANION GAP SERPL CALCULATED.3IONS-SCNC: 9 MMOL/L (ref 3–16)
BASOPHILS ABSOLUTE: 0.1 K/UL (ref 0–0.2)
BASOPHILS RELATIVE PERCENT: 1 %
BUN BLDV-MCNC: 11 MG/DL (ref 7–20)
CALCIUM SERPL-MCNC: 9.7 MG/DL (ref 8.3–10.6)
CHLORIDE BLD-SCNC: 105 MMOL/L (ref 99–110)
CO2: 25 MMOL/L (ref 21–32)
CREAT SERPL-MCNC: <0.5 MG/DL (ref 0.6–1.1)
EOSINOPHILS ABSOLUTE: 0.7 K/UL (ref 0–0.6)
EOSINOPHILS RELATIVE PERCENT: 6.4 %
GFR AFRICAN AMERICAN: >60
GFR NON-AFRICAN AMERICAN: >60
GLUCOSE BLD-MCNC: 98 MG/DL (ref 70–99)
HCT VFR BLD CALC: 37.9 % (ref 36–48)
HEMOGLOBIN: 13.2 G/DL (ref 12–16)
LYMPHOCYTES ABSOLUTE: 3.8 K/UL (ref 1–5.1)
LYMPHOCYTES RELATIVE PERCENT: 35.2 %
MCH RBC QN AUTO: 35.3 PG (ref 26–34)
MCHC RBC AUTO-ENTMCNC: 34.8 G/DL (ref 31–36)
MCV RBC AUTO: 101.6 FL (ref 80–100)
MONOCYTES ABSOLUTE: 1.1 K/UL (ref 0–1.3)
MONOCYTES RELATIVE PERCENT: 10.7 %
NEUTROPHILS ABSOLUTE: 5 K/UL (ref 1.7–7.7)
NEUTROPHILS RELATIVE PERCENT: 46.7 %
PDW BLD-RTO: 13.6 % (ref 12.4–15.4)
PLATELET # BLD: 388 K/UL (ref 135–450)
PMV BLD AUTO: 8.7 FL (ref 5–10.5)
POTASSIUM REFLEX MAGNESIUM: 4 MMOL/L (ref 3.5–5.1)
RBC # BLD: 3.73 M/UL (ref 4–5.2)
SODIUM BLD-SCNC: 139 MMOL/L (ref 136–145)
WBC # BLD: 10.8 K/UL (ref 4–11)

## 2021-05-31 PROCEDURE — 1280000000 HC REHAB R&B

## 2021-05-31 PROCEDURE — 6370000000 HC RX 637 (ALT 250 FOR IP): Performed by: PHYSICAL MEDICINE & REHABILITATION

## 2021-05-31 PROCEDURE — 80048 BASIC METABOLIC PNL TOTAL CA: CPT

## 2021-05-31 PROCEDURE — 94660 CPAP INITIATION&MGMT: CPT

## 2021-05-31 PROCEDURE — 85025 COMPLETE CBC W/AUTO DIFF WBC: CPT

## 2021-05-31 PROCEDURE — 99231 SBSQ HOSP IP/OBS SF/LOW 25: CPT | Performed by: PHYSICAL MEDICINE & REHABILITATION

## 2021-05-31 PROCEDURE — 36415 COLL VENOUS BLD VENIPUNCTURE: CPT

## 2021-05-31 PROCEDURE — 6360000002 HC RX W HCPCS: Performed by: PHYSICAL MEDICINE & REHABILITATION

## 2021-05-31 RX ADMIN — FLUOXETINE 20 MG: 20 CAPSULE ORAL at 08:57

## 2021-05-31 RX ADMIN — ACETAMINOPHEN 1000 MG: 500 TABLET, COATED ORAL at 21:14

## 2021-05-31 RX ADMIN — HEPARIN SODIUM 5000 UNITS: 5000 INJECTION INTRAVENOUS; SUBCUTANEOUS at 21:14

## 2021-05-31 RX ADMIN — ATORVASTATIN CALCIUM 80 MG: 80 TABLET, FILM COATED ORAL at 21:13

## 2021-05-31 RX ADMIN — LEVETIRACETAM 500 MG: 500 TABLET ORAL at 08:58

## 2021-05-31 RX ADMIN — HEPARIN SODIUM 5000 UNITS: 5000 INJECTION INTRAVENOUS; SUBCUTANEOUS at 14:23

## 2021-05-31 RX ADMIN — FAMOTIDINE 20 MG: 20 TABLET, FILM COATED ORAL at 08:57

## 2021-05-31 RX ADMIN — ACETAMINOPHEN 1000 MG: 500 TABLET, COATED ORAL at 10:21

## 2021-05-31 RX ADMIN — OXYCODONE 10 MG: 5 TABLET ORAL at 11:30

## 2021-05-31 RX ADMIN — LEVETIRACETAM 500 MG: 500 TABLET ORAL at 21:13

## 2021-05-31 RX ADMIN — ACETAMINOPHEN 1000 MG: 500 TABLET, COATED ORAL at 05:57

## 2021-05-31 RX ADMIN — FAMOTIDINE 20 MG: 20 TABLET, FILM COATED ORAL at 21:13

## 2021-05-31 RX ADMIN — DICLOFENAC SODIUM 4 G: 10 GEL TOPICAL at 21:14

## 2021-05-31 RX ADMIN — GUAIFENESIN 600 MG: 600 TABLET, EXTENDED RELEASE ORAL at 21:13

## 2021-05-31 RX ADMIN — ASPIRIN 81 MG: 81 TABLET, CHEWABLE ORAL at 08:58

## 2021-05-31 RX ADMIN — DICLOFENAC SODIUM 4 G: 10 GEL TOPICAL at 09:18

## 2021-05-31 RX ADMIN — HEPARIN SODIUM 5000 UNITS: 5000 INJECTION INTRAVENOUS; SUBCUTANEOUS at 06:00

## 2021-05-31 RX ADMIN — GUAIFENESIN 600 MG: 600 TABLET, EXTENDED RELEASE ORAL at 08:58

## 2021-05-31 RX ADMIN — THERA TABS 1 TABLET: TAB at 08:58

## 2021-05-31 RX ADMIN — AMLODIPINE BESYLATE 5 MG: 5 TABLET ORAL at 08:58

## 2021-05-31 RX ADMIN — OXYCODONE HYDROCHLORIDE 5 MG: 5 TABLET ORAL at 21:14

## 2021-05-31 RX ADMIN — CETIRIZINE HYDROCHLORIDE 10 MG: 10 TABLET, FILM COATED ORAL at 08:57

## 2021-05-31 RX ADMIN — ACETAMINOPHEN 1000 MG: 500 TABLET, COATED ORAL at 16:49

## 2021-05-31 RX ADMIN — METHYLPHENIDATE HYDROCHLORIDE 10 MG: 10 TABLET ORAL at 08:57

## 2021-05-31 RX ADMIN — THIAMINE HCL TAB 100 MG 100 MG: 100 TAB at 08:58

## 2021-05-31 ASSESSMENT — PAIN DESCRIPTION - LOCATION
LOCATION: HAND
LOCATION: HAND
LOCATION: BACK
LOCATION: HAND
LOCATION: HAND;HIP

## 2021-05-31 ASSESSMENT — PAIN DESCRIPTION - PROGRESSION
CLINICAL_PROGRESSION: GRADUALLY IMPROVING
CLINICAL_PROGRESSION: GRADUALLY IMPROVING
CLINICAL_PROGRESSION: GRADUALLY WORSENING
CLINICAL_PROGRESSION: RAPIDLY WORSENING
CLINICAL_PROGRESSION: GRADUALLY WORSENING
CLINICAL_PROGRESSION: NOT CHANGED
CLINICAL_PROGRESSION: GRADUALLY IMPROVING
CLINICAL_PROGRESSION: GRADUALLY IMPROVING

## 2021-05-31 ASSESSMENT — PAIN DESCRIPTION - ORIENTATION
ORIENTATION: LEFT
ORIENTATION: LOWER
ORIENTATION: LOWER
ORIENTATION: LEFT

## 2021-05-31 ASSESSMENT — PAIN DESCRIPTION - DESCRIPTORS
DESCRIPTORS: ACHING;DULL
DESCRIPTORS: ACHING;SORE
DESCRIPTORS: BURNING;SHOOTING
DESCRIPTORS: ACHING;SORE
DESCRIPTORS: ACHING;DULL
DESCRIPTORS: ACHING;DULL

## 2021-05-31 ASSESSMENT — PAIN DESCRIPTION - FREQUENCY
FREQUENCY: CONTINUOUS
FREQUENCY: CONTINUOUS
FREQUENCY: INTERMITTENT
FREQUENCY: INTERMITTENT
FREQUENCY: CONTINUOUS
FREQUENCY: INTERMITTENT
FREQUENCY: CONTINUOUS
FREQUENCY: CONTINUOUS

## 2021-05-31 ASSESSMENT — PAIN DESCRIPTION - PAIN TYPE
TYPE: ACUTE PAIN

## 2021-05-31 ASSESSMENT — PAIN SCALES - GENERAL
PAINLEVEL_OUTOF10: 2
PAINLEVEL_OUTOF10: 4
PAINLEVEL_OUTOF10: 10
PAINLEVEL_OUTOF10: 6
PAINLEVEL_OUTOF10: 4
PAINLEVEL_OUTOF10: 2
PAINLEVEL_OUTOF10: 10
PAINLEVEL_OUTOF10: 5

## 2021-05-31 ASSESSMENT — PAIN SCALES - WONG BAKER
WONGBAKER_NUMERICALRESPONSE: 2
WONGBAKER_NUMERICALRESPONSE: 0
WONGBAKER_NUMERICALRESPONSE: 0
WONGBAKER_NUMERICALRESPONSE: 2

## 2021-05-31 ASSESSMENT — PAIN - FUNCTIONAL ASSESSMENT
PAIN_FUNCTIONAL_ASSESSMENT: ACTIVITIES ARE NOT PREVENTED

## 2021-05-31 ASSESSMENT — PAIN DESCRIPTION - ONSET
ONSET: ON-GOING

## 2021-05-31 NOTE — PROGRESS NOTES
SHIFT: 0700 - 1930  Pt upon assessment was stable. Alert and oriented. Denied having chest pain or SOB. Pt verbalized having pain in left hand and hip. Pt was medicated scheduled Tylenol and Oxy-IR. Medication, rest and position was effective for pain control. Pt encouraged to wear left hand splint this shift. Pt wore splint about 2 hours. Refused to wear splint rest of day but states she would allow nursing to put back on before bed. Scalp incision remains with scabs. Medications given this shift were reviewed with pt regarding use, dose and side effects. Pt verbalized understanding of education given. Continent of bladder this shift with toilieting being offered frequently. Pt showered yesterday but refused to shower this shift. Pt at end of shift was up in [facility] with call light within reach.  Pt denied having any further needs.      Electronically signed by Jorge Douglas RN on 5/31/2021 at 7:54 PM

## 2021-05-31 NOTE — PROGRESS NOTES
Assessment complete, VSS, afebrile, medications taken without difficulty. Helmet applied to head while transfer X1 SP/WC to bathroom then back to bed. Edema glove applied for the night. Pt. Em Demar ongoing left hand pain which increases with stimulation. Surgical site to scalp appears healed. Pt. Remains A & O X 4. No s/sx of distress noted. Call light and over bed table within reach, hourly rounding and frequent visual checks in place.

## 2021-05-31 NOTE — PLAN OF CARE
Problem: Falls - Risk of:  Goal: Will remain free from falls  Description: Will remain free from falls  5/31/2021 0458 by Bakari Mccarthy RN  Outcome: Met This Shift    Fall prevention measures ongoing: Fall sign posted at the door, bed wheels locked and in lowest position, video surveillance in place, call light and over bed table within reach, hourly rounding and frequent visual checks in place. No falls reported thus far. Problem: Skin Integrity:  Goal: Will show no infection signs and symptoms  Description: Will show no infection signs and symptoms  Outcome: Met This Shift   Skin assessment complete this shift. No s/sx of new skin issues noted thus far. Problem: Pain:  Goal: Pain level will decrease  Description: Pain level will decrease  Outcome: Ongoing   Pt. Complaint of pain, pain medication administered, see MAR. Pain is being managed with pharmacological and non-pharmacological intervention. Pain level will be decreased or be at a satisfactory level by discharge.

## 2021-05-31 NOTE — PROGRESS NOTES
Department of Physical Medicine & Rehabilitation  Progress Note    Patient Identification:  Kari Patiño  [id number]  : 1970  Admit date: 2021    Chief Complaint: CVA    Subjective:   Doing well today with less pain in her left wrist. Resting today as she is not scheduled for therapy. ROS: No f/c, n/v, cp     Objective:  Patient Vitals for the past 24 hrs:   BP Temp Temp src Pulse Resp SpO2 Weight   21 0849 128/71 98.6 °F (37 °C) Oral 76 17 97 %    21 0557       226 lb 6.6 oz (102.7 kg)   21 0028     16     21 1947 129/83 98.1 °F (36.7 °C) Oral 78 16       Const: Alert. No distress, pleasant. HEENT: Normocephalic, atraumatic. Normal sclera/conjunctiva. MMM. Incision healing well  CV: Regular rate and rhythm. Resp: No respiratory distress. Lungs CTAB. Abd: Soft, nontender, nondistended, NABS+   Ext: No edema. Neuro: Alert, oriented, appropriately interactive. Psych: Cooperative, appropriate mood and affect    Laboratory data: Available via EMR.    Last 24 hour lab  Recent Results (from the past 24 hour(s))   Basic Metabolic Panel w/ Reflex to MG    Collection Time: 21  7:07 AM   Result Value Ref Range    Sodium 139 136 - 145 mmol/L    Potassium reflex Magnesium 4.0 3.5 - 5.1 mmol/L    Chloride 105 99 - 110 mmol/L    CO2 25 21 - 32 mmol/L    Anion Gap 9 3 - 16    Glucose 98 70 - 99 mg/dL    BUN 11 7 - 20 mg/dL    CREATININE <0.5 (L) 0.6 - 1.1 mg/dL    GFR Non-African American >60 >60    GFR African American >60 >60    Calcium 9.7 8.3 - 10.6 mg/dL   CBC auto differential    Collection Time: 21  7:07 AM   Result Value Ref Range    WBC 10.8 4.0 - 11.0 K/uL    RBC 3.73 (L) 4.00 - 5.20 M/uL    Hemoglobin 13.2 12.0 - 16.0 g/dL    Hematocrit 37.9 36.0 - 48.0 %    .6 (H) 80.0 - 100.0 fL    MCH 35.3 (H) 26.0 - 34.0 pg    MCHC 34.8 31.0 - 36.0 g/dL    RDW 13.6 12.4 - 15.4 %    Platelets 308 543 - 998 K/uL    MPV 8.7 5.0 - 10.5 fL    Neutrophils % 46.7 %    Lymphocytes % 35.2 %    Monocytes % 10.7 %    Eosinophils % 6.4 %    Basophils % 1.0 %    Neutrophils Absolute 5.0 1.7 - 7.7 K/uL    Lymphocytes Absolute 3.8 1.0 - 5.1 K/uL    Monocytes Absolute 1.1 0.0 - 1.3 K/uL    Eosinophils Absolute 0.7 (H) 0.0 - 0.6 K/uL    Basophils Absolute 0.1 0.0 - 0.2 K/uL       Therapy progress:  PT  Position Activity Restriction  Other position/activity restrictions: Ambulate, Up with assist, Pt to wear helmet when OOB, OK to remove for shower, HOB to elevated at 30*  Objective     Sit to Stand: Minimal Assistance, Contact guard assistance (Pt min A from recliner at beginning of session and min A from  w/c and first time from mat and then CGA x 15 from mat with cueing for form; assist with L LE placement and cueing to lean forward, min A w/c>bed)  Stand to sit: Minimal Assistance, Contact guard assistance  Bed to Chair: Minimal assistance (for balance during transfer)  Device: Parallel Bars  Other Apparatus:  (sleeve on L shoe to faciitate forward movement with making it a frictionless surface)  Assistance: Minimal assistance, Moderate assistance  Distance: 8' x 2  OT  PT Equipment Recommendations  Equipment Needed: Yes  Other: 20\" hemiheight w/c with drop arm and standard 90 degree leg rest  Toilet - Technique: Stand pivot  Equipment Used: Raised toilet seat with rails (+ GB for stability during pivot)  Toilet Transfers Comments: CGA for wc to RTS transfer, min A for RTS to wc transfer  Assessment        SLP  Diet Solids Recommendation: Dysphagia Soft and Bite-Sized (Dysphagia III) (with regular solids as tolerated. Per pt request, gravy/sauces to keep for moist.)       Body mass index is 36.54 kg/m².     Assessment and Plan:  Matty Car a 46 y.o. female with PMH GERD p/w L sided weakness and facial droop.  CTA head/neck on 4/12 revealed occluded R cervical ICA, occluded right LEAH, near complete occlusion of right MCA.     Acute ischemic CVA, Subdural heomorrhage s/p right hemicraniectomy and subsequen SAH and IP hemorrhage  No tPA given.  MRI 4/14 showing large subacute infarct throughout R LEAH and MCA with some areas of hemorrhagic conversion.  - Neurosurgery and neurology following  - SBP <160, PRN hydralazine, scheduled Norvasc 5 mg daily  - Keppra 500 mg twice daily   - SQH  - PT/OT      Aphasia - SLP   Depression - Home fluoxetine 20 mg daily at home  Anxiety - Hold home Ativan 0.5 mg every 8 hours PRN for anxiety  MISHA - Patient uses CPAP at home  GERD - omeprazole 40 mg daily at home- substituted for pepcid 20 mg BID  Obesity    Rehab Progress: Improving  Anticipated Dispo: home  Services/DME: TTB and RTS (neither covered by insurance, friend to obtain)  ELOS: - extension needed       Loretta Thakkar D.O. M.P.H.  PM&R  5/31/2021  11:59 AM

## 2021-05-31 NOTE — PLAN OF CARE
Problem: Pain:  Goal: Control of acute pain  Description: Control of acute pain  Outcome: Ongoing   Pt instructed on pain and comfort management. Pt verbalized having left hand and hip/leg pain. Pt was medicated with PRN Oxy-IR and scheduled Tylenol. Medication, rest and position was effective. Problem: Falls - Risk of:  Goal: Will remain free from falls  Description: Will remain free from falls  5/31/2021 1741 by Christel Metz RN  Outcome: Ongoing   No falls this shift. Pt uses call light. Bed and chair alarm is in use to promote pt safety. Pt assisted with transfers. Problem: Skin Integrity:  Goal: Absence of new skin breakdown  Description: Absence of new skin breakdown  Outcome: Ongoing   No new skin issues noted this shift. Pt repositioned when up in bed and in WC. Pt refused shower today.     Electronically signed by Christel Metz RN on 5/31/2021 at 5:45 PM

## 2021-06-01 LAB
AFB CULTURE (MYCOBACTERIA): NORMAL
AFB SMEAR: NORMAL

## 2021-06-01 PROCEDURE — 97535 SELF CARE MNGMENT TRAINING: CPT

## 2021-06-01 PROCEDURE — 6360000002 HC RX W HCPCS: Performed by: PHYSICAL MEDICINE & REHABILITATION

## 2021-06-01 PROCEDURE — 6370000000 HC RX 637 (ALT 250 FOR IP): Performed by: PHYSICAL MEDICINE & REHABILITATION

## 2021-06-01 PROCEDURE — 97129 THER IVNTJ 1ST 15 MIN: CPT

## 2021-06-01 PROCEDURE — 94660 CPAP INITIATION&MGMT: CPT

## 2021-06-01 PROCEDURE — 97130 THER IVNTJ EA ADDL 15 MIN: CPT

## 2021-06-01 PROCEDURE — 1280000000 HC REHAB R&B

## 2021-06-01 PROCEDURE — 99232 SBSQ HOSP IP/OBS MODERATE 35: CPT | Performed by: PHYSICAL MEDICINE & REHABILITATION

## 2021-06-01 PROCEDURE — 97110 THERAPEUTIC EXERCISES: CPT

## 2021-06-01 PROCEDURE — 97530 THERAPEUTIC ACTIVITIES: CPT | Performed by: PHYSICAL THERAPIST

## 2021-06-01 PROCEDURE — 97110 THERAPEUTIC EXERCISES: CPT | Performed by: PHYSICAL THERAPIST

## 2021-06-01 PROCEDURE — 97116 GAIT TRAINING THERAPY: CPT | Performed by: PHYSICAL THERAPIST

## 2021-06-01 RX ADMIN — ACETAMINOPHEN 1000 MG: 500 TABLET, COATED ORAL at 11:06

## 2021-06-01 RX ADMIN — THIAMINE HCL TAB 100 MG 100 MG: 100 TAB at 08:53

## 2021-06-01 RX ADMIN — ATORVASTATIN CALCIUM 80 MG: 80 TABLET, FILM COATED ORAL at 21:22

## 2021-06-01 RX ADMIN — GUAIFENESIN 600 MG: 600 TABLET, EXTENDED RELEASE ORAL at 08:52

## 2021-06-01 RX ADMIN — GUAIFENESIN 600 MG: 600 TABLET, EXTENDED RELEASE ORAL at 21:22

## 2021-06-01 RX ADMIN — CETIRIZINE HYDROCHLORIDE 10 MG: 10 TABLET, FILM COATED ORAL at 08:53

## 2021-06-01 RX ADMIN — FAMOTIDINE 20 MG: 20 TABLET, FILM COATED ORAL at 08:53

## 2021-06-01 RX ADMIN — THERA TABS 1 TABLET: TAB at 08:53

## 2021-06-01 RX ADMIN — FAMOTIDINE 20 MG: 20 TABLET, FILM COATED ORAL at 21:21

## 2021-06-01 RX ADMIN — ASPIRIN 81 MG: 81 TABLET, CHEWABLE ORAL at 08:54

## 2021-06-01 RX ADMIN — ACETAMINOPHEN 1000 MG: 500 TABLET, COATED ORAL at 21:22

## 2021-06-01 RX ADMIN — LEVETIRACETAM 500 MG: 500 TABLET ORAL at 08:53

## 2021-06-01 RX ADMIN — HEPARIN SODIUM 5000 UNITS: 5000 INJECTION INTRAVENOUS; SUBCUTANEOUS at 06:01

## 2021-06-01 RX ADMIN — OXYCODONE HYDROCHLORIDE 5 MG: 5 TABLET ORAL at 21:22

## 2021-06-01 RX ADMIN — HEPARIN SODIUM 5000 UNITS: 5000 INJECTION INTRAVENOUS; SUBCUTANEOUS at 14:46

## 2021-06-01 RX ADMIN — DICLOFENAC SODIUM 4 G: 10 GEL TOPICAL at 08:55

## 2021-06-01 RX ADMIN — ACETAMINOPHEN 1000 MG: 500 TABLET, COATED ORAL at 03:13

## 2021-06-01 RX ADMIN — LEVETIRACETAM 500 MG: 500 TABLET ORAL at 21:22

## 2021-06-01 RX ADMIN — HEPARIN SODIUM 5000 UNITS: 5000 INJECTION INTRAVENOUS; SUBCUTANEOUS at 21:23

## 2021-06-01 RX ADMIN — METHYLPHENIDATE HYDROCHLORIDE 10 MG: 10 TABLET ORAL at 08:53

## 2021-06-01 RX ADMIN — FLUOXETINE 20 MG: 20 CAPSULE ORAL at 08:53

## 2021-06-01 RX ADMIN — OXYCODONE HYDROCHLORIDE 5 MG: 5 TABLET ORAL at 16:40

## 2021-06-01 RX ADMIN — DICLOFENAC SODIUM 4 G: 10 GEL TOPICAL at 19:45

## 2021-06-01 RX ADMIN — AMLODIPINE BESYLATE 5 MG: 5 TABLET ORAL at 08:52

## 2021-06-01 ASSESSMENT — PAIN SCALES - GENERAL
PAINLEVEL_OUTOF10: 3
PAINLEVEL_OUTOF10: 0
PAINLEVEL_OUTOF10: 6
PAINLEVEL_OUTOF10: 5
PAINLEVEL_OUTOF10: 4
PAINLEVEL_OUTOF10: 2

## 2021-06-01 ASSESSMENT — PAIN DESCRIPTION - LOCATION
LOCATION: HAND

## 2021-06-01 ASSESSMENT — PAIN DESCRIPTION - PAIN TYPE
TYPE: ACUTE PAIN

## 2021-06-01 ASSESSMENT — PAIN - FUNCTIONAL ASSESSMENT: PAIN_FUNCTIONAL_ASSESSMENT: ACTIVITIES ARE NOT PREVENTED

## 2021-06-01 ASSESSMENT — PAIN DESCRIPTION - FREQUENCY
FREQUENCY: INTERMITTENT

## 2021-06-01 ASSESSMENT — PAIN DESCRIPTION - PROGRESSION
CLINICAL_PROGRESSION: NOT CHANGED
CLINICAL_PROGRESSION: NOT CHANGED
CLINICAL_PROGRESSION: GRADUALLY WORSENING

## 2021-06-01 ASSESSMENT — PAIN DESCRIPTION - ORIENTATION
ORIENTATION: LEFT

## 2021-06-01 ASSESSMENT — PAIN DESCRIPTION - DESCRIPTORS
DESCRIPTORS: ACHING;SORE
DESCRIPTORS: ACHING;SORE
DESCRIPTORS: ACHING;BURNING

## 2021-06-01 ASSESSMENT — PAIN DESCRIPTION - ONSET
ONSET: ON-GOING
ONSET: ON-GOING
ONSET: OTHER (COMMENT)

## 2021-06-01 NOTE — PLAN OF CARE
Problem: Pain:  Goal: Pain level will decrease  Outcome: Ongoing     Problem: Pain:  Goal: Control of acute pain  6/1/2021 0957 by Lizabeth Clemente RN  Outcome: Ongoing   Patient is able to rate pain on a 0-10 scale, she is able to request medication appropriately, she uses non -pharmacologic pain interventions and is on routine tylenol.

## 2021-06-01 NOTE — PROGRESS NOTES
Department of Physical Medicine & Rehabilitation  Progress Note    Patient Identification:  Destinee Castro  [id number]  : 1970  Admit date: 2021    Chief Complaint: CVA    Subjective:   Complains of some contact dermatitis today where her shirt is laying againt her skin. No other issues. She is slowly improving in therapy. ROS: No f/c, n/v, cp     Objective:  Patient Vitals for the past 24 hrs:   BP Temp Temp src Pulse Resp SpO2   21 0852 110/73        21 0830 110/76 97.8 °F (36.6 °C) Oral 86 16 95 %   21 0001     16    21 2216     18    21 2132 115/65 98.1 °F (36.7 °C) Oral 74 16 97 %     Const: Alert. No distress, pleasant. HEENT: Normocephalic, atraumatic. Normal sclera/conjunctiva. MMM. Incision healing well  CV: Regular rate and rhythm. Resp: No respiratory distress. Lungs CTAB. Abd: Soft, nontender, nondistended, NABS+   Ext: No edema. Neuro: Alert, oriented, appropriately interactive. Psych: Cooperative, appropriate mood and affect    Laboratory data: Available via EMR. Last 24 hour lab  No results found for this or any previous visit (from the past 24 hour(s)).     Therapy progress:  PT  Position Activity Restriction  Other position/activity restrictions: Ambulate, Up with assist, Pt to wear helmet when OOB, OK to remove for shower, HOB to elevated at 30*  Objective     Sit to Stand: Minimal Assistance, Contact guard assistance (Pt min A from recliner at beginning of session and min A from  w/c and first time from mat and then CGA x 15 from mat with cueing for form; assist with L LE placement and cueing to lean forward, min A w/c>bed)  Stand to sit: Minimal Assistance, Contact guard assistance  Bed to Chair: Minimal assistance (for balance during transfer)  Device: Parallel Bars  Other Apparatus:  (sleeve on L shoe to faciitate forward movement with making it a frictionless surface)  Assistance: Minimal assistance, Moderate assistance Distance: 8' x 2  OT  PT Equipment Recommendations  Equipment Needed: Yes  Other: 20\" hemiheight w/c with drop arm and standard 90 degree leg rest  Toilet - Technique: Stand pivot  Equipment Used: Raised toilet seat with rails (+ GB for stability during pivot)  Toilet Transfers Comments: CGA for wc to RTS transfer, min A for RTS to wc transfer  Assessment        SLP  Diet Solids Recommendation: Dysphagia Soft and Bite-Sized (Dysphagia III) (with regular solids as tolerated. Per pt request, gravy/sauces to keep for moist.)       Body mass index is 36.54 kg/m². Assessment and Plan:  Danyell Hampton a 46 y.o. female with PMH GERD p/w L sided weakness and facial droop.  CTA head/neck on 4/12 revealed occluded R cervical ICA, occluded right LEAH, near complete occlusion of right MCA.     Acute ischemic CVA, Subdural heomorrhage s/p right hemicraniectomy and subsequen SAH and IP hemorrhage  No tPA given.  MRI 4/14 showing large subacute infarct throughout R LEAH and MCA with some areas of hemorrhagic conversion.  - Neurosurgery and neurology following  - SBP <160, PRN hydralazine, scheduled Norvasc 5 mg daily  - Keppra 500 mg twice daily   - SQH  - PT/OT      Aphasia - SLP   Depression - Home fluoxetine 20 mg daily at home  Anxiety - Hold home Ativan 0.5 mg every 8 hours PRN for anxiety  MISHA - Patient uses CPAP at home  GERD - omeprazole 40 mg daily at home- substituted for pepcid 20 mg BID  Obesity    Rehab Progress: Improving  Anticipated Dispo: home  Services/DME: TTB and RTS (neither covered by insurance, friend to obtain)  ELOS: - extension needed       Loretta Thakkar D.O. M.P.H.  PM&R  6/1/2021  10:13 AM

## 2021-06-01 NOTE — PLAN OF CARE
Problem: Pain:  Goal: Control of acute pain  Description: Control of acute pain  6/1/2021 0038 by Emmy Villasenor RN  Note: Voltaren gel applied to L hand. Arm elevated and edema glove applied to decrease swelling. Problem: Falls - Risk of:  Goal: Will remain free from falls  Description: Will remain free from falls  6/1/2021 0038 by Emmy Villasenor RN  Note: Remains free from falls this shift. Call light within reach and alarms in use at all times. Calls appropriately for assist. X1 mod assist stand pivot for transfers.

## 2021-06-01 NOTE — PROGRESS NOTES
ACUTE REHAB UNIT  SPEECH/LANGUAGE PATHOLOGY      [x] Daily  [] Weekly Care Conference Note  [] Discharge    Patient:Laura Solomon      MVD:[id number]  NQQ:[id number]  Rehab Dx/Hx: Acute CVA (cerebrovascular accident) ([facility] [address] [I63.9]    Precautions: [x] Aspiration  [x] Fall risk  [] Sternal  [] Seizure [] Hip  [] Weight Bearing [] Other  ST Dx: [] Aphasia  [] Dysarthria  [] Apraxia   [x] Oropharyngeal dysphagia [x] Cognitive Impairment  [] Other:   Date of Admit: 4/23/2021  Room #: 3101/3101-01  Date: 6/1/2021        Current Diet Order:Dietary Nutrition Supplements: Low Calorie High Protein Supplement  DIET GENERAL;   Recommended Form of Meds: PO  Compensatory Swallowing Strategies: Alternate solids and liquids, Upright as possible for all oral intake, Check for pocketing of food on the Left, Small bites/sips, Lingual sweep   Subjective: Pt admitted 4/12 after fall with left sided paralysis. Pt underwent craniectomy on 4/13 per Dr. Hyun Mojica. Social/Functional History  Lives With: Spouse;Son( reported that son is diagnosed with Autism.)  Occupation: Full time employment  Type of occupation:  at [employer]"    FEES 4/14: IMPRESSIONS:   Pt presents w/ mild oropharyngeal dysphagia characterized by premature spillage of thin liquids to UES and pyriforms w/ intermittent deep penetration of laryngeal vestibule; adequate airway protection and complete clearance of all residue after swallow initiation. No aspiration w/ any trials. Timely swallow initiation w/ puree and regular solid consistencies; no oropharyngeal residue.    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 Adequate ab/adduction. Complete closure of TVFs upon adduction.      Dentition: Adequate  Vision  Vision: Impaired  Vision Exceptions: Visual field cut  Hearing  Hearing: Within functional limits  Barriers toward progress: None towards stated goals    Date: 6/1/2021      Tx session 1 Tx session 2   Total Timed Code Min 31 30   Total Treatment Minutes 31 30   Individual Treatment Minutes 31 30   Group Treatment Minutes 0 0   Co-Treat Minutes 0 0   Brief Exception: N/A N/A   Pain Discomfort in R hand Discomfort hand   Pain Intervention: [] RN notified  [] Repositioned  [] Intervention offered and patient declined  [] N/A  [x] Other: pt removed splint (1.25 hrs early but pt reported unable to tolerate)   [] RN notified  [x] Repositioned  [] Intervention offered and patient declined  [] N/A  [] Other:    Subjective:     Friend present for session. Pt up in w/c and alert, pleasant, and cooperative. In good spirits with frequent use of humor. Pt in w/c upon entry, attended session alone. Pleasant and cooperative. Splint placed per schedule at beginning of session. Objective / Goals:     Patient will consume regular solids with timely mastication and no pocketing in L buccal cavity across 2 sessions. Goal not targeted. Goal not targeted. Patient will consume PO without anterior spillage or overt pocketing across 2 sessions. GOAL MET   GOAL MET   Pt will complete divided/alternating attention tasks with 85% accuracy given min cues. Not directly targeted. Mod cues to divide attention between conversation + physical task (assisting with splint placement). Pt given explicit instruction to continue conversation during task in effort to direct attention away from discomfort. Pt will accurately complete executive functioning tasks with min cues. Max cues for temporal reasoning r/t splint schedule - suspect distractions d/t pain resulted in persistent erroneous reasoning. Deductive reasoning - arranging items according to verbally presented constraints: no cues increasing to mod cues as complexity increased. For more complex items, constraints presented in writing to pt. Final item not completed d/t time constraint.    Deductive reasoning - arranging items according to written constraints: min cues for organized scanning of written information (began at clue #3 vs #1). Overall min cues for attention to detail and self-monitoring of response. Unscrambling sentences:  -5 words: independent  -7 words: no increasing to mod cues. Attention appeared to decline as discomfort in RUE increased. Pt will attend to L visual field with min cues. Min cues for attending to L visual field for functional reading tasks   Min cues for identifying items in far L field during reading tasks. Pt will participate in ongoing cognitive linguistic assessment. GOAL MET GOAL MET   Other areas targeted:     Education:   Educated to skills targeted, L attention, and attention deficits. Educated to internal distractions and decline in attention across tasks as well as concept of \"attention fatigue. \"   Safety Devices: [x] Call light within reach  [x] Chair alarm activated and connected to nurse call light system  [] Bed alarm activated   [x] Other: friend at side [x] Call light within reach  [x] Chair alarm activated and connected to nurse call light system  [] Bed alarm activated   [x] Other: reinforced use of call light   Assessment:   Cognitive linguistic impairment with attention deficits and difficulty with internal distractions this date. Plan: Continue per POC.       Interventions used this date:  [] Speech/Language Treatment  [] Instruction in HEP  [] Dysphagia Treatment [x] Cognitive Treatment   [] Other:    Discharge recommendations:  [] Home independently  [x] Home with assistance []  24 hour supervision  [] ECF [] Other  Continued Tx Upon Discharge: ? [x] Yes    [] No    [] TBD based on progress while on ARU     [] Vital Stim indicated     [] Other:   Estimated discharge date: 6/3/21    Tita Pacheco MA CCC-SLP; [phone]  Speech-Language Pathologist

## 2021-06-01 NOTE — PROGRESS NOTES
Assessment completed, medications given. Fall precautions are in place, call light and bedside table are within reach. Patient is aware to call for assistance to transfer. Splint applied to arm.

## 2021-06-01 NOTE — PROGRESS NOTES
Occupational Therapy  Facility/Department: [facility] ACUTE REHAB UNIT  Daily Treatment Note  NAME: Yelena Fuentes  : 1970  MRN: [id number]    Date of Service: 2021    Discharge Recommendations:  24 hour supervision or assist, Home with Home health OT, Continue to assess pending progress, Home with nursing aide  OT Equipment Recommendations  Equipment Needed: Yes  ADL Assistive Devices: Transfer Tub Bench;Grab Bars - toilet; Toileting - Raised Toilet Seat with arms; Reacher;Grab Bars - tub  Other: continue to assess    Assessment   Performance deficits / Impairments: Decreased functional mobility ; Decreased ADL status; Decreased ROM; Decreased strength;Decreased sensation;Decreased fine motor control;Decreased endurance;Decreased posture;Decreased balance;Decreased safe awareness;Decreased coordination;Decreased vision/visual deficit; Decreased cognition  Assessment: Pt showing improved carryover of use of reacher for LB dressing req decreased cues and asssist to thread LLE. Pt CGA for UB dressing when short sleeve shirt via hemitechnique. Pt limited by pain in L hemibody and thenar eminence and benefits from assisting the donning of splint to decrease anticiaption of pain with increased control. Pt continues to  Treatment Diagnosis: decreased independence with ADLs and decreased functional mobility 2/2 CVA  Prognosis: Fair  OT Education: Plan of Care;Transfer Training;ADL Adaptive Strategies  Patient Education: cont to reinforce  REQUIRES OT FOLLOW UP: Yes  Activity Tolerance  Activity Tolerance: Patient Tolerated treatment well  Safety Devices  Safety Devices in place: Yes  Type of devices: Chair alarm in place; Left in chair; All fall risk precautions in place;Call light within reach;Nurse notified         Patient Diagnosis(es): There were no encounter diagnoses. has a past medical history of GERD (gastroesophageal reflux disease), Hernia, Obesity, and Unspecified sleep apnea.    has a past surgical history that includes Splenectomy (); Total hip arthroplasty (); Lap Band (); hernia repair ();  section (); and craniotomy (Right, 2021). Restrictions  Position Activity Restriction  Other position/activity restrictions: Ambulate, Up with assist, Pt to wear helmet when OOB, OK to remove for shower, HOB to elevated at 30*  Subjective   General  Chart Reviewed: Yes  Patient assessed for rehabilitation services?: Yes  Additional Pertinent Hx: 46 y.o. female with hypertension and tobacco abuse who presented after spending a prolonged period on the ground s/p fall out of bed; found to have acute left hemiparesis and gaze deviation. CXR with no acute cardiopulmonary abnormality. CTH revealed large acute/subacute infarct in the R frontal lobe with associated mass effect and 4 mm R to L midline shift. It also noted subdural hemorrhage of 6mm along falx, although NSGY less convinced. CTA head and neck revealed occluded R cervical ICA & R anterior cerebral artery with near complete occlusion of R MCA. Outside window on TPA. Pt is now POD #1 following hemicraniectomy (). Pt admitted to ARU   Response to previous treatment: Patient with no complaints from previous session  Family / Caregiver Present: No  Referring Practitioner: Dr. Jane Dawson DO  Diagnosis: CVA  Subjective  Subjective: Pt supine in bed upon arrival, pleasant and agreeable to OT session requesting to use toilet. General Comment  Comments: Angela Kwok Vital Signs  Patient Currently in Pain: Yes (2/10 L hip and hand)     Orientation  Orientation  Overall Orientation Status: Within Functional Limits  Objective    ADL  Feeding: Setup (to open containers)  Grooming: Modified independent  (oral hygiene seated in w/c)  UE Dressing: Setup;Contact guard assistance; Increased time to complete;Verbal cueing (pt doffed shirt i'ly with + time to unthread LUE, pt donned t shirt with setup, cues to pull L arm hole to elbow, + time to thread LUE)  LE Dressing: Moderate assistance; Increased time to complete;Verbal cueing;Setup (pt donned brief and pants using reacher, VCs to use RLE to position LLE, assist to thread LLE for both, + time as pt threaded RLE in wrong pants hole, A to pull over hips on L side with CGA)  Toileting: Minimal assistance; Increased time to complete (pt managed brief off hips in stance with + time and VCs to problem solve L side, pericare hygiene seated, assist to manage LB clothing over hips on L side with steady assist)        Balance  Sitting Balance: Supervision (seated EOB and on RTS)  Standing Balance: Contact guard assistance (during LB clothing mgmt/toileting)  Standing Balance  Time: ~3 minutes total  Activity: functional transfers; in stance for LB clothing mgmt/toileting  Toilet Transfers  Toilet - Technique: Stand pivot  Equipment Used: Raised toilet seat with rails  Toilet Transfer: Minimal assistance  Toilet Transfers Comments: CGA for wc to RTS transfer, min A for RTS to wc transfer  Bed mobility  Supine to Sit: Minimal assistance (from L sidelying, use of bed rail, assist for trunk ascension)  Transfers  Sit Pivot Transfers: Contact guard assistance (EOB<w/c with + time to initiate)                       Cognition  Overall Cognitive Status: Exceptions  Arousal/Alertness: Appropriate responses to stimuli  Following Commands:  Follows one step commands consistently  Attention Span: Appears intact  Memory: Appears intact  Safety Judgement: Decreased awareness of need for assistance;Decreased awareness of need for safety  Problem Solving: Assistance required to generate solutions;Assistance required to correct errors made;Assistance required to implement solutions;Decreased awareness of errors  Insights: Decreased awareness of deficits  Initiation: Requires cues for some  Sequencing: Requires cues for some  Cognition Comment: Pt with inconsistent initiation of transfers and req + time but verb insight, \"I'm overthinking it\" Plan   Plan  Times per week: 5x a week for 60 mins daily  Times per day: Daily  Current Treatment Recommendations: Strengthening, Endurance Training, Neuromuscular Re-education, Self-Care / ADL, Functional Mobility Training, Safety Education & Training, ROM, Positioning, Wheelchair Mobility Training, Balance Training, Patient/Caregiver Education & Training, Manual Therapy:  MLD, Equipment Evaluation, Education, & procurement, Home Management Training, Cognitive Reorientation    Second Session: Pt sitting in w/c upon arrival [employer], pleasant and agreeable to OT session requesting to use bathroom. Pt instructed to attempt to setup w/c for transfer which requires backing into bathroom, pt req min A to manage over door threshold d/t running into L side of doorframe. Pt completed toilet transfer via stand pivot req 2 attempts to stand as pt did not maintain ant lean resulting in unsuccessful upright stance. On successful second attempt pt utilized GB to complete stand pivot with VCs for RUE placement, + time to position/advance RLE. Pt managed LB clothing mgmt on R side in stance with CGA and req assist on L side d/t clothing getting caught on toilet. Pt continent of bladder, pericare hygiene seated. Pt encouraged to attempt rear pericare as pt stated nursing req 2 person assist for toileting for perihygiene. Pt demonstrated success wiping x 3 trials with lateral WS on RTS. Pt req Min A for toileting total d/t req assist for pants mgmt on L side on/off hips. Pt washed hands I'ly seated in w/c. Pt completed scap pro/retraction at tabletop with LUE placed in air cast and use of cylindrical bolster to minimize trunk compensation. Pt able to complete x 20 pro/retraction with minimal trunk compensation. X 3 scaption PROM to LUE with pt only tolerating ~95 degrees d/t pain and hypertonicity. Pt left sitting in w/c with PT present to initiate therapy session.      Goals  Short term goals  Time Frame for Short term goals: 2 weeks  Short term goal 1: Pt will complete toilet transfer w/ Mod A-goal met 5/4  Short term goal 2: Pt will complete UE dressing w/ Mod A-goal met 5/12  Short term goal 3: Pt will complete oral care w/ spvn seated at sink w/o VCs for L sided attention-goal met 5/17  Short term goal 4: Pt will complete LE dressing w/ Mod A-goal met 5/26  Long term goals  Time Frame for Long term goals : 4 weeks  Long term goal 1: Pt will complete toilet transfer w/ SBA-ongoing  Long term goal 2: Pt will complete UE dressing w/ setup-ongoing  Long term goal 3: Pt will complete LE dressing w/ SBA-ongoing  Long term goal 4: Pt will be independent w/ tone management exercises to improve functional use of LUE-ongoing  Long term goal 5: Pt will complete oral care I'ly-goal met 5/24  Patient Goals   Patient goals : \"get back to my normal self\"       Therapy Time   Individual Second Session Group Co-treatment   Time In 0730  1315       Time Out 0830  1345       Minutes 60  30       Timed Code Treatment Minutes: 60 Minutes+ 30 Minutes   Total Treatment Time: 90 Minutes total       Hiram Caldwell OT
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
## 2021-06-02 ENCOUNTER — NURSE ONLY (OUTPATIENT)
Dept: CARDIOLOGY CLINIC | Age: 51
End: 2021-06-02
Payer: COMMERCIAL

## 2021-06-02 DIAGNOSIS — I63.9 ACUTE CVA (CEREBROVASCULAR ACCIDENT) (HCC): ICD-10-CM

## 2021-06-02 DIAGNOSIS — I63.511 ACUTE RIGHT MCA STROKE (HCC): ICD-10-CM

## 2021-06-02 DIAGNOSIS — Z45.09 ENCOUNTER FOR LOOP RECORDER CHECK: ICD-10-CM

## 2021-06-02 LAB
ANION GAP SERPL CALCULATED.3IONS-SCNC: 10 MMOL/L (ref 3–16)
BASOPHILS ABSOLUTE: 0.1 K/UL (ref 0–0.2)
BASOPHILS RELATIVE PERCENT: 1.1 %
BUN BLDV-MCNC: 11 MG/DL (ref 7–20)
CALCIUM SERPL-MCNC: 9.7 MG/DL (ref 8.3–10.6)
CHLORIDE BLD-SCNC: 105 MMOL/L (ref 99–110)
CO2: 23 MMOL/L (ref 21–32)
CREAT SERPL-MCNC: <0.5 MG/DL (ref 0.6–1.1)
EOSINOPHILS ABSOLUTE: 0.7 K/UL (ref 0–0.6)
EOSINOPHILS RELATIVE PERCENT: 5.7 %
GFR AFRICAN AMERICAN: >60
GFR NON-AFRICAN AMERICAN: >60
GLUCOSE BLD-MCNC: 87 MG/DL (ref 70–99)
HCT VFR BLD CALC: 38 % (ref 36–48)
HEMOGLOBIN: 12.8 G/DL (ref 12–16)
LYMPHOCYTES ABSOLUTE: 5 K/UL (ref 1–5.1)
LYMPHOCYTES RELATIVE PERCENT: 41.1 %
MCH RBC QN AUTO: 34.3 PG (ref 26–34)
MCHC RBC AUTO-ENTMCNC: 33.6 G/DL (ref 31–36)
MCV RBC AUTO: 102.2 FL (ref 80–100)
MONOCYTES ABSOLUTE: 1.4 K/UL (ref 0–1.3)
MONOCYTES RELATIVE PERCENT: 11.1 %
NEUTROPHILS ABSOLUTE: 5 K/UL (ref 1.7–7.7)
NEUTROPHILS RELATIVE PERCENT: 41 %
PDW BLD-RTO: 13.6 % (ref 12.4–15.4)
PLATELET # BLD: 394 K/UL (ref 135–450)
PMV BLD AUTO: 9.1 FL (ref 5–10.5)
POTASSIUM REFLEX MAGNESIUM: 4.1 MMOL/L (ref 3.5–5.1)
RBC # BLD: 3.72 M/UL (ref 4–5.2)
SODIUM BLD-SCNC: 138 MMOL/L (ref 136–145)
WBC # BLD: 12.2 K/UL (ref 4–11)

## 2021-06-02 PROCEDURE — 97530 THERAPEUTIC ACTIVITIES: CPT | Performed by: PHYSICAL THERAPIST

## 2021-06-02 PROCEDURE — 36415 COLL VENOUS BLD VENIPUNCTURE: CPT

## 2021-06-02 PROCEDURE — 80048 BASIC METABOLIC PNL TOTAL CA: CPT

## 2021-06-02 PROCEDURE — 99231 SBSQ HOSP IP/OBS SF/LOW 25: CPT | Performed by: PHYSICAL MEDICINE & REHABILITATION

## 2021-06-02 PROCEDURE — 6360000002 HC RX W HCPCS: Performed by: PHYSICAL MEDICINE & REHABILITATION

## 2021-06-02 PROCEDURE — 93291 INTERROG DEV EVAL SCRMS IP: CPT | Performed by: INTERNAL MEDICINE

## 2021-06-02 PROCEDURE — 97130 THER IVNTJ EA ADDL 15 MIN: CPT

## 2021-06-02 PROCEDURE — 6370000000 HC RX 637 (ALT 250 FOR IP): Performed by: PHYSICAL MEDICINE & REHABILITATION

## 2021-06-02 PROCEDURE — 1280000000 HC REHAB R&B

## 2021-06-02 PROCEDURE — 97530 THERAPEUTIC ACTIVITIES: CPT

## 2021-06-02 PROCEDURE — 97542 WHEELCHAIR MNGMENT TRAINING: CPT | Performed by: PHYSICAL THERAPIST

## 2021-06-02 PROCEDURE — 92526 ORAL FUNCTION THERAPY: CPT

## 2021-06-02 PROCEDURE — 97116 GAIT TRAINING THERAPY: CPT | Performed by: PHYSICAL THERAPIST

## 2021-06-02 PROCEDURE — 97110 THERAPEUTIC EXERCISES: CPT

## 2021-06-02 PROCEDURE — 97129 THER IVNTJ 1ST 15 MIN: CPT

## 2021-06-02 PROCEDURE — 97535 SELF CARE MNGMENT TRAINING: CPT

## 2021-06-02 PROCEDURE — 85025 COMPLETE CBC W/AUTO DIFF WBC: CPT

## 2021-06-02 RX ADMIN — THIAMINE HCL TAB 100 MG 100 MG: 100 TAB at 09:21

## 2021-06-02 RX ADMIN — GUAIFENESIN 600 MG: 600 TABLET, EXTENDED RELEASE ORAL at 21:40

## 2021-06-02 RX ADMIN — GUAIFENESIN 600 MG: 600 TABLET, EXTENDED RELEASE ORAL at 09:21

## 2021-06-02 RX ADMIN — DICLOFENAC SODIUM 4 G: 10 GEL TOPICAL at 21:40

## 2021-06-02 RX ADMIN — LEVETIRACETAM 500 MG: 500 TABLET ORAL at 21:40

## 2021-06-02 RX ADMIN — HEPARIN SODIUM 5000 UNITS: 5000 INJECTION INTRAVENOUS; SUBCUTANEOUS at 13:26

## 2021-06-02 RX ADMIN — DICLOFENAC SODIUM 4 G: 10 GEL TOPICAL at 09:22

## 2021-06-02 RX ADMIN — FLUOXETINE 20 MG: 20 CAPSULE ORAL at 09:21

## 2021-06-02 RX ADMIN — ASPIRIN 81 MG: 81 TABLET, CHEWABLE ORAL at 09:21

## 2021-06-02 RX ADMIN — ACETAMINOPHEN 1000 MG: 500 TABLET, COATED ORAL at 17:56

## 2021-06-02 RX ADMIN — ATORVASTATIN CALCIUM 80 MG: 80 TABLET, FILM COATED ORAL at 21:40

## 2021-06-02 RX ADMIN — THERA TABS 1 TABLET: TAB at 09:21

## 2021-06-02 RX ADMIN — HEPARIN SODIUM 5000 UNITS: 5000 INJECTION INTRAVENOUS; SUBCUTANEOUS at 05:34

## 2021-06-02 RX ADMIN — FAMOTIDINE 20 MG: 20 TABLET, FILM COATED ORAL at 21:40

## 2021-06-02 RX ADMIN — ACETAMINOPHEN 1000 MG: 500 TABLET, COATED ORAL at 09:21

## 2021-06-02 RX ADMIN — METHYLPHENIDATE HYDROCHLORIDE 10 MG: 10 TABLET ORAL at 09:21

## 2021-06-02 RX ADMIN — FAMOTIDINE 20 MG: 20 TABLET, FILM COATED ORAL at 09:21

## 2021-06-02 RX ADMIN — HEPARIN SODIUM 5000 UNITS: 5000 INJECTION INTRAVENOUS; SUBCUTANEOUS at 21:42

## 2021-06-02 RX ADMIN — LEVETIRACETAM 500 MG: 500 TABLET ORAL at 09:21

## 2021-06-02 RX ADMIN — ACETAMINOPHEN 1000 MG: 500 TABLET, COATED ORAL at 03:33

## 2021-06-02 RX ADMIN — OXYCODONE 10 MG: 5 TABLET ORAL at 13:25

## 2021-06-02 RX ADMIN — AMLODIPINE BESYLATE 5 MG: 5 TABLET ORAL at 09:21

## 2021-06-02 RX ADMIN — CETIRIZINE HYDROCHLORIDE 10 MG: 10 TABLET, FILM COATED ORAL at 09:21

## 2021-06-02 RX ADMIN — ACETAMINOPHEN 1000 MG: 500 TABLET, COATED ORAL at 21:41

## 2021-06-02 ASSESSMENT — PAIN DESCRIPTION - PAIN TYPE
TYPE: ACUTE PAIN

## 2021-06-02 ASSESSMENT — PAIN DESCRIPTION - FREQUENCY
FREQUENCY: INTERMITTENT

## 2021-06-02 ASSESSMENT — PAIN - FUNCTIONAL ASSESSMENT
PAIN_FUNCTIONAL_ASSESSMENT: ACTIVITIES ARE NOT PREVENTED

## 2021-06-02 ASSESSMENT — PAIN DESCRIPTION - DESCRIPTORS
DESCRIPTORS: ACHING;DISCOMFORT
DESCRIPTORS: ACHING;BURNING

## 2021-06-02 ASSESSMENT — PAIN DESCRIPTION - LOCATION
LOCATION: HAND

## 2021-06-02 ASSESSMENT — PAIN DESCRIPTION - ORIENTATION
ORIENTATION: LEFT

## 2021-06-02 ASSESSMENT — PAIN SCALES - GENERAL
PAINLEVEL_OUTOF10: 0
PAINLEVEL_OUTOF10: 9
PAINLEVEL_OUTOF10: 0
PAINLEVEL_OUTOF10: 5
PAINLEVEL_OUTOF10: 3
PAINLEVEL_OUTOF10: 2

## 2021-06-02 ASSESSMENT — PAIN DESCRIPTION - PROGRESSION
CLINICAL_PROGRESSION: GRADUALLY IMPROVING
CLINICAL_PROGRESSION: GRADUALLY IMPROVING
CLINICAL_PROGRESSION: GRADUALLY WORSENING

## 2021-06-02 NOTE — PROGRESS NOTES
Department of Physical Medicine & Rehabilitation  Progress Note    Patient Identification:  Jorge Coleman  [id number]  : 1970  Admit date: 2021    Chief Complaint: CVA    Subjective:   No new issues overnight. Contact dermatitis has improved. Plan to continue with therapy. ROS: No f/c, n/v, cp     Objective:  Patient Vitals for the past 24 hrs:   BP Temp Temp src Pulse Resp SpO2   21 0915 117/73 98.2 °F (36.8 °C) Oral 85 16    21 1954 128/71 98.5 °F (36.9 °C) Oral 80 16 97 %     Const: Alert. No distress, pleasant. HEENT: Normocephalic, atraumatic. Normal sclera/conjunctiva. MMM. Incision healing well  CV: Regular rate and rhythm. Resp: No respiratory distress. Lungs CTAB. Abd: Soft, nontender, nondistended, NABS+   Ext: No edema. Neuro: Alert, oriented, appropriately interactive. Psych: Cooperative, appropriate mood and affect    Laboratory data: Available via EMR.    Last 24 hour lab  Recent Results (from the past 24 hour(s))   Basic Metabolic Panel w/ Reflex to MG    Collection Time: 21  5:55 AM   Result Value Ref Range    Sodium 138 136 - 145 mmol/L    Potassium reflex Magnesium 4.1 3.5 - 5.1 mmol/L    Chloride 105 99 - 110 mmol/L    CO2 23 21 - 32 mmol/L    Anion Gap 10 3 - 16    Glucose 87 70 - 99 mg/dL    BUN 11 7 - 20 mg/dL    CREATININE <0.5 (L) 0.6 - 1.1 mg/dL    GFR Non-African American >60 >60    GFR African American >60 >60    Calcium 9.7 8.3 - 10.6 mg/dL   CBC auto differential    Collection Time: 21  5:55 AM   Result Value Ref Range    WBC 12.2 (H) 4.0 - 11.0 K/uL    RBC 3.72 (L) 4.00 - 5.20 M/uL    Hemoglobin 12.8 12.0 - 16.0 g/dL    Hematocrit 38.0 36.0 - 48.0 %    .2 (H) 80.0 - 100.0 fL    MCH 34.3 (H) 26.0 - 34.0 pg    MCHC 33.6 31.0 - 36.0 g/dL    RDW 13.6 12.4 - 15.4 %    Platelets 703 799 - 319 K/uL    MPV 9.1 5.0 - 10.5 fL    Neutrophils % 41.0 %    Lymphocytes % 41.1 %    Monocytes % 11.1 %    Eosinophils % 5.7 %    Basophils % 1.1 % Neutrophils Absolute 5.0 1.7 - 7.7 K/uL    Lymphocytes Absolute 5.0 1.0 - 5.1 K/uL    Monocytes Absolute 1.4 (H) 0.0 - 1.3 K/uL    Eosinophils Absolute 0.7 (H) 0.0 - 0.6 K/uL    Basophils Absolute 0.1 0.0 - 0.2 K/uL       Therapy progress:  PT  Position Activity Restriction  Other position/activity restrictions: Ambulate, Up with assist, Pt to wear helmet when OOB, OK to remove for shower, HOB to elevated at 30*  Objective     Sit to Stand: Contact guard assistance (VC and maintaining midline position for an ant WS and stabilizing LLE to maintain a neutral position. Transf from w/c and mat table)  Stand to sit: Contact guard assistance (VC for hand placement and to maintain a midline position)  Bed to Chair: Minimal assistance (for balance during transfer)  Device: Parallel Bars  Other Apparatus:  (sleeve on L shoe to faciitate forward movement with making it a frictionless surface)  Assistance: Minimal assistance, Moderate assistance  Distance: 8' x 2  OT  PT Equipment Recommendations  Equipment Needed: Yes (continue to assess)  Other: 20\" hemiheight w/c with drop arm and standard 90 degree leg rest  Toilet - Technique: Stand pivot  Equipment Used: Raised toilet seat with rails  Toilet Transfers Comments: CGA for wc to RTS transfer, min A for RTS to wc transfer  Assessment        SLP  Diet Solids Recommendation: Dysphagia Soft and Bite-Sized (Dysphagia III) (with regular solids as tolerated. Per pt request, gravy/sauces to keep for moist.)       Body mass index is 36.54 kg/m². Assessment and Plan:  Stephy Frazier a 46 y.o. female with PMH GERD p/w L sided weakness and facial droop.  CTA head/neck on 4/12 revealed occluded R cervical ICA, occluded right LEAH, near complete occlusion of right MCA.     Acute ischemic CVA, Subdural heomorrhage s/p right hemicraniectomy and subsequen SAH and IP hemorrhage  No tPA given.  MRI 4/14 showing large subacute infarct throughout R LEAH and MCA with some areas of hemorrhagic
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
## 2021-06-02 NOTE — PATIENT CARE CONFERENCE
Inpatient Rehabilitation  Weekly Team Conference Note  The [address], [address] [place] [address]  [phone]  Patient Name: Shell Singh        MRN: [id number]    : 1970  (46 y.o.)  Gender: female   Referring Practitioner: Beth Winter       The team conference for this patient was held on 6/3/2021 at 10:30am by:  Kim Bustamante.  Melva,     Current/Goal QM SCORES  QM Current/Goal Score   Eating CARE Score: 5 / Discharge Goal: Set-up or clean-up assistance   Oral Hygiene CARE Score: 6 / Discharge Goal: Independent   Shower/Bathing CARE Score: 4 / Discharge Goal: Supervision or touching assistance   UB Dressing CARE Score: 3 / Discharge Goal: Set-up or clean-up assistance   LB Dressing CARE Score: 2 / Discharge Goal: Supervision or touching assistance   Putting on/off Footwear CARE Score: 3 / Discharge Goal: Supervision or touching assistance   Toileting Hygiene CARE Score: 3 / Discharge Goal: Supervision or touching assistance   Bladder Continence Bladder Continence: Always continent    Bowel Continence Bowel Continence: Always continent    Toilet Transfers CARE Score: 4 / Discharge Goal: Supervision or touching assistance   Shower/Bathe Self  CARE Score: 4 / Discharge Goal: Supervision or touching assistance   Rolling Left and Right CARE Score: 4 / Discharge Goal: Supervision or touching assistance   Sit to Lying CARE Score: 3 / Discharge Goal: Supervision or touching assistance   Lying to Sitting on Bedside CARE Score: 3 / Discharge Goal: Supervision or touching assistance   Sit to Stand CARE Score: 3 / Discharge Goal: Supervision or touching assistance   Chair/Bed to Chair Transfer CARE Score: 3 / Discharge Goal: Supervision or touching assistance   Car Transfers CARE Score: 3 / Discharge Goal: Supervision or touching assistance   Walk 10 Feet CARE Score: 3 / Discharge Goal: Partial/moderate assistance   Walk 50 Feet with Two Turns CARE Score: 88 / Discharge Goal: Not Attempted   Walk 150 Feet CARE Score: 88 / Discharge Goal: Not Attempted   Walk 10 Feet on Uneven Surfaces CARE Score: 88 / Discharge Goal: Not Attempted   1 Step (Curb) CARE Score: 88 / Discharge Goal: Partial/moderate assistance   4 Steps CARE Score: 88 / Discharge Goal: Partial/moderate assistance   12 Steps CARE Score: 88 / Discharge Goal: Not Attempted   Picking up Object from Floor CARE Score: 88 / Discharge Goal: Partial/moderate assistance   Wheel 50 Feet with 2 Turns CARE Score: 4 / Discharge Goal: Independent   Type         [] Manual        [] Motorized        [] N/A   Wheel 150 Feet CARE Score: 4 / Discharge Goal: Independent   Type         [] Manual        [] Motorized        [] N/A     NURSING:  A&Ox: Level of Consciousness: Alert (0)  Orientation Level: Oriented X4  Kat Fall Risk Score: Score: 60  Admission BIMS: 15   [] Unable to complete BIMS on Admission, Reasoning:   Wounds/Incisions/Ulcers: Surgical incision to head  Medication Review:   Pain: Left arm and hand pain  Consultations: n/a  Imaging:    XR HIP 2-3 VW W PELVIS LEFT   Final Result      Left hip arthroplasty with interval lucency noted about the femoral component indeterminate. Findings may relate to particle disease and/or may relate to loosening. Clinical correlation suggested. Infection is not excluded. Lucency about the supra-acetabular region adjacent to the acetabular component present previously indeterminate. No fracture. Active Comorbid Conditions:   Systems Review:   Renal: n/a, Dialysis:  Type: Frequency:   Neurological: Left side flaccid. Pain and numbness left hand/arm  Musculoskeletal: Left leg locks up.   Respiratory: CPAP at hs  Cardiac/Circulatory/Peripheral Vascular: Pacemaker  Abnormal/Relevant Test Results:   Abnormal/Relevant Lab Values:   CBC:   Recent Labs     05/31/21  0707 06/02/21  0555   WBC 10.8 12.2*   HGB 13.2 12.8   HCT 37.9 38.0   .6* 102.2*    394     BMP:   Recent Labs     05/31/21  0707 06/02/21  0555    138   K 4.0 4.1    105   CO2 25 23   BUN 11 11   CREATININE <0.5* <0.5*     PT/INR: No results for input(s): PROTIME, INR in the last 72 hours. APTT: No results for input(s): APTT in the last 72 hours. Liver Profile:  Lab Results   Component Value Date    AST 41 04/13/2021    ALT 32 04/13/2021    BILITOT 0.7 04/13/2021    ALKPHOS 86 04/13/2021     Lab Results   Component Value Date    CHOL 251 04/13/2021    HDL 45 04/13/2021    TRIG 278 04/13/2021     Recent Labs     05/31/21  0707 06/02/21  0555   WBC 10.8 12.2*   HGB 13.2 12.8   HCT 37.9 38.0    394   .6* 102.2*     Recent Labs     05/31/21  0707 06/02/21  0555    138   K 4.0 4.1    105   CO2 25 23   BUN 11 11   CREATININE <0.5* <0.5*     No results for input(s): AST, ALT, ALB, BILIDIR, BILITOT, ALKPHOS in the last 72 hours. No results for input(s): MG in the last 72 hours. Recent Labs     05/31/21  0707 06/02/21  0555   WBC 10.8 12.2*   HGB 13.2 12.8   HCT 37.9 38.0    394     Recent Labs     05/31/21 0707 06/02/21  0555    138   K 4.0 4.1    105   CO2 25 23   BUN 11 11   CREATININE <0.5* <0.5*   CALCIUM 9.7 9.7     No results for input(s): AST, ALT, BILIDIR, BILITOT, ALKPHOS in the last 72 hours. No results for input(s): INR in the last 72 hours. No results for input(s): Eli Lia in the last 72 hours. PHYSICAL THERAPY:  Bed Mobility: Scooting: Contact guard assistance (VC/ TC in short sitting to advance hips to EOS in prep for transf. Req additional time appearing as though it is a motor planning problem.)    Transfers:  Sit to Stand: Contact guard assistance (VC and maintaining midline position for an ant WS and stabilizing LLE to maintain a neutral position.  Transf from w/c and mat table)  Stand to sit: Contact guard assistance (VC for hand placement and to maintain a midline position)  Bed to Chair: Minimal assistance (for balance during on R side with steady assist and total A to manage L side; pt doffed/ donned R shoe with setup and L shoe with total A)  Toileting: Minimal assistance (py continent of bladder, pt managed brief off hips in stance on R side, pericare hygiene seated, assist to manage LB clothing over hips on L side with steady assist)  Additional Comments: Pt doffed L shoe with CGA propping against wall and utilizing RLE, pt doffed R shoe i'ly, total A to don L shoe, setup to don R shoe    Toilet Transfers: Toilet Transfers  Toilet - Technique: Stand pivot  Equipment Used: Raised toilet seat with rails  Toilet Transfer: Minimal assistance  Toilet Transfers Comments: CGA for wc to RTS transfer, min A for RTS to wc transfer    Tub/ShowerTransfers:  Tub Transfers  Tub - Transfer From: Wheelchair  Tub - Transfer Type: To and From  Tub - Transfer To: Transfer tub bench  Tub - Technique: Stand pivot  Tub Transfers: Minimal assistance  Tub Transfers Comments: Pt completed dry tub transfer to simulate home environment, pt assisting with LLE mgmt but req assist in/out of tub to fully clear, pt used side GB to assist with scooting laterally on bench; pt utilized [facility] to complete stand pivot leading to L from TTB<w/c to assist with advacement of RLE with RUE supported, pt verbalized increased comfort/confidence with transfer  Shower Transfers  Shower - Transfer From: Wheelchair  Shower - Transfer Type: To and From  Shower - Transfer To: Transfer tub bench  Shower - Technique: Stand pivot  Shower Transfers: Contact Guard  Shower Transfers Comments: use of grab bar    SPEECH THERAPY:  Assessment: Speech Therapy Diagnosis  Cognitive Diagnosis: Pt presents with moderate-severe cognitive linguistic impairment. NUTRITION:  Please see nutrition note for details. Weight: 226 lb 6.6 oz (102.7 kg) / Body mass index is 36.54 kg/m².   Diet Level/Order: Dietary Nutrition Supplements: Low Calorie High Protein Supplement  DIET GENERAL;  Adult Oral Nutrition Atrial Fibrillation [] Smoker (current)  [] Smoker (quit in last 12 months)  [x] Sleep Apnea  [] Other:     Risk for Readmission: High - 23%    Justification for Continued Stay:   Criteria for continued IRF stay:  Based on my medical assessment of the patient and review of information from the interdisciplinary team, as part of this weekly team conference, the patient continues to meet the following criteria for IRF level of care:  [x] The patient requires 24-hour rehabilitation nursing care   [x] The patient requires an intensive rehabilitation therapy program  [x] The patient requires active and ongoing intervention of multiple therapy disciplines  [x] The patient requires continued physician supervision by a rehabilitation physician  [x] The patient requires an intensive and coordinated interdisciplinary team approach to the delivery of rehabilitative care    Medical Necessity-continued close physician medical management is required for:   [] Cardiac/Circulatory dysfunction  [] Respiratory/Pulmonary dysfunction  [] Integumentary complications  [] Peripheral Vascular dysfunction  [] Musculoskeletal dysfunction  [x] Neurological dysfunction d/t:  [x] CVA  [] SCI  [] TBI  [] Other: __________  [] Renal dysfunction  [] Hematologic dysfunction    [] Endocrine disorders  [] GI disorders     [] Genito-Urinary dysfunction    Assessment/Plan:  [x] The patient is making good progression towards their LTG's, is actively participating in, and has a reasonable expectation to continue to benefit from the intensive rehabilitation program.  [] The estimated discharge date has been changed from initial team conference due to:   [] The estimated discharge destination has been changed from initial team conference due to:     Rehab Team Members in attendance for Team Conference:  :  ROSETTA Quintana    Social Work:  USMAN Warner, RAMYAS    Nursing:  JURGEN Zamorano, RN    Melissa Dinero, KIZZY Damon [facility] RN  Mickie Morris, KIZZY    Therapy:  Ashley Riggs, PT  Carlos Alberto Nevarez, PT, DPT    Bharat Menchaca, OTR/L  Suleiman Lilly, OTR/L  Britney, OTR/L    Candida Vega MA/[facility]-SLP    Nutrition:  Colton Paul, RD LD    I approve the established interdisciplinary plan of care as documented within the medical record of Yelena Fuentes.     MD Javi Ca, D.O. M.P.H  PM&R  6/3/2021  9:34 PM

## 2021-06-02 NOTE — PROGRESS NOTES
MARLENA IP  SJM Rep check of [employer]    Here is the PDF for the one month check for Delphine Ghosh from yesterday. I also uploaded it to [email or web address]. Please let me know if you need anything else. Thanks,  Maye Saenz    See [employer] report under the Cardiology tab.

## 2021-06-02 NOTE — PROGRESS NOTES
stated goals    Date: 6/2/2021      Tx session 1 Tx session 2   Total Timed Code Min 0 45   Total Treatment Minutes 30 45   Individual Treatment Minutes 30 45   Group Treatment Minutes 0 0   Co-Treat Minutes 0 0   Brief Exception: N/A N/A   Pain None indicated Occasional discomfort in LUE and back pain. Pain Intervention: [] RN notified  [] Repositioned  [] Intervention offered and patient declined  [x] N/A  [] Other:    [] RN notified  [x] Repositioned  [] Intervention offered and patient declined  [] N/A  [] Other:    Subjective:     Pt upright in chair and ready for therapy after completing OT session. Pt upright in chair upon arrival and agreeable to session. Participates well in therapy as always. Objective / Goals:     Patient will consume regular solids with timely mastication and no pocketing in L buccal cavity across 2 sessions. Patient tolerated NMES via VitalStim placement 4a (targeting orbicularis oris, buccinator and superior pharyngeal constrictor) @ 3.0 mA on right x 20 minutes minutes, 7.5 mA on left x 6 minutes, increased to 8.5 mA on left 14 minutes (total stimulation of 20 minutes)    Minimal pocketing x2. Goal not targeted this session. Patient will consume PO without anterior spillage or overt pocketing across 2 sessions. GOAL MET   GOAL MET   Pt will complete divided/alternating attention tasks with 85% accuracy given min cues. No cues for attention with good ability to continue with task and appropriately redirect back. Increasing inaccuracy with task suggestive of attention impairments. Pt will accurately complete executive functioning tasks with min cues. Cues for initiation to task x2. Cueing x8 for attention to detail and reasoning and initiation. Poor carryover of strategies. Pt will attend to L visual field with min cues. Goal not targeted this session. x2 cues for left visual field attention during reading task.     Pt will participate in ongoing cognitive linguistic assessment. GOAL MET GOAL MET   Other areas targeted:     Education:   Education ongoing regarding rationale for Convo  Education ongoing regarding rationale for tasks this date. Safety Devices: [x] Call light within reach  [x] Chair alarm activated and connected to nurse call light system  [] Bed alarm activated   [] Other: [x] Call light within reach  [x] Chair alarm activated and connected to nurse call light system  [] Bed alarm activated   [] Other:    Assessment: Improving executive function skills, mild left inattention, mild pocketing contributed to by reduced carryover of strategies. Left sided perioral weakness persists. Plan: Continue per POC.       Interventions used this date:  [] Speech/Language Treatment  [] Instruction in HEP  [x] Dysphagia Treatment [x] Cognitive Treatment   [] Other:    Discharge recommendations:  [] Home independently  [x] Home with assistance []  24 hour supervision  [] ECF [x] Other: Education completed with family on Friday 5/28/2021  Continued Tx Upon Discharge: ? [x] Yes    [] No    [] TBD based on progress while on ARU     [] Vital Stim indicated     [] Other:   Estimated discharge date: Pending extension request    Norman Garcia M.A., Dejuan  Speech-Language Pathologist

## 2021-06-02 NOTE — PROGRESS NOTES
Pt up in chair reports little pain to L hand, declines any intervention other than the scheduled Voltaren. All meds taken within reach and chair alarm engaged. For patient safety, Visual Surveillance is in place, reviewed with patient/family, verbalized understanding.     Vitals:    06/02/21 0915   BP: 117/73   Pulse: 85   Resp: 16   Temp: 98.2 °F (36.8 °C)   SpO2:

## 2021-06-02 NOTE — PLAN OF CARE
Problem: Pain:  Description: Pain management should include both nonpharmacologic and pharmacologic interventions. Goal: Control of acute pain  Description: Control of acute pain  6/2/2021 1126 by Sue Barnhart RN  Outcome: Ongoing  Note: Pt reports minimal pain to left hand. Problem: Falls - Risk of:  Goal: Will remain free from falls  Description: Will remain free from falls  6/2/2021 1126 by Sue Barnhart RN  Outcome: Ongoing  Note: Pt remains free of falls thus far this shift. All fall precautions in place and functioning properly. For patient safety, Visual Surveillance is in place, reviewed with patient/family, verbalized understanding.

## 2021-06-02 NOTE — PROGRESS NOTES
Occupational Therapy  Facility/Department: [facility] ACUTE REHAB UNIT  Daily Treatment Note  NAME: Yelena Fuentes  : 1970  MRN: [id number]    Date of Service: 2021    Discharge Recommendations:  24 hour supervision or assist, Home with Home health OT, Continue to assess pending progress, Home with nursing aide  OT Equipment Recommendations  Equipment Needed: Yes  ADL Assistive Devices: Transfer Tub Bench;Grab Bars - toilet; Toileting - Raised Toilet Seat with arms; Reacher;Grab Bars - tub  Other: continue to assess    Assessment   Performance deficits / Impairments: Decreased functional mobility ; Decreased ADL status; Decreased ROM; Decreased strength;Decreased sensation;Decreased fine motor control;Decreased endurance;Decreased posture;Decreased balance;Decreased safe awareness;Decreased coordination;Decreased vision/visual deficit; Decreased cognition  Assessment: Pt completed shower transfer from w/c to TTB with CGA this date via stand pivot. Pt LUE hypertonicity appeared to be easier to break out of flexion synergy this date and pt stating being more aware of her LUE and stating that it feels tingly. Pt unable to tolerate L hand splint but agreeable to wearing edema glove. Pt functioning below baseline, cont per OT POC. Treatment Diagnosis: decreased independence with ADLs and decreased functional mobility 2/2 CVA  Prognosis: Fair  OT Education: Plan of Care;Transfer Training;ADL Adaptive Strategies  Patient Education: Pt educated on tone mgmt of LUE and to be responsible for maintaining neutral/open-fisted digits as pt declined use of splint-pt verb and demo understanding with teach back method-continue to reinforce  REQUIRES OT FOLLOW UP: Yes  Activity Tolerance  Activity Tolerance: Patient Tolerated treatment well  Safety Devices  Safety Devices in place: Yes  Type of devices: Chair alarm in place; Left in chair; All fall risk precautions in place;Call light within reach;Nurse notified         Patient Diagnosis(es): There were no encounter diagnoses. has a past medical history of GERD (gastroesophageal reflux disease), Hernia, Obesity, and Unspecified sleep apnea. has a past surgical history that includes Splenectomy (); Total hip arthroplasty (); Lap Band (); hernia repair ();  section (); and craniotomy (Right, 2021). Restrictions  Position Activity Restriction  Other position/activity restrictions: Ambulate, Up with assist, Pt to wear helmet when OOB, OK to remove for shower, HOB to elevated at 30*  Subjective   General  Chart Reviewed: Yes  Patient assessed for rehabilitation services?: Yes  Additional Pertinent Hx: 46 y.o. female with hypertension and tobacco abuse who presented after spending a prolonged period on the ground s/p fall out of bed; found to have acute left hemiparesis and gaze deviation. CXR with no acute cardiopulmonary abnormality. CTH revealed large acute/subacute infarct in the R frontal lobe with associated mass effect and 4 mm R to L midline shift. It also noted subdural hemorrhage of 6mm along falx, although NSGY less convinced. CTA head and neck revealed occluded R cervical ICA & R anterior cerebral artery with near complete occlusion of R MCA. Outside window on TPA. Pt is now POD #1 following hemicraniectomy (). Pt admitted to ARU   Response to previous treatment: Patient with no complaints from previous session  Family / Caregiver Present: No  Referring Practitioner: Dr. Sung Galicia DO  Diagnosis: CVA  Subjective  Subjective: Pt supine in bed upon arrival, pleasant and agreeable to OT session requesting to use toilet. General Comment  Comments: Sharon Suarez   Vital Signs  Patient Currently in Pain: Yes (2/10 L hip and hand)     Orientation  Orientation  Overall Orientation Status: Within Functional Limits  Objective    ADL  Grooming: Modified independent  (oral hygiene seated on w/c)  UE Bathing: Contact guard assistance;Setup;Verbal cueing (seated on TTB, cues for RUE to position LUE to wash LUE, pt i'ly demo adaptive pratik technique to wash RUE)  LE Bathing: Increased time to complete;Contact guard assistance;Verbal cueing;Setup (pt completed rear eugenie hygiene in stance with CGA to maintain stance and intermittent use of GB to steady self, use of LHS to wash lower limbs and feet)  UE Dressing: Minimal assistance;Setup;Verbal cueing; Increased time to complete (pt doffed shirt i'ly, pt donned long sleeve shirt with min A to thread LUE, VCs to pull down on L side)  LE Dressing: Maximum assistance (assist to thread BLEs for brief and pants for sake of time, pt assisting pulling up on R side with steady assist and total A to manage L side; pt doffed/ donned R shoe with setup and L shoe with total A)  Toileting: Minimal assistance (py continent of bladder, pt managed brief off hips in stance on R side, pericare hygiene seated, assist to manage LB clothing over hips on L side with steady assist)        Balance  Sitting Balance: Supervision (seated on EOB RTS and TTB)  Standing Balance: Contact guard assistance (during LB clothing mgmt/toileting)  Toilet Transfers  Toilet - Technique: Stand pivot  Equipment Used: Raised toilet seat with rails  Toilet Transfer: Minimal assistance  Toilet Transfers Comments: CGA for wc to RTS transfer, min A for RTS to wc transfer  Shower Transfers  Shower - Transfer From: Wheelchair  Shower - Transfer Type: To and From  Shower - Transfer To:  Transfer tub bench  Shower - Technique: Stand pivot  Shower Transfers: Contact Guard  Shower Transfers Comments: use of grab bar  Bed mobility  Supine to Sit: Minimal assistance (facilitation for trunk ascension from L sidelying, HOB flat, use of bed rail)  Transfers  Stand Pivot Transfers: Contact guard assistance (EOB<w/c leading to R, delayed initiation)  Sit to stand: Contact guard assistance (from TTB, use of GB)                       Cognition  Overall Cognitive Status: Exceptions Arousal/Alertness: Appropriate responses to stimuli  Following Commands: Follows one step commands consistently  Attention Span: Appears intact  Memory: Appears intact  Safety Judgement: Decreased awareness of need for assistance;Decreased awareness of need for safety  Problem Solving: Assistance required to generate solutions;Assistance required to correct errors made;Assistance required to implement solutions;Decreased awareness of errors  Insights: Decreased awareness of deficits  Initiation: Requires cues for some  Sequencing: Requires cues for some  Cognition Comment: Pt with inconsistent initiation of transfers and req + time but verb insight, \"I'm overthinking it\"                                         Plan   Plan  Times per week: 5x a week for 60 mins daily  Times per day: Daily  Current Treatment Recommendations: Strengthening, Endurance Training, Neuromuscular Re-education, Self-Care / ADL, Functional Mobility Training, Safety Education & Training, ROM, Positioning, Wheelchair Mobility Training, Balance Training, Patient/Caregiver Education & Training, Manual Therapy:  MLD, Equipment Evaluation, Education, & procurement, Home Management Training, Cognitive Reorientation    Second Session: Pt sitting in w/c upon arrival, pleasant and agreeable to OT session, \"Anything to get my butt out of this w/c.\" Pt completed w/c mobility using RLE and RUE to propel self to therapy gym without collisions and demonstrated good visual scanning L/R. Pt setup w/c for transfer and managed bilateral w/c brakes with brake extender on L. However pt req cues for proximity of w/c to surface prior to initiating transfer. Pt stand pivot from w/c<EOM with CGA. Pt completed sit<supine with VCs to sequence with spvn assist to R sidelying. Pt maintained R sidelying in order to attempt LUE gravity eliminated sh flexion at tabletop and use of pillowcase to decrease friction.  Pt req facilitation at scapula and muscle tapping in order to Time Out 0830  1315       Minutes 60  30       Timed Code Treatment Minutes: 60 Minutes+ 30 Minutes  Total Treatment Time: 90 Minutes       Joaquin Arenas OT

## 2021-06-03 PROCEDURE — 97530 THERAPEUTIC ACTIVITIES: CPT

## 2021-06-03 PROCEDURE — 97116 GAIT TRAINING THERAPY: CPT | Performed by: PHYSICAL THERAPIST

## 2021-06-03 PROCEDURE — 1280000000 HC REHAB R&B

## 2021-06-03 PROCEDURE — 97535 SELF CARE MNGMENT TRAINING: CPT

## 2021-06-03 PROCEDURE — 97542 WHEELCHAIR MNGMENT TRAINING: CPT | Performed by: PHYSICAL THERAPIST

## 2021-06-03 PROCEDURE — 97130 THER IVNTJ EA ADDL 15 MIN: CPT

## 2021-06-03 PROCEDURE — 94660 CPAP INITIATION&MGMT: CPT

## 2021-06-03 PROCEDURE — 97530 THERAPEUTIC ACTIVITIES: CPT | Performed by: PHYSICAL THERAPIST

## 2021-06-03 PROCEDURE — 6360000002 HC RX W HCPCS: Performed by: PHYSICAL MEDICINE & REHABILITATION

## 2021-06-03 PROCEDURE — 99232 SBSQ HOSP IP/OBS MODERATE 35: CPT | Performed by: PHYSICAL MEDICINE & REHABILITATION

## 2021-06-03 PROCEDURE — 97110 THERAPEUTIC EXERCISES: CPT

## 2021-06-03 PROCEDURE — 97129 THER IVNTJ 1ST 15 MIN: CPT

## 2021-06-03 PROCEDURE — 6370000000 HC RX 637 (ALT 250 FOR IP): Performed by: PHYSICAL MEDICINE & REHABILITATION

## 2021-06-03 RX ADMIN — AMLODIPINE BESYLATE 5 MG: 5 TABLET ORAL at 09:45

## 2021-06-03 RX ADMIN — LEVETIRACETAM 500 MG: 500 TABLET ORAL at 09:45

## 2021-06-03 RX ADMIN — THIAMINE HCL TAB 100 MG 100 MG: 100 TAB at 09:45

## 2021-06-03 RX ADMIN — GUAIFENESIN 600 MG: 600 TABLET, EXTENDED RELEASE ORAL at 09:45

## 2021-06-03 RX ADMIN — GUAIFENESIN 600 MG: 600 TABLET, EXTENDED RELEASE ORAL at 21:43

## 2021-06-03 RX ADMIN — DICLOFENAC SODIUM 4 G: 10 GEL TOPICAL at 21:50

## 2021-06-03 RX ADMIN — HEPARIN SODIUM 5000 UNITS: 5000 INJECTION INTRAVENOUS; SUBCUTANEOUS at 06:01

## 2021-06-03 RX ADMIN — METHYLPHENIDATE HYDROCHLORIDE 10 MG: 10 TABLET ORAL at 09:45

## 2021-06-03 RX ADMIN — CETIRIZINE HYDROCHLORIDE 10 MG: 10 TABLET, FILM COATED ORAL at 09:45

## 2021-06-03 RX ADMIN — FAMOTIDINE 20 MG: 20 TABLET, FILM COATED ORAL at 09:45

## 2021-06-03 RX ADMIN — ACETAMINOPHEN 1000 MG: 500 TABLET, COATED ORAL at 21:43

## 2021-06-03 RX ADMIN — ATORVASTATIN CALCIUM 80 MG: 80 TABLET, FILM COATED ORAL at 21:43

## 2021-06-03 RX ADMIN — ACETAMINOPHEN 1000 MG: 500 TABLET, COATED ORAL at 09:45

## 2021-06-03 RX ADMIN — THERA TABS 1 TABLET: TAB at 09:45

## 2021-06-03 RX ADMIN — ASPIRIN 81 MG: 81 TABLET, CHEWABLE ORAL at 09:45

## 2021-06-03 RX ADMIN — FAMOTIDINE 20 MG: 20 TABLET, FILM COATED ORAL at 21:43

## 2021-06-03 RX ADMIN — FLUOXETINE 20 MG: 20 CAPSULE ORAL at 09:45

## 2021-06-03 RX ADMIN — ACETAMINOPHEN 1000 MG: 500 TABLET, COATED ORAL at 17:17

## 2021-06-03 RX ADMIN — LEVETIRACETAM 500 MG: 500 TABLET ORAL at 21:44

## 2021-06-03 RX ADMIN — DICLOFENAC SODIUM 4 G: 10 GEL TOPICAL at 09:46

## 2021-06-03 RX ADMIN — HEPARIN SODIUM 5000 UNITS: 5000 INJECTION INTRAVENOUS; SUBCUTANEOUS at 21:44

## 2021-06-03 RX ADMIN — HEPARIN SODIUM 5000 UNITS: 5000 INJECTION INTRAVENOUS; SUBCUTANEOUS at 14:32

## 2021-06-03 RX ADMIN — OXYCODONE HYDROCHLORIDE 5 MG: 5 TABLET ORAL at 09:50

## 2021-06-03 ASSESSMENT — PAIN DESCRIPTION - PROGRESSION
CLINICAL_PROGRESSION: GRADUALLY WORSENING
CLINICAL_PROGRESSION: GRADUALLY IMPROVING
CLINICAL_PROGRESSION: GRADUALLY WORSENING
CLINICAL_PROGRESSION: NOT CHANGED

## 2021-06-03 ASSESSMENT — PAIN - FUNCTIONAL ASSESSMENT
PAIN_FUNCTIONAL_ASSESSMENT: ACTIVITIES ARE NOT PREVENTED
PAIN_FUNCTIONAL_ASSESSMENT: PREVENTS OR INTERFERES SOME ACTIVE ACTIVITIES AND ADLS
PAIN_FUNCTIONAL_ASSESSMENT: ACTIVITIES ARE NOT PREVENTED
PAIN_FUNCTIONAL_ASSESSMENT: PREVENTS OR INTERFERES SOME ACTIVE ACTIVITIES AND ADLS

## 2021-06-03 ASSESSMENT — PAIN DESCRIPTION - LOCATION
LOCATION: LEG;HAND
LOCATION: LEG;FOOT
LOCATION: HAND
LOCATION: LEG;HAND

## 2021-06-03 ASSESSMENT — PAIN DESCRIPTION - FREQUENCY
FREQUENCY: INTERMITTENT

## 2021-06-03 ASSESSMENT — PAIN DESCRIPTION - PAIN TYPE
TYPE: ACUTE PAIN

## 2021-06-03 ASSESSMENT — PAIN DESCRIPTION - DESCRIPTORS
DESCRIPTORS: ACHING;NUMBNESS
DESCRIPTORS: ACHING
DESCRIPTORS: ACHING;BURNING;DISCOMFORT
DESCRIPTORS: BURNING;DISCOMFORT

## 2021-06-03 ASSESSMENT — PAIN DESCRIPTION - ORIENTATION
ORIENTATION: LEFT

## 2021-06-03 ASSESSMENT — PAIN SCALES - GENERAL
PAINLEVEL_OUTOF10: 2
PAINLEVEL_OUTOF10: 0
PAINLEVEL_OUTOF10: 4
PAINLEVEL_OUTOF10: 2
PAINLEVEL_OUTOF10: 0

## 2021-06-03 ASSESSMENT — PAIN DESCRIPTION - ONSET
ONSET: ON-GOING

## 2021-06-03 NOTE — FLOWSHEET NOTE
06/03/21 1256   Encounter Summary   Services provided to: Patient and family together   Referral/Consult From: Rounding   Continue Visiting   (es 6/3)   Complexity of Encounter Moderate   Length of Encounter 15 minutes   Routine   Type Follow up   Assessment Approachable   Intervention Active listening;Discussed illness/injury and it's impact   Outcome Engaged in conversation;Receptive

## 2021-06-03 NOTE — PLAN OF CARE
Problem: Falls - Risk of:  Goal: Will remain free from falls  Description: Will remain free from falls  Outcome: Ongoing  Note: Pt remains free of falls thus far this shift. All fall precautions in place and functioning properly. For patient safety, Visual Surveillance is in place, reviewed with patient/family, verbalized understanding.

## 2021-06-03 NOTE — CARE COORDINATION
Per team, insurance has approved to 6/9. Patient and family updated. Will continue to follow for d/c needs.   Electronically signed by USMAN Harding, ESVIN-S on 6/3/2021 at 3:29 PM

## 2021-06-03 NOTE — PLAN OF CARE
Problem: Pain:  Description: Pain management should include both nonpharmacologic and pharmacologic interventions. Goal: Control of acute pain  Description: Control of acute pain  Outcome: Ongoing  Note: Patient has complained of left hand pain. Left hand contracted. Skin warm and dry. Pulses palpable. Patent rated her pain 3 on pain scale. Medicated with scheduled. Tylenol. She did not want to be awakened for Tylenol during the night. Problem: Falls - Risk of:  Goal: Absence of physical injury  Description: Absence of physical injury  Outcome: Ongoing  Note: Fall precautions in place. Bed is in low and locked position. Bed alarm set. Call light and bedside table within reach. [employer] camera in use. Frequent visual checks.

## 2021-06-03 NOTE — PROGRESS NOTES
Pt assisted to the bathroom and back to wheelchair, reports pain to L leg and L hand, prn pain meds. All meds taken without difficulty. Call light within reach and chair alarm engaged. For patient safety, Visual Surveillance is in place, reviewed with patient/family, verbalized understanding.   Vitals:    06/03/21 0930   BP: (!) 131/92   Pulse: 93   Resp: 18   Temp: 97.8 °F (36.6 °C)   SpO2: 96%

## 2021-06-03 NOTE — PROGRESS NOTES
ACUTE REHAB UNIT  SPEECH/LANGUAGE PATHOLOGY      [x] Daily  [x] Weekly Care Conference Note  [] Discharge    Patient:Laura Solomon      IJN:[id number]  BXB:[id number]  Rehab Dx/Hx: Acute CVA (cerebrovascular accident) ([facility] [address] [I63.9]    Precautions: [x] Aspiration  [x] Fall risk  [] Sternal  [] Seizure [] Hip  [] Weight Bearing [] Other  ST Dx: [] Aphasia  [] Dysarthria  [] Apraxia   [x] Oropharyngeal dysphagia [x] Cognitive Impairment  [] Other:   Date of Admit: 4/23/2021  Room #: 3101/3101-01  Date: 6/3/2021        Current Diet Order:Dietary Nutrition Supplements: Low Calorie High Protein Supplement  DIET GENERAL;  Adult Oral Nutrition Supplement; Low Calorie/High Protein Oral Supplement   Recommended Form of Meds: PO  Compensatory Swallowing Strategies: Alternate solids and liquids, Upright as possible for all oral intake, Check for pocketing of food on the Left, Small bites/sips, Lingual sweep   Subjective: Pt admitted 4/12 after fall with left sided paralysis. Pt underwent craniectomy on 4/13 per Dr. Hyun Mojica. Social/Functional History  Lives With: Spouse;Son( reported that son is diagnosed with Autism.)  Occupation: Full time employment  Type of occupation:  at [employer]"    FEES 4/14: IMPRESSIONS:   Pt presents w/ mild oropharyngeal dysphagia characterized by premature spillage of thin liquids to UES and pyriforms w/ intermittent deep penetration of laryngeal vestibule; adequate airway protection and complete clearance of all residue after swallow initiation. No aspiration w/ any trials. Timely swallow initiation w/ puree and regular solid consistencies; no oropharyngeal residue.    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 Adequate ab/adduction. Complete closure of TVFs upon adduction.      Dentition: Adequate  Vision  Vision: Impaired  Vision Exceptions: Visual field cut  Hearing Hearing: Within functional limits  Barriers toward progress: None towards stated goals    Date: 6/3/2021      Tx session 1 Weekly Summary   Total Timed Code Min 60    Total Treatment Minutes 60    Individual Treatment Minutes 60    Group Treatment Minutes 0    Co-Treat Minutes 0    Brief Exception: N/A    Pain None indicated    Pain Intervention: [] RN notified  [] Repositioned  [] Intervention offered and patient declined  [x] N/A  [] Other:       Subjective:     Pt was visited sitting upright in bedside chair and was agreeable to therapy this date. Given prompting, the pt vaguely recalled meeting the SLP in a previous session. Objective / Goals:     Patient will consume regular solids with timely mastication and no pocketing in L buccal cavity across 2 sessions. Goal not directly targeted this date. Pt was educated to oral strengthening exercises including \"pucker-smile\" that pt could practice during down time. GOAL NOT MET - Continue to target   Patient will consume PO without anterior spillage or overt pocketing across 2 sessions. GOAL MET   GOAL MET - D/C GOAL   Pt will complete divided/alternating attention tasks with 85% accuracy given min cues. Throughout activities, pt was able to maintain attention. At times she would get side tracked and beginning speaking tangential however she was often able to self-correct and return to the task. Pt stated, \"I sometimes get distracted when I'm reading but I think it has always been like that. Reading comprehension stuff has always been hard for me. \"   GOAL NOT MET - Continue to target   Pt will accurately complete executive functioning tasks with min cues. Cues for initiation to task x2. GOAL NOT MET - Continue to target   Pt will attend to L visual field with min cues. Pt completed a symbol cancellation activity with approximately 84% accuracy.  She was given prompting to check all the way to her left and utilize a highlighted line on the left side of the telma. She was also encouraged to double check herself and was able to get to 100% accuracy in 3/4 trials. GOAL NOT MET; Continue to target   Pt will participate in ongoing cognitive linguistic assessment. GOAL MET GOAL MET - D/C GOAL   Other areas targeted:     Education:   Pt was educated to rational for each task. Safety Devices: [x] Call light within reach  [x] Chair alarm activated and connected to nurse call light system  [] Bed alarm activated   [] Other:    Assessment: Improving attention. Pt with continued L neglect but is receptive to given strategies. Plan: Continue per POC.       Interventions used this date:  [] Speech/Language Treatment  [] Instruction in HEP  [x] Dysphagia Treatment [x] Cognitive Treatment   [] Other:    Discharge recommendations:  [] Home independently  [x] Home with assistance []  24 hour supervision  [] ECF [x] Other: Education completed with family on Friday 5/28/2021  Continued Tx Upon Discharge: ? [x] Yes    [] No    [] TBD based on progress while on ARU     [] Vital Stim indicated     [] Other:   Estimated discharge date: Pending extension request    Electronically Signed By:  Trevor Fermin, [address] [place] [address]; [id number]  Speech-Language Pathologist

## 2021-06-03 NOTE — PROGRESS NOTES
Department of Physical Medicine & Rehabilitation  Progress Note    Patient Identification:  Joane Homans  [id number]  : 1970  Admit date: 2021    Chief Complaint: CVA    Subjective:   Improving in therapy. She is able to move her left arm today with bicep activation . Extension approved through . No new complaints. ROS: No f/c, n/v, cp     Objective:  Patient Vitals for the past 24 hrs:   BP Temp Temp src Pulse Resp SpO2   21 0930 (!) 131/92 97.8 °F (36.6 °C) Oral 93 18 96 %   21 0143     18    21 107/67 98.1 °F (36.7 °C) Oral 70 16 96 %     Const: Alert. No distress, pleasant. HEENT: Normocephalic, atraumatic. Normal sclera/conjunctiva. MMM. Incision healing well  CV: Regular rate and rhythm. Resp: No respiratory distress. Lungs CTAB. Abd: Soft, nontender, nondistended, NABS+   Ext: No edema. Neuro: Alert, oriented, appropriately interactive. Psych: Cooperative, appropriate mood and affect    Laboratory data: Available via EMR. Last 24 hour lab  No results found for this or any previous visit (from the past 24 hour(s)). Therapy progress:  PT  Position Activity Restriction  Other position/activity restrictions: Ambulate, Up with assist, Pt to wear helmet when OOB, OK to remove for shower, HOB to elevated at 30*  Objective     Sit to Stand: Contact guard assistance (Transf from w/c, bar height chair and mat table to standing  w/ a SPC.  VC to maintain midline position for an ant WS and stabilizing LLE to maintain a neutral position.)  Stand to sit: Contact guard assistance (VC for hand placement and to maintain a midline position with eccentric control)  Bed to Chair: Minimal assistance (for balance during transfer)  Device: Single point cane  Other Apparatus:  (sleeve on L shoe to faciitate forward movement with making it a frictionless surface)  Assistance: Minimal assistance, Moderate assistance  Distance: 20', 30'x2  OT  PT Equipment Recommendations

## 2021-06-03 NOTE — PROGRESS NOTES
Tolerance: Pt with decreased frustration tolerance this date asking for assistance during LB dressing when threading LLE was challenging and req verbal encouragement to continue to problem solve and perservere  Safety Devices  Safety Devices in place: Yes  Type of devices: Chair alarm in place; Left in chair; All fall risk precautions in place;Call light within reach         Patient Diagnosis(es): There were no encounter diagnoses. has a past medical history of GERD (gastroesophageal reflux disease), Hernia, Obesity, and Unspecified sleep apnea. has a past surgical history that includes Splenectomy (); Total hip arthroplasty (); Lap Band (); hernia repair ();  section (); and craniotomy (Right, 2021). Restrictions  Position Activity Restriction  Other position/activity restrictions: Ambulate, Up with assist, Pt to wear helmet when OOB, OK to remove for shower, HOB to elevated at 30*  Subjective   General  Chart Reviewed: Yes  Patient assessed for rehabilitation services?: Yes  Additional Pertinent Hx: 46 y.o. female with hypertension and tobacco abuse who presented after spending a prolonged period on the ground s/p fall out of bed; found to have acute left hemiparesis and gaze deviation. CXR with no acute cardiopulmonary abnormality. CTH revealed large acute/subacute infarct in the R frontal lobe with associated mass effect and 4 mm R to L midline shift. It also noted subdural hemorrhage of 6mm along falx, although NSGY less convinced. CTA head and neck revealed occluded R cervical ICA & R anterior cerebral artery with near complete occlusion of R MCA. Outside window on TPA. Pt is now POD #1 following hemicraniectomy ().   Pt admitted to ARU   Response to previous treatment: Patient with no complaints from previous session  Family / Caregiver Present: No  Referring Practitioner: Dr. Rosana Garcia DO  Diagnosis: CVA  Subjective  Subjective: Pt sitting in w/ c upon arrival collins RN present in bathroom as pt requested to use toilet. Pt pleasant and agreeable to OT session. General Comment  Comments: Nelson Gonsalez Vital Signs  Patient Currently in Pain: Yes (2/10 L hip and hand)   Orientation  Orientation  Overall Orientation Status: Within Functional Limits  Objective    ADL  Equipment Provided: Reacher  Feeding: Setup (breakfast tray brought in at EOS)  Grooming: Modified independent  (oral hygiene seated in w/c)  UE Dressing: Minimal assistance;Setup;Verbal cueing; Increased time to complete (+ time, VCs and assist to thread LUE into arm hole and cues to pull up to elbow, cues to pulll down in back and L side)  LE Dressing:  Moderate assistance (+++time to thread LLE for brief and pants using reacher, step by step VC req for placement of reacher and utilizing RLE to position LLE, assist to pull over L hip in stance with unilateral UE at GB and CGA)  Additional Comments: Pt doffed moira shoes wih spvn, pt donned R shoe with setup and L shoe with total A        Balance  Sitting Balance: Supervision (seated on RTS)  Standing Balance: Contact guard assistance (with unilateral UE support at GB)  Standing Balance  Time: ~3 minutes total  Activity: functional transfers; in stance for LB clothing mgmt/toileting  Functional Mobility  Functional - Mobility Device: Wheelchair  Activity: To/from bathroom  Assist Level: Minimal assistance  Functional Mobility Comments: VCs to scan to L visual field to increase awareness of environmental barriers, pt ran into door frame on L exiting bathroom backing out, pt utilized RUE and RLE to propel self  Toilet Transfers  Toilet - Technique: Stand pivot  Equipment Used: Raised toilet seat with rails  Toilet Transfer: Minimal assistance  Toilet Transfers Comments: Difficulty pivoting leading to L req + time and cus for hand placement, use of GB     Transfers  Sit to stand: Contact guard assistance;Minimal assistance (from w/c, cues for ant lean, assist for setup of LLE)  Stand to sit: Contact guard assistance (decreased eccentric control)                                            Type of ROM/Therapeutic Exercise  Comment: Pt educated on tone mgmt of LUE utilizing RUE to prevent flexion contractures and educate pt on optimal positioning of LUE, pt req VCs and demo understanding to extend elbow and externally rotate LUE with proper handling technique as well as extend digits                 Second Session: Pt sitting in w/c upon arrival, pleasant and agreeable to OT session requesting further explanation about how to manage LUE tone to extend elbow. Pt was provided with TCs and demonstration with step by step VCs, pt demo understanding with difficulty and + time utilizing RUE. Pt c/o pain in L foot and recently applied voltaren gel to it under nursing supervision. Pt completed w/c mobility to/from therapy gym propelling with RUE and RLE ~150 feet x 2 with spvn assist to therapy gym with no collision and good scanning to L visual field, however pt did run into environmental barriers on L when returning to room and req + time to locate room on L of pt. With utilization of air cast on LUE pt completed x 10 scapular protraction and retraction using arm skate at tabletop but demonstrated significant trunk compensation and minimal activation in LUE. Pt completed x 10 sh h abduction/adduction with visual target req mostly PROM but trace activation vs flexion synergy when adducting ~15 degrees. x5 LUE sh abduction/scaption tolerating ~110 degrees with 5 second hold. Pt with trace activation upon instruction for eccentric sh flexion inconsistently. Pt tolerated x 5 sh flexion PROM with air cast donned tolerating up to ~140 degrees with 5 second hold. Pt educated to complete self range using RUE, pt completed x 3 pt stating that her RUE doesn't feel strong enough to manipulate LUE.  Pt provided with 3 x dumbbell to RUE and completed x 15 sh flexion, x 15 overhead sh press and x 15 elbow flex in order to increase activity tolerance and RUE strength for improved tone/ROM mgmt of LUE. Pt provided with setup of lunch to open containers and cut chicken in to bite size pieces. Pt left in w/c at end of session with chair alarm engaged, call light within reach and all needs met.       Plan   Plan  Times per week: 5x a week for 60 mins daily  Times per day: Daily  Current Treatment Recommendations: Strengthening, Endurance Training, Neuromuscular Re-education, Self-Care / ADL, Functional Mobility Training, Safety Education & Training, ROM, Positioning, Wheelchair Mobility Training, Balance Training, Patient/Caregiver Education & Training, Manual Therapy:  MLD, Equipment Evaluation, Education, & procurement, Home Management Training, Cognitive Reorientation  G-Code     OutComes Score                                                  AM-PAC Score             Goals  Short term goals  Time Frame for Short term goals: 2 weeks  Short term goal 1: Pt will complete toilet transfer w/ Mod A-goal met 5/4  Short term goal 2: Pt will complete UE dressing w/ Mod A-goal met 5/12  Short term goal 3: Pt will complete oral care w/ spvn seated at sink w/o VCs for L sided attention-goal met 5/17  Short term goal 4: Pt will complete LE dressing w/ Mod A-goal met 5/26  Long term goals  Time Frame for Long term goals : 4 weeks  Long term goal 1: Pt will complete toilet transfer w/ SBA-ongoing  Long term goal 2: Pt will complete UE dressing w/ setup-ongoing  Long term goal 3: Pt will complete LE dressing w/ SBA-ongoing  Long term goal 4: Pt will be independent w/ tone management exercises to improve functional use of LUE-ongoing  Long term goal 5: Pt will complete oral care I'ly-goal met 5/24  Patient Goals   Patient goals : \"get back to my normal self\"       Therapy Time   Individual Second Session Group Co-treatment   Time In 0730  1130       Time Out 0830  1210       Minutes 60  40       Timed Code Treatment Minutes: 60 Minutes+ 40 Minutes Total Treatment Time: 100 Minutes       Elinor Nath, OT
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
## 2021-06-04 LAB
ANION GAP SERPL CALCULATED.3IONS-SCNC: 12 MMOL/L (ref 3–16)
BASOPHILS ABSOLUTE: 0.1 K/UL (ref 0–0.2)
BASOPHILS RELATIVE PERCENT: 1.4 %
BUN BLDV-MCNC: 11 MG/DL (ref 7–20)
CALCIUM SERPL-MCNC: 9.7 MG/DL (ref 8.3–10.6)
CHLORIDE BLD-SCNC: 105 MMOL/L (ref 99–110)
CO2: 23 MMOL/L (ref 21–32)
CREAT SERPL-MCNC: <0.5 MG/DL (ref 0.6–1.1)
EOSINOPHILS ABSOLUTE: 0.6 K/UL (ref 0–0.6)
EOSINOPHILS RELATIVE PERCENT: 5.5 %
GFR AFRICAN AMERICAN: >60
GFR NON-AFRICAN AMERICAN: >60
GLUCOSE BLD-MCNC: 99 MG/DL (ref 70–99)
HCT VFR BLD CALC: 40.2 % (ref 36–48)
HEMOGLOBIN: 13.5 G/DL (ref 12–16)
LYMPHOCYTES ABSOLUTE: 4.1 K/UL (ref 1–5.1)
LYMPHOCYTES RELATIVE PERCENT: 40 %
MCH RBC QN AUTO: 34.8 PG (ref 26–34)
MCHC RBC AUTO-ENTMCNC: 33.7 G/DL (ref 31–36)
MCV RBC AUTO: 103.3 FL (ref 80–100)
MONOCYTES ABSOLUTE: 1 K/UL (ref 0–1.3)
MONOCYTES RELATIVE PERCENT: 9.7 %
NEUTROPHILS ABSOLUTE: 4.5 K/UL (ref 1.7–7.7)
NEUTROPHILS RELATIVE PERCENT: 43.4 %
PDW BLD-RTO: 13.6 % (ref 12.4–15.4)
PLATELET # BLD: 432 K/UL (ref 135–450)
PMV BLD AUTO: 8.5 FL (ref 5–10.5)
POTASSIUM REFLEX MAGNESIUM: 4.3 MMOL/L (ref 3.5–5.1)
RBC # BLD: 3.89 M/UL (ref 4–5.2)
SODIUM BLD-SCNC: 140 MMOL/L (ref 136–145)
WBC # BLD: 10.3 K/UL (ref 4–11)

## 2021-06-04 PROCEDURE — 1280000000 HC REHAB R&B

## 2021-06-04 PROCEDURE — 97129 THER IVNTJ 1ST 15 MIN: CPT

## 2021-06-04 PROCEDURE — 80048 BASIC METABOLIC PNL TOTAL CA: CPT

## 2021-06-04 PROCEDURE — 97530 THERAPEUTIC ACTIVITIES: CPT

## 2021-06-04 PROCEDURE — 6360000002 HC RX W HCPCS: Performed by: PHYSICAL MEDICINE & REHABILITATION

## 2021-06-04 PROCEDURE — 97130 THER IVNTJ EA ADDL 15 MIN: CPT

## 2021-06-04 PROCEDURE — 92526 ORAL FUNCTION THERAPY: CPT

## 2021-06-04 PROCEDURE — 97542 WHEELCHAIR MNGMENT TRAINING: CPT | Performed by: PHYSICAL THERAPIST

## 2021-06-04 PROCEDURE — 6370000000 HC RX 637 (ALT 250 FOR IP): Performed by: PHYSICAL MEDICINE & REHABILITATION

## 2021-06-04 PROCEDURE — 85025 COMPLETE CBC W/AUTO DIFF WBC: CPT

## 2021-06-04 PROCEDURE — 97530 THERAPEUTIC ACTIVITIES: CPT | Performed by: PHYSICAL THERAPIST

## 2021-06-04 PROCEDURE — 99232 SBSQ HOSP IP/OBS MODERATE 35: CPT | Performed by: PHYSICAL MEDICINE & REHABILITATION

## 2021-06-04 PROCEDURE — 36415 COLL VENOUS BLD VENIPUNCTURE: CPT

## 2021-06-04 PROCEDURE — 97110 THERAPEUTIC EXERCISES: CPT

## 2021-06-04 PROCEDURE — 97110 THERAPEUTIC EXERCISES: CPT | Performed by: PHYSICAL THERAPIST

## 2021-06-04 PROCEDURE — 97535 SELF CARE MNGMENT TRAINING: CPT

## 2021-06-04 RX ADMIN — METHYLPHENIDATE HYDROCHLORIDE 10 MG: 10 TABLET ORAL at 10:21

## 2021-06-04 RX ADMIN — THERA TABS 1 TABLET: TAB at 10:21

## 2021-06-04 RX ADMIN — DICLOFENAC SODIUM 4 G: 10 GEL TOPICAL at 10:23

## 2021-06-04 RX ADMIN — CETIRIZINE HYDROCHLORIDE 10 MG: 10 TABLET, FILM COATED ORAL at 10:21

## 2021-06-04 RX ADMIN — ACETAMINOPHEN 1000 MG: 500 TABLET, COATED ORAL at 10:20

## 2021-06-04 RX ADMIN — LEVETIRACETAM 500 MG: 500 TABLET ORAL at 21:54

## 2021-06-04 RX ADMIN — HEPARIN SODIUM 5000 UNITS: 5000 INJECTION INTRAVENOUS; SUBCUTANEOUS at 15:25

## 2021-06-04 RX ADMIN — FAMOTIDINE 20 MG: 20 TABLET, FILM COATED ORAL at 10:21

## 2021-06-04 RX ADMIN — LEVETIRACETAM 500 MG: 500 TABLET ORAL at 10:22

## 2021-06-04 RX ADMIN — FLUOXETINE 20 MG: 20 CAPSULE ORAL at 10:20

## 2021-06-04 RX ADMIN — ASPIRIN 81 MG: 81 TABLET, CHEWABLE ORAL at 10:21

## 2021-06-04 RX ADMIN — ATORVASTATIN CALCIUM 80 MG: 80 TABLET, FILM COATED ORAL at 21:54

## 2021-06-04 RX ADMIN — ACETAMINOPHEN 1000 MG: 500 TABLET, COATED ORAL at 21:54

## 2021-06-04 RX ADMIN — FAMOTIDINE 20 MG: 20 TABLET, FILM COATED ORAL at 21:54

## 2021-06-04 RX ADMIN — ACETAMINOPHEN 1000 MG: 500 TABLET, COATED ORAL at 05:53

## 2021-06-04 RX ADMIN — HEPARIN SODIUM 5000 UNITS: 5000 INJECTION INTRAVENOUS; SUBCUTANEOUS at 05:53

## 2021-06-04 RX ADMIN — OXYCODONE 10 MG: 5 TABLET ORAL at 10:22

## 2021-06-04 RX ADMIN — GUAIFENESIN 600 MG: 600 TABLET, EXTENDED RELEASE ORAL at 10:21

## 2021-06-04 RX ADMIN — GUAIFENESIN 600 MG: 600 TABLET, EXTENDED RELEASE ORAL at 21:54

## 2021-06-04 RX ADMIN — THIAMINE HCL TAB 100 MG 100 MG: 100 TAB at 10:21

## 2021-06-04 RX ADMIN — DICLOFENAC SODIUM 4 G: 10 GEL TOPICAL at 21:54

## 2021-06-04 RX ADMIN — HEPARIN SODIUM 5000 UNITS: 5000 INJECTION INTRAVENOUS; SUBCUTANEOUS at 21:55

## 2021-06-04 RX ADMIN — AMLODIPINE BESYLATE 5 MG: 5 TABLET ORAL at 10:00

## 2021-06-04 ASSESSMENT — PAIN SCALES - GENERAL
PAINLEVEL_OUTOF10: 0
PAINLEVEL_OUTOF10: 3
PAINLEVEL_OUTOF10: 0
PAINLEVEL_OUTOF10: 7
PAINLEVEL_OUTOF10: 4
PAINLEVEL_OUTOF10: 0
PAINLEVEL_OUTOF10: 0

## 2021-06-04 ASSESSMENT — PAIN DESCRIPTION - ORIENTATION
ORIENTATION: LEFT
ORIENTATION: LEFT

## 2021-06-04 ASSESSMENT — PAIN DESCRIPTION - DESCRIPTORS
DESCRIPTORS: ACHING
DESCRIPTORS: ACHING

## 2021-06-04 ASSESSMENT — PAIN DESCRIPTION - LOCATION
LOCATION: HIP
LOCATION: HIP;HAND

## 2021-06-04 ASSESSMENT — PAIN DESCRIPTION - ONSET: ONSET: ON-GOING

## 2021-06-04 ASSESSMENT — PAIN - FUNCTIONAL ASSESSMENT: PAIN_FUNCTIONAL_ASSESSMENT: PREVENTS OR INTERFERES SOME ACTIVE ACTIVITIES AND ADLS

## 2021-06-04 ASSESSMENT — PAIN DESCRIPTION - PAIN TYPE
TYPE: ACUTE PAIN
TYPE: ACUTE PAIN

## 2021-06-04 ASSESSMENT — PAIN DESCRIPTION - PROGRESSION: CLINICAL_PROGRESSION: GRADUALLY WORSENING

## 2021-06-04 ASSESSMENT — PAIN DESCRIPTION - FREQUENCY
FREQUENCY: INTERMITTENT
FREQUENCY: INTERMITTENT

## 2021-06-04 NOTE — PLAN OF CARE
Problem: Falls - Risk of:  Goal: Will remain free from falls  Description: Will remain free from falls  6/4/2021 0035 by Kelton Escobar  Outcome: Ongoing  Note: Pt is a High fall risk. See [facility] Fall Score and ABCDS Injury Risk assessments. + Screening for Orthostasis and/or + High Fall Risk per BAUGH/ABCDS: Explained fall risk precautions to pt and family and rationale behind their use to keep the patient safe. Pt bed is in low position, side rails up, call light and belongings are in reach. Fall wristband applied and present on pts wrist.  Bed alarm on. Pt encouraged to call for assistance. Will continue with hourly rounds for PO intake, pain needs, toileting and repositioning as needed. Problem: Skin Integrity:  Goal: Will show no infection signs and symptoms  Description: Will show no infection signs and symptoms  Outcome: Ongoing  Note: No s/s of infection noted. No new areas of skin breakdown/PI's/or DTI's noted upon shift assessment. Surgical incision to scalp remains SHEMAR, no drainage, warmth, swelling, or redness. Continue with wound care as ordered.
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
## 2021-06-04 NOTE — PROGRESS NOTES
Assessment completed. VSS and patient is afebrile. Patient complained of pain at left hip. Ranking pain a 7/10. Patient medicated with Oxycodone 10mg per md order. Utilizes 1 assist for transfers and toileting. Bed in low position.  Call light within reach

## 2021-06-04 NOTE — PROGRESS NOTES
Department of Physical Medicine & Rehabilitation  Progress Note    Patient Identification:  Héctor Cruz  [id number]  : 1970  Admit date: 2021    Chief Complaint: CVA    Subjective:   Doing well this morning without complaint. She is participating in SLP on evaluation. No new issues overnight. ROS: No f/c, n/v, cp     Objective:  Patient Vitals for the past 24 hrs:   BP Temp Temp src Pulse Resp SpO2   21 110/71 98.2 °F (36.8 °C) Oral 87 16 96 %   21 0930 (!) 131/92 97.8 °F (36.6 °C) Oral 93 18 96 %     Const: Alert. No distress, pleasant. HEENT: Normocephalic, atraumatic. Normal sclera/conjunctiva. MMM. Incision healing well  CV: Regular rate and rhythm. Resp: No respiratory distress. Lungs CTAB. Abd: Soft, nontender, nondistended, NABS+   Ext: No edema. Neuro: Alert, oriented, appropriately interactive. Psych: Cooperative, appropriate mood and affect    Laboratory data: Available via EMR.    Last 24 hour lab  Recent Results (from the past 24 hour(s))   Basic Metabolic Panel w/ Reflex to MG    Collection Time: 21  6:58 AM   Result Value Ref Range    Sodium 140 136 - 145 mmol/L    Potassium reflex Magnesium 4.3 3.5 - 5.1 mmol/L    Chloride 105 99 - 110 mmol/L    CO2 23 21 - 32 mmol/L    Anion Gap 12 3 - 16    Glucose 99 70 - 99 mg/dL    BUN 11 7 - 20 mg/dL    CREATININE <0.5 (L) 0.6 - 1.1 mg/dL    GFR Non-African American >60 >60    GFR African American >60 >60    Calcium 9.7 8.3 - 10.6 mg/dL   CBC auto differential    Collection Time: 21  6:58 AM   Result Value Ref Range    WBC 10.3 4.0 - 11.0 K/uL    RBC 3.89 (L) 4.00 - 5.20 M/uL    Hemoglobin 13.5 12.0 - 16.0 g/dL    Hematocrit 40.2 36.0 - 48.0 %    .3 (H) 80.0 - 100.0 fL    MCH 34.8 (H) 26.0 - 34.0 pg    MCHC 33.7 31.0 - 36.0 g/dL    RDW 13.6 12.4 - 15.4 %    Platelets 285 692 - 522 K/uL    MPV 8.5 5.0 - 10.5 fL    Neutrophils % 43.4 %    Lymphocytes % 40.0 %    Monocytes % 9.7 %    Eosinophils % 5.5 % Basophils % 1.4 %    Neutrophils Absolute 4.5 1.7 - 7.7 K/uL    Lymphocytes Absolute 4.1 1.0 - 5.1 K/uL    Monocytes Absolute 1.0 0.0 - 1.3 K/uL    Eosinophils Absolute 0.6 0.0 - 0.6 K/uL    Basophils Absolute 0.1 0.0 - 0.2 K/uL       Therapy progress:  PT  Position Activity Restriction  Other position/activity restrictions: Ambulate, Up with assist, Pt to wear helmet when OOB, OK to remove for shower, HOB to elevated at 30*  Objective     Sit to Stand: Contact guard assistance (Transf from w/c, and mat table to standing  w/ a SPC. VC to maintain midline position for an ant WS and stabilizing LLE to maintain a neutral position.)  Stand to sit: Contact guard assistance (VC for hand placement and to maintain a midline position with eccentric control)  Bed to Chair: Minimal assistance (for balance during transfer)  Device: Single point cane  Other Apparatus: AFO (Initially used a DF assist via ACE wrap but was then changed to a prefabricated AFO)  Assistance: Minimal assistance, Moderate assistance  Distance: 27' x2  OT  PT Equipment Recommendations  Equipment Needed: Yes (continue to assess)  Other: 20\" hemiheight w/c with drop arm and standard 90 degree leg rest  Toilet - Technique: Stand pivot  Equipment Used: Raised toilet seat with rails  Toilet Transfers Comments: Difficulty pivoting leading to L req + time and cus for hand placement, use of GB  Assessment        SLP  Diet Solids Recommendation: Dysphagia Soft and Bite-Sized (Dysphagia III) (with regular solids as tolerated. Per pt request, gravy/sauces to keep for moist.)       Body mass index is 36.54 kg/m². Assessment and Plan:  Dalila Foy a 46 y.o. female with PMH GERD p/w L sided weakness and facial droop.  CTA head/neck on 4/12 revealed occluded R cervical ICA, occluded right LEAH, near complete occlusion of right MCA.     Acute ischemic CVA, Subdural heomorrhage s/p right hemicraniectomy and subsequen SAH and IP hemorrhage  No tPA given.  MRI 4/14 showing large subacute infarct throughout R LEAH and MCA with some areas of hemorrhagic conversion.  - Neurosurgery and neurology following  - SBP <160, PRN hydralazine, scheduled Norvasc 5 mg daily  - Keppra 500 mg twice daily   - SQH  - PT/OT      Aphasia - SLP   Depression - Home fluoxetine 20 mg daily at home  Anxiety - Hold home Ativan 0.5 mg every 8 hours PRN for anxiety  MISHA - Patient uses CPAP at home  GERD - omeprazole 40 mg daily at home- substituted for pepcid 20 mg BID  Obesity    Rehab Progress: Improving  Anticipated Dispo: home  Services/DME: TTB and RTS (neither covered by insurance, friend to obtain)  ELOS: - extension needed       Jovani Ca D.O. M.P.H.  PM&R  6/4/2021  8:42 AM

## 2021-06-04 NOTE — PROGRESS NOTES
Occupational Therapy  Facility/Department: [facility] ACUTE REHAB UNIT  Daily Treatment Note  NAME: Donna Tran  : 1970  MRN: [id number]    Date of Service: 2021    Discharge Recommendations:  24 hour supervision or assist, Home with Home health OT, Continue to assess pending progress, Home with nursing aide  OT Equipment Recommendations  ADL Assistive Devices: Transfer Tub Bench;Grab Bars - toilet; Toileting - Raised Toilet Seat with arms; Reacher;Grab Bars - tub  Other: continue to assess    Assessment   Performance deficits / Impairments: Decreased functional mobility ; Decreased ADL status; Decreased ROM; Decreased strength;Decreased sensation;Decreased fine motor control;Decreased endurance;Decreased posture;Decreased balance;Decreased safe awareness;Decreased coordination;Decreased vision/visual deficit; Decreased cognition  Assessment: Pt completed A/AAROM in LUE w/ friction reduced - increased pt sat and engagement w/ long-arm-skate. Pt req verbal cues for safety w/ transfers and fxl mobilty, lawrence w/ scan/attn to L side. Pt eager for retn to home, and progress made in IPR stay. Cont per POC  Treatment Diagnosis: decreased independence with ADLs and decreased functional mobility 2/2 CVA  Prognosis: Fair  OT Education: Plan of Care;Transfer Training;ADL Adaptive Strategies  Patient Education: LUE mgt, desensitization of L hand, and importance of SROM  Barriers to Learning: cognition  Safety Devices  Safety Devices in place: Yes  Type of devices: Chair alarm in place; Left in chair; All fall risk precautions in place;Call light within reach         Patient Diagnosis(es): There were no encounter diagnoses. has a past medical history of GERD (gastroesophageal reflux disease), Hernia, Obesity, and Unspecified sleep apnea. has a past surgical history that includes Splenectomy (); Total hip arthroplasty (); Lap Band (); hernia repair ();  section (); and craniotomy (Right, 2021). Restrictions  Position Activity Restriction  Other position/activity restrictions: Ambulate, Up with assist, Pt to wear helmet when OOB, OK to remove for shower, HOB to elevated at 30*  Subjective   General  Chart Reviewed: Yes  Patient assessed for rehabilitation services?: Yes  Additional Pertinent Hx: 46 y.o. female with hypertension and tobacco abuse who presented after spending a prolonged period on the ground s/p fall out of bed; found to have acute left hemiparesis and gaze deviation. CXR with no acute cardiopulmonary abnormality. CTH revealed large acute/subacute infarct in the R frontal lobe with associated mass effect and 4 mm R to L midline shift. It also noted subdural hemorrhage of 6mm along falx, although NSGY less convinced. CTA head and neck revealed occluded R cervical ICA & R anterior cerebral artery with near complete occlusion of R MCA. Outside window on TPA. Pt is now POD #1 following hemicraniectomy (4/13). Pt admitted to ARU 4/23  Response to previous treatment: Patient with no complaints from previous session  Family / Caregiver Present: No  Referring Practitioner: Dr. Gonzalez See DO  Diagnosis: CVA  Subjective  Subjective: Pt sitting in w/c on OT approach and agreed to tx  General Comment  Comments: Heriberto Hoffman Vital Signs  Patient Currently in Pain: Yes   Orientation     Objective    ADL  Toileting: Minimal assistance (continent bladder void on toilet. Hugo w/ clothing mgt)        Balance  Sitting Balance: Supervision  Functional Mobility  Functional - Mobility Device: Wheelchair  Activity:  (to therapy gym)  Assist Level: Minimal assistance  Functional Mobility Comments: cues for attending to L side in navigating moya - pt did not come into contact w/ any obstacles. once in gym, pt distracted and ran into chair and almost corner of hi-lo table.   Toilet Transfers  Toilet - Technique: Stand pivot  Equipment Used: Raised toilet seat with rails  Toilet Transfer: Minimal assistance  Toilet Transfers Comments: setup in room requires pt to transfer to L side- inc time and cueing for safety and sequence                          Vision  Vision Comment: Pt req cues for scanning and item location at L side  Cognition  Overall Cognitive Status: Exceptions  Following Commands: Follows one step commands consistently  Attention Span: Difficulty dividing attention; Attends with cues to redirect  Memory: Appears intact  Safety Judgement: Decreased awareness of need for assistance;Decreased awareness of need for safety  Problem Solving: Assistance required to generate solutions;Assistance required to correct errors made;Assistance required to implement solutions;Decreased awareness of errors                       LUE PROM (degrees)  LUE PROM: WNL  LUE AROM (degrees)  LUE General AROM: PROM L shdr, elbow, wrist in positioning for table arc w/ aircast in place. review of SROM and importance. Completed table arcs 10x w/ pillowcase to decrease fritction, pt actively trace engaging shldr for prot/re-traction, reqd Hugo to complete full range - pt increasing AROM w/ repetion. Pt demo some frustration w/ difficulty of task, OT downgraded to long arm skate and demo increased AROM and noting better outcome. Second Session: Pt sitting in w/c on OT approach and agreed to tx. Pt reqesting to put cream on L hand - OKd for OT to assist by RN. Educ w/ pt of SROM to promote increased comfort and positioning w/ L hand. Pt placed and rubbed cream in w/ SUPV. Pt then req to don edema glove- cont educ regarding SROM for more neutral PIP/MIP/DIP positioning and comfort. DEP for donning edema glove to L hand. Cont educ w/ strategies to decrease hypersensitivity of L hand, and increase SROM. Pt verbalized understanding - recommend reinforcemtn. Pt in w/c w/ needs in reach,and friend in room [facility] at [address] [place] [address].     Plan   Plan  Times per week: 5x a week for 60 mins daily  Times per day: Daily  Current Treatment Recommendations:

## 2021-06-04 NOTE — PROGRESS NOTES
ACUTE REHAB UNIT  SPEECH/LANGUAGE PATHOLOGY      [x] Daily  [] Weekly Care Conference Note  [] Discharge    Patient:Laura Calhoun      RTM:[birth year]  HVX:[id number]  Rehab Dx/Hx: Acute CVA (cerebrovascular accident) ([facility] [address] [I63.9]    Precautions: [x] Aspiration  [x] Fall risk  [] Sternal  [] Seizure [] Hip  [] Weight Bearing [] Other  ST Dx: [] Aphasia  [] Dysarthria  [] Apraxia   [x] Oropharyngeal dysphagia [x] Cognitive Impairment  [] Other:   Date of Admit: 4/23/2021  Room #: 3101/3101-01  Date: 6/4/2021        Current Diet Order:DIET GENERAL;   Recommended Form of Meds: PO  Compensatory Swallowing Strategies: Alternate solids and liquids, Upright as possible for all oral intake, Check for pocketing of food on the Left, Small bites/sips, Lingual sweep   Subjective: Pt admitted 4/12 after fall with left sided paralysis. Pt underwent craniectomy on 4/13 per Dr. Dalila Loa. Social/Functional History  Lives With: Spouse;Son( reported that son is diagnosed with Autism.)  Occupation: Full time employment  Type of occupation:  at [employer]"    FEES 4/14: IMPRESSIONS:   Pt presents w/ mild oropharyngeal dysphagia characterized by premature spillage of thin liquids to UES and pyriforms w/ intermittent deep penetration of laryngeal vestibule; adequate airway protection and complete clearance of all residue after swallow initiation. No aspiration w/ any trials. Timely swallow initiation w/ puree and regular solid consistencies; no oropharyngeal residue.    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 Adequate ab/adduction. Complete closure of TVFs upon adduction.      Dentition: Adequate  Vision  Vision: Impaired  Vision Exceptions: Visual field cut  Hearing  Hearing: Within functional limits  Barriers toward progress: None towards stated goals    Date: 6/4/2021       Tx session 1 Tx session 2 Tx session 3    Total Timed Code Min 10 40 20   Total Treatment Minutes 33 40 20   Individual Treatment Minutes 33 40 20   Group Treatment Minutes 0 0 0   Co-Treat Minutes 0 0 0   Brief Exception: N/A N/A NA   Pain Indicated pain in LLE upon SLP entry to room. Relived with repositioning. None stated, though pt mentioned RN is bringing her some pain medicine. None indicated   Pain Intervention: [] RN notified  [x] Repositioned  [] Intervention offered and patient declined  [] N/A  [] Other:    [x] RN notified  [] Repositioned  [] Intervention offered and patient declined  [x] N/A  [] Other:  [] RN notified  [x] Repositioned  [] Intervention offered and patient declined  [] N/A  [] Other:    Subjective:     Pt upright in chair and agreeable for therapy. Patient awake, alert, pleasant, and agreeable. Seated up in wheel chair, on room air. Pt upright in chair visiting with friend, [address]. Pts friend present to observe for duration of session. Objective / Goals:      Patient will consume regular solids with timely mastication and no pocketing in L buccal cavity across 2 sessions. Patient tolerated NMES via VitalStim placement 4a (targeting orbicularis oris, buccinator and superior pharyngeal constrictor) @ 2.0 mA x 25 minutes and 7.5 mA x 14 minutes, 8.5 mA x 11 minutes (total stimulation 25 minutes). No overt pocketing this session. Did not target Goal not targeted this session. Patient will consume PO without anterior spillage or overt pocketing across 2 sessions. GOAL MET   GOAL MET Goal not targeted this session. Pt will complete divided/alternating attention tasks with 85% accuracy given min cues. Good awareness of time constraint and ability to continue conversational exchanges while consuming PO. Please see goal 6 below. Goal not targeted this session. Pt will accurately complete executive functioning tasks with min cues. Goal not targeted this session. Please see goal 6 below.  Checkbook [place]: 83% accuracy (or 100% accuracy with min cues) with self correction noted x3. Pt will attend to L visual field with min cues. No cues for identifying items on tray. x2 cues for attending to far left visual field during barrier activity. Please see goal 6 below. Suspected left neglect x1 with checkbook register error. Pt will participate in ongoing cognitive linguistic assessment. GOAL MET Goal previously met; however to further determine progress/current status, re-administered the Cognitive Linguistic Quick Test (CLQT), with the following results:    Attention- moderate  Memory- [facility]  Executive Functions- mild  Language- WFL  Visuospatial Skills- mild  Composite Severity Rating- mild  Clock Drawing Severity Rating- WFL    Across all domains pt shows improvement, as compared to initial test administration (04/24/2021); results below:    Attention: Moderate  Memory: Moderate  Executive Function: Severe  Language: Mild  Visuospatial: Severe  Clock Drawing Severity Rating - (omitted)    Pt initially scored within the moderate-severe cognitive impairment range, but has improved today to mild cognitive impairment range. Goal not targeted this session. Other areas targeted:      Education:   Ongoing regarding rationale for NMES. Provided education re: purpose of visit, re-assessment, results. Education re: improvement noted overall in performance. Reviewed CLQT scores using graphs with pt and friend. Safety Devices: [x] Call light within reach  [x] Chair alarm activated and connected to nurse call light system  [] Bed alarm activated   [] Other: [x] Call light within reach  [] Chair alarm activated and connected to nurse call light system  [] Bed alarm activated   [x] Other: RN assisting patient.  [x] Call light within reach  [x] Chair alarm activated and connected to nurse call light system  [] Bed alarm activated   [] Other:   Assessment: Patient with improved cognitive-linguistic skills Across all domains (CLQT on 4/24: moderate-severe impairment; today: mild impairment). Persistent left perioral weakness with reduced range of motion. Resolving executive function impairment and left neglect. No pocketing noted this date. Plan: Continue per POC.        Interventions used this date:  [] Speech/Language Treatment  [] Instruction in HEP  [x] Dysphagia Treatment [x] Cognitive Treatment   [] Other:    Discharge recommendations:  [] Home independently  [x] Home with assistance []  24 hour supervision  [] ECF [x] Other: Education completed with family on Friday 5/28/2021  Continued Tx Upon Discharge: ? [x] Yes    [] No    [] TBD based on progress while on ARU     [] Vital Stim indicated     [] Other:   Estimated discharge date: 6/9/2021    Treatment Session 1 & 3:  Jacinda Cool M.A., Dejuan  Speech-Language Pathologist    Treatment Session 2:  Hernandez Dill, Jinny Arceo   Speech-Language Pathologist

## 2021-06-04 NOTE — PROGRESS NOTES
NUTRITION NOTE   Admission Date: 4/23/2021     Type and Reason for Visit: Reassess    NUTRITION RECOMMENDATIONS:   1. PO Diet: Continue general diet     NUTRITION ASSESSMENT:  Pt continues to have good po intakes. Per EMR pt taking in % most meals consumed, >50% all meals. Pt confirms, states no change in po/appetite. Patient admitted d/t GERD, depression/anxiety     PMH significant for: CVA    MALNUTRITION ASSESSMENT  Context of Malnutrition: Acute Illness   Malnutrition Status: No malnutrition    NUTRITION DIAGNOSIS No nutrition diagnosis at this time. NUTRITION INTERVENTION  Food and/or Nutrient Delivery: Continue Current Diet, Continue Oral Nutrition Supplement   Nutrition Education/Counseling: No recommendation at this time Coordination of Nutrition Care: Continue to monitor while inpatient     NUTRITION RISK LEVEL: Risk Level: Low        The patient will still be monitored per nutrition standards of care. Consult dietitian if nutrition interventions essential to patient care is needed.      Trell Bee, [address] [place] [address], [address] [place] [address] Drive:  [phone]  Office:  [phone]

## 2021-06-05 PROCEDURE — 6370000000 HC RX 637 (ALT 250 FOR IP): Performed by: PHYSICAL MEDICINE & REHABILITATION

## 2021-06-05 PROCEDURE — 1280000000 HC REHAB R&B

## 2021-06-05 PROCEDURE — 99232 SBSQ HOSP IP/OBS MODERATE 35: CPT | Performed by: PHYSICAL MEDICINE & REHABILITATION

## 2021-06-05 PROCEDURE — 94660 CPAP INITIATION&MGMT: CPT

## 2021-06-05 PROCEDURE — 6360000002 HC RX W HCPCS: Performed by: PHYSICAL MEDICINE & REHABILITATION

## 2021-06-05 RX ADMIN — FAMOTIDINE 20 MG: 20 TABLET, FILM COATED ORAL at 08:14

## 2021-06-05 RX ADMIN — THERA TABS 1 TABLET: TAB at 08:14

## 2021-06-05 RX ADMIN — ASPIRIN 81 MG: 81 TABLET, CHEWABLE ORAL at 08:13

## 2021-06-05 RX ADMIN — ACETAMINOPHEN 1000 MG: 500 TABLET, COATED ORAL at 21:00

## 2021-06-05 RX ADMIN — METHYLPHENIDATE HYDROCHLORIDE 10 MG: 10 TABLET ORAL at 08:13

## 2021-06-05 RX ADMIN — OXYCODONE 10 MG: 5 TABLET ORAL at 15:53

## 2021-06-05 RX ADMIN — CETIRIZINE HYDROCHLORIDE 10 MG: 10 TABLET, FILM COATED ORAL at 08:13

## 2021-06-05 RX ADMIN — THIAMINE HCL TAB 100 MG 100 MG: 100 TAB at 08:13

## 2021-06-05 RX ADMIN — FAMOTIDINE 20 MG: 20 TABLET, FILM COATED ORAL at 21:00

## 2021-06-05 RX ADMIN — HEPARIN SODIUM 5000 UNITS: 5000 INJECTION INTRAVENOUS; SUBCUTANEOUS at 14:18

## 2021-06-05 RX ADMIN — LEVETIRACETAM 500 MG: 500 TABLET ORAL at 08:14

## 2021-06-05 RX ADMIN — DICLOFENAC SODIUM 4 G: 10 GEL TOPICAL at 08:14

## 2021-06-05 RX ADMIN — AMLODIPINE BESYLATE 5 MG: 5 TABLET ORAL at 08:13

## 2021-06-05 RX ADMIN — Medication 5 MG: at 08:14

## 2021-06-05 RX ADMIN — ACETAMINOPHEN 1000 MG: 500 TABLET, COATED ORAL at 15:49

## 2021-06-05 RX ADMIN — FLUOXETINE 20 MG: 20 CAPSULE ORAL at 08:14

## 2021-06-05 RX ADMIN — LEVETIRACETAM 500 MG: 500 TABLET ORAL at 21:00

## 2021-06-05 RX ADMIN — HEPARIN SODIUM 5000 UNITS: 5000 INJECTION INTRAVENOUS; SUBCUTANEOUS at 21:01

## 2021-06-05 RX ADMIN — OXYCODONE HYDROCHLORIDE 5 MG: 5 TABLET ORAL at 21:00

## 2021-06-05 RX ADMIN — GUAIFENESIN 600 MG: 600 TABLET, EXTENDED RELEASE ORAL at 21:00

## 2021-06-05 RX ADMIN — ATORVASTATIN CALCIUM 80 MG: 80 TABLET, FILM COATED ORAL at 21:00

## 2021-06-05 RX ADMIN — HEPARIN SODIUM 5000 UNITS: 5000 INJECTION INTRAVENOUS; SUBCUTANEOUS at 06:56

## 2021-06-05 RX ADMIN — OXYCODONE 10 MG: 5 TABLET ORAL at 08:13

## 2021-06-05 RX ADMIN — GUAIFENESIN 600 MG: 600 TABLET, EXTENDED RELEASE ORAL at 08:13

## 2021-06-05 ASSESSMENT — PAIN SCALES - GENERAL
PAINLEVEL_OUTOF10: 0
PAINLEVEL_OUTOF10: 7
PAINLEVEL_OUTOF10: 0
PAINLEVEL_OUTOF10: 5
PAINLEVEL_OUTOF10: 7
PAINLEVEL_OUTOF10: 7
PAINLEVEL_OUTOF10: 6

## 2021-06-05 ASSESSMENT — PAIN DESCRIPTION - ONSET
ONSET: ON-GOING

## 2021-06-05 ASSESSMENT — PAIN DESCRIPTION - PAIN TYPE
TYPE: ACUTE PAIN

## 2021-06-05 ASSESSMENT — PAIN DESCRIPTION - FREQUENCY
FREQUENCY: INTERMITTENT

## 2021-06-05 ASSESSMENT — PAIN DESCRIPTION - ORIENTATION
ORIENTATION: LEFT

## 2021-06-05 ASSESSMENT — PAIN DESCRIPTION - DESCRIPTORS
DESCRIPTORS: ACHING

## 2021-06-05 ASSESSMENT — PAIN DESCRIPTION - LOCATION
LOCATION: HAND
LOCATION: HIP
LOCATION: HIP

## 2021-06-05 NOTE — PROGRESS NOTES
Patient has been resting quietly in bed. She is alert and oriented. Vital signs stable. She report intermittent pain in left hand. Voltaren gel applied. Patient declined using glove. Arm elevated on pillow. Instructed to call with any needs. Call light within reach.

## 2021-06-05 NOTE — PLAN OF CARE
Problem: Falls - Risk of:  Goal: Will remain free from falls  Description: Will remain free from falls  6/5/2021 1047 by Chioma Garcia  Outcome: Ongoing  Note: Patient remains free of falls this shift. Room free of clutter, bed in low position and call light within reach. Problem: Skin Integrity:  Goal: Will show no infection signs and symptoms  Description: Will show no infection signs and symptoms  Outcome: Ongoing  Note: Patient shows no signs or symptoms of infection at this time. VSS and patient is afebrile. Problem: Skin Integrity:  Goal: Absence of new skin breakdown  Description: Absence of new skin breakdown  Outcome: Ongoing  Note: Patient shows no evidence of new skin breakdown. Will continue to monitor.

## 2021-06-05 NOTE — PLAN OF CARE
Problem: Pain:  Description: Pain management should include both nonpharmacologic and pharmacologic interventions. Goal: Control of acute pain  Description: Control of acute pain  Outcome: Ongoing  Note: Patient has reported intermittent left hand pain. Left hand contracted. Patient has applied Voltaren gel to hand. She is receiving scheduled Tylenol while awake. She has also received Oxycodone for more severe pain     Problem: Falls - Risk of:  Goal: Will remain free from falls  Description: Will remain free from falls  Outcome: Ongoing  Note: Patient is alert and oriented. She has called appropriately for needs. Fall precautions in place. Bed is in low and locked positions. Bed alarm set. Call light within reach. She has remained free from falls.

## 2021-06-05 NOTE — PROGRESS NOTES
Department of Physical Medicine & Rehabilitation  Progress Note    Patient Identification:  Ronda Duran  [id number]  : 1970  Admit date: 2021    Chief Complaint: CVA    Subjective:   No new complaints. Improving in therapy. Continue to progress in therapy this week. ROS: No f/c, n/v, cp     Objective:  Patient Vitals for the past 24 hrs:   BP Temp Temp src Pulse Resp SpO2   21 0800 124/76 97.9 °F (36.6 °C) Oral 78 18    21 115/66 98 °F (36.7 °C) Oral 84 18 98 %   21 1030 137/83 97.6 °F (36.4 °C) Oral 82 16    21 1020      96 %   21 1000 135/83          Const: Alert. No distress, pleasant. HEENT: Normocephalic, atraumatic. Normal sclera/conjunctiva. MMM. Incision healing well  CV: Regular rate and rhythm. Resp: No respiratory distress. Lungs CTAB. Abd: Soft, nontender, nondistended, NABS+   Ext: No edema. Neuro: Alert, oriented, appropriately interactive. Psych: Cooperative, appropriate mood and affect    Laboratory data: Available via EMR. Last 24 hour lab  No results found for this or any previous visit (from the past 24 hour(s)). Therapy progress:  PT  Position Activity Restriction  Other position/activity restrictions: Ambulate, Up with assist, Pt to wear helmet when OOB, OK to remove for shower, HOB to elevated at 30*  Objective     Sit to Stand: Contact guard assistance, Stand by assistance (Transf from w/c,  mat table and commode. PT facilitating midline position for an ant WS and stabilizing LLE to maintain a neutral position.)  Stand to sit: Contact guard assistance, Stand by assistance (VC for hand placement and to maintain a midline position with eccentric control)  Bed to Chair: Minimal assistance (for balance during transfer)  Device: Single point cane  Other Apparatus: AFO (Initially used a DF assist via ACE wrap but was then changed to a prefabricated AFO)  Assistance: Minimal assistance, Moderate assistance  Distance: 30' x2 OT  PT Equipment Recommendations  Equipment Needed: Yes (continue to assess)  Other: 20\" hemiheight w/c with drop arm and standard 90 degree leg rest  Toilet - Technique: Stand pivot  Equipment Used: Raised toilet seat with rails  Toilet Transfers Comments: setup in room requires pt to transfer to L side- inc time and cueing for safety and sequence  Assessment        SLP  Diet Solids Recommendation: Dysphagia Soft and Bite-Sized (Dysphagia III) (with regular solids as tolerated. Per pt request, gravy/sauces to keep for moist.)       Body mass index is 36.54 kg/m². Assessment and Plan:  Kj Reyes a 46 y.o. female with PMH GERD p/w L sided weakness and facial droop.  CTA head/neck on 4/12 revealed occluded R cervical ICA, occluded right LEAH, near complete occlusion of right MCA.     Acute ischemic CVA, Subdural heomorrhage s/p right hemicraniectomy and subsequen SAH and IP hemorrhage  No tPA given.  MRI 4/14 showing large subacute infarct throughout R LEAH and MCA with some areas of hemorrhagic conversion.  - Neurosurgery and neurology following  - SBP <160, PRN hydralazine, scheduled Norvasc 5 mg daily  - Keppra 500 mg twice daily   - SQH  - PT/OT      Aphasia - SLP   Depression - Home fluoxetine 20 mg daily at home  Anxiety - Hold home Ativan 0.5 mg every 8 hours PRN for anxiety  MISHA - Patient uses CPAP at home  GERD - omeprazole 40 mg daily at home- substituted for pepcid 20 mg BID  Obesity    Rehab Progress: Improving  Anticipated Dispo: home  Services/DME: TTB and RTS (neither covered by insurance, friend to obtain)  ELOS: - extension needed       Charlette Villa D.O. M.P.H.  PM&R  6/5/2021  8:26 AM

## 2021-06-06 PROCEDURE — 6370000000 HC RX 637 (ALT 250 FOR IP): Performed by: PHYSICAL MEDICINE & REHABILITATION

## 2021-06-06 PROCEDURE — 6360000002 HC RX W HCPCS: Performed by: PHYSICAL MEDICINE & REHABILITATION

## 2021-06-06 PROCEDURE — 94660 CPAP INITIATION&MGMT: CPT

## 2021-06-06 PROCEDURE — 1280000000 HC REHAB R&B

## 2021-06-06 RX ADMIN — GUAIFENESIN 600 MG: 600 TABLET, EXTENDED RELEASE ORAL at 21:27

## 2021-06-06 RX ADMIN — OXYCODONE HYDROCHLORIDE 5 MG: 5 TABLET ORAL at 21:27

## 2021-06-06 RX ADMIN — DICLOFENAC SODIUM 4 G: 10 GEL TOPICAL at 21:28

## 2021-06-06 RX ADMIN — ACETAMINOPHEN 1000 MG: 500 TABLET, COATED ORAL at 21:27

## 2021-06-06 RX ADMIN — ASPIRIN 81 MG: 81 TABLET, CHEWABLE ORAL at 10:16

## 2021-06-06 RX ADMIN — HEPARIN SODIUM 5000 UNITS: 5000 INJECTION INTRAVENOUS; SUBCUTANEOUS at 21:28

## 2021-06-06 RX ADMIN — ACETAMINOPHEN 1000 MG: 500 TABLET, COATED ORAL at 10:16

## 2021-06-06 RX ADMIN — FLUOXETINE 20 MG: 20 CAPSULE ORAL at 10:16

## 2021-06-06 RX ADMIN — THERA TABS 1 TABLET: TAB at 10:16

## 2021-06-06 RX ADMIN — AMLODIPINE BESYLATE 5 MG: 5 TABLET ORAL at 10:17

## 2021-06-06 RX ADMIN — METHYLPHENIDATE HYDROCHLORIDE 10 MG: 10 TABLET ORAL at 10:16

## 2021-06-06 RX ADMIN — CETIRIZINE HYDROCHLORIDE 10 MG: 10 TABLET, FILM COATED ORAL at 10:17

## 2021-06-06 RX ADMIN — LEVETIRACETAM 500 MG: 500 TABLET ORAL at 21:27

## 2021-06-06 RX ADMIN — Medication 5 MG: at 10:17

## 2021-06-06 RX ADMIN — HEPARIN SODIUM 5000 UNITS: 5000 INJECTION INTRAVENOUS; SUBCUTANEOUS at 05:40

## 2021-06-06 RX ADMIN — OXYCODONE 10 MG: 5 TABLET ORAL at 10:15

## 2021-06-06 RX ADMIN — GUAIFENESIN 600 MG: 600 TABLET, EXTENDED RELEASE ORAL at 10:17

## 2021-06-06 RX ADMIN — FAMOTIDINE 20 MG: 20 TABLET, FILM COATED ORAL at 21:27

## 2021-06-06 RX ADMIN — LEVETIRACETAM 500 MG: 500 TABLET ORAL at 10:16

## 2021-06-06 RX ADMIN — ACETAMINOPHEN 1000 MG: 500 TABLET, COATED ORAL at 16:33

## 2021-06-06 RX ADMIN — ACETAMINOPHEN 1000 MG: 500 TABLET, COATED ORAL at 04:06

## 2021-06-06 RX ADMIN — ATORVASTATIN CALCIUM 80 MG: 80 TABLET, FILM COATED ORAL at 21:27

## 2021-06-06 RX ADMIN — HEPARIN SODIUM 5000 UNITS: 5000 INJECTION INTRAVENOUS; SUBCUTANEOUS at 16:33

## 2021-06-06 RX ADMIN — FAMOTIDINE 20 MG: 20 TABLET, FILM COATED ORAL at 10:16

## 2021-06-06 RX ADMIN — DOCUSATE SODIUM 50 MG AND SENNOSIDES 8.6 MG 2 TABLET: 8.6; 5 TABLET, FILM COATED ORAL at 10:17

## 2021-06-06 RX ADMIN — THIAMINE HCL TAB 100 MG 100 MG: 100 TAB at 10:16

## 2021-06-06 RX ADMIN — DICLOFENAC SODIUM 4 G: 10 GEL TOPICAL at 10:17

## 2021-06-06 ASSESSMENT — PAIN DESCRIPTION - ONSET
ONSET: ON-GOING
ONSET: ON-GOING

## 2021-06-06 ASSESSMENT — PAIN DESCRIPTION - LOCATION
LOCATION: HAND
LOCATION: HAND

## 2021-06-06 ASSESSMENT — PAIN SCALES - GENERAL
PAINLEVEL_OUTOF10: 2
PAINLEVEL_OUTOF10: 7
PAINLEVEL_OUTOF10: 4
PAINLEVEL_OUTOF10: 0
PAINLEVEL_OUTOF10: 7
PAINLEVEL_OUTOF10: 0

## 2021-06-06 ASSESSMENT — PAIN DESCRIPTION - PROGRESSION
CLINICAL_PROGRESSION: NOT CHANGED
CLINICAL_PROGRESSION: NOT CHANGED

## 2021-06-06 ASSESSMENT — PAIN DESCRIPTION - PAIN TYPE
TYPE: ACUTE PAIN
TYPE: ACUTE PAIN

## 2021-06-06 ASSESSMENT — PAIN DESCRIPTION - FREQUENCY
FREQUENCY: INTERMITTENT
FREQUENCY: INTERMITTENT

## 2021-06-06 ASSESSMENT — PAIN SCALES - WONG BAKER: WONGBAKER_NUMERICALRESPONSE: 0

## 2021-06-06 ASSESSMENT — PAIN - FUNCTIONAL ASSESSMENT: PAIN_FUNCTIONAL_ASSESSMENT: PREVENTS OR INTERFERES SOME ACTIVE ACTIVITIES AND ADLS

## 2021-06-06 ASSESSMENT — PAIN DESCRIPTION - ORIENTATION
ORIENTATION: LEFT
ORIENTATION: LEFT

## 2021-06-06 ASSESSMENT — PAIN DESCRIPTION - DESCRIPTORS
DESCRIPTORS: ACHING
DESCRIPTORS: ACHING

## 2021-06-06 NOTE — PROGRESS NOTES
Patient resting in bed. Assessment complete. VSS and patient is afebrile. Complains of pain in right hand rating it a 7 on a pain scale of 0-10. Medicated with Oxycodone 10mg. Per md order. Patient states she is satisfied with current pain regimen. Voltaren gel applied per md order. Patient states she feels that it is not very effective. Call light within reach. Bed in low position.

## 2021-06-06 NOTE — PLAN OF CARE
Problem: Falls - Risk of:  Goal: Will remain free from falls  Description: Will remain free from falls  6/6/2021 0015 by Samanta Nava RN  Note: Remains free from falls. Call light within reach and alarms in use at all times. Calls appropriately for assist. X1 stand pivot for transfers. X2 assist for toileting.

## 2021-06-07 LAB
ANION GAP SERPL CALCULATED.3IONS-SCNC: 9 MMOL/L (ref 3–16)
ATYPICAL LYMPHOCYTE RELATIVE PERCENT: 1 % (ref 0–6)
BASOPHILS ABSOLUTE: 0 K/UL (ref 0–0.2)
BASOPHILS RELATIVE PERCENT: 0 %
BUN BLDV-MCNC: 14 MG/DL (ref 7–20)
CALCIUM SERPL-MCNC: 9.5 MG/DL (ref 8.3–10.6)
CHLORIDE BLD-SCNC: 105 MMOL/L (ref 99–110)
CO2: 25 MMOL/L (ref 21–32)
CREAT SERPL-MCNC: 0.5 MG/DL (ref 0.6–1.1)
EOSINOPHILS ABSOLUTE: 0.6 K/UL (ref 0–0.6)
EOSINOPHILS RELATIVE PERCENT: 5 %
GFR AFRICAN AMERICAN: >60
GFR NON-AFRICAN AMERICAN: >60
GLUCOSE BLD-MCNC: 91 MG/DL (ref 70–99)
HCT VFR BLD CALC: 38.6 % (ref 36–48)
HEMOGLOBIN: 13 G/DL (ref 12–16)
LYMPHOCYTES ABSOLUTE: 4.6 K/UL (ref 1–5.1)
LYMPHOCYTES RELATIVE PERCENT: 37 %
MACROCYTES: ABNORMAL
MCH RBC QN AUTO: 34.4 PG (ref 26–34)
MCHC RBC AUTO-ENTMCNC: 33.7 G/DL (ref 31–36)
MCV RBC AUTO: 101.9 FL (ref 80–100)
MONOCYTES ABSOLUTE: 1 K/UL (ref 0–1.3)
MONOCYTES RELATIVE PERCENT: 8 %
NEUTROPHILS ABSOLUTE: 6 K/UL (ref 1.7–7.7)
NEUTROPHILS RELATIVE PERCENT: 49 %
PDW BLD-RTO: 13.5 % (ref 12.4–15.4)
PLATELET # BLD: 401 K/UL (ref 135–450)
PLATELET SLIDE REVIEW: ABNORMAL
PMV BLD AUTO: 8.6 FL (ref 5–10.5)
POTASSIUM REFLEX MAGNESIUM: 4.1 MMOL/L (ref 3.5–5.1)
RBC # BLD: 3.79 M/UL (ref 4–5.2)
SLIDE REVIEW: ABNORMAL
SODIUM BLD-SCNC: 139 MMOL/L (ref 136–145)
WBC # BLD: 12.2 K/UL (ref 4–11)

## 2021-06-07 PROCEDURE — 97130 THER IVNTJ EA ADDL 15 MIN: CPT

## 2021-06-07 PROCEDURE — 6360000002 HC RX W HCPCS: Performed by: PHYSICAL MEDICINE & REHABILITATION

## 2021-06-07 PROCEDURE — 97129 THER IVNTJ 1ST 15 MIN: CPT

## 2021-06-07 PROCEDURE — 1280000000 HC REHAB R&B

## 2021-06-07 PROCEDURE — 94660 CPAP INITIATION&MGMT: CPT

## 2021-06-07 PROCEDURE — 97542 WHEELCHAIR MNGMENT TRAINING: CPT | Performed by: PHYSICAL THERAPIST

## 2021-06-07 PROCEDURE — 36415 COLL VENOUS BLD VENIPUNCTURE: CPT

## 2021-06-07 PROCEDURE — 97530 THERAPEUTIC ACTIVITIES: CPT | Performed by: PHYSICAL THERAPIST

## 2021-06-07 PROCEDURE — 97112 NEUROMUSCULAR REEDUCATION: CPT

## 2021-06-07 PROCEDURE — 92526 ORAL FUNCTION THERAPY: CPT

## 2021-06-07 PROCEDURE — 80048 BASIC METABOLIC PNL TOTAL CA: CPT

## 2021-06-07 PROCEDURE — 97110 THERAPEUTIC EXERCISES: CPT

## 2021-06-07 PROCEDURE — 97110 THERAPEUTIC EXERCISES: CPT | Performed by: PHYSICAL THERAPIST

## 2021-06-07 PROCEDURE — 97530 THERAPEUTIC ACTIVITIES: CPT

## 2021-06-07 PROCEDURE — 99232 SBSQ HOSP IP/OBS MODERATE 35: CPT | Performed by: PHYSICAL MEDICINE & REHABILITATION

## 2021-06-07 PROCEDURE — 97140 MANUAL THERAPY 1/> REGIONS: CPT

## 2021-06-07 PROCEDURE — 6370000000 HC RX 637 (ALT 250 FOR IP): Performed by: PHYSICAL MEDICINE & REHABILITATION

## 2021-06-07 PROCEDURE — 85025 COMPLETE CBC W/AUTO DIFF WBC: CPT

## 2021-06-07 RX ORDER — DOCUSATE SODIUM 100 MG/1
100 CAPSULE, LIQUID FILLED ORAL 2 TIMES DAILY PRN
Status: DISCONTINUED | OUTPATIENT
Start: 2021-06-07 | End: 2021-06-24 | Stop reason: HOSPADM

## 2021-06-07 RX ADMIN — FLUOXETINE 20 MG: 20 CAPSULE ORAL at 09:46

## 2021-06-07 RX ADMIN — FAMOTIDINE 20 MG: 20 TABLET, FILM COATED ORAL at 21:44

## 2021-06-07 RX ADMIN — HEPARIN SODIUM 5000 UNITS: 5000 INJECTION INTRAVENOUS; SUBCUTANEOUS at 06:48

## 2021-06-07 RX ADMIN — OXYCODONE 10 MG: 5 TABLET ORAL at 18:25

## 2021-06-07 RX ADMIN — HEPARIN SODIUM 5000 UNITS: 5000 INJECTION INTRAVENOUS; SUBCUTANEOUS at 21:44

## 2021-06-07 RX ADMIN — THIAMINE HCL TAB 100 MG 100 MG: 100 TAB at 09:46

## 2021-06-07 RX ADMIN — GUAIFENESIN 600 MG: 600 TABLET, EXTENDED RELEASE ORAL at 21:44

## 2021-06-07 RX ADMIN — ASPIRIN 81 MG: 81 TABLET, CHEWABLE ORAL at 09:46

## 2021-06-07 RX ADMIN — HEPARIN SODIUM 5000 UNITS: 5000 INJECTION INTRAVENOUS; SUBCUTANEOUS at 14:41

## 2021-06-07 RX ADMIN — FAMOTIDINE 20 MG: 20 TABLET, FILM COATED ORAL at 09:46

## 2021-06-07 RX ADMIN — OXYCODONE HYDROCHLORIDE 5 MG: 5 TABLET ORAL at 06:57

## 2021-06-07 RX ADMIN — ATORVASTATIN CALCIUM 80 MG: 80 TABLET, FILM COATED ORAL at 21:44

## 2021-06-07 RX ADMIN — AMLODIPINE BESYLATE 5 MG: 5 TABLET ORAL at 09:46

## 2021-06-07 RX ADMIN — DICLOFENAC SODIUM 4 G: 10 GEL TOPICAL at 21:44

## 2021-06-07 RX ADMIN — DOCUSATE SODIUM 50 MG AND SENNOSIDES 8.6 MG 2 TABLET: 8.6; 5 TABLET, FILM COATED ORAL at 09:46

## 2021-06-07 RX ADMIN — ACETAMINOPHEN 1000 MG: 500 TABLET, COATED ORAL at 21:44

## 2021-06-07 RX ADMIN — ACETAMINOPHEN 1000 MG: 500 TABLET, COATED ORAL at 09:46

## 2021-06-07 RX ADMIN — CETIRIZINE HYDROCHLORIDE 10 MG: 10 TABLET, FILM COATED ORAL at 09:46

## 2021-06-07 RX ADMIN — METHYLPHENIDATE HYDROCHLORIDE 10 MG: 10 TABLET ORAL at 09:46

## 2021-06-07 RX ADMIN — ACETAMINOPHEN 1000 MG: 500 TABLET, COATED ORAL at 14:03

## 2021-06-07 RX ADMIN — LEVETIRACETAM 500 MG: 500 TABLET ORAL at 09:46

## 2021-06-07 RX ADMIN — GUAIFENESIN 600 MG: 600 TABLET, EXTENDED RELEASE ORAL at 09:46

## 2021-06-07 RX ADMIN — OXYCODONE 10 MG: 5 TABLET ORAL at 14:03

## 2021-06-07 RX ADMIN — DICLOFENAC SODIUM 4 G: 10 GEL TOPICAL at 09:47

## 2021-06-07 RX ADMIN — THERA TABS 1 TABLET: TAB at 09:46

## 2021-06-07 RX ADMIN — LEVETIRACETAM 500 MG: 500 TABLET ORAL at 21:43

## 2021-06-07 ASSESSMENT — PAIN DESCRIPTION - DESCRIPTORS
DESCRIPTORS: DISCOMFORT;BURNING
DESCRIPTORS: ACHING
DESCRIPTORS: BURNING;DISCOMFORT
DESCRIPTORS: BURNING

## 2021-06-07 ASSESSMENT — PAIN - FUNCTIONAL ASSESSMENT
PAIN_FUNCTIONAL_ASSESSMENT: ACTIVITIES ARE NOT PREVENTED

## 2021-06-07 ASSESSMENT — PAIN DESCRIPTION - ONSET
ONSET: ON-GOING

## 2021-06-07 ASSESSMENT — PAIN DESCRIPTION - PROGRESSION
CLINICAL_PROGRESSION: GRADUALLY IMPROVING
CLINICAL_PROGRESSION: GRADUALLY WORSENING
CLINICAL_PROGRESSION: RAPIDLY WORSENING
CLINICAL_PROGRESSION: NOT CHANGED
CLINICAL_PROGRESSION: GRADUALLY WORSENING

## 2021-06-07 ASSESSMENT — PAIN SCALES - GENERAL
PAINLEVEL_OUTOF10: 0
PAINLEVEL_OUTOF10: 5
PAINLEVEL_OUTOF10: 7
PAINLEVEL_OUTOF10: 0
PAINLEVEL_OUTOF10: 10
PAINLEVEL_OUTOF10: 4
PAINLEVEL_OUTOF10: 4
PAINLEVEL_OUTOF10: 0

## 2021-06-07 ASSESSMENT — PAIN DESCRIPTION - FREQUENCY
FREQUENCY: INTERMITTENT

## 2021-06-07 ASSESSMENT — PAIN DESCRIPTION - PAIN TYPE
TYPE: ACUTE PAIN

## 2021-06-07 ASSESSMENT — PAIN DESCRIPTION - LOCATION
LOCATION: HAND
LOCATION: HIP
LOCATION: HIP;LEG
LOCATION: HAND

## 2021-06-07 ASSESSMENT — PAIN DESCRIPTION - ORIENTATION
ORIENTATION: LEFT

## 2021-06-07 NOTE — PROGRESS NOTES
ACUTE REHAB UNIT  SPEECH/LANGUAGE PATHOLOGY      [x] Daily  [] Weekly Care Conference Note  [] Discharge    Patient:Laura Cintron      UGU:[birth year]  QCS:[id number]  Rehab Dx/Hx: Acute CVA (cerebrovascular accident) ([facility] [address] [I63.9]    Precautions: [x] Aspiration  [x] Fall risk  [] Sternal  [] Seizure [] Hip  [] Weight Bearing [] Other  ST Dx: [] Aphasia  [] Dysarthria  [] Apraxia   [x] Oropharyngeal dysphagia [x] Cognitive Impairment  [] Other:   Date of Admit: 4/23/2021  Room #: 3101/3101-01  Date: 6/7/2021        Current Diet Order:DIET GENERAL;   Recommended Form of Meds: PO  Compensatory Swallowing Strategies: Alternate solids and liquids, Upright as possible for all oral intake, Check for pocketing of food on the Left, Small bites/sips, Lingual sweep   Subjective: Pt admitted 4/12 after fall with left sided paralysis. Pt underwent craniectomy on 4/13 per Dr. Lei Samayoa. Social/Functional History  Lives With: Spouse;Son( reported that son is diagnosed with Autism.)  Occupation: Full time employment  Type of occupation:  at [employer]"    FEES 4/14: IMPRESSIONS:   Pt presents w/ mild oropharyngeal dysphagia characterized by premature spillage of thin liquids to UES and pyriforms w/ intermittent deep penetration of laryngeal vestibule; adequate airway protection and complete clearance of all residue after swallow initiation. No aspiration w/ any trials. Timely swallow initiation w/ puree and regular solid consistencies; no oropharyngeal residue.    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 Adequate ab/adduction. Complete closure of TVFs upon adduction.      Dentition: Adequate  Vision  Vision: Impaired  Vision Exceptions: Visual field cut  Hearing  Hearing: Within functional limits  Barriers toward progress: None towards stated goals    Date: 6/7/2021      Tx session 1 Tx session 2   Total Timed Code Min 10 35   Total Treatment Minutes 30 35   Individual Treatment Minutes 30 35   Group Treatment Minutes 0 0   Co-Treat Minutes 0 0   Brief Exception: N/A N/A   Pain None indicated  None indicated    Pain Intervention: [] RN notified  [] Repositioned  [] Intervention offered and patient declined  [x] N/A  [] Other:    [] RN notified  [] Repositioned  [] Intervention offered and patient declined  [x] N/A  [] Other:    Subjective:     Pt upright in chair and agreeable to therapy. Pt upright in wheelchair and participates in therapy with good effort. Objective / Goals:     Patient will consume regular solids with timely mastication and no pocketing in L buccal cavity across 2 sessions. Patient tolerated NMES via VitalStim placement 4a (targeting orbicularis oris, buccinator and superior pharyngeal constrictor) @ 3.0 mA on right x 25 minutes, 8.0 mA on left x 5 minutes, increased to 10.0 mA x 20 minutes (total stimulation time of 25 minutes). Pt with anterior loss x1 with sensation but incomplete as pt was unable to remove independently. Minimal residue intermittently noted on left but cleared with liquid rinse / additional bolus and no overt pocketing noted or identified per patient. Goal not targeted this session. Patient will consume PO without anterior spillage or overt pocketing across 2 sessions. GOAL MET   GOAL MET   Pt will complete divided/alternating attention tasks with 85% accuracy given min cues. Min cues to sustain attention appropriately / alternate between conversational partner and meal. Pt noted to intermittently maintain visual attention to television vs conversational partner. Pt independently able to participate in task without cues despite multiple distractors. Pt will accurately complete executive functioning tasks with min cues. Goal not targeted this session. Mild impulsivity noted x1.    Reduced execution for transfer x1 related to emotional state (laughing); cues required for pt to initiate correct body posturing / foot placement and to verbalize needs / sequence. Pt will attend to L visual field with min cues. No cues for left sided attention to items on tray or SLP positioned in left visual field. There was a mild right gaze preference at rest appreciated. Cue x1 for familiar navigation task for locating items on left. Cues x3 in novel task without contextual cues. Pt will participate in ongoing cognitive linguistic assessment. GOAL MET GOAL MET   Other areas targeted:     Education:   Ongoing regarding dysphagia. Education continues to focus on rationale for tasks this date. Safety Devices: [x] Call light within reach  [x] Chair alarm activated and connected to nurse call light system  [] Bed alarm activated   [] Other: [x] Call light within reach  [x] Chair alarm activated and connected to nurse call light system  [] Bed alarm activated   [] Other:    Assessment: Mild oral stage dysphagia with comorbidity of reduced attention and carryover for pocketing. Mild executive function impairment and left visual inattention with improvements noted. Excellent potential. Would benefit from ongoing intense rehab. Plan: Continue per POC.       Interventions used this date:  [] Speech/Language Treatment  [] Instruction in HEP  [x] Dysphagia Treatment [x] Cognitive Treatment   [] Other:    Discharge recommendations:  [] Home independently  [x] Home with assistance []  24 hour supervision  [] ECF [x] Other: Education completed with family on Friday 5/28/2021  Continued Tx Upon Discharge: ? [x] Yes    [] No    [] TBD based on progress while on ARU     [] Vital Stim indicated     [] Other:   Estimated discharge date: 6/9/2021     Rodrigo South, Fifi Maria., Dejuan  Speech-Language Pathologist

## 2021-06-07 NOTE — PROGRESS NOTES
Pt up in chair, reports mild pain, scheduled tylenol given at this time. All meds taken without difficulty. Call light within reach and chair alarm engaged. For patient safety, Visual Surveillance is in place, reviewed with patient/family, verbalized understanding.   Vitals:    06/07/21 0943   BP: 115/69   Pulse: 86   Resp: 18   Temp: 98.1 °F (36.7 °C)   SpO2: 96%

## 2021-06-07 NOTE — PROGRESS NOTES
Occupational Therapy  Facility/Department: [facility] ACUTE REHAB UNIT  Daily Treatment Note  NAME: Abelino Ashraf  : 1970  MRN: [id number]    Date of Service: 2021    Discharge Recommendations:  24 hour supervision or assist, Home with Home health OT, Continue to assess pending progress, Home with nursing aide  OT Equipment Recommendations  Equipment Needed: Yes  ADL Assistive Devices: Transfer Tub Bench;Grab Bars - toilet; Toileting - Raised Toilet Seat with arms; Reacher;Grab Bars - tub  Other: continue to assess    Assessment   Performance deficits / Impairments: Decreased functional mobility ; Decreased ADL status; Decreased ROM; Decreased strength;Decreased sensation;Decreased fine motor control;Decreased endurance;Decreased posture;Decreased balance;Decreased safe awareness;Decreased coordination;Decreased vision/visual deficit; Decreased cognition  Assessment: Patient with good response to slow sustain stretch for tone management, and with decreased hypersensitivity noted following extended arm weightbearing tasks. Would benefit from continued weightbearing and desensitization tasks to promote tone management and increase functional use of LUE. Would continue to benefit from OT services, cont POC. Treatment Diagnosis: decreased independence with ADLs and decreased functional mobility 2/2 CVA  Prognosis: Fair  OT Education: Plan of Care;Transfer Training;ADL Adaptive Strategies  Patient Education: sustained stretch, purpose of weightbearing - verb understanding  REQUIRES OT FOLLOW UP: Yes  Activity Tolerance  Activity Tolerance: Patient Tolerated treatment well  Safety Devices  Safety Devices in place: Yes  Type of devices: Chair alarm in place; Left in chair; All fall risk precautions in place;Call light within reach         Patient Diagnosis(es): There were no encounter diagnoses. has a past medical history of GERD (gastroesophageal reflux disease), Hernia, Obesity, and Unspecified sleep apnea.    has a Attention Span: Attends with cues to redirect  Memory: Appears intact  Safety Judgement: Decreased awareness of need for assistance;Decreased awareness of need for safety  Problem Solving: Assistance required to generate solutions;Assistance required to correct errors made;Assistance required to implement solutions;Decreased awareness of errors  Insights: Decreased awareness of deficits  Initiation: Requires cues for some  Sequencing: Requires cues for some  Type of ROM/Therapeutic Exercise  Comment: Completed slow sustained stretch to promote tone management and muscle elongation. Able to achieve near 90 degrees prior to onset of pain, redirected into movement into abduction. Arm supported at ~85 degrees abduction, gradually extended elbow to full range. Opened digits and assist to sustain finger extension; held for approx 2 min for sustained stretch. Positioned with foam piece within hand after completion in order to decrease pressure of fingers into the palm of the hand. Second session:  Patient seated in w/c upon arrival, agreeable to OT services. W/c<>mat transfer with CGA. Initially attempted to transition LUE into extended arm weightbearing position, but limited by increased hypersensitivity with attempt. Attempted lateral weightbearing to elbow while on mat as a preparatory stretch/movement while desensitizing mat, reporting hip pain with attempt to both mat and elevated bolster so task discontinued. Utilized low level of vibration (wand) beginning at elbow and working distally, first touching thumb, then the dorsum of the hand, and progressing into palm and fingers. 1 instance of increased pain with completion - when nearing MCP digit 5 - but no further pain reported.  Following completion, patient able to achieve and tolerate extended arm weightbearing for 2 trials (4 min + 2 min) with brief rest break between trials, min assist to maintain elbow extension and intermittent assist to sustain digit will complete UE dressing w/ setup-ongoing  Long term goal 3: Pt will complete LE dressing w/ SBA-ongoing  Long term goal 4: Pt will be independent w/ tone management exercises to improve functional use of LUE-ongoing  Long term goal 5: Pt will complete oral care I'ly-goal met 5/24  Patient Goals   Patient goals : \"get back to my normal self\"       Therapy Time   Individual Concurrent Group Co-treatment   Time In 1000         Time Out 1030         Minutes 30              Second Session Therapy Time:   Individual Concurrent Group Co-treatment   Time In 1100        Time Out 1200        Minutes 60          Timed Code Treatment Minutes: 59+83     Total Treatment Minutes:  90 min    MARIO MARR[facility]QUING[facility], OTR/L #[id number]

## 2021-06-07 NOTE — PLAN OF CARE
Problem: Pain:  Goal: Control of acute pain  Description: Control of acute pain  Note: C/O pain to L hand. Voltaren gel applied and edema glove applied. Oxycodone PRN given with effective results. Problem: Falls - Risk of:  Goal: Will remain free from falls  Description: Will remain free from falls  Note: Remains free from falls. Call light within reach and alarms in use at all times. X1 mod assist for stand pivot. X2 assist for toileting.
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
## 2021-06-07 NOTE — PROGRESS NOTES
Department of Physical Medicine & Rehabilitation  Progress Note    Patient Identification:  Stalin Mar  [id number]  : 1970  Admit date: 2021    Chief Complaint: CVA    Subjective:   Doing well. No new complaints overnight. Improving in therapy slowly. Required AFO on left leg. ROS: No f/c, n/v, cp     Objective:  Patient Vitals for the past 24 hrs:   BP Temp Temp src Pulse Resp SpO2 Weight   21 0943 115/69 98.1 °F (36.7 °C) Oral 86 18 96 %    21 0700       219 lb 12.8 oz (99.7 kg)   21 0001     18     21 2307     18     21 2127 127/72 98.1 °F (36.7 °C) Oral 75 16       Const: Alert. No distress, pleasant. HEENT: Normocephalic, atraumatic. Normal sclera/conjunctiva. MMM. Incision healing well  CV: Regular rate and rhythm. Resp: No respiratory distress. Lungs CTAB. Abd: Soft, nontender, nondistended, NABS+   Ext: No edema. Neuro: Alert, oriented, appropriately interactive. Psych: Cooperative, appropriate mood and affect    Laboratory data: Available via EMR.    Last 24 hour lab  Recent Results (from the past 24 hour(s))   Basic Metabolic Panel w/ Reflex to MG    Collection Time: 21  6:53 AM   Result Value Ref Range    Sodium 139 136 - 145 mmol/L    Potassium reflex Magnesium 4.1 3.5 - 5.1 mmol/L    Chloride 105 99 - 110 mmol/L    CO2 25 21 - 32 mmol/L    Anion Gap 9 3 - 16    Glucose 91 70 - 99 mg/dL    BUN 14 7 - 20 mg/dL    CREATININE 0.5 (L) 0.6 - 1.1 mg/dL    GFR Non-African American >60 >60    GFR African American >60 >60    Calcium 9.5 8.3 - 10.6 mg/dL   CBC auto differential    Collection Time: 21  6:53 AM   Result Value Ref Range    WBC 12.2 (H) 4.0 - 11.0 K/uL    RBC 3.79 (L) 4.00 - 5.20 M/uL    Hemoglobin 13.0 12.0 - 16.0 g/dL    Hematocrit 38.6 36.0 - 48.0 %    .9 (H) 80.0 - 100.0 fL    MCH 34.4 (H) 26.0 - 34.0 pg    MCHC 33.7 31.0 - 36.0 g/dL    RDW 13.5 12.4 - 15.4 %    Platelets 258 701 - 974 K/uL    MPV 8.6 5.0 - 10.5 fL    PLATELET SLIDE REVIEW Increased     SLIDE REVIEW see below     Neutrophils % 49.0 %    Lymphocytes % 37.0 %    Monocytes % 8.0 %    Eosinophils % 5.0 %    Basophils % 0.0 %    Neutrophils Absolute 6.0 1.7 - 7.7 K/uL    Lymphocytes Absolute 4.6 1.0 - 5.1 K/uL    Monocytes Absolute 1.0 0.0 - 1.3 K/uL    Eosinophils Absolute 0.6 0.0 - 0.6 K/uL    Basophils Absolute 0.0 0.0 - 0.2 K/uL    Atypical Lymphocytes Relative 1 0 - 6 %    Macrocytes 1+ (A)        Therapy progress:  PT  Position Activity Restriction  Other position/activity restrictions: Ambulate, Up with assist, Pt to wear helmet when OOB, OK to remove for shower, HOB to elevated at 30*  Objective     Sit to Stand: Contact guard assistance, Stand by assistance (Transf from w/c,  mat table and commode. PT facilitating midline position for an ant WS and stabilizing LLE to maintain a neutral position.)  Stand to sit: Contact guard assistance, Stand by assistance (VC for hand placement and to maintain a midline position with eccentric control)  Bed to Chair: Minimal assistance (for balance during transfer)  Device: Single point cane  Other Apparatus: AFO (Initially used a DF assist via ACE wrap but was then changed to a prefabricated AFO)  Assistance: Minimal assistance, Moderate assistance  Distance: 27' x2  OT  PT Equipment Recommendations  Equipment Needed: Yes (continue to assess)  Other: 20\" hemiheight w/c with drop arm and standard 90 degree leg rest  Toilet - Technique: Stand pivot  Equipment Used: Raised toilet seat with rails  Toilet Transfers Comments: setup in room requires pt to transfer to  side- inc time and cueing for safety and sequence  Assessment        SLP  Diet Solids Recommendation: Dysphagia Soft and Bite-Sized (Dysphagia III) (with regular solids as tolerated. Per pt request, gravy/sauces to keep for moist.)       Body mass index is 35.48 kg/m².     Assessment and Plan:  Matty Lauren a 46 y.o. female with PMH GERD p/w L sided weakness and facial droop.  CTA head/neck on 4/12 revealed occluded R cervical ICA, occluded right LEAH, near complete occlusion of right MCA.     Acute ischemic CVA, Subdural heomorrhage s/p right hemicraniectomy and subsequen SAH and IP hemorrhage  No tPA given.  MRI 4/14 showing large subacute infarct throughout R LEAH and MCA with some areas of hemorrhagic conversion.  - Neurosurgery and neurology following  - SBP <160, PRN hydralazine, scheduled Norvasc 5 mg daily  - Keppra 500 mg twice daily   - SQH  - PT/OT      Aphasia - SLP   Depression - Home fluoxetine 20 mg daily at home  Anxiety - Hold home Ativan 0.5 mg every 8 hours PRN for anxiety  MISHA - Patient uses CPAP at home  GERD - omeprazole 40 mg daily at home- substituted for pepcid 20 mg BID  Obesity    Rehab Progress: Improving  Anticipated Dispo: home  Services/DME: TTB and RTS (neither covered by insurance, friend to obtain)  ELOS: - extension needed       Cooper Wilde D.O. M.P.H.  PM&R  6/7/2021  10:13 AM

## 2021-06-08 PROCEDURE — 97116 GAIT TRAINING THERAPY: CPT | Performed by: PHYSICAL THERAPIST

## 2021-06-08 PROCEDURE — 97129 THER IVNTJ 1ST 15 MIN: CPT

## 2021-06-08 PROCEDURE — 97535 SELF CARE MNGMENT TRAINING: CPT

## 2021-06-08 PROCEDURE — 6370000000 HC RX 637 (ALT 250 FOR IP): Performed by: PHYSICAL MEDICINE & REHABILITATION

## 2021-06-08 PROCEDURE — 97130 THER IVNTJ EA ADDL 15 MIN: CPT

## 2021-06-08 PROCEDURE — 97542 WHEELCHAIR MNGMENT TRAINING: CPT | Performed by: PHYSICAL THERAPIST

## 2021-06-08 PROCEDURE — 1280000000 HC REHAB R&B

## 2021-06-08 PROCEDURE — 94660 CPAP INITIATION&MGMT: CPT

## 2021-06-08 PROCEDURE — 97530 THERAPEUTIC ACTIVITIES: CPT

## 2021-06-08 PROCEDURE — 6360000002 HC RX W HCPCS: Performed by: PHYSICAL MEDICINE & REHABILITATION

## 2021-06-08 PROCEDURE — 99232 SBSQ HOSP IP/OBS MODERATE 35: CPT | Performed by: PHYSICAL MEDICINE & REHABILITATION

## 2021-06-08 PROCEDURE — 97530 THERAPEUTIC ACTIVITIES: CPT | Performed by: PHYSICAL THERAPIST

## 2021-06-08 PROCEDURE — 92526 ORAL FUNCTION THERAPY: CPT

## 2021-06-08 RX ADMIN — HEPARIN SODIUM 5000 UNITS: 5000 INJECTION INTRAVENOUS; SUBCUTANEOUS at 05:51

## 2021-06-08 RX ADMIN — ATORVASTATIN CALCIUM 80 MG: 80 TABLET, FILM COATED ORAL at 22:07

## 2021-06-08 RX ADMIN — HEPARIN SODIUM 5000 UNITS: 5000 INJECTION INTRAVENOUS; SUBCUTANEOUS at 22:21

## 2021-06-08 RX ADMIN — LEVETIRACETAM 500 MG: 500 TABLET ORAL at 08:45

## 2021-06-08 RX ADMIN — DICLOFENAC SODIUM 4 G: 10 GEL TOPICAL at 08:48

## 2021-06-08 RX ADMIN — DOCUSATE SODIUM 100 MG: 100 CAPSULE, LIQUID FILLED ORAL at 22:06

## 2021-06-08 RX ADMIN — ACETAMINOPHEN 1000 MG: 500 TABLET, COATED ORAL at 11:18

## 2021-06-08 RX ADMIN — GUAIFENESIN 600 MG: 600 TABLET, EXTENDED RELEASE ORAL at 22:06

## 2021-06-08 RX ADMIN — LEVETIRACETAM 500 MG: 500 TABLET ORAL at 22:06

## 2021-06-08 RX ADMIN — FAMOTIDINE 20 MG: 20 TABLET, FILM COATED ORAL at 22:19

## 2021-06-08 RX ADMIN — OXYCODONE 10 MG: 5 TABLET ORAL at 22:07

## 2021-06-08 RX ADMIN — OXYCODONE 10 MG: 5 TABLET ORAL at 05:51

## 2021-06-08 RX ADMIN — AMLODIPINE BESYLATE 5 MG: 5 TABLET ORAL at 08:46

## 2021-06-08 RX ADMIN — THERA TABS 1 TABLET: TAB at 08:45

## 2021-06-08 RX ADMIN — FLUOXETINE 20 MG: 20 CAPSULE ORAL at 08:46

## 2021-06-08 RX ADMIN — CETIRIZINE HYDROCHLORIDE 10 MG: 10 TABLET, FILM COATED ORAL at 08:46

## 2021-06-08 RX ADMIN — HEPARIN SODIUM 5000 UNITS: 5000 INJECTION INTRAVENOUS; SUBCUTANEOUS at 15:09

## 2021-06-08 RX ADMIN — GUAIFENESIN 600 MG: 600 TABLET, EXTENDED RELEASE ORAL at 08:46

## 2021-06-08 RX ADMIN — ACETAMINOPHEN 1000 MG: 500 TABLET, COATED ORAL at 17:29

## 2021-06-08 RX ADMIN — DICLOFENAC SODIUM 4 G: 10 GEL TOPICAL at 22:07

## 2021-06-08 RX ADMIN — ACETAMINOPHEN 1000 MG: 500 TABLET, COATED ORAL at 22:07

## 2021-06-08 RX ADMIN — METHYLPHENIDATE HYDROCHLORIDE 10 MG: 10 TABLET ORAL at 08:45

## 2021-06-08 RX ADMIN — THIAMINE HCL TAB 100 MG 100 MG: 100 TAB at 08:46

## 2021-06-08 RX ADMIN — FAMOTIDINE 20 MG: 20 TABLET, FILM COATED ORAL at 08:46

## 2021-06-08 RX ADMIN — DOCUSATE SODIUM 50 MG AND SENNOSIDES 8.6 MG 2 TABLET: 8.6; 5 TABLET, FILM COATED ORAL at 08:45

## 2021-06-08 RX ADMIN — ASPIRIN 81 MG: 81 TABLET, CHEWABLE ORAL at 08:45

## 2021-06-08 ASSESSMENT — PAIN SCALES - GENERAL
PAINLEVEL_OUTOF10: 7
PAINLEVEL_OUTOF10: 7
PAINLEVEL_OUTOF10: 2
PAINLEVEL_OUTOF10: 3
PAINLEVEL_OUTOF10: 3

## 2021-06-08 ASSESSMENT — PAIN DESCRIPTION - ORIENTATION
ORIENTATION: LEFT

## 2021-06-08 ASSESSMENT — PAIN DESCRIPTION - PROGRESSION
CLINICAL_PROGRESSION: NOT CHANGED

## 2021-06-08 ASSESSMENT — PAIN DESCRIPTION - LOCATION
LOCATION: HAND

## 2021-06-08 ASSESSMENT — PAIN DESCRIPTION - FREQUENCY
FREQUENCY: INTERMITTENT

## 2021-06-08 ASSESSMENT — PAIN DESCRIPTION - ONSET
ONSET: ON-GOING

## 2021-06-08 ASSESSMENT — PAIN DESCRIPTION - DESCRIPTORS
DESCRIPTORS: BURNING

## 2021-06-08 ASSESSMENT — PAIN DESCRIPTION - PAIN TYPE
TYPE: ACUTE PAIN

## 2021-06-08 NOTE — PROGRESS NOTES
Discharge Summary   Total Timed Code Min 15 30    Total Treatment Minutes 30 30    Individual Treatment Minutes 30 30    Group Treatment Minutes 0 0    Co-Treat Minutes 0 0    Brief Exception: N/A N/A    Pain None indicated  None indicated     Pain Intervention: [] RN notified  [] Repositioned  [] Intervention offered and patient declined  [x] N/A  [] Other:    [] RN notified  [] Repositioned  [] Intervention offered and patient declined  [x] N/A  [] Other:     Subjective:     Pt in w/c upon entry, pleasant and agreeable. Seen with breakfast tray. Pt in w/c upon entry after PT session. Cooperative and motivated as always. Endorsed fatigue and returned to bed at end of session. Objective / Goals:      Patient will consume regular solids with timely mastication and no pocketing in L buccal cavity across 2 sessions. Mastication timely and functional of regular solid. Minimal amount of solids pocketed in L buccal cavity that required finger sweep to clear. Pt independently attempting lingual sweeps + liquid rinses across meal.    Targeted via therapeutic exercises:  -Bolus lateralization with toothette x 10  -Oral strengthening with device - sustained labial / buccal pulling against resistance x 5 seconds x 10 reps   Goal not targeted. GOAL MET - inconsistent, ongoing oral dysphagia; continue to target   Patient will consume PO without anterior spillage or overt pocketing across 2 sessions. GOAL MET   GOAL MET GOAL MET   Pt will complete divided/alternating attention tasks with 85% accuracy given min cues. No cues to alternate attention and redirect self to task during session after interruptions / external distractions. Alternating attention between word finding task and information transfer for acrostic: no cues increasing to mod cues. Limited by visual scanning deficits.    GOAL MET PREVIOUSLY -  Inconsistencies noted this date with attention; continue to target as indicated   Pt will accurately complete executive functioning tasks with min cues. Goal not directly addressed Acrostic: overall mod cues for attention to detail and self-monitoring of responses. Inconsistent implementation of strategies across task to track progress and accuracy of responses. GOAL NOT MET - continue to target   Pt will attend to L visual field with min cues. Independently attended to conversational partner L of midline. Min-mod cues required for appropriate visual scanning and L sided attention for acrostic task detailed above. GOAL MET - continue to ensure consistency as inconsistencies demonstrated     Pt will participate in ongoing cognitive linguistic assessment. GOAL MET GOAL MET GOAL MET   Other areas targeted:      Education:   Educated to skills targeted and rationale for tx tasks as well as noted improvements in attention. Educated to skills targeted, attention fatigue, and compensatory strategies for attention and self-monitoring. Safety Devices: [x] Call light within reach  [x] Chair alarm activated and connected to nurse call light system  [] Bed alarm activated   [x] Other: reinforced use of call light [x] Call light within reach  [] Chair alarm activated and connected to nurse call light system  [x] Bed alarm activated   [x] Other: RN notified     Assessment: Oral dysphagia with minimal L sided pocketing. Cognitive linguistic impairment marked by executive dysfunction with reduced L attention and visual attention deficits. Pt continues to progress in tx and receptive to therapeutic cues for improved performance. Very motivated to improve with good prognosis to return to OF with ongoing intensive ST on inpatient basis. Plan: Continue per POC.        Interventions used this date:  [] Speech/Language Treatment  [] Instruction in HEP  [x] Dysphagia Treatment [x] Cognitive Treatment   [] Other:    Discharge recommendations:  [] Home independently  [x] Home with assistance []  24 hour supervision  [] ECF [x] Other: Education completed with family on Friday 5/28/2021  Continued Tx Upon Discharge: ? [x] Yes    [] No    [] TBD based on progress while on ARU     [] Vital Stim indicated     [] Other:   Estimated discharge date: 6/9/2021       Sera Li MA CCC-SLP; [id number]  Speech-Language Pathologist

## 2021-06-08 NOTE — PROGRESS NOTES
Occupational Therapy  Facility/Department: [facility] ACUTE REHAB UNIT  Daily Treatment Note  NAME: Emy Connor  : 1970  MRN: [id number]    Date of Service: 2021    Discharge Recommendations:  24 hour supervision or assist, Home with Home health OT, Continue to assess pending progress, Home with nursing aide  OT Equipment Recommendations  Equipment Needed: Yes  ADL Assistive Devices: Transfer Tub Bench;Grab Bars - toilet; Toileting - Raised Toilet Seat with arms; Reacher;Grab Bars - tub  Other: continue to assess    Assessment   Performance deficits / Impairments: Decreased functional mobility ; Decreased ADL status; Decreased ROM; Decreased strength;Decreased sensation;Decreased fine motor control;Decreased endurance;Decreased posture;Decreased balance;Decreased safe awareness;Decreased coordination;Decreased vision/visual deficit; Decreased cognition  Assessment: Pt demonstrated improved toilet transfer utilizing [address] for stand pivot to RTS fluctuating between CGA-min A depending on direction. Pt continues to have difficulty managing LB clothing in stance over L hip but is able to maintain stance with CGA for req only one person assist for toileting  Treatment Diagnosis: decreased independence with ADLs and decreased functional mobility 2/2 CVA  Prognosis: Fair  OT Education: Plan of Care;Transfer Training;ADL Adaptive Strategies  Patient Education: pt educated on use of SPC for toilet transfer-pt verb and demo understanding, continue to reinforce  REQUIRES OT FOLLOW UP: Yes  Activity Tolerance  Activity Tolerance: Patient Tolerated treatment well  Safety Devices  Safety Devices in place: Yes  Type of devices: Chair alarm in place; Left in chair; All fall risk precautions in place;Call light within reach         Patient Diagnosis(es): There were no encounter diagnoses. has a past medical history of GERD (gastroesophageal reflux disease), Hernia, Obesity, and Unspecified sleep apnea.    has a past surgical history that includes Splenectomy (); Total hip arthroplasty (); Lap Band (); hernia repair ();  section (); and craniotomy (Right, 2021). Restrictions  Position Activity Restriction  Other position/activity restrictions: Ambulate, Up with assist, Pt to wear helmet when OOB, OK to remove for shower, HOB to elevated at 30*  Subjective   General  Chart Reviewed: Yes  Patient assessed for rehabilitation services?: Yes  Additional Pertinent Hx: 46 y.o. female with hypertension and tobacco abuse who presented after spending a prolonged period on the ground s/p fall out of bed; found to have acute left hemiparesis and gaze deviation. CXR with no acute cardiopulmonary abnormality. CTH revealed large acute/subacute infarct in the R frontal lobe with associated mass effect and 4 mm R to L midline shift. It also noted subdural hemorrhage of 6mm along falx, although NSGY less convinced. CTA head and neck revealed occluded R cervical ICA & R anterior cerebral artery with near complete occlusion of R MCA. Outside window on TPA. Pt is now POD #1 following hemicraniectomy (). Pt admitted to ARU   Response to previous treatment: Patient with no complaints from previous session  Family / Caregiver Present: No  Referring Practitioner: Dr. Jane Dawson DO  Diagnosis: CVA  Subjective  Subjective: Patient seated in w/c upon approach, agreeable to OT session requesting to use bathroom. General Comment  Comments: Anglea Kwok   Vital Signs  Patient Currently in Pain: Yes (3/10 L biceps, hand and hip)     Orientation  Orientation  Overall Orientation Status: Within Functional Limits  Objective    ADL  Grooming: Setup (oral hygiene seated in w/c)  Toileting: Minimal assistance (pt continent of bladder, pericare hygiene seated, VCs for WS tech to reach buttocks, pt donned/doffed LB clothing on R side with CGA and assist for L side)        Toilet Transfers  Toilet - Technique: Stand pivot  Equipment Used: Raised toilet seat Reorientation    Second Session: Pt sitting in w/c upon arrival, pleasant and agreeable to OT session. OT assisted pt in donning of edema glove to encourage more neutral hand positioning and decrease digit extension/flexion synergy, pt tolerated well assisting with placement of fingers. Functional transfers: Pt completed dry tub transfer from w/c<TTB with min A leading to R and use of GB despite attempt to use SPC but declined once in stance. Pt req assist to manage LLE into tub with use of SPC to utilize as a leg lift assist however pt had difficulty as shoe was getting caught on lip of tub. Pt able to scoot laterally within TTB with SBA and use of GB. Pt completed sit<swivel out of tub with min A to manage LLE. Pt attempted stand pivot leading to L with use of SPC initially req CGA but pt unable to shift weight and advance LLE. Pt demo difficulty sequencing placement/pivoting RLE to turn towards w/c req pt to be instructed to sit back on TTB and try again d/t initiation and sequencing challenges. Due to the tight space of therapy gym bathroom w/c was repositioned to R of pt and pt completed stand pivot leading to R with CGA and use of SPC. Representative from [facility] came during therapy session to assess fit of custom AFO. Pt req total A to don/doff shoe and AFO with step by step VCs for donning technique for dual straps. Pt verb understanding and aware she will require assistance upon d/c to don L AFO. Pt completed multiple STS from w/c to kitchen counter with CGA and VCs to scoot anteriorly in w/c. Stand<sit with CGA and cues for eccentric control. Pt set up transfer to bed with spvn assist locking bilateral brakes using brake extender on L req VCs to back up appropriately navigating barriers on pt's left. Pt completed stand pivot from w/c<EOB leading to R with CGA.     Dynamic standing activity: Pt stood at kitchen countertop ~3 minutes with CGA with goal intended to place LUE in extended arm WBing position. Pt tolerated elbow extension but unable to tolerate placement of palm onto tabletop for WBing d/t pain. Throughout task pt was able to maintain LUE extended at side with relatively decreased flexion synergy noted despite effort of dynamic reaching task. Pt instructed to retrieve various kitchen items from overhead cabinet using RUE in order to challenge dynamic reaching as well as visual scanning. Pt req + time and min VCs to scan to L for initial trial but pt demo good carryover locating 7/8 items i'ly with CGA. At times pt LLE knee would hyperextend and as a result pt instructed to complete x8 mini squats to activate quads, pt completed with CGA and 3 second holds. Pt stepped R, L fwd and bkwd x 3 sets with RLE with unilateral UE support on kitchen counter with CGA and good control. No overt LOB noted. Bed mobility: Pt completed sit<supine with CGA req step by step VCs with HOB flat and no use of bed rails, + time to manage LLE into bed and cues for trunk descent. Pt left in bed at end of session with bed alarm engaged, call light within reach and all needs met.       Goals  Short term goals  Time Frame for Short term goals: 2 weeks  Short term goal 1: Pt will complete toilet transfer w/ Mod A-goal met 5/4  Short term goal 2: Pt will complete UE dressing w/ Mod A-goal met 5/12  Short term goal 3: Pt will complete oral care w/ spvn seated at sink w/o VCs for L sided attention-goal met 5/17  Short term goal 4: Pt will complete LE dressing w/ Mod A-goal met 5/26  Long term goals  Time Frame for Long term goals : 4 weeks  Long term goal 1: Pt will complete toilet transfer w/ SBA-ongoing  Long term goal 2: Pt will complete UE dressing w/ setup-ongoing  Long term goal 3: Pt will complete LE dressing w/ SBA-ongoing  Long term goal 4: Pt will be independent w/ tone management exercises to improve functional use of LUE-ongoing  Long term goal 5: Pt will complete oral care I'ly-goal met 5/24  Patient Goals Patient goals : \"get back to my normal self\"       Therapy Time   Individual Second Session Group Co-treatment   Time In 1000  1245       Time Out 7178  7221       Minutes 30  60       Timed Code Treatment Minutes: 30 Minutes+ [address] [facility],
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
## 2021-06-08 NOTE — PROGRESS NOTES
Recommendations  Equipment Needed: Yes (continue to assess)  Other: 20\" hemiheight w/c with drop arm and standard 90 degree leg rest, ongoing assessment at this time  Toilet - Technique: Stand pivot  Equipment Used: Raised toilet seat with rails  Toilet Transfers Comments: setup in room requires pt to transfer to L side- inc time and cueing for safety and sequence  Assessment        SLP  Diet Solids Recommendation: Dysphagia Soft and Bite-Sized (Dysphagia III) (with regular solids as tolerated. Per pt request, gravy/sauces to keep for moist.)       Body mass index is 35.48 kg/m². Assessment and Plan:  Donn Escalante a 46 y.o. female with PMH GERD p/w L sided weakness and facial droop.  CTA head/neck on 4/12 revealed occluded R cervical ICA, occluded right LEAH, near complete occlusion of right MCA.     Acute ischemic CVA, Subdural heomorrhage s/p right hemicraniectomy and subsequen SAH and IP hemorrhage  No tPA given.  MRI 4/14 showing large subacute infarct throughout R LEAH and MCA with some areas of hemorrhagic conversion.  - Neurosurgery and neurology following  - SBP <160, PRN hydralazine, scheduled Norvasc 5 mg daily  - Keppra 500 mg twice daily   - SQH  - PT/OT      Aphasia - SLP   Depression - Home fluoxetine 20 mg daily at home  Anxiety - Hold home Ativan 0.5 mg every 8 hours PRN for anxiety  MISHA - Patient uses CPAP at home  GERD - omeprazole 40 mg daily at home- substituted for pepcid 20 mg BID  Obesity    Rehab Progress: Improving  Anticipated Dispo: home  Services/DME: TTB and RTS (neither covered by insurance, friend to obtain)  ELOS: - extension needed       Migdalia Whittaker D.O. M.P.H.  PM&R  6/8/2021  10:05 AM

## 2021-06-08 NOTE — PROGRESS NOTES
Medications given. Patient reports improved pain 3/10. Fall precautions are in place, call light and bedside table are within reach. Patient is sitting up in chair.

## 2021-06-09 LAB
ANION GAP SERPL CALCULATED.3IONS-SCNC: 9 MMOL/L (ref 3–16)
BASOPHILS ABSOLUTE: 0.1 K/UL (ref 0–0.2)
BASOPHILS RELATIVE PERCENT: 1.2 %
BUN BLDV-MCNC: 14 MG/DL (ref 7–20)
CALCIUM SERPL-MCNC: 9.5 MG/DL (ref 8.3–10.6)
CHLORIDE BLD-SCNC: 107 MMOL/L (ref 99–110)
CO2: 24 MMOL/L (ref 21–32)
CREAT SERPL-MCNC: <0.5 MG/DL (ref 0.6–1.1)
EOSINOPHILS ABSOLUTE: 0.5 K/UL (ref 0–0.6)
EOSINOPHILS RELATIVE PERCENT: 4.7 %
GFR AFRICAN AMERICAN: >60
GFR NON-AFRICAN AMERICAN: >60
GLUCOSE BLD-MCNC: 105 MG/DL (ref 70–99)
HCT VFR BLD CALC: 39.1 % (ref 36–48)
HEMOGLOBIN: 13.3 G/DL (ref 12–16)
LYMPHOCYTES ABSOLUTE: 4.6 K/UL (ref 1–5.1)
LYMPHOCYTES RELATIVE PERCENT: 39.2 %
MCH RBC QN AUTO: 34.6 PG (ref 26–34)
MCHC RBC AUTO-ENTMCNC: 34.2 G/DL (ref 31–36)
MCV RBC AUTO: 101.2 FL (ref 80–100)
MONOCYTES ABSOLUTE: 1.3 K/UL (ref 0–1.3)
MONOCYTES RELATIVE PERCENT: 11.2 %
NEUTROPHILS ABSOLUTE: 5.1 K/UL (ref 1.7–7.7)
NEUTROPHILS RELATIVE PERCENT: 43.7 %
PDW BLD-RTO: 13.3 % (ref 12.4–15.4)
PLATELET # BLD: 400 K/UL (ref 135–450)
PMV BLD AUTO: 8.9 FL (ref 5–10.5)
POTASSIUM REFLEX MAGNESIUM: 4.1 MMOL/L (ref 3.5–5.1)
RBC # BLD: 3.86 M/UL (ref 4–5.2)
SODIUM BLD-SCNC: 140 MMOL/L (ref 136–145)
WBC # BLD: 11.7 K/UL (ref 4–11)

## 2021-06-09 PROCEDURE — 97542 WHEELCHAIR MNGMENT TRAINING: CPT | Performed by: PHYSICAL THERAPIST

## 2021-06-09 PROCEDURE — 85025 COMPLETE CBC W/AUTO DIFF WBC: CPT

## 2021-06-09 PROCEDURE — 97530 THERAPEUTIC ACTIVITIES: CPT | Performed by: PHYSICAL THERAPIST

## 2021-06-09 PROCEDURE — 1280000000 HC REHAB R&B

## 2021-06-09 PROCEDURE — 94660 CPAP INITIATION&MGMT: CPT

## 2021-06-09 PROCEDURE — 36415 COLL VENOUS BLD VENIPUNCTURE: CPT

## 2021-06-09 PROCEDURE — 6360000002 HC RX W HCPCS: Performed by: PHYSICAL MEDICINE & REHABILITATION

## 2021-06-09 PROCEDURE — 97530 THERAPEUTIC ACTIVITIES: CPT

## 2021-06-09 PROCEDURE — 97130 THER IVNTJ EA ADDL 15 MIN: CPT

## 2021-06-09 PROCEDURE — 97129 THER IVNTJ 1ST 15 MIN: CPT

## 2021-06-09 PROCEDURE — 6370000000 HC RX 637 (ALT 250 FOR IP): Performed by: PHYSICAL MEDICINE & REHABILITATION

## 2021-06-09 PROCEDURE — 99232 SBSQ HOSP IP/OBS MODERATE 35: CPT | Performed by: PHYSICAL MEDICINE & REHABILITATION

## 2021-06-09 PROCEDURE — 97116 GAIT TRAINING THERAPY: CPT | Performed by: PHYSICAL THERAPIST

## 2021-06-09 PROCEDURE — 80048 BASIC METABOLIC PNL TOTAL CA: CPT

## 2021-06-09 PROCEDURE — 97535 SELF CARE MNGMENT TRAINING: CPT

## 2021-06-09 RX ADMIN — METHYLPHENIDATE HYDROCHLORIDE 10 MG: 10 TABLET ORAL at 10:01

## 2021-06-09 RX ADMIN — LEVETIRACETAM 500 MG: 500 TABLET ORAL at 10:01

## 2021-06-09 RX ADMIN — FAMOTIDINE 20 MG: 20 TABLET, FILM COATED ORAL at 10:01

## 2021-06-09 RX ADMIN — DOCUSATE SODIUM 50 MG AND SENNOSIDES 8.6 MG 2 TABLET: 8.6; 5 TABLET, FILM COATED ORAL at 10:00

## 2021-06-09 RX ADMIN — HEPARIN SODIUM 5000 UNITS: 5000 INJECTION INTRAVENOUS; SUBCUTANEOUS at 05:14

## 2021-06-09 RX ADMIN — AMLODIPINE BESYLATE 5 MG: 5 TABLET ORAL at 10:00

## 2021-06-09 RX ADMIN — ACETAMINOPHEN 1000 MG: 500 TABLET, COATED ORAL at 22:14

## 2021-06-09 RX ADMIN — ASPIRIN 81 MG: 81 TABLET, CHEWABLE ORAL at 10:01

## 2021-06-09 RX ADMIN — OXYCODONE 10 MG: 5 TABLET ORAL at 03:06

## 2021-06-09 RX ADMIN — ACETAMINOPHEN 1000 MG: 500 TABLET, COATED ORAL at 03:06

## 2021-06-09 RX ADMIN — LEVETIRACETAM 500 MG: 500 TABLET ORAL at 22:14

## 2021-06-09 RX ADMIN — FAMOTIDINE 20 MG: 20 TABLET, FILM COATED ORAL at 22:14

## 2021-06-09 RX ADMIN — HEPARIN SODIUM 5000 UNITS: 5000 INJECTION INTRAVENOUS; SUBCUTANEOUS at 22:17

## 2021-06-09 RX ADMIN — GUAIFENESIN 600 MG: 600 TABLET, EXTENDED RELEASE ORAL at 10:00

## 2021-06-09 RX ADMIN — FLUOXETINE 20 MG: 20 CAPSULE ORAL at 10:01

## 2021-06-09 RX ADMIN — THIAMINE HCL TAB 100 MG 100 MG: 100 TAB at 10:00

## 2021-06-09 RX ADMIN — Medication 5 MG: at 10:01

## 2021-06-09 RX ADMIN — DOCUSATE SODIUM 50 MG AND SENNOSIDES 8.6 MG 2 TABLET: 8.6; 5 TABLET, FILM COATED ORAL at 22:14

## 2021-06-09 RX ADMIN — GUAIFENESIN 600 MG: 600 TABLET, EXTENDED RELEASE ORAL at 22:14

## 2021-06-09 RX ADMIN — HEPARIN SODIUM 5000 UNITS: 5000 INJECTION INTRAVENOUS; SUBCUTANEOUS at 14:36

## 2021-06-09 RX ADMIN — THERA TABS 1 TABLET: TAB at 10:01

## 2021-06-09 RX ADMIN — DICLOFENAC SODIUM 4 G: 10 GEL TOPICAL at 10:01

## 2021-06-09 RX ADMIN — ATORVASTATIN CALCIUM 80 MG: 80 TABLET, FILM COATED ORAL at 22:14

## 2021-06-09 RX ADMIN — CETIRIZINE HYDROCHLORIDE 10 MG: 10 TABLET, FILM COATED ORAL at 10:01

## 2021-06-09 RX ADMIN — DICLOFENAC SODIUM 4 G: 10 GEL TOPICAL at 22:17

## 2021-06-09 ASSESSMENT — PAIN SCALES - GENERAL
PAINLEVEL_OUTOF10: 8
PAINLEVEL_OUTOF10: 0
PAINLEVEL_OUTOF10: 3
PAINLEVEL_OUTOF10: 4

## 2021-06-09 ASSESSMENT — PAIN DESCRIPTION - PAIN TYPE
TYPE: ACUTE PAIN

## 2021-06-09 ASSESSMENT — PAIN DESCRIPTION - FREQUENCY
FREQUENCY: INTERMITTENT

## 2021-06-09 ASSESSMENT — PAIN DESCRIPTION - DESCRIPTORS
DESCRIPTORS: ACHING;SORE
DESCRIPTORS: ACHING;BURNING
DESCRIPTORS: BURNING;DISCOMFORT

## 2021-06-09 ASSESSMENT — PAIN DESCRIPTION - ORIENTATION
ORIENTATION: LEFT

## 2021-06-09 ASSESSMENT — PAIN DESCRIPTION - ONSET
ONSET: ON-GOING

## 2021-06-09 ASSESSMENT — PAIN DESCRIPTION - LOCATION
LOCATION: HAND;HIP
LOCATION: FOOT;HAND
LOCATION: HIP

## 2021-06-09 ASSESSMENT — PAIN - FUNCTIONAL ASSESSMENT: PAIN_FUNCTIONAL_ASSESSMENT: ACTIVITIES ARE NOT PREVENTED

## 2021-06-09 ASSESSMENT — PAIN DESCRIPTION - PROGRESSION
CLINICAL_PROGRESSION: NOT CHANGED
CLINICAL_PROGRESSION: GRADUALLY WORSENING

## 2021-06-09 NOTE — PROGRESS NOTES
Department of Physical Medicine & Rehabilitation  Progress Note    Patient Identification:  Mariano Lindo  [id number]  : 1970  Admit date: 2021    Chief Complaint: CVA    Subjective:   Feels well today. Plan to follow up with NS after discharge from ARU. Improving in therapy but still has left hand pain. ROS: No f/c, n/v, cp     Objective:  Patient Vitals for the past 24 hrs:   BP Temp Temp src Pulse Resp SpO2   21 0957 115/84 98.2 °F (36.8 °C) Oral 74 16 97 %   21 2338     16    21 2153 114/69 97.6 °F (36.4 °C) Oral 80 16 95 %     Const: Alert. No distress, pleasant. HEENT: Normocephalic, atraumatic. Normal sclera/conjunctiva. MMM. Incision healing well  CV: Regular rate and rhythm. Resp: No respiratory distress. Lungs CTAB. Abd: Soft, nontender, nondistended, NABS+   Ext: No edema. Neuro: Alert, oriented, appropriately interactive. Psych: Cooperative, appropriate mood and affect    Laboratory data: Available via EMR.    Last 24 hour lab  Recent Results (from the past 24 hour(s))   Basic Metabolic Panel w/ Reflex to MG    Collection Time: 21  6:33 AM   Result Value Ref Range    Sodium 140 136 - 145 mmol/L    Potassium reflex Magnesium 4.1 3.5 - 5.1 mmol/L    Chloride 107 99 - 110 mmol/L    CO2 24 21 - 32 mmol/L    Anion Gap 9 3 - 16    Glucose 105 (H) 70 - 99 mg/dL    BUN 14 7 - 20 mg/dL    CREATININE <0.5 (L) 0.6 - 1.1 mg/dL    GFR Non-African American >60 >60    GFR African American >60 >60    Calcium 9.5 8.3 - 10.6 mg/dL   CBC auto differential    Collection Time: 21  6:33 AM   Result Value Ref Range    WBC 11.7 (H) 4.0 - 11.0 K/uL    RBC 3.86 (L) 4.00 - 5.20 M/uL    Hemoglobin 13.3 12.0 - 16.0 g/dL    Hematocrit 39.1 36.0 - 48.0 %    .2 (H) 80.0 - 100.0 fL    MCH 34.6 (H) 26.0 - 34.0 pg    MCHC 34.2 31.0 - 36.0 g/dL    RDW 13.3 12.4 - 15.4 %    Platelets 958 123 - 301 K/uL    MPV 8.9 5.0 - 10.5 fL    Neutrophils % 43.7 %    Lymphocytes % 39.2 % Monocytes % 11.2 %    Eosinophils % 4.7 %    Basophils % 1.2 %    Neutrophils Absolute 5.1 1.7 - 7.7 K/uL    Lymphocytes Absolute 4.6 1.0 - 5.1 K/uL    Monocytes Absolute 1.3 0.0 - 1.3 K/uL    Eosinophils Absolute 0.5 0.0 - 0.6 K/uL    Basophils Absolute 0.1 0.0 - 0.2 K/uL       Therapy progress:  PT  Position Activity Restriction  Other position/activity restrictions: Ambulate, Up with assist, Pt to wear helmet when OOB, OK to remove for shower, HOB to elevated at 30*  Objective     Sit to Stand: Contact guard assistance, Stand by assistance (Transf from w/c,and bed to a SPC . PT facilitating midline position for an ant WS and stabilizing LLE to maintain a neutral position.)  Stand to sit: Contact guard assistance, Stand by assistance (VC for hand placement and to maintain a midline position with eccentric control)  Bed to Chair: Minimal assistance (for balance during transfer)  Device: Single point cane  Other Apparatus: AFO, Wheelchair follow (Custom molded AFO with a figure \"8 \"strap at ankle)  Assistance: Minimal assistance, Moderate assistance  Distance: 35', 25'  OT  PT Equipment Recommendations  Equipment Needed: Yes (continue to assess)  Other: 20\" hemiheight w/c with drop arm and standard 90 degree leg rest  Toilet - Technique: Stand pivot  Equipment Used: Raised toilet seat with rails  Toilet Transfers Comments: use of GB, pt educated on use of SPC to assist with WSing and UE support to pivot to L side, + time to advance LLE, mostly stepping with RLE; improved transfer leading to L from RTS<w/c using cane, cues for LLE placement  Assessment        SLP  Diet Solids Recommendation: Dysphagia Soft and Bite-Sized (Dysphagia III) (with regular solids as tolerated. Per pt request, gravy/sauces to keep for moist.)       Body mass index is 35.48 kg/m².     Assessment and Plan:  Carmelo Comment a 46 y.o. female with PMH GERD p/w L sided weakness and facial droop.  CTA head/neck on 4/12 revealed occluded R cervical ICA, occluded right LEAH, near complete occlusion of right MCA.     Acute ischemic CVA, Subdural heomorrhage s/p right hemicraniectomy and subsequen SAH and IP hemorrhage  No tPA given.  MRI 4/14 showing large subacute infarct throughout R LEAH and MCA with some areas of hemorrhagic conversion.  - Neurosurgery and neurology following  - SBP <160, PRN hydralazine, scheduled Norvasc 5 mg daily  - Keppra 500 mg twice daily   - SQH  - PT/OT      Aphasia - SLP   Depression - Home fluoxetine 20 mg daily at home  Anxiety - Hold home Ativan 0.5 mg every 8 hours PRN for anxiety  MISHA - Patient uses CPAP at home  GERD - omeprazole 40 mg daily at home- substituted for pepcid 20 mg BID  Obesity    Rehab Progress: Improving  Anticipated Dispo: home  Services/DME: TTB and RTS (neither covered by insurance, friend to obtain)  ELOS: - extension needed       Blanca Cain D.O. M.P.H.  PM&R  6/9/2021  10:05 AM

## 2021-06-09 NOTE — PATIENT CARE CONFERENCE
Feet CARE Score: 88 / Discharge Goal: Not Attempted   Walk 10 Feet on Uneven Surfaces CARE Score: 88 / Discharge Goal: Not Attempted   1 Step (Curb) CARE Score: 88 / Discharge Goal: Partial/moderate assistance   4 Steps CARE Score: 88 / Discharge Goal: Partial/moderate assistance   12 Steps CARE Score: 88 / Discharge Goal: Not Attempted   Picking up Object from Floor CARE Score: 88 / Discharge Goal: Partial/moderate assistance   Wheel 50 Feet with 2 Turns CARE Score: 4 / Discharge Goal: Independent   Type         [x] Manual        [] Motorized        [] N/A   Wheel 150 Feet CARE Score: 4 / Discharge Goal: Independent   Type         [x] Manual        [] Motorized        [] N/A     NURSING:  A&Ox: Level of Consciousness: Alert (0)  Orientation Level: Oriented X4  Kat Fall Risk Score: Score: 60  Admission BIMS: 15  Wounds/Incisions/Ulcers: Surgical incision right scalp, scattered bruising  Medication Review:   Pain: LUE, LLE discomfort. Consultations: Cardiology, Dietary  Imaging:   XR HIP 2-3 VW W PELVIS LEFT   Final Result   Left hip arthroplasty with interval lucency noted about the femoral component indeterminate. Findings may relate to particle disease and/or may relate to loosening. Clinical correlation suggested. Infection is not excluded. Lucency about the supra-acetabular region adjacent to the acetabular component present previously indeterminate. No fracture.      Active Comorbid Conditions: Sleep Apnea  Systems Review:   Renal: wml , Dialysis:  Type: , Frequency:   Neurological: Left side flaccid  Musculoskeletal: left AFO  Respiratory: CPAP at hs  Cardiac/Circulatory/Peripheral Vascular: ILR  Abnormal/Relevant Test Results: None  Abnormal/Relevant Lab Values:   CBC:   Recent Labs     06/09/21  0633   WBC 11.7*   HGB 13.3   HCT 39.1   .2*        BMP:   Recent Labs     06/09/21  0633      K 4.1      CO2 24   BUN 14   CREATININE <0.5*     PT/INR: No results for input(s): PROTIME, INR in the last 72 hours. APTT: No results for input(s): APTT in the last 72 hours. Liver Profile:  Lab Results   Component Value Date    AST 41 04/13/2021    ALT 32 04/13/2021    BILITOT 0.7 04/13/2021    ALKPHOS 86 04/13/2021     Lab Results   Component Value Date    CHOL 251 04/13/2021    HDL 45 04/13/2021    TRIG 278 04/13/2021     Recent Labs     06/09/21 0633   WBC 11.7*   HGB 13.3   HCT 39.1      .2*     Recent Labs     06/09/21 0633      K 4.1      CO2 24   BUN 14   CREATININE <0.5*     No results for input(s): AST, ALT, ALB, BILIDIR, BILITOT, ALKPHOS in the last 72 hours. No results for input(s): MG in the last 72 hours. Recent Labs     06/09/21 0633   WBC 11.7*   HGB 13.3   HCT 39.1        Recent Labs     06/09/21 0633      K 4.1      CO2 24   BUN 14   CREATININE <0.5*   CALCIUM 9.5     No results for input(s): AST, ALT, BILIDIR, BILITOT, ALKPHOS in the last 72 hours. No results for input(s): INR in the last 72 hours. No results for input(s): Eze Pippins in the last 72 hours. PHYSICAL THERAPY:  Bed Mobility: Scooting: Supervision (VC/ TC in short sitting to advance hips to EOS in prep for transf. Req additional time appearing as though it is a motor planning problem.)    Transfers:  Sit to Stand: Contact guard assistance, Stand by assistance (Transf from w/c,to a SPC . PT facilitating midline position for an ant WS and stabilizing LLE to maintain a neutral position.)  Stand to sit: Contact guard assistance, Stand by assistance (VC for hand placement and to maintain a midline position with eccentric control)  Bed to Chair: Minimal assistance (for balance during transfer)  Squat Pivot Transfers:  (w/c <> mat table leading with both sides with focus on eccentric control of LES to allow for a safe transition.)  Comment: Pt's custom AFO not intact when PT arrived.  The AFO was very difficult to genaro req ~10 minutes with taking out laces and multiple attempts . The L great toe has a tendency to flex which also makes it difficult to get foot into the shoe. [facility]    WB Status: No WB restrictions  Ambulation 1  Surface: level tile  Device: Single point cane  Other Apparatus: AFO, Wheelchair follow (Custom molded AFO with a figure \"8 \"strap at ankle)  Assistance: Minimal assistance, Moderate assistance  Quality of Gait: Pt req manual contacts  to facilitate a neutral hip posistion and intermittently assist pt with  L swing through( pt able to advance LLE consistently but req assist with proper foot placement since pt IR . Lana Loera Pt req VC for sequencing gt including taking large steps to facilitate a reciprocal gt pattern. Gait Deviations: Slow Herlinda, Decreased step height, Decreased step length (Pt tends to rotate upper body excessively resulting in increased gt deviations)  Distance: 35', 25'  Comments: L knee hyperext appears better controlled. Since it is an articulating AFO, PT modified it for min increase in PF. However, pt has increased difficulty with toe off frequently catching toe.  PT will cont wth gt training    OCCUPATIONAL THERAPY:  ADL  Equipment Provided: Reacher  Feeding: Setup (breakfast tray brought in at EOS)  Grooming: Setup (oral hygiene seated in w/c)  UE Bathing: Contact guard assistance, Setup, Verbal cueing (seated on TTB, cues for RUE to position LUE to wash LUE, pt i'ly demo adaptive pratik technique to wash RUE)  LE Bathing: Increased time to complete, Contact guard assistance, Verbal cueing, Setup (pt completed rear eugenie hygiene in stance with CGA to maintain stance and intermittent use of GB to steady self, use of LHS to wash lower limbs and feet)  UE Dressing: Minimal assistance, Setup, Verbal cueing, Increased time to complete (+ time, VCs and assist to thread LUE into arm hole and cues to pull up to elbow, cues to pulll down in back and L side)  LE Dressing: Dependent/Total (donning footwear d/t urgency)  Toileting: Minimal assistance (pt continent of bladder, pericare hygiene seated, VCs for WS tech to reach buttocks, pt donned/doffed LB clothing on R side with CGA and assist for L side)  Additional Comments: Pt req toileting upon attaining seated position at EOB. Transferred to [facility] frame over toilet and PCA assumed care. MaxA in clothing mgt to lower underwear and shorts    Toilet Transfers: Toilet Transfers  Toilet - Technique: Stand pivot  Equipment Used: Raised toilet seat with rails  Toilet Transfer: Minimal assistance  Toilet Transfers Comments: used GB at anterior to pull up to - did not att [facility] d/t urgency, in stance pt req Hugo for balance    Tub/ShowerTransfers:  Tub Transfers  Tub - Transfer From: Wheelchair  Tub - Transfer Type: To and From  Tub - Transfer To: Transfer tub bench  Tub - Technique: Stand pivot  Tub Transfers: Minimal assistance  Tub Transfers Comments: Pt completed dry tub transfer to simulate home environment, pt assisting with LLE mgmt but req assist in/out of tub to fully clear, pt used side GB to assist with scooting laterally on bench; pt utilized [facility] to complete stand pivot leading to L from TTB<w/c to assist with advacement of RLE with RUE supported, pt verbalized increased comfort/confidence with transfer  Shower Transfers  Shower - Transfer From: Wheelchair  Shower - Transfer Type: To and From  Shower - Transfer To: Transfer tub bench  Shower - Technique: Stand pivot  Shower Transfers: Contact Guard  Shower Transfers Comments: use of grab bar    SPEECH THERAPY:  Assessment: Executive dysfunction    NUTRITION:  Please see nutrition note for details. Weight: 219 lb 12.8 oz (99.7 kg) / Body mass index is 35.48 kg/m². Current diet order:  DIET GENERAL;          Feeding: Needs set up, Able to feed self  Room Service: Selective   NSG Recorded PO: PO Meals Eaten (%): 76 - 100% PO Supplement (%): 0%  Malnutrition Status Malnutrition Status: No malnutrition    CASE MANAGEMENT: Psychosocial/Behavioral Issues: none noted  Assessment:  Referred to patient for d/c planning. Await insurance for possible extension. Will continue to follow for d/c needs. Patient/Family Education provided by team:  Role of PT/OT, Planning/organization strategies to decrease cognitive demands. Patient Goals for Rehab stay:  1. To get my arm moving more     Rehab Team Goals for patient for the Upcoming Week:  1. Improve toileting to CGA     Barriers to Progress/Attainment of Goals & Efforts/Interventions to remove Barriers:  N/A    Discharge Plan:  Estimated Length of Stay: 49 days  Destination: home health  Services at Discharge: [address] [place] [address], Occupational Therapy, Speech Therapy and Nursing 3x week  Equipment at Discharge: wheelchair, TTB. Raised toilet seat  Community Resources:   Factors facilitating achievement of predicted outcomes: Family support, Friend support, Motivated, Cooperative, Pleasant and Sense of humor and Good insight into deficits  Barriers to the achievement of predicted outcomes: Pain, Cognitive deficit, Impulsivity, Depression, Unrealistic expectations, Decreased endurance, Decreased sensation, Decreased proprioception, Upper extremity weakness, Lower extremity weakness and Impaired vision    Special Needs in the Upcoming Week:   [x] Family/Caregiver Education  [] Home visit  []Therapeutic Pass [] Equipment  Type:   [] Consults:_______   [] Family/Caregiver Training  [] Stroke Risk Factor Education     [] Other;_______     TEAM SUMMARY: Will continue with current poc & goals until anticipated d/c date of TBD.     MD:   Stroke Risk Factors:   [] N/A for this patient [] HTN  []  Diabetes  [] Hyperlipidemia  [x]Obesity BMI >25  [] Atrial Fibrillation [] Smoker (current)  [] Smoker (quit in last 12 months)  [x] Sleep Apnea [] Other    Risk for Readmission: High - 25%    Justification for Continued Stay:   Criteria for continued IRF stay:  Based on my medical assessment of the patient and review of information from the interdisciplinary team, as part of this weekly team conference, the patient continues to meet the following criteria for IRF level of care:  [x] The patient requires 24-hour rehabilitation nursing care   [x] The patient requires an intensive rehabilitation therapy program  [x] The patient requires active and ongoing intervention of multiple therapy disciplines  [x] The patient requires continued physician supervision by a rehabilitation physician  [x] The patient requires an intensive and coordinated interdisciplinary team approach to the delivery of rehabilitative care    Medical Necessity-continued close physician medical management is required for:   [] Cardiac/Circulatory dysfunction  [] Respiratory/Pulmonary dysfunction  [] Integumentary complications  [] Peripheral Vascular dysfunction  [] Musculoskeletal dysfunction  [x] Neurological dysfunction d/t:  [x] CVA  [] SCI  [] TBI  [] Other: __________  [] Renal dysfunction  [] Hematologic dysfunction    [] Endocrine disorders  [] GI disorders     [] Genito-Urinary dysfunction    Assessment/Plan:  [x] The patient is making good progression towards their LTG's, is actively participating in, and has a reasonable expectation to continue to benefit from the intensive rehabilitation program.  [] The estimated discharge date has been changed from initial team conference due to:   [] The estimated discharge destination has been changed from initial team conference due to:     Rehab Team Members in attendance for Team Conference:  :  ROSETTA Mendez    Therapy Manager:  Haritha Boyer, PT, DPT    Nursing Director:  Brain Barnes, MSN, RN, [facility]    Social Work:  Isatu Rodriguez, MSW, LISW-S    Nursing:  Kiarra Hopkins, KIZZY Brandt RN    Therapy:  Kim Adkins, PT, DPT  Leo Mcguire, OTR/L  Albina Gallegos, OTR/L  Sena Linder MA/CCC-SLP    Nutrition:  SINAI Santos    I approve the established interdisciplinary plan of care as documented within the medical record of Benedict Allison.     MD Signature Hans Rodríguez D.O. M.P.H  PM&R  6/10/2021  5:43 PM

## 2021-06-09 NOTE — PROGRESS NOTES
Pt up in chair. All meds taken without difficulty. Call light within reach and chair alarm engaged. For patient safety, Visual Surveillance is in place, reviewed with patient/family, verbalized understanding.   Vitals:    06/09/21 0957   BP: 115/84   Pulse: 74   Resp: 16   Temp: 98.2 °F (36.8 °C)   SpO2: 97%

## 2021-06-09 NOTE — PROGRESS NOTES
ACUTE REHAB UNIT  SPEECH/LANGUAGE PATHOLOGY      [x] Daily  [] Weekly Care Conference Note  [] Discharge    Patient:Laura Poe      MCU:[birth year]  VIX:[id number]  Rehab Dx/Hx: Acute CVA (cerebrovascular accident) ([facility] [address] [I63.9]    Precautions: [x] Aspiration  [x] Fall risk  [] Sternal  [] Seizure [] Hip  [] Weight Bearing [] Other  ST Dx: [] Aphasia  [] Dysarthria  [] Apraxia   [x] Oropharyngeal dysphagia [x] Cognitive Impairment  [] Other:   Date of Admit: 4/23/2021  Room #: 3101/3101-01  Date: 6/9/2021        Current Diet Order:DIET GENERAL;   Recommended Form of Meds: PO  Compensatory Swallowing Strategies: Alternate solids and liquids, Upright as possible for all oral intake, Check for pocketing of food on the Left, Small bites/sips, Lingual sweep   Subjective: Pt admitted 4/12 after fall with left sided paralysis. Pt underwent craniectomy on 4/13 per Dr. Luis Xiong. Social/Functional History  Lives With: Spouse;Son( reported that son is diagnosed with Autism.)  Occupation: Full time employment  Type of occupation:  at [employer]"    FEES 4/14: IMPRESSIONS:   Pt presents w/ mild oropharyngeal dysphagia characterized by premature spillage of thin liquids to UES and pyriforms w/ intermittent deep penetration of laryngeal vestibule; adequate airway protection and complete clearance of all residue after swallow initiation. No aspiration w/ any trials. Timely swallow initiation w/ puree and regular solid consistencies; no oropharyngeal residue.    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 Adequate ab/adduction. Complete closure of TVFs upon adduction.      Dentition: Adequate  Vision  Vision: Impaired  Vision Exceptions: Visual field cut  Hearing  Hearing: Within functional limits  Barriers toward progress: None towards stated goals    Date: 6/9/2021      Tx session 1 Tx session 2   Total Timed Code Min 30 35   Total Treatment Minutes 30 35   Individual Treatment Minutes 30 35   Group Treatment Minutes 0 0   Co-Treat Minutes 0 0   Brief Exception: N/A N/A   Pain None indicated None indicated    Pain Intervention: [] RN notified  [] Repositioned  [] Intervention offered and patient declined  [x] N/A  [] Other:    [] RN notified  [] Repositioned  [] Intervention offered and patient declined  [x] N/A  [] Other:    Subjective:     Pt upright in chair with helmet on table. Pt replaced helmet with prompt. Pt noted to be visibly upset with motoric agitation in RUE and RLE and press of speech. Pt indicated home Internet and phone not working and suspects she may have missed payment/s due to hospitalization and feels that her son is likely experiencing distress due to this. Declining PO at this time. Pt continues to be visibly upset (press of speech, motor movements) re: recent situation w/finances. Participates well and is motivated towards functional tasks this date. Objective / Goals:     Patient will consume regular solids with timely mastication and no pocketing in L buccal cavity across 2 sessions. Goal not targeted this session. Goal not targeted this session. Patient will consume PO without anterior spillage or overt pocketing across 2 sessions. GOAL MET   GOAL MET   Pt will complete divided/alternating attention tasks with 85% accuracy given min cues. No cues for attention to preferred task. Pt was not able to attend to other conversational topics / tasks given emotional state. Pt will accurately complete executive functioning tasks with min cues. Functional problem solving and execution - mod assist secondary to impulsivity. Independently identifies area of difficulty and appropriately requests assistance. Pt noted with disorganization with notes written on slips of paper rather than notebook.  Generated solutions with min-mod verbal and physical assist.  Functional Jorgito Ojeda., Dejuan  Speech-Language Pathologist

## 2021-06-09 NOTE — FLOWSHEET NOTE
06/09/21 1305   Encounter Summary   Services provided to: Patient   Referral/Consult From: Rounding   Continue Visiting   (es 6/9)   Complexity of Encounter Moderate   Length of Encounter 15 minutes   Routine   Type Follow up   Assessment Approachable   Intervention Active listening   Outcome Engaged in conversation

## 2021-06-09 NOTE — DISCHARGE INSTR - COC
Continuity of Care Form    Patient Name: Carmelo Lagunas   :  1970  MRN:  [id number]    Admit date:  2021  Discharge date:  21    Code Status Order: Full Code   Advance Directives:   Advance Care Flowsheet Documentation       Date/Time Healthcare Directive Type of Healthcare Directive Copy in [address] Agent's Name Healthcare Agent's Phone Number    21 1854  No, patient does not have an advance directive for healthcare treatment -- -- -- -- --            Admitting Physician:  Javier Perdomo DO  PCP: Megan Monroe MD    Discharging Nurse: [facility] Unit/Room#: 3101/3101-01  Discharging Unit Phone Number: [phone]    Emergency Contact:   Extended Emergency Contact Information  Primary Emergency Contact: aRymundo Perkins  Home Phone: [phone]  Mobile Phone: [phone]  Relation: Spouse  Secondary Emergency Contact: [address] [place] [address] Phone: [phone]  Relation: Other    Past Surgical History:  Past Surgical History:   Procedure Laterality Date     SECTION  2004    CRANIOTOMY Right 2021    RIGHT HEMICRANIECTOMY performed by Kera Martinez MD at [address] [place] [address]      LAP BAND      SPLENECTOMY      TOTAL HIP ARTHROPLASTY         Immunization History: There is no immunization history on file for this patient. Active Problems:  Patient Active Problem List   Diagnosis Code    GERD (gastroesophageal reflux disease) K21.9    Sleep apnea G47.30    Hernia K46.9    Status post gastric banding Z98.84    Obesity E66.9    Acute right MCA stroke ([facility] [address] I63.511    Status post craniectomy Z98.890    ICAO (internal carotid artery occlusion), right I65.21    Cerebral edema (HCC) G93.6    Smoking F17.200    Secondary hypertension I15.9    Encounter for loop recorder check-[facility] CONFIRM ILR 2021 Dr Venkata Forbes for cryptogenic stroke. Merlin. net Z45.09    Acute CVA (cerebrovascular accident) ([facility] I63.9    Hyponatremia E87.1    Debility R53.81    Electrolyte imbalance E87.8       Isolation/Infection:   Isolation            No Isolation          Patient Infection Status       Infection Onset Added Last Indicated Last Indicated By Review Planned Expiration Resolved Resolved By    None active    Resolved    COVID-19 Rule Out 04/19/21 04/19/21 04/19/21 COVID-19 (Ordered)   04/19/21 Rule-Out Test Resulted            Nurse Assessment:  Last Vital Signs: /69   Pulse 80   Temp 97.6 °F (36.4 °C) (Oral)   Resp 16   Ht 5' 6\" (1.676 m)   Wt 219 lb 12.8 oz (99.7 kg)   SpO2 95%   BMI 35.48 kg/m²     Last documented pain score (0-10 scale): Pain Level: 8  Last Weight:   Wt Readings from Last 1 Encounters:   06/07/21 219 lb 12.8 oz (99.7 kg)     Mental Status:  oriented and alert    IV Access:  - None    Nursing Mobility/ADLs:  Walking   Assisted  Transfer  Assisted  Bathing  Assisted  Dressing  Assisted  Toileting  Assisted  Feeding  Assisted  Med Admin  Assisted  Med Delivery   whole    Wound Care Documentation and Therapy:        Elimination:  Continence:   · Bowel: Yes  · Bladder: Yes  Urinary Catheter: None   Colostomy/Ileostomy/Ileal Conduit: No       Date of Last BM: 6/22/21      Intake/Output Summary (Last 24 hours) at 6/9/2021 0912  Last data filed at 6/8/2021 1017  Gross per 24 hour   Intake 480 ml   Output    Net 480 ml     I/O last 3 completed shifts: In: 480 [P.O.:480]  Out: -     Safety Concerns: At Risk for Falls    Impairments/Disabilities:      Contractures - LLE,LUE    Nutrition Therapy:  Current Nutrition Therapy:   - Oral Diet:  General    Routes of Feeding: Oral  Liquids: Thin Liquids  Daily Fluid Restriction: no  Last Modified Barium Swallow with Video (Video Swallowing Test): not done    Treatments at the Time of Hospital Discharge:   Respiratory Treatments: n/a  Oxygen Therapy:  is not on home oxygen therapy.   Ventilator:    - No ventilator support    Rehab Therapies: Physical Therapy, Occupational Therapy and Speech/Language Therapy  Weight Bearing Status/Restrictions: No weight bearing restirctions  Other Medical Equipment (for information only, NOT a DME order):  wheelchair and cane  Other Treatments: n/a    Patient's personal belongings (please select all that are sent with patient):  clothing, wheelchair, pillows, leg brace    RN SIGNATURE:  Electronically signed by Kiersten Cochran RN on 6/24/21 at 1:28 PM EDT    CASE MANAGEMENT/SOCIAL WORK SECTION    Inpatient Status Date: ***    Readmission Risk Assessment Score:  Readmission Risk              Risk of Unplanned Readmission:  25           Discharging to Facility/ Agency   · Name: [address]  ·   · XMD:[phone]      / signature: Electronically signed by USMAN Guan on 6/24/21 at 12:47 PM EDT    PHYSICIAN SECTION    Prognosis: Fair    Condition at Discharge: Stable    Rehab Potential (if transferring to Rehab): Fair    Recommended Labs or Other Treatments After Discharge: PT/OT    Physician Certification: I certify the above information and transfer of Jorge Coleman  is necessary for the continuing treatment of the diagnosis listed and that she requires [facility] for greater 30 days.      Update Admission H&P: No change in H&P    PHYSICIAN SIGNATURE:  Electronically signed by Rizwan Clinton DO on 6/23/21 at 9:54 AM EDT

## 2021-06-09 NOTE — PLAN OF CARE
Problem: Falls - Risk of:  Goal: Will remain free from falls  Description: Will remain free from falls  6/9/2021 1451 by David Fernando RN  Outcome: Ongoing  Note: Pt remains free of falls thus far this shift. All fall precautions in place and functioning properly. For patient safety, Visual Surveillance is in place, reviewed with patient/family, verbalized understanding.

## 2021-06-09 NOTE — PLAN OF CARE
Problem: Falls - Risk of:  Goal: Will remain free from falls  Description: Will remain free from falls  Note: Remains free from falls. Call light within reach and alarms in use at all times. Calls appropriately for assist. X1 stand pivot moderate assist for transfers. Problem: Skin Integrity:  Goal: Will show no infection signs and symptoms  Description: Will show no infection signs and symptoms  Note: No signs of infection to surgical incision to scalp. Small areas of scabbing. Healed and pink.
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
## 2021-06-09 NOTE — PROGRESS NOTES
Occupational Therapy  Facility/Department: [facility] ACUTE REHAB UNIT  Daily Treatment Note  NAME: Gisele Prather  : 1970  MRN: [id number]    Date of Service: 2021    Discharge Recommendations:  24 hour supervision or assist, Home with Home health OT, Continue to assess pending progress, Home with nursing aide  OT Equipment Recommendations  ADL Assistive Devices: Transfer Tub Bench;Grab Bars - toilet; Toileting - Raised Toilet Seat with arms; Reacher;Grab Bars - tub  Other: continue to assess    Assessment   Performance deficits / Impairments: Decreased functional mobility ; Decreased ADL status; Decreased ROM; Decreased strength;Decreased sensation;Decreased fine motor control;Decreased endurance;Decreased posture;Decreased balance;Decreased safe awareness;Decreased coordination;Decreased vision/visual deficit; Decreased cognition  Assessment: Pt limited by pain t/o L side this session, expressing worse upon just having woken up. Required phys assist in completing bed mobiliyt, donning footwear, and toielting/toiilet transfer this date- demo difficulty w/ LB clithng mgt reqd for toileting. Cont per POC    Treatment Diagnosis: decreased independence with ADLs and decreased functional mobility 2/2 CVA  Prognosis: Fair  OT Education: Plan of Care;Transfer Training;ADL Adaptive Strategies  Barriers to Learning: cognition  REQUIRES OT FOLLOW UP: Yes  Activity Tolerance  Activity Tolerance: Patient limited by pain  Safety Devices  Safety Devices in place: Not Applicable  Type of devices:  (Pt on commode w/ PCA standing by on OT exit)         Patient Diagnosis(es): There were no encounter diagnoses. has a past medical history of GERD (gastroesophageal reflux disease), Hernia, Obesity, and Unspecified sleep apnea. has a past surgical history that includes Splenectomy (); Total hip arthroplasty (); Lap Band (); hernia repair ();  section (); and craniotomy (Right, 2021).     Restrictions Position Activity Restriction  Other position/activity restrictions: Ambulate, Up with assist, Pt to wear helmet when OOB, OK to remove for shower, HOB to elevated at 30*  Subjective   General  Chart Reviewed: Yes, Progress Notes  Patient assessed for rehabilitation services?: Yes  Additional Pertinent Hx: 46 y.o. female with hypertension and tobacco abuse who presented after spending a prolonged period on the ground s/p fall out of bed; found to have acute left hemiparesis and gaze deviation. CXR with no acute cardiopulmonary abnormality. CTH revealed large acute/subacute infarct in the R frontal lobe with associated mass effect and 4 mm R to L midline shift. It also noted subdural hemorrhage of 6mm along falx, although NSGY less convinced. CTA head and neck revealed occluded R cervical ICA & R anterior cerebral artery with near complete occlusion of R MCA. Outside window on TPA. Pt is now POD #1 following hemicraniectomy (4/13). Pt admitted to ARU 4/23  Response to previous treatment: Patient with no complaints from previous session  Family / Caregiver Present: No  Referring Practitioner: Dr. Janine Resendiz DO  Diagnosis: CVA  Subjective  Subjective: Pt semi-supine on OT approach and agreed to tx. Pt reporting pain in L side upon just waking up  General Comment  Comments: Jes Pena Vital Signs  Patient Currently in Pain: Yes   Orientation  Orientation  Overall Orientation Status: Within Functional Limits  Objective    ADL  LE Dressing: Dependent/Total (donning footwear d/t urgency)  Additional Comments: Pt req toileting upon attaining seated position at EOB. Transferred to [facility] frame over toilet and PCA assumed care.  MaxA in clothing mgt to lower underwear and shorts        Balance  Sitting Balance: Supervision  Standing Balance  Time: ~3 minutes total  Activity: functional transfers; in stance for LB clothing mgmt/toileting  Toilet Transfers  Toilet - Technique: Stand pivot  Equipment Used: Raised toilet seat with rails  Toilet Transfer: Minimal assistance  Toilet Transfers Comments: used GB at anterior to pull up to - did not att [facility] d/t urgency, in stance pt req Hugo for balance  Bed mobility  Supine to Sit: Minimal assistance (Pt reporting increased assist d/t inc pain in L side. Assist in swinging LLE to EOB (exiting bed on R side), and pt pulled up on OT arm d/t not being able to appropriately reach bedrail for pushing up)  Transfers  Stand Pivot Transfers: Contact guard assistance  Sit Pivot Transfers: Contact guard assistance  Sit to stand: Contact guard assistance  Stand to sit: Contact guard assistance         Second Session: On approach pt sitting up in w/c, having just finished session w/ SLP. Pt visibly anxious - reported that some bills had not been paid d/t pt being in hospital - working w/ SLP on some bill-paying and contacting payees. Pt able to redirect [facility] for focus to session. ROM to L hand and L elbow flx/ext d/t increased tone noted, and edema glove placed to L hand. Pt req toileting, completed transfer w/ Hugo, and req full assist for clothing mgt as pt holding GB w/ RUE in stance. Continent void, and pt completed per-care w/ setup. Hugo retn to w/c, then Hugo SPT w/c>EOB w/ HOB flat. Pt req ModA to scoot up in bed w/ bed flat and Trend. On OT exit pt w/ needs in reach, bed alarm on, and RN aware.  Cont per POC  Plan   Plan  Times per week: 5x a week for 60 mins daily  Times per day: Daily  Current Treatment Recommendations: Strengthening, Endurance Training, Neuromuscular Re-education, Self-Care / ADL, Functional Mobility Training, Safety Education & Training, ROM, Positioning, Wheelchair Mobility Training, Balance Training, Patient/Caregiver Education & Training, Manual Therapy:  MLD, Equipment Evaluation, Education, & procurement, Home Management Training, Cognitive Reorientation       Goals  Short term goals  Time Frame for Short term goals: 2 weeks  Short term goal 1: Pt will complete toilet transfer w/ Mod A-goal met 5/4  Short term goal 2: Pt will complete UE dressing w/ Mod A-goal met 5/12  Short term goal 3: Pt will complete oral care w/ spvn seated at sink w/o VCs for L sided attention-goal met 5/17  Short term goal 4: Pt will complete LE dressing w/ Mod A-goal met 5/26  Long term goals  Time Frame for Long term goals : 4 weeks  Long term goal 1: Pt will complete toilet transfer w/ SBA-ongoing  Long term goal 2: Pt will complete UE dressing w/ setup-ongoing  Long term goal 3: Pt will complete LE dressing w/ SBA-ongoing  Long term goal 4: Pt will be independent w/ tone management exercises to improve functional use of LUE-ongoing  Long term goal 5: Pt will complete oral care I'ly-goal met 5/24  Patient Goals   Patient goals : \"get back to my normal self\"       Therapy Time   Individual Concurrent Group Co-treatment   Time In 0800         Time Out 0830         Minutes 30         Timed Code Treatment Minutes: 30 [address] Time:   Individual Concurrent Group Co-treatment   Time In  1696         Time Out 3279         Minutes 68           Timed Code Treatment Minutes:  68    Total Treatment Minutes:  [address], MOT-OTR/L [id number]

## 2021-06-10 PROCEDURE — 99232 SBSQ HOSP IP/OBS MODERATE 35: CPT | Performed by: PHYSICAL MEDICINE & REHABILITATION

## 2021-06-10 PROCEDURE — 6360000002 HC RX W HCPCS: Performed by: PHYSICAL MEDICINE & REHABILITATION

## 2021-06-10 PROCEDURE — 97110 THERAPEUTIC EXERCISES: CPT

## 2021-06-10 PROCEDURE — 97542 WHEELCHAIR MNGMENT TRAINING: CPT

## 2021-06-10 PROCEDURE — 97535 SELF CARE MNGMENT TRAINING: CPT

## 2021-06-10 PROCEDURE — 6370000000 HC RX 637 (ALT 250 FOR IP): Performed by: PHYSICAL MEDICINE & REHABILITATION

## 2021-06-10 PROCEDURE — 97116 GAIT TRAINING THERAPY: CPT

## 2021-06-10 PROCEDURE — 97530 THERAPEUTIC ACTIVITIES: CPT

## 2021-06-10 PROCEDURE — 97129 THER IVNTJ 1ST 15 MIN: CPT

## 2021-06-10 PROCEDURE — 1280000000 HC REHAB R&B

## 2021-06-10 PROCEDURE — 94660 CPAP INITIATION&MGMT: CPT

## 2021-06-10 PROCEDURE — 97130 THER IVNTJ EA ADDL 15 MIN: CPT

## 2021-06-10 RX ADMIN — DOCUSATE SODIUM 50 MG AND SENNOSIDES 8.6 MG 2 TABLET: 8.6; 5 TABLET, FILM COATED ORAL at 22:07

## 2021-06-10 RX ADMIN — FLUOXETINE 20 MG: 20 CAPSULE ORAL at 09:17

## 2021-06-10 RX ADMIN — HEPARIN SODIUM 5000 UNITS: 5000 INJECTION INTRAVENOUS; SUBCUTANEOUS at 05:53

## 2021-06-10 RX ADMIN — GUAIFENESIN 600 MG: 600 TABLET, EXTENDED RELEASE ORAL at 22:06

## 2021-06-10 RX ADMIN — AMLODIPINE BESYLATE 5 MG: 5 TABLET ORAL at 09:18

## 2021-06-10 RX ADMIN — ATORVASTATIN CALCIUM 80 MG: 80 TABLET, FILM COATED ORAL at 22:06

## 2021-06-10 RX ADMIN — CETIRIZINE HYDROCHLORIDE 10 MG: 10 TABLET, FILM COATED ORAL at 09:17

## 2021-06-10 RX ADMIN — METHYLPHENIDATE HYDROCHLORIDE 10 MG: 10 TABLET ORAL at 09:17

## 2021-06-10 RX ADMIN — ACETAMINOPHEN 1000 MG: 500 TABLET, COATED ORAL at 22:07

## 2021-06-10 RX ADMIN — FAMOTIDINE 20 MG: 20 TABLET, FILM COATED ORAL at 09:17

## 2021-06-10 RX ADMIN — FAMOTIDINE 20 MG: 20 TABLET, FILM COATED ORAL at 22:06

## 2021-06-10 RX ADMIN — HEPARIN SODIUM 5000 UNITS: 5000 INJECTION INTRAVENOUS; SUBCUTANEOUS at 22:00

## 2021-06-10 RX ADMIN — ASPIRIN 81 MG: 81 TABLET, CHEWABLE ORAL at 09:17

## 2021-06-10 RX ADMIN — DICLOFENAC SODIUM 4 G: 10 GEL TOPICAL at 22:08

## 2021-06-10 RX ADMIN — ACETAMINOPHEN 1000 MG: 500 TABLET, COATED ORAL at 10:00

## 2021-06-10 RX ADMIN — OXYCODONE 10 MG: 5 TABLET ORAL at 22:07

## 2021-06-10 RX ADMIN — LEVETIRACETAM 500 MG: 500 TABLET ORAL at 09:18

## 2021-06-10 RX ADMIN — DOCUSATE SODIUM 50 MG AND SENNOSIDES 8.6 MG 2 TABLET: 8.6; 5 TABLET, FILM COATED ORAL at 09:17

## 2021-06-10 RX ADMIN — HEPARIN SODIUM 5000 UNITS: 5000 INJECTION INTRAVENOUS; SUBCUTANEOUS at 13:48

## 2021-06-10 RX ADMIN — OXYCODONE 10 MG: 5 TABLET ORAL at 13:48

## 2021-06-10 RX ADMIN — THERA TABS 1 TABLET: TAB at 09:17

## 2021-06-10 RX ADMIN — DICLOFENAC SODIUM 4 G: 10 GEL TOPICAL at 09:18

## 2021-06-10 RX ADMIN — Medication 5 MG: at 09:18

## 2021-06-10 RX ADMIN — GUAIFENESIN 600 MG: 600 TABLET, EXTENDED RELEASE ORAL at 09:17

## 2021-06-10 RX ADMIN — OXYCODONE 10 MG: 5 TABLET ORAL at 02:43

## 2021-06-10 RX ADMIN — THIAMINE HCL TAB 100 MG 100 MG: 100 TAB at 09:17

## 2021-06-10 RX ADMIN — LEVETIRACETAM 500 MG: 500 TABLET ORAL at 22:06

## 2021-06-10 ASSESSMENT — PAIN DESCRIPTION - LOCATION
LOCATION: FOOT;LEG
LOCATION: FOOT;LEG
LOCATION: FOOT
LOCATION: FOOT;LEG
LOCATION: FOOT;LEG

## 2021-06-10 ASSESSMENT — PAIN DESCRIPTION - PAIN TYPE
TYPE: ACUTE PAIN

## 2021-06-10 ASSESSMENT — PAIN DESCRIPTION - ONSET
ONSET: ON-GOING

## 2021-06-10 ASSESSMENT — PAIN DESCRIPTION - DESCRIPTORS
DESCRIPTORS: ACHING
DESCRIPTORS: ACHING;BURNING
DESCRIPTORS: ACHING;BURNING
DESCRIPTORS: ACHING
DESCRIPTORS: ACHING

## 2021-06-10 ASSESSMENT — PAIN SCALES - GENERAL
PAINLEVEL_OUTOF10: 0
PAINLEVEL_OUTOF10: 8
PAINLEVEL_OUTOF10: 6
PAINLEVEL_OUTOF10: 9
PAINLEVEL_OUTOF10: 0
PAINLEVEL_OUTOF10: 0
PAINLEVEL_OUTOF10: 7
PAINLEVEL_OUTOF10: 0

## 2021-06-10 ASSESSMENT — PAIN SCALES - WONG BAKER
WONGBAKER_NUMERICALRESPONSE: 0
WONGBAKER_NUMERICALRESPONSE: 0

## 2021-06-10 ASSESSMENT — PAIN DESCRIPTION - FREQUENCY
FREQUENCY: CONTINUOUS
FREQUENCY: CONTINUOUS
FREQUENCY: INTERMITTENT
FREQUENCY: INTERMITTENT

## 2021-06-10 ASSESSMENT — PAIN DESCRIPTION - ORIENTATION
ORIENTATION: LEFT

## 2021-06-10 ASSESSMENT — PAIN DESCRIPTION - PROGRESSION
CLINICAL_PROGRESSION: GRADUALLY IMPROVING
CLINICAL_PROGRESSION: GRADUALLY WORSENING
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
## 2021-06-10 NOTE — PROGRESS NOTES
Department of Physical Medicine & Rehabilitation  Progress Note    Patient Identification:  Meghan Rick  [id number]  : 1970  Admit date: 2021    Chief Complaint: CVA    Subjective:   No new complaints overnight. She is still participating well in therapy. Continues to have pain in her left hand. ROS: No f/c, n/v, cp     Objective:  Patient Vitals for the past 24 hrs:   BP Temp Temp src Pulse Resp SpO2   06/10/21 0915 (!) 128/92 98.5 °F (36.9 °C) Oral 85 16 96 %   06/10/21 0312     16    21 2317     16    21 2118 109/68 97.2 °F (36.2 °C) Oral 82 18 97 %   21 0957 115/84 98.2 °F (36.8 °C) Oral 74 16 97 %     Const: Alert. No distress, pleasant. HEENT: Normocephalic, atraumatic. Normal sclera/conjunctiva. MMM. Incision healing well  CV: Regular rate and rhythm. Resp: No respiratory distress. Lungs CTAB. Abd: Soft, nontender, nondistended, NABS+   Ext: No edema. Neuro: Alert, oriented, appropriately interactive. Psych: Cooperative, appropriate mood and affect    Laboratory data: Available via EMR. Last 24 hour lab  No results found for this or any previous visit (from the past 24 hour(s)). Therapy progress:  PT  Position Activity Restriction  Other position/activity restrictions: Ambulate, Up with assist, Pt to wear helmet when OOB, OK to remove for shower, HOB to elevated at 30*  Objective     Sit to Stand: Contact guard assistance, Stand by assistance (Transf from w/c,to a SPC . PT facilitating midline position for an ant WS and stabilizing LLE to maintain a neutral position.)  Stand to sit: Contact guard assistance, Stand by assistance (VC for hand placement and to maintain a midline position with eccentric control)  Bed to Chair: Minimal assistance (for balance during transfer)  Device: Single point cane  Other Apparatus: AFO, Wheelchair follow (Custom molded AFO with a figure \"8 \"strap at ankle)  Assistance: Minimal assistance, Moderate assistance Distance: 28', 25'  OT  PT Equipment Recommendations  Equipment Needed: Yes (continue to assess)  Other: 20\" hemiheight w/c with drop arm and standard 90 degree leg rest  Toilet - Technique: Stand pivot  Equipment Used: Raised toilet seat with rails  Toilet Transfers Comments: used GB at anterior to pull up to - did not att [address] d/t urgency, in stance pt req Hugo for balance  Assessment        SLP  Diet Solids Recommendation: Dysphagia Soft and Bite-Sized (Dysphagia III) (with regular solids as tolerated. Per pt request, gravy/sauces to keep for moist.)       Body mass index is 35.48 kg/m². Assessment and Plan:  Salena Gonzales a 46 y.o. female with PMH GERD p/w L sided weakness and facial droop.  CTA head/neck on 4/12 revealed occluded R cervical ICA, occluded right LEAH, near complete occlusion of right MCA.     Acute ischemic CVA, Subdural heomorrhage s/p right hemicraniectomy and subsequen SAH and IP hemorrhage  No tPA given.  MRI 4/14 showing large subacute infarct throughout R LEAH and MCA with some areas of hemorrhagic conversion.  - Neurosurgery and neurology following  - SBP <160, PRN hydralazine, scheduled Norvasc 5 mg daily  - Keppra 500 mg twice daily   - SQH  - PT/OT      Aphasia - SLP   Depression - Home fluoxetine 20 mg daily at home  Anxiety - Hold home Ativan 0.5 mg every 8 hours PRN for anxiety  MISHA - Patient uses CPAP at home  GERD - omeprazole 40 mg daily at home- substituted for pepcid 20 mg BID  Obesity    Rehab Progress: Improving  Anticipated Dispo: home  Services/DME: TTB and RTS (neither covered by insurance, friend to obtain)  ELOS: - extension needed       Megan Acevedo D.O. M.P.H.  PM&R  6/10/2021  9:48 AM

## 2021-06-10 NOTE — PLAN OF CARE
Problem: Pain:  Goal: Control of acute pain  Description: Control of acute pain  Outcome: Ongoing  Note: Pt voices pain needs appropriately, pain is assessed during shift. Problem: Falls - Risk of:  Goal: Will remain free from falls  Description: Will remain free from falls  6/10/2021 1949 by Augustus Barraza RN  Outcome: Ongoing  Note: Client remains free from falls, bed/chair alarm in place, door open, encouraged to use call light for needs, call light is within reach, bed locked in lowest position,  Will continue to monitor. Problem: Skin Integrity:  Goal: Absence of new skin breakdown  Description: Absence of new skin breakdown  6/10/2021 1949 by Augustus Barraza RN  Outcome: Ongoing  Note:  See Mark scale. Encourage/assist pt to turn and reposition every two hours and as needed. Heels elevated off bed. Protective barrier placed as needed. Patient kept clean and dry. Pillows used for positioning. Will continue to monitor for skin breakdown.

## 2021-06-10 NOTE — CARE COORDINATION
Patient's insurance approved to 6/14. Patient, friend and family notified. Will continue to follow for d/c needs.  Electronically signed by USMAN Kim LISW-s on 6/10/2021 at 4:28 PM

## 2021-06-10 NOTE — PROGRESS NOTES
Total Timed Code Min 60    Total Treatment Minutes 60    Individual Treatment Minutes 60    Group Treatment Minutes 0    Co-Treat Minutes 0    Brief Exception: N/A    Pain None indicated    Pain Intervention: [] RN notified  [] Repositioned  [] Intervention offered and patient declined  [x] N/A  [] Other:       Subjective:     Pt in w/c upon entry, pleasant and cooperative. Appeared more relaxed re: financial concerns this date with plan in place to address. Objective / Goals:     Patient will consume regular solids with timely mastication and no pocketing in L buccal cavity across 2 sessions. Goal not targeted. GOAL MET - continue to target as pt with inconsistencies at times   Patient will consume PO without anterior spillage or overt pocketing across 2 sessions. GOAL MET   GOAL MET   Pt will complete divided/alternating attention tasks with 85% accuracy given min cues. No increasing to min-mod cues for sustained attention. Pt overly quick to alternate attention during tasks detailed below. GOAL PARTIALLY MET - pt continues with inconsistencies, continue to target   Pt will accurately complete executive functioning tasks with min cues. Generating organization chart to manage finances / bills: min-mod cues required to identify pertinent information and generate template on computer. Reduced propulsion through task 2/2 attention breaks. GOAL NOT MET - continue to target; increased cues required for complex tasks with higher cognitive demands   Pt will attend to L visual field with min cues. No increasing to min cues for attention to L side during functional typing task on computer and during navigation in w/c. GOAL MET - continue to target to ensure consistency   Pt will participate in ongoing cognitive linguistic assessment. GOAL MET GOAL MET   Other areas targeted:     Education:   Educated to use of external organization aids and supports for executive functioning skills.  Reviewed previous education re: visual scanning and L neglect. Safety Devices: [x] Call light within reach  [x] Chair alarm activated and connected to nurse call light system  [] Bed alarm activated   [x] Other: reinforced use of call light    Assessment:   Cognitive linguistic impairment with mild executive dysfunction. Increased cueing required for more complex cognitive tasks for functional financial management. Plan: Continue per POC.       Interventions used this date:  [] Speech/Language Treatment  [] Instruction in HEP  [] Dysphagia Treatment [x] Cognitive Treatment   [] Other:    Discharge recommendations:  [] Home independently  [x] Home with assistance []  24 hour supervision  [] ECF [x] Other: Education completed with family on Friday 5/28/2021  Continued Tx Upon Discharge: ? [x] Yes    [] No    [] TBD based on progress while on ARU     [] Vital Stim indicated     [] Other:   Estimated discharge date: extension request pending    Heather Holman MA CCC-SLP; [phone]  Speech-Language Pathologist

## 2021-06-10 NOTE — PROGRESS NOTES
Patient resting in bedside chair. Assessment completed. Patient VSS and patient is afebrile. Denies pain at this time. Bed in low position and call light within reach.

## 2021-06-10 NOTE — PROGRESS NOTES
Patient alert and oriented. Vital signs stable. She reports intermittent burning, aching discomfort in left hand and foot. Patient was assisted to the bathroom using gait belt and wheelchair. She unsuccessfully attempted to have a bowel movement. Patient states last BM was two days ago. She was medicated with Senokot as ordered. She declined any laxatives at this time. Patient agreeable to trying prune juice.

## 2021-06-10 NOTE — PROGRESS NOTES
Occupational Therapy  Facility/Department: [place] [address] [facility]  Daily Treatment Note  NAME: Destinee Castro  : 1970  MRN: [id number]    Date of Service: 6/10/2021    Discharge Recommendations:  24 hour supervision or assist, Home with Home health OT, Continue to assess pending progress, Home with nursing aide  OT Equipment Recommendations  Equipment Needed: Yes  ADL Assistive Devices: Transfer Tub Bench;Grab Bars - toilet; Toileting - Raised Toilet Seat with arms; Reacher;Grab Bars - tub    Assessment   Performance deficits / Impairments: Decreased functional mobility ; Decreased ADL status; Decreased ROM; Decreased strength;Decreased sensation;Decreased fine motor control;Decreased endurance;Decreased posture;Decreased balance;Decreased safe awareness;Decreased coordination;Decreased vision/visual deficit; Decreased cognition  Assessment: Pt required encouragement to initiate/ attempt difficult dressing tasks this date. Pt progressing with toileting/ toilet transfers, however would benefit from continued practice to promote independence. Pt would benefit from continued OT while in ARU, continue OT POC. Treatment Diagnosis: decreased independence with ADLs and decreased functional mobility 2/2 CVA  OT Education: Plan of Care;Transfer Training;ADL Adaptive Strategies  Patient Education: pt educated on use of SPC for toilet transfer-pt verb and demo understanding, continue to reinforce  REQUIRES OT FOLLOW UP: Yes  Activity Tolerance  Activity Tolerance: Patient limited by pain  Safety Devices  Type of devices: Left in chair;Nurse notified;Call light within reach; Chair alarm in place         Patient Diagnosis(es): There were no encounter diagnoses. has a past medical history of GERD (gastroesophageal reflux disease), Hernia, Obesity, and Unspecified sleep apnea. has a past surgical history that includes Splenectomy (); Total hip arthroplasty (); Lap Band (); hernia repair ();   section (2004); and craniotomy (Right, 4/13/2021). Restrictions  Position Activity Restriction  Other position/activity restrictions: Ambulate, Up with assist, Pt to wear helmet when OOB, OK to remove for shower, HOB to elevated at 30*  Subjective   General  Chart Reviewed: Yes, Progress Notes  Patient assessed for rehabilitation services?: Yes  Additional Pertinent Hx: 46 y.o. female with hypertension and tobacco abuse who presented after spending a prolonged period on the ground s/p fall out of bed; found to have acute left hemiparesis and gaze deviation. CXR with no acute cardiopulmonary abnormality. CTH revealed large acute/subacute infarct in the R frontal lobe with associated mass effect and 4 mm R to L midline shift. It also noted subdural hemorrhage of 6mm along falx, although NSGY less convinced. CTA head and neck revealed occluded R cervical ICA & R anterior cerebral artery with near complete occlusion of R MCA. Outside window on TPA. Pt is now POD #1 following hemicraniectomy (4/13). Pt admitted to ARU 4/23  Response to previous treatment: Patient with no complaints from previous session  Family / Caregiver Present: No  Referring Practitioner: Dr. Amena Valiente DO  Diagnosis: CVA  Subjective  Subjective: Pt semi-supine on OT approach and agreed to tx. Pt required increased time for unsuccessful BM attempt. General Comment  Comments: . Orientation  Orientation  Overall Orientation Status: Within Functional Limits  Objective    ADL  Feeding: Setup (pt using knife to open creamer, assisted pt to open milk)  Grooming: Independent (oral care/ washing hand with setup seated in wc at sink side)  UE Dressing: Minimal assistance;Setup;Verbal cueing; Increased time to complete (assist to pull L sleeve over elbow, pt with multiple unsuccessful attempts and noted with increased frustration with limited success)  LE Dressing:  (unable to complete footwear 2/2 time constraint/ increased time required for toileting) Toileting: Minimal assistance  Additional Comments: Extensive amount of time on toilet to attempt to have BM- pt reporting feeling urge, then after extensive time seated on toilet pt frequently reporting \"It's almost there\"        Balance  Sitting Balance: Supervision (seated EOB)  Standing Balance: Contact guard assistance (standing for transfers, standing with SC for toileting)  Standing Balance  Time: ~3 minutes total  Activity: functional transfers; in stance for LB clothing mgmt/toileting  Toilet Transfers  Toilet - Technique: Stand pivot  Equipment Used: Raised toilet seat with rails  Toilet Transfer: Contact guard assistance  Bed mobility  Supine to Sit: Supervision (+ use of bed rail, HOB slightly elevated. Increased time to complete, ++encouragement)  Transfers  Stand Pivot Transfers: Contact guard assistance  Sit to stand: Contact guard assistance  Stand to sit: Contact guard assistance                       Cognition  Overall Cognitive Status: Exceptions  Arousal/Alertness: Appropriate responses to stimuli  Following Commands:  Follows one step commands consistently  Attention Span: Attends with cues to redirect  Memory: Appears intact  Safety Judgement: Decreased awareness of need for assistance;Decreased awareness of need for safety  Problem Solving: Assistance required to generate solutions;Assistance required to correct errors made;Assistance required to implement solutions;Decreased awareness of errors  Insights: Decreased awareness of deficits  Initiation: Requires cues for some  Sequencing: Requires cues for some            Plan   Plan  Times per week: 5x a week for 60 mins daily  Times per day: Daily  Current Treatment Recommendations: Strengthening, Endurance Training, Neuromuscular Re-education, Self-Care / ADL, Functional Mobility Training, Safety Education & Training, ROM, Positioning, Wheelchair Mobility Training, Balance Training, Patient/Caregiver Education & Training, Manual Therapy:  YOHAN, Equipment Evaluation, Education, & procurement, Home Management Training, Cognitive Reorientation       Goals  Short term goals  Time Frame for Short term goals: 2 weeks  Short term goal 1: Pt will complete toilet transfer w/ Mod A-goal met 5/4  Short term goal 2: Pt will complete UE dressing w/ Mod A-goal met 5/12  Short term goal 3: Pt will complete oral care w/ spvn seated at sink w/o VCs for L sided attention-goal met 5/17  Short term goal 4: Pt will complete LE dressing w/ Mod A-goal met 5/26  Long term goals  Time Frame for Long term goals : 4 weeks  Long term goal 1: Pt will complete toilet transfer w/ SBA-ongoing  Long term goal 2: Pt will complete UE dressing w/ setup-ongoing  Long term goal 3: Pt will complete LE dressing w/ SBA-ongoing  Long term goal 4: Pt will be independent w/ tone management exercises to improve functional use of LUE-ongoing  Long term goal 5: Pt will complete oral care I'ly-goal met 5/24  Patient Goals   Patient goals : \"get back to my normal self\"       Therapy Time   Individual Concurrent Group Co-treatment   Time In 0730         Time Out 0830         Minutes 60         Timed Code Treatment Minutes: [address] [facility].  [address] [facility], OTR/L [id number]

## 2021-06-10 NOTE — PLAN OF CARE
Problem: Falls - Risk of:  Goal: Will remain free from falls  Description: Will remain free from falls  Outcome: Ongoing  Note: Patient remains free of falls this shift. Room free of clutter, bed in low position, call light and personal items are within reach     Problem: Falls - Risk of:  Goal: Absence of physical injury  Description: Absence of physical injury  6/10/2021 1012 by Flor Barrientos  Outcome: Ongoing  Note: Patient remains free of physical injury. Will continue to monitor     Problem: Skin Integrity:  Goal: Absence of new skin breakdown  Description: Absence of new skin breakdown  Outcome: Ongoing  Note: No evidence of skinbreakdown noted this shift.  Will continue to monitor

## 2021-06-11 LAB
ANION GAP SERPL CALCULATED.3IONS-SCNC: 13 MMOL/L (ref 3–16)
BASOPHILS ABSOLUTE: 0.1 K/UL (ref 0–0.2)
BASOPHILS RELATIVE PERCENT: 1 %
BUN BLDV-MCNC: 15 MG/DL (ref 7–20)
CALCIUM SERPL-MCNC: 9.8 MG/DL (ref 8.3–10.6)
CHLORIDE BLD-SCNC: 102 MMOL/L (ref 99–110)
CO2: 22 MMOL/L (ref 21–32)
CREAT SERPL-MCNC: <0.5 MG/DL (ref 0.6–1.1)
EOSINOPHILS ABSOLUTE: 0.4 K/UL (ref 0–0.6)
EOSINOPHILS RELATIVE PERCENT: 3.5 %
GFR AFRICAN AMERICAN: >60
GFR NON-AFRICAN AMERICAN: >60
GLUCOSE BLD-MCNC: 93 MG/DL (ref 70–99)
HCT VFR BLD CALC: 40.8 % (ref 36–48)
HEMOGLOBIN: 13.8 G/DL (ref 12–16)
LYMPHOCYTES ABSOLUTE: 4.9 K/UL (ref 1–5.1)
LYMPHOCYTES RELATIVE PERCENT: 40.6 %
MCH RBC QN AUTO: 34.6 PG (ref 26–34)
MCHC RBC AUTO-ENTMCNC: 33.7 G/DL (ref 31–36)
MCV RBC AUTO: 102.7 FL (ref 80–100)
MONOCYTES ABSOLUTE: 1.3 K/UL (ref 0–1.3)
MONOCYTES RELATIVE PERCENT: 10.8 %
NEUTROPHILS ABSOLUTE: 5.3 K/UL (ref 1.7–7.7)
NEUTROPHILS RELATIVE PERCENT: 44.1 %
PDW BLD-RTO: 13.5 % (ref 12.4–15.4)
PLATELET # BLD: 402 K/UL (ref 135–450)
PMV BLD AUTO: 8.3 FL (ref 5–10.5)
POTASSIUM REFLEX MAGNESIUM: 3.9 MMOL/L (ref 3.5–5.1)
RBC # BLD: 3.98 M/UL (ref 4–5.2)
SODIUM BLD-SCNC: 137 MMOL/L (ref 136–145)
WBC # BLD: 12 K/UL (ref 4–11)

## 2021-06-11 PROCEDURE — 6360000002 HC RX W HCPCS: Performed by: PHYSICAL MEDICINE & REHABILITATION

## 2021-06-11 PROCEDURE — 80048 BASIC METABOLIC PNL TOTAL CA: CPT

## 2021-06-11 PROCEDURE — 97530 THERAPEUTIC ACTIVITIES: CPT

## 2021-06-11 PROCEDURE — 97110 THERAPEUTIC EXERCISES: CPT

## 2021-06-11 PROCEDURE — 1280000000 HC REHAB R&B

## 2021-06-11 PROCEDURE — 97116 GAIT TRAINING THERAPY: CPT

## 2021-06-11 PROCEDURE — 92507 TX SP LANG VOICE COMM INDIV: CPT

## 2021-06-11 PROCEDURE — 85025 COMPLETE CBC W/AUTO DIFF WBC: CPT

## 2021-06-11 PROCEDURE — 6370000000 HC RX 637 (ALT 250 FOR IP): Performed by: PHYSICAL MEDICINE & REHABILITATION

## 2021-06-11 PROCEDURE — 97535 SELF CARE MNGMENT TRAINING: CPT

## 2021-06-11 PROCEDURE — 92526 ORAL FUNCTION THERAPY: CPT

## 2021-06-11 PROCEDURE — 99232 SBSQ HOSP IP/OBS MODERATE 35: CPT | Performed by: PHYSICAL MEDICINE & REHABILITATION

## 2021-06-11 PROCEDURE — 36415 COLL VENOUS BLD VENIPUNCTURE: CPT

## 2021-06-11 RX ADMIN — GUAIFENESIN 600 MG: 600 TABLET, EXTENDED RELEASE ORAL at 08:09

## 2021-06-11 RX ADMIN — THERA TABS 1 TABLET: TAB at 08:09

## 2021-06-11 RX ADMIN — DOCUSATE SODIUM 50 MG AND SENNOSIDES 8.6 MG 2 TABLET: 8.6; 5 TABLET, FILM COATED ORAL at 08:10

## 2021-06-11 RX ADMIN — METHYLPHENIDATE HYDROCHLORIDE 10 MG: 10 TABLET ORAL at 08:09

## 2021-06-11 RX ADMIN — OXYCODONE 10 MG: 5 TABLET ORAL at 10:53

## 2021-06-11 RX ADMIN — FLUOXETINE 20 MG: 20 CAPSULE ORAL at 08:09

## 2021-06-11 RX ADMIN — OXYCODONE 10 MG: 5 TABLET ORAL at 22:03

## 2021-06-11 RX ADMIN — LEVETIRACETAM 500 MG: 500 TABLET ORAL at 22:03

## 2021-06-11 RX ADMIN — CETIRIZINE HYDROCHLORIDE 10 MG: 10 TABLET, FILM COATED ORAL at 08:09

## 2021-06-11 RX ADMIN — THIAMINE HCL TAB 100 MG 100 MG: 100 TAB at 08:09

## 2021-06-11 RX ADMIN — GUAIFENESIN 600 MG: 600 TABLET, EXTENDED RELEASE ORAL at 22:02

## 2021-06-11 RX ADMIN — LEVETIRACETAM 500 MG: 500 TABLET ORAL at 08:09

## 2021-06-11 RX ADMIN — FAMOTIDINE 20 MG: 20 TABLET, FILM COATED ORAL at 08:09

## 2021-06-11 RX ADMIN — AMLODIPINE BESYLATE 5 MG: 5 TABLET ORAL at 08:09

## 2021-06-11 RX ADMIN — Medication 5 MG: at 08:09

## 2021-06-11 RX ADMIN — DICLOFENAC SODIUM 4 G: 10 GEL TOPICAL at 22:05

## 2021-06-11 RX ADMIN — ACETAMINOPHEN 1000 MG: 500 TABLET, COATED ORAL at 05:25

## 2021-06-11 RX ADMIN — ASPIRIN 81 MG: 81 TABLET, CHEWABLE ORAL at 08:09

## 2021-06-11 RX ADMIN — DICLOFENAC SODIUM 4 G: 10 GEL TOPICAL at 08:10

## 2021-06-11 RX ADMIN — ATORVASTATIN CALCIUM 80 MG: 80 TABLET, FILM COATED ORAL at 22:03

## 2021-06-11 RX ADMIN — ACETAMINOPHEN 1000 MG: 500 TABLET, COATED ORAL at 10:53

## 2021-06-11 RX ADMIN — FAMOTIDINE 20 MG: 20 TABLET, FILM COATED ORAL at 22:03

## 2021-06-11 RX ADMIN — HEPARIN SODIUM 5000 UNITS: 5000 INJECTION INTRAVENOUS; SUBCUTANEOUS at 05:25

## 2021-06-11 RX ADMIN — ACETAMINOPHEN 1000 MG: 500 TABLET, COATED ORAL at 17:02

## 2021-06-11 RX ADMIN — HEPARIN SODIUM 5000 UNITS: 5000 INJECTION INTRAVENOUS; SUBCUTANEOUS at 22:22

## 2021-06-11 RX ADMIN — HEPARIN SODIUM 5000 UNITS: 5000 INJECTION INTRAVENOUS; SUBCUTANEOUS at 14:39

## 2021-06-11 ASSESSMENT — PAIN DESCRIPTION - DESCRIPTORS
DESCRIPTORS: ACHING

## 2021-06-11 ASSESSMENT — PAIN DESCRIPTION - PAIN TYPE
TYPE: ACUTE PAIN

## 2021-06-11 ASSESSMENT — PAIN DESCRIPTION - LOCATION
LOCATION: ARM;FOOT
LOCATION: FOOT;LEG
LOCATION: ARM;FOOT;LEG
LOCATION: ARM;FOOT;LEG

## 2021-06-11 ASSESSMENT — PAIN SCALES - GENERAL
PAINLEVEL_OUTOF10: 8
PAINLEVEL_OUTOF10: 0
PAINLEVEL_OUTOF10: 3
PAINLEVEL_OUTOF10: 7
PAINLEVEL_OUTOF10: 3
PAINLEVEL_OUTOF10: 6
PAINLEVEL_OUTOF10: 0

## 2021-06-11 ASSESSMENT — PAIN DESCRIPTION - FREQUENCY
FREQUENCY: CONTINUOUS

## 2021-06-11 ASSESSMENT — PAIN DESCRIPTION - ORIENTATION
ORIENTATION: LEFT

## 2021-06-11 ASSESSMENT — PAIN DESCRIPTION - PROGRESSION
CLINICAL_PROGRESSION: GRADUALLY WORSENING
CLINICAL_PROGRESSION: GRADUALLY WORSENING

## 2021-06-11 ASSESSMENT — PAIN DESCRIPTION - ONSET
ONSET: ON-GOING
ONSET: ON-GOING

## 2021-06-11 NOTE — PROGRESS NOTES
Occupational Therapy  Facility/Department: [facility] ACUTE REHAB UNIT  Daily Treatment Note  NAME: Yelena Fuentes  : 1970  MRN: [id number]    Date of Service: 2021    Discharge Recommendations:  24 hour supervision or assist, Home with Home health OT, Continue to assess pending progress, Home with nursing aide  OT Equipment Recommendations  ADL Assistive Devices: Transfer Tub Bench;Grab Bars - toilet; Toileting - Raised Toilet Seat with arms; Reacher;Grab Bars - tub  Other: continue to assess    Assessment   Performance deficits / Impairments: Decreased functional mobility ; Decreased ADL status; Decreased ROM; Decreased strength;Decreased sensation;Decreased fine motor control;Decreased endurance;Decreased posture;Decreased balance;Decreased safe awareness;Decreased coordination;Decreased vision/visual deficit; Decreased cognition  Assessment: Pt demonstrated toilet transfer using SPC stand pivot with CGA and reported increased confidence with toileting with encouragement to complete rear pericare seated. Pt continues to require assistance to manage pants on L side but is able to maintain standing balance with CGA and no overt LOB. Pt demonstrated recall with self management of LUE tone in hand but continues to be limited by pain. Pt continues to make progress and benefits from continued skilled OT. Cont per OT POC. Treatment Diagnosis: decreased independence with ADLs and decreased functional mobility 2/2 CVA  Prognosis: Fair  OT Education: Plan of Care;Transfer Training;ADL Adaptive Strategies  Patient Education: pt educated on technique to increase independence with rear perihygiene-pt verb and demo understanding  REQUIRES OT FOLLOW UP: Yes  Activity Tolerance  Activity Tolerance: Patient Tolerated treatment well  Safety Devices  Safety Devices in place: Yes  Type of devices: Left in chair;Call light within reach; Chair alarm in place; All fall risk precautions in place         Patient Diagnosis(es): There were no encounter diagnoses. has a past medical history of GERD (gastroesophageal reflux disease), Hernia, Obesity, and Unspecified sleep apnea. has a past surgical history that includes Splenectomy (); Total hip arthroplasty (); Lap Band (); hernia repair ();  section (); and craniotomy (Right, 2021). Restrictions  Position Activity Restriction  Other position/activity restrictions: Ambulate, Up with assist, Pt to wear helmet when OOB, OK to remove for shower, HOB to elevated at 30*  Subjective   General  Chart Reviewed: Yes  Patient assessed for rehabilitation services?: Yes  Additional Pertinent Hx: 46 y.o. female with hypertension and tobacco abuse who presented after spending a prolonged period on the ground s/p fall out of bed; found to have acute left hemiparesis and gaze deviation. CXR with no acute cardiopulmonary abnormality. CTH revealed large acute/subacute infarct in the R frontal lobe with associated mass effect and 4 mm R to L midline shift. It also noted subdural hemorrhage of 6mm along falx, although NSGY less convinced. CTA head and neck revealed occluded R cervical ICA & R anterior cerebral artery with near complete occlusion of R MCA. Outside window on TPA. Pt is now POD #1 following hemicraniectomy (). Pt admitted to ARU   Response to previous treatment: Patient with no complaints from previous session  Family / Caregiver Present: No  Referring Practitioner: Dr. Marcus Sales DO  Diagnosis: CVA  Subjective  Subjective: Pt semi-supine on OT approach finishing breakfast, agreeable to OT session. Pt required significantly increased time to don footwear d/t L AFO and difficulty getting into shoe in supine position. General Comment  Comments: Gerber Hirsch   Vital Signs  Patient Currently in Pain: Yes (pain in LUE, did not rate)   Orientation  Orientation  Overall Orientation Status: Within Functional Limits  Objective    ADL  Grooming: Independent (oral and hand hygiene made;Assistance required to implement solutions;Decreased awareness of errors  Insights: Decreased awareness of deficits  Initiation: Requires cues for some  Sequencing: Requires cues for some                                         Plan   Plan  Times per week: 5x a week for 60 mins daily  Times per day: Daily  Current Treatment Recommendations: Strengthening, Endurance Training, Neuromuscular Re-education, Self-Care / ADL, Functional Mobility Training, Safety Education & Training, ROM, Positioning, Wheelchair Mobility Training, Balance Training, Patient/Caregiver Education & Training, Manual Therapy:  MLD, Equipment Evaluation, Education, & procurement, Home Management Training, Cognitive Reorientation    Goals  Short term goals  Time Frame for Short term goals: 2 weeks  Short term goal 1: Pt will complete toilet transfer w/ Mod A-goal met 5/4  Short term goal 2: Pt will complete UE dressing w/ Mod A-goal met 5/12  Short term goal 3: Pt will complete oral care w/ spvn seated at sink w/o VCs for L sided attention-goal met 5/17  Short term goal 4: Pt will complete LE dressing w/ Mod A-goal met 5/26  Long term goals  Time Frame for Long term goals : 4 weeks  Long term goal 1: Pt will complete toilet transfer w/ SBA-ongoing  Long term goal 2: Pt will complete UE dressing w/ setup-ongoing  Long term goal 3: Pt will complete LE dressing w/ SBA-ongoing  Long term goal 4: Pt will be independent w/ tone management exercises to improve functional use of LUE-ongoing  Long term goal 5: Pt will complete oral care I'ly-goal met 5/24  Patient Goals   Patient goals : \"get back to my normal self\"       Therapy Time   Individual Concurrent Group Co-treatment   Time In 0830         Time Out 0930         Minutes 60         Timed Code Treatment Minutes: [address] [facility],

## 2021-06-11 NOTE — PROGRESS NOTES
Assessment completed, medications given. Fall precautions are in place, call light and bedside table are within reach. Patient is alert and oriented x 4 she is able to use call light appropriately , and is able to teach back to call for assistance to transfer.

## 2021-06-11 NOTE — PROGRESS NOTES
Department of Physical Medicine & Rehabilitation  Progress Note    Patient Identification:  Teresa Jiménez  [id number]  : 1970  Admit date: 2021    Chief Complaint: CVA    Subjective:   Patient extended until next week. No new complaints overnight. AFO delivered which fits well. Plan to continue rehab. ROS: No f/c, n/v, cp     Objective:  Patient Vitals for the past 24 hrs:   BP Temp Temp src Pulse Resp SpO2   21 0745 117/82 97.8 °F (36.6 °C) Oral 81 16 96 %   06/10/21 2205 131/84 98 °F (36.7 °C) Oral 80 16 96 %     Const: Alert. No distress, pleasant. HEENT: Normocephalic, atraumatic. Normal sclera/conjunctiva. MMM. Incision healing well  CV: Regular rate and rhythm. Resp: No respiratory distress. Lungs CTAB. Abd: Soft, nontender, nondistended, NABS+   Ext: No edema. Neuro: Alert, oriented, appropriately interactive. Psych: Cooperative, appropriate mood and affect    Laboratory data: Available via EMR.    Last 24 hour lab  Recent Results (from the past 24 hour(s))   Basic Metabolic Panel w/ Reflex to MG    Collection Time: 21  6:47 AM   Result Value Ref Range    Sodium 137 136 - 145 mmol/L    Potassium reflex Magnesium 3.9 3.5 - 5.1 mmol/L    Chloride 102 99 - 110 mmol/L    CO2 22 21 - 32 mmol/L    Anion Gap 13 3 - 16    Glucose 93 70 - 99 mg/dL    BUN 15 7 - 20 mg/dL    CREATININE <0.5 (L) 0.6 - 1.1 mg/dL    GFR Non-African American >60 >60    GFR African American >60 >60    Calcium 9.8 8.3 - 10.6 mg/dL   CBC auto differential    Collection Time: 21  6:47 AM   Result Value Ref Range    WBC 12.0 (H) 4.0 - 11.0 K/uL    RBC 3.98 (L) 4.00 - 5.20 M/uL    Hemoglobin 13.8 12.0 - 16.0 g/dL    Hematocrit 40.8 36.0 - 48.0 %    .7 (H) 80.0 - 100.0 fL    MCH 34.6 (H) 26.0 - 34.0 pg    MCHC 33.7 31.0 - 36.0 g/dL    RDW 13.5 12.4 - 15.4 %    Platelets 489 554 - 577 K/uL    MPV 8.3 5.0 - 10.5 fL    Neutrophils % 44.1 %    Lymphocytes % 40.6 %    Monocytes % 10.8 %    Eosinophils % 3.5 %    Basophils % 1.0 %    Neutrophils Absolute 5.3 1.7 - 7.7 K/uL    Lymphocytes Absolute 4.9 1.0 - 5.1 K/uL    Monocytes Absolute 1.3 0.0 - 1.3 K/uL    Eosinophils Absolute 0.4 0.0 - 0.6 K/uL    Basophils Absolute 0.1 0.0 - 0.2 K/uL       Therapy progress:  PT  Position Activity Restriction  Other position/activity restrictions: Ambulate, Up with assist, Pt to wear helmet when OOB, OK to remove for shower, HOB to elevated at 30*  Objective     Sit to Stand: Contact guard assistance, Minimal Assistance (requires assist at LLE for foot placement prior to transfer, VC's for scooting forward, min A for anterior lean, x3 from w/c to [address])  Stand to sit: Minimal Assistance, Contact guard assistance (pt required increased assist this date for eccentric control into chair and midline alignment into chair.)  Bed to Chair: Minimal assistance (for balance during transfer)  Device: Single point cane  Other Apparatus: AFO  Assistance: 2 Person assistance (min A x2)  Distance: 22'  OT  PT Equipment Recommendations  Equipment Needed: Yes (continue to assess)  Other: 20\" hemiheight w/c with drop arm and standard 90 degree leg rest  Toilet - Technique: Stand pivot  Equipment Used: Raised toilet seat with rails  Toilet Transfers Comments: used GB at anterior to pull up to - did not att SPC d/t urgency, in stance pt req Hugo for balance  Assessment        SLP  Diet Solids Recommendation: Dysphagia Soft and Bite-Sized (Dysphagia III) (with regular solids as tolerated. Per pt request, gravy/sauces to keep for moist.)       Body mass index is 35.48 kg/m². Assessment and Plan:  Danyell Hampton a 46 y.o. female with PMH GERD p/w L sided weakness and facial droop.  CTA head/neck on 4/12 revealed occluded R cervical ICA, occluded right LEAH, near complete occlusion of right MCA.     Acute ischemic CVA, Subdural heomorrhage s/p right hemicraniectomy and subsequen SAH and IP hemorrhage  No tPA given.  MRI 4/14 showing large subacute infarct throughout R LEAH and MCA with some areas of hemorrhagic conversion.  - Neurosurgery and neurology following  - SBP <160, PRN hydralazine, scheduled Norvasc 5 mg daily  - Keppra 500 mg twice daily   - SQH  - PT/OT      Aphasia - SLP   Depression - Home fluoxetine 20 mg daily at home  Anxiety - Hold home Ativan 0.5 mg every 8 hours PRN for anxiety  MISHA - Patient uses CPAP at home  GERD - omeprazole 40 mg daily at home- substituted for pepcid 20 mg BID  Obesity    Rehab Progress: Improving  Anticipated Dispo: home  Services/DME: TTB and RTS (neither covered by insurance, friend to obtain)  ELOS: - extension needed       Khoa Cobb D.O. M.P.H.  PM&R  6/11/2021  9:24 AM
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
## 2021-06-11 NOTE — PROGRESS NOTES
ACUTE REHAB UNIT  SPEECH/LANGUAGE PATHOLOGY      [x] Daily  [] Weekly Care Conference Note  [] Discharge    Patient:Laura Ayon      GXM:[id number]  WLL:[id number]  Rehab Dx/Hx: Acute CVA (cerebrovascular accident) ([facility] [address] [I63.9]    Precautions: [x] Aspiration  [x] Fall risk  [] Sternal  [] Seizure [] Hip  [] Weight Bearing [] Other  ST Dx: [] Aphasia  [] Dysarthria  [] Apraxia   [x] Oropharyngeal dysphagia [x] Cognitive Impairment  [] Other:   Date of Admit: 4/23/2021  Room #: 3101/3101-01  Date: 6/11/2021        Current Diet Order:DIET GENERAL;   Recommended Form of Meds: PO  Compensatory Swallowing Strategies: Alternate solids and liquids, Upright as possible for all oral intake, Check for pocketing of food on the Left, Small bites/sips, Lingual sweep   Subjective: Pt admitted 4/12 after fall with left sided paralysis. Pt underwent craniectomy on 4/13 per Dr. Yamilet Mcclure. Social/Functional History  Lives With: Spouse;Son( reported that son is diagnosed with Autism.)  Occupation: Full time employment  Type of occupation:  at [employer]"    FEES 4/14: IMPRESSIONS:   Pt presents w/ mild oropharyngeal dysphagia characterized by premature spillage of thin liquids to UES and pyriforms w/ intermittent deep penetration of laryngeal vestibule; adequate airway protection and complete clearance of all residue after swallow initiation. No aspiration w/ any trials. Timely swallow initiation w/ puree and regular solid consistencies; no oropharyngeal residue.    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 Adequate ab/adduction. Complete closure of TVFs upon adduction.      Dentition: Adequate  Vision  Vision: Impaired  Vision Exceptions: Visual field cut  Hearing  Hearing: Within functional limits  Barriers toward progress: None towards stated goals    Date: 6/11/2021      Tx session 1 Tx session 2    Total Timed Code Min 35 0   Total Treatment Minutes 35 0   Individual Treatment Minutes 35 0   Group Treatment Minutes 0 0   Co-Treat Minutes 0 0   Brief Exception: N/A 25 minutes - Presented to pts room and pt appeared unwell stating that was nauseous. Agreeable to ginger ale but did not feel she would be able to participate in therapy at this time. RN notified and present to assess. Pt feels nausea related to having eating chicken liver prior to PT session. Pain Pain in left side this date. None indicated    Pain Intervention: [] RN notified  [] Repositioned  [x] Intervention offered and patient declined  [] N/A  [] Other:    [] RN notified  [] Repositioned  [] Intervention offered and patient declined  [] N/A  [] Other:    Subjective:     Pt upright in chair and agreeable to therapy. Visitor arrived during session which was extremely distracting to patient. Declined lunch at end of session because friend is bringing food items. N/A   Objective / Goals:     Patient will consume regular solids with timely mastication and no pocketing in L buccal cavity across 2 sessions. Goal not targeted this session. N/A   Patient will consume PO without anterior spillage or overt pocketing across 2 sessions. GOAL MET   GOAL MET   Pt will complete divided/alternating attention tasks with 85% accuracy given min cues. Mod cues for sustained attention; presence of friend was significantly distracting to patient. N/A   Pt will accurately complete executive functioning tasks with min cues. Functional bill paying:  Min-mod cues for sequencing task. Dependent for generating possible solution for locating identifying bill due dates. N/A   Pt will attend to L visual field with min cues. Min cues for use on familiar electronic device. N/A   Pt will participate in ongoing cognitive linguistic assessment. GOAL MET GOAL MET   Other areas targeted:     Education:   Ongoing regarding cognitive impairments.   Provided pt and friends with pts log created in therapy for completion. Educated that bill paying / finance strategies are not routinely completed as part of therapy but due to distress per pt this was completed. Safety Devices: [x] Call light within reach  [x] Chair alarm activated and connected to nurse call light system  [] Bed alarm activated   [] Other:  [x] Call light within reach  [] Chair alarm activated and connected to nurse call light system  [x] Bed alarm activated   [] Other:    Assessment: Executive function impairment, left neglect and attention impairments. Plan: Continue per POC.       Interventions used this date:  [] Speech/Language Treatment  [] Instruction in HEP  [] Dysphagia Treatment [x] Cognitive Treatment   [] Other:    Discharge recommendations:  [] Home independently  [x] Home with assistance []  24 hour supervision  [] ECF [x] Other: Education completed with family on Friday 5/28/2021  Continued Tx Upon Discharge: ? [x] Yes    [] No    [] TBD based on progress while on ARU     [] Vital Stim indicated     [] Other:   Estimated discharge date: extension request pending    Kimberly Oh, Dejuan Oliva  Speech-Language Pathologist

## 2021-06-12 PROCEDURE — 6360000002 HC RX W HCPCS: Performed by: PHYSICAL MEDICINE & REHABILITATION

## 2021-06-12 PROCEDURE — 97530 THERAPEUTIC ACTIVITIES: CPT

## 2021-06-12 PROCEDURE — 97110 THERAPEUTIC EXERCISES: CPT

## 2021-06-12 PROCEDURE — 97112 NEUROMUSCULAR REEDUCATION: CPT

## 2021-06-12 PROCEDURE — 1280000000 HC REHAB R&B

## 2021-06-12 PROCEDURE — 97116 GAIT TRAINING THERAPY: CPT

## 2021-06-12 PROCEDURE — 97129 THER IVNTJ 1ST 15 MIN: CPT

## 2021-06-12 PROCEDURE — 97535 SELF CARE MNGMENT TRAINING: CPT

## 2021-06-12 PROCEDURE — 99232 SBSQ HOSP IP/OBS MODERATE 35: CPT | Performed by: PHYSICAL MEDICINE & REHABILITATION

## 2021-06-12 PROCEDURE — 6370000000 HC RX 637 (ALT 250 FOR IP): Performed by: PHYSICAL MEDICINE & REHABILITATION

## 2021-06-12 PROCEDURE — 97130 THER IVNTJ EA ADDL 15 MIN: CPT

## 2021-06-12 PROCEDURE — 94660 CPAP INITIATION&MGMT: CPT

## 2021-06-12 RX ADMIN — HEPARIN SODIUM 5000 UNITS: 5000 INJECTION INTRAVENOUS; SUBCUTANEOUS at 21:39

## 2021-06-12 RX ADMIN — AMLODIPINE BESYLATE 5 MG: 5 TABLET ORAL at 08:40

## 2021-06-12 RX ADMIN — THERA TABS 1 TABLET: TAB at 08:40

## 2021-06-12 RX ADMIN — LEVETIRACETAM 500 MG: 500 TABLET ORAL at 21:38

## 2021-06-12 RX ADMIN — ASPIRIN 81 MG: 81 TABLET, CHEWABLE ORAL at 08:40

## 2021-06-12 RX ADMIN — ACETAMINOPHEN 1000 MG: 500 TABLET, COATED ORAL at 17:14

## 2021-06-12 RX ADMIN — DICLOFENAC SODIUM 4 G: 10 GEL TOPICAL at 21:39

## 2021-06-12 RX ADMIN — OXYCODONE HYDROCHLORIDE 5 MG: 5 TABLET ORAL at 06:08

## 2021-06-12 RX ADMIN — ACETAMINOPHEN 1000 MG: 500 TABLET, COATED ORAL at 06:08

## 2021-06-12 RX ADMIN — OXYCODONE 10 MG: 5 TABLET ORAL at 12:59

## 2021-06-12 RX ADMIN — FLUOXETINE 20 MG: 20 CAPSULE ORAL at 08:40

## 2021-06-12 RX ADMIN — FAMOTIDINE 20 MG: 20 TABLET, FILM COATED ORAL at 21:38

## 2021-06-12 RX ADMIN — DICLOFENAC SODIUM 4 G: 10 GEL TOPICAL at 08:41

## 2021-06-12 RX ADMIN — HEPARIN SODIUM 5000 UNITS: 5000 INJECTION INTRAVENOUS; SUBCUTANEOUS at 06:08

## 2021-06-12 RX ADMIN — DOCUSATE SODIUM 50 MG AND SENNOSIDES 8.6 MG 2 TABLET: 8.6; 5 TABLET, FILM COATED ORAL at 21:38

## 2021-06-12 RX ADMIN — METHYLPHENIDATE HYDROCHLORIDE 10 MG: 10 TABLET ORAL at 08:40

## 2021-06-12 RX ADMIN — THIAMINE HCL TAB 100 MG 100 MG: 100 TAB at 08:40

## 2021-06-12 RX ADMIN — OXYCODONE 10 MG: 5 TABLET ORAL at 17:25

## 2021-06-12 RX ADMIN — FAMOTIDINE 20 MG: 20 TABLET, FILM COATED ORAL at 08:40

## 2021-06-12 RX ADMIN — ACETAMINOPHEN 1000 MG: 500 TABLET, COATED ORAL at 21:38

## 2021-06-12 RX ADMIN — LEVETIRACETAM 500 MG: 500 TABLET ORAL at 08:40

## 2021-06-12 RX ADMIN — HEPARIN SODIUM 5000 UNITS: 5000 INJECTION INTRAVENOUS; SUBCUTANEOUS at 14:53

## 2021-06-12 RX ADMIN — ATORVASTATIN CALCIUM 80 MG: 80 TABLET, FILM COATED ORAL at 21:38

## 2021-06-12 RX ADMIN — DOCUSATE SODIUM 50 MG AND SENNOSIDES 8.6 MG 2 TABLET: 8.6; 5 TABLET, FILM COATED ORAL at 08:40

## 2021-06-12 RX ADMIN — GUAIFENESIN 600 MG: 600 TABLET, EXTENDED RELEASE ORAL at 08:40

## 2021-06-12 RX ADMIN — CETIRIZINE HYDROCHLORIDE 10 MG: 10 TABLET, FILM COATED ORAL at 08:40

## 2021-06-12 RX ADMIN — GUAIFENESIN 600 MG: 600 TABLET, EXTENDED RELEASE ORAL at 21:38

## 2021-06-12 ASSESSMENT — PAIN SCALES - GENERAL
PAINLEVEL_OUTOF10: 0
PAINLEVEL_OUTOF10: 6
PAINLEVEL_OUTOF10: 0
PAINLEVEL_OUTOF10: 0
PAINLEVEL_OUTOF10: 4
PAINLEVEL_OUTOF10: 0
PAINLEVEL_OUTOF10: 4
PAINLEVEL_OUTOF10: 7
PAINLEVEL_OUTOF10: 7

## 2021-06-12 ASSESSMENT — PAIN - FUNCTIONAL ASSESSMENT
PAIN_FUNCTIONAL_ASSESSMENT: ACTIVITIES ARE NOT PREVENTED
PAIN_FUNCTIONAL_ASSESSMENT: PREVENTS OR INTERFERES SOME ACTIVE ACTIVITIES AND ADLS
PAIN_FUNCTIONAL_ASSESSMENT: ACTIVITIES ARE NOT PREVENTED

## 2021-06-12 ASSESSMENT — PAIN DESCRIPTION - ONSET
ONSET: ON-GOING

## 2021-06-12 ASSESSMENT — PAIN DESCRIPTION - LOCATION
LOCATION: HAND
LOCATION: HIP
LOCATION: HAND
LOCATION: HAND;HIP;FOOT
LOCATION: HAND;HIP;FOOT

## 2021-06-12 ASSESSMENT — PAIN DESCRIPTION - ORIENTATION
ORIENTATION: LEFT

## 2021-06-12 ASSESSMENT — PAIN DESCRIPTION - PAIN TYPE
TYPE: ACUTE PAIN

## 2021-06-12 ASSESSMENT — PAIN DESCRIPTION - FREQUENCY
FREQUENCY: INTERMITTENT

## 2021-06-12 ASSESSMENT — PAIN DESCRIPTION - DESCRIPTORS
DESCRIPTORS: BURNING
DESCRIPTORS: DISCOMFORT
DESCRIPTORS: DISCOMFORT;BURNING
DESCRIPTORS: BURNING;DISCOMFORT
DESCRIPTORS: BURNING;DISCOMFORT
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
## 2021-06-12 NOTE — PROGRESS NOTES
Department of Physical Medicine & Rehabilitation  Progress Note    Patient Identification:  Héctor Cruz  [id number]  : 1970  Admit date: 2021    Chief Complaint: CVA    Subjective:   No new complaints. Feels well today. Plans to continue therapy. ROS: No f/c, n/v, cp     Objective:  Patient Vitals for the past 24 hrs:   BP Temp Temp src Pulse Resp SpO2   21 0836 115/67 98.6 °F (37 °C) Oral 95 16 96 %   21 2201 112/80 98 °F (36.7 °C) Oral 88 16 95 %     Const: Alert. No distress, pleasant. HEENT: Normocephalic, atraumatic. Normal sclera/conjunctiva. MMM. Incision healing well  CV: Regular rate and rhythm. Resp: No respiratory distress. Lungs CTAB. Abd: Soft, nontender, nondistended, NABS+   Ext: No edema. Neuro: Alert, oriented, appropriately interactive. Psych: Cooperative, appropriate mood and affect    Laboratory data: Available via EMR. Last 24 hour lab  No results found for this or any previous visit (from the past 24 hour(s)).     Therapy progress:  PT  Position Activity Restriction  Other position/activity restrictions: Ambulate, Up with assist, Pt to wear helmet when OOB, OK to remove for shower, HOB to elevated at 30*  Objective     Sit to Stand: Contact guard assistance, Minimal Assistance (pt fluctuating between CGA-Hugo throughout session, from EOB, toilet, and w/c to no AD or SPC, assist for LLE placement prior to transfer and faciliation for neutral pelvis alignment during transfer, pt reporting issues with initiation this date)  Stand to sit: Contact guard assistance (cues for hand placement, occasional poor eccentric control, espeically when sitting from pivot toward pt's R)  Bed to Chair: Minimal assistance (for balance during transfer)  Device: Single point cane  Other Apparatus: AFO  Assistance: 2 Person assistance (min A x2)  Distance: 22'  OT  PT Equipment Recommendations  Equipment Needed: Yes (continue to assess)  Other: 20\" hemiheight w/c with drop arm and standard 90 degree leg rest  Toilet - Technique: Stand pivot  Equipment Used: Raised toilet seat with rails  Toilet Transfers Comments: Use of SPC, cues to sequence pivot and for hand placement, use of GB upon stance  Assessment        SLP  Diet Solids Recommendation: Dysphagia Soft and Bite-Sized (Dysphagia III) (with regular solids as tolerated. Per pt request, gravy/sauces to keep for moist.)       Body mass index is 35.48 kg/m². Assessment and Plan:  Asael Shields a 46 y.o. female with PMH GERD p/w L sided weakness and facial droop.  CTA head/neck on 4/12 revealed occluded R cervical ICA, occluded right LEAH, near complete occlusion of right MCA.     Acute ischemic CVA, Subdural heomorrhage s/p right hemicraniectomy and subsequen SAH and IP hemorrhage  No tPA given.  MRI 4/14 showing large subacute infarct throughout R LEAH and MCA with some areas of hemorrhagic conversion.  - Neurosurgery and neurology following  - SBP <160, PRN hydralazine, scheduled Norvasc 5 mg daily  - Keppra 500 mg twice daily   - SQH  - PT/OT      Aphasia - SLP   Depression - Home fluoxetine 20 mg daily at home  Anxiety - Hold home Ativan 0.5 mg every 8 hours PRN for anxiety  MISHA - Patient uses CPAP at home  GERD - omeprazole 40 mg daily at home- substituted for pepcid 20 mg BID  Obesity    Rehab Progress: Improving  Anticipated Dispo: home  Services/DME: TTB and RTS (neither covered by insurance, friend to obtain)  ELOS: - extension needed       Bridgett Garza, AILEEN.O. M.P.H.  PM&R  6/12/2021  11:24 AM

## 2021-06-12 NOTE — PROGRESS NOTES
ACUTE REHAB UNIT  SPEECH/LANGUAGE PATHOLOGY      [x] Daily  [] Weekly Care Conference Note  [] Discharge    Patient:Laura Dixon      JUJ:[birth year]  GAW:[id number]  Rehab Dx/Hx: Acute CVA (cerebrovascular accident) ([facility] [address] [I63.9]    Precautions: [x] Aspiration  [x] Fall risk  [] Sternal  [] Seizure [] Hip  [] Weight Bearing [] Other  ST Dx: [] Aphasia  [] Dysarthria  [] Apraxia   [x] Oropharyngeal dysphagia [x] Cognitive Impairment  [] Other:   Date of Admit: 4/23/2021  Room #: 3101/3101-01  Date: 6/12/2021        Current Diet Order:DIET GENERAL;   Recommended Form of Meds: PO  Compensatory Swallowing Strategies: Alternate solids and liquids, Upright as possible for all oral intake, Check for pocketing of food on the Left, Small bites/sips, Lingual sweep   Subjective: Pt admitted 4/12 after fall with left sided paralysis. Pt underwent craniectomy on 4/13 per Dr. Ashley Flanagan. Social/Functional History  Lives With: Spouse;Son( reported that son is diagnosed with Autism.)  Occupation: Full time employment  Type of occupation:  at [employer]"    FEES 4/14: IMPRESSIONS:   Pt presents w/ mild oropharyngeal dysphagia characterized by premature spillage of thin liquids to UES and pyriforms w/ intermittent deep penetration of laryngeal vestibule; adequate airway protection and complete clearance of all residue after swallow initiation. No aspiration w/ any trials. Timely swallow initiation w/ puree and regular solid consistencies; no oropharyngeal residue.    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 Adequate ab/adduction. Complete closure of TVFs upon adduction.      Dentition: Adequate  Vision  Vision: Impaired  Vision Exceptions: Visual field cut  Hearing  Hearing: Within functional limits  Barriers toward progress: None towards stated goals    Date: 6/12/2021      Tx session 1 Tx session 2    Total Timed Code Min 45 45   Total Treatment Minutes 45 45   Individual Treatment Minutes 45 45   Group Treatment Minutes 0 0   Co-Treat Minutes 0 0   Brief Exception: N/A N/A   Pain None indicated None indicated    Pain Intervention: [] RN notified  [] Repositioned  [] Intervention offered and patient declined  [x] N/A  [] Other:    [] RN notified  [] Repositioned  [] Intervention offered and patient declined  [x] N/A  [] Other:    Subjective:     Pt upright in chair after working with PT, agreeable and in good spirits Pt had two visitors in room but left towards the beginning of tx session   Objective / Goals:     Patient will consume regular solids with timely mastication and no pocketing in L buccal cavity across 2 sessions. Goal not targeted this session. Goal not targeted this session   Patient will consume PO without anterior spillage or overt pocketing across 2 sessions. GOAL MET   GOAL MET   Pt will complete divided/alternating attention tasks with 85% accuracy given min cues. Mod cues for sustained attention; noted improvement in attention once friends left    Attempted conversational task while pt was completed sustained attention task, pt with significant difficulty alternating attention between the two Throughout below activities, pt able to maintain adequate attention, x2 episodes of tangential but pt self-aware of these episodes   Pt will accurately complete executive functioning tasks with min cues. Completing grid: completed with 90 accuracy, left sided scanning abilities interfered  Acrostics puzzle: completed with 82% accuracy Deductive Reasoning activity: completed with 80% accuracy    Creating a Schedule: completed with 85% accuracy, min cues required       Pt will attend to L visual field with min cues. Min cues during activities Min cues for activities   Pt will participate in ongoing cognitive linguistic assessment.  GOAL MET GOAL MET   Other areas targeted:     Education:   Ongoing regarding cognitive impairments. Provided pt and friends with pts log created in therapy for completion. Educated that bill paying / finance strategies are not routinely completed as part of therapy but due to distress per pt this was completed. Safety Devices: [x] Call light within reach  [x] Chair alarm activated and connected to nurse call light system  [] Bed alarm activated   [] Other:  [x] Call light within reach  [x] Chair alarm activated and connected to nurse call light system  [] Bed alarm activated   [] Other:    Assessment: Executive function impairment, left neglect and attention impairments. Plan: Continue per POC.       Interventions used this date:  [] Speech/Language Treatment  [] Instruction in HEP  [] Dysphagia Treatment [x] Cognitive Treatment   [] Other:    Discharge recommendations:  [] Home independently  [x] Home with assistance []  24 hour supervision  [] ECF [x] Other: Education completed with family on Friday 5/28/2021  Continued Tx Upon Discharge: ? [x] Yes    [] No    [] TBD based on progress while on ARU     [] Vital Stim indicated     [] Other:   Estimated discharge date: extension request pending    Mikki Mcpherson [address], [id number] [facility] [id number]   Speech-Language Pathologist

## 2021-06-12 NOTE — PROGRESS NOTES
Occupational Therapy  Facility/Department: [facility] ACUTE REHAB UNIT  Daily Treatment Note  NAME: Emy Connor  : 1970  MRN: [id number]    Date of Service: 2021    Discharge Recommendations:  24 hour supervision or assist, Home with Home health OT, Continue to assess pending progress, Home with nursing aide  OT Equipment Recommendations  Equipment Needed: Yes  ADL Assistive Devices: Transfer Tub Bench;Grab Bars - toilet; Toileting - Raised Toilet Seat with arms; Reacher;Grab Bars - tub  Other: continue to assess    Assessment   Performance deficits / Impairments: Decreased functional mobility ; Decreased ADL status; Decreased ROM; Decreased strength;Decreased sensation;Decreased fine motor control;Decreased endurance;Decreased posture;Decreased balance;Decreased safe awareness;Decreased coordination;Decreased vision/visual deficit; Decreased cognition  Assessment: Pt completed shower transfer with CGA to/from w/c via stand pivot and scoot pivot with cues to sequence. Pt continues to demonstrate improved confidence and carryover with toileting and completion of pericare hygiene while seated with encouragement. Pt maintains standing balance with CGA for LB clothing mgmt and STS transfers with CGA and occasional + time to initiate. Pt LUE hypertonicity worsened this date. Cont per OT POC. Treatment Diagnosis: decreased independence with ADLs and decreased functional mobility 2/2 CVA  Prognosis: Fair  OT Education: Plan of Care;Transfer Training;ADL Adaptive Strategies  Patient Education: pt encouraged to increase independence with rear perihygiene-pt verb and demo understanding with good carryover from previous session  REQUIRES OT FOLLOW UP: Yes  Activity Tolerance  Activity Tolerance: Patient Tolerated treatment well  Safety Devices  Safety Devices in place: Yes  Type of devices: Left in chair;Call light within reach; Chair alarm in place; All fall risk precautions in place;Nurse notified         Patient Diagnosis(es): There were no encounter diagnoses. has a past medical history of GERD (gastroesophageal reflux disease), Hernia, Obesity, and Unspecified sleep apnea. has a past surgical history that includes Splenectomy (); Total hip arthroplasty (); Lap Band (); hernia repair ();  section (); and craniotomy (Right, 2021). Restrictions  Position Activity Restriction  Other position/activity restrictions: Ambulate, Up with assist, Pt to wear helmet when OOB, OK to remove for shower, HOB to elevated at 30*  Subjective   General  Chart Reviewed: Yes  Patient assessed for rehabilitation services?: Yes  Additional Pertinent Hx: 46 y.o. female with hypertension and tobacco abuse who presented after spending a prolonged period on the ground s/p fall out of bed; found to have acute left hemiparesis and gaze deviation. CXR with no acute cardiopulmonary abnormality. CTH revealed large acute/subacute infarct in the R frontal lobe with associated mass effect and 4 mm R to L midline shift. It also noted subdural hemorrhage of 6mm along falx, although NSGY less convinced. CTA head and neck revealed occluded R cervical ICA & R anterior cerebral artery with near complete occlusion of R MCA. Outside window on TPA. Pt is now POD #1 following hemicraniectomy (). Pt admitted to ARU   Response to previous treatment: Patient with no complaints from previous session  Family / Caregiver Present: No  Referring Practitioner: Dr. Kamaljit Shields DO  Diagnosis: CVA  Subjective  Subjective: Pt sitting in w/c upon arrival, pleasant and agreeable to OT session excited to shower but requesting to use toilet. General Comment  Comments: Clement Fly Vital Signs  Patient Currently in Pain: Yes (5/10 LLE soreness and L hand pain)     Orientation  Orientation  Overall Orientation Status: Within Functional Limits  Objective    ADL  UE Bathing: Contact guard assistance; Increased time to complete;Verbal cueing;Setup with effort. Pt requested to put on voltaren gel to palmar aspect of hand, RN notified and approved. Pt provided with gel and applied in order to minimize pain. + time spent applying edema glove to L hand to decrease hypertonicity and risk for contractures. Pillow placed to LUE to promote external rotation and elbow extension. Pt left in bed at end of session with bed alarm engaged, call light within reach and all needs met.        Plan   Plan  Times per week: 5x a week for 60 mins daily  Times per day: Daily  Current Treatment Recommendations: Strengthening, Endurance Training, Neuromuscular Re-education, Self-Care / ADL, Functional Mobility Training, Safety Education & Training, ROM, Positioning, Wheelchair Mobility Training, Balance Training, Patient/Caregiver Education & Training, Manual Therapy:  MLD, Equipment Evaluation, Education, & procurement, Home Management Training, Cognitive Reorientation    Goals  Short term goals  Time Frame for Short term goals: 2 weeks  Short term goal 1: Pt will complete toilet transfer w/ Mod A-goal met 5/4  Short term goal 2: Pt will complete UE dressing w/ Mod A-goal met 5/12  Short term goal 3: Pt will complete oral care w/ spvn seated at sink w/o VCs for L sided attention-goal met 5/17  Short term goal 4: Pt will complete LE dressing w/ Mod A-goal met 5/26  Long term goals  Time Frame for Long term goals : 4 weeks  Long term goal 1: Pt will complete toilet transfer w/ SBA-ongoing  Long term goal 2: Pt will complete UE dressing w/ setup-ongoing  Long term goal 3: Pt will complete LE dressing w/ SBA-ongoing  Long term goal 4: Pt will be independent w/ tone management exercises to improve functional use of LUE-ongoing  Long term goal 5: Pt will complete oral care I'ly-goal met 5/24  Patient Goals   Patient goals : \"get back to my normal self\"       Therapy Time   Individual Concurrent Group Co-treatment   Time In 1000         Time Out 1100         Minutes 60         Timed Code Treatment Minutes: 60 Minutes     Second Session Therapy Time:   Individual Concurrent Group Co-treatment   Time In 5396        Time Out 5636        Minutes 54          Timed Code Treatment Minutes:  60 + 54     Total Treatment Minutes:  JAZMYN Payan

## 2021-06-12 NOTE — PROGRESS NOTES
PT up in chair, denies any pain. All meds taken without difficulty. Call light within reach and chair alarm engaged. For patient safety, Visual Surveillance is in place, reviewed with patient/family, verbalized understanding.   Vitals:    06/12/21 0836   BP: 115/67   Pulse: 95   Resp: 16   Temp: 98.6 °F (37 °C)   SpO2: 96%

## 2021-06-13 PROCEDURE — 6370000000 HC RX 637 (ALT 250 FOR IP): Performed by: PHYSICAL MEDICINE & REHABILITATION

## 2021-06-13 PROCEDURE — 6360000002 HC RX W HCPCS: Performed by: PHYSICAL MEDICINE & REHABILITATION

## 2021-06-13 PROCEDURE — 1280000000 HC REHAB R&B

## 2021-06-13 PROCEDURE — 99231 SBSQ HOSP IP/OBS SF/LOW 25: CPT | Performed by: PHYSICAL MEDICINE & REHABILITATION

## 2021-06-13 RX ADMIN — LEVETIRACETAM 500 MG: 500 TABLET ORAL at 21:29

## 2021-06-13 RX ADMIN — LEVETIRACETAM 500 MG: 500 TABLET ORAL at 09:51

## 2021-06-13 RX ADMIN — GUAIFENESIN 600 MG: 600 TABLET, EXTENDED RELEASE ORAL at 09:51

## 2021-06-13 RX ADMIN — ACETAMINOPHEN 1000 MG: 500 TABLET, COATED ORAL at 04:16

## 2021-06-13 RX ADMIN — FAMOTIDINE 20 MG: 20 TABLET, FILM COATED ORAL at 09:51

## 2021-06-13 RX ADMIN — HEPARIN SODIUM 5000 UNITS: 5000 INJECTION INTRAVENOUS; SUBCUTANEOUS at 13:52

## 2021-06-13 RX ADMIN — AMLODIPINE BESYLATE 5 MG: 5 TABLET ORAL at 09:51

## 2021-06-13 RX ADMIN — GUAIFENESIN 600 MG: 600 TABLET, EXTENDED RELEASE ORAL at 21:29

## 2021-06-13 RX ADMIN — DOCUSATE SODIUM 50 MG AND SENNOSIDES 8.6 MG 2 TABLET: 8.6; 5 TABLET, FILM COATED ORAL at 21:29

## 2021-06-13 RX ADMIN — FLUOXETINE 20 MG: 20 CAPSULE ORAL at 09:51

## 2021-06-13 RX ADMIN — HEPARIN SODIUM 5000 UNITS: 5000 INJECTION INTRAVENOUS; SUBCUTANEOUS at 21:30

## 2021-06-13 RX ADMIN — THERA TABS 1 TABLET: TAB at 09:51

## 2021-06-13 RX ADMIN — THIAMINE HCL TAB 100 MG 100 MG: 100 TAB at 09:51

## 2021-06-13 RX ADMIN — DOCUSATE SODIUM 50 MG AND SENNOSIDES 8.6 MG 2 TABLET: 8.6; 5 TABLET, FILM COATED ORAL at 09:51

## 2021-06-13 RX ADMIN — HEPARIN SODIUM 5000 UNITS: 5000 INJECTION INTRAVENOUS; SUBCUTANEOUS at 04:17

## 2021-06-13 RX ADMIN — ACETAMINOPHEN 1000 MG: 500 TABLET, COATED ORAL at 09:51

## 2021-06-13 RX ADMIN — ACETAMINOPHEN 1000 MG: 500 TABLET, COATED ORAL at 21:30

## 2021-06-13 RX ADMIN — FAMOTIDINE 20 MG: 20 TABLET, FILM COATED ORAL at 21:30

## 2021-06-13 RX ADMIN — OXYCODONE 10 MG: 5 TABLET ORAL at 13:57

## 2021-06-13 RX ADMIN — DICLOFENAC SODIUM 4 G: 10 GEL TOPICAL at 09:52

## 2021-06-13 RX ADMIN — CETIRIZINE HYDROCHLORIDE 10 MG: 10 TABLET, FILM COATED ORAL at 09:51

## 2021-06-13 RX ADMIN — METHYLPHENIDATE HYDROCHLORIDE 10 MG: 10 TABLET ORAL at 09:51

## 2021-06-13 RX ADMIN — ATORVASTATIN CALCIUM 80 MG: 80 TABLET, FILM COATED ORAL at 21:29

## 2021-06-13 RX ADMIN — ASPIRIN 81 MG: 81 TABLET, CHEWABLE ORAL at 09:51

## 2021-06-13 RX ADMIN — DICLOFENAC SODIUM 4 G: 10 GEL TOPICAL at 21:30

## 2021-06-13 ASSESSMENT — PAIN SCALES - GENERAL
PAINLEVEL_OUTOF10: 4
PAINLEVEL_OUTOF10: 0
PAINLEVEL_OUTOF10: 7
PAINLEVEL_OUTOF10: 0

## 2021-06-13 ASSESSMENT — PAIN DESCRIPTION - FREQUENCY
FREQUENCY: INTERMITTENT
FREQUENCY: INTERMITTENT

## 2021-06-13 ASSESSMENT — PAIN DESCRIPTION - PAIN TYPE
TYPE: ACUTE PAIN
TYPE: ACUTE PAIN

## 2021-06-13 ASSESSMENT — PAIN DESCRIPTION - ORIENTATION
ORIENTATION: LEFT
ORIENTATION: LEFT

## 2021-06-13 ASSESSMENT — PAIN DESCRIPTION - PROGRESSION
CLINICAL_PROGRESSION: GRADUALLY IMPROVING
CLINICAL_PROGRESSION: GRADUALLY WORSENING

## 2021-06-13 ASSESSMENT — PAIN DESCRIPTION - LOCATION
LOCATION: FOOT;HAND
LOCATION: HAND;FOOT

## 2021-06-13 ASSESSMENT — PAIN DESCRIPTION - DESCRIPTORS
DESCRIPTORS: BURNING
DESCRIPTORS: BURNING;DISCOMFORT

## 2021-06-13 ASSESSMENT — PAIN DESCRIPTION - ONSET
ONSET: ON-GOING
ONSET: ON-GOING

## 2021-06-13 NOTE — PLAN OF CARE
Problem: Pain:  Goal: Pain level will decrease  Description: Pain level will decrease  Outcome: Ongoing  Note: No complaints of pain voiced. Problem: Falls - Risk of:  Goal: Will remain free from falls  Description: Will remain free from falls  Outcome: Ongoing  Note: Pt is a Med fall risk. Explained fall risk precautions to pt and rationale behind their use to keep the patient safe. Belongings are in reach. Pt encouraged to notify staff for any and all assistance. Staff present in tx suite throughout entirety of pts treatment to monitor and protect from falls. Assistance provided when ambulating to restroom utilizing Stay With Me. Problem: Skin Integrity:  Goal: Absence of new skin breakdown  Description: Absence of new skin breakdown  Outcome: Ongoing  Note: No evidence of skin breakdown.

## 2021-06-13 NOTE — PLAN OF CARE
Problem: Falls - Risk of:  Goal: Will remain free from falls  Description: Will remain free from falls  6/13/2021 1107 by Sue Barnhart RN  Outcome: Ongoing  Note: Pt remains free of falls thus far this shift. All fall precautions in place and functioning properly. For patient safety, Visual Surveillance is in place, reviewed with patient/family, verbalized understanding. Problem: Nutrition  Goal: Optimal nutrition therapy  Outcome: Ongoing  Note: Continues to eat % of all meals.

## 2021-06-13 NOTE — PROGRESS NOTES
Department of Physical Medicine & Rehabilitation  Progress Note    Patient Identification:  Colton Sutherland  [id number]  : 1970  Admit date: 2021    Chief Complaint: CVA    Subjective:   Doing well today and excited about hitting some ADL milestones yesterday. No new complaints. ROS: No f/c, n/v, cp     Objective:  Patient Vitals for the past 24 hrs:   BP Temp Temp src Pulse Resp SpO2   21 2255     16    21 2135 121/83 98.1 °F (36.7 °C) Oral 75 17 95 %   21 0836 115/67 98.6 °F (37 °C) Oral 95 16 96 %     Const: Alert. No distress, pleasant. HEENT: Normocephalic, atraumatic. Normal sclera/conjunctiva. MMM. Incision healing well  CV: Regular rate and rhythm. Resp: No respiratory distress. Lungs CTAB. Abd: Soft, nontender, nondistended, NABS+   Ext: No edema. Neuro: Alert, oriented, appropriately interactive. Psych: Cooperative, appropriate mood and affect    Laboratory data: Available via EMR. Last 24 hour lab  No results found for this or any previous visit (from the past 24 hour(s)).     Therapy progress:  PT  Position Activity Restriction  Other position/activity restrictions: Ambulate, Up with assist, Pt to wear helmet when OOB, OK to remove for shower, HOB to elevated at 30*  Objective     Sit to Stand: Contact guard assistance, Minimal Assistance (pt fluctuating between CGA-Hugo throughout session, from EOB, toilet, and w/c to no AD or SPC, assist for LLE placement prior to transfer and faciliation for neutral pelvis alignment during transfer, pt reporting issues with initiation this date)  Stand to sit: Contact guard assistance (cues for hand placement, occasional poor eccentric control, espeically when sitting from pivot toward pt's R)  Bed to Chair: Minimal assistance (for balance during transfer)  Device: Single point cane  Other Apparatus: AFO, Left, Wheelchair follow  Assistance: Minimal assistance, Moderate assistance (pt fluctuating between Min-ModA) Distance: 13'  OT  PT Equipment Recommendations  Equipment Needed: Yes (continue to assess)  Mobility Devices: Wheelchair  Wheelchair: Light Weight  Other: Pt will require a 20\" hemiheight w/c with drop armrests and standard 90 degree legrest, with pressure relieving cushion. Pt is currently unable to safely ambulate household distances with use of RW or SPC, limiting her ability to access areas of home such as kitchen and bathroom. Due to being unable to access these areas, pt will be unable to perform ADL tasks. Use of a w/c will allow pt to access these areas. Pt has appropriate UB strength to propel w/c and has not expressed an unwillingness to use w/c at d/c. Toilet - Technique: Stand pivot  Equipment Used: Raised toilet seat with rails  Toilet Transfers Comments: Use of SPC, cues to sequence pivot and for hand placement, use of GB upon stance  Assessment        SLP  Diet Solids Recommendation: Dysphagia Soft and Bite-Sized (Dysphagia III) (with regular solids as tolerated. Per pt request, gravy/sauces to keep for moist.)       Body mass index is 35.48 kg/m². Assessment and Plan:  Stephen Hinds a 46 y.o. female with PMH GERD p/w L sided weakness and facial droop.  CTA head/neck on 4/12 revealed occluded R cervical ICA, occluded right LEAH, near complete occlusion of right MCA.     Acute ischemic CVA, Subdural heomorrhage s/p right hemicraniectomy and subsequen SAH and IP hemorrhage  No tPA given.  MRI 4/14 showing large subacute infarct throughout R LEAH and MCA with some areas of hemorrhagic conversion.  - Neurosurgery and neurology following  - SBP <160, PRN hydralazine, scheduled Norvasc 5 mg daily  - Keppra 500 mg twice daily   - SQH  - PT/OT      Aphasia - SLP   Depression - Home fluoxetine 20 mg daily at home  Anxiety - Hold home Ativan 0.5 mg every 8 hours PRN for anxiety  MISHA - Patient uses CPAP at home  GERD - omeprazole 40 mg daily at home- substituted for pepcid 20 mg BID  Obesity    Rehab Progress: Improving  Anticipated Dispo: home  Services/DME: TTB and RTS (neither covered by insurance, friend to obtain)  ELOS: - extension needed       TITO AnguloPEdgarHEdgar  PM&R  6/13/2021  8:12 AM

## 2021-06-13 NOTE — PROGRESS NOTES
Assisted back to bed, All meds taken without difficulty. Call light within reach and bed alarm engaged. For patient safety, Visual Surveillance is in place, reviewed with patient/family, verbalized understanding.   Vitals:    06/13/21 0945   BP: 127/82   Pulse: 85   Resp: 16   Temp: 98 °F (36.7 °C)   SpO2: 95%

## 2021-06-14 LAB
ANION GAP SERPL CALCULATED.3IONS-SCNC: 13 MMOL/L (ref 3–16)
ATYPICAL LYMPHOCYTE RELATIVE PERCENT: 9 % (ref 0–6)
BANDED NEUTROPHILS RELATIVE PERCENT: 1 % (ref 0–7)
BASOPHILS ABSOLUTE: 0 K/UL (ref 0–0.2)
BASOPHILS RELATIVE PERCENT: 0 %
BUN BLDV-MCNC: 9 MG/DL (ref 7–20)
CALCIUM SERPL-MCNC: 9.6 MG/DL (ref 8.3–10.6)
CHLORIDE BLD-SCNC: 104 MMOL/L (ref 99–110)
CO2: 23 MMOL/L (ref 21–32)
CREAT SERPL-MCNC: <0.5 MG/DL (ref 0.6–1.1)
EOSINOPHILS ABSOLUTE: 0.6 K/UL (ref 0–0.6)
EOSINOPHILS RELATIVE PERCENT: 5 %
GFR AFRICAN AMERICAN: >60
GFR NON-AFRICAN AMERICAN: >60
GLUCOSE BLD-MCNC: 96 MG/DL (ref 70–99)
HCT VFR BLD CALC: 39.6 % (ref 36–48)
HEMOGLOBIN: 13.3 G/DL (ref 12–16)
LYMPHOCYTES ABSOLUTE: 6.1 K/UL (ref 1–5.1)
LYMPHOCYTES RELATIVE PERCENT: 41 %
MCH RBC QN AUTO: 34.2 PG (ref 26–34)
MCHC RBC AUTO-ENTMCNC: 33.7 G/DL (ref 31–36)
MCV RBC AUTO: 101.4 FL (ref 80–100)
MONOCYTES ABSOLUTE: 0.9 K/UL (ref 0–1.3)
MONOCYTES RELATIVE PERCENT: 7 %
NEUTROPHILS ABSOLUTE: 4.6 K/UL (ref 1.7–7.7)
NEUTROPHILS RELATIVE PERCENT: 37 %
PDW BLD-RTO: 13.5 % (ref 12.4–15.4)
PLATELET # BLD: 407 K/UL (ref 135–450)
PLATELET SLIDE REVIEW: ABNORMAL
PMV BLD AUTO: 8.7 FL (ref 5–10.5)
POTASSIUM REFLEX MAGNESIUM: 3.7 MMOL/L (ref 3.5–5.1)
RBC # BLD: 3.91 M/UL (ref 4–5.2)
RBC # BLD: NORMAL 10*6/UL
SLIDE REVIEW: ABNORMAL
SODIUM BLD-SCNC: 140 MMOL/L (ref 136–145)
WBC # BLD: 12.2 K/UL (ref 4–11)

## 2021-06-14 PROCEDURE — 85025 COMPLETE CBC W/AUTO DIFF WBC: CPT

## 2021-06-14 PROCEDURE — 1280000000 HC REHAB R&B

## 2021-06-14 PROCEDURE — 97110 THERAPEUTIC EXERCISES: CPT

## 2021-06-14 PROCEDURE — 97116 GAIT TRAINING THERAPY: CPT

## 2021-06-14 PROCEDURE — 6370000000 HC RX 637 (ALT 250 FOR IP): Performed by: PHYSICAL MEDICINE & REHABILITATION

## 2021-06-14 PROCEDURE — 80048 BASIC METABOLIC PNL TOTAL CA: CPT

## 2021-06-14 PROCEDURE — 94660 CPAP INITIATION&MGMT: CPT

## 2021-06-14 PROCEDURE — 97130 THER IVNTJ EA ADDL 15 MIN: CPT

## 2021-06-14 PROCEDURE — 97535 SELF CARE MNGMENT TRAINING: CPT

## 2021-06-14 PROCEDURE — 97530 THERAPEUTIC ACTIVITIES: CPT

## 2021-06-14 PROCEDURE — 99231 SBSQ HOSP IP/OBS SF/LOW 25: CPT | Performed by: PHYSICAL MEDICINE & REHABILITATION

## 2021-06-14 PROCEDURE — 97129 THER IVNTJ 1ST 15 MIN: CPT

## 2021-06-14 PROCEDURE — 6360000002 HC RX W HCPCS: Performed by: PHYSICAL MEDICINE & REHABILITATION

## 2021-06-14 PROCEDURE — 36415 COLL VENOUS BLD VENIPUNCTURE: CPT

## 2021-06-14 PROCEDURE — 97112 NEUROMUSCULAR REEDUCATION: CPT

## 2021-06-14 RX ADMIN — FAMOTIDINE 20 MG: 20 TABLET, FILM COATED ORAL at 21:33

## 2021-06-14 RX ADMIN — LEVETIRACETAM 500 MG: 500 TABLET ORAL at 21:33

## 2021-06-14 RX ADMIN — FAMOTIDINE 20 MG: 20 TABLET, FILM COATED ORAL at 10:46

## 2021-06-14 RX ADMIN — OXYCODONE HYDROCHLORIDE 5 MG: 5 TABLET ORAL at 11:15

## 2021-06-14 RX ADMIN — LEVETIRACETAM 500 MG: 500 TABLET ORAL at 10:46

## 2021-06-14 RX ADMIN — ATORVASTATIN CALCIUM 80 MG: 80 TABLET, FILM COATED ORAL at 21:33

## 2021-06-14 RX ADMIN — ACETAMINOPHEN 1000 MG: 500 TABLET, COATED ORAL at 06:05

## 2021-06-14 RX ADMIN — DICLOFENAC SODIUM 4 G: 10 GEL TOPICAL at 21:32

## 2021-06-14 RX ADMIN — ASPIRIN 81 MG: 81 TABLET, CHEWABLE ORAL at 10:46

## 2021-06-14 RX ADMIN — ACETAMINOPHEN 1000 MG: 500 TABLET, COATED ORAL at 12:21

## 2021-06-14 RX ADMIN — METHYLPHENIDATE HYDROCHLORIDE 10 MG: 10 TABLET ORAL at 10:45

## 2021-06-14 RX ADMIN — Medication 5 MG: at 10:47

## 2021-06-14 RX ADMIN — THIAMINE HCL TAB 100 MG 100 MG: 100 TAB at 10:46

## 2021-06-14 RX ADMIN — DOCUSATE SODIUM 50 MG AND SENNOSIDES 8.6 MG 2 TABLET: 8.6; 5 TABLET, FILM COATED ORAL at 21:32

## 2021-06-14 RX ADMIN — DICLOFENAC SODIUM 4 G: 10 GEL TOPICAL at 10:48

## 2021-06-14 RX ADMIN — ACETAMINOPHEN 1000 MG: 500 TABLET, COATED ORAL at 21:33

## 2021-06-14 RX ADMIN — HEPARIN SODIUM 5000 UNITS: 5000 INJECTION INTRAVENOUS; SUBCUTANEOUS at 15:21

## 2021-06-14 RX ADMIN — GUAIFENESIN 600 MG: 600 TABLET, EXTENDED RELEASE ORAL at 10:45

## 2021-06-14 RX ADMIN — HEPARIN SODIUM 5000 UNITS: 5000 INJECTION INTRAVENOUS; SUBCUTANEOUS at 06:05

## 2021-06-14 RX ADMIN — OXYCODONE 10 MG: 5 TABLET ORAL at 18:22

## 2021-06-14 RX ADMIN — THERA TABS 1 TABLET: TAB at 10:46

## 2021-06-14 RX ADMIN — OXYCODONE HYDROCHLORIDE 5 MG: 5 TABLET ORAL at 06:05

## 2021-06-14 RX ADMIN — CETIRIZINE HYDROCHLORIDE 10 MG: 10 TABLET, FILM COATED ORAL at 10:46

## 2021-06-14 RX ADMIN — HEPARIN SODIUM 5000 UNITS: 5000 INJECTION INTRAVENOUS; SUBCUTANEOUS at 21:34

## 2021-06-14 RX ADMIN — FLUOXETINE 20 MG: 20 CAPSULE ORAL at 10:45

## 2021-06-14 RX ADMIN — AMLODIPINE BESYLATE 5 MG: 5 TABLET ORAL at 10:47

## 2021-06-14 RX ADMIN — GUAIFENESIN 600 MG: 600 TABLET, EXTENDED RELEASE ORAL at 21:33

## 2021-06-14 RX ADMIN — DOCUSATE SODIUM 50 MG AND SENNOSIDES 8.6 MG 2 TABLET: 8.6; 5 TABLET, FILM COATED ORAL at 10:47

## 2021-06-14 ASSESSMENT — PAIN DESCRIPTION - PAIN TYPE
TYPE: ACUTE PAIN
TYPE: ACUTE PAIN

## 2021-06-14 ASSESSMENT — PAIN DESCRIPTION - ONSET
ONSET: ON-GOING
ONSET: ON-GOING

## 2021-06-14 ASSESSMENT — PAIN SCALES - GENERAL
PAINLEVEL_OUTOF10: 0
PAINLEVEL_OUTOF10: 9
PAINLEVEL_OUTOF10: 0
PAINLEVEL_OUTOF10: 6
PAINLEVEL_OUTOF10: 2
PAINLEVEL_OUTOF10: 2
PAINLEVEL_OUTOF10: 0
PAINLEVEL_OUTOF10: 2
PAINLEVEL_OUTOF10: 4

## 2021-06-14 ASSESSMENT — PAIN DESCRIPTION - ORIENTATION
ORIENTATION: LEFT
ORIENTATION: LEFT

## 2021-06-14 ASSESSMENT — PAIN DESCRIPTION - DESCRIPTORS
DESCRIPTORS: PATIENT UNABLE TO DESCRIBE
DESCRIPTORS: DISCOMFORT

## 2021-06-14 ASSESSMENT — PAIN DESCRIPTION - LOCATION
LOCATION: HIP
LOCATION: HAND;ARM

## 2021-06-14 ASSESSMENT — PAIN DESCRIPTION - PROGRESSION: CLINICAL_PROGRESSION: GRADUALLY IMPROVING

## 2021-06-14 ASSESSMENT — PAIN DESCRIPTION - FREQUENCY
FREQUENCY: CONTINUOUS
FREQUENCY: INTERMITTENT

## 2021-06-14 NOTE — PROGRESS NOTES
Department of Physical Medicine & Rehabilitation  Progress Note    Patient Identification:  Jacek Powers  [id number]  : 1970  Admit date: 2021    Chief Complaint: CVA    Subjective:   Feeling well and very motivated. Still having pain in the left hand. Participating well in therapy. ROS: No f/c, n/v, cp     Objective:  Patient Vitals for the past 24 hrs:   BP Temp Temp src Pulse Resp SpO2 Weight   21 0600       226 lb 6.6 oz (102.7 kg)   21 0039     16     21 2018 (!) 152/87 98.3 °F (36.8 °C) Oral 87 16 95 %    21 0945 127/82 98 °F (36.7 °C) Oral 85 16 95 %      Const: Alert. No distress, pleasant. HEENT: Normocephalic, atraumatic. Normal sclera/conjunctiva. MMM. Incision healing well  CV: Regular rate and rhythm. Resp: No respiratory distress. Lungs CTAB. Abd: Soft, nontender, nondistended, NABS+   Ext: No edema. Neuro: Alert, oriented, appropriately interactive. Psych: Cooperative, appropriate mood and affect    Laboratory data: Available via EMR.    Last 24 hour lab  Recent Results (from the past 24 hour(s))   Basic Metabolic Panel w/ Reflex to MG    Collection Time: 21  6:04 AM   Result Value Ref Range    Sodium 140 136 - 145 mmol/L    Potassium reflex Magnesium 3.7 3.5 - 5.1 mmol/L    Chloride 104 99 - 110 mmol/L    CO2 23 21 - 32 mmol/L    Anion Gap 13 3 - 16    Glucose 96 70 - 99 mg/dL    BUN 9 7 - 20 mg/dL    CREATININE <0.5 (L) 0.6 - 1.1 mg/dL    GFR Non-African American >60 >60    GFR African American >60 >60    Calcium 9.6 8.3 - 10.6 mg/dL   CBC auto differential    Collection Time: 21  6:04 AM   Result Value Ref Range    WBC 12.2 (H) 4.0 - 11.0 K/uL    RBC 3.91 (L) 4.00 - 5.20 M/uL    Hemoglobin 13.3 12.0 - 16.0 g/dL    Hematocrit 39.6 36.0 - 48.0 %    .4 (H) 80.0 - 100.0 fL    MCH 34.2 (H) 26.0 - 34.0 pg    MCHC 33.7 31.0 - 36.0 g/dL    RDW 13.5 12.4 - 15.4 %    Platelets 711 075 - 086 K/uL    MPV 8.7 5.0 - 10.5 fL Therapy progress:  PT  Position Activity Restriction  Other position/activity restrictions: Ambulate, Up with assist, Pt to wear helmet when OOB, OK to remove for shower, HOB to elevated at 30*  Objective     Sit to Stand: Contact guard assistance, Minimal Assistance (pt fluctuating between CGA-Hugo throughout session, from EOB, toilet, and w/c to no AD or SPC, assist for LLE placement prior to transfer and faciliation for neutral pelvis alignment during transfer, pt reporting issues with initiation this date)  Stand to sit: Contact guard assistance (cues for hand placement, occasional poor eccentric control, espeically when sitting from pivot toward pt's R)  Bed to Chair: Minimal assistance (for balance during transfer)  Device: Single point cane  Other Apparatus: AFO, Left, Wheelchair follow  Assistance: Minimal assistance, Moderate assistance (pt fluctuating between Min-ModA)  Distance: 15'  OT  PT Equipment Recommendations  Equipment Needed: Yes (continue to assess)  Mobility Devices: Wheelchair  Wheelchair: Light Weight  Other: Pt will require a 20\" hemiheight w/c with drop armrests and standard 90 degree legrest, with pressure relieving cushion. Pt is currently unable to safely ambulate household distances with use of RW or SPC, limiting her ability to access areas of home such as kitchen and bathroom. Due to being unable to access these areas, pt will be unable to perform ADL tasks. Use of a w/c will allow pt to access these areas. Pt has appropriate UB strength to propel w/c and has not expressed an unwillingness to use w/c at d/c. Toilet - Technique: Stand pivot  Equipment Used: Raised toilet seat with rails  Toilet Transfers Comments: Use of SPC, cues to sequence pivot and for hand placement, use of GB upon stance  Assessment        SLP  Diet Solids Recommendation: Dysphagia Soft and Bite-Sized (Dysphagia III) (with regular solids as tolerated.  Per pt request, gravy/sauces to keep for moist.) Body mass index is 36.54 kg/m². Assessment and Plan:  Salena Gonzales a 46 y.o. female with PMH GERD p/w L sided weakness and facial droop.  CTA head/neck on 4/12 revealed occluded R cervical ICA, occluded right LEAH, near complete occlusion of right MCA.     Acute ischemic CVA, Subdural heomorrhage s/p right hemicraniectomy and subsequen SAH and IP hemorrhage  No tPA given.  MRI 4/14 showing large subacute infarct throughout R LEAH and MCA with some areas of hemorrhagic conversion.  - Neurosurgery and neurology following  - SBP <160, PRN hydralazine, scheduled Norvasc 5 mg daily  - Keppra 500 mg twice daily   - SQH  - PT/OT      Aphasia - SLP   Depression - Home fluoxetine 20 mg daily at home  Anxiety - Hold home Ativan 0.5 mg every 8 hours PRN for anxiety  MISHA - Patient uses CPAP at home  GERD - omeprazole 40 mg daily at home- substituted for pepcid 20 mg BID  Obesity    Rehab Progress: Improving  Anticipated Dispo: home  Services/DME: TTB and RTS (neither covered by insurance, friend to obtain)  ELOS: - extension needed       Megan Acevedo D.O. M.P.H.  PM&R  6/14/2021  8:27 AM

## 2021-06-14 NOTE — PROGRESS NOTES
Physical Therapy  Facility/Department: [facility] ACUTE REHAB UNIT  Daily Treatment Note  NAME: Jorge Coleman  : 1970  MRN: [id number]    Date of Service: 2021    Discharge Recommendations:  24 hour supervision or assist, Home with Home health PT, Continue to assess pending progress   PT Equipment Recommendations  Equipment Needed: Yes  Wheelchair: Light Weight  Other: Pt will require a 20\" hemiheight w/c with drop armrests and standard 90 degree legrest, with pressure relieving cushion. Pt is currently unable to safely ambulate household distances with use of RW or SPC, limiting her ability to access areas of home such as kitchen and bathroom. Due to being unable to access these areas, pt will be unable to perform ADL tasks. Use of a w/c will allow pt to access these areas. Pt has appropriate UB strength to propel w/c and has not expressed an unwillingness to use w/c at d/c. Assessment   Body structures, Functions, Activity limitations: Decreased sensation;Decreased ROM; Decreased strength;Decreased endurance;Decreased balance;Decreased posture;Decreased vision/visual deficit; Decreased coordination;Decreased cognition;Decreased fine motor control;Decreased safe awareness  Assessment: Pt continues to demonstrate improved ability with gait and transfers, but still requiring facilitation and VC for improved safety. Pt still functioning well below her baseline for mobility and would benefit from continued therapy to increase her independnce. Treatment Diagnosis: Decreased independence with functional mobility. Prognosis: Good  Barriers to Learning: Cognition  REQUIRES PT FOLLOW UP: Yes  Activity Tolerance  Activity Tolerance: Patient limited by fatigue;Patient limited by endurance     Patient Diagnosis(es): There were no encounter diagnoses. has a past medical history of GERD (gastroesophageal reflux disease), Hernia, Obesity, and Unspecified sleep apnea.    has a past surgical history that includes Assistance: Modified independent  Right Brakes Level of Assistance: Independent  Propulsion: Yes  Propulsion 1  Propulsion: Manual  Level: Level Tile  Method: RUE;RLE  Level of Assistance: Supervision (VC for linear pathway and avoidance of objects on the L in pathway)  Description/ Details: Pt occasionally drifting toward L side of hallway, requiring cues to correct pathway. Pt navigating turns and doorways. Distance: 180', 230'        Exercises  Bridging:  (2 x 15 reps of B supine bridges with a 3 sec hold. x 15 reps of single leg fig 4 bridges on the L with a 3 sec hold.)  Comments: 2 x 15 reps of lower trunk rotations         Comment: step by step instruction with the pt on donning AFO, however, with therapist having increased difficulty donning pt likdeborah to continue needing a 2nd person to asssit         2nd SEssion: Pt was sitting on the commode with RN present. Pt agreeable to PT intervetnion. Pt completed seated and standying dynamic reaching to complete pericare, lower body dressing and hand hygiene with CGA-MIN A. Pt completed multiple reps of sit <> stand transfers throughout the session with CGA-MIN A. Pt completed 2 x 15 reps of fwd then backwards stepping on B LEs with a SPC and CGA-Juan, but consistently needing facilitation to prevent L knee hyperextension. Ambulation  Ambulation?: Yes  WB Status: No WB restrictions  More Ambulation?: Yes  Ambulation 1  Surface: level tile  Device: Single point cane  Other Apparatus: AFO; Wheelchair follow (Custom molded AFO with a figure \"8 \"strap at ankle)  Assistance: Minimal assistance  Quality of Gait: Pt req manual contacts  to facilitate a neutral hip posistion and intermittently assist pt with  L swing through( pt able to advance LLE consistently but req assist with proper foot placement since pt IR. Pt req VC for sequencing gt including increasing step length to facilitate a reciprocal gt pattern.  Once consistently taking increased R step length her L step length and foot clearance also improved  Gait Deviations: Slow Herlinda;Decreased step height;Decreased step length (Pt tends to rotate upper body excessively resulting in increased gt deviations)  Distance: 40', 10'    Stairs/Curb  Stairs?: No  Wheelchair Activities  Wheelchair Size: 20\"  Wheelchair Type: Standard  Wheelchair Parts Management: Yes  Left Leg Rest Level of Assistance: Supervision (VC to move shoe philip out of the way)  Left Brakes Level of Assistance: Modified independent  Right Brakes Level of Assistance: Independent  Propulsion: Yes  Propulsion 1  Propulsion: Manual  Level: Level Tile  Method: RUE;RLE  Level of Assistance: Supervision (VC for linear pathway and avoidance of objects on the L in pathway)  Description/ Details: Pt navigating turns and doorways. Distance:2 x 160'    Pt was sitting in the w/c at the end of the session with chair alarm on, call light and all needs within reach. G-Code     OutComes Score                                                     AM-PAC Score             Goals  Short term goals  Time Frame for Short term goals: 2 weeks  Short term goal 1: Pt will complete bed mobility with MIN A. Goal achieved  Short term goal 2: Pt will transfer sit <> stand with MIN A, met 4/30/2021  Short term goal 3: Pt will transfer bed <> chair with MIN A. Goal achieved  Short term goal 4: Pt will propel the w/c 50' with SBA. met 4/30/2021  Short term goal 5: Pt will ambulate 5' with LRAD and MOD A. Goal achieved  Long term goals  Time Frame for Long term goals : 4 Weeks  Long term goal 1: Pt will complete bed mobility with SBA. Ongoing  Long term goal 2: Pt will transfer sit <> stand with SBA. Ongoing  Long term goal 3: Pt will transfer bed <> chair with SBA. Ongoing  Long term goal 4: Pt will propel the w/c 150' independently. goal met  Long term goal 5: Pt will ambulate 22' with LRAD and MIN A.  Ongoing  Patient Goals   Patient goals : Return home and back to caring for her son Plan    Plan  Times per week: 5x/wk for 60 minutes/day  Times per day: Daily  Current Treatment Recommendations: Strengthening, ROM, Balance Training, Endurance Training, Functional Mobility Training, Transfer Training, Gait Training, Safety Education & Training, Home Exercise Program, Patient/Caregiver Education & Training, Neuromuscular Re-education, Stair training  Safety Devices  Type of devices:  All fall risk precautions in place, Call light within reach, Chair alarm in place, Gait belt, Left in chair     Therapy Time   Individual Concurrent Group Co-treatment   Time In 0730         Time Out 0830         Minutes 60           Second Session Therapy Time:   Individual Concurrent Group Co-treatment   Time In 1100         Time Out 1140         Minutes 40           Timed Code Treatment Minutes:  60+40    Total Treatment Minutes:  [address], PT

## 2021-06-14 NOTE — PLAN OF CARE
Problem: Pain:  Goal: Pain level will decrease  Description: Pain level will decrease  Outcome: Ongoing  Note: No complaints of pain voiced. Problem: Falls - Risk of:  Goal: Will remain free from falls  Description: Will remain free from falls  6/14/2021 0042 by Vanda Barraza RN  Outcome: Ongoing  Note: Pt is a High fall risk. Explained fall risk precautions to pt and rationale behind their use to keep the patient safe. Belongings are in reach. Pt encouraged to notify staff for any and all assistance. Staff present in tx suite throughout entirety of pts treatment to monitor and protect from falls. Assistance provided when ambulating to restroom utilizing Stay With Me. Problem: Skin Integrity:  Goal: Absence of new skin breakdown  Description: Absence of new skin breakdown  Outcome: Ongoing  Note: No evidence of skin breakdown.

## 2021-06-14 NOTE — PROGRESS NOTES
Pt awake in wheelchair. Physical assessment and vital signs as charted. Pt currently rates her pain as a 4 out of 10 on the pain scale, pain medication given at this time. Call light placed within reach. RN will continue to monitor Pt.

## 2021-06-14 NOTE — PROGRESS NOTES
Pt A & O. VSS. LUE/LLE flaccid. Pt x 1-2 st pivot. All safety precautions in place. Bed alarm activated. Non skid socks on. No complaints of pain or nausea voiced. Tolerates diet. Neuro checks remain unchanged. Will cont to monitor.

## 2021-06-14 NOTE — PROGRESS NOTES
Occupational Therapy  Facility/Department: [facility] ACUTE REHAB UNIT  Daily Treatment Note  NAME: Yelena Fuentes  : 1970  MRN: [id number]    Date of Service: 2021    Discharge Recommendations:  24 hour supervision or assist, Home with Home health OT, Continue to assess pending progress, Home with nursing aide  OT Equipment Recommendations  Equipment Needed: Yes  ADL Assistive Devices: Transfer Tub Bench;Grab Bars - toilet; Toileting - Raised Toilet Seat with arms; Reacher;Grab Bars - tub  Other: continue to assess    Assessment   Performance deficits / Impairments: Decreased functional mobility ; Decreased ADL status; Decreased ROM; Decreased strength;Decreased sensation;Decreased fine motor control;Decreased endurance;Decreased posture;Decreased balance;Decreased safe awareness;Decreased coordination;Decreased vision/visual deficit; Decreased cognition  Assessment: Pt able to manage LB clothing off hips for toileting in stance with CGA for first time this date with + time and encouragement, however pt continues to require assist with management of LB clothing over hips. Pt demonstrated activation of shoulder to complete sh elevation and wrist pronation this date with increased effort. Pt limited by hypersensitivity of LUE but continues to be motivated to regain independence. Cont per OT POC. Treatment Diagnosis: decreased independence with ADLs and decreased functional mobility 2/2 CVA  Prognosis: Fair  OT Education: Plan of Care;Transfer Training;ADL Adaptive Strategies; Home Exercise Program  Patient Education: pt educated on visually attending to LUE to maintain open grasp to prevent flexion contracture and optimize positioning-pt verb and demo understanding, continue to reinforce  Barriers to Learning: cognition  REQUIRES OT FOLLOW UP: Yes  Activity Tolerance  Activity Tolerance: Patient Tolerated treatment well  Safety Devices  Safety Devices in place: Yes  Type of devices: Left in chair;Call light within Objective    ADL  Grooming: Setup (hand hygiene seated in w/c, setup of towels to dry)  Toileting: Minimal assistance; Increased time to complete;Setup (pt continent of bladder, pt able to doff pants in stance with CGA and + time, assist to manage brief over hips in stance on L side, pt able to manage pants with + time)        Balance  Sitting Balance: Supervision (RTS)  Standing Balance: Contact guard assistance  Standing Balance  Time: ~2 minutes total  Activity: functional transfers; in stance for LB ADLs/ clothing mgmt for toileting  Toilet Transfers  Toilet - Technique: Stand pivot  Equipment Used: Raised toilet seat with rails  Toilet Transfer: Contact guard assistance  Toilet Transfers Comments: Use of SPC, cues to sequence pivot and for hand placement, use of GB upon stance                                         Positioning  Adaptations: edema glove donned to L hand with + time d/t hypersensitivity        Exercises  Scapular Protraction: x 10 AAROM, most movement elicited was from trunk compensation  Scapular Retraction: x 10 AAROM, most movement elicited was from trunk compensation  Shoulder Elevation: Pt demo x3 AROM after provided resistance/sh depression however pt with inconsistent carryover with just VCs  Shoulder Flexion: x 5 PROM with air cast donned to maintain elbow in extension tolerating ~100 degrees  Horizontal ABduction: x10 AAROM with air cast donned and use of arm skate for decreased friction, ~8 degrees of activation noted, trunk compensation  Horizontal ADduction: x10 AROM air cast donned, use of arm skate for decreased friction, ~20 degrees of activation noted  Supination: x8 PROM  Pronation: x8 AROM after demo and TCs  Wrist Flexion: x8 PROM  Wrist Extension: x8 PROM  Finger Extension: x5 with cues to visually attend to L hand; forearm and hand placed against tabletop to promote finger extension, pt with increased sensitivity, use of vibrating wand on low setting in attempt to desensitize/pain mgmt, pt tolerated well                    Plan   Plan  Times per week: 5x a week for 60 mins daily  Times per day: Daily  Current Treatment Recommendations: Strengthening, Endurance Training, Neuromuscular Re-education, Self-Care / ADL, Functional Mobility Training, Safety Education & Training, ROM, Positioning, Wheelchair Mobility Training, Balance Training, Patient/Caregiver Education & Training, Manual Therapy:  MLD, Equipment Evaluation, Education, & procurement, Home Management Training, Cognitive Reorientation    Goals  Short term goals  Time Frame for Short term goals: 2 weeks  Short term goal 1: Pt will complete toilet transfer w/ Mod A-goal met 5/4  Short term goal 2: Pt will complete UE dressing w/ Mod A-goal met 5/12  Short term goal 3: Pt will complete oral care w/ spvn seated at sink w/o VCs for L sided attention-goal met 5/17  Short term goal 4: Pt will complete LE dressing w/ Mod A-goal met 5/26  Long term goals  Time Frame for Long term goals : 4 weeks  Long term goal 1: Pt will complete toilet transfer w/ SBA-ongoing  Long term goal 2: Pt will complete UE dressing w/ setup-ongoing  Long term goal 3: Pt will complete LE dressing w/ SBA-ongoing  Long term goal 4: Pt will be independent w/ tone management exercises to improve functional use of LUE-ongoing  Long term goal 5: Pt will complete oral care I'ly-goal met 5/24  Patient Goals   Patient goals : \"get back to my normal self\"       Therapy Time   Individual Concurrent Group Co-treatment   Time In 0930         Time Out 1030         Minutes 60         Timed Code Treatment Minutes: [address] [facility], OT

## 2021-06-14 NOTE — PROGRESS NOTES
ACUTE REHAB UNIT  SPEECH/LANGUAGE PATHOLOGY      [x] Daily  [] Weekly Care Conference Note  [] Discharge    Patient:Laura Freed      XBK:[birth year]  [facility]:[id number]  Rehab Dx/Hx: Acute CVA (cerebrovascular accident) ([facility] [address] [I63.9]    Precautions: [x] Aspiration  [x] Fall risk  [] Sternal  [] Seizure [] Hip  [] Weight Bearing [] Other  ST Dx: [] Aphasia  [] Dysarthria  [] Apraxia   [x] Oropharyngeal dysphagia [x] Cognitive Impairment  [] Other:   Date of Admit: 4/23/2021  Room #: 3101/3101-01  Date: 6/14/2021        Current Diet Order:DIET GENERAL;   Recommended Form of Meds: PO  Compensatory Swallowing Strategies: Alternate solids and liquids, Upright as possible for all oral intake, Check for pocketing of food on the Left, Small bites/sips, Lingual sweep   Subjective: Pt admitted 4/12 after fall with left sided paralysis. Pt underwent craniectomy on 4/13 per Dr. Nell Ferrari. Social/Functional History  Lives With: Spouse;Son( reported that son is diagnosed with Autism.)  Occupation: Full time employment  Type of occupation:  at [employer]"    FEES 4/14: IMPRESSIONS:   Pt presents w/ mild oropharyngeal dysphagia characterized by premature spillage of thin liquids to UES and pyriforms w/ intermittent deep penetration of laryngeal vestibule; adequate airway protection and complete clearance of all residue after swallow initiation. No aspiration w/ any trials. Timely swallow initiation w/ puree and regular solid consistencies; no oropharyngeal residue.    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 Adequate ab/adduction. Complete closure of TVFs upon adduction.      Dentition: Adequate  Vision  Vision: Impaired  Vision Exceptions: Visual field cut  Hearing  Hearing: Within functional limits  Barriers toward progress: None towards stated goals    Date: 6/14/2021       Tx session 1 Tx session 2  Tx session 3    Total Timed Code Min 30 20 25   Total Treatment Minutes 30 20 25   Individual Treatment Minutes 30 20 25   Group Treatment Minutes 0 0 0   Co-Treat Minutes 0 0 0   Brief Exception: N/A N/A N/A   Pain None indicated  None reported  None indicated    Pain Intervention: [] RN notified  [] Repositioned  [] Intervention offered and patient declined  [x] N/A  [] Other:    [] RN notified  [] Repositioned  [] Intervention offered and patient declined  [x] N/A  [] Other:  [] RN notified  [] Repositioned  [] Intervention offered and patient declined  [x] N/A  [] Other:    Subjective:     Pt upright in chair and agreeable to therapy. Pt returning from PT therapy session. Pt upright in chair and agreeable to therapy session. Objective / Goals:      Patient will consume regular solids with timely mastication and no pocketing in L buccal cavity across 2 sessions. Goal not targeted this session. Pt with pocketed PO at start of session. Spontaneous use of lingual sweep when SLP gave prompt to identify goal areas. Goal not targeted this session. Goal not targeted this session. Patient will consume PO without anterior spillage or overt pocketing across 2 sessions. GOAL MET   GOAL MET GOAL MET   Pt will complete divided/alternating attention tasks with 85% accuracy given min cues. Incidental cues for sustained attention. No cues for attention despite multiple distractors. Delays in task continuation that appeared related to loss of attention but pt eventually able to attend to task with only cue of prolonged silence. Pt will accurately complete executive functioning tasks with min cues. High level deduction puzzle - mod cues. Goal not targeted this session. Functional computer activity - min-mod cues for impulsivity and reduced error awareness. Pt with much improved ability to utilize mouse in controlled manner. Pt will attend to L visual field with min cues.    Min cues for left visual attention for non-contextual tasks. Min cues for left visual field attention. Mod-max cues for left visual attention during wheelchair mobility in tight area. Pt did demo increased awareness via looking to left when hitting objects. Pt able to locate mildly familiar icons / functions on computer screen this date without cues and with self verbalizing cue. Pt will participate in ongoing cognitive linguistic assessment. GOAL MET GOAL MET GOAL MET   Other areas targeted:      Education:   Education ongoing regarding rationale. Education on improvements noted. Safety Devices: [x] Call light within reach  [x] Chair alarm activated and connected to nurse call light system  [] Bed alarm activated   [] Other:  [x] Call light within reach  [x] Chair alarm activated and connected to nurse call light system  [] Bed alarm activated   [] Other:  [x] Call light within reach  [x] Chair alarm activated and connected to nurse call light system  [] Bed alarm activated   [] Other:    Assessment: Executive function impairment, left visual field attention. Plan: Continue per POC.        Interventions used this date:  [] Speech/Language Treatment  [] Instruction in HEP  [] Dysphagia Treatment [x] Cognitive Treatment   [] Other:    Discharge recommendations:  [] Home independently  [x] Home with assistance []  24 hour supervision  [] ECF [x] Other: Education completed with family on Friday 5/28/2021  Continued Tx Upon Discharge: ? [x] Yes    [] No    [] TBD based on progress while on ARU     [] Vital Stim indicated     [] Other:   Estimated discharge date: 6/14/2021     Hernandez Drew, Dejuan  Speech-Language Pathologist

## 2021-06-15 PROCEDURE — 97535 SELF CARE MNGMENT TRAINING: CPT

## 2021-06-15 PROCEDURE — 97129 THER IVNTJ 1ST 15 MIN: CPT

## 2021-06-15 PROCEDURE — 97110 THERAPEUTIC EXERCISES: CPT

## 2021-06-15 PROCEDURE — 97130 THER IVNTJ EA ADDL 15 MIN: CPT

## 2021-06-15 PROCEDURE — 97530 THERAPEUTIC ACTIVITIES: CPT

## 2021-06-15 PROCEDURE — 6370000000 HC RX 637 (ALT 250 FOR IP): Performed by: PHYSICAL MEDICINE & REHABILITATION

## 2021-06-15 PROCEDURE — 6360000002 HC RX W HCPCS: Performed by: PHYSICAL MEDICINE & REHABILITATION

## 2021-06-15 PROCEDURE — 94660 CPAP INITIATION&MGMT: CPT

## 2021-06-15 PROCEDURE — 97116 GAIT TRAINING THERAPY: CPT

## 2021-06-15 PROCEDURE — 1280000000 HC REHAB R&B

## 2021-06-15 PROCEDURE — 97112 NEUROMUSCULAR REEDUCATION: CPT

## 2021-06-15 PROCEDURE — 99232 SBSQ HOSP IP/OBS MODERATE 35: CPT | Performed by: PHYSICAL MEDICINE & REHABILITATION

## 2021-06-15 RX ORDER — TIZANIDINE 4 MG/1
2 TABLET ORAL 2 TIMES DAILY
Status: DISCONTINUED | OUTPATIENT
Start: 2021-06-15 | End: 2021-06-24 | Stop reason: HOSPADM

## 2021-06-15 RX ADMIN — ATORVASTATIN CALCIUM 80 MG: 80 TABLET, FILM COATED ORAL at 22:30

## 2021-06-15 RX ADMIN — FAMOTIDINE 20 MG: 20 TABLET, FILM COATED ORAL at 10:16

## 2021-06-15 RX ADMIN — ACETAMINOPHEN 1000 MG: 500 TABLET, COATED ORAL at 03:44

## 2021-06-15 RX ADMIN — LEVETIRACETAM 500 MG: 500 TABLET ORAL at 22:30

## 2021-06-15 RX ADMIN — DOCUSATE SODIUM 50 MG AND SENNOSIDES 8.6 MG 2 TABLET: 8.6; 5 TABLET, FILM COATED ORAL at 22:31

## 2021-06-15 RX ADMIN — ASPIRIN 81 MG: 81 TABLET, CHEWABLE ORAL at 10:16

## 2021-06-15 RX ADMIN — CETIRIZINE HYDROCHLORIDE 10 MG: 10 TABLET, FILM COATED ORAL at 10:15

## 2021-06-15 RX ADMIN — HEPARIN SODIUM 5000 UNITS: 5000 INJECTION INTRAVENOUS; SUBCUTANEOUS at 22:36

## 2021-06-15 RX ADMIN — TIZANIDINE 2 MG: 4 TABLET ORAL at 22:30

## 2021-06-15 RX ADMIN — GUAIFENESIN 600 MG: 600 TABLET, EXTENDED RELEASE ORAL at 22:31

## 2021-06-15 RX ADMIN — OXYCODONE 10 MG: 5 TABLET ORAL at 16:43

## 2021-06-15 RX ADMIN — ACETAMINOPHEN 1000 MG: 500 TABLET, COATED ORAL at 22:30

## 2021-06-15 RX ADMIN — THERA TABS 1 TABLET: TAB at 10:16

## 2021-06-15 RX ADMIN — DICLOFENAC SODIUM 4 G: 10 GEL TOPICAL at 23:20

## 2021-06-15 RX ADMIN — HEPARIN SODIUM 5000 UNITS: 5000 INJECTION INTRAVENOUS; SUBCUTANEOUS at 14:43

## 2021-06-15 RX ADMIN — HEPARIN SODIUM 5000 UNITS: 5000 INJECTION INTRAVENOUS; SUBCUTANEOUS at 06:07

## 2021-06-15 RX ADMIN — THIAMINE HCL TAB 100 MG 100 MG: 100 TAB at 10:16

## 2021-06-15 RX ADMIN — LEVETIRACETAM 500 MG: 500 TABLET ORAL at 10:16

## 2021-06-15 RX ADMIN — DICLOFENAC SODIUM 4 G: 10 GEL TOPICAL at 10:18

## 2021-06-15 RX ADMIN — OXYCODONE HYDROCHLORIDE 5 MG: 5 TABLET ORAL at 03:45

## 2021-06-15 RX ADMIN — ACETAMINOPHEN 1000 MG: 500 TABLET, COATED ORAL at 10:16

## 2021-06-15 RX ADMIN — FAMOTIDINE 20 MG: 20 TABLET, FILM COATED ORAL at 22:31

## 2021-06-15 RX ADMIN — METHYLPHENIDATE HYDROCHLORIDE 10 MG: 10 TABLET ORAL at 10:16

## 2021-06-15 RX ADMIN — TIZANIDINE 2 MG: 4 TABLET ORAL at 10:22

## 2021-06-15 RX ADMIN — GUAIFENESIN 600 MG: 600 TABLET, EXTENDED RELEASE ORAL at 10:16

## 2021-06-15 RX ADMIN — DOCUSATE SODIUM 50 MG AND SENNOSIDES 8.6 MG 2 TABLET: 8.6; 5 TABLET, FILM COATED ORAL at 10:22

## 2021-06-15 RX ADMIN — AMLODIPINE BESYLATE 5 MG: 5 TABLET ORAL at 10:16

## 2021-06-15 RX ADMIN — FLUOXETINE 20 MG: 20 CAPSULE ORAL at 10:16

## 2021-06-15 RX ADMIN — ACETAMINOPHEN 1000 MG: 500 TABLET, COATED ORAL at 16:43

## 2021-06-15 ASSESSMENT — PAIN DESCRIPTION - PROGRESSION
CLINICAL_PROGRESSION: GRADUALLY IMPROVING
CLINICAL_PROGRESSION: NOT CHANGED

## 2021-06-15 ASSESSMENT — PAIN DESCRIPTION - FREQUENCY
FREQUENCY: INTERMITTENT

## 2021-06-15 ASSESSMENT — PAIN SCALES - GENERAL
PAINLEVEL_OUTOF10: 6
PAINLEVEL_OUTOF10: 0
PAINLEVEL_OUTOF10: 2
PAINLEVEL_OUTOF10: 8
PAINLEVEL_OUTOF10: 0
PAINLEVEL_OUTOF10: 2
PAINLEVEL_OUTOF10: 0
PAINLEVEL_OUTOF10: 5
PAINLEVEL_OUTOF10: 0
PAINLEVEL_OUTOF10: 0

## 2021-06-15 ASSESSMENT — PAIN DESCRIPTION - LOCATION
LOCATION: HIP
LOCATION: HIP
LOCATION: HAND

## 2021-06-15 ASSESSMENT — PAIN DESCRIPTION - ONSET
ONSET: ON-GOING

## 2021-06-15 ASSESSMENT — PAIN DESCRIPTION - PAIN TYPE
TYPE: ACUTE PAIN

## 2021-06-15 ASSESSMENT — PAIN DESCRIPTION - DESCRIPTORS
DESCRIPTORS: RADIATING;SHARP
DESCRIPTORS: DISCOMFORT
DESCRIPTORS: DISCOMFORT

## 2021-06-15 ASSESSMENT — PAIN DESCRIPTION - ORIENTATION
ORIENTATION: LEFT

## 2021-06-15 NOTE — PROGRESS NOTES
ACUTE REHAB UNIT  SPEECH/LANGUAGE PATHOLOGY      [x] Daily  [] Weekly Care Conference Note  [] Discharge    Patient:Laura Rodriguez      FXT:3/89/[birth year]881  [facility]:[id number]  Rehab Dx/Hx: Acute CVA (cerebrovascular accident) ([facility] [address] [I63.9]    Precautions: [x] Aspiration  [x] Fall risk  [] Sternal  [] Seizure [] Hip  [] Weight Bearing [] Other  ST Dx: [] Aphasia  [] Dysarthria  [] Apraxia   [x] Oropharyngeal dysphagia [x] Cognitive Impairment  [] Other:   Date of Admit: 4/23/2021  Room #: 3101/3101-01  Date: 6/15/2021        Current Diet Order:DIET GENERAL;   Recommended Form of Meds: PO  Compensatory Swallowing Strategies: Alternate solids and liquids, Upright as possible for all oral intake, Check for pocketing of food on the Left, Small bites/sips, Lingual sweep   Subjective: Pt admitted 4/12 after fall with left sided paralysis. Pt underwent craniectomy on 4/13 per Dr. Kelly Petty. Social/Functional History  Lives With: Spouse;Son( reported that son is diagnosed with Autism.)  Occupation: Full time employment  Type of occupation:  at [employer]"    FEES 4/14: IMPRESSIONS:   Pt presents w/ mild oropharyngeal dysphagia characterized by premature spillage of thin liquids to UES and pyriforms w/ intermittent deep penetration of laryngeal vestibule; adequate airway protection and complete clearance of all residue after swallow initiation. No aspiration w/ any trials. Timely swallow initiation w/ puree and regular solid consistencies; no oropharyngeal residue.    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 Adequate ab/adduction. Complete closure of TVFs upon adduction.      Dentition: Adequate  Vision  Vision: Impaired  Vision Exceptions: Visual field cut  Hearing  Hearing: Within functional limits  Barriers toward progress: None towards stated goals    Date: 6/15/2021      Tx session 1 Tx session 2    Total Assessment: Progressing towards goals with improvements in processing speed and math calculations since admission as evidenced from re-administration of certain SULMA tasks. Continues with reduced executive function and working memory when dealing with more complex task (counting money). Ongoing reduced prosody of speech which pt endorsed awareness of this date. Plan: Continue per POC.        Interventions used this date:  [] Speech/Language Treatment  [] Instruction in HEP  [] Dysphagia Treatment [x] Cognitive Treatment   [] Other:    Discharge recommendations:  [] Home independently  [x] Home with assistance []  24 hour supervision  [] ECF [x] Other: Education completed with family on Friday 5/28/2021  Continued Tx Upon Discharge: ? [x] Yes    [] No    [] TBD based on progress while on ARU     [] Vital Stim indicated     [] Other:   Estimated discharge date: 6/14/2021 - extension requested    Tyree Turcios MA CCC-SLP; [phone]  Speech-Language Pathologist

## 2021-06-15 NOTE — PROGRESS NOTES
Department of Physical Medicine & Rehabilitation  Progress Note    Patient Identification:  Kari Patiño  [id number]  : 1970  Admit date: 2021    Chief Complaint: CVA    Subjective:   More tone today in her left arm and leg. Interested in starting a muscle relaxer. Plan to continue therapy. ROS: No f/c, n/v, cp     Objective:  Patient Vitals for the past 24 hrs:   BP Temp Temp src Pulse Resp SpO2   06/15/21 0740 121/79 97.8 °F (36.6 °C) Oral 75 16 95 %   06/15/21 0040     16    21 2131 139/86 98.1 °F (36.7 °C) Oral 74 17 95 %   21 1043 125/86 98.1 °F (36.7 °C) Oral 91 18 94 %     Const: Alert. No distress, pleasant. HEENT: Normocephalic, atraumatic. Normal sclera/conjunctiva. MMM. Incision healing well  CV: Regular rate and rhythm. Resp: No respiratory distress. Lungs CTAB. Abd: Soft, nontender, nondistended, NABS+   Ext: No edema. Neuro: Alert, oriented, appropriately interactive. Psych: Cooperative, appropriate mood and affect    Laboratory data: Available via EMR. Last 24 hour lab  No results found for this or any previous visit (from the past 24 hour(s)). Therapy progress:  PT  Position Activity Restriction  Other position/activity restrictions: Ambulate, Up with assist, Pt to wear helmet when OOB, OK to remove for shower, HOB to elevated at 30*  Objective     Sit to Stand: Contact guard assistance (from bed, w/c, commode and mat table; VC for trunk flexion, ant wt shift and increased hip ext in standing)  Stand to sit: Contact guard assistance (VC for hand placement and improved eccentric control)  Bed to Chair: Minimal assistance (MIN A when going to the L and CGA when going to the R; bed to w/c, w/c <> commode, w/c <> mat table;  Step by step VC especially when transferring to the R)  Device: Single point cane  Other Apparatus: AFO, Wheelchair follow (Custom molded AFO with a figure \"8 \"strap at ankle)  Assistance: Minimal assistance  Distance: 36'  OT  PT Equipment Recommendations  Equipment Needed: Yes  Mobility Devices: Wheelchair  Wheelchair: Light Weight  Other: Pt will require a 20\" hemiheight w/c with drop armrests and standard 90 degree legrest, with pressure relieving cushion. Pt is currently unable to safely ambulate household distances with use of RW or SPC, limiting her ability to access areas of home such as kitchen and bathroom. Due to being unable to access these areas, pt will be unable to perform ADL tasks. Use of a w/c will allow pt to access these areas. Pt has appropriate UB strength to propel w/c and has not expressed an unwillingness to use w/c at d/c. Toilet - Technique: Stand pivot  Equipment Used: Raised toilet seat with rails  Toilet Transfers Comments: Use of SPC, cues to sequence pivot and for hand placement, use of GB upon stance  Assessment        SLP  Diet Solids Recommendation: Dysphagia Soft and Bite-Sized (Dysphagia III) (with regular solids as tolerated. Per pt request, gravy/sauces to keep for moist.)       Body mass index is 36.54 kg/m². Assessment and Plan:  Onesimo Gregory a 46 y.o. female with PMH GERD p/w L sided weakness and facial droop.  CTA head/neck on 4/12 revealed occluded R cervical ICA, occluded right LEAH, near complete occlusion of right MCA.     Acute ischemic CVA, Subdural heomorrhage s/p right hemicraniectomy and subsequen SAH and IP hemorrhage  No tPA given.  MRI 4/14 showing large subacute infarct throughout R LEAH and MCA with some areas of hemorrhagic conversion.  - Neurosurgery and neurology following  - SBP <160, PRN hydralazine, scheduled Norvasc 5 mg daily  - Keppra 500 mg twice daily   - SQH  - PT/OT      Aphasia - SLP   Depression - Home fluoxetine 20 mg daily at home  Anxiety - Hold home Ativan 0.5 mg every 8 hours PRN for anxiety  MISHA - Patient uses CPAP at home  GERD - omeprazole 40 mg daily at home- substituted for pepcid 20 mg BID  Obesity    Rehab Progress: Improving  Anticipated Dispo: home Services/DME: [facility] and [facility] (neither covered by insurance, friend to obtain)  ELOS: - extension needed       TITO QuiñonesPEdgarHEdgar  PM&R  6/15/2021  10:02 AM
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
## 2021-06-15 NOTE — PROGRESS NOTES
SHIFT: 0700 - 1930  Pt upon assessment was stable. Alert and oriented. Denied having chest pain or SOB. Pt verbalized having pain in left hand and hip. Pt was medicated scheduled Tylenol and Oxy-IR. Pt started on Zanaflex to reduce muscle spasms in her left hand. Medication, rest and position was effective for pain control. Scalp incision remains with scabs. Medications given this shift were reviewed with pt regarding use, dose and side effects. Pt verbalized understanding of education given. Continent of bladder this shift with toilieting being offered frequently. Pt showered yesterday but washed up at sink today. Pt at end of shift was resting in bed with call light within reach.  Pt denied having any further needs.      Electronically signed by Doris Rivas RN on 6/15/2021 at 6:48 PM

## 2021-06-15 NOTE — PLAN OF CARE
Problem: Pain:  Goal: Pain level will decrease  Description: Pain level will decrease  Outcome: Ongoing  Note: No complaints of pain. Problem: Falls - Risk of:  Goal: Will remain free from falls  Description: Will remain free from falls  6/15/2021 0041 by Dionicio Rossi RN  Outcome: Ongoing  Note: Pt is a Med fall risk. Explained fall risk precautions to pt and rationale behind their use to keep the patient safe. Belongings are in reach. Pt encouraged to notify staff for any and all assistance. Staff present in tx suite throughout entirety of pts treatment to monitor and protect from falls. Assistance provided when ambulating to restroom utilizing Stay With Me. Problem: Skin Integrity:  Goal: Absence of new skin breakdown  Description: Absence of new skin breakdown  Outcome: Ongoing  Note: No evidence of skin breakdown.

## 2021-06-15 NOTE — PROGRESS NOTES
Occupational Therapy  Facility/Department: [facility] ACUTE REHAB UNIT  Daily Treatment Note  NAME: Yelena Fuentes  : 1970  MRN: [id number]    Date of Service: 6/15/2021    Discharge Recommendations:  24 hour supervision or assist, Home with Home health OT, Continue to assess pending progress, Home with nursing aide  OT Equipment Recommendations  Equipment Needed: Yes  ADL Assistive Devices: Transfer Tub Bench;Grab Bars - toilet; Toileting - Raised Toilet Seat with arms; Reacher;Grab Bars - tub  Other: continue to assess    Assessment   Performance deficits / Impairments: Decreased functional mobility ; Decreased ADL status; Decreased ROM; Decreased strength;Decreased sensation;Decreased fine motor control;Decreased endurance;Decreased posture;Decreased balance;Decreased safe awareness;Decreased coordination;Decreased vision/visual deficit; Decreased cognition  Assessment: Pt demonstrated activation of scapular movements protraction+depression and retraction + elevation in sidelying with minimal facilitation. Pt continues to have difficulty with carryover of tone management and positional awareness despite max education during ARU stay. Pt continues to be limited by pain in L hemibody but hypertonicity is responsive to stretch, vibration and massage. Pt functioning below baseline, cont per OT POC. Treatment Diagnosis: decreased independence with ADLs and decreased functional mobility 2/2 CVA  Prognosis: Fair  OT Education: Plan of Care;Transfer Training;ADL Adaptive Strategies; Home Exercise Program  Patient Education: pt educated on positioning of LUE when supine in bed with pillow in  ER, elbow ext and use of edema glove or rolled towel to decrease finger flexion as pt declines use of hand splint-pt verb understanding, cont to reinforce  REQUIRES OT FOLLOW UP: Yes  Activity Tolerance  Activity Tolerance: Patient Tolerated treatment well  Safety Devices  Safety Devices in place: Yes  Type of devices: Left in chair;Call light within reach; Chair alarm in place; All fall risk precautions in place         Patient Diagnosis(es): There were no encounter diagnoses. has a past medical history of GERD (gastroesophageal reflux disease), Hernia, Obesity, and Unspecified sleep apnea. has a past surgical history that includes Splenectomy (); Total hip arthroplasty (); Lap Band (); hernia repair ();  section (); and craniotomy (Right, 2021). Restrictions  Position Activity Restriction  Other position/activity restrictions: Ambulate, Up with assist, Pt to wear helmet when OOB, OK to remove for shower, HOB to elevated at 30*  Subjective   General  Chart Reviewed: Yes  Patient assessed for rehabilitation services?: Yes  Additional Pertinent Hx: 46 y.o. female with hypertension and tobacco abuse who presented after spending a prolonged period on the ground s/p fall out of bed; found to have acute left hemiparesis and gaze deviation. CXR with no acute cardiopulmonary abnormality. CTH revealed large acute/subacute infarct in the R frontal lobe with associated mass effect and 4 mm R to L midline shift. It also noted subdural hemorrhage of 6mm along falx, although NSGY less convinced. CTA head and neck revealed occluded R cervical ICA & R anterior cerebral artery with near complete occlusion of R MCA. Outside window on TPA. Pt is now POD #1 following hemicraniectomy (). Pt admitted to ARU   Response to previous treatment: Patient with no complaints from previous session  Family / Caregiver Present: No  Referring Practitioner: Dr. Winn Child DO  Diagnosis: CVA  Subjective  Subjective: Pt sitting in w/c upon arrival, pleasant and agreeable to OT session. General Comment  Comments: Nasrin Jimenez   Vital Signs  Patient Currently in Pain: Yes (4/10 in LLE and LUE)   Orientation  Orientation  Overall Orientation Status: Within Functional Limits  Objective             Standing Balance  Time: less than 1 minute  Activity: functional transfers, partial stance/lateral scoots along edge of mat  Functional Mobility  Functional - Mobility Device: Wheelchair  Activity: To/From therapy gym  Assist Level: Supervision  Functional Mobility Comments: use of RUE and RLE to propel w/c, no collisions with environmental barriers noted  Bed mobility  Bridging: Supervision (to scoot laterally on mat)  Supine to Sit: Contact guard assistance (on mat table to L sidelying, poor eccentric control of trunk)  Sit to Supine: Stand by assistance (on mat from L sidelying)  Scooting: Stand by assistance (pt scooted laterally seated EOM with partial stance and cues for ant lean)  Transfers  Stand Pivot Transfers: Contact guard assistance (w/c<EOM leading R)  Sit Pivot Transfers: Contact guard assistance (EOM<w/c leading L with cues to prevent full stance)                 Neuromuscular Education  Neuromuscular education: Yes  Functional Movement Patterns: Pt engaged in scapular PNF patterns while sidelying on R in order to promote scapular elevation, depression, protraction and retraction in LUE for both D1 flex/ext and D2 flex/ext; x 10 PROM, x 10 with quick stretch in attempt to elicit contractions and engagement, pt tolerated well with activation noted during D1 flex/ext and able to complete against minimal resistance, pt with no c/o discomfort     Cognition  Overall Cognitive Status: Exceptions  Arousal/Alertness: Appropriate responses to stimuli  Following Commands:  Follows one step commands consistently  Attention Span: Attends with cues to redirect  Memory: Appears intact  Safety Judgement: Decreased awareness of need for assistance;Decreased awareness of need for safety  Problem Solving: Assistance required to generate solutions;Assistance required to correct errors made;Assistance required to implement solutions;Decreased awareness of errors  Insights: Decreased awareness of deficits  Initiation: Requires cues for some  Sequencing: Requires cues for leading to R with CGA and cues to manage LLE prior to descent. Pt washed hands and completed oral hygiene with mod I seated in w/c. Pt left in w/c at end of session with chair alarm engaged, call light within reach and all needs met.       Plan   Plan  Times per week: 5x a week for 60 mins daily  Times per day: Daily  Current Treatment Recommendations: Strengthening, Endurance Training, Neuromuscular Re-education, Self-Care / ADL, Functional Mobility Training, Safety Education & Training, ROM, Positioning, Wheelchair Mobility Training, Balance Training, Patient/Caregiver Education & Training, Manual Therapy:  MLD, Equipment Evaluation, Education, & procurement, Home Management Training, Cognitive Reorientation    Goals  Short term goals  Time Frame for Short term goals: 2 weeks  Short term goal 1: Pt will complete toilet transfer w/ Mod A-goal met 5/4  Short term goal 2: Pt will complete UE dressing w/ Mod A-goal met 5/12  Short term goal 3: Pt will complete oral care w/ spvn seated at sink w/o VCs for L sided attention-goal met 5/17  Short term goal 4: Pt will complete LE dressing w/ Mod A-goal met 5/26  Long term goals  Time Frame for Long term goals : 4 weeks  Long term goal 1: Pt will complete toilet transfer w/ SBA-ongoing  Long term goal 2: Pt will complete UE dressing w/ setup-ongoing  Long term goal 3: Pt will complete LE dressing w/ SBA-ongoing  Long term goal 4: Pt will be independent w/ tone management exercises to improve functional use of LUE-ongoing  Long term goal 5: Pt will complete oral care I'ly-goal met 5/24  Patient Goals   Patient goals : \"get back to my normal self\"       Therapy Time   Individual Second Session Group Co-treatment   Time In 1100  1245       Time Out 1200  1330       Minutes 60  45       Timed Code Treatment Minutes: 60 Minutes+ 45 Minutes  Total Treatment Time: Jyoti 75, Nghia Deems

## 2021-06-16 LAB
ANION GAP SERPL CALCULATED.3IONS-SCNC: 11 MMOL/L (ref 3–16)
BASOPHILS ABSOLUTE: 0.1 K/UL (ref 0–0.2)
BASOPHILS RELATIVE PERCENT: 1 %
BUN BLDV-MCNC: 14 MG/DL (ref 7–20)
CALCIUM SERPL-MCNC: 9.7 MG/DL (ref 8.3–10.6)
CHLORIDE BLD-SCNC: 106 MMOL/L (ref 99–110)
CO2: 25 MMOL/L (ref 21–32)
CREAT SERPL-MCNC: <0.5 MG/DL (ref 0.6–1.1)
EOSINOPHILS ABSOLUTE: 0.5 K/UL (ref 0–0.6)
EOSINOPHILS RELATIVE PERCENT: 4.5 %
GFR AFRICAN AMERICAN: >60
GFR NON-AFRICAN AMERICAN: >60
GLUCOSE BLD-MCNC: 93 MG/DL (ref 70–99)
HCT VFR BLD CALC: 39.9 % (ref 36–48)
HEMOGLOBIN: 13.3 G/DL (ref 12–16)
LYMPHOCYTES ABSOLUTE: 5 K/UL (ref 1–5.1)
LYMPHOCYTES RELATIVE PERCENT: 41.8 %
MCH RBC QN AUTO: 34.2 PG (ref 26–34)
MCHC RBC AUTO-ENTMCNC: 33.3 G/DL (ref 31–36)
MCV RBC AUTO: 103 FL (ref 80–100)
MONOCYTES ABSOLUTE: 1.2 K/UL (ref 0–1.3)
MONOCYTES RELATIVE PERCENT: 10.5 %
NEUTROPHILS ABSOLUTE: 5 K/UL (ref 1.7–7.7)
NEUTROPHILS RELATIVE PERCENT: 42.2 %
PDW BLD-RTO: 13.4 % (ref 12.4–15.4)
PLATELET # BLD: 377 K/UL (ref 135–450)
PMV BLD AUTO: 9.3 FL (ref 5–10.5)
POTASSIUM REFLEX MAGNESIUM: 4 MMOL/L (ref 3.5–5.1)
RBC # BLD: 3.88 M/UL (ref 4–5.2)
SODIUM BLD-SCNC: 142 MMOL/L (ref 136–145)
WBC # BLD: 11.9 K/UL (ref 4–11)

## 2021-06-16 PROCEDURE — 97530 THERAPEUTIC ACTIVITIES: CPT

## 2021-06-16 PROCEDURE — 6370000000 HC RX 637 (ALT 250 FOR IP): Performed by: PHYSICAL MEDICINE & REHABILITATION

## 2021-06-16 PROCEDURE — 97535 SELF CARE MNGMENT TRAINING: CPT

## 2021-06-16 PROCEDURE — 85025 COMPLETE CBC W/AUTO DIFF WBC: CPT

## 2021-06-16 PROCEDURE — 6360000002 HC RX W HCPCS: Performed by: PHYSICAL MEDICINE & REHABILITATION

## 2021-06-16 PROCEDURE — 94660 CPAP INITIATION&MGMT: CPT

## 2021-06-16 PROCEDURE — 97129 THER IVNTJ 1ST 15 MIN: CPT

## 2021-06-16 PROCEDURE — 99232 SBSQ HOSP IP/OBS MODERATE 35: CPT | Performed by: PHYSICAL MEDICINE & REHABILITATION

## 2021-06-16 PROCEDURE — 97110 THERAPEUTIC EXERCISES: CPT

## 2021-06-16 PROCEDURE — 36415 COLL VENOUS BLD VENIPUNCTURE: CPT

## 2021-06-16 PROCEDURE — 1280000000 HC REHAB R&B

## 2021-06-16 PROCEDURE — 97130 THER IVNTJ EA ADDL 15 MIN: CPT

## 2021-06-16 PROCEDURE — 97116 GAIT TRAINING THERAPY: CPT

## 2021-06-16 PROCEDURE — 80048 BASIC METABOLIC PNL TOTAL CA: CPT

## 2021-06-16 RX ADMIN — ASPIRIN 81 MG: 81 TABLET, CHEWABLE ORAL at 08:25

## 2021-06-16 RX ADMIN — LEVETIRACETAM 500 MG: 500 TABLET ORAL at 08:26

## 2021-06-16 RX ADMIN — LEVETIRACETAM 500 MG: 500 TABLET ORAL at 21:04

## 2021-06-16 RX ADMIN — GUAIFENESIN 600 MG: 600 TABLET, EXTENDED RELEASE ORAL at 21:05

## 2021-06-16 RX ADMIN — OXYCODONE 10 MG: 5 TABLET ORAL at 08:26

## 2021-06-16 RX ADMIN — TIZANIDINE 2 MG: 4 TABLET ORAL at 08:25

## 2021-06-16 RX ADMIN — DICLOFENAC SODIUM 4 G: 10 GEL TOPICAL at 22:04

## 2021-06-16 RX ADMIN — FLUOXETINE 20 MG: 20 CAPSULE ORAL at 08:25

## 2021-06-16 RX ADMIN — Medication 5 MG: at 08:26

## 2021-06-16 RX ADMIN — THERA TABS 1 TABLET: TAB at 08:25

## 2021-06-16 RX ADMIN — TIZANIDINE 2 MG: 4 TABLET ORAL at 21:05

## 2021-06-16 RX ADMIN — FAMOTIDINE 20 MG: 20 TABLET, FILM COATED ORAL at 21:04

## 2021-06-16 RX ADMIN — AMLODIPINE BESYLATE 5 MG: 5 TABLET ORAL at 08:26

## 2021-06-16 RX ADMIN — HEPARIN SODIUM 5000 UNITS: 5000 INJECTION INTRAVENOUS; SUBCUTANEOUS at 13:46

## 2021-06-16 RX ADMIN — ACETAMINOPHEN 1000 MG: 500 TABLET, COATED ORAL at 07:08

## 2021-06-16 RX ADMIN — THIAMINE HCL TAB 100 MG 100 MG: 100 TAB at 08:25

## 2021-06-16 RX ADMIN — DOCUSATE SODIUM 50 MG AND SENNOSIDES 8.6 MG 2 TABLET: 8.6; 5 TABLET, FILM COATED ORAL at 21:05

## 2021-06-16 RX ADMIN — ACETAMINOPHEN 1000 MG: 500 TABLET, COATED ORAL at 21:07

## 2021-06-16 RX ADMIN — ACETAMINOPHEN 1000 MG: 500 TABLET, COATED ORAL at 15:09

## 2021-06-16 RX ADMIN — FAMOTIDINE 20 MG: 20 TABLET, FILM COATED ORAL at 08:26

## 2021-06-16 RX ADMIN — HEPARIN SODIUM 5000 UNITS: 5000 INJECTION INTRAVENOUS; SUBCUTANEOUS at 21:24

## 2021-06-16 RX ADMIN — OXYCODONE 10 MG: 5 TABLET ORAL at 21:03

## 2021-06-16 RX ADMIN — OXYCODONE HYDROCHLORIDE 5 MG: 5 TABLET ORAL at 13:50

## 2021-06-16 RX ADMIN — DOCUSATE SODIUM 50 MG AND SENNOSIDES 8.6 MG 2 TABLET: 8.6; 5 TABLET, FILM COATED ORAL at 08:25

## 2021-06-16 RX ADMIN — HEPARIN SODIUM 5000 UNITS: 5000 INJECTION INTRAVENOUS; SUBCUTANEOUS at 07:08

## 2021-06-16 RX ADMIN — GUAIFENESIN 600 MG: 600 TABLET, EXTENDED RELEASE ORAL at 08:25

## 2021-06-16 RX ADMIN — CETIRIZINE HYDROCHLORIDE 10 MG: 10 TABLET, FILM COATED ORAL at 08:25

## 2021-06-16 RX ADMIN — ATORVASTATIN CALCIUM 80 MG: 80 TABLET, FILM COATED ORAL at 21:04

## 2021-06-16 RX ADMIN — DICLOFENAC SODIUM 4 G: 10 GEL TOPICAL at 08:27

## 2021-06-16 RX ADMIN — METHYLPHENIDATE HYDROCHLORIDE 10 MG: 10 TABLET ORAL at 08:25

## 2021-06-16 ASSESSMENT — PAIN SCALES - GENERAL
PAINLEVEL_OUTOF10: 3
PAINLEVEL_OUTOF10: 6
PAINLEVEL_OUTOF10: 5
PAINLEVEL_OUTOF10: 7
PAINLEVEL_OUTOF10: 2
PAINLEVEL_OUTOF10: 7
PAINLEVEL_OUTOF10: 0
PAINLEVEL_OUTOF10: 8
PAINLEVEL_OUTOF10: 0
PAINLEVEL_OUTOF10: 6
PAINLEVEL_OUTOF10: 8

## 2021-06-16 ASSESSMENT — PAIN DESCRIPTION - ORIENTATION
ORIENTATION: LEFT

## 2021-06-16 ASSESSMENT — PAIN DESCRIPTION - FREQUENCY
FREQUENCY: INTERMITTENT
FREQUENCY: CONTINUOUS
FREQUENCY: INTERMITTENT
FREQUENCY: CONTINUOUS
FREQUENCY: INTERMITTENT

## 2021-06-16 ASSESSMENT — PAIN DESCRIPTION - LOCATION
LOCATION: HIP
LOCATION: HAND;ARM
LOCATION: HIP
LOCATION: HIP
LOCATION: ARM;HAND
LOCATION: HIP
LOCATION: HIP

## 2021-06-16 ASSESSMENT — PAIN DESCRIPTION - DESCRIPTORS
DESCRIPTORS: ACHING
DESCRIPTORS: BURNING;THROBBING;SHOOTING
DESCRIPTORS: ACHING
DESCRIPTORS: BURNING;THROBBING;SHOOTING
DESCRIPTORS: ACHING
DESCRIPTORS: ACHING
DESCRIPTORS: ACHING;SHARP

## 2021-06-16 ASSESSMENT — PAIN DESCRIPTION - PROGRESSION
CLINICAL_PROGRESSION: NOT CHANGED
CLINICAL_PROGRESSION: GRADUALLY IMPROVING
CLINICAL_PROGRESSION: RAPIDLY WORSENING
CLINICAL_PROGRESSION: GRADUALLY WORSENING
CLINICAL_PROGRESSION: RAPIDLY WORSENING
CLINICAL_PROGRESSION: GRADUALLY IMPROVING
CLINICAL_PROGRESSION: GRADUALLY IMPROVING
CLINICAL_PROGRESSION: NOT CHANGED
CLINICAL_PROGRESSION: GRADUALLY IMPROVING
CLINICAL_PROGRESSION: GRADUALLY IMPROVING

## 2021-06-16 ASSESSMENT — PAIN DESCRIPTION - ONSET
ONSET: ON-GOING
ONSET: ON-GOING
ONSET: PROGRESSIVE
ONSET: ON-GOING

## 2021-06-16 ASSESSMENT — PAIN DESCRIPTION - PAIN TYPE
TYPE: ACUTE PAIN

## 2021-06-16 ASSESSMENT — PAIN DESCRIPTION - DIRECTION
RADIATING_TOWARDS: DOWN LLE
RADIATING_TOWARDS: DOWN LLE

## 2021-06-16 NOTE — PLAN OF CARE
Problem: Falls - Risk of:  Goal: Will remain free from falls  Description: Will remain free from falls  6/16/2021 0123 by Roxi Kothari RN  Outcome: Ongoing  Note: Pt with history of falls. Up with assistance, gait belt and stand pivot to wheelchair. LUE & LLE flaccid. Fall precautions maintained. Non skid footwear on. Bed in lowest position. Bed alarm in use. Reminded pt to call for assistance with any needs. Call light within reach. Pt uses call light appropriately. No falls thus far during shift. Problem: Skin Integrity:  Goal: Absence of new skin breakdown  Description: Absence of new skin breakdown  6/16/2021 0123 by Roxi Kothari RN  Outcome: Ongoing  Note: Pt with surgical incision right scalp. Incision open to air, no redness. Scattered bruising. Pt repositioned every 2 hours. Will continue to assess skin integrity for breakdown. Skin care per protocol.

## 2021-06-16 NOTE — PLAN OF CARE
Problem: Pain:  Goal: Control of acute pain  Description: Control of acute pain  Outcome: Ongoing   Pt verbalized having left hip and hand pain. Voltaren gel applied to left hand. Pt medicated with PRN Oxy-IR and scheduled Tylenol. Medication, rest and heat for hand was effective. Pt also taking Zanaflex for spasms in left hand. Problem: Falls - Risk of:  Goal: Will remain free from falls  Description: Will remain free from falls  6/16/2021 1128 by Stasia Sever, RN  Outcome: Ongoing   No falls this shift. Pt was assisted with transfers and ambulated with therapy. Pt uses call light to call for nursing assistance. Bed and chair alarm in use to promote pt safety.     Electronically signed by Stasia Sever, RN on 6/16/2021 at 11:32 AM

## 2021-06-16 NOTE — CARE COORDINATION
ASHWINI sent digital referral to [facility]. ASHWINI spoke with Guille Alamo in admissions ([phone]) Guille Alamo is still following patient and can start pre-cert when discharge is near. ASHWINI noted awaiting extension approval. Will continue to follow. Patient and family are aware of plan for [facility] at discharge.      USMAN Duran, [place]  Social Work/Case Management  [facility]   [phone]

## 2021-06-16 NOTE — PROGRESS NOTES
ACUTE REHAB UNIT  SPEECH/LANGUAGE PATHOLOGY      [x] Daily  [] Weekly Care Conference Note  [] Discharge    Patient:Laura Pacheco June      BZD:[birth year]  TJS:[id number]  Rehab Dx/Hx: Acute CVA (cerebrovascular accident) ([facility] [address] [I63.9]    Precautions: [x] Aspiration  [x] Fall risk  [] Sternal  [] Seizure [] Hip  [] Weight Bearing [] Other  ST Dx: [] Aphasia  [] Dysarthria  [] Apraxia   [x] Oropharyngeal dysphagia [x] Cognitive Impairment  [] Other:   Date of Admit: 4/23/2021  Room #: 3101/3101-01  Date: 6/16/2021        Current Diet Order:DIET GENERAL;   Recommended Form of Meds: PO  Compensatory Swallowing Strategies: Alternate solids and liquids, Upright as possible for all oral intake, Check for pocketing of food on the Left, Small bites/sips, Lingual sweep   Subjective: Pt admitted 4/12 after fall with left sided paralysis. Pt underwent craniectomy on 4/13 per Dr. Patricia Fernández. Social/Functional History  Lives With: Spouse;Son( reported that son is diagnosed with Autism.)  Occupation: Full time employment  Type of occupation:  at [employer]"    FEES 4/14: IMPRESSIONS:   Pt presents w/ mild oropharyngeal dysphagia characterized by premature spillage of thin liquids to UES and pyriforms w/ intermittent deep penetration of laryngeal vestibule; adequate airway protection and complete clearance of all residue after swallow initiation. No aspiration w/ any trials. Timely swallow initiation w/ puree and regular solid consistencies; no oropharyngeal residue.    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 Adequate ab/adduction. Complete closure of TVFs upon adduction.      Dentition: Adequate  Vision  Vision: Impaired  Vision Exceptions: Visual field cut  Hearing  Hearing: Within functional limits  Barriers toward progress: None towards stated goals    Date: 6/16/2021      Tx session 1 Tx session 2    Total Timed Code Min 30 45   Total Treatment Minutes 30 45   Individual Treatment Minutes 30 45   Group Treatment Minutes 0 0   Co-Treat Minutes 0 0   Brief Exception: N/A N/A   Pain No pain Discomfort in L foot   Pain Intervention: [] RN notified  [] Repositioned  [] Intervention offered and patient declined  [x] N/A  [] Other:    [] RN notified  [] Repositioned  [] Intervention offered and patient declined  [x] N/A  [] Other:       Subjective:     Pt upright in chair and reports having just completed PT. Pt reporting slightly slurred speech which she attributes to new medication - DO notified. Pt in w/c upon entry, agreeable to tx and pleasant. No slurring of speech noted this PM.      Objective / Goals:     Patient will consume regular solids with timely mastication and no pocketing in L buccal cavity across 2 sessions. Goal not targeted. Goal not targeted. Patient will consume PO without anterior spillage or overt pocketing across 2 sessions. GOAL MET   GOAL MET   Pt will complete divided/alternating attention tasks with 85% accuracy given min cues. Novel leisure activity - cues x3 for directing attention back to timed task. Dividing attention between w/c mobility and cognitive linguistic tasks: no cues increasing to mod cues   Pt will accurately complete executive functioning tasks with min cues. Min cues for planning/organization/execution. Pathfinding to location in hospital (previously visited by pt x 3): min cues from room to destination; mod cues for return trip to room. Pt limited by impulsivity and concern re: accuracy of pathfinding. Required cueing for functional problem solving and attention to surroundings to return via novel pathway. Menu planning and functional math re: purchases: mod cues required for attention to detail, awareness of errors, and organization. Pt will attend to L visual field with min cues.    Min cues for left visual attention; Pt initiated strategy independently and was independent for left visual attention following direct feedback. No cues increasing to mod cues for attention to left side. Decline in attention appeared r/t increased impulsivity. Pt will participate in ongoing cognitive linguistic assessment. GOAL MET GOAL MET   Other areas targeted:     Education:   Education re: rationale for tasks, stroke symptoms per pt question and stroke risk factors. Educated to impact increased impulsivity had on L attention and strategies to facilitate organization for functional math tasks. Safety Devices: [x] Call light within reach  [x] Chair alarm activated and connected to nurse call light system  [] Bed alarm activated   [] Other:  [x] Call light within reach  [] Chair alarm activated and connected to nurse call light system  [] Bed alarm activated   [] Other:    Assessment: Executive function impairment with left visual neglect and attention impairments. Significant improvements noted since admission. L visual neglect and reduced self-monitoring exacerbated by impulsivity in PM session. Plan: Continue per POC.        Interventions used this date:  [] Speech/Language Treatment  [] Instruction in HEP  [] Dysphagia Treatment [x] Cognitive Treatment   [] Other:    Discharge recommendations:  [] Home independently  [x] Home with assistance []  24 hour supervision  [] ECF [x] Other: Education completed with family on Friday 5/28/2021  Continued Tx Upon Discharge: ? [x] Yes    [] No    [] TBD based on progress while on ARU     [] Vital Stim indicated     [] Other:   Estimated discharge date: 6/14/2021 - extension requested    Tx Session 1:  Jaleesa Forbes M.A., [place]  Speech-Language Pathologist    Tx Session 2:  Denis Sawyer MA CCC-SLP; [id number]  Speech-Language Pathologist
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
## 2021-06-16 NOTE — PROGRESS NOTES
SHIFT: 0700 - 1930  Pt upon assessment was stable. Alert and oriented. Denied having chest pain or SOB. Pt verbalized having pain in left hand and hip. Pt was medicated scheduled Tylenol and Oxy-IR. Medication, rest and position was effective for pain control. Scalp incision healing/pink. Medications given this shift were reviewed with pt regarding use, dose and side effects. Pt verbalized understanding of education given. Continent of bladder this shift with toilieting being offered frequently. Pt showered today with PCA. Pt at end of shift was resting in bed with call light within reach.  Pt denied having any further needs.      Electronically signed by Johana Buckley RN on 6/16/2021 at 6:59 PM

## 2021-06-16 NOTE — PROGRESS NOTES
Occupational Therapy  Facility/Department: [facility] ACUTE REHAB UNIT  Daily Treatment Note  NAME: Benedict Allison  : 1970  MRN: [id number]    Date of Service: 2021    Discharge Recommendations:  24 hour supervision or assist, Home with Home health OT, Continue to assess pending progress, Home with nursing aide  OT Equipment Recommendations  ADL Assistive Devices: Transfer Tub Bench;Grab Bars - toilet; Toileting - Raised Toilet Seat with arms; Reacher;Grab Bars - tub  Other: continue to assess    Assessment   Assessment: Hypertonicity responsive to heat, massage and PROM with prolong stretch. Pt motivated and agreeable throughout therapy session. Pt continues to benefit from inpt OT for maximizing functional independence and safety. Continue OT per POC. Treatment Diagnosis: decreased independence with ADLs and decreased functional mobility 2/2 CVA  Prognosis: Fair  Patient Education: pt educated on LUE positioning and encouragment for use of splint. Pt verb understanding  Barriers to Learning: cognition  Activity Tolerance  Activity Tolerance: Patient Tolerated treatment well         Patient Diagnosis(es): There were no encounter diagnoses. has a past medical history of GERD (gastroesophageal reflux disease), Hernia, Obesity, and Unspecified sleep apnea. has a past surgical history that includes Splenectomy (); Total hip arthroplasty (); Lap Band (); hernia repair ();  section (); and craniotomy (Right, 2021).     Restrictions  Position Activity Restriction  Other position/activity restrictions: Ambulate, Up with assist, Pt to wear helmet when OOB, OK to remove for shower, HOB to elevated at 30*     Subjective   General  Chart Reviewed: Yes  Patient assessed for rehabilitation services?: Yes  Additional Pertinent Hx: 46 y.o. female with hypertension and tobacco abuse who presented after spending a prolonged period on the ground s/p fall out of bed; found to have acute left hemiparesis and gaze deviation. CXR with no acute cardiopulmonary abnormality. CTH revealed large acute/subacute infarct in the R frontal lobe with associated mass effect and 4 mm R to L midline shift. It also noted subdural hemorrhage of 6mm along falx, although NSGY less convinced. CTA head and neck revealed occluded R cervical ICA & R anterior cerebral artery with near complete occlusion of R MCA. Outside window on TPA. Pt is now POD #1 following hemicraniectomy (4/13). Pt admitted to ARU 4/23  Response to previous treatment: Patient with no complaints from previous session  Family / Caregiver Present: No  Referring Practitioner: Dr. Ema Durbin DO  Diagnosis: CVA  Subjective  Subjective: Pt sitting in w/c upon arrival, pleasant and agreeable to OT session. General Comment  Comments: . Pain Assessment  Non-Pharmaceutical Pain Intervention(s):  (Repositioned to comfort)  Response to Pain Intervention: Patient Satisfied  Pre Treatment Pain Screening  Comments / Details: Pt repositioned to comfort  Vital Signs  Patient Currently in Pain: Yes (with LUE mobilization)   Orientation  Orientation  Overall Orientation Status: Within Normal Limits  Objective            Type of ROM/Therapeutic Exercise  Type of ROM/Therapeutic Exercise: PROM  Comment: PROM completed for LUE with prolonged stretch for managment of tone. Heat placed on shoulder, elbow, wrist and hand for 5 min prior to PROM with pt reporting decreased pain. L hands resting flat on surface after PROM. Exercises  Elbow Flexion: x5 with prolong stretch  Elbow Extension: x5 with prolong stretch  Supination: x5 with prolong stretch  Pronation: x5 with prolong stretch  Wrist Flexion: x5 with prolong stretch  Wrist Extension: x5 with prolong stretch  Finger Extension: x5 with prolong stretch  Other: Manual massage for petorials, biceps and deltoids provided prior to PROM. Pt with good tolerance for stretch with self reported decreased pain.            Second Session  Pt met seated in [facility] and motivated for therapy. WC mobility completed to and from bathroom with Supervision. ADL oral care and washing face and hands in stance at sink with Min A for maintaining standing. WC mobility completed from room to Therapy gym. Transfer to and from Brian Leelee to chair with arm x3 and therapy mat x3 with Min A. Lateral scooting completed with from end to end on therapy mat requiring CGA when scooting to L and Supervision when scooting to R. Sit to Supine  completed with Min  A and effortful movement requiring assist for LLE. Rolling to R completed with Mod A and Rolling L complete with CGA. 10 reps scapular/shoulder elevation completed side llying with slight return noted with elevation in gravity eliminated position. Heat applied for comfort in LUE due to increased pain. Pt reporting pain reduction. Pt completed WC mobility back to room with Supervision and VCs for attention to L. Pt given items for identification on L side with 10 out of 10 correct. Pt left seated in WC with alarm on and call light in reach.               Plan  If pt discharges prior to next treatment, this note will serve as discharge summary  Plan  Times per week: 5x a week for 60 mins daily  Times per day: Daily  Current Treatment Recommendations: Strengthening, Endurance Training, Neuromuscular Re-education, Self-Care / ADL, Functional Mobility Training, Safety Education & Training, ROM, Positioning, Wheelchair Mobility Training, Balance Training, Patient/Caregiver Education & Training, Manual Therapy:  MLD, Equipment Evaluation, Education, & procurement, Home Management Training, Cognitive Reorientation                             Goals (as determined and assessed by primary OT)  Short term goals  Time Frame for Short term goals: 2 weeks  Short term goal 1: Pt will complete toilet transfer w/ Mod A-goal met 5/4  Short term goal 2: Pt will complete UE dressing w/ Mod A-goal met 5/12  Short term goal 3: Pt will complete oral care w/ spvn seated at sink w/o VCs for L sided attention-goal met 5/17  Short term goal 4: Pt will complete LE dressing w/ Mod A-goal met 5/26  Long term goals  Time Frame for Long term goals : 4 weeks  Long term goal 1: Pt will complete toilet transfer w/ SBA-ongoing  Long term goal 2: Pt will complete UE dressing w/ setup-ongoing  Long term goal 3: Pt will complete LE dressing w/ SBA-ongoing  Long term goal 4: Pt will be independent w/ tone management exercises to improve functional use of LUE-ongoing  Long term goal 5: Pt will complete oral care I'ly-goal met 5/24  Patient Goals   Patient goals : \"get back to my normal self\"       Therapy Time   Individual Concurrent Group Co-treatment   Time In 1000         Time Out 1030         Minutes 30         Timed Code Treatment Minutes: 30 Minutes + 60 min    Second Session Therapy Time:   Individual Concurrent Group Co-treatment   Time In  7707         Time Out  2928         Minutes  60               Total Treatment Minutes 90 min        [address] [place] [address], Ne, LOMELI/L  Minutes box edited by GEO Lovell/L to reflect accurate therapy session times

## 2021-06-16 NOTE — PROGRESS NOTES
Department of Physical Medicine & Rehabilitation  Progress Note    Patient Identification:  Valeriy Mendosa  [id number]  : 1970  Admit date: 2021    Chief Complaint: CVA    Subjective:   Improved tone in the left arm. She is participating well in therapy and continues to be very motivated. She is awaiting insurance extension. ROS: No f/c, n/v, cp     Objective:  Patient Vitals for the past 24 hrs:   BP Temp Temp src Pulse Resp SpO2   21 0805 125/84 97.6 °F (36.4 °C)  80 17 97 %   21 0010     16    06/15/21 2222 130/73 98.2 °F (36.8 °C) Oral 71 16 97 %     Const: Alert. No distress, pleasant. HEENT: Normocephalic, atraumatic. Normal sclera/conjunctiva. MMM. Incision healing well  CV: Regular rate and rhythm. Resp: No respiratory distress. Lungs CTAB. Abd: Soft, nontender, nondistended, NABS+   Ext: No edema. Neuro: Alert, oriented, appropriately interactive. Psych: Cooperative, appropriate mood and affect    Laboratory data: Available via EMR.    Last 24 hour lab  Recent Results (from the past 24 hour(s))   Basic Metabolic Panel w/ Reflex to MG    Collection Time: 21  6:19 AM   Result Value Ref Range    Sodium 142 136 - 145 mmol/L    Potassium reflex Magnesium 4.0 3.5 - 5.1 mmol/L    Chloride 106 99 - 110 mmol/L    CO2 25 21 - 32 mmol/L    Anion Gap 11 3 - 16    Glucose 93 70 - 99 mg/dL    BUN 14 7 - 20 mg/dL    CREATININE <0.5 (L) 0.6 - 1.1 mg/dL    GFR Non-African American >60 >60    GFR African American >60 >60    Calcium 9.7 8.3 - 10.6 mg/dL   CBC auto differential    Collection Time: 21  6:19 AM   Result Value Ref Range    WBC 11.9 (H) 4.0 - 11.0 K/uL    RBC 3.88 (L) 4.00 - 5.20 M/uL    Hemoglobin 13.3 12.0 - 16.0 g/dL    Hematocrit 39.9 36.0 - 48.0 %    .0 (H) 80.0 - 100.0 fL    MCH 34.2 (H) 26.0 - 34.0 pg    MCHC 33.3 31.0 - 36.0 g/dL    RDW 13.4 12.4 - 15.4 %    Platelets 073 051 - 218 K/uL    MPV 9.3 5.0 - 10.5 fL    Neutrophils % 42.2 % Assessment        SLP  Diet Solids Recommendation: Dysphagia Soft and Bite-Sized (Dysphagia III) (with regular solids as tolerated. Per pt request, gravy/sauces to keep for moist.)       Body mass index is 36.54 kg/m². Assessment and Plan:  Margie Villafuerte a 46 y.o. female with PMH GERD p/w L sided weakness and facial droop.  CTA head/neck on 4/12 revealed occluded R cervical ICA, occluded right LEAH, near complete occlusion of right MCA.     Acute ischemic CVA, Subdural heomorrhage s/p right hemicraniectomy and subsequen SAH and IP hemorrhage  No tPA given.  MRI 4/14 showing large subacute infarct throughout R LEAH and MCA with some areas of hemorrhagic conversion.  - Neurosurgery and neurology following  - SBP <160, PRN hydralazine, scheduled Norvasc 5 mg daily  - Keppra 500 mg twice daily   - SQH  - PT/OT      Aphasia - SLP   Depression - Home fluoxetine 20 mg daily at home  Anxiety - Hold home Ativan 0.5 mg every 8 hours PRN for anxiety  MSIHA - Patient uses CPAP at home  GERD - omeprazole 40 mg daily at home- substituted for pepcid 20 mg BID  Obesity    Rehab Progress: Improving  Anticipated Dispo: home  Services/DME: TTB and RTS (neither covered by insurance, friend to obtain)  ELOS: - extension needed       Kong Marin D.O. M.P.H.  PM&R  6/16/2021  8:47 AM

## 2021-06-16 NOTE — PATIENT CARE CONFERENCE
Inpatient Rehabilitation  Weekly Team Conference Note  The [address]  [phone]  Patient Name: Maged Bangura        MRN: [id number]    : 1970  (46 y.o.)  Gender: female   Referring Practitioner: Dr. Peter Carrizales  The team conference for this patient was held on 2021 at 10:30am by:  Devera Kanner.  DO Melva    Current/Goal QM SCORES  QM Current/Goal Score   Eating CARE Score: 5 / Discharge Goal: Set-up or clean-up assistance   Oral Hygiene CARE Score: 6 / Discharge Goal: Independent   Shower/Bathing CARE Score: 4 / Discharge Goal: Supervision or touching assistance   UB Dressing CARE Score: 3 / Discharge Goal: Set-up or clean-up assistance   LB Dressing CARE Score: 2 / Discharge Goal: Supervision or touching assistance   Putting on/off Footwear CARE Score: 3 / Discharge Goal: Supervision or touching assistance   Toileting Hygiene CARE Score: 3 / Discharge Goal: Independent   Bladder Continence Bladder Continence: Always continent    Bowel Continence Bowel Continence: Not rated    Toilet Transfers CARE Score: 3 / Discharge Goal: Supervision or touching assistance   Shower/Bathe Self  CARE Score: 4 / Discharge Goal: Supervision or touching assistance   Rolling Left and Right CARE Score: 3 / Discharge Goal: Supervision or touching assistance   Sit to Lying CARE Score: 3 / Discharge Goal: Supervision or touching assistance   Lying to Sitting on Bedside CARE Score: 4 / Discharge Goal: Supervision or touching assistance   Sit to Stand CARE Score: 3 / Discharge Goal: Supervision or touching assistance   Chair/Bed to Chair Transfer CARE Score: 3 / Discharge Goal: Supervision or touching assistance   Car Transfers CARE Score: 3 / Discharge Goal: Supervision or touching assistance   Walk 10 Feet CARE Score: 3 / Discharge Goal: Partial/moderate assistance   Walk 50 Feet with Two Turns CARE Score: 88 / Discharge Goal: Not Attempted   Walk 150 Feet CARE Score: 80 / Discharge Goal: Not Attempted   Walk 10 Feet on Uneven Surfaces CARE Score: 88 / Discharge Goal: Not Attempted   1 Step (Curb) CARE Score: 88 / Discharge Goal: Partial/moderate assistance   4 Steps CARE Score: 88 / Discharge Goal: Partial/moderate assistance   12 Steps CARE Score: 88 / Discharge Goal: Not Attempted   Picking up Object from Floor CARE Score: 88 / Discharge Goal: Partial/moderate assistance   Wheel 50 Feet with 2 Turns CARE Score: 6 / Discharge Goal: Independent   Type         [x] Manual        [] Motorized        [] N/A   Wheel 150 Feet CARE Score: 6 / Discharge Goal: Independent   Type         [x] Manual        [] Motorized        [] N/A     NURSING:  A&Ox: Level of Consciousness: Alert (0)  Orientation Level: Oriented X4  Kat Fall Risk Score: Score: 60  Admission BIMS: 15  Wounds/Incisions/Ulcers: Incision to right scalp  Medication Review: Medications reviewed with patient  Pain: complains of pain to LUE & left hip, Scheduled Tylenol & prn Oxycdone given for pain  Consultations: n0  Imaging:   XR HIP 2-3 VW W PELVIS LEFT   Final Result   Left hip arthroplasty with interval lucency noted about the femoral component indeterminate. Findings may relate to particle disease and/or may relate to loosening. Clinical correlation suggested. Infection is not excluded. Lucency about the supra-acetabular region adjacent to the acetabular component present previously indeterminate. No Fracture.      Active Comorbid Conditions: Obesity  Systems Review:   Renal: n/a, Dialysis:  Type: no, Frequency:   Neurological: LUE & LLE flaccid  Musculoskeletal: Swelling to LUE & LLE   Respiratory: wnl  Cardiac/Circulatory/Peripheral Vascular: Decreased sensation to LUE  Abnormal/Relevant Test Results: WBC 11.9  Abnormal/Relevant Lab Values:   CBC:   Recent Labs     06/16/21 0619   WBC 11.9*   HGB 13.3   HCT 39.9   .0*        BMP:   Recent Labs     06/16/21 0619      K 4.0   CL 106   CO2 25   BUN 14   CREATININE <0.5*     PT/INR: No results for input(s): PROTIME, INR in the last 72 hours. APTT: No results for input(s): APTT in the last 72 hours. Liver Profile:  Lab Results   Component Value Date    AST 41 04/13/2021    ALT 32 04/13/2021    BILITOT 0.7 04/13/2021    ALKPHOS 86 04/13/2021     Lab Results   Component Value Date    CHOL 251 04/13/2021    HDL 45 04/13/2021    TRIG 278 04/13/2021     Recent Labs     06/16/21 0619   WBC 11.9*   HGB 13.3   HCT 39.9      .0*     Recent Labs     06/16/21 0619      K 4.0      CO2 25   BUN 14   CREATININE <0.5*     No results for input(s): AST, ALT, ALB, BILIDIR, BILITOT, ALKPHOS in the last 72 hours. No results for input(s): MG in the last 72 hours. Recent Labs     06/16/21 0619   WBC 11.9*   HGB 13.3   HCT 39.9        Recent Labs     06/16/21 0619      K 4.0      CO2 25   BUN 14   CREATININE <0.5*   CALCIUM 9.7     No results for input(s): AST, ALT, BILIDIR, BILITOT, ALKPHOS in the last 72 hours. No results for input(s): INR in the last 72 hours. No results for input(s): Paralee Fontan in the last 72 hours. PHYSICAL THERAPY:  Bed Mobility: Scooting: Stand by assistance (pt scooted laterally seated EOM with partial stance and cues for ant lean)    Transfers:  Sit to Stand: Contact guard assistance (from bed, w/c, commode and mat table; VC for trunk flexion, ant wt shift and increased hip ext in standing)  Stand to sit: Contact guard assistance (VC for hand placement and improved eccentric control)  Bed to Chair: Minimal assistance (w/c <> armed chair (twice, fluctuating between CGA and MIN A) using SPC.)  Squat Pivot Transfers: Stand by assistance (scoot pivot w/c>mat table)  Comment: Pt completed 2 x 5 reps of consecutive sit <> stand transfers with R UE on L knee to promote transferring through midline vs off to the R. Pt requiring VC and MIN A during.  Pt completed 30 seconds of static standing with eyes closed and no UE A with CGA, pt expressing significant fear due to balance deficits during. Pt completed 5 reps of standing trunk rotations to end range to the R and L with CGA and no UE A.    WB Status: No WB restrictions  Ambulation 1  Surface: level tile  Device: Single point cane  Other Apparatus: AFO, Wheelchair follow (Custom molded AFO with a figure \"8 \"strap at ankle)  Assistance: Minimal assistance  Quality of Gait: Step through pattern with improved ability to advance L LE without IR (however, therapist facilitating at the glutes and pelvis for positioning to assist). Pt also demonstrating improved control and step length with the R. Small LOB that she needed assistance to correct along with some of the LOB she demonstrated appropriate righting reactions. Gait Deviations: Slow Herlinda, Decreased step height, Decreased step length (Pt tends to rotate upper body excessively resulting in increased gt deviations)  Distance: 50', 40'  Comments: Cues for marching with LLE, pt able to clear L foot from ground each swing phase without use of dorsiflexion aid or use of ten-so-shape to reduce friction from ground. Heavy cues for appropriate weight shifting on stance leg and to complete appropriate L knee extension prior to attempting to advance RLE.     OCCUPATIONAL THERAPY:  ADL  Equipment Provided: Reacher  Feeding: Setup (pt using knife to open creamer, assisted pt to open milk)  Grooming: Setup (hand hygiene seated in w/c, setup of towels to dry)  UE Bathing: Contact guard assistance, Increased time to complete, Verbal cueing, Setup (seated on TTB, adaptive pratik tech to wash RUE, positional assist of LUE for pt to wash underarm)  LE Bathing: Increased time to complete, Contact guard assistance, Verbal cueing, Setup (pt completed rear eugenie hygiene in stance with CGA to maintain stance and intermittent use of GB to steady self, use of LHS to wash lower limbs and feet)  UE Dressing: Minimal assistance, Setup, Verbal cueing, Increased time to complete (pt doffed shirt i'ly, pt donned shirt with setup and assist to thread LUE, positional assist of LE to pull down on L side)  LE Dressing: Maximum assistance (d/t time constraints, reacher was not attempted to thread BLEs, assist to pull brief and pants over hips in stance with unilateral UE suport at GB and CGA)  Toileting: Minimal assistance, Increased time to complete, Setup (pt continent of bladder, pt able to doff pants in stance with CGA and + time, assist to manage brief over hips in stance on L side, pt able to manage pants with + time)  Additional Comments: total A to don socks and shoes d/t safe of time, pt requested AFO to not be put on d/t discomfort    Toilet Transfers: Toilet Transfers  Toilet - Technique: Stand pivot  Equipment Used: Raised toilet seat with rails  Toilet Transfer: Contact guard assistance  Toilet Transfers Comments: Use of SPC, cues to sequence pivot and for hand placement, use of GB upon stance    Tub/ShowerTransfers:  Tub Transfers  Tub - Transfer From: Wheelchair  Tub - Transfer Type: To and From  Tub - Transfer To: Transfer tub bench  Tub - Technique: Stand pivot  Tub Transfers: Minimal assistance  Tub Transfers Comments: Pt completed dry tub transfer to simulate home environment, pt assisting with LLE mgmt but req assist in/out of tub to fully clear, pt used side GB to assist with scooting laterally on bench; pt utilized [facility] to complete stand pivot leading to L from TTB<w/c to assist with advacement of RLE with RUE supported, pt verbalized increased comfort/confidence with transfer  Shower Transfers  Shower - Transfer From: Wheelchair  Shower - Transfer Type: To and From  Shower - Transfer To:  Transfer tub bench  Shower - Technique: Stand pivot, Lateral  Shower Transfers: Contact Guard  Shower Transfers Comments: use of grab bar to stand to pivot from w/c<TTB; lateral scoot pivot from TTB<w/c leading R with assist to setup positioning of LLE    SPEECH THERAPY:  Assessment: Executive function impairment with left visual neglect and attention impairments. Significant improvements noted since admission. L visual neglect and reduced self-monitoring exacerbated by impulsivity in PM session. NUTRITION:  Please see nutrition note for details. Weight: 226 lb 6.6 oz (102.7 kg) / Body mass index is 36.54 kg/m². Diet Level/Order: DIET GENERAL;  Supplements:   Average Consumption per meal: PO Meals Eaten (%): 26 - 50%    CASE MANAGEMENT:  Psychosocial/Behavioral Issues: none noted  Assessment:  Patient usually lived at home with . Referred to patient for D/C planning    Patient/Family Education provided by team:  Role of PT/OT, educated on improved transfer techniques and gait mechanics, stroke education, tone management     Patient Goals for Rehab stay:  1. Return home and back to caring for her son     Rehab Team Goals for patient for the Upcoming Week:  1. Increase independence with gait and transfers  2.  Increase independence w/ ADLs     Barriers to Progress/Attainment of Goals & Efforts/Interventions to remove Barriers:  1. L sided deficits--Continued therapy    Discharge Plan:  Estimated Length of Stay: 23 days  Destination: undetermined at this time  Services at Discharge: will continue to assess pending progress/extension  Equipment at Discharge: TBD based on d/c location   Community Resources:   Factors facilitating achievement of predicted outcomes: Family support, Friend support, Motivated, Cooperative and Pleasant, Good insight into deficits  Barriers to the achievement of predicted outcomes: Pain, Cognitive deficit, Decreased endurance, Decreased sensation, Decreased proprioception, Upper extremity weakness, Lower extremity weakness and Stairs at home    Special Needs in the Upcoming Week:   [x] Family/Caregiver Education  [] Home visit  []Therapeutic Pass [] Equipment  Type:   [] Consults:_______   [] Family/Caregiver Training  [] Stroke Risk Factor Education     [] Other;_______     TEAM SUMMARY: Will continue with current poc & goals until anticipated d/c date underdetermined at this. Pending approval of an extension applied for on 6/14/2021.     MD:   Stroke Risk Factors:   [] N/A for this patient [] HTN  []  Diabetes  [] Hyperlipidemia  [x]Obesity BMI >25  [] Atrial Fibrillation [] Smoker (current)  [] Smoker (quit in last 12 months)  [x] Sleep Apnea [] Other     Risk for Readmission: High - 28%    Justification for Continued Stay:   Criteria for continued IRF stay:  Based on my medical assessment of the patient and review of information from the interdisciplinary team, as part of this weekly team conference, the patient continues to meet the following criteria for IRF level of care:  [x] The patient requires 24-hour rehabilitation nursing care   [x] The patient requires an intensive rehabilitation therapy program  [x] The patient requires active and ongoing intervention of multiple therapy disciplines  [x] The patient requires continued physician supervision by a rehabilitation physician  [x] The patient requires an intensive and coordinated interdisciplinary team approach to the delivery of rehabilitative care    Medical Necessity-continued close physician medical management is required for:   [] Cardiac/Circulatory dysfunction  [] Respiratory/Pulmonary dysfunction  [] Integumentary complications  [] Peripheral Vascular dysfunction  [] Musculoskeletal dysfunction  [x] Neurological dysfunction d/t:  [x] CVA  [] SCI  [] TBI  [] Other: __________  [] Renal dysfunction  [] Hematologic dysfunction    [] Endocrine disorders  [] GI disorders     [] Genito-Urinary dysfunction    Assessment/Plan:  [x] The patient is making good progression towards their LTG's, is actively participating in, and has a reasonable expectation to continue to benefit from the intensive rehabilitation program.  [] The estimated discharge date has been changed from initial team conference due to:   [] The estimated discharge destination has been changed from initial team conference due to:     Rehab Team Members in attendance for Team Conference:  :  ROSETTA Morillo    Therapy Manager:  Sukhwinder Tejada, PT, DPT    Nursing Director:  Corrinne Pippins, MSN, RN, ACNS-BC    Social Work:  Dunia May,     Nursing:  Caroll Felty, BSN, RN      Therapy:  Jigar Yoon, PT, DPT  Lucien Stubbs, PT, DPT    Irene Plasencia, OTR/L  Fabio Rodriguez, OTR/L    Rell Redmond MA/CCC-SLP    Nutrition:  Cash Johnson, RD LD    I approve the established interdisciplinary plan of care as documented within the medical record of Meghan Rick.     MD Javi Arellano, D.O. M.P.H  PM&R  6/17/2021  12:15 PM

## 2021-06-17 PROCEDURE — 94660 CPAP INITIATION&MGMT: CPT

## 2021-06-17 PROCEDURE — 1280000000 HC REHAB R&B

## 2021-06-17 PROCEDURE — 99232 SBSQ HOSP IP/OBS MODERATE 35: CPT | Performed by: PHYSICAL MEDICINE & REHABILITATION

## 2021-06-17 PROCEDURE — 6360000002 HC RX W HCPCS: Performed by: PHYSICAL MEDICINE & REHABILITATION

## 2021-06-17 PROCEDURE — 97129 THER IVNTJ 1ST 15 MIN: CPT

## 2021-06-17 PROCEDURE — 97140 MANUAL THERAPY 1/> REGIONS: CPT

## 2021-06-17 PROCEDURE — 97530 THERAPEUTIC ACTIVITIES: CPT

## 2021-06-17 PROCEDURE — 97130 THER IVNTJ EA ADDL 15 MIN: CPT

## 2021-06-17 PROCEDURE — 97535 SELF CARE MNGMENT TRAINING: CPT

## 2021-06-17 PROCEDURE — 6370000000 HC RX 637 (ALT 250 FOR IP): Performed by: PHYSICAL MEDICINE & REHABILITATION

## 2021-06-17 PROCEDURE — 97110 THERAPEUTIC EXERCISES: CPT

## 2021-06-17 RX ORDER — GABAPENTIN 100 MG/1
100 CAPSULE ORAL 3 TIMES DAILY
Status: DISCONTINUED | OUTPATIENT
Start: 2021-06-17 | End: 2021-06-24

## 2021-06-17 RX ORDER — METHYLPHENIDATE HYDROCHLORIDE 5 MG/1
5 TABLET ORAL EVERY MORNING
Status: DISCONTINUED | OUTPATIENT
Start: 2021-06-18 | End: 2021-06-24 | Stop reason: HOSPADM

## 2021-06-17 RX ADMIN — HEPARIN SODIUM 5000 UNITS: 5000 INJECTION INTRAVENOUS; SUBCUTANEOUS at 14:02

## 2021-06-17 RX ADMIN — ATORVASTATIN CALCIUM 80 MG: 80 TABLET, FILM COATED ORAL at 21:05

## 2021-06-17 RX ADMIN — THERA TABS 1 TABLET: TAB at 11:23

## 2021-06-17 RX ADMIN — CETIRIZINE HYDROCHLORIDE 10 MG: 10 TABLET, FILM COATED ORAL at 11:22

## 2021-06-17 RX ADMIN — ASPIRIN 81 MG: 81 TABLET, CHEWABLE ORAL at 11:21

## 2021-06-17 RX ADMIN — LEVETIRACETAM 500 MG: 500 TABLET ORAL at 11:23

## 2021-06-17 RX ADMIN — GABAPENTIN 100 MG: 100 CAPSULE ORAL at 14:01

## 2021-06-17 RX ADMIN — ACETAMINOPHEN 1000 MG: 500 TABLET, COATED ORAL at 22:59

## 2021-06-17 RX ADMIN — DICLOFENAC SODIUM 4 G: 10 GEL TOPICAL at 21:07

## 2021-06-17 RX ADMIN — ACETAMINOPHEN 1000 MG: 500 TABLET, COATED ORAL at 04:23

## 2021-06-17 RX ADMIN — ACETAMINOPHEN 1000 MG: 500 TABLET, COATED ORAL at 11:23

## 2021-06-17 RX ADMIN — LEVETIRACETAM 500 MG: 500 TABLET ORAL at 21:05

## 2021-06-17 RX ADMIN — METHYLPHENIDATE HYDROCHLORIDE 10 MG: 10 TABLET ORAL at 11:23

## 2021-06-17 RX ADMIN — OXYCODONE 10 MG: 5 TABLET ORAL at 15:29

## 2021-06-17 RX ADMIN — FLUOXETINE 20 MG: 20 CAPSULE ORAL at 11:23

## 2021-06-17 RX ADMIN — TIZANIDINE 2 MG: 4 TABLET ORAL at 21:05

## 2021-06-17 RX ADMIN — FAMOTIDINE 20 MG: 20 TABLET, FILM COATED ORAL at 21:05

## 2021-06-17 RX ADMIN — GUAIFENESIN 600 MG: 600 TABLET, EXTENDED RELEASE ORAL at 11:22

## 2021-06-17 RX ADMIN — HEPARIN SODIUM 5000 UNITS: 5000 INJECTION INTRAVENOUS; SUBCUTANEOUS at 23:01

## 2021-06-17 RX ADMIN — TIZANIDINE 2 MG: 4 TABLET ORAL at 11:22

## 2021-06-17 RX ADMIN — AMLODIPINE BESYLATE 5 MG: 5 TABLET ORAL at 11:21

## 2021-06-17 RX ADMIN — DOCUSATE SODIUM 50 MG AND SENNOSIDES 8.6 MG 2 TABLET: 8.6; 5 TABLET, FILM COATED ORAL at 11:22

## 2021-06-17 RX ADMIN — GABAPENTIN 100 MG: 100 CAPSULE ORAL at 21:05

## 2021-06-17 RX ADMIN — DICLOFENAC SODIUM 4 G: 10 GEL TOPICAL at 11:24

## 2021-06-17 RX ADMIN — DOCUSATE SODIUM 50 MG AND SENNOSIDES 8.6 MG 2 TABLET: 8.6; 5 TABLET, FILM COATED ORAL at 21:05

## 2021-06-17 RX ADMIN — THIAMINE HCL TAB 100 MG 100 MG: 100 TAB at 11:23

## 2021-06-17 RX ADMIN — Medication 5 MG: at 11:24

## 2021-06-17 RX ADMIN — FAMOTIDINE 20 MG: 20 TABLET, FILM COATED ORAL at 11:22

## 2021-06-17 RX ADMIN — ACETAMINOPHEN 1000 MG: 500 TABLET, COATED ORAL at 15:29

## 2021-06-17 RX ADMIN — HEPARIN SODIUM 5000 UNITS: 5000 INJECTION INTRAVENOUS; SUBCUTANEOUS at 06:14

## 2021-06-17 RX ADMIN — OXYCODONE 10 MG: 5 TABLET ORAL at 09:56

## 2021-06-17 ASSESSMENT — PAIN DESCRIPTION - LOCATION
LOCATION: ARM

## 2021-06-17 ASSESSMENT — PAIN SCALES - GENERAL
PAINLEVEL_OUTOF10: 7
PAINLEVEL_OUTOF10: 2
PAINLEVEL_OUTOF10: 3
PAINLEVEL_OUTOF10: 8
PAINLEVEL_OUTOF10: 0

## 2021-06-17 ASSESSMENT — PAIN - FUNCTIONAL ASSESSMENT
PAIN_FUNCTIONAL_ASSESSMENT: ACTIVITIES ARE NOT PREVENTED

## 2021-06-17 ASSESSMENT — PAIN DESCRIPTION - ORIENTATION
ORIENTATION: LEFT

## 2021-06-17 ASSESSMENT — PAIN DESCRIPTION - PAIN TYPE
TYPE: ACUTE PAIN

## 2021-06-17 ASSESSMENT — PAIN DESCRIPTION - PROGRESSION
CLINICAL_PROGRESSION: NOT CHANGED

## 2021-06-17 ASSESSMENT — PAIN DESCRIPTION - FREQUENCY
FREQUENCY: INTERMITTENT

## 2021-06-17 ASSESSMENT — PAIN DESCRIPTION - ONSET
ONSET: ON-GOING

## 2021-06-17 ASSESSMENT — PAIN DESCRIPTION - DESCRIPTORS
DESCRIPTORS: ACHING

## 2021-06-17 NOTE — PROGRESS NOTES
Patient up in chair and participating with physical therapy. Medicated for pain X 1 this shift. Assessment completed, VSS, and patient is afebrile. Bed in low position, call light within reach.

## 2021-06-17 NOTE — PROGRESS NOTES
Department of Physical Medicine & Rehabilitation  Progress Note    Patient Identification:  Mariano Lindo  [id number]  : 1970  Admit date: 2021    Chief Complaint: CVA    Subjective:   No new complaints overnight. She is doing well in therapy and has shown great improvement with ambulation. She walked with assistance yesterday up and down an incline. ROS: No f/c, n/v, cp     Objective:  Patient Vitals for the past 24 hrs:   BP Temp Temp src Pulse Resp SpO2   21 0126     16    218 122/80 97.8 °F (36.6 °C) Oral 72 18 94 %     Const: Alert. No distress, pleasant. HEENT: Normocephalic, atraumatic. Normal sclera/conjunctiva. MMM. Incision healing well  CV: Regular rate and rhythm. Resp: No respiratory distress. Lungs CTAB. Abd: Soft, nontender, nondistended, NABS+   Ext: No edema. Neuro: Alert, oriented, appropriately interactive. Psych: Cooperative, appropriate mood and affect    Laboratory data: Available via EMR. Last 24 hour lab  No results found for this or any previous visit (from the past 24 hour(s)).     Therapy progress:  PT  Position Activity Restriction  Other position/activity restrictions: Ambulate, Up with assist, Pt to wear helmet when OOB, OK to remove for shower, HOB to elevated at 30*  Objective     Sit to Stand: Contact guard assistance (from bed, w/c, commode and mat table; VC for trunk flexion, ant wt shift and increased hip ext in standing)  Stand to sit: Contact guard assistance (VC for hand placement and improved eccentric control)  Bed to Chair: Minimal assistance (w/c <> armed chair (twice, fluctuating between CGA and MIN A) using SPC.)  Device: Single point cane  Other Apparatus: AFO, Wheelchair follow (Custom molded AFO with a figure \"8 \"strap at ankle)  Assistance: Minimal assistance  Distance: 50', 40'  OT  PT Equipment Recommendations  Equipment Needed: Yes  Mobility Devices: Wheelchair  Wheelchair: Light Weight  Other: Pt will require a 20\" PM&R  6/17/2021  10:11 AM

## 2021-06-17 NOTE — PLAN OF CARE
Problem: Pain:  Goal: Control of chronic pain  Description: Control of chronic pain  Outcome: Ongoing  Note: Patient complains of pain in left arm. Medicated with Oxycodone per order. Patient states she is satisfied with current pain medication regimen. Problem: Falls - Risk of:  Goal: Will remain free from falls  Description: Will remain free from falls  6/17/2021 1101 by Elaine Bowen  Outcome: Ongoing  Note: Patient remains free of falls this shift. Room free of clutter, bed in low position and call light within reach. Problem: Falls - Risk of:  Goal: Absence of physical injury  Description: Absence of physical injury  Outcome: Ongoing  Note: Patient remains free of physical injury this shift. Bed/chair alarm in use.

## 2021-06-17 NOTE — PLAN OF CARE
Problem: Pain:  Goal: Control of acute pain  Description: Control of acute pain  Outcome: Ongoing  Note: Pt complained of pain to LLE. Pt describe as burning, sharp & shooting down leg. Massage & stretching was done. A heat pack was applied to left thigh. Scheduled Tylenol & prn Oxycodone given for pain. Will continue to assess pain and continue to offer interventions. Pt advised pain was just about gone after receiving interventions. Pt resting comfortably at this time. Problem: Falls - Risk of:  Goal: Will remain free from falls  Description: Will remain free from falls  Outcome: Ongoing  Note: Pt with history of falls. Up with assistance, gait belt and stand pivot to chair. LUE & LLE flaccid. Fall precautions maintained. Fall risk armband on. Bed in lowest position. Bed alarm in use. Reminded pt to call for assistance for assistance with any needs. Call light within reach. Pt uses call light appropriately. No falls thus far during shift. Problem: Skin Integrity:  Goal: Will show no infection signs and symptoms  Description: Will show no infection signs and symptoms  Outcome: Ongoing  Note: Pt with surgical incision to right scalp. Incision healed. No redness or drainage. Pt afebrile. WBC slight elevated at 11.9. No s/sx of infection at this time. Will continue to assess q shift and prn and provide interventions as needed.

## 2021-06-17 NOTE — PROGRESS NOTES
ACUTE REHAB UNIT  SPEECH/LANGUAGE PATHOLOGY      [x] Daily  [x] Weekly Care Conference Note  [] Discharge    Patient:Laura Lan      BDQ:9[id number]  MYL:[id number]  Rehab Dx/Hx: Acute CVA (cerebrovascular accident) ([facility] [address] [I63.9]    Precautions: [x] Aspiration  [x] Fall risk  [] Sternal  [] Seizure [] Hip  [] Weight Bearing [] Other  ST Dx: [] Aphasia  [] Dysarthria  [] Apraxia   [x] Oropharyngeal dysphagia [x] Cognitive Impairment  [] Other:   Date of Admit: 4/23/2021  Room #: 3101/3101-01  Date: 6/17/2021        Current Diet Order:DIET GENERAL;   Recommended Form of Meds: PO  Compensatory Swallowing Strategies: Alternate solids and liquids, Upright as possible for all oral intake, Check for pocketing of food on the Left, Small bites/sips, Lingual sweep   Subjective: Pt admitted 4/12 after fall with left sided paralysis. Pt underwent craniectomy on 4/13 per Dr. Rodrigez Aid. Social/Functional History  Lives With: Spouse;Son( reported that son is diagnosed with Autism.)  Occupation: Full time employment  Type of occupation:  at [employer]"    FEES 4/14: IMPRESSIONS:   Pt presents w/ mild oropharyngeal dysphagia characterized by premature spillage of thin liquids to UES and pyriforms w/ intermittent deep penetration of laryngeal vestibule; adequate airway protection and complete clearance of all residue after swallow initiation. No aspiration w/ any trials. Timely swallow initiation w/ puree and regular solid consistencies; no oropharyngeal residue.    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 Adequate ab/adduction. Complete closure of TVFs upon adduction.      Dentition: Adequate  Vision  Vision: Impaired  Vision Exceptions: Visual field cut  Hearing  Hearing: Within functional limits  Barriers toward progress: None towards stated goals    Date: 6/17/2021       Tx session 1 Tx session 2 Weekly Summary   Total Timed Code Min 30 30    Total Treatment Minutes 30 30    Individual Treatment Minutes 30 30    Group Treatment Minutes 0 0    Co-Treat Minutes 0 0    Brief Exception: N/A N/A    Pain Left shoulder and leg None indicated    Pain Intervention: [] RN notified  [] Repositioned  [x] Intervention offered and patient declined  [] N/A  [] Other:    [] RN notified  [] Repositioned  [] Intervention offered and patient declined  [x] N/A  [] Other:        Subjective:     Pt in w/c during the session. Notes pain in her left shoulder and leg. Pt in w/c for session and agreeable to therapy. Needed break during the session to apply lotion to craniotomy scar d/c itching. Pt anxious appearing at beginning with repetitive motions of RLE. Pt endorsed anxiety 2/2 insurance request pending and unknown d/c date / plans. RN arrived during session to administer medications. Objective / Goals:      Patient will consume regular solids with timely mastication and no pocketing in L buccal cavity across 2 sessions. Goal not targeted in session. Goal not targeted in session. GOAL MET - continue to target   Patient will consume PO without anterior spillage or overt pocketing across 2 sessions. GOAL MET   GOAL MET GOAL MET   Pt will complete divided/alternating attention tasks with 85% accuracy given min cues. Patient needed to be cued to return to executive function task after making plans regarding getting dressed before PT. Patient noted trouble alternating attention from thoughts concerning a TV show watched immediately before the session. Patient typically redirected self back to task if given extended time. Overall min cues. Min cues to divide attention between staff and tx tasks. GOAL MET - continue to target as indicated   Pt will accurately complete executive functioning tasks with min cues.  Information transfer + basic math calculations for work related task: Cued to monitor task accuracy and completeness, maintain organization during task, initiate the next steps of task when necessary, and use pen as tool for visual tracking when reading. Prompted to finish the activity x3. Patient able to verbally reason through the task to manage attention to details and assist with persistence. Adequate organization of math calculations. 100% accuracy with min cues. Calculating money amounts: 81% accuracy with min-no cues, improving to 100% accuracy with min cues. Adding coin amounts - 2 types: 60% accuracy with min-no cues improving to 100% accuracy with mod cues. Counting money and completing functional calculations: min-mod cues for working memory and attention to details. GOAL PARTIALLY MET - continue to target   Pt will attend to L visual field with min cues. Cued to monitor task progress on the left side, during executive function activity and commence left to right reading and writing pattern x2. Overt L inattention x 1. Cues more r/t appropriate visual scanning pattern. Min cues overall. Neglected 2 pennies in L visual field during coin counting task. Otherwise no indications of L neglect during tasks. GOAL MET - continue to target as pt with inconsistent carryover depending on type of task   Pt will participate in ongoing cognitive linguistic assessment. GOAL MET GOAL MET GOAL MET   Other areas targeted:      Education:   Educated to improvements noted and ongoing deficits as well as visual scanning strategies. Educated on strategies for organizing thought process during mental math calculations regarding money and patient progress with executive function since the previous day.     Safety Devices: [x] Call light within reach  [x] Chair alarm activated and connected to nurse call light system  [] Bed alarm activated   [] Other:  [x] Call light within reach  [x] Chair alarm activated and connected to nurse call light system  [] Bed alarm activated   [x] Other: reinforced use of call light

## 2021-06-17 NOTE — PROGRESS NOTES
Occupational Therapy  Facility/Department: [facility] ACUTE REHAB UNIT  Daily Treatment Note  NAME: Valeriy Mendosa  : 1970  MRN: [id number]    Date of Service: 2021    Discharge Recommendations:  24 hour supervision or assist, Home with Home health OT, Continue to assess pending progress, Home with nursing aide  OT Equipment Recommendations  ADL Assistive Devices: Transfer Tub Bench;Grab Bars - toilet; Toileting - Raised Toilet Seat with arms; Reacher;Grab Bars - tub  Other: continue to assess    Assessment   Performance deficits / Impairments: Decreased functional mobility ; Decreased ADL status; Decreased ROM; Decreased strength;Decreased sensation;Decreased fine motor control;Decreased endurance;Decreased posture;Decreased balance;Decreased safe awareness;Decreased coordination;Decreased vision/visual deficit; Decreased cognition  Assessment: Patient demonstrating improvement with LB dressing tasks, with increased success using reacher. Increased independence with LB dressing with use of grab bar which provided extra stability v use of cane while seated EOB, continues to require mod assist to complete. Would continue to benefit from OT services, cont POC. Treatment Diagnosis: decreased independence with ADLs and decreased functional mobility 2/2 CVA  Prognosis: Good  OT Education: Plan of Care;Transfer Training;ADL Adaptive Strategies; Home Exercise Program  Patient Education: verb understanding  REQUIRES OT FOLLOW UP: Yes  Activity Tolerance  Activity Tolerance: Patient Tolerated treatment well  Safety Devices  Safety Devices in place: Yes  Type of devices: Left in chair;Call light within reach; Chair alarm in place; All fall risk precautions in place         Patient Diagnosis(es): There were no encounter diagnoses. has a past medical history of GERD (gastroesophageal reflux disease), Hernia, Obesity, and Unspecified sleep apnea. has a past surgical history that includes Splenectomy ();  Total hip arthroplasty (); Lap Band (); hernia repair ();  section (); and craniotomy (Right, 2021). Restrictions  Position Activity Restriction  Other position/activity restrictions: Ambulate, Up with assist, Pt to wear helmet when OOB, OK to remove for shower, HOB to elevated at 30*  Subjective   General  Chart Reviewed: Yes  Patient assessed for rehabilitation services?: Yes  Additional Pertinent Hx: 46 y.o. female with hypertension and tobacco abuse who presented after spending a prolonged period on the ground s/p fall out of bed; found to have acute left hemiparesis and gaze deviation. CXR with no acute cardiopulmonary abnormality. CTH revealed large acute/subacute infarct in the R frontal lobe with associated mass effect and 4 mm R to L midline shift. It also noted subdural hemorrhage of 6mm along falx, although NSGY less convinced. CTA head and neck revealed occluded R cervical ICA & R anterior cerebral artery with near complete occlusion of R MCA. Outside window on TPA. Pt is now POD #1 following hemicraniectomy (). Pt admitted to ARU   Response to previous treatment: Patient with no complaints from previous session  Family / Caregiver Present: No  Referring Practitioner: Dr. Vera Magaña DO  Diagnosis: CVA  Subjective  Subjective: Patient seated in w/c upon arrival, agreeable to OT session. General Comment  Comments: Alicia Brill Vital Signs  Patient Currently in Pain: Yes (L shoulder/hand)   Orientation  Orientation  Overall Orientation Status: Within Normal Limits  Objective    ADL  UE Bathing: Contact guard assistance; Increased time to complete;Verbal cueing;Setup (positional assist for LUE, pratik technique)  LE Bathing: Contact guard assistance (use of grab bar, steadying assist in stance, use of LHS)  UE Dressing: Setup;Minimal assistance (doff shirt without assist, min assist to don gown over LUE)  LE Dressing: Verbal cueing;Setup; Moderate assistance (heavy v/c for improved technique to thread BLE. Able to doff over hips in stance with grab bar, increased difficulty pulling up over hips when completed at EOB with only cane from steadying. Discussed that patient may demo increased independence with)  Shower Transfers  Shower - Transfer From: Wheelchair  Shower - Transfer Type: To and From  Shower - Transfer To: Transfer tub bench  Shower - Technique: Stand pivot; Lateral  Shower Transfers: Contact Guard  Shower Transfers Comments: with use of grab bar, v/c for improved positioning LLE prior to transfer  Bed mobility  Sit to Supine: Stand by assistance  Scooting: Minimal assistance (scoot up in bed, assist for improved positioning RLE and at R hip)  Transfers  Sit to stand: Contact guard assistance  Stand to sit: Contact guard assistance  Type of ROM/Therapeutic Exercise  Comment: Completed UE sustained stretch with sh abduction to 90, externally rotated and elbow extended. Attempted maximal muscle lenthening with finger/wrist extension in this position, but provided stretch was too strong. Held position for 1-2 min.      Plan   Plan  Times per week: 5x a week for 60 mins daily  Times per day: Daily  Current Treatment Recommendations: Strengthening, Endurance Training, Neuromuscular Re-education, Self-Care / ADL, Functional Mobility Training, Safety Education & Training, ROM, Positioning, Wheelchair Mobility Training, Balance Training, Patient/Caregiver Education & Training, Manual Therapy:  MLD, Equipment Evaluation, Education, & procurement, Home Management Training, Cognitive Reorientation    Goals  Short term goals  Time Frame for Short term goals: 2 weeks  Short term goal 1: Pt will complete toilet transfer w/ Mod A-goal met 5/4  Short term goal 2: Pt will complete UE dressing w/ Mod A-goal met 5/12  Short term goal 3: Pt will complete oral care w/ spvn seated at sink w/o VCs for L sided attention-goal met 5/17  Short term goal 4: Pt will complete LE dressing w/ Mod A-goal met 5/26  Long term goals  Time Frame for Long term goals : 4 weeks  Long term goal 1: Pt will complete toilet transfer w/ SBA-ongoing  Long term goal 2: Pt will complete UE dressing w/ setup-ongoing  Long term goal 3: Pt will complete LE dressing w/ SBA-ongoing  Long term goal 4: Pt will be independent w/ tone management exercises to improve functional use of LUE-ongoing  Long term goal 5: Pt will complete oral care I'ly-goal met 5/24  Patient Goals   Patient goals : \"get back to my normal self\"       Therapy Time   Individual Concurrent Group Co-treatment   Time In 1245         Time Out 1400         Minutes [address] [facility], OTR/L #[id number]
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
## 2021-06-18 LAB
ANION GAP SERPL CALCULATED.3IONS-SCNC: 11 MMOL/L (ref 3–16)
BASOPHILS ABSOLUTE: 0.1 K/UL (ref 0–0.2)
BASOPHILS RELATIVE PERCENT: 1.1 %
BUN BLDV-MCNC: 14 MG/DL (ref 7–20)
CALCIUM SERPL-MCNC: 9.4 MG/DL (ref 8.3–10.6)
CHLORIDE BLD-SCNC: 105 MMOL/L (ref 99–110)
CO2: 24 MMOL/L (ref 21–32)
CREAT SERPL-MCNC: <0.5 MG/DL (ref 0.6–1.1)
EOSINOPHILS ABSOLUTE: 0.5 K/UL (ref 0–0.6)
EOSINOPHILS RELATIVE PERCENT: 4.6 %
GFR AFRICAN AMERICAN: >60
GFR NON-AFRICAN AMERICAN: >60
GLUCOSE BLD-MCNC: 88 MG/DL (ref 70–99)
HCT VFR BLD CALC: 38.3 % (ref 36–48)
HEMOGLOBIN: 12.9 G/DL (ref 12–16)
LYMPHOCYTES ABSOLUTE: 5.1 K/UL (ref 1–5.1)
LYMPHOCYTES RELATIVE PERCENT: 45.6 %
MCH RBC QN AUTO: 34.3 PG (ref 26–34)
MCHC RBC AUTO-ENTMCNC: 33.8 G/DL (ref 31–36)
MCV RBC AUTO: 101.7 FL (ref 80–100)
MONOCYTES ABSOLUTE: 1.1 K/UL (ref 0–1.3)
MONOCYTES RELATIVE PERCENT: 10 %
NEUTROPHILS ABSOLUTE: 4.3 K/UL (ref 1.7–7.7)
NEUTROPHILS RELATIVE PERCENT: 38.7 %
PDW BLD-RTO: 13.5 % (ref 12.4–15.4)
PLATELET # BLD: 364 K/UL (ref 135–450)
PMV BLD AUTO: 9.3 FL (ref 5–10.5)
POTASSIUM REFLEX MAGNESIUM: 4.1 MMOL/L (ref 3.5–5.1)
RBC # BLD: 3.77 M/UL (ref 4–5.2)
SODIUM BLD-SCNC: 140 MMOL/L (ref 136–145)
WBC # BLD: 11.2 K/UL (ref 4–11)

## 2021-06-18 PROCEDURE — 97116 GAIT TRAINING THERAPY: CPT

## 2021-06-18 PROCEDURE — 6360000002 HC RX W HCPCS: Performed by: PHYSICAL MEDICINE & REHABILITATION

## 2021-06-18 PROCEDURE — 97112 NEUROMUSCULAR REEDUCATION: CPT

## 2021-06-18 PROCEDURE — 97535 SELF CARE MNGMENT TRAINING: CPT

## 2021-06-18 PROCEDURE — 97530 THERAPEUTIC ACTIVITIES: CPT

## 2021-06-18 PROCEDURE — 1280000000 HC REHAB R&B

## 2021-06-18 PROCEDURE — 99232 SBSQ HOSP IP/OBS MODERATE 35: CPT | Performed by: PHYSICAL MEDICINE & REHABILITATION

## 2021-06-18 PROCEDURE — 97140 MANUAL THERAPY 1/> REGIONS: CPT

## 2021-06-18 PROCEDURE — 36415 COLL VENOUS BLD VENIPUNCTURE: CPT

## 2021-06-18 PROCEDURE — 97129 THER IVNTJ 1ST 15 MIN: CPT

## 2021-06-18 PROCEDURE — 6370000000 HC RX 637 (ALT 250 FOR IP): Performed by: PHYSICAL MEDICINE & REHABILITATION

## 2021-06-18 PROCEDURE — 97110 THERAPEUTIC EXERCISES: CPT

## 2021-06-18 PROCEDURE — 80048 BASIC METABOLIC PNL TOTAL CA: CPT

## 2021-06-18 PROCEDURE — 97130 THER IVNTJ EA ADDL 15 MIN: CPT

## 2021-06-18 PROCEDURE — 94660 CPAP INITIATION&MGMT: CPT

## 2021-06-18 PROCEDURE — 85025 COMPLETE CBC W/AUTO DIFF WBC: CPT

## 2021-06-18 RX ADMIN — ACETAMINOPHEN 1000 MG: 500 TABLET, COATED ORAL at 21:09

## 2021-06-18 RX ADMIN — DOCUSATE SODIUM 50 MG AND SENNOSIDES 8.6 MG 2 TABLET: 8.6; 5 TABLET, FILM COATED ORAL at 21:08

## 2021-06-18 RX ADMIN — HEPARIN SODIUM 5000 UNITS: 5000 INJECTION INTRAVENOUS; SUBCUTANEOUS at 21:09

## 2021-06-18 RX ADMIN — THERA TABS 1 TABLET: TAB at 08:26

## 2021-06-18 RX ADMIN — ACETAMINOPHEN 1000 MG: 500 TABLET, COATED ORAL at 11:03

## 2021-06-18 RX ADMIN — ATORVASTATIN CALCIUM 80 MG: 80 TABLET, FILM COATED ORAL at 21:08

## 2021-06-18 RX ADMIN — OXYCODONE HYDROCHLORIDE 5 MG: 5 TABLET ORAL at 06:25

## 2021-06-18 RX ADMIN — HEPARIN SODIUM 5000 UNITS: 5000 INJECTION INTRAVENOUS; SUBCUTANEOUS at 15:48

## 2021-06-18 RX ADMIN — ASPIRIN 81 MG: 81 TABLET, CHEWABLE ORAL at 08:26

## 2021-06-18 RX ADMIN — GABAPENTIN 100 MG: 100 CAPSULE ORAL at 21:08

## 2021-06-18 RX ADMIN — GABAPENTIN 100 MG: 100 CAPSULE ORAL at 15:48

## 2021-06-18 RX ADMIN — THIAMINE HCL TAB 100 MG 100 MG: 100 TAB at 08:26

## 2021-06-18 RX ADMIN — CETIRIZINE HYDROCHLORIDE 10 MG: 10 TABLET, FILM COATED ORAL at 08:26

## 2021-06-18 RX ADMIN — DICLOFENAC SODIUM 4 G: 10 GEL TOPICAL at 21:07

## 2021-06-18 RX ADMIN — TIZANIDINE 2 MG: 4 TABLET ORAL at 21:08

## 2021-06-18 RX ADMIN — LEVETIRACETAM 500 MG: 500 TABLET ORAL at 21:08

## 2021-06-18 RX ADMIN — FAMOTIDINE 20 MG: 20 TABLET, FILM COATED ORAL at 21:08

## 2021-06-18 RX ADMIN — AMLODIPINE BESYLATE 5 MG: 5 TABLET ORAL at 08:26

## 2021-06-18 RX ADMIN — GABAPENTIN 100 MG: 100 CAPSULE ORAL at 08:26

## 2021-06-18 RX ADMIN — OXYCODONE 10 MG: 5 TABLET ORAL at 21:08

## 2021-06-18 RX ADMIN — LEVETIRACETAM 500 MG: 500 TABLET ORAL at 08:26

## 2021-06-18 RX ADMIN — FAMOTIDINE 20 MG: 20 TABLET, FILM COATED ORAL at 08:27

## 2021-06-18 RX ADMIN — DICLOFENAC SODIUM 4 G: 10 GEL TOPICAL at 08:28

## 2021-06-18 RX ADMIN — METHYLPHENIDATE HYDROCHLORIDE 5 MG: 5 TABLET ORAL at 08:27

## 2021-06-18 RX ADMIN — HEPARIN SODIUM 5000 UNITS: 5000 INJECTION INTRAVENOUS; SUBCUTANEOUS at 06:27

## 2021-06-18 RX ADMIN — ACETAMINOPHEN 1000 MG: 500 TABLET, COATED ORAL at 15:48

## 2021-06-18 RX ADMIN — FLUOXETINE 20 MG: 20 CAPSULE ORAL at 08:27

## 2021-06-18 RX ADMIN — TIZANIDINE 2 MG: 4 TABLET ORAL at 08:26

## 2021-06-18 ASSESSMENT — PAIN SCALES - GENERAL
PAINLEVEL_OUTOF10: 0
PAINLEVEL_OUTOF10: 4
PAINLEVEL_OUTOF10: 7
PAINLEVEL_OUTOF10: 0
PAINLEVEL_OUTOF10: 2
PAINLEVEL_OUTOF10: 3
PAINLEVEL_OUTOF10: 0

## 2021-06-18 ASSESSMENT — PAIN DESCRIPTION - ONSET
ONSET: ON-GOING
ONSET: ON-GOING

## 2021-06-18 ASSESSMENT — PAIN DESCRIPTION - ORIENTATION
ORIENTATION: LEFT
ORIENTATION: LEFT

## 2021-06-18 ASSESSMENT — PAIN DESCRIPTION - LOCATION
LOCATION: ARM
LOCATION: ARM

## 2021-06-18 ASSESSMENT — PAIN DESCRIPTION - FREQUENCY
FREQUENCY: INTERMITTENT
FREQUENCY: INTERMITTENT

## 2021-06-18 ASSESSMENT — PAIN DESCRIPTION - DESCRIPTORS
DESCRIPTORS: ACHING
DESCRIPTORS: ACHING;SORE

## 2021-06-18 ASSESSMENT — PAIN SCALES - WONG BAKER: WONGBAKER_NUMERICALRESPONSE: 0

## 2021-06-18 ASSESSMENT — PAIN DESCRIPTION - PROGRESSION
CLINICAL_PROGRESSION: GRADUALLY IMPROVING
CLINICAL_PROGRESSION: NOT CHANGED

## 2021-06-18 ASSESSMENT — PAIN - FUNCTIONAL ASSESSMENT: PAIN_FUNCTIONAL_ASSESSMENT: ACTIVITIES ARE NOT PREVENTED

## 2021-06-18 ASSESSMENT — PAIN DESCRIPTION - PAIN TYPE
TYPE: ACUTE PAIN
TYPE: ACUTE PAIN

## 2021-06-18 NOTE — PLAN OF CARE
Problem: Pain:  Goal: Pain level will decrease  Description: Pain level will decrease  6/18/2021 1749 by Min Valdez RN  Outcome: Ongoing  Note: Pain being managed with PRN and scheduled medications. Non-pharm measures of heat therapy and repositioning encouraged throughout shift. Problem: Falls - Risk of:  Goal: Will remain free from falls  Description: Will remain free from falls  6/18/2021 1749 by Min Valdez RN  Outcome: Ongoing  Note: Remains free from falls this shift. Fall precautions in place. Bed/chair alarm utilized. Calls out appropriately for assistance, call light within reach. Problem: Skin Integrity:  Goal: Absence of new skin breakdown  Description: Absence of new skin breakdown  Outcome: Ongoing  Note: Skin kept clean and dry throughout shift. Patient encouraged and assisted with repositioning throughout shift.

## 2021-06-18 NOTE — PROGRESS NOTES
Pt. Assessment complete, VSS, afebrile, medications taken without difficulty. Pt. transferred X 2 wheel chair/GB to bathroom and back to bed. Helmet placed during transfer. Pt. Remains A & O X 4. No s/sx of distress noted at this time.

## 2021-06-18 NOTE — PLAN OF CARE
Problem: Pain:  Goal: Pain level will decrease  Description: Pain level will decrease  Outcome: Met This Shift   Pt.voiced satisfaction with pain level during this shift. Will continue to evaluate and treat as ordered. Problem: Falls - Risk of:  Goal: Will remain free from falls  Description: Will remain free from falls  Outcome: Met This Shift   No falls reported thus far this shift. Problem: Skin Integrity:  Goal: Will show no infection signs and symptoms  Description: Will show no infection signs and symptoms  Outcome: Met This Shift   No new skin issues found.

## 2021-06-18 NOTE — PROGRESS NOTES
NUTRITION NOTE   Admission Date: 4/23/2021     Type and Reason for Visit: Reassess    NUTRITION RECOMMENDATIONS:   1. PO Diet: Continue general diet     NUTRITION ASSESSMENT:  Pt continues with good po intakes; >50% all but one meal in the last week. Pt reports eating \"everything in sight\" and reports she is getting in enough po with dc of supplements. Patient admitted d/t GERD, depression/anxiety     PMH significant for: CVA    MALNUTRITION ASSESSMENT  Context of Malnutrition: Acute Illness   Malnutrition Status: No malnutrition    NUTRITION DIAGNOSIS No nutrition diagnosis at this time. NUTRITION INTERVENTION  Food and/or Nutrient Delivery: Continue Current Diet   Nutrition Education/Counseling: No recommendation at this time Coordination of Nutrition Care: Continue to monitor while inpatient     NUTRITION RISK LEVEL: Risk Level: Low        The patient will still be monitored per nutrition standards of care. Consult dietitian if nutrition interventions essential to patient care is needed.      Daniela Goldman, [address] [place] [address], [address] [place] [address] Drive:  [phone]  Office:  [phone]

## 2021-06-18 NOTE — PROGRESS NOTES
Occupational Therapy  Facility/Department: [facility] ACUTE REHAB UNIT  Daily Treatment Note  NAME: Navi Vaughan  : 1970  MRN: [id number]    Date of Service: 2021    Discharge Recommendations:  24 hour supervision or assist, Home with Home health OT, Continue to assess pending progress, Home with nursing aide  OT Equipment Recommendations  Equipment Needed: Yes  ADL Assistive Devices: Transfer Tub Bench;Grab Bars - toilet; Toileting - Raised Toilet Seat with arms; Reacher;Grab Bars - tub  Other: continue to assess    Assessment   Performance deficits / Impairments: Decreased functional mobility ; Decreased ADL status; Decreased ROM; Decreased strength;Decreased sensation;Decreased fine motor control;Decreased endurance;Decreased posture;Decreased balance;Decreased safe awareness;Decreased coordination;Decreased vision/visual deficit; Decreased cognition  Treatment Diagnosis: decreased independence with ADLs and decreased functional mobility 2/2 CVA  Prognosis: Good  OT Education: Plan of Care;Transfer Training;ADL Adaptive Strategies; Home Exercise Program  Patient Education: pt educated on use of arm trough- pt verb understanding  REQUIRES OT FOLLOW UP: Yes  Activity Tolerance  Activity Tolerance: Patient Tolerated treatment well  Safety Devices  Safety Devices in place: Yes  Type of devices: Left in chair;Call light within reach; Chair alarm in place; All fall risk precautions in place         Patient Diagnosis(es): There were no encounter diagnoses. has a past medical history of GERD (gastroesophageal reflux disease), Hernia, Obesity, and Unspecified sleep apnea. has a past surgical history that includes Splenectomy (); Total hip arthroplasty (); Lap Band (); hernia repair ();  section (); and craniotomy (Right, 2021).     Restrictions  Position Activity Restriction  Other position/activity restrictions: Ambulate, Up with assist, Pt to wear helmet when OOB, OK to remove for shower, HOB to elevated at 30*  Subjective   General  Chart Reviewed: Yes  Patient assessed for rehabilitation services?: Yes  Additional Pertinent Hx: 46 y.o. female with hypertension and tobacco abuse who presented after spending a prolonged period on the ground s/p fall out of bed; found to have acute left hemiparesis and gaze deviation. CXR with no acute cardiopulmonary abnormality. CTH revealed large acute/subacute infarct in the R frontal lobe with associated mass effect and 4 mm R to L midline shift. It also noted subdural hemorrhage of 6mm along falx, although NSGY less convinced. CTA head and neck revealed occluded R cervical ICA & R anterior cerebral artery with near complete occlusion of R MCA. Outside window on TPA. Pt is now POD #1 following hemicraniectomy (4/13). Pt admitted to ARU 4/23  Response to previous treatment: Patient with no complaints from previous session  Family / Caregiver Present: No  Referring Practitioner: Dr. Delmis Glover DO  Diagnosis: CVA  Subjective  Subjective: Patient seated in w/c upon arrival, agreeable to OT session. General Comment  Comments: Alek Christie Vital Signs  Patient Currently in Pain: Denies     Orientation  Orientation  Overall Orientation Status: Within Normal Limits  Objective    ADL  Grooming: Modified independent  (oral hygiene seated in w/c)  UE Dressing: Setup;Minimal assistance;Verbal cueing (pt donned t shirt with min A to pull L sleeve up to elbow, cues to pull down in back and on L side fully, + time to complete)  LE Dressing: Verbal cueing; Moderate assistance (VCs for improved technique to use reacher to thread LLE, assist to pull off foot as pant lining was getting caught on  socks, pt utilized GB in stance to steady self and manage pants over hips, assist on L side with CGA)        Balance  Standing Balance: Contact guard assistance  Standing Balance  Time: less than 1 minute  Activity: functional transfers, in stance for LB dressing     Transfers  Sit to stand: Contact guard assistance  Stand to sit: Contact guard assistance  Transfer Comments: x 2 trials, one with SPC for RUE support, LLE hyperextended, second trial with GB                       Cognition  Overall Cognitive Status: Exceptions  Arousal/Alertness: Appropriate responses to stimuli  Following Commands: Follows one step commands consistently  Attention Span: Attends with cues to redirect  Memory: Appears intact  Safety Judgement: Decreased awareness of need for assistance;Decreased awareness of need for safety  Problem Solving: Assistance required to generate solutions;Assistance required to correct errors made;Assistance required to implement solutions;Decreased awareness of errors  Insights: Decreased awareness of deficits  Initiation: Requires cues for some  Sequencing: Requires cues for some           Positioning  Wheelchair Position Type: Arm trough  Wheelchair Postion Comment: Pt provided with arm trough to LUE in order to position extrem in more neutral position with increased ER compared to lap tray, padded with towels and pillow case for skin integrity        Type of ROM/Therapeutic Exercise  Type of ROM/Therapeutic Exercise: PROM  Comment: Completed UE sustained stretch with sh abduction to 90, externally rotated, elbow extended with finger/wrist extension in this position; pt tolerated position for 2 minutes  Exercises  Other: Pt provided with heat pack to LUE as preparatory method prior to muscle stretching to maximize elongation, pt responded well but with inconsistent sensation                  Second Session:  Patient seated in w/c upon arrival, agreeable to OT services. W/c<>mat CGA. Sit<>sup SBA. Completed passive stretch, able to achieve ~110 degrees shoulder flexion with 1 min hold and 90 degrees abduction with elbow/wrist extension x2 min. Following passive stretch, patient able to complete x2 reps sh abduction with friction reduced in GE plane, ~5-10 degrees movement each time. Completed active abduction only in order to strengthen muscles outside of traditional tonic pattern. Following 2 reps, patient demo over-activation flexion synnergy and required additional stretching to reduce. Completed additional x2 reps sh abduction to similar range. Sup<>sit CGA. Completed extended arm weightbearing while seated, increased difficulty tolerating hand fully extended on table and therefore positioned on folded towel to decrease height/reduce stretch. LUE slightly externally rotated, min assist to maintain elbow extension as patient completed x10 reaches across midline with 3-4s hold. Attempted to increase external rotation prior to final rep for increased stretch but unable to tolerate, with c/o increased burning at thumb. Mat<>w/c CGA. W/c<>bed CGA. Sit<>sup SBA.  Left in bed, bed alarm activated, needs met and in reach, RN aware        Plan   Plan  Times per week: 5x a week for 60 mins daily  Times per day: Daily  Current Treatment Recommendations: Strengthening, Endurance Training, Neuromuscular Re-education, Self-Care / ADL, Functional Mobility Training, Safety Education & Training, ROM, Positioning, Wheelchair Mobility Training, Balance Training, Patient/Caregiver Education & Training, Manual Therapy:  MLD, Equipment Evaluation, Education, & procurement, Home Management Training, Cognitive Reorientation    Goals  Short term goals  Time Frame for Short term goals: 2 weeks  Short term goal 1: Pt will complete toilet transfer w/ Mod A-goal met 5/4  Short term goal 2: Pt will complete UE dressing w/ Mod A-goal met 5/12  Short term goal 3: Pt will complete oral care w/ spvn seated at sink w/o VCs for L sided attention-goal met 5/17  Short term goal 4: Pt will complete LE dressing w/ Mod A-goal met 5/26  Long term goals  Time Frame for Long term goals : 4 weeks  Long term goal 1: Pt will complete toilet transfer w/ SBA-ongoing  Long term goal 2: Pt will complete UE dressing w/ setup-ongoing  Long term goal 3: Pt will complete LE dressing w/ SBA-ongoing  Long term goal 4: Pt will be independent w/ tone management exercises to improve functional use of LUE-ongoing  Long term goal 5: Pt will complete oral care I'ly-goal met 5/24  Patient Goals   Patient goals : \"get back to my normal self\"       Therapy Time   Individual Concurrent Group Co-treatment   Time In 0830         Time Out 0930         Minutes 60         Timed Code Treatment Minutes: 60 Minutes     Second Session Therapy Time:   Individual Concurrent Group Co-treatment   Time In 8747       Time Out 1455        Minutes 40          Timed Code Treatment Minutes:  75+66    Total Treatment Minutes:  100 min    Jennifer Chinchilla OT   Second session:  Ethel James, OTR/L #[id number]

## 2021-06-18 NOTE — PROGRESS NOTES
Shift assessment complete. VSS. A&Ox4. Patient reports pain, pain being managed with PRN and scheduled medciations. Non-pharm measures of rest, repositioning, and heat encouraged throughout shift. Fall precautions in place. Patient up to chair for breakfast, chair alarm utilized, call light within reach.      Vitals:    06/18/21 0824   BP: 104/61   Pulse: 84   Resp: 16   Temp: 98.1 °F (36.7 °C)   SpO2: 97%

## 2021-06-18 NOTE — PROGRESS NOTES
ACUTE REHAB UNIT  SPEECH/LANGUAGE PATHOLOGY      [x] Daily  [] Weekly Care Conference Note  [] Discharge    Patient:Laura Calhoun      OEE:[birth year]  VZM:[id number]  Rehab Dx/Hx: Acute CVA (cerebrovascular accident) ([facility] [address] [I63.9]    Precautions: [x] Aspiration  [x] Fall risk  [] Sternal  [] Seizure [] Hip  [] Weight Bearing [] Other  ST Dx: [] Aphasia  [] Dysarthria  [] Apraxia   [x] Oropharyngeal dysphagia [x] Cognitive Impairment  [] Other:   Date of Admit: 4/23/2021  Room #: 3101/3101-01  Date: 6/18/2021        Current Diet Order:DIET GENERAL;   Recommended Form of Meds: PO  Compensatory Swallowing Strategies: Alternate solids and liquids, Upright as possible for all oral intake, Check for pocketing of food on the Left, Small bites/sips, Lingual sweep   Subjective: Pt admitted 4/12 after fall with left sided paralysis. Pt underwent craniectomy on 4/13 per Dr. Dalila Lao. Social/Functional History  Lives With: Spouse;Son( reported that son is diagnosed with Autism.)  Occupation: Full time employment  Type of occupation:  at [employer]"    FEES 4/14: IMPRESSIONS:   Pt presents w/ mild oropharyngeal dysphagia characterized by premature spillage of thin liquids to UES and pyriforms w/ intermittent deep penetration of laryngeal vestibule; adequate airway protection and complete clearance of all residue after swallow initiation. No aspiration w/ any trials. Timely swallow initiation w/ puree and regular solid consistencies; no oropharyngeal residue.    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 Adequate ab/adduction. Complete closure of TVFs upon adduction.      Dentition: Adequate  Vision  Vision: Impaired  Vision Exceptions: Visual field cut  Hearing  Hearing: Within functional limits  Barriers toward progress: None towards stated goals    Date: 6/18/2021      Tx session 1 Tx session 2    Total improved carryover of cognitive processes. Education ongoing regarding rationale for tasks this date and feedback on progress. Safety Devices: [x] Call light within reach  [x] Chair alarm activated and connected to nurse call light system  [] Bed alarm activated   [] Other:  [x] Call light within reach  [x] Chair alarm activated and connected to nurse call light system  [] Bed alarm activated   [] Other:    Assessment: Executive function impairments with left inattention. Excellent potential.     Plan: Continue per POC.        Interventions used this date:  [] Speech/Language Treatment  [] Instruction in HEP  [] Dysphagia Treatment [x] Cognitive Treatment   [] Other:    Discharge recommendations:  [] Home independently  [x] Home with assistance []  24 hour supervision  [] ECF [x] Other: Education completed with family on Friday 5/28/2021  Continued Tx Upon Discharge: ? [x] Yes    [] No    [] TBD based on progress while on ARU     [] Vital Stim indicated     [] Other:   Estimated discharge date: 6/24/2021    Naseem Coelho., Dejuan  Speech-Language Pathologist

## 2021-06-18 NOTE — PROGRESS NOTES
Physical Therapy  Facility/Department: [facility] ACUTE REHAB UNIT  Daily Treatment Note  NAME: Valeriy Mendosa  : 1970  MRN: [id number]    Date of Service: 2021    Discharge Recommendations:  24 hour supervision or assist, Home with Home health PT, Continue to assess pending progress (may require SNF 2/2 need of assistance with transfers and gait)   PT Equipment Recommendations  Equipment Needed: Yes  Wheelchair: Light Weight  Other: Pt will require a 20\" hemiheight w/c with drop armrests and standard 90 degree legrest, with pressure relieving cushion. Pt is currently unable to safely ambulate household distances with use of RW or SPC, limiting her ability to access areas of home such as kitchen and bathroom. Due to being unable to access these areas, pt will be unable to perform ADL tasks. Use of a w/c will allow pt to access these areas. Pt has appropriate UB strength to propel w/c and has not expressed an unwillingness to use w/c at d/c. Assessment   Body structures, Functions, Activity limitations: Decreased sensation;Decreased ROM; Decreased strength;Decreased endurance;Decreased balance;Decreased posture;Decreased vision/visual deficit; Decreased coordination;Decreased cognition;Decreased fine motor control;Decreased safe awareness  Assessment: Pt continues to show progress with her ambulation, but had an episode of knee buckling this session resulting in total A to correct. Pt continues to express worry about her d/c plan and returning to work, offered to have  speak with pt, but she declined. PT remains highly motivated to increase her independence and would beenfit fromc ontinued therapy to increase her independence. Treatment Diagnosis: Decreased independence with functional mobility.   Prognosis: Good  Barriers to Learning: Cognition  REQUIRES PT FOLLOW UP: Yes  Activity Tolerance  Activity Tolerance: Patient limited by fatigue;Patient limited by endurance     Patient Diagnosis(es): There Status: No WB restrictions  More Ambulation?: Yes  Ambulation 1  Surface: level tile  Device: Single point cane  Other Apparatus: AFO; Wheelchair follow (Custom molded AFO with a figure \"8 \"strap at ankle)  Assistance:  (Predominantly fluctuating between MIN-MOD A, but one episode of L knee buckling resulting in total A to maintain standing.)  Quality of Gait: Step through pattern with L LE with IR (however, therapist facilitating at the glutes and pelvis for positioning to assist). Pt intermittently demonstrating improved control and step length with the R. Significant LOB that she needed assistance to correct. Gait Deviations: Slow Herlinda;Decreased step height;Decreased step length  Distance: 50', 40'  Ambulation 3  Device 3:  (Lite gait( body supported system))  Stairs/Curb  Stairs?: No  Wheelchair Activities  Wheelchair Size: 20\"  Wheelchair Type: Standard  Wheelchair Parts Management: Yes  Left Leg Rest Level of Assistance: Supervision (VC to move shoe philip out of the way)  Left Brakes Level of Assistance: Modified independent  Right Brakes Level of Assistance: Independent  Propulsion: Yes  Propulsion 1  Propulsion: Manual  Level: Level Tile  Method: RUE;RLE  Level of Assistance: Supervision  Description/ Details: Pt occasionally drifting toward L side of hallway, requiring cues to correct pathway. Pt navigating turns and doorways. Distance: 2x 180', 30'  and short distances in the gym        Exercises  Hip Flexion: x 10 reps in standing with the L with SPC and MIN-MOD A, x 15 reps in sitting on the L with AA. Comment: Pt completed ten reps of B stepping fwd, and out to the sides (completed all reps on R and then L) with SPC and CGA. Pt needing intermittent facilitation for stepping with the L.  Pt completed              G-Code     OutComes Score                                                     AM-PAC Score             Goals  Short term goals  Time Frame for Short term goals: 2 weeks  Short term goal 1: Pt will complete bed mobility with MIN A. Goal achieved  Short term goal 2: Pt will transfer sit <> stand with MIN A, met 4/30/2021  Short term goal 3: Pt will transfer bed <> chair with MIN A. Goal achieved  Short term goal 4: Pt will propel the w/c 50' with SBA. met 4/30/2021  Short term goal 5: Pt will ambulate 5' with LRAD and MOD A. Goal achieved  Long term goals  Time Frame for Long term goals : 4 Weeks  Long term goal 1: Pt will complete bed mobility with SBA. Ongoing  Long term goal 2: Pt will transfer sit <> stand with SBA. Ongoing  Long term goal 3: Pt will transfer bed <> chair with SBA. Ongoing  Long term goal 4: Pt will propel the w/c 150' independently. goal met  Long term goal 5: Pt will ambulate 22' with LRAD and MIN A. Ongoing  Patient Goals   Patient goals : Return home and back to caring for her son    Plan    Plan  Times per week: 5x/wk for 60 minutes/day  Times per day: Daily  Current Treatment Recommendations: Strengthening, ROM, Balance Training, Endurance Training, Functional Mobility Training, Transfer Training, Gait Training, Safety Education & Training, Home Exercise Program, Patient/Caregiver Education & Training, Neuromuscular Re-education, Stair training  Safety Devices  Type of devices:  All fall risk precautions in place, Call light within reach, Chair alarm in place, Gait belt, Left in chair     Therapy Time   Individual Concurrent Group Co-treatment   Time In 1100         Time Out 1200         Minutes Hedy 354, PT

## 2021-06-18 NOTE — PROGRESS NOTES
Department of Physical Medicine & Rehabilitation  Progress Note    Patient Identification:  Jostin Perdomo  [id number]  : 1970  Admit date: 2021    Chief Complaint: CVA    Subjective:   Extended to . Doing well with improvement daily. Continue current therapy program.       ROS: No f/c, n/v, cp     Objective:  Patient Vitals for the past 24 hrs:   BP Temp Temp src Pulse Resp SpO2   21 0824 104/61 98.1 °F (36.7 °C) Oral 84 16 97 %   21 2239     16    21 2059 105/68 98.8 °F (37.1 °C) Oral 74 16 93 %   21 1121 124/78          Const: Alert. No distress, pleasant. HEENT: Normocephalic, atraumatic. Normal sclera/conjunctiva. MMM. Incision healing well  CV: Regular rate and rhythm. Resp: No respiratory distress. Lungs CTAB. Abd: Soft, nontender, nondistended, NABS+   Ext: No edema. Neuro: Alert, oriented, appropriately interactive. Psych: Cooperative, appropriate mood and affect    Laboratory data: Available via EMR.    Last 24 hour lab  Recent Results (from the past 24 hour(s))   Basic Metabolic Panel w/ Reflex to MG    Collection Time: 21  6:00 AM   Result Value Ref Range    Sodium 140 136 - 145 mmol/L    Potassium reflex Magnesium 4.1 3.5 - 5.1 mmol/L    Chloride 105 99 - 110 mmol/L    CO2 24 21 - 32 mmol/L    Anion Gap 11 3 - 16    Glucose 88 70 - 99 mg/dL    BUN 14 7 - 20 mg/dL    CREATININE <0.5 (L) 0.6 - 1.1 mg/dL    GFR Non-African American >60 >60    GFR African American >60 >60    Calcium 9.4 8.3 - 10.6 mg/dL   CBC auto differential    Collection Time: 21  6:00 AM   Result Value Ref Range    WBC 11.2 (H) 4.0 - 11.0 K/uL    RBC 3.77 (L) 4.00 - 5.20 M/uL    Hemoglobin 12.9 12.0 - 16.0 g/dL    Hematocrit 38.3 36.0 - 48.0 %    .7 (H) 80.0 - 100.0 fL    MCH 34.3 (H) 26.0 - 34.0 pg    MCHC 33.8 31.0 - 36.0 g/dL    RDW 13.5 12.4 - 15.4 %    Platelets 899 511 - 700 K/uL    MPV 9.3 5.0 - 10.5 fL    Neutrophils % 38.7 %    Lymphocytes % 45.6 % Monocytes % 10.0 %    Eosinophils % 4.6 %    Basophils % 1.1 %    Neutrophils Absolute 4.3 1.7 - 7.7 K/uL    Lymphocytes Absolute 5.1 1.0 - 5.1 K/uL    Monocytes Absolute 1.1 0.0 - 1.3 K/uL    Eosinophils Absolute 0.5 0.0 - 0.6 K/uL    Basophils Absolute 0.1 0.0 - 0.2 K/uL       Therapy progress:  PT  Position Activity Restriction  Other position/activity restrictions: Ambulate, Up with assist, Pt to wear helmet when OOB, OK to remove for shower, HOB to elevated at 30*  Objective     Sit to Stand: Contact guard assistance (from bed, w/c, commode and mat table; VC for trunk flexion, ant wt shift and increased hip ext in standing)  Stand to sit: Contact guard assistance (VC for hand placement and improved eccentric control)  Bed to Chair: Minimal assistance (w/c <> mat table, w/c <> commode using SPC. step by step VC for sequencing)  Device: Single point cane  Other Apparatus: AFO, Wheelchair follow (Custom molded AFO with a figure \"8 \"strap at ankle)  Assistance: Minimal assistance  Distance: 50', 40'  OT  PT Equipment Recommendations  Equipment Needed: Yes  Mobility Devices: Wheelchair  Wheelchair: Light Weight  Other: Pt will require a 20\" hemiheight w/c with drop armrests and standard 90 degree legrest, with pressure relieving cushion. Pt is currently unable to safely ambulate household distances with use of RW or SPC, limiting her ability to access areas of home such as kitchen and bathroom. Due to being unable to access these areas, pt will be unable to perform ADL tasks. Use of a w/c will allow pt to access these areas. Pt has appropriate UB strength to propel w/c and has not expressed an unwillingness to use w/c at d/c.   Toilet - Technique: Stand pivot  Equipment Used: Raised toilet seat with rails  Toilet Transfers Comments: Use of SPC, cues to sequence pivot and for hand placement, use of GB upon stance  Assessment        SLP  Diet Solids Recommendation: Dysphagia Soft and Bite-Sized (Dysphagia III) (with regular solids as tolerated. Per pt request, gravy/sauces to keep for moist.)       Body mass index is 36.54 kg/m². Assessment and Plan:  Evelin Farley a 46 y.o. female with PMH GERD p/w L sided weakness and facial droop.  CTA head/neck on 4/12 revealed occluded R cervical ICA, occluded right LEAH, near complete occlusion of right MCA.     Acute ischemic CVA, Subdural heomorrhage s/p right hemicraniectomy and subsequen SAH and IP hemorrhage  No tPA given.  MRI 4/14 showing large subacute infarct throughout R LEAH and MCA with some areas of hemorrhagic conversion.  - Neurosurgery and neurology following  - SBP <160, PRN hydralazine, scheduled Norvasc 5 mg daily  - Keppra 500 mg twice daily   - SQH  - PT/OT      Aphasia - SLP   Depression - Home fluoxetine 20 mg daily at home  Anxiety - Hold home Ativan 0.5 mg every 8 hours PRN for anxiety  MISHA - Patient uses CPAP at home  GERD - omeprazole 40 mg daily at home- substituted for pepcid 20 mg BID  Obesity    Rehab Progress: Improving  Anticipated Dispo: home  Services/DME: TTB and RTS (neither covered by insurance, friend to obtain)  ELOS: - extension needed       Cj Murillo D.O. M.P.H.  PM&R  6/18/2021  10:23 AM

## 2021-06-19 PROCEDURE — 6360000002 HC RX W HCPCS: Performed by: PHYSICAL MEDICINE & REHABILITATION

## 2021-06-19 PROCEDURE — 99231 SBSQ HOSP IP/OBS SF/LOW 25: CPT | Performed by: PHYSICAL MEDICINE & REHABILITATION

## 2021-06-19 PROCEDURE — 1280000000 HC REHAB R&B

## 2021-06-19 PROCEDURE — 94660 CPAP INITIATION&MGMT: CPT

## 2021-06-19 PROCEDURE — 6370000000 HC RX 637 (ALT 250 FOR IP): Performed by: PHYSICAL MEDICINE & REHABILITATION

## 2021-06-19 RX ADMIN — ASPIRIN 81 MG: 81 TABLET, CHEWABLE ORAL at 09:20

## 2021-06-19 RX ADMIN — HEPARIN SODIUM 5000 UNITS: 5000 INJECTION INTRAVENOUS; SUBCUTANEOUS at 06:48

## 2021-06-19 RX ADMIN — OXYCODONE 10 MG: 5 TABLET ORAL at 09:21

## 2021-06-19 RX ADMIN — GABAPENTIN 100 MG: 100 CAPSULE ORAL at 13:45

## 2021-06-19 RX ADMIN — ACETAMINOPHEN 1000 MG: 500 TABLET, COATED ORAL at 17:06

## 2021-06-19 RX ADMIN — DOCUSATE SODIUM 50 MG AND SENNOSIDES 8.6 MG 2 TABLET: 8.6; 5 TABLET, FILM COATED ORAL at 09:21

## 2021-06-19 RX ADMIN — THERA TABS 1 TABLET: TAB at 09:22

## 2021-06-19 RX ADMIN — OXYCODONE 10 MG: 5 TABLET ORAL at 20:57

## 2021-06-19 RX ADMIN — ACETAMINOPHEN 1000 MG: 500 TABLET, COATED ORAL at 09:21

## 2021-06-19 RX ADMIN — DOCUSATE SODIUM 50 MG AND SENNOSIDES 8.6 MG 2 TABLET: 8.6; 5 TABLET, FILM COATED ORAL at 20:47

## 2021-06-19 RX ADMIN — DICLOFENAC SODIUM 4 G: 10 GEL TOPICAL at 20:48

## 2021-06-19 RX ADMIN — LEVETIRACETAM 500 MG: 500 TABLET ORAL at 09:22

## 2021-06-19 RX ADMIN — TIZANIDINE 2 MG: 4 TABLET ORAL at 20:47

## 2021-06-19 RX ADMIN — LEVETIRACETAM 500 MG: 500 TABLET ORAL at 20:47

## 2021-06-19 RX ADMIN — TIZANIDINE 2 MG: 4 TABLET ORAL at 09:22

## 2021-06-19 RX ADMIN — FAMOTIDINE 20 MG: 20 TABLET, FILM COATED ORAL at 09:21

## 2021-06-19 RX ADMIN — AMLODIPINE BESYLATE 5 MG: 5 TABLET ORAL at 09:21

## 2021-06-19 RX ADMIN — METHYLPHENIDATE HYDROCHLORIDE 5 MG: 5 TABLET ORAL at 09:22

## 2021-06-19 RX ADMIN — DICLOFENAC SODIUM 4 G: 10 GEL TOPICAL at 09:22

## 2021-06-19 RX ADMIN — ACETAMINOPHEN 1000 MG: 500 TABLET, COATED ORAL at 20:47

## 2021-06-19 RX ADMIN — ATORVASTATIN CALCIUM 80 MG: 80 TABLET, FILM COATED ORAL at 20:48

## 2021-06-19 RX ADMIN — FAMOTIDINE 20 MG: 20 TABLET, FILM COATED ORAL at 20:48

## 2021-06-19 RX ADMIN — HEPARIN SODIUM 5000 UNITS: 5000 INJECTION INTRAVENOUS; SUBCUTANEOUS at 13:45

## 2021-06-19 RX ADMIN — CETIRIZINE HYDROCHLORIDE 10 MG: 10 TABLET, FILM COATED ORAL at 09:22

## 2021-06-19 RX ADMIN — GABAPENTIN 100 MG: 100 CAPSULE ORAL at 09:22

## 2021-06-19 RX ADMIN — HEPARIN SODIUM 5000 UNITS: 5000 INJECTION INTRAVENOUS; SUBCUTANEOUS at 20:48

## 2021-06-19 RX ADMIN — Medication 5 MG: at 09:22

## 2021-06-19 RX ADMIN — GABAPENTIN 100 MG: 100 CAPSULE ORAL at 20:48

## 2021-06-19 RX ADMIN — FLUOXETINE 20 MG: 20 CAPSULE ORAL at 09:21

## 2021-06-19 RX ADMIN — THIAMINE HCL TAB 100 MG 100 MG: 100 TAB at 09:22

## 2021-06-19 ASSESSMENT — PAIN DESCRIPTION - LOCATION
LOCATION: HAND;FOOT
LOCATION: HAND;LEG
LOCATION: LEG;SHOULDER
LOCATION: HAND;LEG

## 2021-06-19 ASSESSMENT — PAIN DESCRIPTION - ONSET
ONSET: ON-GOING

## 2021-06-19 ASSESSMENT — PAIN - FUNCTIONAL ASSESSMENT
PAIN_FUNCTIONAL_ASSESSMENT: ACTIVITIES ARE NOT PREVENTED

## 2021-06-19 ASSESSMENT — PAIN SCALES - GENERAL
PAINLEVEL_OUTOF10: 3
PAINLEVEL_OUTOF10: 6
PAINLEVEL_OUTOF10: 2
PAINLEVEL_OUTOF10: 9
PAINLEVEL_OUTOF10: 0
PAINLEVEL_OUTOF10: 0
PAINLEVEL_OUTOF10: 7
PAINLEVEL_OUTOF10: 0
PAINLEVEL_OUTOF10: 0
PAINLEVEL_OUTOF10: 3
PAINLEVEL_OUTOF10: 6

## 2021-06-19 ASSESSMENT — PAIN DESCRIPTION - PROGRESSION
CLINICAL_PROGRESSION: GRADUALLY IMPROVING

## 2021-06-19 ASSESSMENT — PAIN DESCRIPTION - FREQUENCY
FREQUENCY: INTERMITTENT

## 2021-06-19 ASSESSMENT — PAIN DESCRIPTION - DESCRIPTORS
DESCRIPTORS: ACHING;SORE
DESCRIPTORS: DISCOMFORT

## 2021-06-19 ASSESSMENT — PAIN DESCRIPTION - ORIENTATION
ORIENTATION: LEFT

## 2021-06-19 ASSESSMENT — PAIN DESCRIPTION - PAIN TYPE
TYPE: ACUTE PAIN

## 2021-06-19 NOTE — PROGRESS NOTES
Department of Physical Medicine & Rehabilitation  Progress Note    Patient Identification:  Donna Tran  [id number]  : 1970  Admit date: 2021    Chief Complaint: CVA    Subjective:   Doing well today. She is resting in her room but feels like she continues to improve daily. ROS: No f/c, n/v, cp     Objective:  Patient Vitals for the past 24 hrs:   BP Temp Temp src Pulse Resp SpO2   21 0752      96 %   21 0750 110/71 98.5 °F (36.9 °C) Oral 75 18    21 0026     18    21 2049 104/66 98.3 °F (36.8 °C) Oral 68 16 96 %     Const: Alert. No distress, pleasant. HEENT: Normocephalic, atraumatic. Normal sclera/conjunctiva. MMM. Incision healing well  CV: Regular rate and rhythm. Resp: No respiratory distress. Lungs CTAB. Abd: Soft, nontender, nondistended, NABS+   Ext: No edema. Neuro: Alert, oriented, appropriately interactive. Psych: Cooperative, appropriate mood and affect    Laboratory data: Available via EMR. Last 24 hour lab  No results found for this or any previous visit (from the past 24 hour(s)). Therapy progress:  PT  Position Activity Restriction  Other position/activity restrictions: Ambulate, Up with assist, Pt to wear helmet when OOB, OK to remove for shower, HOB to elevated at 30*  Objective     Sit to Stand: Contact guard assistance (from w/c and armed chair; VC for trunk flexion, ant wt shift and increased hip ext in standing)  Stand to sit: Contact guard assistance (VC for hand placement and improved eccentric control)  Bed to Chair: Contact guard assistance (w/c <> armed chair using SPC.  step by step VC for sequencing)  Device: Single point cane  Other Apparatus: AFO, Wheelchair follow (Custom molded AFO with a figure \"8 \"strap at ankle)  Assistance:  (Predominantly fluctuating between MIN-MOD A, but one episode of L knee buckling resulting in total A to maintain standing.)  Distance: 50', 40'  OT  PT Equipment Recommendations  Equipment covered by insurance, friend to obtain)  SALLY: - extension needed       Loretta Benitezer, TITO WOLFEPEdgarH.  PM&R  6/19/2021  11:52 AM

## 2021-06-19 NOTE — PLAN OF CARE
Problem: Pain:  Goal: Pain level will decrease  Description: Pain level will decrease  Outcome: Ongoing  Note: Pain being managed with PRN and scheduled medications. Non-pharm measures of heat therapy and repositioning encouraged throughout shift. Problem: Falls - Risk of:  Goal: Will remain free from falls  Description: Will remain free from falls  6/19/2021 0223 by Ryan Victor RN  Note: Remains free from falls. Call light within reach and alarms in use at all times. Calls appropriately for assist. X1 assist stand pivot for transfers. Improving mobility and able to walk short distances. LLE AFO used.

## 2021-06-19 NOTE — PLAN OF CARE
Problem: Pain:  Goal: Control of acute pain  Description: Control of acute pain  Outcome: Ongoing   Pt verbalized having pain in her left leg and hand this shift. Pt was medicated with scheduled Tylenol and PRN Oxy IR. Medication, rest and position was effective. Problem: Falls - Risk of:  Goal: Will remain free from falls  Description: Will remain free from falls  6/19/2021 1455 by Jorge Douglas RN  Outcome: Ongoing   No falls this shift. Pt uses call light and assisted with transfers. Bed and chair alarm in use to promote pt safety. Problem: Skin Integrity:  Goal: Absence of new skin breakdown  Description: Absence of new skin breakdown  Outcome: Ongoing   No new skin issues this shift. Pt washed up at sink.      Electronically signed by Jorge Douglas RN on 6/19/2021 at 2:58 PM

## 2021-06-20 PROCEDURE — 6370000000 HC RX 637 (ALT 250 FOR IP): Performed by: PHYSICAL MEDICINE & REHABILITATION

## 2021-06-20 PROCEDURE — 6360000002 HC RX W HCPCS: Performed by: PHYSICAL MEDICINE & REHABILITATION

## 2021-06-20 PROCEDURE — 94660 CPAP INITIATION&MGMT: CPT

## 2021-06-20 PROCEDURE — 1280000000 HC REHAB R&B

## 2021-06-20 PROCEDURE — 99231 SBSQ HOSP IP/OBS SF/LOW 25: CPT | Performed by: PHYSICAL MEDICINE & REHABILITATION

## 2021-06-20 RX ADMIN — DICLOFENAC SODIUM 4 G: 10 GEL TOPICAL at 08:57

## 2021-06-20 RX ADMIN — METHYLPHENIDATE HYDROCHLORIDE 5 MG: 5 TABLET ORAL at 08:58

## 2021-06-20 RX ADMIN — FLUOXETINE 20 MG: 20 CAPSULE ORAL at 08:58

## 2021-06-20 RX ADMIN — ATORVASTATIN CALCIUM 80 MG: 80 TABLET, FILM COATED ORAL at 22:01

## 2021-06-20 RX ADMIN — GABAPENTIN 100 MG: 100 CAPSULE ORAL at 14:25

## 2021-06-20 RX ADMIN — ACETAMINOPHEN 1000 MG: 500 TABLET, COATED ORAL at 10:52

## 2021-06-20 RX ADMIN — ACETAMINOPHEN 1000 MG: 500 TABLET, COATED ORAL at 22:01

## 2021-06-20 RX ADMIN — DOCUSATE SODIUM 50 MG AND SENNOSIDES 8.6 MG 2 TABLET: 8.6; 5 TABLET, FILM COATED ORAL at 08:58

## 2021-06-20 RX ADMIN — DOCUSATE SODIUM 50 MG AND SENNOSIDES 8.6 MG 2 TABLET: 8.6; 5 TABLET, FILM COATED ORAL at 22:00

## 2021-06-20 RX ADMIN — GABAPENTIN 100 MG: 100 CAPSULE ORAL at 08:58

## 2021-06-20 RX ADMIN — ACETAMINOPHEN 1000 MG: 500 TABLET, COATED ORAL at 05:59

## 2021-06-20 RX ADMIN — FAMOTIDINE 20 MG: 20 TABLET, FILM COATED ORAL at 08:58

## 2021-06-20 RX ADMIN — OXYCODONE 10 MG: 5 TABLET ORAL at 22:00

## 2021-06-20 RX ADMIN — HEPARIN SODIUM 5000 UNITS: 5000 INJECTION INTRAVENOUS; SUBCUTANEOUS at 22:01

## 2021-06-20 RX ADMIN — THERA TABS 1 TABLET: TAB at 08:58

## 2021-06-20 RX ADMIN — OXYCODONE 10 MG: 5 TABLET ORAL at 07:16

## 2021-06-20 RX ADMIN — LEVETIRACETAM 500 MG: 500 TABLET ORAL at 22:00

## 2021-06-20 RX ADMIN — FAMOTIDINE 20 MG: 20 TABLET, FILM COATED ORAL at 22:00

## 2021-06-20 RX ADMIN — AMLODIPINE BESYLATE 5 MG: 5 TABLET ORAL at 08:58

## 2021-06-20 RX ADMIN — TIZANIDINE 2 MG: 4 TABLET ORAL at 08:59

## 2021-06-20 RX ADMIN — ACETAMINOPHEN 1000 MG: 500 TABLET, COATED ORAL at 17:07

## 2021-06-20 RX ADMIN — LEVETIRACETAM 500 MG: 500 TABLET ORAL at 08:58

## 2021-06-20 RX ADMIN — Medication 5 MG: at 08:58

## 2021-06-20 RX ADMIN — CETIRIZINE HYDROCHLORIDE 10 MG: 10 TABLET, FILM COATED ORAL at 08:58

## 2021-06-20 RX ADMIN — ASPIRIN 81 MG: 81 TABLET, CHEWABLE ORAL at 08:58

## 2021-06-20 RX ADMIN — THIAMINE HCL TAB 100 MG 100 MG: 100 TAB at 08:58

## 2021-06-20 RX ADMIN — TIZANIDINE 2 MG: 4 TABLET ORAL at 22:00

## 2021-06-20 RX ADMIN — HEPARIN SODIUM 5000 UNITS: 5000 INJECTION INTRAVENOUS; SUBCUTANEOUS at 05:59

## 2021-06-20 RX ADMIN — DICLOFENAC SODIUM 4 G: 10 GEL TOPICAL at 22:00

## 2021-06-20 RX ADMIN — GABAPENTIN 100 MG: 100 CAPSULE ORAL at 22:00

## 2021-06-20 RX ADMIN — HEPARIN SODIUM 5000 UNITS: 5000 INJECTION INTRAVENOUS; SUBCUTANEOUS at 14:25

## 2021-06-20 ASSESSMENT — PAIN DESCRIPTION - PAIN TYPE
TYPE: ACUTE PAIN

## 2021-06-20 ASSESSMENT — PAIN DESCRIPTION - ONSET
ONSET: ON-GOING

## 2021-06-20 ASSESSMENT — PAIN DESCRIPTION - PROGRESSION
CLINICAL_PROGRESSION: GRADUALLY IMPROVING

## 2021-06-20 ASSESSMENT — PAIN SCALES - GENERAL
PAINLEVEL_OUTOF10: 6
PAINLEVEL_OUTOF10: 0
PAINLEVEL_OUTOF10: 0
PAINLEVEL_OUTOF10: 2
PAINLEVEL_OUTOF10: 3
PAINLEVEL_OUTOF10: 0
PAINLEVEL_OUTOF10: 7
PAINLEVEL_OUTOF10: 3
PAINLEVEL_OUTOF10: 0
PAINLEVEL_OUTOF10: 2
PAINLEVEL_OUTOF10: 8

## 2021-06-20 ASSESSMENT — PAIN DESCRIPTION - FREQUENCY
FREQUENCY: CONTINUOUS
FREQUENCY: CONTINUOUS
FREQUENCY: INTERMITTENT
FREQUENCY: CONTINUOUS

## 2021-06-20 ASSESSMENT — PAIN DESCRIPTION - ORIENTATION
ORIENTATION: LEFT

## 2021-06-20 ASSESSMENT — PAIN - FUNCTIONAL ASSESSMENT
PAIN_FUNCTIONAL_ASSESSMENT: ACTIVITIES ARE NOT PREVENTED

## 2021-06-20 ASSESSMENT — PAIN DESCRIPTION - DESCRIPTORS
DESCRIPTORS: ACHING
DESCRIPTORS: DISCOMFORT

## 2021-06-20 ASSESSMENT — PAIN DESCRIPTION - LOCATION
LOCATION: LEG;HIP
LOCATION: HIP;SHOULDER
LOCATION: HIP;LEG
LOCATION: LEG;HIP

## 2021-06-20 NOTE — PLAN OF CARE
Problem: Pain:  Goal: Control of acute pain  Description: Control of acute pain  Outcome: Ongoing   Pt verbalized having pain in left hip and leg this shift. Pt was medicated with PRN Oxy-IR and scheduled pain medications. Pt also using rest and position to promote comfort. Current regimen was effective. Problem: Falls - Risk of:  Goal: Will remain free from falls  Description: Will remain free from falls  6/20/2021 1554 by Elise Fonseca RN  Outcome: Ongoing   No falls this shift. Pt uses call light. Bed and chair alarm in use to promote safety. Problem: Skin Integrity:  Goal: Absence of new skin breakdown  Description: Absence of new skin breakdown  6/20/2021 1554 by Elise Fonseca RN  Outcome: Ongoing   No new skin issues. Pt showered this shift.     Electronically signed by Elise Fonseca RN on 6/20/2021 at 3:57 PM

## 2021-06-20 NOTE — PROGRESS NOTES
SHIFT: 0700 - 1930  Pt upon assessment was stable. Alert and oriented. Denied having chest pain or SOB. Pt verbalized having pain in left hip and leg. Pt was medicated scheduled Tylenol and Oxy-IR. Voltaren gel applied to left hand. Medication, rest and position was effective for pain control. Scalp incision healing/pink. Medications given this shift were reviewed with pt regarding use, dose and side effects. Pt verbalized understanding of education given. Continent of bladder and bowel this shift. Pt showered this shift with nursing assistance. No new skin issues noted. Pt at end of shift was resting in bed with call light within reach. Pt denied having any further needs.     Electronically signed by Peter Chavez RN on 6/20/2021 at 6:58 PM

## 2021-06-20 NOTE — PLAN OF CARE
Problem: Pain:  Goal: Pain level will decrease  Description: Pain level will decrease  Outcome: Ongoing  Note: C/o left hip and shoulder pain managed with PRN pain medication. Problem: Falls - Risk of:  Goal: Will remain free from falls  Description: Will remain free from falls  6/20/2021 0237 by Courtney Welsh RN  Outcome: Ongoing  Note: Pt is a Med fall risk. Explained fall risk precautions to pt and rationale behind their use to keep the patient safe. Belongings are in reach. Pt encouraged to notify staff for any and all assistance. Staff present in tx suite throughout entirety of pts treatment to monitor and protect from falls. Assistance provided when ambulating to restroom utilizing Stay With Me. Problem: Skin Integrity:  Goal: Absence of new skin breakdown  Description: Absence of new skin breakdown  6/20/2021 0237 by Courtney Welsh RN  Outcome: Ongoing  Note: No evidence of skin breakdown.

## 2021-06-20 NOTE — PROGRESS NOTES
Department of Physical Medicine & Rehabilitation  Progress Note    Patient Identification:  Sydnee Moore  [id number]  : 1970  Admit date: 2021    Chief Complaint: CVA    Subjective:   Just waking up on exam this morning. No new complaints overnight. Improving in therapy. ROS: No f/c, n/v, cp     Objective:  Patient Vitals for the past 24 hrs:   BP Temp Temp src Pulse Resp SpO2   21 0712 106/71 98.5 °F (36.9 °C) Oral 71 18 94 %   21 0036     18    21 2045 108/61 98.2 °F (36.8 °C) Oral 75 17      Const: Alert. No distress, pleasant. HEENT: Normocephalic, atraumatic. Normal sclera/conjunctiva. MMM. Incision healing well  CV: Regular rate and rhythm. Resp: No respiratory distress. Lungs CTAB. Abd: Soft, nontender, nondistended, NABS+   Ext: No edema. Neuro: Alert, oriented, appropriately interactive. Psych: Cooperative, appropriate mood and affect    Laboratory data: Available via EMR. Last 24 hour lab  No results found for this or any previous visit (from the past 24 hour(s)). Therapy progress:  PT  Position Activity Restriction  Other position/activity restrictions: Ambulate, Up with assist, Pt to wear helmet when OOB, OK to remove for shower, HOB to elevated at 30*  Objective     Sit to Stand: Contact guard assistance (from w/c and armed chair; VC for trunk flexion, ant wt shift and increased hip ext in standing)  Stand to sit: Contact guard assistance (VC for hand placement and improved eccentric control)  Bed to Chair: Contact guard assistance (w/c <> armed chair using SPC.  step by step VC for sequencing)  Device: Single point cane  Other Apparatus: AFO, Wheelchair follow (Custom molded AFO with a figure \"8 \"strap at ankle)  Assistance:  (Predominantly fluctuating between MIN-MOD A, but one episode of L knee buckling resulting in total A to maintain standing.)  Distance: 50', 40'  OT  PT Equipment Recommendations  Equipment Needed: Yes  Mobility Devices: Wheelchair  Wheelchair: Light Weight  Other: Pt will require a 20\" hemiheight w/c with drop armrests and standard 90 degree legrest, with pressure relieving cushion. Pt is currently unable to safely ambulate household distances with use of RW or SPC, limiting her ability to access areas of home such as kitchen and bathroom. Due to being unable to access these areas, pt will be unable to perform ADL tasks. Use of a w/c will allow pt to access these areas. Pt has appropriate UB strength to propel w/c and has not expressed an unwillingness to use w/c at d/c. Toilet - Technique: Stand pivot  Equipment Used: Raised toilet seat with rails  Toilet Transfers Comments: Use of SPC, cues to sequence pivot and for hand placement, use of GB upon stance  Assessment        SLP  Diet Solids Recommendation: Dysphagia Soft and Bite-Sized (Dysphagia III) (with regular solids as tolerated. Per pt request, gravy/sauces to keep for moist.)       Body mass index is 36.54 kg/m². Assessment and Plan:  Luis Enrique Remedies a 46 y.o. female with PMH GERD p/w L sided weakness and facial droop.  CTA head/neck on 4/12 revealed occluded R cervical ICA, occluded right LEAH, near complete occlusion of right MCA.     Acute ischemic CVA, Subdural heomorrhage s/p right hemicraniectomy and subsequen SAH and IP hemorrhage  No tPA given.  MRI 4/14 showing large subacute infarct throughout R LEAH and MCA with some areas of hemorrhagic conversion.  - Neurosurgery and neurology following  - SBP <160, PRN hydralazine, scheduled Norvasc 5 mg daily  - Keppra 500 mg twice daily   - SQH  - PT/OT      Aphasia - SLP   Depression - Home fluoxetine 20 mg daily at home  Anxiety - Hold home Ativan 0.5 mg every 8 hours PRN for anxiety  MISHA - Patient uses CPAP at home  GERD - omeprazole 40 mg daily at home- substituted for pepcid 20 mg BID  Obesity    Rehab Progress: Improving  Anticipated Dispo: home  Services/DME: TTB and RTS (neither covered by insurance, friend to obtain)  SALLY: - extension needed       TITO JesusPEdgarHEdgar  PM&R  6/20/2021  9:10 AM

## 2021-06-20 NOTE — PROGRESS NOTES
Pt A & O. VSS. Pt x 1 st pivot. All safety precautions in place. Bed alarm activated. Non skid socks on. Pt c/o pain to left hip and shoulder. PRN pain medication given. Reports effective. No c/o nausea voiced. Tolerates diet. Neuro checks remain unchanged. Will cont to monitor.

## 2021-06-21 LAB
ANION GAP SERPL CALCULATED.3IONS-SCNC: 10 MMOL/L (ref 3–16)
BASOPHILS ABSOLUTE: 0 K/UL (ref 0–0.2)
BASOPHILS RELATIVE PERCENT: 0 %
BUN BLDV-MCNC: 10 MG/DL (ref 7–20)
CALCIUM SERPL-MCNC: 9.7 MG/DL (ref 8.3–10.6)
CHLORIDE BLD-SCNC: 105 MMOL/L (ref 99–110)
CO2: 25 MMOL/L (ref 21–32)
CREAT SERPL-MCNC: 0.6 MG/DL (ref 0.6–1.1)
EOSINOPHILS ABSOLUTE: 0.5 K/UL (ref 0–0.6)
EOSINOPHILS RELATIVE PERCENT: 4 %
GFR AFRICAN AMERICAN: >60
GFR NON-AFRICAN AMERICAN: >60
GLUCOSE BLD-MCNC: 93 MG/DL (ref 70–99)
HCT VFR BLD CALC: 37.3 % (ref 36–48)
HEMOGLOBIN: 12.5 G/DL (ref 12–16)
LYMPHOCYTES ABSOLUTE: 5.5 K/UL (ref 1–5.1)
LYMPHOCYTES RELATIVE PERCENT: 47 %
MCH RBC QN AUTO: 34.3 PG (ref 26–34)
MCHC RBC AUTO-ENTMCNC: 33.6 G/DL (ref 31–36)
MCV RBC AUTO: 102.1 FL (ref 80–100)
MONOCYTES ABSOLUTE: 1 K/UL (ref 0–1.3)
MONOCYTES RELATIVE PERCENT: 9 %
NEUTROPHILS ABSOLUTE: 4.6 K/UL (ref 1.7–7.7)
NEUTROPHILS RELATIVE PERCENT: 40 %
PDW BLD-RTO: 13.5 % (ref 12.4–15.4)
PLATELET # BLD: 362 K/UL (ref 135–450)
PMV BLD AUTO: 8.6 FL (ref 5–10.5)
POTASSIUM REFLEX MAGNESIUM: 4.1 MMOL/L (ref 3.5–5.1)
RBC # BLD: 3.65 M/UL (ref 4–5.2)
SODIUM BLD-SCNC: 140 MMOL/L (ref 136–145)
WBC # BLD: 11.6 K/UL (ref 4–11)

## 2021-06-21 PROCEDURE — 97116 GAIT TRAINING THERAPY: CPT

## 2021-06-21 PROCEDURE — 80048 BASIC METABOLIC PNL TOTAL CA: CPT

## 2021-06-21 PROCEDURE — 97530 THERAPEUTIC ACTIVITIES: CPT

## 2021-06-21 PROCEDURE — 97535 SELF CARE MNGMENT TRAINING: CPT

## 2021-06-21 PROCEDURE — 36415 COLL VENOUS BLD VENIPUNCTURE: CPT

## 2021-06-21 PROCEDURE — 85025 COMPLETE CBC W/AUTO DIFF WBC: CPT

## 2021-06-21 PROCEDURE — 94660 CPAP INITIATION&MGMT: CPT

## 2021-06-21 PROCEDURE — 99232 SBSQ HOSP IP/OBS MODERATE 35: CPT | Performed by: PHYSICAL MEDICINE & REHABILITATION

## 2021-06-21 PROCEDURE — 97130 THER IVNTJ EA ADDL 15 MIN: CPT

## 2021-06-21 PROCEDURE — 6360000002 HC RX W HCPCS: Performed by: PHYSICAL MEDICINE & REHABILITATION

## 2021-06-21 PROCEDURE — 97129 THER IVNTJ 1ST 15 MIN: CPT

## 2021-06-21 PROCEDURE — 97542 WHEELCHAIR MNGMENT TRAINING: CPT

## 2021-06-21 PROCEDURE — 6370000000 HC RX 637 (ALT 250 FOR IP): Performed by: PHYSICAL MEDICINE & REHABILITATION

## 2021-06-21 PROCEDURE — 1280000000 HC REHAB R&B

## 2021-06-21 RX ADMIN — Medication 5 MG: at 09:20

## 2021-06-21 RX ADMIN — ASPIRIN 81 MG: 81 TABLET, CHEWABLE ORAL at 09:18

## 2021-06-21 RX ADMIN — GABAPENTIN 100 MG: 100 CAPSULE ORAL at 15:03

## 2021-06-21 RX ADMIN — OXYCODONE 10 MG: 5 TABLET ORAL at 05:58

## 2021-06-21 RX ADMIN — HEPARIN SODIUM 5000 UNITS: 5000 INJECTION INTRAVENOUS; SUBCUTANEOUS at 21:11

## 2021-06-21 RX ADMIN — HEPARIN SODIUM 5000 UNITS: 5000 INJECTION INTRAVENOUS; SUBCUTANEOUS at 15:02

## 2021-06-21 RX ADMIN — CETIRIZINE HYDROCHLORIDE 10 MG: 10 TABLET, FILM COATED ORAL at 09:20

## 2021-06-21 RX ADMIN — FAMOTIDINE 20 MG: 20 TABLET, FILM COATED ORAL at 09:20

## 2021-06-21 RX ADMIN — ACETAMINOPHEN 1000 MG: 500 TABLET, COATED ORAL at 09:19

## 2021-06-21 RX ADMIN — DICLOFENAC SODIUM 4 G: 10 GEL TOPICAL at 21:10

## 2021-06-21 RX ADMIN — ACETAMINOPHEN 1000 MG: 500 TABLET, COATED ORAL at 21:10

## 2021-06-21 RX ADMIN — TIZANIDINE 2 MG: 4 TABLET ORAL at 09:18

## 2021-06-21 RX ADMIN — OXYCODONE HYDROCHLORIDE 5 MG: 5 TABLET ORAL at 21:30

## 2021-06-21 RX ADMIN — AMLODIPINE BESYLATE 5 MG: 5 TABLET ORAL at 09:18

## 2021-06-21 RX ADMIN — DICLOFENAC SODIUM 4 G: 10 GEL TOPICAL at 09:13

## 2021-06-21 RX ADMIN — METHYLPHENIDATE HYDROCHLORIDE 5 MG: 5 TABLET ORAL at 09:19

## 2021-06-21 RX ADMIN — LEVETIRACETAM 500 MG: 500 TABLET ORAL at 21:10

## 2021-06-21 RX ADMIN — FAMOTIDINE 20 MG: 20 TABLET, FILM COATED ORAL at 21:10

## 2021-06-21 RX ADMIN — GABAPENTIN 100 MG: 100 CAPSULE ORAL at 21:10

## 2021-06-21 RX ADMIN — DOCUSATE SODIUM 50 MG AND SENNOSIDES 8.6 MG 2 TABLET: 8.6; 5 TABLET, FILM COATED ORAL at 09:20

## 2021-06-21 RX ADMIN — ATORVASTATIN CALCIUM 80 MG: 80 TABLET, FILM COATED ORAL at 21:10

## 2021-06-21 RX ADMIN — HEPARIN SODIUM 5000 UNITS: 5000 INJECTION INTRAVENOUS; SUBCUTANEOUS at 05:58

## 2021-06-21 RX ADMIN — GABAPENTIN 100 MG: 100 CAPSULE ORAL at 09:20

## 2021-06-21 RX ADMIN — FLUOXETINE 20 MG: 20 CAPSULE ORAL at 09:23

## 2021-06-21 RX ADMIN — ACETAMINOPHEN 1000 MG: 500 TABLET, COATED ORAL at 16:42

## 2021-06-21 RX ADMIN — LEVETIRACETAM 500 MG: 500 TABLET ORAL at 09:19

## 2021-06-21 RX ADMIN — THIAMINE HCL TAB 100 MG 100 MG: 100 TAB at 09:19

## 2021-06-21 RX ADMIN — THERA TABS 1 TABLET: TAB at 09:19

## 2021-06-21 RX ADMIN — TIZANIDINE 2 MG: 4 TABLET ORAL at 21:11

## 2021-06-21 ASSESSMENT — PAIN DESCRIPTION - PROGRESSION
CLINICAL_PROGRESSION: GRADUALLY IMPROVING
CLINICAL_PROGRESSION: GRADUALLY IMPROVING
CLINICAL_PROGRESSION: GRADUALLY WORSENING
CLINICAL_PROGRESSION: GRADUALLY IMPROVING
CLINICAL_PROGRESSION: GRADUALLY WORSENING
CLINICAL_PROGRESSION: GRADUALLY IMPROVING

## 2021-06-21 ASSESSMENT — PAIN DESCRIPTION - FREQUENCY
FREQUENCY: INTERMITTENT

## 2021-06-21 ASSESSMENT — PAIN SCALES - WONG BAKER
WONGBAKER_NUMERICALRESPONSE: 0

## 2021-06-21 ASSESSMENT — PAIN DESCRIPTION - DESCRIPTORS
DESCRIPTORS: DISCOMFORT

## 2021-06-21 ASSESSMENT — PAIN SCALES - GENERAL
PAINLEVEL_OUTOF10: 2
PAINLEVEL_OUTOF10: 4
PAINLEVEL_OUTOF10: 0
PAINLEVEL_OUTOF10: 3
PAINLEVEL_OUTOF10: 7
PAINLEVEL_OUTOF10: 6
PAINLEVEL_OUTOF10: 3
PAINLEVEL_OUTOF10: 0

## 2021-06-21 ASSESSMENT — PAIN DESCRIPTION - PAIN TYPE
TYPE: ACUTE PAIN

## 2021-06-21 ASSESSMENT — PAIN DESCRIPTION - LOCATION
LOCATION: FOOT;TOE (COMMENT WHICH ONE)
LOCATION: HIP;SHOULDER
LOCATION: FOOT
LOCATION: FOOT
LOCATION: HIP;SHOULDER

## 2021-06-21 ASSESSMENT — PAIN DESCRIPTION - ONSET
ONSET: ON-GOING
ONSET: GRADUAL

## 2021-06-21 ASSESSMENT — PAIN - FUNCTIONAL ASSESSMENT
PAIN_FUNCTIONAL_ASSESSMENT: ACTIVITIES ARE NOT PREVENTED

## 2021-06-21 ASSESSMENT — PAIN DESCRIPTION - ORIENTATION
ORIENTATION: LEFT

## 2021-06-21 NOTE — PROGRESS NOTES
of GERD (gastroesophageal reflux disease), Hernia, Obesity, and Unspecified sleep apnea. has a past surgical history that includes Splenectomy (); Total hip arthroplasty (); Lap Band (); hernia repair ();  section (); and craniotomy (Right, 2021). Restrictions  Position Activity Restriction  Other position/activity restrictions: Ambulate, Up with assist, Pt to wear helmet when OOB, OK to remove for shower, HOB to elevated at 30*  Subjective   General  Chart Reviewed: Yes  Patient assessed for rehabilitation services?: Yes  Additional Pertinent Hx: 46 y.o. female with hypertension and tobacco abuse who presented after spending a prolonged period on the ground s/p fall out of bed; found to have acute left hemiparesis and gaze deviation. CXR with no acute cardiopulmonary abnormality. CTH revealed large acute/subacute infarct in the R frontal lobe with associated mass effect and 4 mm R to L midline shift. It also noted subdural hemorrhage of 6mm along falx, although NSGY less convinced. CTA head and neck revealed occluded R cervical ICA & R anterior cerebral artery with near complete occlusion of R MCA. Outside window on TPA. Pt is now POD #1 following hemicraniectomy (). Pt admitted to ARU   Response to previous treatment: Patient with no complaints from previous session  Family / Caregiver Present: No  Referring Practitioner: Dr. Vera Magaña DO  Diagnosis: CVA  Subjective  Subjective: Pt supine in bed upon arrival awake and pleasant requesting to use bathroom and change out of gown after. General Comment  Comments: Alicia Brill   Vital Signs  Patient Currently in Pain: Yes (3/10 L foot \"burning\" and discomfort in L hand, RN OK'd application of voltaren gel)     Orientation  Orientation  Overall Orientation Status: Within Normal Limits  Objective    ADL  Equipment Provided: Reacher  Feeding: Setup (to open containers)  Grooming: Modified independent  (hand hygiene seated in w/c)  UE Dressing:  (to don shirt, VCs to pull up to elbow and to pull down on L side)  LE Dressing: Moderate assistance;Verbal cueing; Increased time to complete;Setup (pt donned underwear and pants using reacher with VCs for improved tech, assist for thoroughness of LLE, assist to manage LB clothing over hips on L side in stance with CGA)  Toileting: Moderate assistance (pt continent of bladder, pericare hygiene seated, assist to manage LB clothing over hip on L side)        Toilet Transfers  Toilet - Technique: Stand pivot  Equipment Used: Raised toilet seat with rails  Toilet Transfer: Contact guard assistance  Toilet Transfers Comments: Use of SPC, cues to sequence pivot, use of GB upon stance  Bed mobility  Supine to Sit: Supervision (HOB slightly elevated)  Scooting: Supervision  Transfers  Stand Pivot Transfers: Contact guard assistance (EOB<w/c leading L, + time to pivot)                       Cognition  Overall Cognitive Status: Exceptions  Arousal/Alertness: Appropriate responses to stimuli  Following Commands:  Follows one step commands consistently  Attention Span: Attends with cues to redirect  Memory: Appears intact  Safety Judgement: Decreased awareness of need for assistance;Decreased awareness of need for safety  Problem Solving: Assistance required to generate solutions;Assistance required to correct errors made;Assistance required to implement solutions;Decreased awareness of errors  Insights: Decreased awareness of deficits  Initiation: Requires cues for some  Sequencing: Requires cues for some           Positioning  Wheelchair Position Type: Arm trough  Wheelchair Postion Comment: Pt provided with arm trough to LUE in order to position extrem in more neutral position with increased ER compared to lap tray, padded with towels and pillow case for skin integrity  Adaptations: edema glove donned to L hand with + time d/t hypersensitivity, + flexion at DIP of all digits this date Plan   Plan  Times per week: 5x a week for 60 mins daily  Times per day: Daily  Current Treatment Recommendations: Strengthening, Endurance Training, Neuromuscular Re-education, Self-Care / ADL, Functional Mobility Training, Safety Education & Training, ROM, Positioning, Wheelchair Mobility Training, Balance Training, Patient/Caregiver Education & Training, Manual Therapy:  MLD, Equipment Evaluation, Education, & procurement, Home Management Training, Cognitive Reorientation    Goals  Short term goals  Time Frame for Short term goals: 2 weeks  Short term goal 1: Pt will complete toilet transfer w/ Mod A-goal met 5/4  Short term goal 2: Pt will complete UE dressing w/ Mod A-goal met 5/12  Short term goal 3: Pt will complete oral care w/ spvn seated at sink w/o VCs for L sided attention-goal met 5/17  Short term goal 4: Pt will complete LE dressing w/ Mod A-goal met 5/26  Long term goals  Time Frame for Long term goals : 4 weeks  Long term goal 1: Pt will complete toilet transfer w/ SBA-ongoing  Long term goal 2: Pt will complete UE dressing w/ setup-ongoing  Long term goal 3: Pt will complete LE dressing w/ SBA-ongoing  Long term goal 4: Pt will be independent w/ tone management exercises to improve functional use of LUE-ongoing  Long term goal 5: Pt will complete oral care I'ly-goal met 5/24  Patient Goals   Patient goals : \"get back to my normal self\"       Therapy Time   Individual Concurrent Group Co-treatment   Time In 0730         Time Out 0830         Minutes 60         Timed Code Treatment Minutes: [address] [facility], OT

## 2021-06-21 NOTE — PROGRESS NOTES
Department of Physical Medicine & Rehabilitation  Progress Note    Patient Identification:  Abelino Ashraf  [id number]  : 1970  Admit date: 2021    Chief Complaint: CVA    Subjective:   Doing well today with good participation in therapy. No new complaints. Hoping to be able to walk out of the hospital by the end of the week. ROS: No f/c, n/v, cp     Objective:  Patient Vitals for the past 24 hrs:   BP Temp Temp src Pulse Resp SpO2   21 0915 118/82 98.2 °F (36.8 °C) Oral 71 16 95 %   21 0035     16    21 2158 114/68 98.1 °F (36.7 °C) Oral 77 17 95 %     Const: Alert. No distress, pleasant. HEENT: Normocephalic, atraumatic. Normal sclera/conjunctiva. MMM. Incision healing well  CV: Regular rate and rhythm. Resp: No respiratory distress. Lungs CTAB. Abd: Soft, nontender, nondistended, NABS+   Ext: No edema. Neuro: Alert, oriented, appropriately interactive. Psych: Cooperative, appropriate mood and affect    Laboratory data: Available via EMR.    Last 24 hour lab  Recent Results (from the past 24 hour(s))   Basic Metabolic Panel w/ Reflex to MG    Collection Time: 21  6:02 AM   Result Value Ref Range    Sodium 140 136 - 145 mmol/L    Potassium reflex Magnesium 4.1 3.5 - 5.1 mmol/L    Chloride 105 99 - 110 mmol/L    CO2 25 21 - 32 mmol/L    Anion Gap 10 3 - 16    Glucose 93 70 - 99 mg/dL    BUN 10 7 - 20 mg/dL    CREATININE 0.6 0.6 - 1.1 mg/dL    GFR Non-African American >60 >60    GFR African American >60 >60    Calcium 9.7 8.3 - 10.6 mg/dL   CBC auto differential    Collection Time: 21  6:02 AM   Result Value Ref Range    WBC 11.6 (H) 4.0 - 11.0 K/uL    RBC 3.65 (L) 4.00 - 5.20 M/uL    Hemoglobin 12.5 12.0 - 16.0 g/dL    Hematocrit 37.3 36.0 - 48.0 %    .1 (H) 80.0 - 100.0 fL    MCH 34.3 (H) 26.0 - 34.0 pg    MCHC 33.6 31.0 - 36.0 g/dL    RDW 13.5 12.4 - 15.4 %    Platelets 355 251 - 222 K/uL    MPV 8.6 5.0 - 10.5 fL       Therapy progress:  PT Position Activity Restriction  Other position/activity restrictions: Ambulate, Up with assist, Pt to wear helmet when OOB, OK to remove for shower, HOB to elevated at 30*  Objective     Sit to Stand: Contact guard assistance (from w/c and armed chair; VC for trunk flexion, ant wt shift and increased hip ext in standing)  Stand to sit: Contact guard assistance (VC for hand placement and improved eccentric control)  Bed to Chair: Contact guard assistance (w/c <> armed chair using SPC. step by step VC for sequencing)  Device: Single point cane  Other Apparatus: AFO, Wheelchair follow (Custom molded AFO with a figure \"8 \"strap at ankle)  Assistance:  (Predominantly fluctuating between MIN-MOD A, but one episode of L knee buckling resulting in total A to maintain standing.)  Distance: 50', 40'  OT  PT Equipment Recommendations  Equipment Needed: Yes  Mobility Devices: Wheelchair  Wheelchair: Light Weight  Other: Pt will require a 20\" hemiheight w/c with drop armrests and standard 90 degree legrest, with pressure relieving cushion. Pt is currently unable to safely ambulate household distances with use of RW or SPC, limiting her ability to access areas of home such as kitchen and bathroom. Due to being unable to access these areas, pt will be unable to perform ADL tasks. Use of a w/c will allow pt to access these areas. Pt has appropriate UB strength to propel w/c and has not expressed an unwillingness to use w/c at d/c. Toilet - Technique: Stand pivot  Equipment Used: Raised toilet seat with rails  Toilet Transfers Comments: Use of SPC, cues to sequence pivot and for hand placement, use of GB upon stance  Assessment        SLP  Diet Solids Recommendation: Dysphagia Soft and Bite-Sized (Dysphagia III) (with regular solids as tolerated. Per pt request, gravy/sauces to keep for moist.)       Body mass index is 36.54 kg/m².     Assessment and Plan:  Kaleigh Ford a 46 y.o. female with PMH GERD p/w L sided weakness and facial droop.  CTA head/neck on 4/12 revealed occluded R cervical ICA, occluded right LEAH, near complete occlusion of right MCA.     Acute ischemic CVA, Subdural heomorrhage s/p right hemicraniectomy and subsequen SAH and IP hemorrhage  No tPA given.  MRI 4/14 showing large subacute infarct throughout R LEAH and MCA with some areas of hemorrhagic conversion.  - Neurosurgery and neurology following  - SBP <160, PRN hydralazine, scheduled Norvasc 5 mg daily  - Keppra 500 mg twice daily   - SQH  - PT/OT      Aphasia - SLP   Depression - Home fluoxetine 20 mg daily at home  Anxiety - Hold home Ativan 0.5 mg every 8 hours PRN for anxiety  MISHA - Patient uses CPAP at home  GERD - omeprazole 40 mg daily at home- substituted for pepcid 20 mg BID  Obesity    Rehab Progress: Improving  Anticipated Dispo: home  Services/DME: TTB and RTS (neither covered by insurance, friend to obtain)  ELOS: - extension needed       Robin Gusman D.O. M.P.H.  PM&R  6/21/2021  10:00 AM

## 2021-06-21 NOTE — PROGRESS NOTES
ACUTE REHAB UNIT  SPEECH/LANGUAGE PATHOLOGY      [x] Daily  [] Weekly Care Conference Note  [] Discharge    Patient:Laura Prince      GPC:[birth year]  WYN:[id number]  Rehab Dx/Hx: Acute CVA (cerebrovascular accident) ([facility] [address] [I63.9]    Precautions: [x] Aspiration  [x] Fall risk  [] Sternal  [] Seizure [] Hip  [] Weight Bearing [] Other  ST Dx: [] Aphasia  [] Dysarthria  [] Apraxia   [x] Oropharyngeal dysphagia [x] Cognitive Impairment  [] Other:   Date of Admit: 4/23/2021  Room #: 3101/3101-01  Date: 6/21/2021        Current Diet Order:DIET GENERAL;   Recommended Form of Meds: PO  Compensatory Swallowing Strategies: Alternate solids and liquids, Upright as possible for all oral intake, Check for pocketing of food on the Left, Small bites/sips, Lingual sweep   Subjective: Pt admitted 4/12 after fall with left sided paralysis. Pt underwent craniectomy on 4/13 per Dr. Gerald Damon. Social/Functional History  Lives With: Spouse;Son( reported that son is diagnosed with Autism.)  Occupation: Full time employment  Type of occupation:  at [employer]"    FEES 4/14: IMPRESSIONS:   Pt presents w/ mild oropharyngeal dysphagia characterized by premature spillage of thin liquids to UES and pyriforms w/ intermittent deep penetration of laryngeal vestibule; adequate airway protection and complete clearance of all residue after swallow initiation. No aspiration w/ any trials. Timely swallow initiation w/ puree and regular solid consistencies; no oropharyngeal residue.    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 Adequate ab/adduction. Complete closure of TVFs upon adduction.      Dentition: Adequate  Vision  Vision: Impaired  Vision Exceptions: Visual field cut  Hearing  Hearing: Within functional limits  Barriers toward progress: None towards stated goals    Date: 6/21/2021      Tx session 1 Tx session 2    Total Timed Code Min 30 56   Total Treatment Minutes 30 56   Individual Treatment Minutes 30 56   Group Treatment Minutes 0 0   Co-Treat Minutes 0 0   Brief Exception: N/A N/A   Pain Indicated pain in LLE and requested shoe and brace removed. Pain in buttocks from wheelchair. Pain Intervention: [] RN notified  [x] Repositioned  [] Intervention offered and patient declined  [] N/A  [] Other:    [] RN notified  [] Repositioned  [] Intervention offered and patient declined  [x] N/A  [] Other:       Subjective:     Pt upright in chair and agreeable to therapy. Completed cognitive therapy in dining room. Mild slurred speech noted in the middle of session. Pt endorses receiving xanaflex and gabapentin this AM which has been known to cause slurred speech and cause pt to feel altered; RN notified and was aware of this side effect. Pt upright in wheelchair c/o pain in buttocks. SLP assisted with transfer to bed prior to therapy session. Objective / Goals:     Patient will consume regular solids with timely mastication and no pocketing in L buccal cavity across 2 sessions. Goal not targeted this session. Goal not targeted this session. Patient will consume PO without anterior spillage or overt pocketing across 2 sessions. GOAL MET   GOAL MET   Pt will complete divided/alternating attention tasks with 85% accuracy given min cues. Participated in therapy outside of room for more opportunity for distractions. Pt initially with tangential interjections but redirected self to therapeutic tasks. As session progressed, pt required min cues (x3 during session) to redirect back to therapeutic task; this resolved after AFO removed. Visual attention task: Min cues  Dynamic visual attention task: Min-mod cues   Pt will accurately complete executive functioning tasks with min cues. Following unrelated instructions: 90% accuracy. Conditional Commands. 100% accuracy. Word deduction - min cues for planning/organization. Visual reasoning puzzle -  Min-mod cues. Design Generation - min-mod cues to complete the task and attend to left side of design. Pt demonstrated L neglect and lack of systematic planning when completing the figure. Pt will attend to L visual field with min cues. Left inattention x1 for table top activity. x1 cue for left sided attention for dynamic task. x2 cues for left visual field attention for visual reasoning task and design generation. Pt will participate in ongoing cognitive linguistic assessment. GOAL MET GOAL MET   Other areas targeted:     Education:   Feedback regarding performance on given task and suspected pain in LLE creating internal distraction without overt awareness. Education regarding direct feedback on performance. Pt endorsing previous behaviors consistent with a prior attention impairment. Safety Devices: [x] Call light within reach  [x] Chair alarm activated and connected to nurse call light system  [] Bed alarm activated   [] Other:  [x] Call light within reach  [x] Chair alarm activated and connected to nurse call light system  [] Bed alarm activated   [] Other:    Assessment: Executive function impairment, left neglect. Plan: Continue per POC.        Interventions used this date:  [] Speech/Language Treatment  [] Instruction in HEP  [] Dysphagia Treatment [x] Cognitive Treatment   [] Other:    Discharge recommendations:  [] Home independently  [x] Home with assistance []  24 hour supervision  [] ECF [x] Other: Education completed with family on Friday 5/28/2021  Continued Tx Upon Discharge: ? [x] Yes    [] No    [] TBD based on progress while on ARU     [] Vital Stim indicated     [] Other:   Estimated discharge date: 6/24/2021    Treatment session 1 & Cosigner:  Willie Boyd M.A., Dejuan  Speech-Language Pathologist    The speech-language pathologist was present, directed the patient's care, made skilled judgment and was responsible for assessment and treatment.     Portions of second session:   Emmy Yoon  Speech-Language Pathology Graduate Clinician
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
## 2021-06-21 NOTE — CARE COORDINATION
ASHWINI Following, Plan for Pt to DC to [facility], anticipated DC is 6/24. ASHWINI attempted to call [place] [phone] at [facility] [place] to confirm that they will accept the Pt and start precert.  ASHWINI LVM and is waiting on a call back    Electronically signed by USMAN Bolanos, WOLFW on 6/21/2021 at [phone]

## 2021-06-22 PROCEDURE — 97542 WHEELCHAIR MNGMENT TRAINING: CPT

## 2021-06-22 PROCEDURE — 97530 THERAPEUTIC ACTIVITIES: CPT

## 2021-06-22 PROCEDURE — 1280000000 HC REHAB R&B

## 2021-06-22 PROCEDURE — 6360000002 HC RX W HCPCS: Performed by: PHYSICAL MEDICINE & REHABILITATION

## 2021-06-22 PROCEDURE — 99232 SBSQ HOSP IP/OBS MODERATE 35: CPT | Performed by: PHYSICAL MEDICINE & REHABILITATION

## 2021-06-22 PROCEDURE — 97129 THER IVNTJ 1ST 15 MIN: CPT

## 2021-06-22 PROCEDURE — 97110 THERAPEUTIC EXERCISES: CPT

## 2021-06-22 PROCEDURE — 97116 GAIT TRAINING THERAPY: CPT

## 2021-06-22 PROCEDURE — 6370000000 HC RX 637 (ALT 250 FOR IP): Performed by: PHYSICAL MEDICINE & REHABILITATION

## 2021-06-22 PROCEDURE — 97130 THER IVNTJ EA ADDL 15 MIN: CPT

## 2021-06-22 PROCEDURE — 97535 SELF CARE MNGMENT TRAINING: CPT

## 2021-06-22 RX ADMIN — TIZANIDINE 2 MG: 4 TABLET ORAL at 22:58

## 2021-06-22 RX ADMIN — THERA TABS 1 TABLET: TAB at 09:34

## 2021-06-22 RX ADMIN — HEPARIN SODIUM 5000 UNITS: 5000 INJECTION INTRAVENOUS; SUBCUTANEOUS at 05:56

## 2021-06-22 RX ADMIN — OXYCODONE 10 MG: 5 TABLET ORAL at 22:58

## 2021-06-22 RX ADMIN — HEPARIN SODIUM 5000 UNITS: 5000 INJECTION INTRAVENOUS; SUBCUTANEOUS at 14:01

## 2021-06-22 RX ADMIN — LEVETIRACETAM 500 MG: 500 TABLET ORAL at 09:34

## 2021-06-22 RX ADMIN — ACETAMINOPHEN 1000 MG: 500 TABLET, COATED ORAL at 09:33

## 2021-06-22 RX ADMIN — DICLOFENAC SODIUM 4 G: 10 GEL TOPICAL at 08:07

## 2021-06-22 RX ADMIN — FLUOXETINE 20 MG: 20 CAPSULE ORAL at 09:34

## 2021-06-22 RX ADMIN — ATORVASTATIN CALCIUM 80 MG: 80 TABLET, FILM COATED ORAL at 22:59

## 2021-06-22 RX ADMIN — GABAPENTIN 100 MG: 100 CAPSULE ORAL at 09:34

## 2021-06-22 RX ADMIN — ASPIRIN 81 MG: 81 TABLET, CHEWABLE ORAL at 09:33

## 2021-06-22 RX ADMIN — OXYCODONE 10 MG: 5 TABLET ORAL at 05:56

## 2021-06-22 RX ADMIN — DICLOFENAC SODIUM 4 G: 10 GEL TOPICAL at 22:59

## 2021-06-22 RX ADMIN — OXYCODONE HYDROCHLORIDE 5 MG: 5 TABLET ORAL at 14:11

## 2021-06-22 RX ADMIN — CETIRIZINE HYDROCHLORIDE 10 MG: 10 TABLET, FILM COATED ORAL at 09:35

## 2021-06-22 RX ADMIN — ACETAMINOPHEN 1000 MG: 500 TABLET, COATED ORAL at 16:18

## 2021-06-22 RX ADMIN — HEPARIN SODIUM 5000 UNITS: 5000 INJECTION INTRAVENOUS; SUBCUTANEOUS at 23:10

## 2021-06-22 RX ADMIN — LEVETIRACETAM 500 MG: 500 TABLET ORAL at 22:57

## 2021-06-22 RX ADMIN — THIAMINE HCL TAB 100 MG 100 MG: 100 TAB at 09:34

## 2021-06-22 RX ADMIN — GABAPENTIN 100 MG: 100 CAPSULE ORAL at 22:59

## 2021-06-22 RX ADMIN — GABAPENTIN 100 MG: 100 CAPSULE ORAL at 14:01

## 2021-06-22 RX ADMIN — FAMOTIDINE 20 MG: 20 TABLET, FILM COATED ORAL at 09:34

## 2021-06-22 RX ADMIN — AMLODIPINE BESYLATE 5 MG: 5 TABLET ORAL at 09:34

## 2021-06-22 RX ADMIN — ACETAMINOPHEN 1000 MG: 500 TABLET, COATED ORAL at 22:58

## 2021-06-22 RX ADMIN — FAMOTIDINE 20 MG: 20 TABLET, FILM COATED ORAL at 22:59

## 2021-06-22 RX ADMIN — METHYLPHENIDATE HYDROCHLORIDE 5 MG: 5 TABLET ORAL at 09:34

## 2021-06-22 RX ADMIN — TIZANIDINE 2 MG: 4 TABLET ORAL at 09:35

## 2021-06-22 ASSESSMENT — PAIN DESCRIPTION - PAIN TYPE
TYPE: ACUTE PAIN
TYPE: CHRONIC PAIN

## 2021-06-22 ASSESSMENT — PAIN DESCRIPTION - LOCATION
LOCATION: HIP;SHOULDER;LEG
LOCATION: FOOT
LOCATION: FOOT
LOCATION: LEG
LOCATION: LEG
LOCATION: HAND;FOOT
LOCATION: LEG
LOCATION: FOOT

## 2021-06-22 ASSESSMENT — PAIN DESCRIPTION - FREQUENCY
FREQUENCY: CONTINUOUS
FREQUENCY: INTERMITTENT
FREQUENCY: CONTINUOUS

## 2021-06-22 ASSESSMENT — PAIN DESCRIPTION - DIRECTION
RADIATING_TOWARDS: LEFT FOOT TOES
RADIATING_TOWARDS: LEFT FOOT TOES
RADIATING_TOWARDS: TOES

## 2021-06-22 ASSESSMENT — PAIN DESCRIPTION - DESCRIPTORS
DESCRIPTORS: DISCOMFORT
DESCRIPTORS: ACHING;DISCOMFORT
DESCRIPTORS: DISCOMFORT
DESCRIPTORS: DISCOMFORT
DESCRIPTORS: DISCOMFORT;ACHING
DESCRIPTORS: ACHING;DISCOMFORT
DESCRIPTORS: ACHING;DISCOMFORT
DESCRIPTORS: DISCOMFORT

## 2021-06-22 ASSESSMENT — PAIN SCALES - GENERAL
PAINLEVEL_OUTOF10: 0
PAINLEVEL_OUTOF10: 7
PAINLEVEL_OUTOF10: 2
PAINLEVEL_OUTOF10: 5
PAINLEVEL_OUTOF10: 6
PAINLEVEL_OUTOF10: 0
PAINLEVEL_OUTOF10: 2
PAINLEVEL_OUTOF10: 7
PAINLEVEL_OUTOF10: 4
PAINLEVEL_OUTOF10: 2
PAINLEVEL_OUTOF10: 0

## 2021-06-22 ASSESSMENT — PAIN DESCRIPTION - PROGRESSION
CLINICAL_PROGRESSION: NOT CHANGED
CLINICAL_PROGRESSION: GRADUALLY IMPROVING
CLINICAL_PROGRESSION: GRADUALLY IMPROVING
CLINICAL_PROGRESSION: GRADUALLY WORSENING
CLINICAL_PROGRESSION: NOT CHANGED
CLINICAL_PROGRESSION: GRADUALLY IMPROVING
CLINICAL_PROGRESSION: GRADUALLY WORSENING

## 2021-06-22 ASSESSMENT — PAIN DESCRIPTION - ONSET
ONSET: ON-GOING
ONSET: ON-GOING
ONSET: GRADUAL
ONSET: ON-GOING
ONSET: ON-GOING
ONSET: GRADUAL
ONSET: ON-GOING
ONSET: ON-GOING

## 2021-06-22 ASSESSMENT — PAIN DESCRIPTION - ORIENTATION
ORIENTATION: LEFT

## 2021-06-22 ASSESSMENT — PAIN SCALES - WONG BAKER
WONGBAKER_NUMERICALRESPONSE: 0

## 2021-06-22 NOTE — PROGRESS NOTES
ACUTE REHAB UNIT  SPEECH/LANGUAGE PATHOLOGY      [x] Daily  [] Weekly Care Conference Note  [] Discharge    Patient:Laura Nieves      AIR:[birth year]  LIQ:[id number]  Rehab Dx/Hx: Acute CVA (cerebrovascular accident) ([facility] [address] [I63.9]    Precautions: [x] Aspiration  [x] Fall risk  [] Sternal  [] Seizure [] Hip  [] Weight Bearing [] Other  ST Dx: [] Aphasia  [] Dysarthria  [] Apraxia   [x] Oropharyngeal dysphagia [x] Cognitive Impairment  [] Other:   Date of Admit: 4/23/2021  Room #: 3101/3101-01  Date: 6/22/2021        Current Diet Order:DIET GENERAL;   Recommended Form of Meds: PO  Compensatory Swallowing Strategies: Alternate solids and liquids, Upright as possible for all oral intake, Check for pocketing of food on the Left, Small bites/sips, Lingual sweep   Subjective: Pt admitted 4/12 after fall with left sided paralysis. Pt underwent craniectomy on 4/13 per Dr. Aleksandr Burden. Social/Functional History  Lives With: Spouse;Son( reported that son is diagnosed with Autism.)  Occupation: Full time employment  Type of occupation:  at [employer]"    FEES 4/14: IMPRESSIONS:   Pt presents w/ mild oropharyngeal dysphagia characterized by premature spillage of thin liquids to UES and pyriforms w/ intermittent deep penetration of laryngeal vestibule; adequate airway protection and complete clearance of all residue after swallow initiation. No aspiration w/ any trials. Timely swallow initiation w/ puree and regular solid consistencies; no oropharyngeal residue.    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 Adequate ab/adduction. Complete closure of TVFs upon adduction.      Dentition: Adequate  Vision  Vision: Impaired  Vision Exceptions: Visual field cut  Hearing  Hearing: Within functional limits  Barriers toward progress: None towards stated goals    Date: 6/22/2021      Tx session 1 Tx session 2    Total Timed Code Min 30 30   Total Treatment Minutes 30 30   Individual Treatment Minutes 30 30   Group Treatment Minutes 0 0   Co-Treat Minutes 0 0   Brief Exception: N/A N/A   Pain Discomfort in LLE denied   Pain Intervention: [] RN notified  [x] Repositioned  [] Intervention offered and patient declined  [] N/A  [x] Other: AFO removed   [] RN notified  [] Repositioned  [] Intervention offered and patient declined  [x] N/A  [] Other:       Subjective:     Pt in w/c upon entry with friend at side. Pleasant and cooperative. Significant discomfort from AFO reduced attention to task t/o session. Pt in bed upon entry, agreeable to tx. Improved attention exhibited. Objective / Goals:     Patient will consume regular solids with timely mastication and no pocketing in L buccal cavity across 2 sessions. Goal not targeted. Goal not targeted. Patient will consume PO without anterior spillage or overt pocketing across 2 sessions. GOAL MET   GOAL MET   Pt will complete divided/alternating attention tasks with 85% accuracy given min cues. Attention limited during session by internal distraction with pt self-identifying deficits and rationale for reduced performance. No cues required to alternate attention between tasks. No cues to alternate attention between word finding and information transfer for acrostic task. Pt will accurately complete executive functioning tasks with min cues. Math word problems: max cues required for attention to detail and organization for setup of problems. Minor perseverations noted. Math word problems: 71% accuracy with min cues improving to 86% accuracy with min-mod cues    Word finding + information transfer task: min cues for problem solving. No cues for organization of approach to task or visual scanning + self-monitoring. Pt will attend to L visual field with min cues. No cues to attend to L during session for functional reading or to conversational partners on far L.  Single instance of inattention to stimuli on far L side of page. Otherwise no cues required. Pt will participate in ongoing cognitive linguistic assessment. GOAL MET GOAL MET   Other areas targeted:     Education:   Educated to skills targeted and decline in attention noted as well as strategies to support attention Educated to improvements noted and indicators of improved executive function   Safety Devices: [x] Call light within reach  [x] Chair alarm activated and connected to nurse call light system  [] Bed alarm activated   [x] Other: encouraged pt to notify RN to request return to bed  [x] Call light within reach  [] Chair alarm activated and connected to nurse call light system  [x] Bed alarm activated   [] Other:   Assessment: Decline in attention with associated decline in performance on tx tasks in initial session 2/2 discomfort. Adequate attention to detail and self-monitoring exhibited in second session for executive function task. Resolving L neglect. Plan: Continue per POC.        Interventions used this date:  [] Speech/Language Treatment  [] Instruction in HEP  [] Dysphagia Treatment [x] Cognitive Treatment   [] Other:    Discharge recommendations:  [] Home independently  [x] Home with assistance []  24 hour supervision  [] ECF [x] Other: Education completed with family on Friday 5/28/2021  Continued Tx Upon Discharge: ? [x] Yes    [] No    [] TBD based on progress while on ARU     [] Vital Stim indicated     [] Other:   Estimated discharge date: 6/24/2021      Denna Peabody, MA CCC-SLP; [id number]  Speech-Language Pathologist

## 2021-06-22 NOTE — PLAN OF CARE
Problem: Pain:  Goal: Pain level will decrease  Description: Pain level will decrease  Outcome: Ongoing  Note: Pt c/o left shoulder and hip pain managed with PRN pain medication. Problem: Falls - Risk of:  Goal: Will remain free from falls  Description: Will remain free from falls  6/21/2021 2341 by Clark Hu RN  Outcome: Ongoing  Note: Pt is a High fall risk. Explained fall risk precautions to pt and rationale behind their use to keep the patient safe. Belongings are in reach. Pt encouraged to notify staff for any and all assistance. Staff present in tx suite throughout entirety of pts treatment to monitor and protect from falls. Assistance provided when ambulating to restroom utilizing Stay With Me. Problem: Skin Integrity:  Goal: Absence of new skin breakdown  Description: Absence of new skin breakdown  6/21/2021 2341 by Clark Hu RN  Outcome: Ongoing  Note: No evidence of skin breakdown.

## 2021-06-22 NOTE — PLAN OF CARE
Problem: Falls - Risk of:  Goal: Will remain free from falls  Description: Will remain free from falls  Outcome: Met This Shift  Note: Pt remains free of falls thus far this shift. All fall precautions in place and functioning properly. Problem: Skin Integrity:  Goal: Absence of new skin breakdown  Description: Absence of new skin breakdown  Outcome: Met This Shift  Note: Patient offered to be toileted every two hours and PRN, skin barrier applied as needed. Staff assists patient with repositioning and pillow support is provided when needed. Patient educated on offloading techniques and verbalizes understanding.       Problem: Pain:  Goal: Control of chronic pain  Description: Control of chronic pain  Outcome: Ongoing  Note: Patient's pain is controlled with current regime

## 2021-06-22 NOTE — PROGRESS NOTES
with drop armrests and standard 90 degree legrest, with pressure relieving cushion. Pt is currently unable to safely ambulate household distances with use of RW or SPC, limiting her ability to access areas of home such as kitchen and bathroom. Due to being unable to access these areas, pt will be unable to perform ADL tasks. Use of a w/c will allow pt to access these areas. Pt has appropriate UB strength to propel w/c and has not expressed an unwillingness to use w/c at d/c. Toilet - Technique: Stand pivot  Equipment Used: Raised toilet seat with rails  Toilet Transfers Comments: Use of SPC, cues to sequence pivot, use of GB upon stance  Assessment        SLP  Diet Solids Recommendation: Dysphagia Soft and Bite-Sized (Dysphagia III) (with regular solids as tolerated. Per pt request, gravy/sauces to keep for moist.)       Body mass index is 36.54 kg/m². Assessment and Plan:  Carolyn Bass a 46 y.o. female with PMH GERD p/w L sided weakness and facial droop.  CTA head/neck on 4/12 revealed occluded R cervical ICA, occluded right LEAH, near complete occlusion of right MCA.     Acute ischemic CVA, Subdural heomorrhage s/p right hemicraniectomy and subsequen SAH and IP hemorrhage  No tPA given.  MRI 4/14 showing large subacute infarct throughout R LEAH and MCA with some areas of hemorrhagic conversion.  - Neurosurgery and neurology following  - SBP <160, PRN hydralazine, scheduled Norvasc 5 mg daily  - Keppra 500 mg twice daily   - SQH  - PT/OT      Aphasia - SLP   Depression - Home fluoxetine 20 mg daily at home  Anxiety - Hold home Ativan 0.5 mg every 8 hours PRN for anxiety  MISHA - Patient uses CPAP at home  GERD - omeprazole 40 mg daily at home- substituted for pepcid 20 mg BID  Obesity    Rehab Progress: Improving  Anticipated Dispo: home  Services/DME: TTB and RTS (neither covered by insurance, friend to obtain)  ELOS: - 6/26      Be Herrera D.O. M.P.H.  PM&R  6/22/2021  9:54 AM
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
## 2021-06-22 NOTE — CARE COORDINATION
Referred to patient for d/c planning. Spoke to patient and friend, Carie. Insurance approved to 6/24. Per team, no planned extension, patient will need SNF. Call placed to [facility] to start precert for d/c there by end of week. Will continue to follow for d/c needs.   Electronically signed by USMAN Aguilar LISW-S on 6/22/2021 at 3:54 PM

## 2021-06-22 NOTE — PROGRESS NOTES
alarm in place; All fall risk precautions in place;Nurse notified         Patient Diagnosis(es): There were no encounter diagnoses. has a past medical history of GERD (gastroesophageal reflux disease), Hernia, Obesity, and Unspecified sleep apnea. has a past surgical history that includes Splenectomy (); Total hip arthroplasty (); Lap Band (); hernia repair ();  section (); and craniotomy (Right, 2021). Restrictions  Position Activity Restriction  Other position/activity restrictions: Ambulate, Up with assist, Pt to wear helmet when OOB, OK to remove for shower, HOB to elevated at 30*  Subjective   General  Chart Reviewed: Yes  Patient assessed for rehabilitation services?: Yes  Additional Pertinent Hx: 46 y.o. female with hypertension and tobacco abuse who presented after spending a prolonged period on the ground s/p fall out of bed; found to have acute left hemiparesis and gaze deviation. CXR with no acute cardiopulmonary abnormality. CTH revealed large acute/subacute infarct in the R frontal lobe with associated mass effect and 4 mm R to L midline shift. It also noted subdural hemorrhage of 6mm along falx, although NSGY less convinced. CTA head and neck revealed occluded R cervical ICA & R anterior cerebral artery with near complete occlusion of R MCA. Outside window on TPA. Pt is now POD #1 following hemicraniectomy (). Pt admitted to ARU   Response to previous treatment: Patient with no complaints from previous session  Family / Caregiver Present: No  Referring Practitioner: Dr. Hayden Naranjo DO  Diagnosis: CVA  Subjective  Subjective: Pt sitting in w/c upon arrival, pleasant and agreeable to OT session. General Comment  Comments: Rui Perkins   Vital Signs  Patient Currently in Pain: Yes (4/10 LUE and hand, RN present at end of session to provide pain medication)     Orientation  Orientation  Overall Orientation Status: Within Normal Limits  Objective    ADL  Grooming: Modified Strengthening, Endurance Training, Neuromuscular Re-education, Self-Care / ADL, Functional Mobility Training, Safety Education & Training, ROM, Positioning, Wheelchair Mobility Training, Balance Training, Patient/Caregiver Education & Training, Manual Therapy:  MLD, Equipment Evaluation, Education, & procurement, Home Management Training, Cognitive Reorientation    Goals  Short term goals  Time Frame for Short term goals: 2 weeks  Short term goal 1: Pt will complete toilet transfer w/ Mod A-goal met 5/4  Short term goal 2: Pt will complete UE dressing w/ Mod A-goal met 5/12  Short term goal 3: Pt will complete oral care w/ spvn seated at sink w/o VCs for L sided attention-goal met 5/17  Short term goal 4: Pt will complete LE dressing w/ Mod A-goal met 5/26  Long term goals  Time Frame for Long term goals : 4 weeks  Long term goal 1: Pt will complete toilet transfer w/ SBA-ongoing  Long term goal 2: Pt will complete UE dressing w/ setup-ongoing  Long term goal 3: Pt will complete LE dressing w/ SBA-ongoing  Long term goal 4: Pt will be independent w/ tone management exercises to improve functional use of LUE-ongoing  Long term goal 5: Pt will complete oral care I'ly-goal met 5/24  Patient Goals   Patient goals : \"get back to my normal self\"       Therapy Time   Individual Concurrent Group Co-treatment   Time In 0830         Time Out 0930         Minutes 60         Timed Code Treatment Minutes: [address] [facility], OT

## 2021-06-23 LAB
ANION GAP SERPL CALCULATED.3IONS-SCNC: 10 MMOL/L (ref 3–16)
BASOPHILS ABSOLUTE: 0.2 K/UL (ref 0–0.2)
BASOPHILS RELATIVE PERCENT: 2 %
BUN BLDV-MCNC: 11 MG/DL (ref 7–20)
CALCIUM SERPL-MCNC: 9.6 MG/DL (ref 8.3–10.6)
CHLORIDE BLD-SCNC: 103 MMOL/L (ref 99–110)
CO2: 24 MMOL/L (ref 21–32)
CREAT SERPL-MCNC: <0.5 MG/DL (ref 0.6–1.1)
EOSINOPHILS ABSOLUTE: 0.4 K/UL (ref 0–0.6)
EOSINOPHILS RELATIVE PERCENT: 3 %
GFR AFRICAN AMERICAN: >60
GFR NON-AFRICAN AMERICAN: >60
GLUCOSE BLD-MCNC: 97 MG/DL (ref 70–99)
HCT VFR BLD CALC: 41.4 % (ref 36–48)
HEMOGLOBIN: 13.7 G/DL (ref 12–16)
LYMPHOCYTES ABSOLUTE: 5.3 K/UL (ref 1–5.1)
LYMPHOCYTES RELATIVE PERCENT: 44 %
MCH RBC QN AUTO: 34.3 PG (ref 26–34)
MCHC RBC AUTO-ENTMCNC: 33.2 G/DL (ref 31–36)
MCV RBC AUTO: 103.2 FL (ref 80–100)
MONOCYTES ABSOLUTE: 1.2 K/UL (ref 0–1.3)
MONOCYTES RELATIVE PERCENT: 10 %
NEUTROPHILS ABSOLUTE: 4.9 K/UL (ref 1.7–7.7)
NEUTROPHILS RELATIVE PERCENT: 41 %
PDW BLD-RTO: 13.2 % (ref 12.4–15.4)
PLATELET # BLD: 379 K/UL (ref 135–450)
PMV BLD AUTO: 9.2 FL (ref 5–10.5)
POTASSIUM REFLEX MAGNESIUM: 4.1 MMOL/L (ref 3.5–5.1)
RBC # BLD: 4.01 M/UL (ref 4–5.2)
RBC # BLD: NORMAL 10*6/UL
SODIUM BLD-SCNC: 137 MMOL/L (ref 136–145)
WBC # BLD: 12 K/UL (ref 4–11)

## 2021-06-23 PROCEDURE — 36415 COLL VENOUS BLD VENIPUNCTURE: CPT

## 2021-06-23 PROCEDURE — 97535 SELF CARE MNGMENT TRAINING: CPT

## 2021-06-23 PROCEDURE — 97129 THER IVNTJ 1ST 15 MIN: CPT

## 2021-06-23 PROCEDURE — 80048 BASIC METABOLIC PNL TOTAL CA: CPT

## 2021-06-23 PROCEDURE — 85025 COMPLETE CBC W/AUTO DIFF WBC: CPT

## 2021-06-23 PROCEDURE — 97130 THER IVNTJ EA ADDL 15 MIN: CPT

## 2021-06-23 PROCEDURE — 97530 THERAPEUTIC ACTIVITIES: CPT

## 2021-06-23 PROCEDURE — 6360000002 HC RX W HCPCS: Performed by: PHYSICAL MEDICINE & REHABILITATION

## 2021-06-23 PROCEDURE — 97116 GAIT TRAINING THERAPY: CPT

## 2021-06-23 PROCEDURE — 99232 SBSQ HOSP IP/OBS MODERATE 35: CPT | Performed by: PHYSICAL MEDICINE & REHABILITATION

## 2021-06-23 PROCEDURE — 97542 WHEELCHAIR MNGMENT TRAINING: CPT

## 2021-06-23 PROCEDURE — 6370000000 HC RX 637 (ALT 250 FOR IP): Performed by: PHYSICAL MEDICINE & REHABILITATION

## 2021-06-23 PROCEDURE — 1280000000 HC REHAB R&B

## 2021-06-23 RX ORDER — LEVETIRACETAM 500 MG/1
500 TABLET ORAL 2 TIMES DAILY
Qty: 60 TABLET | Refills: 3 | Status: SHIPPED | OUTPATIENT
Start: 2021-06-23

## 2021-06-23 RX ORDER — ATORVASTATIN CALCIUM 80 MG/1
80 TABLET, FILM COATED ORAL NIGHTLY
Qty: 30 TABLET | Refills: 3 | Status: SHIPPED | OUTPATIENT
Start: 2021-06-23

## 2021-06-23 RX ORDER — CETIRIZINE HYDROCHLORIDE 10 MG/1
10 TABLET ORAL DAILY
Qty: 60 TABLET | Refills: 1
Start: 2021-06-24 | End: 2021-08-31

## 2021-06-23 RX ORDER — LANOLIN ALCOHOL/MO/W.PET/CERES
100 CREAM (GRAM) TOPICAL DAILY
Qty: 30 TABLET | Refills: 3 | Status: SHIPPED | OUTPATIENT
Start: 2021-06-23

## 2021-06-23 RX ORDER — SENNA AND DOCUSATE SODIUM 50; 8.6 MG/1; MG/1
2 TABLET, FILM COATED ORAL 2 TIMES DAILY
Qty: 60 TABLET | Refills: 1
Start: 2021-06-23 | End: 2021-08-31

## 2021-06-23 RX ORDER — OXYCODONE HYDROCHLORIDE 5 MG/1
5 TABLET ORAL EVERY 4 HOURS PRN
Qty: 20 TABLET | Refills: 0 | Status: SHIPPED | OUTPATIENT
Start: 2021-06-23 | End: 2021-06-26

## 2021-06-23 RX ORDER — ASPIRIN 81 MG/1
81 TABLET, CHEWABLE ORAL DAILY
Qty: 30 TABLET | Refills: 3 | Status: ON HOLD | OUTPATIENT
Start: 2021-06-23 | End: 2021-09-04 | Stop reason: SDUPTHER

## 2021-06-23 RX ORDER — METHYLPHENIDATE HYDROCHLORIDE 5 MG/1
5 TABLET ORAL EVERY MORNING
Qty: 30 TABLET | Refills: 0 | Status: SHIPPED | OUTPATIENT
Start: 2021-06-24 | End: 2021-07-24

## 2021-06-23 RX ORDER — PSEUDOEPHEDRINE HCL 30 MG
100 TABLET ORAL 2 TIMES DAILY PRN
Qty: 60 CAPSULE | Refills: 1
Start: 2021-06-23 | End: 2021-08-31

## 2021-06-23 RX ORDER — AMLODIPINE BESYLATE 5 MG/1
5 TABLET ORAL DAILY
Qty: 30 TABLET | Refills: 3 | Status: SHIPPED | OUTPATIENT
Start: 2021-06-23

## 2021-06-23 RX ORDER — GABAPENTIN 100 MG/1
100 CAPSULE ORAL 3 TIMES DAILY
Qty: 90 CAPSULE | Refills: 3
Start: 2021-06-23 | End: 2021-06-24 | Stop reason: HOSPADM

## 2021-06-23 RX ORDER — NICOTINE 21 MG/24HR
1 PATCH, TRANSDERMAL 24 HOURS TRANSDERMAL DAILY PRN
Qty: 30 PATCH | Refills: 3
Start: 2021-06-23 | End: 2021-08-31

## 2021-06-23 RX ORDER — POLYETHYLENE GLYCOL 3350 17 G/17G
17 POWDER, FOR SOLUTION ORAL DAILY PRN
Qty: 527 G | Refills: 1
Start: 2021-06-23 | End: 2021-07-23

## 2021-06-23 RX ORDER — TIZANIDINE 2 MG/1
2 TABLET ORAL 2 TIMES DAILY
Qty: 60 TABLET | Refills: 1
Start: 2021-06-23

## 2021-06-23 RX ADMIN — FLUOXETINE 20 MG: 20 CAPSULE ORAL at 08:58

## 2021-06-23 RX ADMIN — GABAPENTIN 100 MG: 100 CAPSULE ORAL at 14:58

## 2021-06-23 RX ADMIN — ATORVASTATIN CALCIUM 80 MG: 80 TABLET, FILM COATED ORAL at 22:30

## 2021-06-23 RX ADMIN — AMLODIPINE BESYLATE 5 MG: 5 TABLET ORAL at 08:58

## 2021-06-23 RX ADMIN — OXYCODONE 10 MG: 5 TABLET ORAL at 22:30

## 2021-06-23 RX ADMIN — TIZANIDINE 2 MG: 4 TABLET ORAL at 08:58

## 2021-06-23 RX ADMIN — ACETAMINOPHEN 1000 MG: 500 TABLET, COATED ORAL at 22:30

## 2021-06-23 RX ADMIN — LEVETIRACETAM 500 MG: 500 TABLET ORAL at 08:59

## 2021-06-23 RX ADMIN — ACETAMINOPHEN 1000 MG: 500 TABLET, COATED ORAL at 10:55

## 2021-06-23 RX ADMIN — HEPARIN SODIUM 5000 UNITS: 5000 INJECTION INTRAVENOUS; SUBCUTANEOUS at 05:44

## 2021-06-23 RX ADMIN — METHYLPHENIDATE HYDROCHLORIDE 5 MG: 5 TABLET ORAL at 08:58

## 2021-06-23 RX ADMIN — LEVETIRACETAM 500 MG: 500 TABLET ORAL at 22:30

## 2021-06-23 RX ADMIN — TIZANIDINE 2 MG: 4 TABLET ORAL at 22:30

## 2021-06-23 RX ADMIN — DICLOFENAC SODIUM 4 G: 10 GEL TOPICAL at 22:31

## 2021-06-23 RX ADMIN — THERA TABS 1 TABLET: TAB at 08:58

## 2021-06-23 RX ADMIN — OXYCODONE 10 MG: 5 TABLET ORAL at 17:47

## 2021-06-23 RX ADMIN — FAMOTIDINE 20 MG: 20 TABLET, FILM COATED ORAL at 22:30

## 2021-06-23 RX ADMIN — CETIRIZINE HYDROCHLORIDE 10 MG: 10 TABLET, FILM COATED ORAL at 08:58

## 2021-06-23 RX ADMIN — HEPARIN SODIUM 5000 UNITS: 5000 INJECTION INTRAVENOUS; SUBCUTANEOUS at 22:34

## 2021-06-23 RX ADMIN — OXYCODONE 10 MG: 5 TABLET ORAL at 03:02

## 2021-06-23 RX ADMIN — HEPARIN SODIUM 5000 UNITS: 5000 INJECTION INTRAVENOUS; SUBCUTANEOUS at 14:58

## 2021-06-23 RX ADMIN — ACETAMINOPHEN 1000 MG: 500 TABLET, COATED ORAL at 05:46

## 2021-06-23 RX ADMIN — GABAPENTIN 100 MG: 100 CAPSULE ORAL at 08:58

## 2021-06-23 RX ADMIN — ASPIRIN 81 MG: 81 TABLET, CHEWABLE ORAL at 08:58

## 2021-06-23 RX ADMIN — FAMOTIDINE 20 MG: 20 TABLET, FILM COATED ORAL at 08:58

## 2021-06-23 RX ADMIN — OXYCODONE 10 MG: 5 TABLET ORAL at 08:58

## 2021-06-23 RX ADMIN — THIAMINE HCL TAB 100 MG 100 MG: 100 TAB at 08:58

## 2021-06-23 RX ADMIN — GABAPENTIN 100 MG: 100 CAPSULE ORAL at 22:30

## 2021-06-23 RX ADMIN — DICLOFENAC SODIUM 4 G: 10 GEL TOPICAL at 10:14

## 2021-06-23 ASSESSMENT — PAIN DESCRIPTION - ONSET
ONSET: ON-GOING

## 2021-06-23 ASSESSMENT — PAIN DESCRIPTION - ORIENTATION
ORIENTATION: LEFT

## 2021-06-23 ASSESSMENT — PAIN SCALES - GENERAL
PAINLEVEL_OUTOF10: 0
PAINLEVEL_OUTOF10: 9
PAINLEVEL_OUTOF10: 7
PAINLEVEL_OUTOF10: 3
PAINLEVEL_OUTOF10: 0
PAINLEVEL_OUTOF10: 9
PAINLEVEL_OUTOF10: 0
PAINLEVEL_OUTOF10: 6
PAINLEVEL_OUTOF10: 3

## 2021-06-23 ASSESSMENT — PAIN DESCRIPTION - FREQUENCY
FREQUENCY: CONTINUOUS

## 2021-06-23 ASSESSMENT — PAIN DESCRIPTION - LOCATION
LOCATION: HIP
LOCATION: GENERALIZED
LOCATION: HIP;SHOULDER
LOCATION: GENERALIZED
LOCATION: GENERALIZED

## 2021-06-23 ASSESSMENT — PAIN DESCRIPTION - DESCRIPTORS
DESCRIPTORS: ACHING;DISCOMFORT
DESCRIPTORS: SHOOTING;DISCOMFORT
DESCRIPTORS: ACHING;DISCOMFORT
DESCRIPTORS: ACHING;DISCOMFORT
DESCRIPTORS: DISCOMFORT;ACHING

## 2021-06-23 ASSESSMENT — PAIN DESCRIPTION - PAIN TYPE
TYPE: CHRONIC PAIN

## 2021-06-23 ASSESSMENT — PAIN - FUNCTIONAL ASSESSMENT
PAIN_FUNCTIONAL_ASSESSMENT: ACTIVITIES ARE NOT PREVENTED

## 2021-06-23 NOTE — PROGRESS NOTES
Department of Physical Medicine & Rehabilitation  Progress Note    Patient Identification:  Sydnee Moore  [id number]  : 1970  Admit date: 2021    Chief Complaint: CVA    Subjective:   Doing well. Plan to discharge to SNF tomorrow. She does not have any new complaints. Patient has left sided hemiplegia     ROS: No f/c, n/v, cp     Objective:  Patient Vitals for the past 24 hrs:   BP Temp Temp src Pulse Resp SpO2   21 0856 (!) 136/56 98.1 °F (36.7 °C) Oral 73 16 97 %   21 2256 119/75 97.7 °F (36.5 °C) Oral 71 16 96 %     Const: Alert. No distress, pleasant. HEENT: Normocephalic, atraumatic. Normal sclera/conjunctiva. MMM. Incision healing well  CV: Regular rate and rhythm. Resp: No respiratory distress. Lungs CTAB. Abd: Soft, nontender, nondistended, NABS+   Ext: No edema. Neuro: Alert, oriented, appropriately interactive. Psych: Cooperative, appropriate mood and affect    Laboratory data: Available via EMR.    Last 24 hour lab  Recent Results (from the past 24 hour(s))   Basic Metabolic Panel w/ Reflex to MG    Collection Time: 21  6:37 AM   Result Value Ref Range    Sodium 137 136 - 145 mmol/L    Potassium reflex Magnesium 4.1 3.5 - 5.1 mmol/L    Chloride 103 99 - 110 mmol/L    CO2 24 21 - 32 mmol/L    Anion Gap 10 3 - 16    Glucose 97 70 - 99 mg/dL    BUN 11 7 - 20 mg/dL    CREATININE <0.5 (L) 0.6 - 1.1 mg/dL    GFR Non-African American >60 >60    GFR African American >60 >60    Calcium 9.6 8.3 - 10.6 mg/dL   CBC auto differential    Collection Time: 21  6:37 AM   Result Value Ref Range    WBC 12.0 (H) 4.0 - 11.0 K/uL    RBC 4.01 4.00 - 5.20 M/uL    Hemoglobin 13.7 12.0 - 16.0 g/dL    Hematocrit 41.4 36.0 - 48.0 %    .2 (H) 80.0 - 100.0 fL    MCH 34.3 (H) 26.0 - 34.0 pg    MCHC 33.2 31.0 - 36.0 g/dL    RDW 13.2 12.4 - 15.4 %    Platelets 280 563 - 698 K/uL    MPV 9.2 5.0 - 10.5 fL    Neutrophils % 41.0 %    Lymphocytes % 44.0 %    Monocytes % 10.0 % Eosinophils % 3.0 %    Basophils % 2.0 %    Neutrophils Absolute 4.9 1.7 - 7.7 K/uL    Lymphocytes Absolute 5.3 (H) 1.0 - 5.1 K/uL    Monocytes Absolute 1.2 0.0 - 1.3 K/uL    Eosinophils Absolute 0.4 0.0 - 0.6 K/uL    Basophils Absolute 0.2 0.0 - 0.2 K/uL    RBC Morphology Normal        Therapy progress:  PT  Position Activity Restriction  Other position/activity restrictions: Ambulate, Up with assist, Pt to wear helmet when OOB, OK to remove for shower, HOB to elevated at 30*  Objective     Sit to Stand: Contact guard assistance, Minimal Assistance (from w/c)  Stand to sit: Contact guard assistance, Minimal Assistance  Bed to Chair: Contact guard assistance (w/c <> armed chair using SPC. step by step VC for sequencing)  Device: Single point cane  Other Apparatus: AFO, Wheelchair follow, Left (Custom molded AFO with a figure \"8 \"strap at ankle)  Assistance:  (Predominantly fluctuating between MIN-MOD A, but one episode of L knee buckling resulting in total A to maintain standing.)  Distance: 40'+10'  OT  PT Equipment Recommendations  Equipment Needed: Yes  Mobility Devices: Theodor Mode, Wheelchair  Wheelchair: Musical Sneakers [place]  Other: Pt will require a 20\" hemiheight w/c with drop armrests and standard 90 degree legrest, with pressure relieving cushion. Pt is currently unable to safely ambulate household distances with use of RW or SPC, limiting her ability to access areas of home such as kitchen and bathroom. Due to being unable to access these areas, pt will be unable to perform ADL tasks. Use of a w/c will allow pt to access these areas. Pt has appropriate UB strength to propel w/c and has not expressed an unwillingness to use w/c at d/c.   Toilet - Technique: Stand pivot  Equipment Used: Raised toilet seat with rails  Toilet Transfers Comments: Use of SPC, cues to sequence pivot, use of GB upon stance  Assessment        SLP  Diet Solids Recommendation: Dysphagia Soft and Bite-Sized (Dysphagia III) (with regular solids as tolerated. Per pt request, gravy/sauces to keep for moist.)       Body mass index is 36.54 kg/m². Assessment and Plan:  Gina Abdullahi a 46 y.o. female with PMH GERD p/w L sided weakness and facial droop.  CTA head/neck on 4/12 revealed occluded R cervical ICA, occluded right LEAH, near complete occlusion of right MCA.     Acute ischemic CVA, Subdural heomorrhage s/p right hemicraniectomy and subsequen SAH and IP hemorrhage  No tPA given.  MRI 4/14 showing large subacute infarct throughout R LEAH and MCA with some areas of hemorrhagic conversion.  - Neurosurgery and neurology following  - SBP <160, PRN hydralazine, scheduled Norvasc 5 mg daily  - Keppra 500 mg twice daily   - SQH  - PT/OT   - left sided hemiplegia      Aphasia - SLP   Depression - Home fluoxetine 20 mg daily at home  Anxiety - Hold home Ativan 0.5 mg every 8 hours PRN for anxiety  MISHA - Patient uses CPAP at home  GERD - omeprazole 40 mg daily at home- substituted for pepcid 20 mg BID  Obesity    Rehab Progress: Improving  Anticipated Dispo: home  Services/DME: TTB and RTS (neither covered by insurance, friend to obtain)  ELOS: - 6/26      Teresita Gunter D.O. M.P.H.  PM&R  6/23/2021  9:49 AM

## 2021-06-23 NOTE — CARE COORDINATION
Referred to patient for d/c planning. Spoke to patient and friend Carie. Discussed patient will be transferred to [facility] once precert obtained, most likely by the end of the week. Friend to notify . [facility] started precert yesterday. All questions answered. Patient needs w/c, referral to [facility]. Patient will need wheelchair transport. Family aware this is an out of pocket expense. Patient will need [facility] prior to d/c. Per MD patient will need neurosurgery appointment next week to have surgery to skull put back into place with [address] [place] [address], [phone]. Discussed with friend, she will review with family. Continue to follow for d/c needs.  Electronically signed by USMAN Villatoro LISW-S on 6/23/2021 at 2:29 PM

## 2021-06-23 NOTE — PATIENT CARE CONFERENCE
Inpatient Rehabilitation  Weekly Team Conference Note  The [address]  [phone]  Patient Name: Donna Tran        MRN: [id number]    : 1970  (46 y.o.)  Gender: female   Referring Practitioner: Dr. Aurelia Cummins  The team conference for this patient was held on 2021 at 10:30am by:  Christina Frye DO    Current/Goal QM SCORES  QM Current/Goal Score   Eating CARE Score: 5 / Discharge Goal: Set-up or clean-up assistance   Oral Hygiene CARE Score: 6 / Discharge Goal: Independent   Shower/Bathing CARE Score: 3 / Discharge Goal: Supervision or touching assistance   UB Dressing CARE Score: 4 / Discharge Goal: Set-up or clean-up assistance   LB Dressing CARE Score: 3 / Discharge Goal: Supervision or touching assistance   Putting on/off Footwear CARE Score: 1 / Discharge Goal: Supervision or touching assistance   Toileting Hygiene CARE Score: 3 / Discharge Goal: Independent   Bladder Continence Bladder Continence: Always continent    Bowel Continence Bowel Continence: Not rated    Toilet Transfers CARE Score: 3 / Discharge Goal: Supervision or touching assistance   Shower/Bathe Self  CARE Score: 3 / Discharge Goal: Supervision or touching assistance   Rolling Left and Right CARE Score: 2 / Discharge Goal: Supervision or touching assistance   Sit to Lying CARE Score: 3 / Discharge Goal: Supervision or touching assistance   Lying to Sitting on Bedside CARE Score: 3 / Discharge Goal: Supervision or touching assistance   Sit to Stand CARE Score: 3 / Discharge Goal: Supervision or touching assistance   Chair/Bed to Chair Transfer CARE Score: 3 / Discharge Goal: Supervision or touching assistance   Car Transfers CARE Score: 3 / Discharge Goal: Supervision or touching assistance   Walk 10 Feet CARE Score: 3 / Discharge Goal: Partial/moderate assistance   Walk 50 Feet with Two Turns CARE Score: 3 / Discharge Goal: Not Attempted   Walk 150 Feet CARE Score: 88 / Discharge Goal: Not Attempted   Walk 10 Feet on Uneven Surfaces CARE Score: 88 / Discharge Goal: Not Attempted   1 Step (Curb) CARE Score: 88 / Discharge Goal: Partial/moderate assistance   4 Steps CARE Score: 88 / Discharge Goal: Partial/moderate assistance   12 Steps CARE Score: 88 / Discharge Goal: Not Attempted   Picking up Object from Floor CARE Score: 88 / Discharge Goal: Partial/moderate assistance   Wheel 50 Feet with 2 Turns CARE Score: 6 / Discharge Goal: Independent   Type         [x] Manual        [] Motorized        [] N/A   Wheel 150 Feet CARE Score: 6 / Discharge Goal: Independent   Type         [x] Manual        [] Motorized        [] N/A     NURSING:  A&Ox: Level of Consciousness: Alert (0)  Orientation Level: Oriented X4  Kat Fall Risk Score: Score: 60  Admission BIMS: 15  Wounds/Incisions/Ulcers:   sx site to R scalp  Scattered bruising  Medication Review: reviewed with pt   Pain: L sided pain, pharmacological and non-pharmacological methods of relief in place  Consultations: home care needs 5/7/2021  Imaging: last imaging 5/12/2021 hip x-ray  XR HIP 2-3 VW W PELVIS LEFT   Final Result   Left hip arthroplasty with interval lucency noted about the femoral component indeterminate. Findings may relate to particle disease and/or may relate to loosening. Clinical correlation suggested. Infection is not excluded. Lucency about the supra-acetabular region adjacent to the acetabular component present previously indeterminate. No fracture.      Active Comorbid Conditions:  GERD  Sleep apnea  Systems Review:   Renal: wnl , Dialysis:no   Type: no Frequency:no  Neurological: wnl  Musculoskeletal: X  Respiratory: wnl  Cardiac/Circulatory/Peripheral Vascular: x  Abnormal/Relevant Test Results:   WBC 12.0  Abnormal/Relevant Lab Values:   CBC:   Recent Labs     06/21/21  0602 06/23/21  0637   WBC 11.6* 12.0*   HGB 12.5 13.7   HCT 37.3 41.4   .1* 103.2*    379     BMP:   Recent Labs 06/21/21  0602 06/23/21  0637    137   K 4.1 4.1    103   CO2 25 24   BUN 10 11   CREATININE 0.6 <0.5*     PT/INR: No results for input(s): PROTIME, INR in the last 72 hours. APTT: No results for input(s): APTT in the last 72 hours. Liver Profile:  Lab Results   Component Value Date    AST 41 04/13/2021    ALT 32 04/13/2021    BILITOT 0.7 04/13/2021    ALKPHOS 86 04/13/2021     Lab Results   Component Value Date    CHOL 251 04/13/2021    HDL 45 04/13/2021    TRIG 278 04/13/2021     Recent Labs     06/21/21 0602 06/23/21 0637   WBC 11.6* 12.0*   HGB 12.5 13.7   HCT 37.3 41.4    379   .1* 103.2*     Recent Labs     06/21/21 0602 06/23/21  0637    137   K 4.1 4.1    103   CO2 25 24   BUN 10 11   CREATININE 0.6 <0.5*     No results for input(s): AST, ALT, ALB, BILIDIR, BILITOT, ALKPHOS in the last 72 hours. No results for input(s): MG in the last 72 hours. Recent Labs     06/21/21 0602 06/23/21 0637   WBC 11.6* 12.0*   HGB 12.5 13.7   HCT 37.3 41.4    379     Recent Labs     06/21/21 0602 06/23/21  0637    137   K 4.1 4.1    103   CO2 25 24   BUN 10 11   CREATININE 0.6 <0.5*   CALCIUM 9.7 9.6     No results for input(s): AST, ALT, BILIDIR, BILITOT, ALKPHOS in the last 72 hours. No results for input(s): INR in the last 72 hours. No results for input(s): [place] in the last 72 hours. PHYSICAL THERAPY:  Bed Mobility: Scooting: Supervision (laterally along mat)    Transfers:  Sit to Stand: Contact guard assistance, Minimal Assistance (from w/c)  Stand to sit: Contact guard assistance, Minimal Assistance  Bed to Chair: Contact guard assistance (w/c <> armed chair using SPC. step by step VC for sequencing)  Squat Pivot Transfers: Stand by assistance (scoot pivot w/c>mat table)  Comment: Pt completed ten reps of B stepping fwd, and out to the sides (completed all reps on R and then L) with SPC and CGA. Pt needing intermittent facilitation for stepping with the L. Pt completed    WB Status: No WB restrictions  Ambulation 1  Surface: level tile  Device: Single point cane  Other Apparatus: AFO, Wheelchair follow, Left (Custom molded AFO with a figure \"8 \"strap at ankle)  Assistance:  (Predominantly fluctuating between MIN-MOD A, but one episode of L knee buckling resulting in total A to maintain standing.)  Quality of Gait: Step through pattern with L LE with IR (however, therapist facilitating at the glutes and pelvis for positioning to assist). Pt intermittently demonstrating improved control and step length with the R. Significant LOB that she needed assistance to correct. Gait Deviations: Slow Herlinda, Decreased step height, Decreased step length  Distance: 40'+10'  Comments: Pt reporting feeling light headed during 2nd amb. In sitting pt's BP assessed: 124/63 - reporting improvement after sitting for a few minutes. OCCUPATIONAL THERAPY:  ADL  Equipment Provided: Reacher  Feeding: Setup (to open containers)  Grooming: Modified independent  (oral hygiene seated in w/c)  UE Bathing: Contact guard assistance, Increased time to complete, Verbal cueing, Setup (positional assist for LUE, pratik technique)  LE Bathing: Contact guard assistance (use of grab bar, steadying assist in stance, use of LHS)  UE Dressing: Contact guard assistance, Verbal cueing, Increased time to complete, Setup (pt doffed shirt i'ly, pt donned shirt with + time and cues to pull up to L elbow, setup of shirt)  LE Dressing: Moderate assistance, Verbal cueing, Increased time to complete, Setup (pt donned underwear and pants using reacher with VCs for improved tech, assist for thoroughness of LLE, assist to manage LB clothing over hips on L side in stance with CGA)  Toileting:  Moderate assistance (pt continent of bladder, pericare hygiene seated, assist to manage LB clothing over hip on L side)  Additional Comments: total A to don socks and shoes d/t safe of time, pt requested AFO to not be put on d/t discomfort    Toilet Transfers: Toilet Transfers  Toilet - Technique: Stand pivot  Equipment Used: Raised toilet seat with rails  Toilet Transfer: Contact guard assistance  Toilet Transfers Comments: Use of SPC, cues to sequence pivot, use of GB upon stance    Tub/ShowerTransfers:  Tub Transfers  Tub - Transfer From: Wheelchair  Tub - Transfer Type: To and From  Tub - Transfer To: Transfer tub bench  Tub - Technique: Stand pivot  Tub Transfers: Minimal assistance  Tub Transfers Comments: Pt completed dry tub transfer to simulate home environment, pt assisting with LLE mgmt but req assist in/out of tub to fully clear, pt used side GB to assist with scooting laterally on bench; pt utilized [facility] to complete stand pivot leading to L from TTB<w/c to assist with advacement of RLE with RUE supported, pt verbalized increased comfort/confidence with transfer  Shower Transfers  Shower - Transfer From: Wheelchair  Shower - Transfer Type: To and From  Shower - Transfer To: Transfer tub bench  Shower - Technique: Stand pivot, Lateral  Shower Transfers: Contact Guard  Shower Transfers Comments: with use of grab bar, v/c for improved positioning LLE prior to transfer    SPEECH THERAPY:  Assessment: Speech Therapy Diagnosis  Cognitive Diagnosis:Mild executive dysfunction and attention deficits with improvement in attention to left visual field. Suspect baseline attention impairments consistent with pt report. NUTRITION:  Please see nutrition note for details. Current Weight: 226 lb 6.6 oz (102.7 kg) BMI (Calculated): 36.6  Current diet order: Current diet and supplement order: DIET GENERAL; Feeding: Able to feed self  Room Service: Selective   NSG Recorded PO: PO Meals Eaten (%): 76 - 100% PO Supplement (%): 0%  Malnutrition Status Malnutrition Status: No malnutrition  Please see nutrition evaluation per nutrition standards of care for additional info.    Pari Wilson, [address],   Adis: [phone]  Office: [phone]    CASE MANAGEMENT:  Psychosocial/Behavioral Issues: none noted  Assessment:  Continue to plan for d/c.  Plan is currently for patient to go to SNF at [facility]. Discussed at length with patient and friend. Will continue to follow for needs. Patient/Family Education provided by team:  Role of PT/OT, safety with functional mobility, gait training     Patient Goals for Rehab stay:  1. increase independence , ambulation      Rehab Team Goals for patient for the Upcoming Week:  1. increase functional mobility   2. Increase independence w/ ADLs     Barriers to Progress/Attainment of Goals & Efforts/Interventions to remove Barriers:  1. L side weakness- continue PT/OT  2. No 24 hr assist at dc     Discharge Plan:  Estimated Length of Stay: 62 days  Destination: skilled nursing facility  Services at Discharge: SNF Physical Therapy, Occupational Therapy and Speech Therapy 5x week  Equipment at Discharge: wheelchair and cane  Factors facilitating achievement of predicted outcomes: Friend support, Motivated, Cooperative, Pleasant and Sense of humor  Barriers to the achievement of predicted outcomes: Limited family support, Impulsivity, Decreased endurance, Upper extremity weakness and Lower extremity weakness    Special Needs in the Upcoming Week:   [x] Family/Caregiver Education  [] Home visit  []Therapeutic Pass [] Equipment  Type:   [] Consults:_______   [] Family/Caregiver Training [] Stroke Risk Factor Education     [] Other;_______     TEAM SUMMARY: Will continue with current poc & goals until anticipated d/c date TBD.     MD:   Stroke Risk Factors:   [] N/A for this patient [] HTN  []  Diabetes  [] Hyperlipidemia  [x]Obesity BMI >25  [] Atrial Fibrillation [] Smoker (current)  [] Smoker (quit in last 12 months)  [x] Sleep Apnea [] Other    Risk for Readmission: High - 30%    Justification for Continued Stay:   Criteria for continued IRF stay:  Based on my medical assessment of the patient and review of information from the interdisciplinary team, as part of this weekly team conference, the patient continues to meet the following criteria for IRF level of care:  [x] The patient requires 24-hour rehabilitation nursing care   [x] The patient requires an intensive rehabilitation therapy program  [x] The patient requires active and ongoing intervention of multiple therapy disciplines  [x] The patient requires continued physician supervision by a rehabilitation physician  [x] The patient requires an intensive and coordinated interdisciplinary team approach to the delivery of rehabilitative care    Medical Necessity-continued close physician medical management is required for:   [] Cardiac/Circulatory dysfunction  [] Respiratory/Pulmonary dysfunction  [] Integumentary complications  [] Peripheral Vascular dysfunction  [] Musculoskeletal dysfunction  [x] Neurological dysfunction d/t:  [x] CVA  [] SCI  [] TBI  [] Other: __________  [] Renal dysfunction  [] Hematologic dysfunction    [] Endocrine disorders  [] GI disorders     [] Genito-Urinary dysfunction    Assessment/Plan:  [x] The patient is making good progression towards their LTG's, is actively participating in, and has a reasonable expectation to continue to benefit from the intensive rehabilitation program.  [] The estimated discharge date has been changed from initial team conference due to:   [] The estimated discharge destination has been changed from initial team conference due to:     Rehab Team Members in attendance for Team Conference:  :  ROSETTA Puentes    Therapy Manager:  Kavon Lamas, PT, DPT    Social Work:  Sheryl Mondragon MSW, LISW-S    Nursing:  JURGEN Rodriguez, RN  Jose Chiu, KIZZY Byers RN    Therapy:  Arthur Melgar PT, DPT    Tony Torres, OTR/L  Britney, OTR/L    Carlos Eduardo Nieves MA/CCC-SLP    Nutrition:  Consuelo Reyes, [facility] LD    I approve the established interdisciplinary plan of care as documented within
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
## 2021-06-23 NOTE — PLAN OF CARE
Problem: Pain:  Goal: Pain level will decrease  Description: Pain level will decrease  Outcome: Ongoing  Note: Patient reports generalized pain. Pain being managed with PRN and scheduled medications. Non-pharm measures of heat, repositioning, and rest encouraged throughout shift. Problem: Falls - Risk of:  Goal: Will remain free from falls  Description: Will remain free from falls  6/23/2021 1148 by Jerardo Pike RN  Outcome: Ongoing  Note: Remains free from falls this shift. Fall precautions in place. Calls out appropriately for assistance, call light within reach. Bed/chair alarm utilized throughout shift. Problem: Skin Integrity:  Goal: Absence of new skin breakdown  Description: Absence of new skin breakdown  Outcome: Ongoing  Note: Patient repositions self, encouraged and assisted throughout shift. Skin kept clean and dry.

## 2021-06-23 NOTE — PROGRESS NOTES
ACUTE REHAB UNIT  SPEECH/LANGUAGE PATHOLOGY      [x] Daily  [] Weekly Care Conference Note  [x] Discharge    Patient:Laura Ozuna      GPF:[id number]  [facility]:[id number]  Rehab Dx/Hx: Acute CVA (cerebrovascular accident) ([facility] [address] [I63.9]    Precautions: [x] Aspiration  [x] Fall risk  [] Sternal  [] Seizure [] Hip  [] Weight Bearing [] Other  ST Dx: [] Aphasia  [] Dysarthria  [] Apraxia   [x] Oropharyngeal dysphagia [x] Cognitive Impairment  [] Other:   Date of Admit: 4/23/2021  Room #: 3101/3101-01  Date: 6/23/2021        Current Diet Order:DIET GENERAL;   Recommended Form of Meds: PO  Compensatory Swallowing Strategies: Alternate solids and liquids, Upright as possible for all oral intake, Check for pocketing of food on the Left, Small bites/sips, Lingual sweep   Subjective: Pt admitted 4/12 after fall with left sided paralysis. Pt underwent craniectomy on 4/13 per Dr. Lisandra Buckley. Social/Functional History  Lives With: Spouse;Son( reported that son is diagnosed with Autism.)  Occupation: Full time employment  Type of occupation:  at [employer]"    FEES 4/14: IMPRESSIONS:   Pt presents w/ mild oropharyngeal dysphagia characterized by premature spillage of thin liquids to UES and pyriforms w/ intermittent deep penetration of laryngeal vestibule; adequate airway protection and complete clearance of all residue after swallow initiation. No aspiration w/ any trials. Timely swallow initiation w/ puree and regular solid consistencies; no oropharyngeal residue.    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 Adequate ab/adduction. Complete closure of TVFs upon adduction.      Dentition: Adequate  Vision  Vision: Impaired  Vision Exceptions: Visual field cut  Hearing  Hearing: Within functional limits  Barriers toward progress: None towards stated goals    Date: 6/23/2021       Tx session 1 Tx session 2 Discharge Summary    Total Timed Code Min 30 30    Total Treatment Minutes 30 30    Individual Treatment Minutes 30 30    Group Treatment Minutes 0 0    Co-Treat Minutes 0 0    Brief Exception: N/A N/A    Pain Reports having soreness in body. States \"it's on fire\" None indicated     Pain Intervention: [] RN notified  [] Repositioned  [] Intervention offered and patient declined  [x] N/A  [] Other:    [] RN notified  [] Repositioned  [] Intervention offered and patient declined  [x] N/A  [] Other:        Subjective:     Pt upright in chair and agreeable to therapy as usual. Pt endorses anxiety re: upcoming discharge. Support provided. Pt upright in chair and puts forth great effort. Objective / Goals:      Patient will consume regular solids with timely mastication and no pocketing in L buccal cavity across 2 sessions. Goal not targeted this session. Goal not targeted this session. GOAL PARTIALLY MET  Limited treatment time in past few weeks due to significantly functional swallow and suspected influence of attention on carryover. Patient will consume PO without anterior spillage or overt pocketing across 2 sessions. GOAL MET   GOAL MET GOAL MET   Pt will complete divided/alternating attention tasks with 85% accuracy given min cues. Cue x1; pt self reported \"I got a little lost there\". Multiple distractors and side comments but pt redirected self back to task with cue x1. GOAL PARTIALLY MET  Inconsistent ability to demo high level attention especially at times of increased internal distractions related to pain or mental state. Pt will accurately complete executive functioning tasks with min cues. Functional map task - min cues. Min cues for high level deductions and reasoning. GOAL PARTIALLY MET  Ongoing cues required intermittently. Pt will attend to L visual field with min cues. Cue x1 during session without context No overt instances of left inattention; pt redirecting self.   GOAL

## 2021-06-23 NOTE — PROGRESS NOTES
to this visit. has a past medical history of GERD (gastroesophageal reflux disease), Hernia, Obesity, and Unspecified sleep apnea. has a past surgical history that includes Splenectomy (); Total hip arthroplasty (); Lap Band (); hernia repair ();  section (); and craniotomy (Right, 2021). Restrictions  Position Activity Restriction  Other position/activity restrictions: Ambulate, Up with assist, Pt to wear helmet when OOB, OK to remove for shower, HOB to elevated at 30*  Subjective   General  Chart Reviewed: Yes, Orders, Progress Notes  Patient assessed for rehabilitation services?: Yes  Additional Pertinent Hx: 46 y.o. female with hypertension and tobacco abuse who presented after spending a prolonged period on the ground s/p fall out of bed; found to have acute left hemiparesis and gaze deviation. CXR with no acute cardiopulmonary abnormality. CTH revealed large acute/subacute infarct in the R frontal lobe with associated mass effect and 4 mm R to L midline shift. It also noted subdural hemorrhage of 6mm along falx, although NSGY less convinced. CTA head and neck revealed occluded R cervical ICA & R anterior cerebral artery with near complete occlusion of R MCA. Outside window on TPA. Pt is now POD #1 following hemicraniectomy (). Pt admitted to ARU   Response to previous treatment: Patient with no complaints from previous session  Family / Caregiver Present: No  Referring Practitioner: Dr. Manjeet Bay DO  Diagnosis: CVA  Subjective  Subjective: Pt sitting up in w/c on OT approach and agreed to tx. General Comment  Comments: Radha Aguilar Vital Signs  Patient Currently in Pain: Yes   Orientation     Objective    ADL  Grooming: Modified independent  (w/c level)  UE Bathing: Stand by assistance  LE Bathing: Minimal assistance (assist to dry feet)  UE Dressing:  (LS sweatshirt)  LE Dressing: Maximum assistance (Pt able to thread underwear using reacher over RLE & LLE.  Pt required assist to thread leggings over RLE & LLE. MaxA to pull underwear and brief over hips. DEP For doff/don footwear.)  Toileting: Maximum assistance (Pt req assist in clothing mgt pre/post void, able to complete eugenie-care post void.)  Additional Comments: Pt completed ADL for discharge as noted above. Pt reporting increased fatigue affecting performance this afternoon. Pt wearing leggings - which may have affected level of IND w/ LB dressing. In stance for LB clothing mgt pt using SPC for balance w/ R hand. Balance  Sitting Balance: Supervision  Standing Balance: Contact guard assistance  Standing Balance  Time: ~2min  Activity: transfers, fxl mob  Comment: walked from doorway of bathroom to recliner using RW and CGA  Functional Mobility  Assist Level: Contact guard assistance  Functional Mobility Comments: walked from doorway of bathroom to recliner using RW and CGA  Toilet Transfers  Toilet - Technique: Stand pivot  Equipment Used: Raised toilet seat with rails  Toilet Transfer: Contact guard assistance  Toilet Transfers Comments: cues to sequence pivot, use of GB upon stance  Shower Transfers  Shower - Transfer From: Wheelchair  Shower - Transfer Type: To and From  Shower - Transfer To: Transfer tub bench  Shower - Technique: Stand pivot; Lateral  Shower Transfers: Contact Guard  Shower Transfers Comments: with use of grab bar, v/c for improved positioning LLE prior to transfer     Transfers  Stand Pivot Transfers: Contact guard assistance  Sit to stand: Stand by assistance  Stand to sit: Stand by assistance  Transfer Comments: multiple stand pivot and sit<>stand transfers t/o session - completed w/ CGA                                                                 Plan   Plan  Times per week: 5x a week for 60 mins daily  Times per day: Daily  Current Treatment Recommendations: Strengthening, Endurance Training, Neuromuscular Re-education, Self-Care / ADL, Functional Mobility Training, Safety Education & Training, ROM, Positioning, Wheelchair Mobility Training, Balance Training, Patient/Caregiver Education & Training, Manual Therapy:  MLD, Equipment Evaluation, Education, & procurement, Home Management Training, Cognitive Reorientation    Goals  Short term goals  Time Frame for Short term goals: 2 weeks  Short term goal 1: Pt will complete toilet transfer w/ Mod A-goal met 5/4  Short term goal 2: Pt will complete UE dressing w/ Mod A-goal met 5/12  Short term goal 3: Pt will complete oral care w/ spvn seated at sink w/o VCs for L sided attention-goal met 5/17  Short term goal 4: Pt will complete LE dressing w/ Mod A-goal met 5/26  Long term goals  Time Frame for Long term goals : 4 weeks  Long term goal 1: Pt will complete toilet transfer w/ SBA- Not met CGA  Long term goal 2: Pt will complete UE dressing w/ setup-ongoing - not met Hugo  Long term goal 3: Pt will complete LE dressing w/ SBA-ongoing- not met MaxA  Long term goal 4: Pt will be independent w/ tone management exercises to improve functional use of LUE- Not met, requires physicall assist d/t tone  Long term goal 5: Pt will complete oral care I'ly-goal met 5/24  Patient Goals   Patient goals : \"get back to my normal self\"       Therapy Time   Individual Concurrent Group Co-treatment   Time In 8015         Time Out 1440         Minutes 85         Timed Code Treatment Minutes: [address] [facility], [facility]-OTR/L [id number]

## 2021-06-23 NOTE — PROGRESS NOTES
on the ground s/p fall out of bed; found to have acute left hemiparesis and gaze deviation. CXR with no acute cardiopulmonary abnormality. CTH revealed large acute/subacute infarct in the R frontal lobe with associated mass effect and 4 mm R to L midline shift. It also noted subdural hemorrhage of 6mm along falx, although NSGY less convinced. CTA head and neck revealed occluded R cervical ICA & R anterior cerebral artery with near complete occlusion of R MCA. Outside window on TPA. Pt is now POD #1 following hemicraniectomy (4/13). Pt admitted to ARU 4/23  Response to previous treatment: Patient with no complaints from previous session  Family / Caregiver Present: No  Referring Practitioner: Dr. Ann Samano DO  Diagnosis: CVA  Subjective  Subjective: Pt sitting up in recliner on OT approach and agreed to tx. Pt req toileting on arrival  General Comment  Comments: . Orientation     Objective    ADL  Grooming: Modified independent  (w/c level)  Toileting: Moderate assistance (Pt w/ continent void (bowel 2x and bladder) on toilet. Pt req CGA in pulling pants down, and ModA to pull over hips to pull up.)  Additional Comments: On arrival pt requesting toileting, and having diarrhea - EOS pt requesting toileting again d/t need for BM.  Pt on toilet w/ PCA EOS        Balance  Sitting Balance: Supervision  Standing Balance: Contact guard assistance  Standing Balance  Time: ~2min  Activity: transfers, fxl mob  Comment: walked from doorway of bathroom to recliner using RW and CGA  Functional Mobility  Assist Level: Contact guard assistance  Functional Mobility Comments: walked from doorway of bathroom to recliner using RW and [address]  Times per week: 5x a week for 60 mins daily  Times per day: Daily  Current Treatment Recommendations: Strengthening, Endurance Training, Neuromuscular Re-education, Self-Care / ADL, Functional Mobility Training, Safety Education & Training, ROM, Positioning, Wheelchair Mobility Training, Balance Training, Patient/Caregiver Education & Training, Manual Therapy:  MLD, Equipment Evaluation, Education, & procurement, Home Management Training, Cognitive Reorientation    Goals  Short term goals  Time Frame for Short term goals: 2 weeks  Short term goal 1: Pt will complete toilet transfer w/ Mod A-goal met 5/4  Short term goal 2: Pt will complete UE dressing w/ Mod A-goal met 5/12  Short term goal 3: Pt will complete oral care w/ spvn seated at sink w/o VCs for L sided attention-goal met 5/17  Short term goal 4: Pt will complete LE dressing w/ Mod A-goal met 5/26  Long term goals  Time Frame for Long term goals : 4 weeks  Long term goal 1: Pt will complete toilet transfer w/ SBA-ongoing  Long term goal 2: Pt will complete UE dressing w/ setup-ongoing  Long term goal 3: Pt will complete LE dressing w/ SBA-ongoing  Long term goal 4: Pt will be independent w/ tone management exercises to improve functional use of LUE-ongoing  Long term goal 5: Pt will complete oral care I'ly-goal met 5/24  Patient Goals   Patient goals : \"get back to my normal self\"       Therapy Time   Individual Concurrent Group Co-treatment   Time In 0830         Time Out 0900         Minutes 30         Timed Code Treatment Minutes: [address] [facility], [facility]-OTR/L [address] [facility], OT

## 2021-06-23 NOTE — FLOWSHEET NOTE
06/23/21 1312   Encounter Summary   Services provided to: Patient   Referral/Consult From: Vangie   Continue Visiting   (es 6/23)   Complexity of Encounter Moderate   Length of Encounter 15 minutes   Routine   Type Follow up   Assessment Approachable; Hopeful   Intervention Active listening;Explored feelings, thoughts, concerns;Explored coping resources;Nurtured hope;Discussed illness/injury and it's impact   Outcome Engaged in conversation;Receptive

## 2021-06-24 VITALS
HEIGHT: 66 IN | DIASTOLIC BLOOD PRESSURE: 61 MMHG | HEART RATE: 66 BPM | BODY MASS INDEX: 36.39 KG/M2 | WEIGHT: 226.41 LBS | RESPIRATION RATE: 16 BRPM | SYSTOLIC BLOOD PRESSURE: 113 MMHG | OXYGEN SATURATION: 95 % | TEMPERATURE: 97.4 F

## 2021-06-24 LAB — SARS-COV-2, NAAT: NOT DETECTED

## 2021-06-24 PROCEDURE — 87635 SARS-COV-2 COVID-19 AMP PRB: CPT

## 2021-06-24 PROCEDURE — 97530 THERAPEUTIC ACTIVITIES: CPT

## 2021-06-24 PROCEDURE — 97535 SELF CARE MNGMENT TRAINING: CPT

## 2021-06-24 PROCEDURE — 97110 THERAPEUTIC EXERCISES: CPT

## 2021-06-24 PROCEDURE — 6360000002 HC RX W HCPCS: Performed by: PHYSICAL MEDICINE & REHABILITATION

## 2021-06-24 PROCEDURE — 99239 HOSP IP/OBS DSCHRG MGMT >30: CPT | Performed by: PHYSICAL MEDICINE & REHABILITATION

## 2021-06-24 PROCEDURE — 6370000000 HC RX 637 (ALT 250 FOR IP): Performed by: PHYSICAL MEDICINE & REHABILITATION

## 2021-06-24 PROCEDURE — 97129 THER IVNTJ 1ST 15 MIN: CPT

## 2021-06-24 PROCEDURE — 92526 ORAL FUNCTION THERAPY: CPT

## 2021-06-24 PROCEDURE — 97130 THER IVNTJ EA ADDL 15 MIN: CPT

## 2021-06-24 RX ORDER — GABAPENTIN 300 MG/1
300 CAPSULE ORAL 3 TIMES DAILY
Qty: 90 CAPSULE | Refills: 3 | Status: SHIPPED | OUTPATIENT
Start: 2021-06-24 | End: 2021-06-24

## 2021-06-24 RX ORDER — GABAPENTIN 300 MG/1
300 CAPSULE ORAL 3 TIMES DAILY
Qty: 90 CAPSULE | Refills: 2 | Status: SHIPPED | OUTPATIENT
Start: 2021-06-24 | End: 2021-10-12

## 2021-06-24 RX ORDER — GABAPENTIN 300 MG/1
300 CAPSULE ORAL 3 TIMES DAILY
Status: DISCONTINUED | OUTPATIENT
Start: 2021-06-24 | End: 2021-06-24 | Stop reason: HOSPADM

## 2021-06-24 RX ORDER — GABAPENTIN 300 MG/1
300 CAPSULE ORAL 3 TIMES DAILY
Qty: 90 CAPSULE | Refills: 2 | Status: SHIPPED | OUTPATIENT
Start: 2021-06-24 | End: 2021-06-24

## 2021-06-24 RX ADMIN — METHYLPHENIDATE HYDROCHLORIDE 5 MG: 5 TABLET ORAL at 08:08

## 2021-06-24 RX ADMIN — TIZANIDINE 2 MG: 4 TABLET ORAL at 08:08

## 2021-06-24 RX ADMIN — ACETAMINOPHEN 1000 MG: 500 TABLET, COATED ORAL at 05:24

## 2021-06-24 RX ADMIN — DICLOFENAC SODIUM 4 G: 10 GEL TOPICAL at 08:11

## 2021-06-24 RX ADMIN — THIAMINE HCL TAB 100 MG 100 MG: 100 TAB at 08:10

## 2021-06-24 RX ADMIN — HEPARIN SODIUM 5000 UNITS: 5000 INJECTION INTRAVENOUS; SUBCUTANEOUS at 14:41

## 2021-06-24 RX ADMIN — ACETAMINOPHEN 1000 MG: 500 TABLET, COATED ORAL at 10:57

## 2021-06-24 RX ADMIN — HEPARIN SODIUM 5000 UNITS: 5000 INJECTION INTRAVENOUS; SUBCUTANEOUS at 05:25

## 2021-06-24 RX ADMIN — LEVETIRACETAM 500 MG: 500 TABLET ORAL at 08:07

## 2021-06-24 RX ADMIN — OXYCODONE 10 MG: 5 TABLET ORAL at 05:24

## 2021-06-24 RX ADMIN — GABAPENTIN 300 MG: 300 CAPSULE ORAL at 14:40

## 2021-06-24 RX ADMIN — ASPIRIN 81 MG: 81 TABLET, CHEWABLE ORAL at 08:08

## 2021-06-24 RX ADMIN — FAMOTIDINE 20 MG: 20 TABLET, FILM COATED ORAL at 08:08

## 2021-06-24 RX ADMIN — THERA TABS 1 TABLET: TAB at 08:10

## 2021-06-24 RX ADMIN — FLUOXETINE 20 MG: 20 CAPSULE ORAL at 08:10

## 2021-06-24 RX ADMIN — GABAPENTIN 100 MG: 100 CAPSULE ORAL at 08:08

## 2021-06-24 RX ADMIN — CETIRIZINE HYDROCHLORIDE 10 MG: 10 TABLET, FILM COATED ORAL at 08:08

## 2021-06-24 RX ADMIN — AMLODIPINE BESYLATE 5 MG: 5 TABLET ORAL at 08:08

## 2021-06-24 ASSESSMENT — PAIN SCALES - GENERAL
PAINLEVEL_OUTOF10: 5
PAINLEVEL_OUTOF10: 9
PAINLEVEL_OUTOF10: 4
PAINLEVEL_OUTOF10: 7
PAINLEVEL_OUTOF10: 4
PAINLEVEL_OUTOF10: 8

## 2021-06-24 ASSESSMENT — PAIN DESCRIPTION - ORIENTATION
ORIENTATION: LEFT

## 2021-06-24 ASSESSMENT — PAIN DESCRIPTION - PAIN TYPE
TYPE: CHRONIC PAIN

## 2021-06-24 ASSESSMENT — PAIN DESCRIPTION - DESCRIPTORS
DESCRIPTORS: ACHING;DISCOMFORT

## 2021-06-24 ASSESSMENT — PAIN DESCRIPTION - FREQUENCY
FREQUENCY: CONTINUOUS

## 2021-06-24 ASSESSMENT — PAIN DESCRIPTION - LOCATION
LOCATION: GENERALIZED
LOCATION: SHOULDER;HIP
LOCATION: GENERALIZED

## 2021-06-24 ASSESSMENT — PAIN DESCRIPTION - ONSET
ONSET: ON-GOING
ONSET: ON-GOING

## 2021-06-24 ASSESSMENT — PAIN - FUNCTIONAL ASSESSMENT: PAIN_FUNCTIONAL_ASSESSMENT: ACTIVITIES ARE NOT PREVENTED

## 2021-06-24 NOTE — PROGRESS NOTES
Shift assessment complete. VSS. A&Ox4. Patient reports generalized pain to left side. Pain being managed with PRN and scheduled medications. Fall precautions in place. Patient in bed, refuses up to chair at this time, bed alarm utilized, call light within reach. Patient request Anastasia Akins more minutes\" in bed. Patient reported that she did not sleep well last night d/t pain of left side.      Vitals:    06/24/21 0803   BP: 113/61   Pulse: 66   Resp: 16   Temp: 97.4 °F (36.3 °C)   SpO2: 95%
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
## 2021-06-24 NOTE — PROGRESS NOTES
ACUTE REHAB UNIT  SPEECH/LANGUAGE PATHOLOGY      [x] Daily  [] Weekly Care Conference Note  [x] Discharge    Patient:Laura Dixon      JPB:283  [place]  Rehab Dx/Hx: Acute CVA (cerebrovascular accident) ([facility] [address] [I63.9]    Precautions: [x] Aspiration  [x] Fall risk  [] Sternal  [] Seizure [] Hip  [] Weight Bearing [] Other  ST Dx: [] Aphasia  [] Dysarthria  [] Apraxia   [x] Oropharyngeal dysphagia [x] Cognitive Impairment  [] Other:   Date of Admit: 2021  Room #: 3101/3101-01  Date: 2021        Current Diet Order:DIET GENERAL;   Recommended Form of Meds: PO  Compensatory Swallowing Strategies: Alternate solids and liquids, Upright as possible for all oral intake, Check for pocketing of food on the Left, Small bites/sips, Lingual sweep   Subjective: Pt admitted  after fall with left sided paralysis. Pt underwent craniectomy on  per Dr. Ashley Flanagan. Social/Functional History  Lives With: Spouse;Son( reported that son is diagnosed with Autism.)  Occupation: Full time employment  Type of occupation:  at [employer]"    FEES : IMPRESSIONS:   Pt presents w/ mild oropharyngeal dysphagia characterized by premature spillage of thin liquids to UES and pyriforms w/ intermittent deep penetration of laryngeal vestibule; adequate airway protection and complete clearance of all residue after swallow initiation. No aspiration w/ any trials. Timely swallow initiation w/ puree and regular solid consistencies; no oropharyngeal residue.    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 Adequate ab/adduction. Complete closure of TVFs upon adduction.      Dentition: Adequate  Vision  Vision: Impaired  Vision Exceptions: Visual field cut  Hearing  Hearing: Within functional limits  Barriers toward progress: None towards stated goals    Date: 2021      Tx session 1 Discharge Summary Total Timed Code Min 45    Total Treatment Minutes 60    Individual Treatment Minutes 60    Group Treatment Minutes 0    Co-Treat Minutes 0    Brief Exception: N/A    Pain \"Everything hurts. I slept terrible. \"    Pain Intervention: [x] RN notified  [] Repositioned  [] Intervention offered and patient declined  [] N/A  [x] Other: Pt informed MD        Subjective:     Pt was visited laying in bed. She was agreeable to therapy this date. She endorsed that the RN would return to help her into the chair in a few minutes. Approximately half way through the session, the PCA and RN came in to help pt into bedside wheelchair. Objective / Goals:     Patient will consume regular solids with timely mastication and no pocketing in L buccal cavity across 2 sessions. The pt chose to wait until she was seated upright before beginning her breakfast which showed good insight. The pt was able to verbally state safe swallow compensatory strategies but required one prompt to utilize lingual sweep to check for pocketing. She stated, \"Whoops theres eggs in there. \" The pt was educated on need to check for pocketing every time she eats regardless of her setting. She verbalized understanding. GOAL PARTIALLY MET  Limited treatment time in past few weeks due to significantly functional swallow and suspected influence of attention on carryover. Patient will consume PO without anterior spillage or overt pocketing across 2 sessions. GOAL MET   GOAL MET   Pt will complete divided/alternating attention tasks with 85% accuracy given min cues. The pt self reported difficulty with attention and would indicate at different times throughout the session when she got \"sidetracked\". Pt was easily redirected and was able to complete given tasks. GOAL PARTIALLY MET  Inconsistent ability to demo high level attention especially at times of increased internal distractions related to pain or mental state.     Pt will accurately complete executive functioning tasks with min cues. Pt completed a functional money activity where she placed an order and then paid the appropriate amount. She did so with approximately 95% accuracy. The pt was also able to make accurate change and recognize when the incorrect change was given. GOAL PARTIALLY MET  Ongoing cues required intermittently. Pt will attend to L visual field with min cues. All tasks were completed on the left side this session. Pt did not appear to ignore the left side of her tray or have difficulty seeing the left side of each activity. Little to no cueing was needed for L attention this date. GOAL PARTIALLY MET   Occasional left sided inattention noted for table top type activities. Per observation and PT/OT pt continues to demo body neglect and difficulty with wheelchair mobility due to left inattention. Pt will participate in ongoing cognitive linguistic assessment. GOAL MET GOAL MET   Other areas targeted:     Education:   Pt was re-educated to safe swallow compensatory strategies as well as her current progress on her goals. Safety Devices: [x] Call light within reach  [x] Chair alarm activated and connected to nurse call light system  [] Bed alarm activated   [x] Other: PT at bedside upon SLP exit    Assessment:     Mild difficulty with attention and executive functioning. Easily redirected. Pt reports baseline attention impairments. Plan: Scheduled for discharge today with ongoing therapy indicated.       Interventions used this date:  [] Speech/Language Treatment  [] Instruction in HEP  [] Dysphagia Treatment [x] Cognitive Treatment   [] Other:    Discharge recommendations:  [] Home independently  [x] Home with assistance []  24 hour supervision  [] ECF [x] Other: Education completed with family on Friday 5/28/2021  Continued Tx Upon Discharge: ? [x] Yes    [] No    [] TBD based on progress while on ARU     [] Vital Stim indicated     [] Other:   Estimated discharge date: 6/24/2021 Tx Session:  Breanna Keiht, [address]; [id number]  Speech-Language Pathologist        Discharge Summary:  Rodrigo South M.A., [facility]  Speech-Language Pathologist

## 2021-06-24 NOTE — DISCHARGE SUMMARY
Physical Medicine & Rehabilitation  Discharge Summary     Patient Identification:  Adrianna Todd  : 1970  Admit date: 2021  Discharge date:  21  Attending provider: Iram Chang DO        Primary care provider: Chuyita Castillo MD     Discharge Diagnoses:   Patient Active Problem List   Diagnosis    GERD (gastroesophageal reflux disease)    Sleep apnea    Hernia    Status post gastric banding    Obesity    Acute right MCA stroke ([address]    Status post craniectomy    ICAO (internal carotid artery occlusion), right    Cerebral edema ([address]    Smoking    Secondary hypertension    Encounter for loop recorder check-[facility] CONFIRM ILR 2021 Dr Bharat Hinojosa for cryptogenic stroke. [employer]. net    Acute CVA (cerebrovascular accident) ([facility] [address]    Hyponatremia    Debility    Electrolyte imbalance       History of Present Illness/Acute Hospital Course:  45 YO F PMH GERD, depression/anxiety presented to [facility] ED via EMS for new onset left sided weakness.      Pt states that she fell out of her bed the night prior to admission and was down on the ground all night until the AM.  Patient endorses she did not let her  call EMS because she felt like she could improve.  Patient claims EMS was finally called after her coworker started calling her in the morning.  Pts GCS was 15 upon arrival @ ED with L sided facial droop, L sided weakness & R sided gaze deviation. Stroke alert was called. In the ED she was hemodynamically stable with /81. CTH revealed large acute/subacute infarct in the R frontal lobe with associated mass effect and 4 mm R to L midline shift. It also noted subdural hemorrhage of 6mm along falx, although NSGY less convinced. CTA head and neck revealed occluded R cervical ICA & R anterior cerebral artery with near complete occlusion of R MCA. She was ultimately seen by NSx who performed a R hemicraniectomy.  A MRI on  showing large subacute infarct throughout R LEAH and MCA with some areas of hemorrhagic conversion. She has been on seizure ppx. Inpatient Rehabilitation Course:   Jostin Perdomo is a 46 y.o. female admitted to inpatient rehabilitation on 4/23/2021 with CVA. The patient participated in an aggressive multidisciplinary inpatient rehabilitation program involving 3 hours of therapy per day, at least 5 days per week. Impairments: Decreased functional mobility     Medical Management:  Felipe Patel a 46 y.o. female with PMH GERD p/w L sided weakness and facial droop.  CTA head/neck on 4/12 revealed occluded R cervical ICA, occluded right LEAH, near complete occlusion of right MCA.     Acute ischemic CVA, Subdural heomorrhage s/p right hemicraniectomy and subsequen SAH and IP hemorrhage  No tPA given. MRI 4/14 showing large subacute infarct throughout R LEAH and MCA with some areas of hemorrhagic conversion.  - Neurosurgery and neurology following  --SBP <160, PRN hydralazine, scheduled Norvasc  -- Keppra 500 mg twice daily   -  SQH  - PT/OT consulted      Aphasia- SLP consulted   Depression-Patient takes fluoxetine 20 mg daily at home  Anxiety -Patient takes Ativan 0.5 mg every 8 hours as needed for anxiety- holding  MISHA-Patient uses CPAP at home   GERD- Patient takes omeprazole 40 mg daily at home- half dose  Obesity    Discharge Exam:  Constitutional: Alert, WDWN, Pleasant, no distress  Head: Normocephalic, atruamatic, MMM  Eyes: Conjunctiva noninjected, no icterus, no drainage  Pulm: CTA bilat. Respirations non-labored. CV: No murmurs noted. RRR. Abd: Soft, nontender.  NABS+  Ext: No edema, no varicosities  Neuro: Alert, fully oriented, appropriate   MSK: No joint abnormalities noted       Discharge Functional Status:    Physical therapy:  Bed Mobility: Scooting: Supervision (laterally along mat)  Transfers: Sit to Stand: Contact guard assistance (Pt sit <> stand from [facility] and toilet with use of grab bar and CGA, cues for set up.)  Stand to sit: Contact guard assistance  Bed to Chair: Contact guard assistance (WC <> mat table with CGA via stand pivot transfer , VC for set up)  Squat Pivot Transfers: Stand by assistance (scoot pivot w/c>mat table)  Comment: Pt completed ten reps of B stepping fwd, and out to the sides (completed all reps on R and then L) with SPC and CGA. Pt needing intermittent facilitation for stepping with the L. Pt completed, WB Status: No WB restrictions  Ambulation 1  Surface: level tile  Device: Single point cane  Other Apparatus: AFO, Wheelchair follow, Left  Assistance: Minimal assistance (min A of 1 with WC follow)  Quality of Gait: Step through pattern with L LE with IR (however, therapist facilitating at the glutes and pelvis for positioning to assist and weight shifts). Pt  demonstrating improved control and step length with the R and widened JOSETTE. no significant LOB  Gait Deviations: Slow Herlinda, Decreased step height, Decreased step length, Decreased arm swing  Distance: 52 ft  Comments: 1 standing rest break to complete distance,    Mobility:  , PT Equipment Recommendations  Equipment Needed: Yes  Mobility Devices: Wheelchair (light weight , pratik height)  Wheelchair: Wheelchair Cushion Standard  Other: Pt will require a 20\" hemiheight w/c with drop armrests and standard 90 degree legrest, L break extender, and L arm trough  with pressure relieving cushion. Pt is currently unable to safely ambulate household distances with use of RW or SPC, limiting her ability to access areas of home such as kitchen and bathroom. Due to being unable to access these areas, pt will be unable to perform ADL tasks. Use of a w/c will allow pt to access these areas. Pt has appropriate UB strength to propel w/c and has not expressed an unwillingness to use w/c at d/c., Assessment: Pt tolerated session well. Pt CGA for all transfers this date and just requires VC for set up. Pt continues to be mod I for bed mobility and WC mobility .  Pt with increased pain this date and reports increased fatigue. Pt requries continued skilled PT to increase functional mobility and maximize rehab potential.    Occupational therapy:  ,  , Assessment: Pt seen this date for discharge scores, completed ADL as noted below. Pt required increased assist w/ LB dressing this date v. prior d/t fatigue. Pt has progressed toward goals since admission, is eager to participate in therapies and verbalizes milestones achieved. Pt may benefit from cont'd skilled OT after d/c from [facility]. Speech therapy:         Significant Diagnostics:   Lab Results   Component Value Date    CREATININE <0.5 (L) 06/23/2021    BUN 11 06/23/2021     06/23/2021    K 4.1 06/23/2021     06/23/2021    CO2 24 06/23/2021       Lab Results   Component Value Date    WBC 12.0 (H) 06/23/2021    HGB 13.7 06/23/2021    HCT 41.4 06/23/2021    .2 (H) 06/23/2021     06/23/2021       Disposition:  SNF    Services:PT/OT  DME: walker/WC    Discharge Condition: Stable    Follow-up:  See after visit summary from hospitalization    Discharge Medications:     Medication List      START taking these medications    bisacodyl 5 MG EC tablet  Commonly known as: DULCOLAX  Take 1 tablet by mouth daily     cetirizine 10 MG tablet  Commonly known as: ZYRTEC  Take 1 tablet by mouth daily     dextran 70-hypromellose 0.1-0.3 % Soln opthalmic solution  Commonly known as: TEARS NATURALE  Place 1 drop into both eyes as needed (dry eyes)     diclofenac sodium 1 % Gel  Commonly known as: VOLTAREN  Apply 4 g topically 2 times daily     docusate 100 MG Caps  Commonly known as: COLACE, DULCOLAX  Take 100 mg by mouth 2 times daily as needed for Constipation     gabapentin 300 MG capsule  Commonly known as: NEURONTIN  Take 1 capsule by mouth 3 times daily for 30 days. methylphenidate 5 MG tablet  Commonly known as: RITALIN  Take 1 tablet by mouth every morning for 30 days.      nicotine 21 MG/24HR  Commonly known as: Sincere Kiser CQ  Place 1 patch onto the skin daily as needed (Current smoker 1ppd)     oxyCODONE 5 MG immediate release tablet  Commonly known as: ROXICODONE  Take 1 tablet by mouth every 4 hours as needed for Pain for up to 3 days.      polyethylene glycol 17 g packet  Commonly known as: GLYCOLAX  Take 17 g by mouth daily as needed for Constipation     sennosides-docusate sodium 8.6-50 MG tablet  Commonly known as: SENOKOT-S  Take 2 tablets by mouth 2 times daily     tiZANidine 2 MG tablet  Commonly known as: ZANAFLEX  Take 1 tablet by mouth 2 times daily        CONTINUE taking these medications    amLODIPine 5 MG tablet  Commonly known as: NORVASC  Take 1 tablet by mouth daily     aspirin 81 MG chewable tablet  Take 1 tablet by mouth daily     atorvastatin 80 MG tablet  Commonly known as: LIPITOR  Take 1 tablet by mouth nightly     FLUoxetine 20 MG capsule  Commonly known as: PROZAC     levETIRAcetam 500 MG tablet  Commonly known as: KEPPRA  Take 1 tablet by mouth 2 times daily     MULTIVITAMIN PO     omeprazole 20 MG delayed release capsule  Commonly known as: PRILOSEC     thiamine 100 MG tablet  Take 1 tablet by mouth daily        STOP taking these medications    LORazepam 0.5 MG tablet  Commonly known as: ATIVAN     norethindrone 0.35 MG tablet  Commonly known as: MICRONOR           Where to Get Your Medications      These medications were sent to [facility] [address], [address] [place] [address] [phone]  [address] [place] [address]    Phone: [phone]   · amLODIPine 5 MG tablet  · aspirin 81 MG chewable tablet  · atorvastatin 80 MG tablet  · gabapentin 300 MG capsule  · levETIRAcetam 500 MG tablet  · thiamine 100 MG tablet     You can get these medications from any pharmacy    Bring a paper prescription for each of these medications  · methylphenidate 5 MG tablet  · oxyCODONE 5 MG immediate release tablet     Information about where to get these medications is not yet available    Ask your nurse or doctor about these medications  · bisacodyl 5 MG EC tablet  · cetirizine 10 MG tablet  · dextran 70-hypromellose 0.1-0.3 % Soln opthalmic solution  · diclofenac sodium 1 % Gel  · docusate 100 MG Caps  · nicotine 21 MG/24HR  · polyethylene glycol 17 g packet  · sennosides-docusate sodium 8.6-50 MG tablet  · tiZANidine 2 MG tablet           I spent over 35 minutes on this discharge encounter between counseling, coordination of care, and medication reconciliation. To comply with Suleiman Lincoln palacios R.II.4.1:   Discharge order placed in advance to facilitate patients discharge needs.       Chirag Frye, DO

## 2021-06-24 NOTE — CARE COORDINATION
Facility (SNF): [facility] Phone: [phone] Fax: [phone]    LOC at discharge: Skilled  [address] Completed: Yes    Notification completed in HENS/PAS?:  Yes : CM has completed HENS online through secure website for SNF admission at [facility]. Document ID #: [id number]    IMM Completed:   Not Indicated    Transportation:  Transportation PLAN for discharge: EMS transportation   Mode of Transport: [address]    Name of [address] [place] [address],[facility]: [address]  Phone: [phone]  Time of Transport: 1500    Transport form completed: Yes    Home Care:  [address] ordered at discharge: No  [address]: Not Applicable  Orders faxed: Yes    Durable Medical Equipment:  DME Provider: Ashanti Laguerre obtained during hospitalization: wheelchair      Referrals made at [facility] for outpatient continued care:  Not Applicable    Additional CM Notes: Referred to patient for d/c to SNF at [facility]. Patient and family notified and agreeable. Paperwork completed and faxed. Facility has obtained precert and is agreeable to accept patient today. [facility] to transport via wheelchair. Patient agreed to privately pay for transport. RN to call report. Paperwork completed and faxed. No other needs at this time. The Plan for Transition of Care is related to the following treatment goals of Acute CVA (cerebrovascular accident) [facility]) [I63.9]    The Patient and/or patient representative Edwin Middleton and her family were provided with a choice of provider and agrees with the discharge plan Yes    Freedom of choice list was provided with basic dialogue that supports the patient's individualized plan of care/goals and shares the quality data associated with the providers.  Yes    Care Transitions patient: No    USMAN Johnson, MASTER  [facility] [employer]  Case Management Department  Ph: [phone]

## 2021-06-24 NOTE — PROGRESS NOTES
Patient provided discharge teaching and paperwork. Patient reports no questions at this time. Report provided to nurse at [facility] phone number: [phone]. Patient transported via personal wheelchair with [place] transportation.

## 2021-06-24 NOTE — PROGRESS NOTES
Pt assessment and vitals completed. Medications administered as ordered. Pt repositioned and resting comfortably in bed. Will continue to monitor.

## 2021-06-24 NOTE — PLAN OF CARE
Problem: Pain:  Goal: Control of acute pain  Description: Control of acute pain  Outcome: Ongoing  Note: Pt voices pain needs appropriately, pain is assessed during shift. Problem: Falls - Risk of:  Goal: Will remain free from falls  Description: Will remain free from falls  6/24/2021 0007 by Pinky Polo RN  Outcome: Ongoing  Note: Client remains free from falls, bed/chair alarm in place, door open, encouraged to use call light for needs, call light is within reach, bed locked in lowest position,  Will continue to monitor. Problem: Skin Integrity:  Goal: Will show no infection signs and symptoms  Description: Will show no infection signs and symptoms  Outcome: Ongoing  Note: Surgical site observed for signs/symptoms of infection. Vitals performed routinely, labs observed. Will monitor pt while on ARU       Problem: Skin Integrity:  Goal: Absence of new skin breakdown  Description: Absence of new skin breakdown  6/24/2021 0008 by Pinky Polo RN  Outcome: Ongoing  Note: . See Mark scale. Encourage/assist pt to turn and reposition every two hours and as needed. Heels elevated off bed. Protective barrier placed as needed. Pillows used for positioning. Will continue to monitor for skin breakdown.

## 2021-06-24 NOTE — PROGRESS NOTES
Department of Physical Medicine & Rehabilitation  Progress Note    Patient Identification:  Shola Cid  [id number]  : 1970  Admit date: 2021    Chief Complaint: CVA    Subjective:   Possible discharge today. Will send to SNF if approved by insurance. Doing well today. Does have some pain in her left side. ROS: No f/c, n/v, cp     Objective:  Patient Vitals for the past 24 hrs:   BP Temp Temp src Pulse Resp SpO2   21 0803 113/61 97.4 °F (36.3 °C) Oral 66 16 95 %   21 2229 134/77 98.1 °F (36.7 °C) Oral 69 16 97 %     Const: Alert. No distress, pleasant. HEENT: Normocephalic, atraumatic. Normal sclera/conjunctiva. MMM. Incision healing well  CV: Regular rate and rhythm. Resp: No respiratory distress. Lungs CTAB. Abd: Soft, nontender, nondistended, NABS+   Ext: No edema. Neuro: Alert, oriented, appropriately interactive. Psych: Cooperative, appropriate mood and affect    Laboratory data: Available via EMR. Last 24 hour lab  No results found for this or any previous visit (from the past 24 hour(s)). Therapy progress:  PT  Position Activity Restriction  Other position/activity restrictions: Ambulate, Up with assist, Pt to wear helmet when OOB, OK to remove for shower, HOB to elevated at 30*  Objective     Sit to Stand: Contact guard assistance (Pt sit <> stand from [address] to cane with CGA and VC for set up)  Stand to sit: Contact guard assistance  Bed to Chair: Contact guard assistance (WC <> mat table with CGA via stand pivot transfer , VC for set up)  Device: Single point cane  Other Apparatus: AFO, Wheelchair follow, Left  Assistance: Minimal assistance (min A of 1 with WC follow)  Distance: 52 ft  OT  PT Equipment Recommendations  Equipment Needed: Yes  Mobility Devices: Wheelchair  Wheelchair: Light Weight  Other: Pt will require a 20\" hemiheight w/c with drop armrests and standard 90 degree legrest, and L arm trough  with pressure relieving cushion.   Pt is currently unable to safely ambulate household distances with use of RW or SPC, limiting her ability to access areas of home such as kitchen and bathroom. Due to being unable to access these areas, pt will be unable to perform ADL tasks. Use of a w/c will allow pt to access these areas. Pt has appropriate UB strength to propel w/c and has not expressed an unwillingness to use w/c at d/c. Toilet - Technique: Stand pivot  Equipment Used: Raised toilet seat with rails  Toilet Transfers Comments: cues to sequence pivot, use of GB upon stance  Assessment        SLP  Diet Solids Recommendation: Dysphagia Soft and Bite-Sized (Dysphagia III) (with regular solids as tolerated. Per pt request, gravy/sauces to keep for moist.)       Body mass index is 36.54 kg/m². Assessment and Plan:  Ruben Gaines a 46 y.o. female with PMH GERD p/w L sided weakness and facial droop.  CTA head/neck on 4/12 revealed occluded R cervical ICA, occluded right LEAH, near complete occlusion of right MCA.     Acute ischemic CVA, Subdural heomorrhage s/p right hemicraniectomy and subsequen SAH and IP hemorrhage  No tPA given.  MRI 4/14 showing large subacute infarct throughout R LEAH and MCA with some areas of hemorrhagic conversion.  - Neurosurgery and neurology following  - SBP <160, PRN hydralazine, scheduled Norvasc 5 mg daily  - Keppra 500 mg twice daily   - SQH  - PT/OT   - left sided hemiplegia      Aphasia - SLP   Depression - Home fluoxetine 20 mg daily at home  Anxiety - Hold home Ativan 0.5 mg every 8 hours PRN for anxiety  MISHA - Patient uses CPAP at home  GERD - omeprazole 40 mg daily at home- substituted for pepcid 20 mg BID  Obesity    Rehab Progress: Improving  Anticipated Dispo: home  Services/DME: TTB and RTS (neither covered by insurance, friend to obtain)  ELOS: - 6/26      Shefali Camacho D.O. M.P.H.  PM&R  6/24/2021  10:17 AM

## 2021-06-24 NOTE — PROGRESS NOTES
Occupational Therapy  Facility/Department: [facility] ACUTE REHAB UNIT  Daily Treatment Note  NAME: Colton Sutherland  : 1970  MRN: [id number]    Date of Service: 2021    Discharge Recommendations:  24 hour supervision or assist, Home with Home health OT, Continue to assess pending progress, Home with nursing aide  OT Equipment Recommendations  Other: continue to assess- defer to next level of care. Assessment   Performance deficits / Impairments: Decreased functional mobility ; Decreased ADL status; Decreased ROM; Decreased strength;Decreased sensation;Decreased fine motor control;Decreased endurance;Decreased posture;Decreased balance;Decreased safe awareness;Decreased coordination;Decreased vision/visual deficit; Decreased cognition  Assessment: Patient demonstrating emerging movements at shoulder and elbow, with contraction and partial movement detected in GE planes. Improved motor response with tapping; however, muscles continue to fatigue quickly. Utilizes synnergistic movement patterns with muscle activation. Would continue to benefit from OT services in order to maxmize functional use of LUE and increased independence with ADL tasks. Planning d/c to SNF later this afternoon. Treatment Diagnosis: decreased independence with ADLs and decreased functional mobility 2/2 CVA  Prognosis: Good  OT Education: Transfer Training;ADL Adaptive Strategies; Home Exercise Program  Patient Education: verb understanding  REQUIRES OT FOLLOW UP: Yes  Activity Tolerance  Activity Tolerance: Patient Tolerated treatment well  Safety Devices  Safety Devices in place: Yes  Type of devices: Call light within reach; Chair alarm in place; Left in chair;Nurse notified         Patient Diagnosis(es): The primary encounter diagnosis was Acute CVA (cerebrovascular accident) ([address] Diagnoses of Acute right MCA stroke ([address] and Status post craniectomy were also pertinent to this visit.       has a past medical history of GERD (gastroesophageal reflux disease), Hernia, Obesity, and Unspecified sleep apnea. has a past surgical history that includes Splenectomy (); Total hip arthroplasty (); Lap Band (); hernia repair ();  section (); and craniotomy (Right, 2021). Restrictions  Position Activity Restriction  Other position/activity restrictions: Ambulate, Up with assist, Pt to wear helmet when OOB, OK to remove for shower, HOB to elevated at 30*  Subjective   General  Chart Reviewed: Yes, Orders, Progress Notes  Patient assessed for rehabilitation services?: Yes  Additional Pertinent Hx: 46 y.o. female with hypertension and tobacco abuse who presented after spending a prolonged period on the ground s/p fall out of bed; found to have acute left hemiparesis and gaze deviation. CXR with no acute cardiopulmonary abnormality. CTH revealed large acute/subacute infarct in the R frontal lobe with associated mass effect and 4 mm R to L midline shift. It also noted subdural hemorrhage of 6mm along falx, although NSGY less convinced. CTA head and neck revealed occluded R cervical ICA & R anterior cerebral artery with near complete occlusion of R MCA. Outside window on TPA. Pt is now POD #1 following hemicraniectomy (). Pt admitted to ARU   Response to previous treatment: Patient with no complaints from previous session  Family / Caregiver Present: No  Referring Practitioner: Dr. Donny Ruiz DO  Diagnosis: CVA  Subjective  Subjective: Patient seated in w/c upon arrival, agreeable to OT services. Requesting assist with toileting. Reporting d/c planned this afternoon to SNF. General Comment  Comments: Matthew  Vital Signs  Patient Currently in Pain: Denies   Orientation  Orientation  Overall Orientation Status: Within Normal Limits  Objective    ADL  Grooming: Modified independent  (hand hygiene w/c level)  Toileting:  Moderate assistance (assist clothing management up/down over L hip despite v/c and increased time to complete)  Toilet Transfers  Toilet - Technique: Stand pivot  Equipment Used: Raised toilet seat with rails  Toilet Transfer: Contact guard assistance  Toilet Transfers Comments: with use of grab bars, v/c for increased pivot prior to descent to fully reach seat  Bed mobility  Supine to Sit: Modified independent  Sit to Supine: Modified independent  Transfers  Stand Pivot Transfers: Contact guard assistance   Type of ROM/Therapeutic Exercise  Comment: While supine, completed stretch to shoulder flexion, achieving ~ degrees and held 1-2 min. Moved into abduction, around 90 degrees, sustained 1-2min. Slight stretch for ER while humerus abducted to 90, but unable to tolerate more than 5 degrees passive stretch, sustained ~30s.   Exercises  Shoulder Flexion: AAROM to reduce effects of gravity, contraction and partial movement observed on 2/5 trials  Shoulder ABduction: AAROM in GE plane with friction reduced, contraction and slight movement on 3/5 trials  Horizontal ABduction: AAROM in gravity assisted plane x5 reps, supine, contraction on 4/5 reps  Horizontal ADduction: PROM, no contraction noted  Elbow Flexion: AAROM x5, supine with muscle tapping, able to complete to approx 60 degrees flexion with effort  Elbow Extension: AAROM x5, supine with increased time for tone to relax, minimal tricep activation detected  Supination: attempted AAROM with no contraction detected, x5 reps  Pronation: attempted AAROM with no contraction detected, x5 reps  Other: Wore resting hand splint during completion of shoulder/elbow exercises in order to promote sustained stretch and increased functional hand positioning during therex; positioned only 1 strap in order to maintain comfort within device     Plan   Plan  Times per week: 5x a week for 60 mins daily  Times per day: Daily  Current Treatment Recommendations: Strengthening, Endurance Training, Neuromuscular Re-education, Self-Care / ADL, Functional Mobility Training, Safety Education & Training, ROM, Positioning, Wheelchair Mobility Training, Balance Training, Patient/Caregiver Education & Training, Manual Therapy:  MLD, Equipment Evaluation, Education, & procurement, Home Management Training, Cognitive Reorientation    Goals  Short term goals  Time Frame for Short term goals: 2 weeks  Short term goal 1: Pt will complete toilet transfer w/ Mod A-goal met 5/4  Short term goal 2: Pt will complete UE dressing w/ Mod A-goal met 5/12  Short term goal 3: Pt will complete oral care w/ spvn seated at sink w/o VCs for L sided attention-goal met 5/17  Short term goal 4: Pt will complete LE dressing w/ Mod A-goal met 5/26  Long term goals  Time Frame for Long term goals : 4 weeks  Long term goal 1: Pt will complete toilet transfer w/ SBA- Not met  Long term goal 2: Pt will complete UE dressing w/ setup-ongoing - not met  Long term goal 3: Pt will complete LE dressing w/ SBA-ongoing- not met  Long term goal 4: Pt will be independent w/ tone management exercises to improve functional use of LUE- Not met  Long term goal 5: Pt will complete oral care I'ly-goal met 5/24  Patient Goals   Patient goals : \"get back to my normal self\"       Therapy Time   Individual Concurrent Group Co-treatment   Time In 1245         Time Out 1345         Minutes [address], OTR/L #[id number]

## 2021-06-30 ENCOUNTER — NURSE ONLY (OUTPATIENT)
Dept: CARDIOLOGY CLINIC | Age: 51
End: 2021-06-30

## 2021-06-30 DIAGNOSIS — Z45.09 ENCOUNTER FOR LOOP RECORDER CHECK: ICD-10-CM

## 2021-06-30 NOTE — PROGRESS NOTES
Remote transmission received for patients ILR. EP physician will review. See interrogation under the cardiology tab in the [address] [facility] [place] [address] field for more details. Will continue to monitor remotely. ILR for cryptogenic stroke. No AFib recorded to date. Presenting rhythm w/ noisy baseline. No new arrhythmias/events recorded.

## 2021-08-04 ENCOUNTER — NURSE ONLY (OUTPATIENT)
Dept: CARDIOLOGY CLINIC | Age: 51
End: 2021-08-04
Payer: COMMERCIAL

## 2021-08-04 DIAGNOSIS — I63.9 ACUTE CVA (CEREBROVASCULAR ACCIDENT) (HCC): ICD-10-CM

## 2021-08-04 DIAGNOSIS — Z45.09 ENCOUNTER FOR LOOP RECORDER CHECK: ICD-10-CM

## 2021-08-05 PROCEDURE — 93298 REM INTERROG DEV EVAL SCRMS: CPT | Performed by: INTERNAL MEDICINE

## 2021-08-05 PROCEDURE — G2066 INTER DEVC REMOTE 30D: HCPCS | Performed by: INTERNAL MEDICINE

## 2021-08-05 NOTE — PROGRESS NOTES
ILR for cryptogenic stroke. No AFib recorded to date. Remote transmission received for patients ILR. No new arrhythmias/events recorded. EP physician will review. See interrogation under the cardiology tab in the [address] [place] [address] [address] field for more details. Will continue to monitor remotely.

## 2021-08-30 ENCOUNTER — TELEPHONE (OUTPATIENT)
Dept: ORTHOPEDIC SURGERY | Age: 51
End: 2021-08-30

## 2021-08-30 NOTE — TELEPHONE ENCOUNTER
Called and spoke to patient. Dr Eric Membreno would be happy to see her again. She will call for any questions.

## 2021-08-31 RX ORDER — OXYCODONE HYDROCHLORIDE AND ACETAMINOPHEN 5; 325 MG/1; MG/1
1 TABLET ORAL EVERY 4 HOURS PRN
COMMUNITY

## 2021-08-31 NOTE — PROGRESS NOTES
8/31/21 1212pm Requested orders from Dr. Mable Sanabria office, spoke with Peng Jackman and she will send. -db      8/31/21 1212pm She also said patient is having covid testing done 8/31/21 at [facility]. -db    8/31/21 1240pm with PAT call patient states she has been fully vaccinated with covid vaccine last dose 8/14/2021 can not remember the name of,however. -db

## 2021-09-02 ENCOUNTER — ANESTHESIA EVENT (OUTPATIENT)
Dept: OPERATING ROOM | Age: 51
DRG: 516 | End: 2021-09-02
Payer: COMMERCIAL

## 2021-09-03 ENCOUNTER — ANESTHESIA (OUTPATIENT)
Dept: OPERATING ROOM | Age: 51
DRG: 516 | End: 2021-09-03
Payer: COMMERCIAL

## 2021-09-03 ENCOUNTER — HOSPITAL ENCOUNTER (INPATIENT)
Age: 51
LOS: 1 days | Discharge: HOME | DRG: 516 | End: 2021-09-04
Attending: NEUROLOGICAL SURGERY | Admitting: NEUROLOGICAL SURGERY
Payer: COMMERCIAL

## 2021-09-03 VITALS — DIASTOLIC BLOOD PRESSURE: 124 MMHG | SYSTOLIC BLOOD PRESSURE: 208 MMHG | TEMPERATURE: 96.6 F | OXYGEN SATURATION: 99 %

## 2021-09-03 PROBLEM — M95.2 ACQUIRED DEFORMITY OF SKULL: Status: ACTIVE | Noted: 2021-09-03

## 2021-09-03 LAB
ABO/RH: NORMAL
ANTIBODY SCREEN: NORMAL
APTT: 34.1 SEC (ref 26.2–38.6)
INR BLD: 0.97 (ref 0.88–1.12)
PREGNANCY, URINE: NEGATIVE
PROTHROMBIN TIME: 11 SEC (ref 9.9–12.7)

## 2021-09-03 PROCEDURE — 2580000003 HC RX 258: Performed by: NURSE ANESTHETIST, CERTIFIED REGISTERED

## 2021-09-03 PROCEDURE — 2720000010 HC SURG SUPPLY STERILE: Performed by: NEUROLOGICAL SURGERY

## 2021-09-03 PROCEDURE — 6360000002 HC RX W HCPCS: Performed by: NEUROLOGICAL SURGERY

## 2021-09-03 PROCEDURE — 85730 THROMBOPLASTIN TIME PARTIAL: CPT

## 2021-09-03 PROCEDURE — 3700000001 HC ADD 15 MINUTES (ANESTHESIA): Performed by: NEUROLOGICAL SURGERY

## 2021-09-03 PROCEDURE — 2500000003 HC RX 250 WO HCPCS: Performed by: NURSE ANESTHETIST, CERTIFIED REGISTERED

## 2021-09-03 PROCEDURE — 2580000003 HC RX 258: Performed by: NEUROLOGICAL SURGERY

## 2021-09-03 PROCEDURE — 85610 PROTHROMBIN TIME: CPT

## 2021-09-03 PROCEDURE — 6360000002 HC RX W HCPCS: Performed by: NURSE ANESTHETIST, CERTIFIED REGISTERED

## 2021-09-03 PROCEDURE — 6360000002 HC RX W HCPCS: Performed by: NURSE PRACTITIONER

## 2021-09-03 PROCEDURE — 84703 CHORIONIC GONADOTROPIN ASSAY: CPT

## 2021-09-03 PROCEDURE — C1729 CATH, DRAINAGE: HCPCS | Performed by: NEUROLOGICAL SURGERY

## 2021-09-03 PROCEDURE — C1713 ANCHOR/SCREW BN/BN,TIS/BN: HCPCS | Performed by: NEUROLOGICAL SURGERY

## 2021-09-03 PROCEDURE — 2580000003 HC RX 258: Performed by: ANESTHESIOLOGY

## 2021-09-03 PROCEDURE — 86850 RBC ANTIBODY SCREEN: CPT

## 2021-09-03 PROCEDURE — 86900 BLOOD TYPING SEROLOGIC ABO: CPT

## 2021-09-03 PROCEDURE — 7100000001 HC PACU RECOVERY - ADDTL 15 MIN: Performed by: NEUROLOGICAL SURGERY

## 2021-09-03 PROCEDURE — 3600000004 HC SURGERY LEVEL 4 BASE: Performed by: NEUROLOGICAL SURGERY

## 2021-09-03 PROCEDURE — 1200000000 HC SEMI PRIVATE

## 2021-09-03 PROCEDURE — 0NR00KZ REPLACEMENT OF SKULL WITH NONAUTOLOGOUS TISSUE SUBSTITUTE, OPEN APPROACH: ICD-10-PCS | Performed by: NEUROLOGICAL SURGERY

## 2021-09-03 PROCEDURE — 2500000003 HC RX 250 WO HCPCS: Performed by: NEUROLOGICAL SURGERY

## 2021-09-03 PROCEDURE — 6360000002 HC RX W HCPCS: Performed by: STUDENT IN AN ORGANIZED HEALTH CARE EDUCATION/TRAINING PROGRAM

## 2021-09-03 PROCEDURE — 2709999900 HC NON-CHARGEABLE SUPPLY: Performed by: NEUROLOGICAL SURGERY

## 2021-09-03 PROCEDURE — 3700000000 HC ANESTHESIA ATTENDED CARE: Performed by: NEUROLOGICAL SURGERY

## 2021-09-03 PROCEDURE — 86901 BLOOD TYPING SEROLOGIC RH(D): CPT

## 2021-09-03 PROCEDURE — 7100000000 HC PACU RECOVERY - FIRST 15 MIN: Performed by: NEUROLOGICAL SURGERY

## 2021-09-03 PROCEDURE — 3600000014 HC SURGERY LEVEL 4 ADDTL 15MIN: Performed by: NEUROLOGICAL SURGERY

## 2021-09-03 PROCEDURE — 6370000000 HC RX 637 (ALT 250 FOR IP): Performed by: NURSE PRACTITIONER

## 2021-09-03 DEVICE — BOX PLATE, SMALL
Type: IMPLANTABLE DEVICE | Status: FUNCTIONAL
Brand: UNIVERSAL NEURO 3

## 2021-09-03 DEVICE — LOW PROFILE BURR HOLE COVER, W/TAB, 10MM
Type: IMPLANTABLE DEVICE | Status: FUNCTIONAL
Brand: UNIVERSAL NEURO 2

## 2021-09-03 DEVICE — SCREW UN3 SLFTP 1.5X4MM: Type: IMPLANTABLE DEVICE | Status: FUNCTIONAL

## 2021-09-03 RX ORDER — OXYCODONE HYDROCHLORIDE AND ACETAMINOPHEN 5; 325 MG/1; MG/1
1 TABLET ORAL PRN
Status: DISCONTINUED | OUTPATIENT
Start: 2021-09-03 | End: 2021-09-03 | Stop reason: HOSPADM

## 2021-09-03 RX ORDER — TIZANIDINE 4 MG/1
2 TABLET ORAL 2 TIMES DAILY
Status: DISCONTINUED | OUTPATIENT
Start: 2021-09-03 | End: 2021-09-04 | Stop reason: HOSPADM

## 2021-09-03 RX ORDER — LEVETIRACETAM 500 MG/1
500 TABLET ORAL 2 TIMES DAILY
Status: DISCONTINUED | OUTPATIENT
Start: 2021-09-03 | End: 2021-09-04 | Stop reason: HOSPADM

## 2021-09-03 RX ORDER — ONDANSETRON 2 MG/ML
4 INJECTION INTRAMUSCULAR; INTRAVENOUS
Status: DISCONTINUED | OUTPATIENT
Start: 2021-09-03 | End: 2021-09-03 | Stop reason: HOSPADM

## 2021-09-03 RX ORDER — PROPOFOL 10 MG/ML
INJECTION, EMULSION INTRAVENOUS PRN
Status: DISCONTINUED | OUTPATIENT
Start: 2021-09-03 | End: 2021-09-03 | Stop reason: SDUPTHER

## 2021-09-03 RX ORDER — OXYCODONE HYDROCHLORIDE 5 MG/1
5 TABLET ORAL EVERY 4 HOURS PRN
Status: DISCONTINUED | OUTPATIENT
Start: 2021-09-03 | End: 2021-09-04 | Stop reason: SDUPTHER

## 2021-09-03 RX ORDER — HYDRALAZINE HYDROCHLORIDE 20 MG/ML
5 INJECTION INTRAMUSCULAR; INTRAVENOUS EVERY 10 MIN PRN
Status: DISCONTINUED | OUTPATIENT
Start: 2021-09-03 | End: 2021-09-03 | Stop reason: HOSPADM

## 2021-09-03 RX ORDER — ATORVASTATIN CALCIUM 80 MG/1
80 TABLET, FILM COATED ORAL NIGHTLY
Status: DISCONTINUED | OUTPATIENT
Start: 2021-09-03 | End: 2021-09-04 | Stop reason: HOSPADM

## 2021-09-03 RX ORDER — DIPHENHYDRAMINE HYDROCHLORIDE 50 MG/ML
12.5 INJECTION INTRAMUSCULAR; INTRAVENOUS
Status: DISCONTINUED | OUTPATIENT
Start: 2021-09-03 | End: 2021-09-03 | Stop reason: HOSPADM

## 2021-09-03 RX ORDER — METOPROLOL TARTRATE 5 MG/5ML
INJECTION INTRAVENOUS PRN
Status: DISCONTINUED | OUTPATIENT
Start: 2021-09-03 | End: 2021-09-03 | Stop reason: SDUPTHER

## 2021-09-03 RX ORDER — FENTANYL CITRATE 50 UG/ML
50 INJECTION, SOLUTION INTRAMUSCULAR; INTRAVENOUS EVERY 5 MIN PRN
Status: DISCONTINUED | OUTPATIENT
Start: 2021-09-03 | End: 2021-09-03 | Stop reason: HOSPADM

## 2021-09-03 RX ORDER — OXYCODONE HYDROCHLORIDE 5 MG/1
10 TABLET ORAL EVERY 4 HOURS PRN
Status: DISCONTINUED | OUTPATIENT
Start: 2021-09-03 | End: 2021-09-04 | Stop reason: SDUPTHER

## 2021-09-03 RX ORDER — SODIUM CHLORIDE, SODIUM LACTATE, POTASSIUM CHLORIDE, CALCIUM CHLORIDE 600; 310; 30; 20 MG/100ML; MG/100ML; MG/100ML; MG/100ML
INJECTION, SOLUTION INTRAVENOUS CONTINUOUS
Status: DISCONTINUED | OUTPATIENT
Start: 2021-09-03 | End: 2021-09-03

## 2021-09-03 RX ORDER — FENTANYL CITRATE 50 UG/ML
INJECTION, SOLUTION INTRAMUSCULAR; INTRAVENOUS PRN
Status: DISCONTINUED | OUTPATIENT
Start: 2021-09-03 | End: 2021-09-03 | Stop reason: SDUPTHER

## 2021-09-03 RX ORDER — DEXAMETHASONE SODIUM PHOSPHATE 4 MG/ML
INJECTION, SOLUTION INTRA-ARTICULAR; INTRALESIONAL; INTRAMUSCULAR; INTRAVENOUS; SOFT TISSUE PRN
Status: DISCONTINUED | OUTPATIENT
Start: 2021-09-03 | End: 2021-09-03 | Stop reason: SDUPTHER

## 2021-09-03 RX ORDER — SODIUM CHLORIDE 9 MG/ML
INJECTION, SOLUTION INTRAVENOUS CONTINUOUS
Status: DISCONTINUED | OUTPATIENT
Start: 2021-09-03 | End: 2021-09-03

## 2021-09-03 RX ORDER — SODIUM CHLORIDE, SODIUM LACTATE, POTASSIUM CHLORIDE, AND CALCIUM CHLORIDE .6; .31; .03; .02 G/100ML; G/100ML; G/100ML; G/100ML
IRRIGANT IRRIGATION PRN
Status: DISCONTINUED | OUTPATIENT
Start: 2021-09-03 | End: 2021-09-03 | Stop reason: ALTCHOICE

## 2021-09-03 RX ORDER — GABAPENTIN 300 MG/1
300 CAPSULE ORAL 3 TIMES DAILY
COMMUNITY

## 2021-09-03 RX ORDER — GABAPENTIN 300 MG/1
300 CAPSULE ORAL 3 TIMES DAILY
Status: DISCONTINUED | OUTPATIENT
Start: 2021-09-03 | End: 2021-09-04 | Stop reason: HOSPADM

## 2021-09-03 RX ORDER — FENTANYL CITRATE 50 UG/ML
25 INJECTION, SOLUTION INTRAMUSCULAR; INTRAVENOUS EVERY 5 MIN PRN
Status: DISCONTINUED | OUTPATIENT
Start: 2021-09-03 | End: 2021-09-03 | Stop reason: HOSPADM

## 2021-09-03 RX ORDER — SODIUM CHLORIDE 9 MG/ML
25 INJECTION, SOLUTION INTRAVENOUS PRN
Status: DISCONTINUED | OUTPATIENT
Start: 2021-09-03 | End: 2021-09-03 | Stop reason: HOSPADM

## 2021-09-03 RX ORDER — FLUOXETINE HYDROCHLORIDE 20 MG/1
20 CAPSULE ORAL DAILY
Status: DISCONTINUED | OUTPATIENT
Start: 2021-09-03 | End: 2021-09-04 | Stop reason: HOSPADM

## 2021-09-03 RX ORDER — AMLODIPINE BESYLATE 5 MG/1
5 TABLET ORAL DAILY
Status: DISCONTINUED | OUTPATIENT
Start: 2021-09-04 | End: 2021-09-04 | Stop reason: HOSPADM

## 2021-09-03 RX ORDER — ACETAMINOPHEN 500 MG
1000 TABLET ORAL EVERY 6 HOURS
Status: DISCONTINUED | OUTPATIENT
Start: 2021-09-03 | End: 2021-09-04 | Stop reason: HOSPADM

## 2021-09-03 RX ORDER — PANTOPRAZOLE SODIUM 40 MG/1
40 TABLET, DELAYED RELEASE ORAL
Status: DISCONTINUED | OUTPATIENT
Start: 2021-09-04 | End: 2021-09-04 | Stop reason: HOSPADM

## 2021-09-03 RX ORDER — ONDANSETRON 2 MG/ML
INJECTION INTRAMUSCULAR; INTRAVENOUS PRN
Status: DISCONTINUED | OUTPATIENT
Start: 2021-09-03 | End: 2021-09-03 | Stop reason: SDUPTHER

## 2021-09-03 RX ORDER — SODIUM CHLORIDE 0.9 % (FLUSH) 0.9 %
10 SYRINGE (ML) INJECTION EVERY 12 HOURS SCHEDULED
Status: DISCONTINUED | OUTPATIENT
Start: 2021-09-03 | End: 2021-09-03 | Stop reason: HOSPADM

## 2021-09-03 RX ORDER — HYDRALAZINE HYDROCHLORIDE 20 MG/ML
INJECTION INTRAMUSCULAR; INTRAVENOUS PRN
Status: DISCONTINUED | OUTPATIENT
Start: 2021-09-03 | End: 2021-09-03 | Stop reason: SDUPTHER

## 2021-09-03 RX ORDER — LIDOCAINE HYDROCHLORIDE AND EPINEPHRINE 10; 10 MG/ML; UG/ML
INJECTION, SOLUTION INFILTRATION; PERINEURAL PRN
Status: DISCONTINUED | OUTPATIENT
Start: 2021-09-03 | End: 2021-09-03 | Stop reason: ALTCHOICE

## 2021-09-03 RX ORDER — LIDOCAINE HYDROCHLORIDE 20 MG/ML
INJECTION, SOLUTION EPIDURAL; INFILTRATION; INTRACAUDAL; PERINEURAL PRN
Status: DISCONTINUED | OUTPATIENT
Start: 2021-09-03 | End: 2021-09-03 | Stop reason: SDUPTHER

## 2021-09-03 RX ORDER — OXYCODONE HYDROCHLORIDE AND ACETAMINOPHEN 5; 325 MG/1; MG/1
2 TABLET ORAL PRN
Status: DISCONTINUED | OUTPATIENT
Start: 2021-09-03 | End: 2021-09-03 | Stop reason: HOSPADM

## 2021-09-03 RX ORDER — PROCHLORPERAZINE EDISYLATE 5 MG/ML
5 INJECTION INTRAMUSCULAR; INTRAVENOUS
Status: DISCONTINUED | OUTPATIENT
Start: 2021-09-03 | End: 2021-09-03 | Stop reason: HOSPADM

## 2021-09-03 RX ORDER — LABETALOL HYDROCHLORIDE 5 MG/ML
5 INJECTION, SOLUTION INTRAVENOUS EVERY 10 MIN PRN
Status: DISCONTINUED | OUTPATIENT
Start: 2021-09-03 | End: 2021-09-03 | Stop reason: HOSPADM

## 2021-09-03 RX ORDER — ROCURONIUM BROMIDE 10 MG/ML
INJECTION, SOLUTION INTRAVENOUS PRN
Status: DISCONTINUED | OUTPATIENT
Start: 2021-09-03 | End: 2021-09-03 | Stop reason: SDUPTHER

## 2021-09-03 RX ORDER — SODIUM CHLORIDE 0.9 % (FLUSH) 0.9 %
10 SYRINGE (ML) INJECTION PRN
Status: DISCONTINUED | OUTPATIENT
Start: 2021-09-03 | End: 2021-09-03 | Stop reason: HOSPADM

## 2021-09-03 RX ADMIN — DEXMEDETOMIDINE HYDROCHLORIDE 75 MCG: 100 INJECTION, SOLUTION INTRAVENOUS at 15:07

## 2021-09-03 RX ADMIN — SODIUM CHLORIDE, POTASSIUM CHLORIDE, SODIUM LACTATE AND CALCIUM CHLORIDE: 600; 310; 30; 20 INJECTION, SOLUTION INTRAVENOUS at 11:10

## 2021-09-03 RX ADMIN — BISACODYL 5 MG: 5 TABLET, COATED ORAL at 22:06

## 2021-09-03 RX ADMIN — LEVETIRACETAM 500 MG: 500 TABLET ORAL at 22:07

## 2021-09-03 RX ADMIN — DEXAMETHASONE SODIUM PHOSPHATE 8 MG: 4 INJECTION, SOLUTION INTRAMUSCULAR; INTRAVENOUS at 13:27

## 2021-09-03 RX ADMIN — SODIUM CHLORIDE, POTASSIUM CHLORIDE, SODIUM LACTATE AND CALCIUM CHLORIDE: 600; 310; 30; 20 INJECTION, SOLUTION INTRAVENOUS at 14:25

## 2021-09-03 RX ADMIN — GABAPENTIN 300 MG: 300 CAPSULE ORAL at 22:07

## 2021-09-03 RX ADMIN — METOPROLOL TARTRATE 2.5 MG: 5 INJECTION INTRAVENOUS at 14:49

## 2021-09-03 RX ADMIN — CEFAZOLIN 2000 MG: 10 INJECTION, POWDER, FOR SOLUTION INTRAVENOUS at 13:11

## 2021-09-03 RX ADMIN — HYDROMORPHONE HYDROCHLORIDE 0.5 MG: 1 INJECTION, SOLUTION INTRAMUSCULAR; INTRAVENOUS; SUBCUTANEOUS at 17:38

## 2021-09-03 RX ADMIN — ROCURONIUM BROMIDE 70 MG: 10 INJECTION INTRAVENOUS at 13:01

## 2021-09-03 RX ADMIN — PROPOFOL 50 MG: 10 INJECTION, EMULSION INTRAVENOUS at 13:57

## 2021-09-03 RX ADMIN — HYDRALAZINE HYDROCHLORIDE 10 MG: 20 INJECTION INTRAMUSCULAR; INTRAVENOUS at 14:48

## 2021-09-03 RX ADMIN — PROPOFOL 150 MG: 10 INJECTION, EMULSION INTRAVENOUS at 13:54

## 2021-09-03 RX ADMIN — FLUOXETINE 20 MG: 20 CAPSULE ORAL at 22:07

## 2021-09-03 RX ADMIN — LIDOCAINE HYDROCHLORIDE 80 MG: 20 INJECTION, SOLUTION EPIDURAL; INFILTRATION; INTRACAUDAL; PERINEURAL at 13:01

## 2021-09-03 RX ADMIN — ROCURONIUM BROMIDE 30 MG: 10 INJECTION INTRAVENOUS at 13:39

## 2021-09-03 RX ADMIN — FENTANYL CITRATE 50 MCG: 50 INJECTION, SOLUTION INTRAMUSCULAR; INTRAVENOUS at 14:37

## 2021-09-03 RX ADMIN — METOPROLOL TARTRATE 2.5 MG: 5 INJECTION INTRAVENOUS at 14:59

## 2021-09-03 RX ADMIN — ACETAMINOPHEN 1000 MG: 500 TABLET ORAL at 22:06

## 2021-09-03 RX ADMIN — FENTANYL CITRATE 100 MCG: 50 INJECTION, SOLUTION INTRAMUSCULAR; INTRAVENOUS at 12:57

## 2021-09-03 RX ADMIN — TIZANIDINE 2 MG: 4 TABLET ORAL at 22:07

## 2021-09-03 RX ADMIN — PROPOFOL 170 MG: 10 INJECTION, EMULSION INTRAVENOUS at 13:01

## 2021-09-03 RX ADMIN — FENTANYL CITRATE 50 MCG: 50 INJECTION, SOLUTION INTRAMUSCULAR; INTRAVENOUS at 14:43

## 2021-09-03 RX ADMIN — SODIUM CHLORIDE, POTASSIUM CHLORIDE, SODIUM LACTATE AND CALCIUM CHLORIDE: 600; 310; 30; 20 INJECTION, SOLUTION INTRAVENOUS at 12:56

## 2021-09-03 RX ADMIN — ONDANSETRON 4 MG: 2 INJECTION INTRAMUSCULAR; INTRAVENOUS at 13:27

## 2021-09-03 RX ADMIN — SUGAMMADEX 200 MG: 100 INJECTION, SOLUTION INTRAVENOUS at 14:54

## 2021-09-03 RX ADMIN — HYDROMORPHONE HYDROCHLORIDE 0.5 MG: 1 INJECTION, SOLUTION INTRAMUSCULAR; INTRAVENOUS; SUBCUTANEOUS at 18:38

## 2021-09-03 RX ADMIN — ATORVASTATIN CALCIUM 80 MG: 80 TABLET, FILM COATED ORAL at 22:07

## 2021-09-03 RX ADMIN — OXYCODONE 10 MG: 5 TABLET ORAL at 22:06

## 2021-09-03 RX ADMIN — HYDRALAZINE HYDROCHLORIDE 10 MG: 20 INJECTION INTRAMUSCULAR; INTRAVENOUS at 13:58

## 2021-09-03 RX ADMIN — FENTANYL CITRATE 50 MCG: 50 INJECTION, SOLUTION INTRAMUSCULAR; INTRAVENOUS at 15:42

## 2021-09-03 RX ADMIN — FENTANYL CITRATE 50 MCG: 50 INJECTION, SOLUTION INTRAMUSCULAR; INTRAVENOUS at 16:36

## 2021-09-03 RX ADMIN — HYDROMORPHONE HYDROCHLORIDE 0.5 MG: 1 INJECTION, SOLUTION INTRAMUSCULAR; INTRAVENOUS; SUBCUTANEOUS at 23:32

## 2021-09-03 ASSESSMENT — PULMONARY FUNCTION TESTS
PIF_VALUE: 18
PIF_VALUE: 19
PIF_VALUE: 18
PIF_VALUE: 16
PIF_VALUE: 24
PIF_VALUE: 19
PIF_VALUE: 18
PIF_VALUE: 1
PIF_VALUE: 17
PIF_VALUE: 21
PIF_VALUE: 18
PIF_VALUE: 20
PIF_VALUE: 1
PIF_VALUE: 17
PIF_VALUE: 19
PIF_VALUE: 2
PIF_VALUE: 18
PIF_VALUE: 19
PIF_VALUE: 17
PIF_VALUE: 16
PIF_VALUE: 3
PIF_VALUE: 19
PIF_VALUE: 19
PIF_VALUE: 18
PIF_VALUE: 23
PIF_VALUE: 18
PIF_VALUE: 18
PIF_VALUE: 1
PIF_VALUE: 20
PIF_VALUE: 18
PIF_VALUE: 1
PIF_VALUE: 2
PIF_VALUE: 18
PIF_VALUE: 18
PIF_VALUE: 19
PIF_VALUE: 18
PIF_VALUE: 24
PIF_VALUE: 20
PIF_VALUE: 18
PIF_VALUE: 17
PIF_VALUE: 18
PIF_VALUE: 18
PIF_VALUE: 17
PIF_VALUE: 22
PIF_VALUE: 18
PIF_VALUE: 19
PIF_VALUE: 6
PIF_VALUE: 1
PIF_VALUE: 18
PIF_VALUE: 18
PIF_VALUE: 19
PIF_VALUE: 19
PIF_VALUE: 16
PIF_VALUE: 23
PIF_VALUE: 18
PIF_VALUE: 20
PIF_VALUE: 18
PIF_VALUE: 1
PIF_VALUE: 18
PIF_VALUE: 18
PIF_VALUE: 1
PIF_VALUE: 18
PIF_VALUE: 21
PIF_VALUE: 17
PIF_VALUE: 14
PIF_VALUE: 18
PIF_VALUE: 18
PIF_VALUE: 16
PIF_VALUE: 18
PIF_VALUE: 16
PIF_VALUE: 2
PIF_VALUE: 24
PIF_VALUE: 19
PIF_VALUE: 16
PIF_VALUE: 18
PIF_VALUE: 20
PIF_VALUE: 18
PIF_VALUE: 4
PIF_VALUE: 18
PIF_VALUE: 18
PIF_VALUE: 20
PIF_VALUE: 19
PIF_VALUE: 23
PIF_VALUE: 20
PIF_VALUE: 18
PIF_VALUE: 18
PIF_VALUE: 0
PIF_VALUE: 19
PIF_VALUE: 18
PIF_VALUE: 14
PIF_VALUE: 18
PIF_VALUE: 3
PIF_VALUE: 39
PIF_VALUE: 18
PIF_VALUE: 17
PIF_VALUE: 19
PIF_VALUE: 18
PIF_VALUE: 17
PIF_VALUE: 18
PIF_VALUE: 16
PIF_VALUE: 18
PIF_VALUE: 17
PIF_VALUE: 18
PIF_VALUE: 19
PIF_VALUE: 19

## 2021-09-03 ASSESSMENT — PAIN SCALES - GENERAL
PAINLEVEL_OUTOF10: 0
PAINLEVEL_OUTOF10: 8
PAINLEVEL_OUTOF10: 10
PAINLEVEL_OUTOF10: 8
PAINLEVEL_OUTOF10: 10
PAINLEVEL_OUTOF10: 10
PAINLEVEL_OUTOF10: 3
PAINLEVEL_OUTOF10: 7
PAINLEVEL_OUTOF10: 10
PAINLEVEL_OUTOF10: 8

## 2021-09-03 ASSESSMENT — PAIN DESCRIPTION - LOCATION
LOCATION: HEAD

## 2021-09-03 ASSESSMENT — PAIN DESCRIPTION - PAIN TYPE
TYPE: SURGICAL PAIN

## 2021-09-03 ASSESSMENT — PAIN DESCRIPTION - DESCRIPTORS
DESCRIPTORS: ACHING
DESCRIPTORS: BURNING;ACHING
DESCRIPTORS: ACHING
DESCRIPTORS: ACHING
DESCRIPTORS: SHARP
DESCRIPTORS: SHARP

## 2021-09-03 ASSESSMENT — PAIN DESCRIPTION - ONSET
ONSET: ON-GOING

## 2021-09-03 ASSESSMENT — PAIN DESCRIPTION - PROGRESSION
CLINICAL_PROGRESSION: NOT CHANGED

## 2021-09-03 ASSESSMENT — PAIN DESCRIPTION - FREQUENCY
FREQUENCY: CONTINUOUS

## 2021-09-03 ASSESSMENT — PAIN - FUNCTIONAL ASSESSMENT
PAIN_FUNCTIONAL_ASSESSMENT: PREVENTS OR INTERFERES SOME ACTIVE ACTIVITIES AND ADLS
PAIN_FUNCTIONAL_ASSESSMENT: ACTIVITIES ARE NOT PREVENTED
PAIN_FUNCTIONAL_ASSESSMENT: PREVENTS OR INTERFERES SOME ACTIVE ACTIVITIES AND ADLS
PAIN_FUNCTIONAL_ASSESSMENT: 0-10

## 2021-09-03 ASSESSMENT — PAIN DESCRIPTION - ORIENTATION
ORIENTATION: RIGHT
ORIENTATION: RIGHT

## 2021-09-03 NOTE — FLOWSHEET NOTE
Pt c/o pain 10/10 in head. PRN pain medication given. See MAR. Pt , Ceci Myke called and updated on pt status and made aware pt is waiting for room.

## 2021-09-03 NOTE — FLOWSHEET NOTE
Pt has room that is currently being cleaned. Tried to call report and unit secretary said has not yet been assigned a nurse. Charge RN left her phone at desk per . Will try to call again. Pt , Heaven Patel at bedside. Heaven Patel given pt room number.

## 2021-09-03 NOTE — ANESTHESIA PRE PROCEDURE
Department of Anesthesiology  Preprocedure Note       Name:  Jacqueline Valderrama   Age:  46 y.o.  :  1970                                          MRN:  [id number]         Date:  9/3/2021      Surgeon: Ildefonso Gregory):  Romie Lancaster MD    Procedure: Procedure(s):  RIGHT SKULL FLAP REPLACEMENT    Medications prior to admission:   Prior to Admission medications    Medication Sig Start Date End Date Taking? Authorizing Provider   gabapentin (NEURONTIN) 300 MG capsule Take 300 mg by mouth 3 times daily. Yes Historical Provider, MD   oxyCODONE-acetaminophen (PERCOCET) 5-325 MG per tablet Take 1 tablet by mouth every 4 hours as needed for Pain. Yes Historical Provider, MD   gabapentin (NEURONTIN) 300 MG capsule Take 1 capsule by mouth 3 times daily for 30 days.  21 Yes Chirag Frye DO   levETIRAcetam (KEPPRA) 500 MG tablet Take 1 tablet by mouth 2 times daily 21  Yes Chirag Frye DO   atorvastatin (LIPITOR) 80 MG tablet Take 1 tablet by mouth nightly 21  Yes Chirag Frye DO   amLODIPine (NORVASC) 5 MG tablet Take 1 tablet by mouth daily 21  Yes Chirag Frye DO   diclofenac sodium (VOLTAREN) 1 % GEL Apply 4 g topically 2 times daily 21  Yes Chirag Frye DO   bisacodyl (DULCOLAX) 5 MG EC tablet Take 1 tablet by mouth daily 21  Yes Chirag Frye DO   dextran 70-hypromellose (TEARS NATURALE) 0.1-0.3 % SOLN opthalmic solution Place 1 drop into both eyes as needed (dry eyes) 21  Yes Chirag Frye DO   tiZANidine (ZANAFLEX) 2 MG tablet Take 1 tablet by mouth 2 times daily 21  Yes Chirag Frye DO   thiamine 100 MG tablet Take 1 tablet by mouth daily 21  Yes Chirag Frye DO   omeprazole (PRILOSEC) 20 MG delayed release capsule Take 40 mg by mouth daily   Yes Historical Provider, MD   FLUoxetine (PROZAC) 20 MG capsule Take 20 mg by mouth daily   Yes Historical Provider, MD   aspirin 81 MG chewable tablet Take 1 tablet by mouth daily 21 Right 2021    RIGHT HEMICRANIECTOMY performed by Keith Hernandez MD at [address]      INSERTABLE CARDIAC MONITOR      LAP BAND      SPLENECTOMY      TOTAL HIP ARTHROPLASTY Left        Social History:    Social History     Tobacco Use    Smoking status: Former Smoker     Packs/day: 1.00     Years: 20.00     Pack years: 20.00     Quit date: 2021     Years since quittin.3    Smokeless tobacco: Never Used   Substance Use Topics    Alcohol use: Not Currently                                Counseling given: Not Answered      Vital Signs (Current):   Vitals:    21 1222 21 1041   BP:  131/87   Pulse:  75   Resp:  16   Temp:  98.2 °F (36.8 °C)   TempSrc:  Oral   SpO2:  96%   Weight: 220 lb (99.8 kg) 220 lb (99.8 kg)   Height: 5' 6\" (1.676 m) 5' 6\" (1.676 m)                                              BP Readings from Last 3 Encounters:   21 131/87   21 113/61   21 108/69       NPO Status: Time of last liquid consumption:                         Time of last solid consumption:                         Date of last liquid consumption: 21                        Date of last solid food consumption: 21    BMI:   Wt Readings from Last 3 Encounters:   21 220 lb (99.8 kg)   21 226 lb 6.6 oz (102.7 kg)   04/15/21 238 lb 8.6 oz (108.2 kg)     Body mass index is 35.51 kg/m².     CBC:   Lab Results   Component Value Date    WBC 12.0 2021    RBC 4.01 2021    HGB 13.7 2021    HCT 41.4 2021    .2 2021    RDW 13.2 2021     2021       CMP:   Lab Results   Component Value Date     2021    K 4.1 2021     2021    CO2 24 2021    BUN 11 2021    CREATININE <0.5 2021    GFRAA >60 2021    AGRATIO 1.1 2021    LABGLOM >60 2021    GLUCOSE 97 2021    PROT 7.6 2021    CALCIUM 9.6 06/23/2021    BILITOT 0.7 04/13/2021    ALKPHOS 86 04/13/2021    AST 41 04/13/2021    ALT 32 04/13/2021       POC Tests: No results for input(s): POCGLU, POCNA, POCK, POCCL, POCBUN, POCHEMO, POCHCT in the last 72 hours. Coags:   Lab Results   Component Value Date    PROTIME 11.0 09/03/2021    INR 0.97 09/03/2021    APTT 34.1 09/03/2021       HCG (If Applicable):   Lab Results   Component Value Date    PREGTESTUR Negative 09/03/2021        ABGs: No results found for: PHART, PO2ART, FFY7SAJ, CMF5QJN, BEART, R7JAAORK     Type & Screen (If Applicable):  No results found for: LABABO, LABRH    Drug/Infectious Status (If Applicable):  No results found for: HIV, HEPCAB    COVID-19 Screening (If Applicable):   Lab Results   Component Value Date    COVID19 Not Detected 06/24/2021    COVID19 Not Detected 04/19/2021           Anesthesia Evaluation  Patient summary reviewed and Nursing notes reviewed  Airway: Mallampati: II  TM distance: >3 FB   Neck ROM: full  Mouth opening: > = 3 FB Dental: normal exam         Pulmonary:normal exam    (+) sleep apnea: on CPAP,                             Cardiovascular:  Exercise tolerance: good (>4 METS),   (+) hypertension:,                   Neuro/Psych:   (+) CVA (L-sided weakness): residual symptoms,             GI/Hepatic/Renal: Neg GI/Hepatic/Renal ROS  (+) GERD:,           Endo/Other: Negative Endo/Other ROS                    Abdominal:             Vascular: negative vascular ROS. Other Findings:             Anesthesia Plan      general     ASA 3       Induction: intravenous. MIPS: Postoperative opioids intended and Prophylactic antiemetics administered. Anesthetic plan and risks discussed with patient. Use of blood products discussed with patient whom consented to blood products. Plan discussed with CRNA.     Attending anesthesiologist reviewed and agrees with Viviana Wood MD   9/3/2021

## 2021-09-03 NOTE — H&P
History of Present Illness   HPI: Ms. Michelle Childs comes in with her  and her sister in law for follow-up after a right hemicraniectomy for stroke. Ms. Michelle Childs was admitted to [facility] with a complete right MCA occlusion and large stroke in April. By the time of presentation, she already had completion of the stroke on CT, so was not a candidate for intervention, but did require decompression. Since then, she did quite well in rehab at [facility], then a 2-week stay in inpatient subacute rehab, then came home this week. The source of her stroke is uncertain. She did not have a coagulopathy identified by Hematology during her workup and did have a loop recorder placed, but missed her first follow-up visit with Cardiology. She is currently on aspirin as her sole antithrombotic. She does get pain in her right arm and leg that is improved with an oral narcotic and muscle relaxant. Vital Signs for Today's Encounter     Height: 5' 6'' Weight: 216 pounds    BMI: 34.86 (Patient was advised of the benefits of maintaining a healthy weight and counseled on methods to achieve that weight.)    Medical History       Prior neck/back surgery? no  Has pt. had any other surgery? yes    Details: Other Major Non-Spine Surgery,Significant Orthopedic (knee, hip, shoulder)  Comorbidities:hypertension,neurological condition,sleep apnea  Supplemental oxygen? no  Bleeding/clotting disorder? no  Blood thinner? Aspirin  Has the patient had angioplasty? no  Does patient have stents? no  Other implant: other metal implanted device  Has patient had a flu shot this flu season (8/1-3/31)? Yes    Allergies   Allergic to shellfish, contrast dye, or iodine? NKA  Allergic to latex? NKA  Allergic to metals/jewelry? NKA  Allergic to steroids? NKA  Allergic to antibiotics? NKA  Allergic to tape? NKA  Other allergies? no    Social History   Does the patient live with someone else? yes  Is this person able to help at home if surgery is needed? yes  Smoking status: former  How much/week? Cigarettes - 1-2 Packs Per Day  For how many years? 35  When did patient give up smoking? within the last year    Other tobacco user? never    Alcohol consumed: none  Drug use: never     Work History   Is the patient employed? yes  Occupation:   Is patient working now with these symptoms? 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 no  Malignant hyperthermia? no      Review of Histories and Systems   I reviewed the patient's past medical history, social history, family history, and review of systems. The patient recorded this information in our chart and I reviewed it personally.       Physical Exam   General   General appearance: well groomed, cooperative  Build and nutrition: obese  Posture: seated in wheelchair    Eyes  Visual acuity: grossly normal  Visual fields: left homonymous hemianopsia  Pupils: equal, round, reactive to light and accommodation  EOM: extraocular movements intact, normal gaze alignment      Musculoskeletal   Gait and station: did not test; walks short distances w hemiwalker  RUE muscle strength: 5/5 strength throughout  RUE muscle tone: normal  LUE muscle strength: 0-1/5 strength throughout  LUE muscle tone: hypotonic  RLE muscle strength: 5/5 throughout  RLE muscle tone: normal  LLE muscle strength: 4-/5 throughout except ankle (in AFO)  LLE muscle tone: normal      Neurologic   Cerebellar function: normal    Sensory   Light touch: normal    Cranial Nerves  5th (Trigeminal): normal corneal reflex  7th (Facial): no weakness  8th (Auditory): normal hearing with whispered voice or finger rub  9th (Glossophar): gag reflex normal  11th (Spinal access): no atrophy, normal strength bilaterally  12th (Hypoglossal): tongue midline on protrusion, no fasciculations    Mental Status Exam   Affect: normal  Orientation: oriented to time, place, and person  Attention span/Concentration/Cognitive function: all normal  Memory: normal but describes STM loss subjectively  Speech/language: normal  Fund of knowledge: able to name months, seasons, current president  Thought content: normal        Assessment   Impression: Ms. Michelle Childs has made a rather impressive recovery from her right sided stroke and has regained enough left leg strength to walk short distances with a pratik-walker. Her right hemicraniectomy site is healing well and she is a good candidate to replace her skull flap. We had a full discussion of its natural history and treatment options. I spent over 60 minutes with the patient reviewing films, discussing the diagnosis, the natural history and treatment options. After a full discussion of the patient's treatment options, including observation, she has requested that we proceed with surgery. We will plan to schedule that procedure in the near future and keep you updated. Beba score: 4 needs assistance with walking and bodily needs  Daily level of functioning: dependent on others for some activities        Electronically Signed by Geneva Lund MD on 07/22/2021 at 5:43 PM    ________________________________________________________________________    Update History & Physical    The patient's History and Physical of July 22, 2021 was reviewed with the patient and I examined the patient. There was no change. The surgical site was confirmed by the patient and me. Plan: The risks, benefits, expected outcome, and alternative to the recommended procedure have been discussed with the patient. Patient understands and wants to proceed with the procedure.      Electronically signed by Magdalene Walter MD on 9/3/2021 at 12:54 PM

## 2021-09-03 NOTE — PROGRESS NOTES
Patient admitted to PACU # 18 from OR at 1506 post RIGHT SKULL FLAP REPLACEMENT - Right   per Dr. Ray Titus. Attached to PACU monitoring system and report received from anesthesia provider. Patient was reported to be hypertensive during procedure and was treated per OSMAN Diamond. Erickbianca Tejada gave precedex in [place] at bedside. Surgical drsg c/d/i. HOLGER drain that is full with bloody drainage. IVF infusing. Pt on RA. Will continue to monitor.

## 2021-09-03 NOTE — OP NOTE
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 Positioning was confirmed on a horseshoe headrest and the head shaved to expose the prior craniectomy incision. The site was marked, then prepped and draped in sterile fashion. Timeout was performed to confirm the patient's identity, site of surgery, administration of antibiotics and placement of compression boots. I opened the prior incision with a #10 blade, getting hemostasis with Judi clips. The scalp was elevated with the Duraguard graft to expose the entire defect. Using a #1 Penfield dissector, I exposed the inner table of the skull circumferentially. I confirmed hemostasis and irrigated the site with antibiotic solution. The skull flap was prepared with Synthes craniofacial plates and screws on the back table, then implanted and secure. I then removed the Duraguard from the inner surface of the scalp and discarded it. I placed a 7mm flat HOLGER drain, then closed the galea with 2-0 vicryl sutures. The skin was closed with skin staples and dressed with Telfa, dressing sponges and paper tape.     Electronically signed by Chelsea Rosario MD on 9/3/2021 at 12:58 PM

## 2021-09-04 VITALS
WEIGHT: 220 LBS | TEMPERATURE: 98.4 F | HEART RATE: 72 BPM | RESPIRATION RATE: 18 BRPM | BODY MASS INDEX: 35.36 KG/M2 | OXYGEN SATURATION: 95 % | SYSTOLIC BLOOD PRESSURE: 130 MMHG | HEIGHT: 66 IN | DIASTOLIC BLOOD PRESSURE: 83 MMHG

## 2021-09-04 PROCEDURE — 2580000003 HC RX 258: Performed by: NEUROLOGICAL SURGERY

## 2021-09-04 PROCEDURE — 6370000000 HC RX 637 (ALT 250 FOR IP): Performed by: NURSE PRACTITIONER

## 2021-09-04 PROCEDURE — 6360000002 HC RX W HCPCS: Performed by: NURSE PRACTITIONER

## 2021-09-04 PROCEDURE — 6370000000 HC RX 637 (ALT 250 FOR IP): Performed by: NEUROLOGICAL SURGERY

## 2021-09-04 RX ORDER — OXYCODONE HYDROCHLORIDE 5 MG/1
5 TABLET ORAL EVERY 4 HOURS PRN
Status: DISCONTINUED | OUTPATIENT
Start: 2021-09-04 | End: 2021-09-04 | Stop reason: HOSPADM

## 2021-09-04 RX ORDER — ONDANSETRON 2 MG/ML
4 INJECTION INTRAMUSCULAR; INTRAVENOUS EVERY 6 HOURS PRN
Status: DISCONTINUED | OUTPATIENT
Start: 2021-09-04 | End: 2021-09-04 | Stop reason: HOSPADM

## 2021-09-04 RX ORDER — SODIUM CHLORIDE 9 MG/ML
25 INJECTION, SOLUTION INTRAVENOUS PRN
Status: DISCONTINUED | OUTPATIENT
Start: 2021-09-04 | End: 2021-09-04 | Stop reason: HOSPADM

## 2021-09-04 RX ORDER — SODIUM CHLORIDE 0.9 % (FLUSH) 0.9 %
5-40 SYRINGE (ML) INJECTION PRN
Status: DISCONTINUED | OUTPATIENT
Start: 2021-09-04 | End: 2021-09-04 | Stop reason: HOSPADM

## 2021-09-04 RX ORDER — ASPIRIN 81 MG/1
81 TABLET, CHEWABLE ORAL DAILY
Qty: 30 TABLET | Refills: 3 | Status: SHIPPED | OUTPATIENT
Start: 2021-09-04

## 2021-09-04 RX ORDER — ONDANSETRON 4 MG/1
4 TABLET, ORALLY DISINTEGRATING ORAL EVERY 8 HOURS PRN
Status: DISCONTINUED | OUTPATIENT
Start: 2021-09-04 | End: 2021-09-04 | Stop reason: HOSPADM

## 2021-09-04 RX ORDER — OXYCODONE HYDROCHLORIDE 5 MG/1
10 TABLET ORAL EVERY 4 HOURS PRN
Status: DISCONTINUED | OUTPATIENT
Start: 2021-09-04 | End: 2021-09-04 | Stop reason: HOSPADM

## 2021-09-04 RX ORDER — ACETAMINOPHEN 325 MG/1
650 TABLET ORAL EVERY 4 HOURS PRN
Status: DISCONTINUED | OUTPATIENT
Start: 2021-09-04 | End: 2021-09-04 | Stop reason: HOSPADM

## 2021-09-04 RX ORDER — SODIUM CHLORIDE 0.9 % (FLUSH) 0.9 %
5-40 SYRINGE (ML) INJECTION EVERY 12 HOURS SCHEDULED
Status: DISCONTINUED | OUTPATIENT
Start: 2021-09-04 | End: 2021-09-04 | Stop reason: HOSPADM

## 2021-09-04 RX ADMIN — OXYCODONE 5 MG: 5 TABLET ORAL at 15:03

## 2021-09-04 RX ADMIN — GABAPENTIN 300 MG: 300 CAPSULE ORAL at 08:03

## 2021-09-04 RX ADMIN — Medication 10 ML: at 08:03

## 2021-09-04 RX ADMIN — HYDROMORPHONE HYDROCHLORIDE 0.5 MG: 1 INJECTION, SOLUTION INTRAMUSCULAR; INTRAVENOUS; SUBCUTANEOUS at 05:09

## 2021-09-04 RX ADMIN — ACETAMINOPHEN 1000 MG: 500 TABLET ORAL at 02:25

## 2021-09-04 RX ADMIN — PANTOPRAZOLE SODIUM 40 MG: 40 TABLET, DELAYED RELEASE ORAL at 05:09

## 2021-09-04 RX ADMIN — LEVETIRACETAM 500 MG: 500 TABLET ORAL at 08:02

## 2021-09-04 RX ADMIN — TIZANIDINE 2 MG: 4 TABLET ORAL at 08:02

## 2021-09-04 RX ADMIN — AMLODIPINE BESYLATE 5 MG: 5 TABLET ORAL at 08:03

## 2021-09-04 RX ADMIN — ACETAMINOPHEN 1000 MG: 500 TABLET ORAL at 08:02

## 2021-09-04 RX ADMIN — BISACODYL 5 MG: 5 TABLET, COATED ORAL at 08:03

## 2021-09-04 RX ADMIN — OXYCODONE 10 MG: 5 TABLET ORAL at 02:26

## 2021-09-04 RX ADMIN — FLUOXETINE 20 MG: 20 CAPSULE ORAL at 08:02

## 2021-09-04 ASSESSMENT — PAIN DESCRIPTION - ONSET
ONSET: ON-GOING
ONSET: ON-GOING

## 2021-09-04 ASSESSMENT — PAIN DESCRIPTION - ORIENTATION: ORIENTATION: RIGHT

## 2021-09-04 ASSESSMENT — PAIN DESCRIPTION - DESCRIPTORS
DESCRIPTORS: ACHING
DESCRIPTORS: ACHING

## 2021-09-04 ASSESSMENT — PAIN DESCRIPTION - FREQUENCY
FREQUENCY: CONTINUOUS
FREQUENCY: CONTINUOUS

## 2021-09-04 ASSESSMENT — PAIN DESCRIPTION - LOCATION
LOCATION: HEAD

## 2021-09-04 ASSESSMENT — PAIN DESCRIPTION - PAIN TYPE
TYPE: SURGICAL PAIN

## 2021-09-04 ASSESSMENT — PAIN SCALES - GENERAL
PAINLEVEL_OUTOF10: 8
PAINLEVEL_OUTOF10: 5
PAINLEVEL_OUTOF10: 2
PAINLEVEL_OUTOF10: 9
PAINLEVEL_OUTOF10: 6
PAINLEVEL_OUTOF10: 3
PAINLEVEL_OUTOF10: 4

## 2021-09-04 ASSESSMENT — PAIN DESCRIPTION - PROGRESSION
CLINICAL_PROGRESSION: NOT CHANGED
CLINICAL_PROGRESSION: NOT CHANGED

## 2021-09-04 NOTE — PROGRESS NOTES
4 Eyes Admission Assessment     I agree as the admission nurse that 2 RN's have performed a thorough Head to Toe Skin Assessment on the patient. ALL assessment sites listed below have been assessed on admission. Areas assessed by both nurses:   [x]   Head, Face, and Ears ---sx site to R side of head  [x]   Shoulders, Back, and Chest  [x]   Arms, Elbows, and Hands   [x]   Coccyx, Sacrum, and Ischium  [x]   Legs, Feet, and Heels        Does the Patient have Skin Breakdown?   No         Mark Prevention initiated:  No   Wound Care Orders initiated:  No      [facility] nurse consulted for Pressure Injury (Stage 3,4, Unstageable, DTI, NWPT, and Complex wounds) or Mark score 18 or lower:  No      Nurse 1 eSignature: Electronically signed by Michelle Rinne, RN on 9/3/21 at 9:59 PM EDT    **SHARE this note so that the co-signing nurse is able to place an eSignature**    Nurse 2 eSignature: Electronically signed by Murray Hillman RN on 9/4/21 at 12:36 AM EDT

## 2021-09-04 NOTE — PROGRESS NOTES
Pt A&O, VSS and neuro status remains unchanged. All fall precautions in place. Dressing to head CD&I with minimal drainage noted. Pt voiding adequately via purewick. Tolerating fluids well. Pt stating 8/10 pain this shift, medicated pt with prn pain medications per [facility] and was effective in managing pain. Pt in bed with eyes closed and call light in reach.

## 2021-09-04 NOTE — PROGRESS NOTES
Neurosurgery Progress Note      LOS: 1 day     S:  No acute events overnight. States she feels well and is eating well. O:   Vitals:    09/03/21 2015 09/03/21 2315 09/04/21 0225 09/04/21 0700   BP: (!) 140/80 136/84 124/78 (!) 145/84   Pulse: 87 87 78 80   Resp: 16 18 16 18   Temp: 98.2 °F (36.8 °C) 98.3 °F (36.8 °C) 98.2 °F (36.8 °C) 97.8 °F (36.6 °C)   TempSrc: Axillary Oral Oral Oral   SpO2: 93% 93% 94% 93%   Weight:       Height:                Intake/Output Summary (Last 24 hours) at 9/4/2021 1220  Last data filed at 9/4/2021 0601  Gross per 24 hour   Intake 1644 ml   Output 1105 ml   Net 539 ml        No results found for this or any previous visit (from the past 24 hour(s)).     Gen: NAD   Pulm: Easy WOB, symmetric chest rise   CV: RRR   Abd: S/NT/ND   Neuro:     GCS: E4 V5 M6 (15)  A&O x4, speech clear and appropriate  Visual Fields L hemianopsia, EOMI  Facial Symmetry normal  Motor 5/5 in right ext, no drift; L dense hemiparesis (baseline)    Drain output: 155 mL (45 mL last 12h)  -drain removed     Recent Imaging:   No orders to display        A/P: Marval Goldmann is a 46 y.o. female who is POD# 1 s/p right cranioplasty   -Frequent neurochecks   -Pain control   -may DC home today   -f/u in my office in 2 weeks for staple removal; [phone]   -Regular diet, bowel regimen   -PT/OT, Activity as tolerated   -DVT ppx with subq heparin, SCDs     Dispo: home

## 2021-09-04 NOTE — CARE COORDINATION
Case Management Assessment            Discharge Note                    Date / Time of Note: 9/4/2021 1:59 PM                  Discharge Note Completed by: Drake Tineo, RN     Spoke with patient at bedside regarding discharge needs. Pt is from home with her spouse and will return to home at d/c. She was active with [facility] and will resume care with them at d/c. She has a private duty aide through [employer] who comes in daily. She has a pratik-walker, WC, helmet, BSC and shower chair at home. Family is able to transport her to home at d/c. Patient Name: Evelina Mahmood   YOB: 1970  Diagnosis: Cerebral infarction, unspecified mechanism [facility]) [I63.9]  Acquired deformity of skull [M95.2]   Date / Time: 9/3/2021  9:59 AM    Current PCP: Belen Hernandez MD  Clinic patient: No    Hospitalization in the last 30 days: No    Advance Directives:  Code Status: Full Code  [place] DNR form completed and on chart: Not Indicated    Financial:  Payor: Shayne Thakkar / Plan: Alyce Humphreys POS II / Product Type: *No Product type* /      Pharmacy:    [facility] [address], [address] [facility]  [address] [facility]  Phone: [phone] Fax: [phone]      Assistance purchasing medications?: Potential Assistance Purchasing Medications: No  Assistance provided by Case Management: None at this time    Does patient want to participate in local refill/ meds to beds program?: Not Assessed    Meds To Beds General Rules:  1. Can ONLY be done Monday- Friday between 8:30am-5pm  2. Prescription(s) must be in pharmacy by 3pm to be filled same day  3. Copy of patient's insurance/ prescription drug card and patient face sheet must be sent along with the prescription(s)  4. Cost of Rx cannot be added to hospital bill. If financial assistance is needed, please contact unit  or ;   or  CANNOT provide pharmacy voucher for patients co-pays  5. Patients can then  the prescription on their way out of the hospital at discharge, or pharmacy can deliver to the bedside if staff is available. (payment due at time of pick-up or delivery - cash, check, or card accepted)     Able to afford home medications/ co-pay costs: Yes    ADLS:  Current PT AM-PAC Score:   /24  Current OT AM-PAC Score:   /24      DISCHARGE Disposition: Home with [address]: [place]     LOC at discharge: Not Applicable  LIZA Completed: Not Indicated    Notification completed in HENS/PAS?:  Not Applicable    IMM Completed:   Not Indicated    Transportation:  Transportation PLAN for discharge: family   Mode of Transport: [address] ordered at discharge: Yes  [address]: [place] Qu[place]  Phone: [phone] Fax: [phone]  Orders faxed: No-able to pull orders    Additional CM Notes: called [facility] to inform them of her discharge. The Plan for Transition of Care is related to the following treatment goals of Cerebral infarction, unspecified mechanism ([facility] [I63.9]  Acquired deformity of skull [M95.2]    The Patient and/or patient representative Lynnette Jacome and her family were provided with a choice of provider and agrees with the discharge plan Yes    Freedom of choice list was provided with basic dialogue that supports the patient's individualized plan of care/goals and shares the quality data associated with the providers.  Yes    Care Transitions patient: No    Micheal Chery RN  [facility]  Case Management Department  Ph: [phone]  Fax: [phone]

## 2021-09-04 NOTE — PLAN OF CARE
Problem: Falls - Risk of:  Goal: Will remain free from falls  Description: Will remain free from falls  Outcome: Ongoing  Note: Pt will remain free of falls this shift. Bed in lowest position, bed alarm on, call light in reach. Problem: Pain:  Goal: Control of acute pain  Description: Control of acute pain  Outcome: Ongoing  Note: Pt's pain will be controlled this shift. Administer prn pain medications per MAR.

## 2021-09-04 NOTE — ANESTHESIA POSTPROCEDURE EVALUATION
Department of Anesthesiology  Postprocedure Note    Patient: Andria Booth  MRN: [id number]  YOB: 1970  Date of evaluation: 9/3/2021  Time:  9:27 PM     Procedure Summary     Date: 09/03/21 Room / Location: [address] [place] [address]    Anesthesia Start: 9689 Anesthesia Stop: 1586    Procedure: RIGHT SKULL FLAP REPLACEMENT (Right ) Diagnosis:       Cerebral infarction, unspecified mechanism ([address]      Acquired deformity of skull      (Cerebral infarction, unspecified mechanism ([address] [I63.9] Acquired deformity of skull [M95.2])    Surgeons: Erin Worrell MD Responsible Provider: Lenny Stacy DO    Anesthesia Type: general ASA Status: 3          Anesthesia Type: general    Gerardo Phase I: Gerardo Score: 9    Gerardo Phase II:      Last vitals: Reviewed and per EMR flowsheets.        Anesthesia Post Evaluation    Patient location during evaluation: PACU  Patient participation: complete - patient participated  Level of consciousness: awake and alert  Airway patency: patent  Nausea & Vomiting: no nausea and no vomiting  Cardiovascular status: blood pressure returned to baseline  Respiratory status: acceptable  Hydration status: euvolemic

## 2021-09-04 NOTE — PROGRESS NOTES
PIV removed without complications. Discharge paperwork/instructions reviewed with pt. Pt denies other needs at this time. All belongings with pt and sister-in-law to take pt home. Pt discharged via wheelchair.

## 2021-09-04 NOTE — CONSULTS
Hospital Medicine  Consult History & Physical        Chief Complaint:  S/p cranioplasty    Date of Service: Pt seen/examined in consultation on 2021    History Of Present Illness: The patient is a 46 y.o. female with medical history of acute CVA in 2021 which required hemicraniectomy for decompression who we are asked to see/evaluate by Dr. Quiana Bhatia for eugenie-operative mgt. patient returned to the OR for cranioplasty. This morning she reports her headache is controlled, denies any chest pain or trouble breathing. Tolerating p.o. well. Having bowel movements. Patient continues to have left-sided weakness. Especially on upper extremity, with some regaining of her function of lower extremity with a walker. Past Medical History:        Diagnosis Date    Cerebral artery occlusion with cerebral infarction ([address] 2021    weakness left side with some short term memory loss     GERD (gastroesophageal reflux disease)     Hernia     Hyperlipidemia     Hypertension     Obesity     Unspecified sleep apnea     CPAP       Past Surgical History:        Procedure Laterality Date     SECTION  2004    CRANIOTOMY Right 2021    RIGHT HEMICRANIECTOMY performed by Salima Monge MD at [address]      INSERTABLE CARDIAC MONITOR      LAP BAND      SPLENECTOMY      TOTAL HIP ARTHROPLASTY Left        Medications Prior to Admission:    Prior to Admission medications    Medication Sig Start Date End Date Taking? Authorizing Provider   aspirin 81 MG chewable tablet Take 1 tablet by mouth daily Resume when okay with Dr. Quiana Bhatia 21  Yes Pradip Chapa MD   gabapentin (NEURONTIN) 300 MG capsule Take 300 mg by mouth 3 times daily. Yes Historical Provider, MD   oxyCODONE-acetaminophen (PERCOCET) 5-325 MG per tablet Take 1 tablet by mouth every 4 hours as needed for Pain.    Yes Historical Provider, MD   gabapentin (NEURONTIN) 300 MG capsule Take 1 capsule by mouth 3 times daily for 30 days. 6/24/21 8/31/21 Yes Chirag Frye DO   levETIRAcetam (KEPPRA) 500 MG tablet Take 1 tablet by mouth 2 times daily 6/23/21  Yes Chirag Frye DO   atorvastatin (LIPITOR) 80 MG tablet Take 1 tablet by mouth nightly 6/23/21  Yes Chirag Frye DO   amLODIPine (NORVASC) 5 MG tablet Take 1 tablet by mouth daily 6/23/21  Yes Chirag Frye DO   diclofenac sodium (VOLTAREN) 1 % GEL Apply 4 g topically 2 times daily 6/23/21  Yes Chirag Frye DO   bisacodyl (DULCOLAX) 5 MG EC tablet Take 1 tablet by mouth daily 6/23/21  Yes Chirag Frye DO   tiZANidine (ZANAFLEX) 2 MG tablet Take 1 tablet by mouth 2 times daily 6/23/21  Yes Chirag Frye DO   thiamine 100 MG tablet Take 1 tablet by mouth daily 6/23/21  Yes Chirag Frye DO   omeprazole (PRILOSEC) 20 MG delayed release capsule Take 40 mg by mouth daily   Yes Historical Provider, MD   FLUoxetine (PROZAC) 20 MG capsule Take 20 mg by mouth daily   Yes Historical Provider, MD   Multiple Vitamin (MULTIVITAMIN PO) Take  by mouth. Yes Historical Provider, MD       Allergies:  Patient has no known allergies. Social History:  The patient currently lives at home    TOBACCO:   reports that she quit smoking about 4 months ago. She has a 20.00 pack-year smoking history. She has never used smokeless tobacco.  ETOH:   reports previous alcohol use. Family History:  Reviewed in detail and Positive as follows:        Problem Relation Age of Onset    Cancer Mother     Depression Mother     Mental Illness Mother     Stroke Father     Cancer Father         prostate       REVIEW OF SYSTEMS:   Positive and negative as noted in the HPI. All other systems reviewed and negative.     PHYSICAL EXAM:  /83   Pulse 72   Temp 98.4 °F (36.9 °C) (Oral)   Resp 18   Ht 5' 6\" (1.676 m)   Wt 220 lb (99.8 kg)   SpO2 95%   BMI 35.51 kg/m²     General appearance: No apparent distress, appears stated age and [I63.511] 04/13/2021    GERD (gastroesophageal reflux disease) [K21.9]        ASSESSMENT/PLAN:    Status post right cranioplasty:  Continue dressing changes, pain control DVT prophylaxis per surgical team    MISHA:  Allow to use CPAP at night    History of CVA:  Continue statin. Resume aspirin as per surgical team.     HTN:  Controlled with Norvasc    DVT Prophylaxis:SCd  Diet: ADULT DIET; Regular  Code Status: Full Code  PT/OT Eval Status: at baseline    Dispo - inpt.  Okay to discharge when cleared by primary service       Ash Macdonald MD

## 2021-09-08 ENCOUNTER — NURSE ONLY (OUTPATIENT)
Dept: CARDIOLOGY CLINIC | Age: 51
End: 2021-09-08
Payer: COMMERCIAL

## 2021-09-08 DIAGNOSIS — I63.9 ACUTE CVA (CEREBROVASCULAR ACCIDENT) (HCC): ICD-10-CM

## 2021-09-08 DIAGNOSIS — Z45.09 ENCOUNTER FOR LOOP RECORDER CHECK: ICD-10-CM

## 2021-09-08 PROCEDURE — G2066 INTER DEVC REMOTE 30D: HCPCS | Performed by: INTERNAL MEDICINE

## 2021-09-08 PROCEDURE — 93298 REM INTERROG DEV EVAL SCRMS: CPT | Performed by: INTERNAL MEDICINE

## 2021-09-08 NOTE — PROGRESS NOTES
ILR for cryptogenic stroke. No AFib recorded to date. Remote transmission received for patients ILR. No new arrhythmias/events recorded. EP physician will review. See interrogation under the cardiology tab in the [address] [place] [address] [address] field for more details. Will continue to monitor remotely.

## 2021-10-01 NOTE — PROGRESS NOTES
[facility], [place], operated by [facility]   Electrophysiology  Office Visit  Date: 10/12/2021    Chief Complaint   Patient presents with    Hypertension    Cerebrovascular Accident       Cardiac HX: Capri Catalan is a 46 y.o. woman w/ PMH HTN, HLD, MISHA, morbid obesity, GERD, diagnosed with CVA (4/2021), s/p ILR placement (4/22/21, Dr. Selena Vences)    Interval History/HPI: Patient is here for follow up for ILR management, CVA, HTN. She presented to the hospital 4/2021 w/ left sided weakness, found to have CVA. She underwent ILR placement w/ Dr. Selena Vences (4/22/21). Device check today shows no arrhythmias noted, all other parameters stable. Pt denies palpitations, heart racing, dizziness, lightheadedness. No CP, SOB, PND, orthopnea, BLE edema. BP well controlled. Has residual left-sided weakness from her stroke, currently in a wheelchair. Home medications:   Current Outpatient Medications on File Prior to Visit   Medication Sig Dispense Refill    aspirin 81 MG chewable tablet Take 1 tablet by mouth daily Resume when okay with Dr. Mary Mayorga 30 tablet 3    gabapentin (NEURONTIN) 300 MG capsule Take 300 mg by mouth 3 times daily.  oxyCODONE-acetaminophen (PERCOCET) 5-325 MG per tablet Take 1 tablet by mouth every 4 hours as needed for Pain.  gabapentin (NEURONTIN) 300 MG capsule Take 1 capsule by mouth 3 times daily for 30 days.  90 capsule 2    levETIRAcetam (KEPPRA) 500 MG tablet Take 1 tablet by mouth 2 times daily 60 tablet 3    atorvastatin (LIPITOR) 80 MG tablet Take 1 tablet by mouth nightly 30 tablet 3    amLODIPine (NORVASC) 5 MG tablet Take 1 tablet by mouth daily 30 tablet 3    diclofenac sodium (VOLTAREN) 1 % GEL Apply 4 g topically 2 times daily 129 g 1    bisacodyl (DULCOLAX) 5 MG EC tablet Take 1 tablet by mouth daily 60 tablet 1    tiZANidine (ZANAFLEX) 2 MG tablet Take 1 tablet by mouth 2 times daily 60 tablet 1    thiamine 100 MG tablet Take 1 tablet by mouth daily 30 tablet 3    omeprazole (PRILOSEC) 20 MG delayed release capsule Take 40 mg by mouth daily      FLUoxetine (PROZAC) 20 MG capsule Take 20 mg by mouth daily      Multiple Vitamin (MULTIVITAMIN PO) Take  by mouth. No current facility-administered medications on file prior to visit. Past Medical History:   Diagnosis Date    Cerebral artery occlusion with [facility] infarction [facility] 2021    weakness left side with some short term memory loss     GERD (gastroesophageal reflux disease)     Hernia     Hyperlipidemia     Hypertension     Obesity     Unspecified sleep apnea     CPAP        Past Surgical History:   Procedure Laterality Date     SECTION  2004    CRANIOPLASTY Right 9/3/2021    RIGHT SKULL FLAP REPLACEMENT performed by Angely Taylor MD at [address] Right 2021    RIGHT HEMICRANIECTOMY performed by Angely Taylor MD at [address]      INSERTABLE CARDIAC MONITOR      LAP BAND      SPLENECTOMY      TOTAL HIP ARTHROPLASTY Left        Family History:  Reviewed. family history includes Cancer in her father and mother; Depression in her mother; Mental Illness in her mother; Stroke in her father. Review of System:    · Constitutional: No fevers, chills. · Eyes: No visual changes or diplopia. No scleral icterus. · ENT: No Headaches. No mouth sores or sore throat. · Cardiovascular: No for chest pain, No for dyspnea on exertion, No for palpitations or No for loss of consciousness. No cough, hemoptysis, No for pleuritic pain, or phlebitis. · Respiratory: No for cough or wheezing. No hematemesis. · Gastrointestinal: No abdominal pain, blood in stools. · Genitourinary: No dysuria, or hematuria. · Musculoskeletal: No gait disturbance,    · Integumentary: No rash or pruritis. · Neurological: No headache, change in muscle strength, numbness or tingling. · Psychiatric: No anxiety, or depression.   · Endocrine: No temperature intolerance. No excessive thirst, fluid intake, or urination. · Hem/Lymph: No abnormal bruising or bleeding, blood clots or swollen lymph nodes. · Allergic/Immunologic: No nasal congestion or hives. Physical Examination:  Vitals:    10/12/21 1557   BP: 100/85   Pulse: 81         Wt Readings from Last 3 Encounters:   10/12/21 210 lb (95.3 kg)   09/03/21 220 lb (99.8 kg)   06/14/21 226 lb 6.6 oz (102.7 kg)       · Constitutional: Oriented. No distress. · Head: Normocephalic and atraumatic. · Mouth/Throat: Oropharynx is clear and moist.   · Eyes: Conjunctivae clear without jaunduice. PERRL. · Neck: Neck supple. No rigidity. No JVD present. · Cardiovascular: Normal rate, regular rhythm, S1&S2. · Pulmonary/Chest: Bilateral respiratory sounds. No wheezes, No rhonchi. · Abdominal: Soft. Bowel sounds present. No distension, No tenderness. · Musculoskeletal: No tenderness. No edema    · Lymphadenopathy: Has no cervical adenopathy. · Neurological: Alert and oriented. Cranial nerve appears intact, No Gross deficit   · Skin: Skin is warm and dry. No rash noted. · Psychiatric: Has a normal mood, affect and behavior     Labs:  Reviewed. No results for input(s): NA, K, CL, CO2, PHOS, BUN, CREATININE in the last 72 hours. Invalid input(s): CA,  TSH  No results for input(s): WBC, HGB, HCT, MCV, PLT in the last 72 hours. Lab Results   Component Value Date    SZALBZX 491 04/13/2021    TROPONINI <0.01 04/12/2021     No results found for: BNP  Lab Results   Component Value Date    PROTIME 11.0 09/03/2021    PROTIME 12.3 04/13/2021    INR 0.97 09/03/2021    INR 1.06 04/13/2021     Lab Results   Component Value Date    CHOL 251 04/13/2021    HDL 45 04/13/2021    TRIG 278 04/13/2021       ECG: Personally reviewed: NSR, HR 83, , QRS 92, QTc 406    ECHO:  4/2021   Conclusions      Summary   Normal left ventricle size. There is mild-moderate concentric left   ventricular hypertrophy.  Overall left ventricular systolic function appears   normal with an ejection fraction of 55%. No regional wall motion   abnormalities are noted. Diastolic filling parameters suggests normal   diastolic function. The aortic valve appears tricuspid with minimal sclerosis. Trivial tricuspid regurgitation. Estimated pulmonary artery systolic pressure is 26 mmHg assuming a right   atrial pressure of 3 mmHg. Trivial pulmonic regurgitation present. A bubble study was performed and fails to show evidence of right to left   shunting. Stress Test: n/a    Cardiac Angiography: n/a    Problem List:   Patient Active Problem List    Diagnosis Date Noted    Acquired deformity of skull 09/03/2021    Hyponatremia     Debility     Electrolyte imbalance     Acute CVA (cerebrovascular accident) ([address] 04/23/2021    Encounter for loop recorder check-[facility] CONFIRM ILR 4/22/2021 Dr Jass Cancino for cryptogenic stroke. Merlin. Giveter 04/22/2021    ICAO (internal carotid artery occlusion), right     Cerebral edema (HCC)     Smoking     Secondary hypertension     Status post craniectomy     Acute right MCA stroke ([address] 04/13/2021    Status post gastric banding 11/01/2012    Obesity 11/01/2012    GERD (gastroesophageal reflux disease)     Sleep apnea     Hernia         Assessment:   1. Acute right MCA stroke ([address]    2. Acute CVA (cerebrovascular accident) ([address]    3. Benign essential HTN         Cardiac HX: Dixon Juarez is a 46 y.o. woman w/ PMH HTN, HLD, MISHA, morbid obesity, GERD, diagnosed with CVA (4/2021), s/p ILR placement (4/22/21, Dr. Jass Cancino)    CVA  - S/p ILR placement  - Device check shows no arrhythmias noted  - On asa, statin  - Follow-up in 6 months  - ECG ordered and results personally reviewed     HTN  - /85  - On amlodipine 5 mg QD    EF of 32%  No known systolic HF  No known CAD  No known AF     No tobacco use     All questions and concerns were addressed to the patient/family. Alternatives to my treatment were discussed. The note was completed using EMR. Every effort was made to ensure accuracy; however, inadvertent computerized transcription errors may be present. Patient received education regarding their diagnosis, treatment and medications while in the office today.       Dorian Castellano [address]

## 2021-10-05 ENCOUNTER — TELEPHONE (OUTPATIENT)
Dept: ORTHOPEDIC SURGERY | Age: 51
End: 2021-10-05

## 2021-10-05 NOTE — TELEPHONE ENCOUNTER
General Question     Subject: QUESTIONS REGARDING  BOTOX BEING COVERED UNDER/APPT ON 10/13  Patient and /or Facility Request: [address] [place] [address] Number: [phone]

## 2021-10-07 ENCOUNTER — TELEPHONE (OUTPATIENT)
Dept: ORTHOPEDIC SURGERY | Age: 51
End: 2021-10-07

## 2021-10-12 ENCOUNTER — OFFICE VISIT (OUTPATIENT)
Dept: CARDIOLOGY CLINIC | Age: 51
End: 2021-10-12
Payer: COMMERCIAL

## 2021-10-12 ENCOUNTER — NURSE ONLY (OUTPATIENT)
Dept: CARDIOLOGY CLINIC | Age: 51
End: 2021-10-12

## 2021-10-12 VITALS
HEART RATE: 81 BPM | SYSTOLIC BLOOD PRESSURE: 100 MMHG | WEIGHT: 210 LBS | BODY MASS INDEX: 33.89 KG/M2 | DIASTOLIC BLOOD PRESSURE: 85 MMHG

## 2021-10-12 DIAGNOSIS — I10 BENIGN ESSENTIAL HTN: ICD-10-CM

## 2021-10-12 DIAGNOSIS — I63.9 ACUTE CVA (CEREBROVASCULAR ACCIDENT) (HCC): ICD-10-CM

## 2021-10-12 DIAGNOSIS — I63.511 ACUTE RIGHT MCA STROKE (HCC): Primary | ICD-10-CM

## 2021-10-12 PROCEDURE — 99214 OFFICE O/P EST MOD 30 MIN: CPT | Performed by: NURSE PRACTITIONER

## 2021-10-13 ENCOUNTER — TELEPHONE (OUTPATIENT)
Dept: ORTHOPEDIC SURGERY | Age: 51
End: 2021-10-13

## 2021-10-13 ENCOUNTER — OFFICE VISIT (OUTPATIENT)
Dept: ORTHOPEDIC SURGERY | Age: 51
End: 2021-10-13
Payer: COMMERCIAL

## 2021-10-13 VITALS — BODY MASS INDEX: 33.75 KG/M2 | HEIGHT: 66 IN | WEIGHT: 210 LBS

## 2021-10-13 DIAGNOSIS — I63.9 ACUTE CVA (CEREBROVASCULAR ACCIDENT) (HCC): Primary | ICD-10-CM

## 2021-10-13 DIAGNOSIS — I69.398 SPASTICITY AS LATE EFFECT OF CEREBROVASCULAR ACCIDENT (CVA): ICD-10-CM

## 2021-10-13 DIAGNOSIS — G81.14 LEFT SPASTIC HEMIPLEGIA (HCC): ICD-10-CM

## 2021-10-13 DIAGNOSIS — R25.2 SPASTICITY AS LATE EFFECT OF CEREBROVASCULAR ACCIDENT (CVA): ICD-10-CM

## 2021-10-13 PROCEDURE — 99214 OFFICE O/P EST MOD 30 MIN: CPT | Performed by: PHYSICAL MEDICINE & REHABILITATION

## 2021-10-13 RX ORDER — OXYBUTYNIN CHLORIDE 10 MG/1
10 TABLET, EXTENDED RELEASE ORAL DAILY
Qty: 30 TABLET | Refills: 3 | Status: SHIPPED | OUTPATIENT
Start: 2021-10-13

## 2021-10-13 NOTE — TELEPHONE ENCOUNTER
PAULINE INFORMATION    DRUG:  BOTOX [id number]    ADMIN:  [id number], [id number]  [id number]    DX CODE: G81.14    UNITS:  300    LAST OV DATE: 10/12/21

## 2021-10-13 NOTE — PROGRESS NOTES
[facility]) Physical Medicine and Rehabilitation   Outpatient Progress Note  Dain Rose. DO Melva, MPH    Patient Name: Susan Lott MRN: [id number]   Age: 46 y.o. YOB: 1970   Sex: female      CHIEF COMPLAINT   No chief complaint on file. HISTORY OF PRESENT ILLNESS   Susan Lott is a 46 y.o. female admitted to inpatient rehabilitation on 2021 with CVA. She comes in today for evaluation for Botox of her left arm. She continues to have spasticity  in her left arm which is limiting her improvement. She has gone to therapy for this issue in the past but continues to hit a wall and is discouraged because she cannot improve her spasticity. She also complains of some urinary incontinence due to urge. PAST MEDICAL HISTORY      Past Medical History:   Diagnosis Date    Cerebral artery occlusion with cerebral infarction ([facility] [address] 2021    weakness left side with some short term memory loss     GERD (gastroesophageal reflux disease)     Hernia     Hyperlipidemia     Hypertension     Obesity     Unspecified sleep apnea     CPAP       PAST SURGICAL HISTORY     Past Surgical History:   Procedure Laterality Date     SECTION  2004    CRANIOPLASTY Right 9/3/2021    RIGHT SKULL FLAP REPLACEMENT performed by Dash Smith MD at [address] Right 2021    RIGHT HEMICRANIECTOMY performed by Dash Smith MD at [address]    INSERTABLE CARDIAC MONITOR      LAP BAND  2006    SPLENECTOMY  2000    TOTAL HIP ARTHROPLASTY Left        MEDICATIONS     Current Outpatient Medications   Medication Sig Dispense Refill    aspirin 81 MG chewable tablet Take 1 tablet by mouth daily Resume when okay with Dr. Efren Lockwood 30 tablet 3    gabapentin (NEURONTIN) 300 MG capsule Take 300 mg by mouth 3 times daily.       oxyCODONE-acetaminophen (PERCOCET) 5-325 MG per tablet Take 1 tablet by mouth every 4 hours as needed for Pain.      gabapentin (NEURONTIN) 300 MG capsule Take 1 capsule by mouth 3 times daily for 30 days. 90 capsule 2    levETIRAcetam (KEPPRA) 500 MG tablet Take 1 tablet by mouth 2 times daily 60 tablet 3    atorvastatin (LIPITOR) 80 MG tablet Take 1 tablet by mouth nightly 30 tablet 3    amLODIPine (NORVASC) 5 MG tablet Take 1 tablet by mouth daily 30 tablet 3    diclofenac sodium (VOLTAREN) 1 % GEL Apply 4 g topically 2 times daily 129 g 1    bisacodyl (DULCOLAX) 5 MG EC tablet Take 1 tablet by mouth daily 60 tablet 1    tiZANidine (ZANAFLEX) 2 MG tablet Take 1 tablet by mouth 2 times daily 60 tablet 1    thiamine 100 MG tablet Take 1 tablet by mouth daily 30 tablet 3    omeprazole (PRILOSEC) 20 MG delayed release capsule Take 40 mg by mouth daily      FLUoxetine (PROZAC) 20 MG capsule Take 20 mg by mouth daily      Multiple Vitamin (MULTIVITAMIN PO) Take  by mouth. No current facility-administered medications for this visit.        ALLERGIES   No Known Allergies    FAMILY HISTORY     Family History   Problem Relation Age of Onset   Jonh Conde Cancer Mother     Depression Mother     Mental Illness Mother     Stroke Father     Cancer Father         prostate       SOCIAL HISTORY     Social History     Socioeconomic History    Marital status:      Spouse name: Not on file    Number of children: Not on file    Years of education: Not on file    Highest education level: Not on file   Occupational History    Not on file   Tobacco Use    Smoking status: Former Smoker     Packs/day: 1.00     Years: 20.00     Pack years: 20.00     Quit date: 2021     Years since quittin.5    Smokeless tobacco: Never Used   Vaping Use    Vaping Use: Never used   Substance and Sexual Activity    Alcohol use: Not Currently    Drug use: No    Sexual activity: Not on file   Other Topics Concern    Not on file   Social History Narrative    Not on file     Social Determinants of Health     Financial Resource Strain:     Difficulty of Paying Living Expenses:    Food Insecurity:     Worried About Running Out of Food in the Last Year:     [address] in the Last Year:    Transportation Needs:     Lack of Transportation (Medical):  Lack of Transportation (Non-Medical):    Physical Activity:     Days of Exercise per Week:     Minutes of Exercise per Session:    Stress:     Feeling of Stress :    Social Connections:     Frequency of Communication with Friends and Family:     Frequency of Social Gatherings with Friends and Family:     Attends Christian Services:     Active Member of Clubs or Organizations:     Attends Club or Organization Meetings:     Marital Status:    Intimate Partner Violence:     Fear of Current or Ex-Partner:     Emotionally Abused:     Physically Abused:     Sexually Abused:        REVIEW OF SYSTEMS   General: no fever, chills, night sweats, anorexia, malaise, fatigue, or weight change  Hematologic:  no unexplained bleeding or bruising  HEENT:   no nasal congestion, rhinorrhea, sore throat, or facial pain  Respiratory:  no cough, dyspnea, or chest pain  Cardiovascular:  no angina, FORBES, PND, orthopnea, dependent edema, or palpitations  Gastrointestinal:  no nausea, vomiting, diarrhea, constipation, or abdominal pain  Genitourinary:  no urinary urgency, frequency, dysuria, or hematuria  Musculoskeletal: see HPI  Endocrine:  no heat or cold intolerance and no polyphagia, polydipsia, or polyuria  Skin:  no skin eruptions or changing lesions  Neurologic:  no focal weakness, numbness/tingling, tremor, or severe headache. See HPI. See HPI for pertinent positives. PHYSICAL EXAM   Vital Signs: There were no vitals filed for this visit. General appearance: healthy, alert, no distress, obese  Skin: Skin color, texture, turgor normal. No rashes or lesions  HEENT: atraumatic, normocephalic.  PERRL  Respiratory: Unlabored breathing  Lymphatic: No adenopathy   Neuro: Alert and oriented, normal distal sensation, normal bilateral DTRs  Vascular: Normal distal capillary and distal pulses  Muskuloskeletal Exam: left Arm spasticity  Modified lavon scale  1. Bicep +3  2. Flexors +3- FCR, FDP, FCU, FDP  3. 3/5 strength in deltoid    LLE  1. +2 gastroc  2. +2 TA    Ambulation- hemiwalker- very slow gait  Requires Left AFO      IMPRESSION     1. Acute CVA (cerebrovascular accident) ([facility] [address]    2. Spasticity as late effect of cerebrovascular accident (CVA)         PLAN   I had a lengthy discussion with patient today regarding diagnosis and treatment options and recommendations. 1. Botox injections- left arm    Bicep- 70 units  FDS- 30 units  FDP- 40 units  FCU- 20 units  FCR- 40 units  Left LE- Medial/lateral gastroc/solus- 100 units    300 units total - 0 waste      2. Start ditropan XL for urinary incontinence -10mg qd    3. Continue therapy post botox/ continue voltaren gel        FOLLOWUP     Follow up with Botox approval     No orders of the defined types were placed in this encounter. No orders of the defined types were placed in this encounter.       Patient was instructed on appropriate use of braces, participation in home exercise programs, healthy lifestyle choices and weight loss as appropriate     Chirag Frye DO

## 2021-10-14 ENCOUNTER — TELEPHONE (OUTPATIENT)
Dept: ORTHOPEDIC SURGERY | Age: 51
End: 2021-10-14

## 2021-10-14 NOTE — TELEPHONE ENCOUNTER
.General Question     Subject: WOULD LIKE TO KNOW CAN SHE TAKE ADVIL DUAL ACTION   Patient and /or Facility Request: PATIENT   Contact Number: [phone]

## 2021-10-14 NOTE — PROGRESS NOTES
SJM ILR for cryptogenic stroke. Last remote 9/8/21    Interrogation shows no alerts and/or episodes. Presenting EGM normal sinus rhythm  No changes. See Paceart report under the Cardiology tab. Patient will see NPWS today. We will continue to monitor patient remotely.

## 2021-10-18 ENCOUNTER — TELEPHONE (OUTPATIENT)
Dept: ORTHOPEDIC SURGERY | Age: 51
End: 2021-10-18

## 2021-10-18 NOTE — TELEPHONE ENCOUNTER
Appointment Request     Patient requesting earlier appointment: No  Appointment offered to patient: NO  Patient Contact Number: [phone]    PATIENT WOULD LIKE TO KNOW IF SHE CAN COME IN AT 2:30, INSTEAD OF 2:00 ON 10-28-21?

## 2021-10-18 NOTE — TELEPHONE ENCOUNTER
Spoke to patient. Okay to take Advil. Let patient know her Botox was approved. She was not ready to schedule yet, will call the office to schedule once she is ready.

## 2021-10-19 NOTE — TELEPHONE ENCOUNTER
Spoke to patient. Dr. Peyton Turk likes to do Botox earlier in the afternoons. I told her we may have to move her appointment time up if the schedule doesn't fill in. She is okay with that.

## 2021-10-28 ENCOUNTER — NURSE ONLY (OUTPATIENT)
Dept: CARDIOLOGY CLINIC | Age: 51
End: 2021-10-28
Payer: COMMERCIAL

## 2021-10-28 ENCOUNTER — OFFICE VISIT (OUTPATIENT)
Dept: ORTHOPEDIC SURGERY | Age: 51
End: 2021-10-28
Payer: COMMERCIAL

## 2021-10-28 VITALS — WEIGHT: 210 LBS | HEIGHT: 66 IN | BODY MASS INDEX: 33.75 KG/M2

## 2021-10-28 DIAGNOSIS — Z45.09 ENCOUNTER FOR LOOP RECORDER CHECK: ICD-10-CM

## 2021-10-28 DIAGNOSIS — I63.9 ACUTE CVA (CEREBROVASCULAR ACCIDENT) (HCC): ICD-10-CM

## 2021-10-28 DIAGNOSIS — G81.14 LEFT SPASTIC HEMIPLEGIA (HCC): Primary | ICD-10-CM

## 2021-10-28 PROCEDURE — 93298 REM INTERROG DEV EVAL SCRMS: CPT | Performed by: INTERNAL MEDICINE

## 2021-10-28 PROCEDURE — 64643 CHEMODENERV 1 EXTREM 1-4 EA: CPT | Performed by: PHYSICAL MEDICINE & REHABILITATION

## 2021-10-28 PROCEDURE — 64644 CHEMODENERV 1 EXTREM 5/> MUS: CPT | Performed by: PHYSICAL MEDICINE & REHABILITATION

## 2021-10-28 PROCEDURE — G2066 INTER DEVC REMOTE 30D: HCPCS | Performed by: INTERNAL MEDICINE

## 2021-10-28 PROCEDURE — 95873 GUIDE NERV DESTR ELEC STIM: CPT | Performed by: PHYSICAL MEDICINE & REHABILITATION

## 2021-10-28 NOTE — PROGRESS NOTES
[facility]) Physical Medicine and Rehabilitation  Outpatient Progress Note  Toño Frye DO    Patient Name: Paula Dinero MRN: <[id number]>   Age: 46 y.o. YOB: 1970   Sex: female      [address] Complaint   Patient presents with    Other     Botox left arm and leg       HISTORY OF PRESENT ILLNESS   Roseanna Redmond a 46 y.o. female admitted to inpatient rehabilitation on 2021 with CVA. She comes in today for evaluation for Botox of her left arm. She continues to have spasticity  in her left arm which is limiting her improvement. She has gone to therapy for this issue in the past but continues to hit a wall and is discouraged because she cannot improve her spasticity. Today she returns for botox injections.      PAST MEDICAL HISTORY      Past Medical History:   Diagnosis Date    Cerebral artery occlusion with cerebral infarction ([facility] [address] 2021    weakness left side with some short term memory loss     GERD (gastroesophageal reflux disease)     Hernia     Hyperlipidemia     Hypertension     Obesity     Unspecified sleep apnea     CPAP       PAST SURGICAL HISTORY     Past Surgical History:   Procedure Laterality Date     SECTION  2004    CRANIOPLASTY Right 9/3/2021    RIGHT SKULL FLAP REPLACEMENT performed by Teja Suero MD at [address] Right 2021    RIGHT HEMICRANIECTOMY performed by Teja Suero MD at [address]      INSERTABLE CARDIAC MONITOR      LAP BAND  2006    SPLENECTOMY  2000    TOTAL HIP ARTHROPLASTY Left        MEDICATIONS     Current Outpatient Medications   Medication Sig Dispense Refill    oxybutynin (DITROPAN XL) 10 MG extended release tablet Take 1 tablet by mouth daily 30 tablet 3    aspirin 81 MG chewable tablet Take 1 tablet by mouth daily Resume when okay with Dr. Elisa Araujo 30 tablet 3    gabapentin (NEURONTIN) 300 MG capsule Take 300 mg by mouth 3 times daily.  oxyCODONE-acetaminophen (PERCOCET) 5-325 MG per tablet Take 1 tablet by mouth every 4 hours as needed for Pain.  levETIRAcetam (KEPPRA) 500 MG tablet Take 1 tablet by mouth 2 times daily 60 tablet 3    atorvastatin (LIPITOR) 80 MG tablet Take 1 tablet by mouth nightly 30 tablet 3    amLODIPine (NORVASC) 5 MG tablet Take 1 tablet by mouth daily 30 tablet 3    diclofenac sodium (VOLTAREN) 1 % GEL Apply 4 g topically 2 times daily 129 g 1    bisacodyl (DULCOLAX) 5 MG EC tablet Take 1 tablet by mouth daily 60 tablet 1    tiZANidine (ZANAFLEX) 2 MG tablet Take 1 tablet by mouth 2 times daily 60 tablet 1    thiamine 100 MG tablet Take 1 tablet by mouth daily 30 tablet 3    omeprazole (PRILOSEC) 20 MG delayed release capsule Take 40 mg by mouth daily      FLUoxetine (PROZAC) 20 MG capsule Take 20 mg by mouth daily      Multiple Vitamin (MULTIVITAMIN PO) Take  by mouth.  gabapentin (NEURONTIN) 300 MG capsule Take 1 capsule by mouth 3 times daily for 30 days. 90 capsule 2     No current facility-administered medications for this visit.        ALLERGIES   No Known Allergies    FAMILY HISTORY     Family History   Problem Relation Age of Onset   Salomón Aki Cancer Mother     Depression Mother     Mental Illness Mother     Stroke Father     Cancer Father         prostate       SOCIAL HISTORY     Social History     Socioeconomic History    Marital status:      Spouse name: Not on file    Number of children: Not on file    Years of education: Not on file    Highest education level: Not on file   Occupational History    Not on file   Tobacco Use    Smoking status: Former Smoker     Packs/day: 1.00     Years: 20.00     Pack years: 20.00     Quit date: 2021     Years since quittin.5    Smokeless tobacco: Never Used   Vaping Use    Vaping Use: Never used   Substance and Sexual Activity    Alcohol use: Not Currently    Drug use: No    Sexual activity: Not on file   Other turgor normal. No rashes or lesions  HEENT: atraumatic, normocephalic. PERRL  Respiratory: Unlabored breathing  Lymphatic: No adenopathy   Neuro: Alert and oriented, normal distal sensation, normal bilateral DTRs  Vascular: Normal distal capillary and distal pulses  Muskuloskeletal Exam:   Muskuloskeletal Exam: left Arm spasticity  Modified lavon scale  1. Bicep +3  2. Flexors +3- FCR, FDP, FCU, FDP  3. 3/5 strength in deltoid     LLE  1. +2 gastroc  2. +2 TA     Ambulation- hemiwalker- very slow gait  Requires Left AFO      PROCEDURE     Botox Injection:  The patient consented to the procedure and a time out was performed before proceeding. The left Bicep, FCU, FCR, FDP, FDS, medial gastroc and lateral gastroc was prepped in the normal sterile fashion. A 25 gauge needle was introduced into the muscle with EMG guidance and confirmed using EMG. 300 units of Botox was injected into the muscle after the syringe was pulled back and confirmed negative aspiration. The injection flowed freely, and the patient tolerated the procedure well. Post injection expectations were reviewed with the patient. IMPRESSION     1. Left spastic hemiplegia ([facility] [address]         PLAN   I had a lengthy discussion with patient today regarding diagnosis and treatment options and recommendations. I had a lengthy discussion with patient today regarding diagnosis and treatment options and recommendations.      1. Botox injections- left arm    Bicep- 70 units  FDS- 30 units  FDP- 40 units  FCU- 20 units  FCR- 40 units  Left LE- Medial/lateral gastroc/solus- 50 units each- 100 total      300 units total - 0 waste        2. Start ditropan XL for urinary incontinence -10mg qd     3. Continue therapy post botox/ continue voltaren gel      FOLLOWUP     No follow-ups on file. No orders of the defined types were placed in this encounter.      Orders Placed This Encounter   Medications    onabotulinumtoxin A (BOTOX) injection 300 Units Patient was instructed on appropriate use of braces, participation in home exercise programs, healthy lifestyle choices and weight loss as appropriate     Chirag Frye, DO

## 2021-10-28 NOTE — PROGRESS NOTES
ILR for cryptogenic stroke. No AFib recorded to date. Remote transmission received for patients ILR. No new arrhythmias/events recorded since last OV 10.12.21.    EP physician will review. See interrogation under the cardiology tab in the [address] [place] [address] [address] field for more details. Will continue to monitor remotely.

## 2021-10-28 NOTE — PATIENT INSTRUCTIONS
Joint Injection After [address] Physical Medicine and Rehabilitation   Toño Frye, DO    Refer to this sheet in the next few days. These insructions provide you with information on caring for yourself after you have had a joint injection. Your caregiver also may give you more specific instructions. Your treatment has been planned according to current medical practices, but problems sometimes occur. Call your caregiver if you have any problems or questions after your procedure. After any type of joint injection, it is not uncommon to experience:     A temporary increase in pain which should resolve within 2-3 days   Soreness, swelling, or bruising around the injection site   Mild numbness, tingling, or weakness around the injection site caused by the numbing medicine used before or with the injection    It is also possible to experience the following effects associated with the specific agent after injection:     Corticosteroids (these effects are rare):  o Allergic reaction  o Increased blood sugar levels (if you have diabetes and you notice that your blood sugar levels have increased, notify your caregiver)  o Increased blood pressure levels  o Mood swings   Botox   o Temporary heat or redness  o Temporary rash and itching  o Increased fluid accumulation in the injected join    These effects all should resolve within a day or two after your procedure. HOME CARE INSTRUCTIONS     Limit yourself to light activity the day of your procedure. Avoid lifting heavy objections, bending, stooping or twisting   Take prescription or over-the-counter pain medication as directed by your caregiver   You may apply ice to your injection site to reduce pain and swelling the day of your procedure.  Ice may be applied 3-4 times:  o Put ice in a plastic bag  o Place a towel between your skin and the bag  o Leave the ice on for no longer than 15-20 minutes each time    FOLLOW-UP INSTRUCTIONS    Please make sure you keep your follow-up appointment as indicated by your physician.

## 2021-11-08 ENCOUNTER — HOSPITAL ENCOUNTER (OUTPATIENT)
Dept: PHYSICAL THERAPY | Age: 51
Setting detail: THERAPIES SERIES
Discharge: HOME | End: 2021-11-08
Payer: COMMERCIAL

## 2021-11-08 PROCEDURE — 97140 MANUAL THERAPY 1/> REGIONS: CPT

## 2021-11-08 PROCEDURE — 97162 PT EVAL MOD COMPLEX 30 MIN: CPT

## 2021-11-08 NOTE — FLOWSHEET NOTE
[address]  Phone: [phone]   Fax:     [phone]    Physical Therapy Treatment Note/ Progress Report:   Date:  2021    Patient Name:  Gumaro Lacy    :  1970  MRN: [id number]    Pertinent Medical History:Additional Pertinent Hx: Cerebral artery occlusion with cerebral infarction (2021), HTN, Hyperlipidemia    Medical/Treatment Diagnosis Information:  · Diagnosis: G81.14 - Left spastic hemiplegia ([address]  · Treatment Diagnosis: Decreased functional mobility and spasticity. Insurance/Certification information:  PT Insurance Information: [employer]  Physician Information:  Referring Practitioner: Angus Frye DO  Plan of care signed (Y/N): []  Yes [x]  No     Date of Patient follow up with Physician:      Progress Report: []  Yes  [x]  No     Date Range for reporting period:  Beginnin2021  Ending:     Progress report due (10 Rx/or 30 days whichever is less):      Recertification due (POC duration/ or 90 days whichever is less):      Visit # Insurance Allowable Auth required? Date Range   1 60 visits per ed year []  Yes [x]  No      Latex Allergy:  [x]NO      []YES  Preferred Language for Healthcare:   [x]English       []other:    Functional Scale:      Date assessed: at [facility] (21)  Test: LEFS  Score: 0    Pain level:  2/10     History of Injury: Patient reports she had a stroke in 2021. Pt was in acute rehab for 65 days. She then went to a SNF for therapy. She felt like she went down hill while she was at the SNF. She then went home and had an aid come to the house. She states it wasn't too helpful. PT/OT/ST came to her house. She states she received a botox injection in the arm and the leg 1 week ago. She states if there is improvement she can get botox injections every 4 weeks. She wears an AFO daily. She does not wear it at night. kinesthetic sense, posture, motor skill, proprioception and motor activation to allow for proper function of core, proximal hip and LE with self care and ADLs and functional mobility.   [] (10033) Gait Re-education- Provided training and instruction to the patient for proper LE, core and proximal hip recruitment and positioning and eccentric body weight control with ambulation re-education including up and down stairs     Home Exercise Program:    [x] (19685) Reviewed/Progressed HEP activities related to strengthening, flexibility, endurance, ROM of core, proximal hip and LE for functional self-care, mobility, lifting and ambulation/stair navigation   [] (90809)Reviewed/Progressed HEP activities related to improving balance, coordination, kinesthetic sense, posture, motor skill, proprioception of core, proximal hip and LE for self care, mobility, lifting, and ambulation/stair navigation      Manual Treatments:  PROM / STM / Oscillations-Mobs:  G-I, II, III, IV (PA's, Inf., Post.)  [x] (34481) Provided manual therapy to mobilize LE, proximal hip and/or LS spine soft tissue/joints for the purpose of modulating pain, promoting relaxation,  increasing ROM, reducing/eliminating soft tissue swelling/inflammation/restriction, improving soft tissue extensibility and allowing for proper ROM for normal function with self care, mobility, lifting and ambulation.      If [facility] Please Indicate Time In/Out  CPT Code Time in Time out                         Approval Dates:  CPT Code Units Approved Units Used  Date Updated:                     Charges:  Timed Code Treatment Minutes: 15   Total Treatment Minutes: 50      [] EVAL (LOW) 99874 (typically 20 minutes face-to-face)  [x] EVAL (MOD) 49106 (typically 30 minutes face-to-face)  [] EVAL (HIGH) 49203 (typically 45 minutes face-to-face)  [] RE-EVAL     [] SV(54281) x     [] Dry needle 1 or 2 Muscles (38825)  [] NMR (32136) x     [] Dry needle 3+ Muscles (91195)  [x] Manual (12133) x 1     [] Ultrasound (39629) x  [] TA (33861) x     [] Mech Traction (49107)  [] ES(attended) (90761)     [] ES (un) (29558):   [] Vasopump (98866) [] Ionto (02499)   [] Other:    GOALS:  Patient stated goal: \"I eventually want to be able to walk and travel with increased ease. \"     Therapist goals for Patient:   Short Term Goals: To be achieved in: 2 weeks  1. Independent in HEP and progression per patient tolerance, in order to prevent re-injury. 2. Patient will have a decrease in pain to facilitate improvement in movement, function, and ADLs as indicated by Functional Deficits.     Long Term Goals: To be achieved in: 6 weeks  1. Disability index score of 15 or more for the LEFS to assist with reaching prior level of function. 2. Patient will demonstrate increased AROM to [facility] to allow for proper joint functioning as indicated by patients Functional Deficits. 3. Patient will demonstrate an increase in postural awareness and control to allow for proper functional mobility as indicated by patients Functional Deficits. 4. Patient will demonstrate tandem balance for 30 seconds without UE assist in order to facilitate reduced fall risk. 5. Patient will return to Sit <> stand without increased symptoms or restriction. 6. Patient will be able to ambulate 50 feet without stopping/requiring a rest break.                    ASSESSMENT:  See eval    Treatment/Activity Tolerance:  [x] Patient tolerated treatment well [] Patient limited by fatique  [] Patient limited by pain  [] Patient limited by other medical complications  [] Other:     Overall Progression Towards Functional goals/ Treatment Progress Update:  [] Patient is progressing as expected towards functional goals listed. [] Progression is slowed due to complexities/Impairments listed. [] Progression has been slowed due to co-morbidities.   [x] Plan just implemented, too soon to assess goals progression <30days   [] Goals require adjustment due to lack of progress  [] Patient is not progressing as expected and requires additional follow up with physician  [] Other    Prognosis for POC: [x] Good [] Fair  [] Poor    Patient requires continued skilled intervention: [x] Yes  [] No        PLAN:   [] Continue per plan of care [] Alter current plan (see comments)  [x] Plan of care initiated [] Hold pending MD visit [] Discharge    Electronically signed by: Celeste James PT    Note: If patient does not return for scheduled/recommended follow up visits, this note will serve as a discharge from care along with the most recent update on progress.

## 2021-11-08 NOTE — PLAN OF CARE
[facility] [place]. [address]  Phone: [phone]   Fax:     [phone]          Physical Therapy Certification    Dear Referring Practitioner: Pat Ko DO,    We had the pleasure of evaluating the following patient for physical therapy services at [facility]. A summary of our findings can be found in the initial assessment below. This includes our plan of care. If you have any questions or concerns regarding these findings, please do not hesitate to contact me at the office phone number checked above. Thank you for the referral.       Physician Signature:_______________________________Date:__________________  By signing above (or electronic signature), therapists plan is approved by physician    Patient: Geovani Leon   : 1970   MRN: [id number]  Referring Physician: Referring Practitioner: Pat Ko DO      Evaluation Date: 2021      Medical Diagnosis Information:  Diagnosis: G81.14 - Left spastic hemiplegia ([facility] [address]   Treatment Diagnosis: Decreased functional mobility and spasticity. Insurance information: PT Insurance Information: Aetna    Precautions/ Contra-indications: L sided hemiparesis   Latex Allergy:  [x]NO      []YES  Preferred Language for Healthcare:   [x]English       []other:    C-SSRS Triggered by Intake questionnaire (Past 2 wk assessment ):   [x] No, Questionnaire did not trigger screening.   [] Yes, Patient intake triggered C-SSRS Screening      [] C-SSRS Screening completed  [] PCP notified via Epic     SUBJECTIVE: Patient reports she had a stroke in 2021. Pt was in acute rehab for 65 days. She then went to a SNF for therapy. She felt like she went down hill while she was at the SNF. She then went home and had an aid come to the house.  She states it wasn't too helpful. PT/OT/ST came to her house. She states she received a botox injection in the arm and the leg 1 week ago. She states if there is improvement she can get botox injections every 4 weeks. She wears an AFO daily. She does not wear it at night. She states she gets help from her  to get in and out of bed, in/out of car, and cooking. Pt reports some N/T in L fingers and L foot. Pt is R handed. Pt utilizes a pratik-walker. Pt lives with her  and 15 yo son. Relevant Medical History:Cerebral artery occlusion with cerebral infarction (4/2021), HTN, Hyperlipidemia  Functional Outcome Measure: LEFS = 0     Pain Scale: 2/10 - L foot and L hand  Easing factors:  ibprofeun and tylenol  Provocative factors: Too much activity - walking    Type: [x]Constant   []Intermittent  []Radiating []Localized []other:     Numbness/Tingling: Pt reports NT in L foot and L hand. Occupation/School:  Currently not working. Pt previously worked in art work/packing/printing. Living Status/Prior Level of Function: Prior to her stroke in April of 2021, pt was independent. OBJECTIVE:     Palpation: No TTP. Functional Mobility/Transfers: Requires increase time, R UE assist    Inspection: L AFO donned, spasticity in L UE and L LE. Posture: L sided hemiparesis in both UE and LE.      Bandages/Dressings/Incisions: NA    Gait: pratik-walker, severely decreased jimbo      Dermatomes Normal Abnormal Comments   Top of head (C1)      Posterior occipital region (C2)      Side of neck (C3)      Top of shoulder (C4)   Diminished sensation throughout L UE   Lateral deltoid (C5)      Tip of thumb (C6)      Distal middle finger (C7)      Distal fifth finger (C8)      Medial forearm (T1)      inguinal area (L1)       anterior mid-thigh (L2)   Sensation intact and equal throughout L LE   distal ant thigh/med knee (L3)      medial lower leg and foot (L4)      lateral lower leg and foot (L5)      posterior calf (S1)      medial calcaneus (S2)          Myotomes Normal Abnormal Comments   Neck flexion (C1-C2)      Neck sidebending (C3)      Shoulder elevation (C4)  L Hemiparesis in L UE and L LE   Shoulder abduction (C5)  L    Elbow flexion/wrist extension (C6)  L    Elbow extension/wrist flexion (C7)  L    Thumb abduction (C8)  L    Finger abduction (T1)  L    Hip flexion (L1-L2)      Knee extension (L2-L4)  L    Dorsiflexion (L4-L5)  L    Great Toe Ext (L5)  L    Ankle Eversion (S1-S2)  L    Ankle PF(S1-S2)  L        Reflexes Normal Abnormal Comments   C5-6 Biceps      C5-6 Brachioradialis      C7-8 Triceps      Pappass      S1-2 Seated achilles      S1-2 Prone knee bend      L3-4 Patellar tendon      Clonus      Babinski           PROM AROM    L R L R                                                                             Strength (0-5) Left Right     4/5 throughout R UE     4/5 throughout R LE             Flexibility                                  [x] Patient history, allergies, meds reviewed. Medical chart reviewed. See intake form. Review Of Systems (ROS):  [x]Performed Review of systems (Integumentary, CardioPulmonary, Neurological) by intake and observation. Intake form has been scanned into medical record. Patient has been instructed to contact their primary care physician regarding ROS issues if not already being addressed at this time.       Co-morbidities/Complexities (which will affect course of rehabilitation):   []None           Arthritic conditions   []Rheumatoid arthritis (M05.9)  []Osteoarthritis (M19.91)   Cardiovascular conditions   [x]Hypertension (I10)  []Hyperlipidemia (E78.5)  []Angina pectoris (I20)  []Atherosclerosis (I70)   Musculoskeletal conditions   []Disc pathology   []Congenital spine pathologies   []Prior surgical intervention  []Osteoporosis (M81.8)  []Osteopenia (M85.8)   Endocrine conditions   []Hypothyroid (E03.9)  []Hyperthyroid Gastrointestinal conditions   []Constipation (C47.54)   Metabolic or tolerance with driving and/or computer work   [x]Reduced ability to perform lifting, reaching, carrying tasks   []Reduced ability to concentrate   []Reduced ability to sleep    []Reduced ability to tolerate any impact through    [x]Reduced ability to ambulate prolonged functional periods/distances   []other:    Participation Restrictions   [x]Reduced participation in self care activities   [x]Reduced participation in home management activities   [x]Reduced participation in work activities   [x]Reduced participation in social activities. [x]Reduced participation in sport/recreational activities. Classification/Subgrouping:   [x]signs/symptoms consistent with decreased functional mobility secondary to L sided hemiparesis    Prognosis/Rehab Potential:      []Excellent   []Good    [x]Fair   []Poor    Tolerance of evaluation/treatment:    []Excellent   []Good    [x]Fair   []Poor    Physical Therapy Evaluation Complexity Justification  [x] A history of present problem with:  [] no personal factors and/or comorbidities that impact the plan of care;  [x]1-2 personal factors and/or comorbidities that impact the plan of care  []3 personal factors and/or comorbidities that impact the plan of care  [x] An examination of body systems using standardized tests and measures addressing any of the following: body structures and functions (impairments), activity limitations, and/or participation restrictions;:  [x] a total of 1-2 or more elements   [] a total of 3 or more elements   [] a total of 4 or more elements   [x] A clinical presentation with:  [x] stable and/or uncomplicated characteristics   [] evolving clinical presentation with changing characteristics  [] unstable and unpredictable characteristics;   [x] Clinical decision making of [] low, [x] moderate, [] high complexity using standardized patient assessment instrument and/or measurable assessment of functional outcome.     [] EVAL (LOW) 64397 (typically 20 minutes face-to-face)  [x] EVAL (MOD) 30088 (typically 30 minutes face-to-face)  [] EVAL (HIGH) 07648 (typically 45 minutes face-to-face)  [] RE-EVAL     PLAN:   Frequency/Duration:  2 days per week for 6 Weeks:  Interventions:  [x]  Therapeutic exercise including: strength training, ROM, including postural re-education. [x]  NMR activation and proprioception, including postural re-education. [x]  Manual therapy as indicated to include: Dry Needling/IASTM, STM, PROM, Gr I-IV mobilizations, manipulation. [x] Modalities as needed that may include: thermal agents, E-stim, Biofeedback, US, iontophoresis as indicated  [x] Patient education on joint protection, postural re-education, activity modification, progression of HEP. HEP instruction: Written HEP instructions provided and reviewed    GOALS:  Patient stated goal: \"I eventually want to be able to walk and travel with increased ease. \"    Therapist goals for Patient:   Short Term Goals: To be achieved in: 2 weeks  1. Independent in HEP and progression per patient tolerance, in order to prevent re-injury. 2. Patient will have a decrease in pain to facilitate improvement in movement, function, and ADLs as indicated by Functional Deficits. Long Term Goals: To be achieved in: 6 weeks  1. Disability index score of 15 or more for the LEFS to assist with reaching prior level of function. 2. Patient will demonstrate increased AROM to [facility] to allow for proper joint functioning as indicated by patients Functional Deficits. 3. Patient will demonstrate an increase in postural awareness and control to allow for proper functional mobility as indicated by patients Functional Deficits. 4. Patient will demonstrate tandem balance for 30 seconds without UE assist in order to facilitate reduced fall risk. 5. Patient will return to Sit <> stand without increased symptoms or restriction. 6. Patient will be able to ambulate 50 feet without stopping/requiring a rest break. Electronically signed by:  Vamshi Nieto, PT

## 2021-11-15 ENCOUNTER — HOSPITAL ENCOUNTER (OUTPATIENT)
Dept: PHYSICAL THERAPY | Age: 51
Setting detail: THERAPIES SERIES
Discharge: HOME | End: 2021-11-15
Payer: COMMERCIAL

## 2021-11-16 ENCOUNTER — HOSPITAL ENCOUNTER (OUTPATIENT)
Dept: PHYSICAL THERAPY | Age: 51
Setting detail: THERAPIES SERIES
Discharge: HOME | End: 2021-11-16
Payer: COMMERCIAL

## 2021-11-16 PROCEDURE — 97110 THERAPEUTIC EXERCISES: CPT

## 2021-11-16 NOTE — PROGRESS NOTES
Physical Therapy  Daily Treatment Note  Date: 2021  Patient Name: Susan Lott  MRN: [id number]     :   1970    Subjective:   General  Chart Reviewed: Yes  Additional Pertinent Hx: Cerebral artery occlusion with cerebral infarction (2021), HTN, Hyperlipidemia  Referring Practitioner: Victor M Ortiz DO  PT Visit Information  PT Insurance Information: Aetna  Subjective  Subjective: Pt 20 min. late to appointment. Got stuck in traffic. C/o some pain in L foot and hand. Worried she has no insurance visits left. Treatment Activities:      Transfers  Sit to Stand: Modified independent  Stand to sit: Modified independent     Ambulation  Ambulation?: Yes  Ambulation 1  Surface: level tile  Device: No Device  Assistance: Minimal assistance  Quality of Gait: Slow speed, max cues to weight shift, then lift opposite LE, very fearful, difficulty advancing LLE d/t spasticity. Consistent Min A from PT. Exercises  Exercise 1: Supported: lateral weight shift x 10, Min A from PT to increase shift to L. Exercise 2: Unsupported: Lateral weight shift x 10, Mod A from PT, max cues to increase shift to L. Other Activities  Other Activities: Other (see comment)  Comment: Discussed plan with pt. She needs to have her insurance verified before more appointments can be scheduled. We will do this, then discuss scheduling future appointments. Assessment:   Conditions Requiring Skilled Therapeutic Intervention  Body structures, Functions, Activity limitations: Decreased functional mobility ; Decreased strength; Decreased ROM; Decreased safe awareness; Decreased fine motor control; Decreased high-level IADLs; Decreased balance; Decreased ADL status; Decreased sensation; Decreased posture  Assessment: Pt 20 min. late to appointment. Concerned she has no more visits approved. We will need to check this.   Today she was able to complete weight-shifting exercises with cueing to maximize shift to L, and did ambulate short distance without device (limited d/t weakness, and fear of falling). Spasticity significantly limited her gait mechanics as well. Prognosis: Fair  REQUIRES PT FOLLOW UP: Yes  Discharge Recommendations: Patient would benefit from continued therapy after discharge; 2-3 sessions per week; Outpatient PT  Safety Devices  Type of devices: All fall risk precautions in place; Gait belt     Goals:  Short term goals  Time Frame for Short term goals: In 2 weeks  Short term goal 1: Independent in HEP and progression per patient tolerance, in order to prevent re-injury. Short term goal 2: Patient will have a decrease in pain to facilitate improvement in movement, function, and ADLs as indicated by Functional Deficits. Long term goals  Time Frame for Long term goals : In 6 weeks  Long term goal 1: Disability index score of 15 or more for the LEFS to assist with reaching prior level of function. Long term goal 2: Patient will demonstrate increased AROM to [facility] to allow for proper joint functioning as indicated by patients Functional Deficits. Long term goal 3: Patient will demonstrate an increase in postural awareness and control to allow for proper functional mobility as indicated by patients Functional Deficits. Long term goal 4: Patient will demonstrate tandem balance for 30 seconds without UE assist in order to facilitate reduced fall risk. Long term goal 5: Patient will return to Sit <> stand without increased symptoms or restriction. Long term goal 6: Patient will be able to ambulate 50 feet without stopping/requiring a rest break. Patient Goals   Patient goals : \"I eventually want to be able to walk and travel with increased ease. \"    Plan:    Plan  Times per week: 2x  Plan weeks: 6 weeks  Current Treatment Recommendations: Balance Training, ROM, Strengthening, Endurance Training, Functional Mobility Training, Transfer Training, Gait Training, Stair training, Neuromuscular Re-education, Home Exercise Program, Safety Education & Training, Patient/Caregiver Education & Training, Equipment Evaluation, Education, & procurement, Positioning  Safety Devices  Type of devices:  All fall risk precautions in place, Gait belt  Restraints  Initially in place: No  Timed Code Treatment Minutes: 15 Minutes     Therapy Time   Individual Concurrent Group Co-treatment   Time In 1450         Time Out 1515         Minutes 25         Timed Code Treatment Minutes: 15 Minutes   Therapeutic Exercise x 1 charge    Mala Hubbard, PT    Electronically signed by Mala Hubbard, PT [id number] on 11/16/2021 at 5:22 PM

## 2021-11-19 ENCOUNTER — APPOINTMENT (OUTPATIENT)
Dept: PHYSICAL THERAPY | Age: 51
End: 2021-11-19
Payer: COMMERCIAL

## 2021-11-22 ENCOUNTER — APPOINTMENT (OUTPATIENT)
Dept: PHYSICAL THERAPY | Age: 51
End: 2021-11-22
Payer: COMMERCIAL

## 2021-11-23 ENCOUNTER — HOSPITAL ENCOUNTER (OUTPATIENT)
Dept: PHYSICAL THERAPY | Age: 51
Setting detail: THERAPIES SERIES
Discharge: HOME | End: 2021-11-23
Payer: COMMERCIAL

## 2021-11-23 PROCEDURE — 97116 GAIT TRAINING THERAPY: CPT

## 2021-11-23 PROCEDURE — 97110 THERAPEUTIC EXERCISES: CPT

## 2021-11-23 NOTE — PROGRESS NOTES
Physical Therapy  Daily Treatment Note  Date: 2021  Patient Name: Jenelle Manuel  MRN: [id number]     :   1970    Subjective:   General  Chart Reviewed: Yes  Additional Pertinent Hx: Cerebral artery occlusion with cerebral infarction (2021), HTN, Hyperlipidemia  Response To Previous Treatment: Patient with no complaints from previous session. Referring Practitioner: Uzma Etienne DO  PT Visit Information  PT Insurance Information: Aetna  Total # of Visits Approved: 3  Total # of Visits to Date: 2  Subjective  Subjective: Pt agreeable to exercise. Reports feeling very sore after last session. No falls at home. Treatment Activities:      Transfers  Sit to Stand: Modified independent  Stand to sit: Modified independent     Ambulation  Ambulation?: Yes  More Ambulation?: Yes  Ambulation 1  Surface: level tile  Device: Small Sreekanth [place]  Assistance: Contact guard assistance; Stand by assistance  Quality of Gait: Slow speed, max cues to increase weight shift to L, max cues to avoid internal rotation of L hip. Distance: 36'  Comments: Very slow speed    Ambulation 2  Surface - 2: level tile  Device 2: No device  Assistance 2: Minimal assistance; Contact guard assistance  Quality of Gait 2: Max cues to increase weight shift to L, max cues to increase step length, intermittent Min A d/t lateral LOB to R. Distance: 36'    Exercises  Exercise 1: Forward/Backward walking with RUE support, x 10', cues to maximize RLE step length. Exercise 2: RLE/LLE step-up on 3\" step x 5 with RUE support, no LOB. Exercise 3: Static standing balance 5 x 10 s. with RLE placed on 3\" step, no UE support. Pt then repeated with LLE placed on 3\" step, no UE support. Exercise 4: Sit < > stand x 4 from wc, cues for LE placement, and to increase anterior weight shift, SBA. Exercise 5: PT educated pt to continue with lateral weight shifting exercises, and to attempt to avoid L hip IR during ambulation.      Assessment: Conditions Requiring Skilled Therapeutic Intervention  Body structures, Functions, Activity limitations: Decreased functional mobility ; Decreased strength; Decreased ROM; Decreased safe awareness; Decreased fine motor control; Decreased high-level IADLs; Decreased balance; Decreased ADL status; Decreased sensation; Decreased posture  Assessment: Pt on time to her appointment. Able to practice ambulation with Benefitter, and unsupported, requiring intermittent Min A for balance, and max cues to facilitate weight shift to her L side. She is approved for 1 more appointment this year. Will finalize HEP, then re-initiate therapy next year. Treatment Diagnosis: Decreased functional mobility and spasticity. Prognosis: Fair  REQUIRES PT FOLLOW UP: Yes  Discharge Recommendations: Patient would benefit from continued therapy after discharge; 2-3 sessions per week; Outpatient PT  Safety Devices  Type of devices: All fall risk precautions in place; Gait belt      G-Code:     OutComes Score                                                     Goals:  Short term goals  Time Frame for Short term goals: In 2 weeks  Short term goal 1: Independent in HEP and progression per patient tolerance, in order to prevent re-injury. Short term goal 2: Patient will have a decrease in pain to facilitate improvement in movement, function, and ADLs as indicated by Functional Deficits. Long term goals  Time Frame for Long term goals : In 6 weeks  Long term goal 1: Disability index score of 15 or more for the LEFS to assist with reaching prior level of function. Long term goal 2: Patient will demonstrate increased [facility] to [facility] to allow for proper joint functioning as indicated by patients Functional Deficits. Long term goal 3: Patient will demonstrate an increase in postural awareness and control to allow for proper functional mobility as indicated by patients Functional Deficits.   Long term goal 4: Patient will demonstrate tandem balance for 30 seconds without UE assist in order to facilitate reduced fall risk. Long term goal 5: Patient will return to Sit <> stand without increased symptoms or restriction. Long term goal 6: Patient will be able to ambulate 50 feet without stopping/requiring a rest break. Patient Goals   Patient goals : \"I eventually want to be able to walk and travel with increased ease. \"    Plan:    Plan  Times per week: 2x  Plan weeks: 6 weeks  Current Treatment Recommendations: Balance Training, ROM, Strengthening, Endurance Training, Functional Mobility Training, Transfer Training, Gait Training, Stair training, Neuromuscular Re-education, Home Exercise Program, Safety Education & Training, Patient/Caregiver Education & Training, Equipment Evaluation, Education, & procurement, Positioning  Safety Devices  Type of devices:  All fall risk precautions in place, Gait belt  Timed Code Treatment Minutes: 40 Minutes     Therapy Time   Individual Concurrent Group Co-treatment   Time In 1430         Time Out 1515         Minutes 45         Timed Code Treatment Minutes: 40 Minutes   Therapeutic Exercise x 2 charges  Gait training x 1 charge    Albert Nguyễn, PT    Electronically signed by Albert Nguyễn, PT [id number] on 11/23/2021 at 4:35 PM

## 2021-11-24 ENCOUNTER — APPOINTMENT (OUTPATIENT)
Dept: PHYSICAL THERAPY | Age: 51
End: 2021-11-24
Payer: COMMERCIAL

## 2021-11-25 ENCOUNTER — APPOINTMENT (OUTPATIENT)
Dept: PHYSICAL THERAPY | Age: 51
End: 2021-11-25
Payer: COMMERCIAL

## 2021-11-29 ENCOUNTER — APPOINTMENT (OUTPATIENT)
Dept: PHYSICAL THERAPY | Age: 51
End: 2021-11-29
Payer: COMMERCIAL

## 2021-11-30 ENCOUNTER — HOSPITAL ENCOUNTER (OUTPATIENT)
Dept: PHYSICAL THERAPY | Age: 51
Setting detail: THERAPIES SERIES
Discharge: HOME | End: 2021-11-30
Payer: COMMERCIAL

## 2021-11-30 PROCEDURE — 97110 THERAPEUTIC EXERCISES: CPT

## 2021-11-30 NOTE — PROGRESS NOTES
limitations: Decreased functional mobility ; Decreased strength; Decreased ROM; Decreased safe awareness; Decreased fine motor control; Decreased high-level IADLs; Decreased balance; Decreased ADL status; Decreased sensation; Decreased posture  Assessment: Pt 5 min. late to appointment. PT provided her with HEP exercises, including written instructions. Plan for her to return to OP PT in January once her insurance benefit has renewed. Recommend continued therapy to further improve strength, balance, ROM, endurance, safety awareness, gait mechanics, and independence ambulating/transferring. REQUIRES PT FOLLOW UP: Yes  Discharge Recommendations: Patient would benefit from continued therapy after discharge; 2-3 sessions per week; Outpatient PT  Safety Devices  Type of devices: All fall risk precautions in place; Gait belt     Goals:  Short term goals  Time Frame for Short term goals: In 2 weeks  Short term goal 1: Independent in HEP and progression per patient tolerance, in order to prevent re-injury. Short term goal 2: Patient will have a decrease in pain to facilitate improvement in movement, function, and ADLs as indicated by Functional Deficits. Long term goals  Time Frame for Long term goals : In 6 weeks  Long term goal 1: Disability index score of 15 or more for the LEFS to assist with reaching prior level of function. Long term goal 2: Patient will demonstrate increased AROM to [facility] to allow for proper joint functioning as indicated by patients Functional Deficits. Long term goal 3: Patient will demonstrate an increase in postural awareness and control to allow for proper functional mobility as indicated by patients Functional Deficits. Long term goal 4: Patient will demonstrate tandem balance for 30 seconds without UE assist in order to facilitate reduced fall risk. Long term goal 5: Patient will return to Sit <> stand without increased symptoms or restriction.   Long term goal 6: Patient will be able to

## 2021-12-01 ENCOUNTER — TELEPHONE (OUTPATIENT)
Dept: ORTHOPEDIC SURGERY | Age: 51
End: 2021-12-01

## 2021-12-09 ENCOUNTER — TELEPHONE (OUTPATIENT)
Dept: ORTHOPEDIC SURGERY | Age: 51
End: 2021-12-09

## 2021-12-09 NOTE — TELEPHONE ENCOUNTER
Submitted PA for BOTOX 500 UNITS, via FAX to [id number] [address].. STATUS: APPROVED for the 500 units.

## 2021-12-09 NOTE — TELEPHONE ENCOUNTER
PAULINE INFORMATION    DRUG:  BOTOX     ADMIN:  [id number], [id number], [id number]    DX CODE: G81.14    UNITS:  500 units    LAST OV DATE: 10/28/21    This dosage increase will be for her next appointment 12 weeks after initial dose

## 2021-12-21 DIAGNOSIS — N39.498 OTHER URINARY INCONTINENCE: Primary | ICD-10-CM

## 2021-12-21 RX ORDER — OXYBUTYNIN CHLORIDE 10 MG/1
10 TABLET, EXTENDED RELEASE ORAL DAILY
Qty: 30 TABLET | Refills: 3 | Status: SHIPPED | OUTPATIENT
Start: 2021-12-21

## 2022-01-05 ENCOUNTER — NURSE ONLY (OUTPATIENT)
Dept: CARDIOLOGY CLINIC | Age: 52
End: 2022-01-05
Payer: COMMERCIAL

## 2022-01-05 DIAGNOSIS — I63.9 ACUTE CVA (CEREBROVASCULAR ACCIDENT) (HCC): ICD-10-CM

## 2022-01-05 DIAGNOSIS — Z45.09 ENCOUNTER FOR LOOP RECORDER CHECK: ICD-10-CM

## 2022-01-05 PROCEDURE — G2066 INTER DEVC REMOTE 30D: HCPCS | Performed by: INTERNAL MEDICINE

## 2022-01-05 PROCEDURE — 93298 REM INTERROG DEV EVAL SCRMS: CPT | Performed by: INTERNAL MEDICINE

## 2022-01-05 NOTE — PROGRESS NOTES
ILR for cryptogenic stroke. No AFib recorded to date. Remote transmission received for patients ILR. Remote Confirm report shows 1 Symptom event illustrating NSR. EP physician will review. See interrogation under the cardiology tab in the [address] [facility] [address] field for more details. Will continue to monitor remotely. Patient admitted this morning, seen for follow-up. Patient alert awake but nonverbal and noncommunicative due to advanced dementia. Per daughter this is her baseline. Known history of chronic thrombocytopenia and monoclonal gammopathy. Visit Vitals /89 Pulse 75 Temp 98.6 °F (37 °C) Resp 15 Ht 5' 1\" (1.549 m) Wt 54.4 kg (120 lb) SpO2 97% BMI 22.67 kg/m² Exam 
General:  Alert,no acute distress Pulmonary:  CTA Bilaterally. No Wheezing/Rales. Cardiovascular: Regular rate and Rhythm. GI:  Soft, Non distended, Non tender. + Bowel sounds. Extremities:  No edema Neurologic: Alert awake, does not communicate or follow commands. Labs, chart, and vitals noted Patient admitted with right hip fracture Given thrombocytopenia, chronic. Discussed with Dr. Hussain Quevedo, hematology recommended to give 2 units of single donor platelets prior to surgery. Discussed with orthopedic surgery Dr. Jimenez Ferguson and updated the plan of platelet transfusion. Patient is moderate cardiovascular risk for low risk surgery. Discussed with the patient's daughter over the phone and explained the risk of surgery and also need for platelet transfusion, patient's daughter understands and wants to proceed with the surgery. DNR Disclaimer: Sections of this note are dictated using utilizing voice recognition software. Minor typographical errors may be present. If questions arise, please do not hesitate to contact me or call our department.

## 2022-01-26 ENCOUNTER — OFFICE VISIT (OUTPATIENT)
Dept: ORTHOPEDIC SURGERY | Age: 52
End: 2022-01-26
Payer: COMMERCIAL

## 2022-01-26 ENCOUNTER — TELEPHONE (OUTPATIENT)
Dept: ORTHOPEDIC SURGERY | Age: 52
End: 2022-01-26

## 2022-01-26 VITALS — BODY MASS INDEX: 33.75 KG/M2 | HEIGHT: 66 IN | WEIGHT: 210 LBS

## 2022-01-26 DIAGNOSIS — G81.14 LEFT SPASTIC HEMIPLEGIA (HCC): Primary | ICD-10-CM

## 2022-01-26 PROCEDURE — 64642 CHEMODENERV 1 EXTREMITY 1-4: CPT | Performed by: PHYSICAL MEDICINE & REHABILITATION

## 2022-01-26 PROCEDURE — 64643 CHEMODENERV 1 EXTREM 1-4 EA: CPT | Performed by: PHYSICAL MEDICINE & REHABILITATION

## 2022-01-26 PROCEDURE — 95874 GUIDE NERV DESTR NEEDLE EMG: CPT | Performed by: PHYSICAL MEDICINE & REHABILITATION

## 2022-01-26 PROCEDURE — 99214 OFFICE O/P EST MOD 30 MIN: CPT | Performed by: PHYSICAL MEDICINE & REHABILITATION

## 2022-01-26 NOTE — TELEPHONE ENCOUNTER
Medical Facility Question     Facility Name: [facility]  Contact Name: Wilfrid Lopez  Contact Number: [phone]  Request or Information:     Would like a signed release for appt notes   Fax: [phone]

## 2022-01-26 NOTE — PROGRESS NOTES
[facility]) Physical Medicine and Rehabilitation  Outpatient Progress Note  Layman Lashawn Frye DO    Patient Name: Ronda Duran MRN: <[id number]>   Age: 46 y.o. YOB: 1970   Sex: female      CHIEF COMPLAINT   No chief complaint on file. HISTORY OF PRESENT ILLNESS   Gina Sires a 46 y.o. female admitted to inpatient rehabilitation on 2021 with CVA.  She comes in today for follow up Botox of her left arm. She continues to have spasticity  in her left arm which is limiting her improvement. She has gone to therapy for this issue in the past but continues to hit a wall and is discouraged because she cannot improve her spasticity. Past injections did not show much improvement. Today she would like to increase the dose to improve overall function. Today she would like repeat Botox injections.      PAST MEDICAL HISTORY      Past Medical History:   Diagnosis Date    Cerebral artery occlusion with cerebral infarction ([facility] [address] 2021    weakness left side with some short term memory loss     GERD (gastroesophageal reflux disease)     Hernia     Hyperlipidemia     Hypertension     Obesity     Unspecified sleep apnea     CPAP       PAST SURGICAL HISTORY     Past Surgical History:   Procedure Laterality Date     SECTION  2004    CRANIOPLASTY Right 9/3/2021    RIGHT SKULL FLAP REPLACEMENT performed by Gordon Olguin MD at [address] Right 2021    RIGHT HEMICRANIECTOMY performed by Gordon Olguin MD at [address]      INSERTABLE CARDIAC MONITOR      LAP BAND  2006    SPLENECTOMY  2000    TOTAL HIP ARTHROPLASTY Left        MEDICATIONS     Current Outpatient Medications   Medication Sig Dispense Refill    oxybutynin (DITROPAN XL) 10 MG extended release tablet Take 1 tablet by mouth daily 30 tablet 3    oxybutynin (DITROPAN XL) 10 MG extended release tablet Take 1 tablet by mouth daily 30 tablet 3    aspirin 81 MG chewable tablet Take 1 tablet by mouth daily Resume when okay with Dr. Swartz Loose 30 tablet 3    gabapentin (NEURONTIN) 300 MG capsule Take 300 mg by mouth 3 times daily.  oxyCODONE-acetaminophen (PERCOCET) 5-325 MG per tablet Take 1 tablet by mouth every 4 hours as needed for Pain.  gabapentin (NEURONTIN) 300 MG capsule Take 1 capsule by mouth 3 times daily for 30 days. 90 capsule 2    levETIRAcetam (KEPPRA) 500 MG tablet Take 1 tablet by mouth 2 times daily 60 tablet 3    atorvastatin (LIPITOR) 80 MG tablet Take 1 tablet by mouth nightly 30 tablet 3    amLODIPine (NORVASC) 5 MG tablet Take 1 tablet by mouth daily 30 tablet 3    diclofenac sodium (VOLTAREN) 1 % GEL Apply 4 g topically 2 times daily 129 g 1    bisacodyl (DULCOLAX) 5 MG EC tablet Take 1 tablet by mouth daily 60 tablet 1    tiZANidine (ZANAFLEX) 2 MG tablet Take 1 tablet by mouth 2 times daily 60 tablet 1    thiamine 100 MG tablet Take 1 tablet by mouth daily 30 tablet 3    omeprazole (PRILOSEC) 20 MG delayed release capsule Take 40 mg by mouth daily      FLUoxetine (PROZAC) 20 MG capsule Take 20 mg by mouth daily      Multiple Vitamin (MULTIVITAMIN PO) Take  by mouth. No current facility-administered medications for this visit.        ALLERGIES   No Known Allergies    FAMILY HISTORY     Family History   Problem Relation Age of Onset   Jose Murphy Cancer Mother     Depression Mother     Mental Illness Mother     Stroke Father     Cancer Father         prostate       SOCIAL HISTORY     Social History     Socioeconomic History    Marital status:      Spouse name: Not on file    Number of children: Not on file    Years of education: Not on file    Highest education level: Not on file   Occupational History    Not on file   Tobacco Use    Smoking status: Former Smoker     Packs/day: 1.00     Years: 20.00     Pack years: 20.00     Quit date: 2021     Years since quittin.7    Smokeless tobacco: Never Used   Vaping Use    Vaping Use: Never used   Substance and Sexual Activity    Alcohol use: Not Currently    Drug use: No    Sexual activity: Not on file   Other Topics Concern    Not on file   Social History Narrative    Not on file     Social Determinants of Health     Financial Resource Strain:     Difficulty of Paying Living Expenses: Not on file   Food Insecurity:     Worried About Running Out of Food in the Last Year: Not on file    Lucinda of Food in the Last Year: Not on file   Transportation Needs:     Lack of Transportation (Medical): Not on file    Lack of Transportation (Non-Medical):  Not on file   Physical Activity:     Days of Exercise per Week: Not on file    Minutes of Exercise per Session: Not on file   Stress:     Feeling of Stress : Not on file   Social Connections:     Frequency of Communication with Friends and Family: Not on file    Frequency of Social Gatherings with Friends and Family: Not on file    Attends [employer] Services: Not on file    Active Member of [address] [place] [address] or Organizations: Not on file    Attends Club or Organization Meetings: Not on file    Marital Status: Not on file   Intimate Partner Violence:     Fear of Current or Ex-Partner: Not on file    Emotionally Abused: Not on file    Physically Abused: Not on file    Sexually Abused: Not on file   Housing Stability:     Unable to Pay for Housing in the Last Year: Not on file    Number of [place] in the Last Year: Not on file    Unstable Housing in the Last Year: Not on file       REVIEW OF SYSTEMS   General: no fever, chills, night sweats, anorexia, malaise, fatigue, or weight change  Hematologic:  no unexplained bleeding or bruising  HEENT:   no nasal congestion, rhinorrhea, sore throat, or facial pain  Respiratory:  no cough, dyspnea, or chest pain  Cardiovascular:  no angina, FORBES, PND, orthopnea, dependent edema, or palpitations  Gastrointestinal:  no nausea, vomiting, diarrhea, constipation, or abdominal pain  Genitourinary:  no urinary urgency, frequency, dysuria, or hematuria  Musculoskeletal: see HPI  Endocrine:  no heat or cold intolerance and no polyphagia, polydipsia, or polyuria  Skin:  no skin eruptions or changing lesions  Neurologic:  no focal weakness, numbness/tingling, tremor, or severe headache. See HPI. See HPI for pertinent positives. PHYSICAL EXAM   Vital Signs: There were no vitals filed for this visit. General appearance: healthy, alert, no distress  Skin: Skin color, texture, turgor normal. No rashes or lesions  HEENT: atraumatic, normocephalic. PERRL  Respiratory: Unlabored breathing  Lymphatic: No adenopathy   Neuro: Alert and oriented, normal distal sensation, normal bilateral DTRs  Vascular: Normal distal capillary and distal pulses  Muskuloskeletal Exam:   Muskuloskeletal Exam: left Arm spasticity  Modified lavon scale  1. Bicep +3  2. Flexors +3- FCR, FDP, FCU, FDP  3. 3/5 strength in deltoid     LLE  1. +2 gastroc  2. +2 TA     Ambulation- hemiwalker- very slow gait  Requires Left AFO      PROCEDURE      Botox Injection:  The patient consented to the procedure and a time out was performed before proceeding.  The left Bicep, FCU, FCR, FDP, FDS, medial gastroc and lateral gastroc was prepped in the normal sterile fashion.  A 25 gauge needle was introduced into the muscle with EMG guidance and confirmed using EMG. 500 units of Botox was injected into the muscle after the syringe was pulled back and confirmed negative aspiration.  The injection flowed freely, and the patient tolerated the procedure well.  Post injection expectations were reviewed with the patient.           IMPRESSION     1. Left spastic hemiplegia ([facility] [address]         PLAN   I had a lengthy discussion with patient today regarding diagnosis and treatment options and recommendations.       1. Botox injections- left arm    Bicep- 100 units  FDS- 60 units  FDP- 40 units  FCU- 50 units  FCR- 50 units  Left LE-

## 2022-01-26 NOTE — PATIENT INSTRUCTIONS
Joint Injection After [address] Physical Medicine and Rehabilitation   Mo Her. Heis, DO    Refer to this sheet in the next few days. These insructions provide you with information on caring for yourself after you have had a joint injection. Your caregiver also may give you more specific instructions. Your treatment has been planned according to current medical practices, but problems sometimes occur. Call your caregiver if you have any problems or questions after your procedure. After any type of joint injection, it is not uncommon to experience:     A temporary increase in pain which should resolve within 2-3 days   Soreness, swelling, or bruising around the injection site   Mild numbness, tingling, or weakness around the injection site caused by the numbing medicine used before or with the injection    It is also possible to experience the following effects associated with the specific agent after injection:     Corticosteroids (these effects are rare):  o Allergic reaction  o Increased blood sugar levels (if you have diabetes and you notice that your blood sugar levels have increased, notify your caregiver)  o Increased blood pressure levels  o Mood swings   Botox  o Temporary heat or redness  o Temporary rash and itching  o Increased fluid accumulation in the injected join    These effects all should resolve within a day or two after your procedure. HOME CARE INSTRUCTIONS     Limit yourself to light activity the day of your procedure. Avoid lifting heavy objections, bending, stooping or twisting   Take prescription or over-the-counter pain medication as directed by your caregiver   You may apply ice to your injection site to reduce pain and swelling the day of your procedure.  Ice may be applied 3-4 times:  o Put ice in a plastic bag  o Place a towel between your skin and the bag  o Leave the ice on for no longer than 15-20 minutes each time    FOLLOW-UP INSTRUCTIONS    Please make sure

## 2022-01-27 NOTE — TELEPHONE ENCOUNTER
SPOKE WITH PATIENT RELEASE FORM PATIENT FILLED OUT WAS NOT COMPLETED. SHE WAS GOING TO CALL [place]  AND SEE WHAT OFFICE VISITS THEY NEED. ON 10/7/2021 211 PAGES OF MEDICAL RECORDS (HEIS) WAS SENT TO [place].

## 2022-02-08 ENCOUNTER — APPOINTMENT (OUTPATIENT)
Dept: PHYSICAL THERAPY | Age: 52
End: 2022-02-08
Payer: COMMERCIAL

## 2022-02-10 ENCOUNTER — NURSE ONLY (OUTPATIENT)
Dept: CARDIOLOGY CLINIC | Age: 52
End: 2022-02-10
Payer: COMMERCIAL

## 2022-02-10 DIAGNOSIS — Z45.09 ENCOUNTER FOR LOOP RECORDER CHECK: ICD-10-CM

## 2022-02-10 DIAGNOSIS — I63.9 ACUTE CVA (CEREBROVASCULAR ACCIDENT) (HCC): ICD-10-CM

## 2022-02-10 PROCEDURE — G2066 INTER DEVC REMOTE 30D: HCPCS | Performed by: INTERNAL MEDICINE

## 2022-02-10 PROCEDURE — 93298 REM INTERROG DEV EVAL SCRMS: CPT | Performed by: INTERNAL MEDICINE

## 2022-02-10 NOTE — PROGRESS NOTES
ILR for cryptogenic stroke. No AFib recorded to date. Remote transmission received from patient's monitor at home. Remote Confirm report shows no arrhythmias. EP physician to review. See PACEART report under Cardiology tab. Will continue to monitor remotely.

## 2022-02-15 ENCOUNTER — TELEPHONE (OUTPATIENT)
Dept: ORTHOPEDIC SURGERY | Age: 52
End: 2022-02-15

## 2022-02-16 ENCOUNTER — HOSPITAL ENCOUNTER (OUTPATIENT)
Dept: PHYSICAL THERAPY | Age: 52
Setting detail: THERAPIES SERIES
Discharge: HOME | End: 2022-02-16
Payer: COMMERCIAL

## 2022-02-16 PROCEDURE — 97162 PT EVAL MOD COMPLEX 30 MIN: CPT

## 2022-02-16 PROCEDURE — 97110 THERAPEUTIC EXERCISES: CPT

## 2022-02-16 NOTE — PROGRESS NOTES
Physical Therapy  Initial Assessment/Treatment Session  Date: 2022  Patient Name: Raymond Ayon  MRN: [id number]  : 1970          Restrictions  Restrictions/Precautions  Restrictions/Precautions: Fall Risk  Position Activity Restriction  Other position/activity restrictions: LUE/LLE spasticity    Subjective   General  Chart Reviewed: Yes  Additional Pertinent Hx: Cerebral artery occlusion with cerebral infarction (2021), HTN, Hyperlipidemia  Response To Previous Treatment: Patient with no complaints from previous session. Referring Practitioner: Lamin Tovar DO  Referral Date : 21  Diagnosis: G81.14 - Left spastic hemiplegia ([facility] [address]  Follows Commands: Within Functional Limits  PT Visit Information  [place] Information[place] [facility] [place]  Total # of Visits Approved: 1  Total # of Visits to Date: 0  Subjective  Subjective: Pt returns for evaluation of L-sided spasticity, weakness, difficulty walking. She reports intermittent pain throughout the L side of her body. Vision/Hearing  Vision  Vision: Within Functional Limits  Hearing  Hearing: Within functional limits    Orientation  Orientation  Overall Orientation Status: Within Functional Limits    Social/Functional History  Social/Functional History  Lives With: Spouse  Type of Home: House  Home Layout: One level  Home Equipment: Wheelchair-manual (Yann-walker)  ADL Assistance:  (Pt requires assist for lower body dressing. Able to feed and toilet herself.)  Ambulation Assistance: Independent  Transfer Assistance: Independent  Additional Comments: Denies hx of falls.     Objective    AROM RLE (degrees)  RLE AROM: WNL  AROM LLE (degrees)  L Hip Flexion 0-125: 85  L Hip ABduction 0-45: 15  L Knee Flexion 0-145: 95  L Ankle Dorsiflexion 0-20: No active ankle DF  L Ankle Plantar Flexion 0-45: Trace Ankle PF  L Ankle Forefoot Inversion 0-40: Trace ankle Inversion  L Ankle Forefoot Eversion 0-20: No active Eversion    Strength RLE  Strength RLE: WNL  Strength LLE  Strength LLE: Exception  L Hip Flexion: 3+/5  L Hip Extension: 2/5  L Hip ABduction: 2-/5  L Knee Flexion: 2+/5  L Knee Extension: 3+/5  L Ankle Dorsiflexion: 0/5  L Ankle Plantar Flexion: 1/5  L Ankle Inversion: 1/5  L Ankle Eversion: 0/5    Tone RLE  RLE Tone: Normotonic  Tone LLE  LLE Tone: Hypertonic  LLE Modified Mando Scale  LLE Modified Mando Scale Completed: Yes  LLE Modified Mando Scale  LLE Hip ADductors Score: 2  LLE Knee Flexors Score: 3  LLE Knee Extensors Score: 3     Bed mobility  Rolling to Left: Supervision (Cues for technique)  Rolling to Right: Supervision (Cues for technique)  Supine to Sit: Supervision (Cues for technique)  Sit to Supine: Supervision (Cues for technique)     Transfers  Sit to Stand: Modified independent  Stand to sit: Modified independent     Ambulation  Ambulation?: Yes  More Ambulation?: Yes    Ambulation 1  Surface: level tile  Device:  (Yann-walker)  Assistance: Modified Independent  Quality of Gait: Fast pace, partial step-through pattern, trunk rotation to L, slow speed, no LOB. Distance: 20'  Comments: 1 min. 4 s. as part of TUG test.    Ambulation 2  Surface - 2: level tile  Device 2:  (Yann-walker)  Assistance 2: Modified Independent  Quality of Gait 2: Slow speed, decreased step length, decreased stance time LLE, no LOB. Distance: 100'    Ambulation 3  Surface - 3: level tile  Device 3: No device  Assistance 3: Contact guard assistance  Quality of Gait 3: Much slower speed, increased hip/knee flex, and trunk flex noted, no overt LOB. Much more fatigued. Distance: 20'  Comments: 2 min. 6 s. as part of TUG. Stairs/Curb  Stairs?: Yes  Stairs  # Steps : 4  Stairs Height: 6\"  Rails: Bilateral  Assistance: Minimal assistance  Comment: Pt able to ascend 4 steps with RUE support, CGA, then descend 4 steps with Min A for LLE control (non-reciprocal pattern).      Exercises  Exercise 1: Lateral weight shift x 10 with yann-walker support, 2 x 10 without walker support, cues to maximize shift to L, SBA. Exercise 2: Mini-squat with UE support x 10, unsupported x 10, max cues to increase weight shift to L. Assessment   Conditions Requiring Skilled Therapeutic Intervention  Body structures, Functions, Activity limitations: Decreased functional mobility ; Decreased strength;Decreased ROM; Decreased safe awareness;Decreased fine motor control;Decreased high-level IADLs;Decreased balance;Decreased ADL status; Decreased sensation;Decreased posture  Assessment: Pt is a 46 y.o. F. returning to OP PT for LLE strengthening, balance, and gait training s/p CVA. PT notes persistent LUE/LLE spasticity, and pt continues to require AOF and pratik-walker for stabilization during ambulation. PT recommends 6-8 weeks of OP PT (2x/week) to address her strength, ROM, balance, and ambulation deficits. Will continue to assess for equipment needs. Prognosis: Fair  Decision Making: Medium Complexity  History: CVA; HTN; HLD  Exam: Strength; ROM; Balance; Ambulation  Clinical Presentation: Evolving  Barriers to Learning: fatigue  REQUIRES PT FOLLOW UP: Yes  Discharge Recommendations: Outpatient PT;2-3 sessions per week;Patient would benefit from continued therapy after discharge  Activity Tolerance  Activity Tolerance: Patient Tolerated treatment well         Plan   Plan  Times per week: 2x  Plan weeks: 8 weeks  Current Treatment Recommendations: Balance Training,ROM,Strengthening,Endurance Training,Functional Mobility Training,Transfer Training,Gait Training,Stair training,Neuromuscular Re-education,Home Exercise Program,Safety Education & Training,Patient/Caregiver Education & Training,Equipment Evaluation, Education, & procurement,Positioning  Safety Devices  Type of devices:  All fall risk precautions in place,Gait belt  Restraints  Initially in place: No    Goals  Short term goals  Time Frame for Short term goals: in 3-4 weeks pt will perform  Short term goal 1: Independent in HEP and progression per patient tolerance, in order to prevent re-injury. Short term goal 2: Ambulation at least 200' as part of 6 MWT to demonstrated improved tolerance for community mobility  Long term goals  Time Frame for Long term goals : In 6-8 weeks pt will perform  Long term goal 1: TUG with pratik-walker/SBQC support in 45 s. or less to demonstrate improved gait speed, independence with household mobility  Long term goal 2: Ambulate 200' with SBQC, mod I, to facilitate improved independence ambulating  Long term goal 3: Murrell Balance Scale, scoring 41 or higher to indicate low fall risk  Long term goal 4: LLE hip/knee MMTs, scoring grossly 3/5 or better to facilitate improved independence ambulating.   Patient Goals   Patient goals : to walk without using a cane or walker       Therapy Time   Individual Concurrent Group Co-treatment   Time In 1345         Time Out 1430         Minutes 45         Timed Code Treatment Minutes: 25 Minutes   Moderate Complexity Evaluation x 1 charge  Therapeutic Exercise x 2 charges    Emiliana Soria PT  Electronically signed by Emiliana Soria, [id number] [id number] on 2/16/2022 at 3:46 PM

## 2022-02-16 NOTE — PROGRESS NOTES
Outpatient Physical Therapy  [] [facility]    Phone: [phone]   Fax: [phone]   [x] [facility]  Phone: [phone]              Fax: [phone]  [] [facility]   Phone: [phone]   Fax: [phone]     To: Referring Practitioner: Jacobo Enciso DO      Patient: Ronda Duran   : 1970   MRN: [id number]  Evaluation Date: 2022      Diagnosis Information:  · Diagnosis: G81.14 - Left spastic hemiplegia [facility])       Chad Marin  Dear Dr. Jes Flores,  The following patient has been evaluated for physical therapy services and for therapy to continue, Medicare requires monthly physician review of the treatment plan. Please review the attached evaluation and/or summary of the patient's plan of care, and verify that you agree therapy should continue by signing the attached document and sending it back to our office. Plan of Care/Treatment to date:  [x] Therapeutic Exercise    [x] Modalities:  [x] Therapeutic Activity     [] Ultrasound  [] Electrical Stimulation  [x] Gait Training      [] Cervical Traction [] Lumbar Traction  [x] Neuromuscular Re-education    [] Cold/hotpack [] Iontophoresis   [x] Instruction in HEP     Other:  [x] Manual Therapy      []             [] Aquatic Therapy      []           ? Frequency/Duration:  # Days per week: [] 1 day # Weeks: [] 1 week [] 5 weeks     [x] 2 days? [] 2 weeks [] 6 weeks     [] 3 days   [] 3 weeks [] 7 weeks     [] 4 days   [] 4 weeks [x] 8 weeks    Rehab Potential: [] Excellent [x] Good [x] Fair  [] Poor       Electronically signed by:  Nancy Vernon PT   Electronically signed by Nancy Vernon PT [id number] on 2022 at 3:47 PM        If you have any questions or concerns, please don't hesitate to call.   Thank you for your referral.      Physician Signature:________________________________Date:__________________  By signing above, therapists plan is approved by physician

## 2022-03-09 ENCOUNTER — HOSPITAL ENCOUNTER (OUTPATIENT)
Dept: PHYSICAL THERAPY | Age: 52
Setting detail: THERAPIES SERIES
Discharge: HOME | End: 2022-03-09
Payer: COMMERCIAL

## 2022-03-09 PROCEDURE — 97110 THERAPEUTIC EXERCISES: CPT

## 2022-03-09 PROCEDURE — 97116 GAIT TRAINING THERAPY: CPT

## 2022-03-09 NOTE — PROGRESS NOTES
Physical Therapy  Daily Treatment Note  Date: 3/9/2022  Patient Name: Destinee Castro  MRN: [id number]     :   1970    Subjective:   General  Chart Reviewed: Yes  Additional Pertinent Hx: Cerebral artery occlusion with cerebral infarction (2021), HTN, Hyperlipidemia  Response To Previous Treatment: Patient with no complaints from previous session. Referring Practitioner: Liliana Power DO  PT Visit Information  PT Insurance Information: [employer]  Total # of Visits Approved:  (Medical Necessity - 30 TE approved)  Total # of Visits to Date: 1  Subjective  Subjective: Pt reports generalized pain. Agreeable to session. Treatment Activities:      Transfers  Sit to Stand: Modified independent  Stand to sit: Modified independent        Ambulation  Ambulation?: Yes  More Ambulation?: Yes  Ambulation 1  Surface: level tile  Device: Hemiwalker  Assistance: Modified Independent  Quality of Gait: Fast pace, partial step-through pattern, trunk rotation to L, slow speed, no LOB. Distance: 36'    Ambulation 2  Surface - 2: level tile  Device 2: Large Sreekanth Ara  Assistance 2: Modified Independent  Quality of Gait 2: Slow speed, decreased step length, decreased stance time LLE, no LOB. Distance: 36'    Ambulation 3  Surface - 3: level tile  Device 3: Small Sreekanth Sarita  Assistance 3: Modified Independent  Quality of Gait 3: Slow speed, flexed posture, internal rotation L hip, no LOB. Distance: 36'    Stairs/Curb  Stairs?: Yes  Stairs  # Steps : 4  Stairs Height: 6\"  Rails: Bilateral  Assistance: Contact guard assistance  Comment: Pt able to ascend 4 steps with RUE support, CGA, then descend 4 steps with CGA (no assist for LLE control today). Exercises  Exercise 1: Standing unsupported: lateral weight shift x 10, cues to maximize shift to L. Exercise 2: Standing with RUE support: RLE/LLE forward lunge x 10, max cues to increase step length for backward step. Exercise 3:  Forward ambulation 30', unsupported, x 3 trials, intermittent Min A to correct for anterior LOB. Time to complete each trial: 2 mi. 46 s., 1 min. 59 s., 1 min. 45 s. Assessment:   Conditions Requiring Skilled Therapeutic Intervention  Body structures, Functions, Activity limitations: Decreased functional mobility ; Decreased strength;Decreased ROM; Decreased safe awareness;Decreased fine motor control;Decreased high-level IADLs;Decreased balance;Decreased ADL status; Decreased sensation;Decreased posture  Assessment: Pt practiced ambulation with multiple devices today, demonstrating stable gait with LBQC and SBQC. She is not safe to ambulate with SPC at home. She struggled with L hip/knee/ankle control when ambulating unsupported. Recommend continued therapy to further improve strength, balance, coordination, ROM, endurance, and independence ambulating. REQUIRES PT FOLLOW UP: Yes  Discharge Recommendations: Outpatient PT;2-3 sessions per week;Patient would benefit from continued therapy after discharge  Safety Devices  Type of devices: All fall risk precautions in place;Gait belt     Goals:  Short term goals  Time Frame for Short term goals: in 3-4 weeks pt will perform  Short term goal 1: Independent in HEP and progression per patient tolerance, in order to prevent re-injury. Short term goal 2: Ambulation at least 200' as part of 6 MWT to demonstrated improved tolerance for community mobility  Long term goals  Time Frame for Long term goals : In 6-8 weeks pt will perform  Long term goal 1: TUG with pratik-walker/SBQC support in 45 s. or less to demonstrate improved gait speed, independence with household mobility  Long term goal 2: Ambulate 200' with SBQC, mod I, to facilitate improved independence ambulating  Long term goal 3: Murrell Balance Scale, scoring 41 or higher to indicate low fall risk  Long term goal 4: LLE hip/knee MMTs, scoring grossly 3/5 or better to facilitate improved independence ambulating.   Long term goal 5: Patient will return to Sit <> stand without increased symptoms or restriction. Long term goal 6: Patient will be able to ambulate 50 feet without stopping/requiring a rest break. Patient Goals   Patient goals : to walk without using a cane or walker    Plan:    Plan  Times per week: 2x  Plan weeks: 8 weeks  Current Treatment Recommendations: Balance Training,ROM,Strengthening,Endurance Training,Functional Mobility Training,Transfer Training,Gait Training,Stair training,Neuromuscular Re-education,Home Exercise Program,Safety Education & Training,Patient/Caregiver Education & Training,Equipment Evaluation, Education, & procurement,Positioning  Safety Devices  Type of devices:  All fall risk precautions in place,Gait belt  Restraints  Initially in place: No  Timed Code Treatment Minutes: 40 Minutes     Therapy Time   Individual Concurrent Group Co-treatment   Time In 1345         Time Out 1430         Minutes 45         Timed Code Treatment Minutes: 40 Minutes   Therapeutic Exercise x 1 charge  Gait training x 2 charges    Amari Madera PT    Electronically signed by Amari Madera PT [id number] on 3/9/2022 at 4:34 PM

## 2022-03-14 DIAGNOSIS — N39.498 OTHER URINARY INCONTINENCE: ICD-10-CM

## 2022-03-16 ENCOUNTER — HOSPITAL ENCOUNTER (OUTPATIENT)
Dept: PHYSICAL THERAPY | Age: 52
Setting detail: THERAPIES SERIES
Discharge: HOME | End: 2022-03-16
Payer: COMMERCIAL

## 2022-03-16 ENCOUNTER — NURSE ONLY (OUTPATIENT)
Dept: CARDIOLOGY CLINIC | Age: 52
End: 2022-03-16
Payer: COMMERCIAL

## 2022-03-16 DIAGNOSIS — Z45.09 ENCOUNTER FOR LOOP RECORDER CHECK: ICD-10-CM

## 2022-03-16 DIAGNOSIS — I63.9 ACUTE CVA (CEREBROVASCULAR ACCIDENT) (HCC): ICD-10-CM

## 2022-03-16 PROCEDURE — 97140 MANUAL THERAPY 1/> REGIONS: CPT

## 2022-03-16 PROCEDURE — 97110 THERAPEUTIC EXERCISES: CPT

## 2022-03-16 NOTE — PROGRESS NOTES
Physical Therapy  Daily Treatment Note  Date: 3/16/2022  Patient Name: Tatiana Bland  MRN: [id number]     :   1970    Subjective:   General  Chart Reviewed: Yes  Additional Pertinent Hx: Cerebral artery occlusion with cerebral infarction (2021), HTN, Hyperlipidemia  Response To Previous Treatment: Patient with no complaints from previous session. Referring Practitioner: Natalie Mcfadden DO  PT Visit Information  [facility] [facility]  Total # of Visits to Date: 2  Subjective  Subjective: Pt reports fatigue after last session. Treatment Activities:   Manual therapy  PROM: L hip ABD stretch x 2 min. L Brian stretch x 2 min. L gastroc stretch x 4 min. Exercises  Exercise 1: Standing: L gastroc stretch 2 x 1 min., Min A from PT. Exercise 2: Standing unsupported: lateral weight shift 3 x 10, Min A from PT, max cues to increase shift to L (fearful of falling). Exercise 3: Standing unsupported: mini-squat 3 x 10, max cues to increase weight shift to L, limited d/t fear of falling. Exercise 4: Standing unsupported: L/R trunk rotation 2 x 10, cues to maintain L heel flat on floor (unable to do without Mod A from PT). Exercise 5: Ascend/Descend 4 steps with single HR support, non-reciprocal pattern, CGA, cues to widen JOSETTE. Exercise 6: Ambulation 100' with SBQC, CGA-SBA, cues to avoid L hip IR if possible, cues to widen JOSETTE, limited d/t fatigue. Other Activities  Other Activities: Other (see comment)  Comment: K tape place to L ankle to facilitate neutral INV/JANIE, and increased ankle DF. Assessment:   Conditions Requiring Skilled Therapeutic Intervention  Body structures, Functions, Activity limitations: Decreased functional mobility ; Decreased strength;Decreased ROM; Decreased safe awareness;Decreased fine motor control;Decreased high-level IADLs;Decreased balance;Decreased ADL status; Decreased sensation;Decreased posture  Assessment: Pt did well to ambulate with i-marker [place] today, Education & Training,Equipment Evaluation, Education, & procurement,Positioning  Safety Devices  Type of devices:  All fall risk precautions in place,Gait belt  Restraints  Initially in place: No  Timed Code Treatment Minutes: 40 Minutes     Therapy Time   Individual Concurrent Group Co-treatment   Time In 1340         Time Out 1430         Minutes 50         Timed Code Treatment Minutes: 40 Minutes   Manual therapy x 1 charge  Therapeutic Exercise x 2 charges    Ej Liang, PT    Electronically signed by Ej Liang, PT [id number] on 3/16/2022 at 4:26 PM

## 2022-03-23 ENCOUNTER — HOSPITAL ENCOUNTER (OUTPATIENT)
Dept: PHYSICAL THERAPY | Age: 52
Setting detail: THERAPIES SERIES
Discharge: HOME | End: 2022-03-23
Payer: COMMERCIAL

## 2022-03-23 PROCEDURE — 97110 THERAPEUTIC EXERCISES: CPT

## 2022-03-23 PROCEDURE — 97032 APPL MODALITY 1+ESTIM EA 15: CPT

## 2022-03-23 NOTE — PROGRESS NOTES
Physical Therapy  Daily Treatment Note  Date: 3/23/2022  Patient Name: Maged Bangura  MRN: [id number]     :   1970    Subjective:   General  Chart Reviewed: Yes  Additional Pertinent Hx: Cerebral artery occlusion with cerebral infarction (2021), HTN, Hyperlipidemia  Response To Previous Treatment: Patient with no complaints from previous session. Referring Practitioner: Tyler Guerrero DO  PT Visit Information  PT Insurance Information: [facility]  Total # of Visits to Date: 3  Subjective  Subjective: Pt feels her L foot is unchanged. Frustrated. Treatment Activities:      Manual therapy  PROM: L hip ABD stretch x 2 min. L Brian stretch x 2 min. L gastroc stretch x 4 min. Exercises  Exercise 1: Supine: LLE general nerve glide x 10, cues for technique, minimal ankle motion noted. Exercise 2: Hook-lying: L ankle DF/JANIE AAROM x 10. PROM x 10. L ankle PF AROM x 10. Exercise 3: Standing unsupported:  lateral weight shift 3 x 10, Mod A for L ankle stabilization, max cues to increase shift to L. Modalities  E-stim (parameters): [place] stimulation, L Ant Tib., 70 bps, 49mA, x 10 min., 4/12 s. ratio. Cues to attempt Ankle DF/JANIE with stimulation. Assessment:   Conditions Requiring Skilled Therapeutic Intervention  Body structures, Functions, Activity limitations: Decreased functional mobility ; Decreased strength;Decreased ROM; Decreased safe awareness;Decreased fine motor control;Decreased high-level IADLs;Decreased balance;Decreased ADL status; Decreased sensation;Decreased posture  Assessment: Pt tolerated NMES to L ant Tib, although she had little success with AROM exercises after. Standing exercises remain difficult to complete d/t hypertonicity in LLE. Recommend continued therapy to maximize strength/ROM as able.   REQUIRES PT FOLLOW UP: Yes  Discharge Recommendations: Outpatient PT;2-3 sessions per week;Patient would benefit from continued therapy after discharge  Safety Devices  Type of devices: All fall risk precautions in place;Gait belt      Goals:  Short term goals  Time Frame for Short term goals: in 3-4 weeks pt will perform  Short term goal 1: Independent in HEP and progression per patient tolerance, in order to prevent re-injury. Short term goal 2: Ambulation at least 200' as part of 6 MWT to demonstrated improved tolerance for community mobility  Long term goals  Time Frame for Long term goals : In 6-8 weeks pt will perform  Long term goal 1: TUG with pratik-walker/SBQC support in 45 s. or less to demonstrate improved gait speed, independence with household mobility  Long term goal 2: Ambulate 200' with SBQC, mod I, to facilitate improved independence ambulating  Long term goal 3: Murrell Balance Scale, scoring 41 or higher to indicate low fall risk  Long term goal 4: LLE hip/knee MMTs, scoring grossly 3/5 or better to facilitate improved independence ambulating. Long term goal 5: Patient will return to Sit <> stand without increased symptoms or restriction. Long term goal 6: Patient will be able to ambulate 50 feet without stopping/requiring a rest break. Patient Goals   Patient goals : to walk without using a cane or walker    Plan:    Plan  Times per week: 2x  Plan weeks: 4 weeks  Current Treatment Recommendations: Balance Training,ROM,Strengthening,Endurance Training,Functional Mobility Training,Transfer Training,Gait Training,Stair training,Neuromuscular Re-education,Home Exercise Program,Safety Education & Training,Patient/Caregiver Education & Training,Equipment Evaluation, Education, & procurement,Positioning  Safety Devices  Type of devices:  All fall risk precautions in place,Gait belt  Restraints  Initially in place: No  Timed Code Treatment Minutes: 40 Minutes     Therapy Time   Individual Concurrent Group Co-treatment   Time In 1345         Time Out 1430         Minutes 45         Timed Code Treatment Minutes: 40 Minutes   Therapeutic Exercise x 2 charges  Estim Manual x 1 charge    Polo Temple, PT    Electronically signed by Polo Temple, PT [id number] on 3/23/2022 at 3:47 PM

## 2022-03-24 DIAGNOSIS — G81.14 LEFT SPASTIC HEMIPLEGIA (HCC): Primary | ICD-10-CM

## 2022-03-24 DIAGNOSIS — R25.2 SPASTICITY AS LATE EFFECT OF CEREBROVASCULAR ACCIDENT (CVA): ICD-10-CM

## 2022-03-24 DIAGNOSIS — I69.398 SPASTICITY AS LATE EFFECT OF CEREBROVASCULAR ACCIDENT (CVA): ICD-10-CM

## 2022-03-24 PROCEDURE — G2066 INTER DEVC REMOTE 30D: HCPCS | Performed by: INTERNAL MEDICINE

## 2022-03-24 PROCEDURE — 93298 REM INTERROG DEV EVAL SCRMS: CPT | Performed by: INTERNAL MEDICINE

## 2022-03-24 RX ORDER — TIZANIDINE 4 MG/1
4 TABLET ORAL 2 TIMES DAILY
Qty: 60 TABLET | Refills: 2 | Status: SHIPPED | OUTPATIENT
Start: 2022-03-24

## 2022-03-30 ENCOUNTER — HOSPITAL ENCOUNTER (OUTPATIENT)
Dept: PHYSICAL THERAPY | Age: 52
Setting detail: THERAPIES SERIES
Discharge: HOME | End: 2022-03-30
Payer: COMMERCIAL

## 2022-03-30 PROCEDURE — 97110 THERAPEUTIC EXERCISES: CPT

## 2022-03-30 NOTE — PROGRESS NOTES
Physical Therapy  Daily Treatment Note  Date: 3/30/2022  Patient Name: Yvette Haskins  MRN: [id number]     :   1970    Subjective:   General  Chart Reviewed: Yes  Additional Pertinent Hx: Cerebral artery occlusion with cerebral infarction (2021), HTN, Hyperlipidemia  Response To Previous Treatment: Patient with no complaints from previous session. Referring Practitioner: Arnaud Cornejo DO  PT Visit Information  PT Insurance Information: [facility]  Total # of Visits to Date: 4  Subjective  Subjective: Pt reportedly increased her Tizanidine dose (after discussing with MD). Does not feel much difference yet. Treatment Activities:     Exercises  Exercise 1: Supine: L hip flex AAROM 2 x 10, cues to avoid ankle muscle activation (intermittently successful). Exercise 2: Supine with L Hip flexed to 90: pt practiced knee Flex/Ext AAROM 3 x 10, cues to avoid ankle muscle activation as able (intermittently successful). Exercise 3: Supine: LLE general nerve glide 2 x 10, max cues for sequencing, max cues to attempt to relax ankle muscles. Exercise 4: Standing: mini-squat 2 x 10, Min A from PT to facilitate weight shift to L. Exercise 5: Standing, no AFO LLE: Lateral weight shift AAROM, 2 x 10, cues to avoid rotation to R, facilitate lateral shift L. Limited d/t pain. Exercise 6: Standing unsupported x 5 min. PT facilitated lateral weight shift to L. Pt actively pushing against therapist, struggling to follow cues to lean/shift to her L in spite of max A, and visual feedback from mirror. Exercise 7: Seated: L gastroc stretch 3 x 1 min. Exercise 8: Ambulation [de-identified]' with pratik-walker, supervision. Pt continues to experience severe L ankle INV, hip IR, and posterior rotation of pelvis, though she is steady when moving. Muscle tone limits proper gait mechanics.     Assessment:   Conditions Requiring Skilled Therapeutic Intervention  Body structures, Functions, Activity limitations: Decreased functional mobility ; Decreased strength;Decreased ROM; Decreased safe awareness;Decreased fine motor control;Decreased high-level IADLs;Decreased balance;Decreased ADL status; Decreased sensation;Decreased posture  Assessment: Pt reportedly increased her Tizanidine dose this week. PT did not notice much change in functional mobility/gait mechanics. Pt continues to struggle with weight shift to L, in spite of max cueing/assist for proper mechanics. L ankle AROM remains very limited. Recommend continued therapy to further improve strength, balance, ROM, endurance,  and independence ambulating. History: CVA; HTN; HLD  REQUIRES PT FOLLOW UP: Yes  Discharge Recommendations: Outpatient PT;2-3 sessions per week;Patient would benefit from continued therapy after discharge  Safety Devices  Type of devices: All fall risk precautions in place;Gait belt      Goals:  Short term goals  Time Frame for Short term goals: in 3-4 weeks pt will perform  Short term goal 1: Independent in HEP and progression per patient tolerance, in order to prevent re-injury. Short term goal 2: Ambulation at least 200' as part of 6 MWT to demonstrated improved tolerance for community mobility  Long term goals  Time Frame for Long term goals : In 6-8 weeks pt will perform  Long term goal 1: TUG with pratik-walker/SBQC support in 45 s. or less to demonstrate improved gait speed, independence with household mobility  Long term goal 2: Ambulate 200' with SBQC, mod I, to facilitate improved independence ambulating  Long term goal 3: Murrell Balance Scale, scoring 41 or higher to indicate low fall risk  Long term goal 4: LLE hip/knee MMTs, scoring grossly 3/5 or better to facilitate improved independence ambulating. Long term goal 5: Patient will return to Sit <> stand without increased symptoms or restriction. Long term goal 6: Patient will be able to ambulate 50 feet without stopping/requiring a rest break.   Patient Goals   Patient goals : to walk without using a cane or walker    Plan:    Plan  Times per week: 2x  Plan weeks: 4 weeks  Current Treatment Recommendations: Balance Training,ROM,Strengthening,Endurance Training,Functional Mobility Training,Transfer Training,Gait Training,Stair training,Neuromuscular Re-education,Home Exercise Program,Safety Education & Training,Patient/Caregiver Education & Training,Equipment Evaluation, Education, & procurement,Positioning  Safety Devices  Type of devices:  All fall risk precautions in place,Gait belt  Restraints  Initially in place: No  Timed Code Treatment Minutes: 40 Minutes     Therapy Time   Individual Concurrent Group Co-treatment   Time In 1345         Time Out 1430         Minutes 45         Timed Code Treatment Minutes: 40 Minutes   Therapeutic Exercise x 3 charges    Jessica Qureshi PT    Electronically signed by Jessica Qureshi, PT [id number] on 3/30/2022 at 4:25 PM

## 2022-04-06 ENCOUNTER — HOSPITAL ENCOUNTER (OUTPATIENT)
Dept: PHYSICAL THERAPY | Age: 52
Setting detail: THERAPIES SERIES
Discharge: HOME | End: 2022-04-06
Payer: COMMERCIAL

## 2022-04-06 NOTE — PROGRESS NOTES
Physical Therapy  Cancellation/No-show Note  Patient Name:  Liza Martins  :  1970   Date:  2022  Cancelled visits to date: 0  No-shows to date: 0    For today's appointment patient:  [x]  Cancelled  []  Rescheduled appointment  []  No-show     Reason given by patient:  []  Patient ill  []  Conflicting appointment  []  No transportation    []  Conflict with work  [x]  No reason given  []  Other:     Comments:      Electronically signed by:  Daniel Espino PT  Electronically signed by Daniel Espino PT [id number] on 2022 at 11:25 AM

## 2022-04-13 ENCOUNTER — HOSPITAL ENCOUNTER (OUTPATIENT)
Dept: PHYSICAL THERAPY | Age: 52
Setting detail: THERAPIES SERIES
Discharge: HOME | End: 2022-04-13
Payer: COMMERCIAL

## 2022-04-13 PROCEDURE — 97110 THERAPEUTIC EXERCISES: CPT

## 2022-04-13 PROCEDURE — 97530 THERAPEUTIC ACTIVITIES: CPT

## 2022-04-13 NOTE — PROGRESS NOTES
Physical Therapy  Daily Treatment Note  Date: 2022  Patient Name: Yariel Burton  MRN: [id number]     :   1970    Subjective:   General  Chart Reviewed: Yes  Additional Pertinent Hx: Cerebral artery occlusion with cerebral infarction (2021), HTN, Hyperlipidemia  Response To Previous Treatment: Patient with no complaints from previous session. Referring Practitioner: Ceferino Chaney DO  PT Visit Information  PT [facility] Information: [facility]  Total # of Visits Approved:  (30 TE approved - 10 used to date)  Total # of Visits to Date: 5  Subjective  Subjective: Pt feels movement is a little easier. Wants to reduce appointment frequency to 1x/2 weeks. Treatment Activities:      Bed mobility  Rolling to Left: Minimal assistance  Rolling to Right: Minimal assistance  Supine to Sit: Independent  Sit to Supine: Independent     Transfers  Sit to Stand: Modified independent  Stand to sit: Modified independent     Ambulation  Ambulation?: Yes  More Ambulation?: Yes  Ambulation 1  Surface: level tile  Device: Hemiwalker  Assistance: Modified Independent  Quality of Gait: Fast pace, partial step-through pattern, trunk rotation to L, slow speed, no LOB. Distance: 200' as part of 6 MWT    Ambulation 2  Surface - 2: level tile  Device 2: Hemiwalker  Assistance 2: Modified Independent  Quality of Gait 2: Fast pace as part of TUG test, no LOB, persistent L ankle INV and hip IR. Distance: 21' in 51 s. Stairs/Curb  Stairs?: Yes  Stairs  # Steps : 4  Stairs Height: 6\"  Assistance: Minimal assistance  Comment: Pt able to ascend 4 steps with RUE support, CGA, then descend 4 steps with Min-CGA (intermittent Min A assist for LLE control today).      Strength RLE  Strength RLE: WNL  Strength LLE  L Hip Flexion: 3+/5  L Hip Extension: 2/5  L Hip ABduction: 2-/5  L Knee Flexion: 2+/5  L Knee Extension: 3+/5  L Ankle Dorsiflexion: 0/5  L Ankle Plantar Flexion: 0/5  L Ankle Inversion: 0/5  L Ankle Eversion: 0/5    Exercises  Exercise 1: Supine: LLE heel slide x 10, maxi-slide, cues for technique. Exercise 2: Supine: LLE SLR x 10, cues for technique. Exercise 3: Hook-lying: LLE bent-knee fallout x 10. Exercise 4: Seated:  L knee Flex AROM x 10 with yellow t-band resistance. Exercise 5: Seated:  L knee EXT AROM x 10 with yellow t-band resistance. Exercise 6: Hook-lying: bridge x 5. Exercise 7: PT provided pt with written HEP including bridging, heel slide, SLR, Knee Flex/Ext with t-band resistance, bent-knee fallout. Assessment:   Conditions Requiring Skilled Therapeutic Intervention  Body structures, Functions, Activity limitations: Decreased functional mobility ; Decreased strength;Decreased ROM; Decreased safe awareness;Decreased fine motor control;Decreased high-level IADLs;Decreased balance;Decreased ADL status; Decreased sensation;Decreased posture  Assessment: Pt has met 1/2 short-term goals, and 2/6 long-term goals. She continues to demonstrate no active L ankle motion, and her L hip/knee strength remain limited. Spasticity significantly limits her ability to achieve proper gait mechanics; however, her endurance and balance have improved moderately. She was able to achieve her goal of ambulating 200' as part of 6 MWT, and states she feels more steady on her feet at home. PT requests 6 more visits of OP PT to address L hip/knee weakness, and continue to improve balance, and independence ambulating with LRAD. Pt plans to reduce frequency to 1x/2 weeks. Treatment Diagnosis: Decreased functional mobility and spasticity. Prognosis: Fair  Decision Making: Medium Complexity  History: CVA; HTN; HLD  Exam: Strength; ROM; Balance; Ambulation  Clinical Presentation: Evolving  Barriers to Learning: fatigue  REQUIRES PT FOLLOW UP: Yes  Discharge Recommendations: Outpatient PT;2-3 sessions per week;Patient would benefit from continued therapy after discharge  Safety Devices  Type of devices:  All fall risk precautions in place;Gait belt      OutComes Score  Murrell Balance Score: 36 (04/13/22 3800)    Goals:  Short term goals  Time Frame for Short term goals: in 3-4 weeks pt will perform  Short term goal 1: Independent in HEP and progression per patient tolerance, in order to prevent re-injury. - not met 4/13  Short term goal 2: Ambulation at least 200' as part of 6 MWT to demonstrated improved tolerance for community mobility - met 4/13  Long term goals  Time Frame for Long term goals : In 6-8 weeks pt will perform  Long term goal 1: TUG with pratik-walker/SBQC support in 45 s. or less to demonstrate improved gait speed, independence with household mobility - not met 4/13  Long term goal 2: Ambulate 200' with [employer], mod I, to facilitate improved independence ambulating - not met 4/13  Long term goal 3: Murrell Balance Scale, scoring 41 or higher to indicate low fall risk - not met 4/13  Long term goal 4: LLE hip/knee MMTs, scoring grossly 3/5 or better to facilitate improved independence ambulating. - not met 4/13  Long term goal 5: Patient will return to Sit <> stand without increased symptoms or restriction. - met 4/13  Long term goal 6: Patient will be able to ambulate 50 feet without stopping/requiring a rest break. - met 4/13  Patient Goals   Patient goals : to walk without using a cane or walker    Plan:    Plan  Times per week: 1x/2 weeks  Plan weeks: 12 weeks (6 more visits)  Current Treatment Recommendations: Balance Training,ROM,Strengthening,Endurance Training,Functional Mobility Training,Transfer Training,Gait Training,Stair training,Neuromuscular Re-education,Home Exercise Program,Safety Education & Training,Patient/Caregiver Education & Training,Equipment Evaluation, Education, & procurement,Positioning  Safety Devices  Type of devices:  All fall risk precautions in place,Gait belt  Restraints  Initially in place: No  Timed Code Treatment Minutes: 45 Minutes     Therapy Time   Individual Concurrent Group Co-treatment   Time In 1350         Time Out 1450         Minutes 60         Timed Code Treatment Minutes: 45 Minutes   Therapeutic Activity x 1 charge  Therapeutic Exercise x 2 charges    Jairo Chamberlain PT    Electronically signed by Jairo Chamberlain, RIDDHI [id number] on 4/13/2022 at 4:26 PM      Murrell Balance Scale    SITTING TO STANDING    INSTRUCTIONS: Please stand up. Try not to use your hand for support. ( ) 4 able to stand without using hands and stabilize independently  (x) 3 able to stand independently using hands  ( ) 2 able to stand using hands after several tries  ( ) 1 needs minimal aid to stand or stabilize  ( ) 0 needs moderate or maximal assist to stand    STANDING UNSUPPORTED    INSTRUCTIONS: Please stand for two minutes without holding on. (x) 4 able to stand safely for 2 minutes  ( ) 3 able to stand 2 minutes with supervision  ( ) 2 able to stand 30 seconds unsupported  ( ) 1 needs several tries to stand 30 seconds unsupported  ( ) 0 unable to stand 30 seconds unsupported    If a subject is able to stand 2 minutes unsupported, score full points for sitting unsupported. Proceed to item #4. SITTING WITH BACK UNSUPPORTED BUT FEET SUPPORTED ON FLOOR OR ON A STOOL    INSTRUCTIONS: Please sit with arms folded for 2 minutes. (x) 4 able to sit safely and securely for 2 minutes  ( ) 3 able to sit 2 minutes under supervision  ( ) 2 able to able to sit 30 seconds  ( ) 1 able to sit 10 seconds  ( ) 0 unable to sit without support 10 seconds    STANDING TO SITTING    INSTRUCTIONS: Please sit down. ( ) 4 sits safely with minimal use of hands  (x) 3 controls descent by using hands  ( ) 2 uses back of legs against chair to control descent  ( ) 1 sits independently but has uncontrolled descent  ( ) 0 needs assist to sit    TRANSFERS    INSTRUCTIONS: Arrange chair(s) for pivot transfer. Ask subject to transfer one way   toward a seat with armrests and one way  toward a seat without armrests.  You may use two chairs (one with and one without armrests) or a bed and a chair. ( ) 4 able to transfer safely with minor use of hands  (x) 3 able to transfer safely definite need of hands  ( ) 2 able to transfer with verbal cuing and/or supervision  ( ) 1 needs one person to assist  ( ) 0 needs two people to assist or supervise to be safe    STANDING UNSUPPORTED WITH EYES CLOSED    INSTRUCTIONS: Please close your eyes and stand still for 10 seconds. ( ) 4 able to stand 10 seconds safely  (x) 3 able to stand 10 seconds with supervision  ( ) 2 able to stand 3 seconds  ( ) 1 unable to keep eyes closed 3 seconds but stays safely  ( ) 0 needs help to keep from falling    STANDING UNSUPPORTED WITH FEET TOGETHER    INSTRUCTIONS: Place your feet together and stand without holding on. ( ) 4 able to place feet together independently and stand 1 minute safely  (x) 3 able to place feet together independently and stand 1 minute with supervision  ( ) 2 able to place feet together independently but unable to hold for 30 seconds  ( ) 1 needs help to attain position but able to stand 15 seconds feet together  ( ) 0 needs help to attain position and unable to hold for 15 seconds    REACHING FORWARD WITH OUTSTRETCHED ARM WHILE STANDING    INSTRUCTIONS: Lift arm to 90 degrees. Stretch out your fingers and reach forward as far as you can. (Examiner places a ruler at  the end of fingertips when arm is at 90 degrees. Fingers should not touch the ruler while reaching forward. The recorded measure is  the distance forward that the fingers reach while the subject is in the most forward lean position.  When possible, ask subject to use  both arms when reaching to avoid rotation of the trunk.)    ( ) 4 can reach forward confidently 25 cm (10 inches)  ( ) 3 can reach forward 12 cm (5 inches)  (x) 2 can reach forward 5 cm (2 inches)  ( ) 1 reaches forward but needs supervision  ( ) 0 loses balance while trying/requires external support    PICK UP OBJECT FROM THE FLOOR FROM A STANDING POSITION    INSTRUCTIONS:  the shoe/slipper, which is in front of your feet. ( ) 4 able to  slipper safely and easily  (x) 3 able to  slipper but needs supervision  ( ) 2 unable to  but reaches 2-5 cm(1-2 inches) from slipper and keeps balance independently  ( ) 1 unable to  and needs supervision while trying  ( ) 0 unable to try/needs assist to keep from losing balance or falling    TURNING TO LOOK BEHIND OVER LEFT AND RIGHT SHOULDERS WHILE STANDING    INSTRUCTIONS: Turn to look directly behind you over toward the left shoulder. Repeat to the right. (Examiner may pick an object  to look at directly behind the subject to encourage a better twist turn.)    ( ) 4 looks behind from both sides and weight shifts well  (x) 3 looks behind one side only other side shows less weight shift  ( ) 2 turns sideways only but maintains balance  ( ) 1 needs supervision when turning  ( ) 0 needs assist to keep from losing balance or falling    TURN [address]: Turn completely around in a full [place]. Pause. Then turn a full [place] in the other direction. ( ) 4 able to turn 360 degrees safely in 4 seconds or less  ( ) 3 able to turn 360 degrees safely one side only 4 seconds or less  (x) 2 able to turn 360 degrees safely but slowly  ( ) 1 needs close supervision or verbal cuing  ( ) 0 needs assistance while turning    PLACE ALTERNATE FOOT ON STEP OR STOOL WHILE STANDING UNSUPPORTED    INSTRUCTIONS: Place each foot alternately on the step/stool. Continue until each foot has touched the step/stool four times.     ( ) 4 able to stand independently and safely and complete 8 steps in 20 seconds  ( ) 3 able to stand independently and complete 8 steps in > 20 seconds  ( ) 2 able to complete 4 steps without aid with supervision  (x) 1 able to complete > 2 steps needs minimal assist  ( ) 0 needs assistance to keep from falling/unable to try    STANDING UNSUPPORTED ONE FOOT IN FRONT    INSTRUCTIONS: (DEMONSTRATE TO SUBJECT) Place one foot directly in front of the other. If you feel that you cannot place  your foot directly in front, try to step far enough ahead that the heel of your forward foot is ahead of the toes of the other foot. (To  score 3 points, the length of the step should exceed the length of the other foot and the width of the stance should approximate the  subjects normal stride width.)  ( ) 4 able to place foot tandem independently and hold 30 seconds  ( ) 3 able to place foot ahead independently and hold 30 seconds  ( ) 2 able to take small step independently and hold 30 seconds  (x) 1 needs help to step but can hold 15 seconds  ( ) 0 loses balance while stepping or standing    STANDING ON ONE LEG    INSTRUCTIONS: Stand on one leg as long as you can without holding on. ( ) 4 able to lift leg independently and hold > 10 seconds  ( ) 3 able to lift leg independently and hold 5-10 seconds  ( ) 2 able to lift leg independently and hold L 3 seconds  (x) 1 tries to lift leg unable to hold 3 seconds but remains standing independently. ( ) 0 unable to try of needs assist to prevent fall    (36) TOTAL SCORE (Maximum = 56)    Interpretation    41-56 = low fall risk  21-40 = medium fall risk  0 -20 = high fall risk    A change of 8 points is required to reveal a genuine change in function between 2 assessments.

## 2022-04-13 NOTE — PROGRESS NOTES
Outpatient Physical Therapy  [] [facility]    Phone: [phone]   Fax: [phone]   [x] [facility]  Phone: [phone]   Fax: [phone]  [] [facility]              Phone: [phone]   Fax: [phone]     Physical Therapy Progress Note  Date: 2022        Patient Name:  Cheko Charles    :  1970  MRN: [id number]  Restrictions/Precautions:  L-sided weakness; Spasticity  Diagnosis:  CVA  Treatment Diagnosis:  Decreased strength; Decreased ROM; Decreased balance; Difficulty walking  Insurance/Certification information:  [facility]  Referring Physician:  Angelina Frye DO  Plan of care signed (Y/N):    Visit# / total visits:  5   Pain level: 0/10     Time Period for Report:  2022 - 2022  Cancels/No-shows to date:      Plan of Care/Treatment to date:  [x] Therapeutic Exercise    [x] Modalities:  [x] Therapeutic Activity     [] Ultrasound  [] Electrical Stimulation  [x] Gait Training      [] Cervical Traction    [] Lumbar Traction  [x] Neuromuscular Re-education  [] Cold/hotpack [] Iontophoresis  [x] Instruction in HEP      Other:  [x] Manual Therapy       []    [] Aquatic Therapy       []                          Significant Findings At Last Visit/Comments:    Subjective:  Pt feels movement is a little easier. Wants to reduce appointment frequency to 1x/2 weeks. Objective:   Observation:    Bed mobility  Rolling to Left: Minimal assistance  Rolling to Right: Minimal assistance  Supine to Sit: Independent  Sit to Supine: Independent      Transfers  Sit to Stand: Modified independent  Stand to sit: Modified independent    Ambulation  Ambulation?: Yes  More Ambulation?: Yes  Ambulation 1  Surface: level tile  Device: Hemiwalker  Assistance: Modified Independent  Quality of Gait: Fast pace, partial step-through pattern, trunk rotation to L, slow speed, no LOB.   Distance: 200' as part of 6 MWT     Ambulation 2  Surface - 2: level tile  Device 2: Hemiwalker  Assistance 2: Modified 4/13    Long term goals  Time Frame for Long term goals : In 6-8 weeks pt will perform  Long term goal 1: TUG with pratik-walker/SBQC support in 45 s. or less to demonstrate improved gait speed, independence with household mobility - not met 4/13  Long term goal 2: Ambulate 200' with [employer], mod I, to facilitate improved independence ambulating - not met 4/13  Long term goal 3: Murrell Balance Scale, scoring 41 or higher to indicate low fall risk - not met 4/13  Long term goal 4: LLE hip/knee MMTs, scoring grossly 3/5 or better to facilitate improved independence ambulating. - not met 4/13  Long term goal 5: Patient will return to Sit <> stand without increased symptoms or restriction. - met 4/13  Long term goal 6: Patient will be able to ambulate 50 feet without stopping/requiring a rest break. - met 4/13    Patient Goals   Patient goals : to walk without using a cane or walker      Rehab Potential: [] Excellent [] Good [x] Fair  [] Poor     Goal Status:  [] Achieved [x] Partially Achieved  [] Not Achieved     Current Frequency/Duration:  # Days per week: [x] 1 day # Weeks: [] 1 week [] 4 weeks      [] 2 days   [] 2 weeks [x] 5 weeks      [] 3 days   [] 3 weeks [] 6 weeks     Patient Status: [] Continue per initial plan of Care     [] Patient now discharged     [x] Additional visits requested, Please re-certify for additional visits:      Requested frequency/duration:  1X/2 weeks  for 12 weeks    Electronically signed by:  Khadra Peterson PT   Electronically signed by Khadra Peterson, RIDDHI [id number] on 4/13/2022 at 4:34 PM      If you have any questions or concerns, please don't hesitate to call.   Thank you for your referral.    Physician Signature:________________________________Date:__________________  By signing above, therapists plan is approved by physician

## 2022-04-20 ENCOUNTER — NURSE ONLY (OUTPATIENT)
Dept: CARDIOLOGY CLINIC | Age: 52
End: 2022-04-20
Payer: COMMERCIAL

## 2022-04-20 DIAGNOSIS — I63.9 ACUTE CVA (CEREBROVASCULAR ACCIDENT) (HCC): ICD-10-CM

## 2022-04-20 DIAGNOSIS — Z45.09 ENCOUNTER FOR LOOP RECORDER CHECK: ICD-10-CM

## 2022-04-26 ENCOUNTER — OFFICE VISIT (OUTPATIENT)
Dept: ORTHOPEDIC SURGERY | Age: 52
End: 2022-04-26
Payer: COMMERCIAL

## 2022-04-26 DIAGNOSIS — G81.14 LEFT SPASTIC HEMIPLEGIA (HCC): Primary | ICD-10-CM

## 2022-04-26 PROCEDURE — 64642 CHEMODENERV 1 EXTREMITY 1-4: CPT | Performed by: PHYSICAL MEDICINE & REHABILITATION

## 2022-04-26 PROCEDURE — 64643 CHEMODENERV 1 EXTREM 1-4 EA: CPT | Performed by: PHYSICAL MEDICINE & REHABILITATION

## 2022-04-26 PROCEDURE — 95874 GUIDE NERV DESTR NEEDLE EMG: CPT | Performed by: PHYSICAL MEDICINE & REHABILITATION

## 2022-04-26 NOTE — PROGRESS NOTES
[facility]) Physical Medicine and Rehabilitation  Outpatient Progress Note  Campbell Melton. DO Melva    Patient Name: Jaquelin Hood MRN: [id number]   Age: 46 y.o. YOB: 1970   Sex: female      [address] Complaint   Patient presents with    Follow-up     Botox injections       HISTORY OF PRESENT ILLNESS   Jaquelin Hood is a 46 y.o. female admitted to inpatient rehabilitation on 2021 with CVA.  She comes in today for follow up Botox of her left arm. She continues to have spasticity  in her left arm which is limiting her improvement. She has gone to therapy for this issue in the past but continues to hit a wall and is discouraged because she cannot improve her spasticity. Past injections did not show much improvement. Today she would like to increase the dose to improve overall function. Today she is here for evaluation and repeat botox injections. Last injections did not work as well as expected but she wants to try injections today. If they continue to show poor efficacy we will try xeomin in the future.      PAST MEDICAL HISTORY      Past Medical History:   Diagnosis Date    Cerebral artery occlusion with cerebral infarction ([facility] [address] 2021    weakness left side with some short term memory loss     GERD (gastroesophageal reflux disease)     Hernia     Hyperlipidemia     Hypertension     Obesity     Unspecified sleep apnea     CPAP       PAST SURGICAL HISTORY     Past Surgical History:   Procedure Laterality Date     SECTION      CRANIOPLASTY Right 9/3/2021    RIGHT SKULL FLAP REPLACEMENT performed by Kiarra Oakes MD at [address] Right 2021    RIGHT HEMICRANIECTOMY performed by Kiarra Oakes MD at [address]      INSERTABLE CARDIAC MONITOR      LAP BAND  2006    SPLENECTOMY      TOTAL HIP ARTHROPLASTY Left        MEDICATIONS     Current Outpatient Medications   Medication Sig Dispense Refill    tiZANidine (ZANAFLEX) 4 MG tablet Take 1 tablet by mouth 2 times daily 60 tablet 2    oxybutynin (DITROPAN XL) 10 MG extended release tablet Take 1 tablet by mouth daily 30 tablet 3    oxybutynin (DITROPAN XL) 10 MG extended release tablet Take 1 tablet by mouth daily 30 tablet 3    aspirin 81 MG chewable tablet Take 1 tablet by mouth daily Resume when okay with Dr. Nate German 30 tablet 3    gabapentin (NEURONTIN) 300 MG capsule Take 300 mg by mouth 3 times daily.  oxyCODONE-acetaminophen (PERCOCET) 5-325 MG per tablet Take 1 tablet by mouth every 4 hours as needed for Pain.  gabapentin (NEURONTIN) 300 MG capsule Take 1 capsule by mouth 3 times daily for 30 days. 90 capsule 2    levETIRAcetam (KEPPRA) 500 MG tablet Take 1 tablet by mouth 2 times daily 60 tablet 3    atorvastatin (LIPITOR) 80 MG tablet Take 1 tablet by mouth nightly 30 tablet 3    amLODIPine (NORVASC) 5 MG tablet Take 1 tablet by mouth daily 30 tablet 3    diclofenac sodium (VOLTAREN) 1 % GEL Apply 4 g topically 2 times daily 129 g 1    bisacodyl (DULCOLAX) 5 MG EC tablet Take 1 tablet by mouth daily 60 tablet 1    tiZANidine (ZANAFLEX) 2 MG tablet Take 1 tablet by mouth 2 times daily 60 tablet 1    thiamine 100 MG tablet Take 1 tablet by mouth daily 30 tablet 3    omeprazole (PRILOSEC) 20 MG delayed release capsule Take 40 mg by mouth daily      FLUoxetine (PROZAC) 20 MG capsule Take 20 mg by mouth daily      Multiple Vitamin (MULTIVITAMIN PO) Take  by mouth. No current facility-administered medications for this visit.        ALLERGIES   No Known Allergies    FAMILY HISTORY     Family History   Problem Relation Age of Onset    Cancer Mother     Depression Mother     Mental Illness Mother     Stroke Father     Cancer Father         prostate       SOCIAL HISTORY     Social History     Socioeconomic History    Marital status:      Spouse name: Not on file    Number of children: Not on file    Years of education: Not on file    Highest education level: Not on file   Occupational History    Not on file   Tobacco Use    Smoking status: Former Smoker     Packs/day: 1.00     Years: 20.00     Pack years: 20.00     Quit date: 2021     Years since quittin.0    Smokeless tobacco: Never Used   Vaping Use    Vaping Use: Never used   Substance and Sexual Activity    Alcohol use: Not Currently    Drug use: No    Sexual activity: Not on file   Other Topics Concern    Not on file   Social History Narrative    Not on file     Social Determinants of Health     Financial Resource Strain:     Difficulty of Paying Living Expenses: Not on file   Food Insecurity:     Worried About Running Out of Food in the Last Year: Not on file    Lucinda of Food in the Last Year: Not on file   Transportation Needs:     Lack of Transportation (Medical): Not on file    Lack of Transportation (Non-Medical):  Not on file   Physical Activity:     Days of Exercise per Week: Not on file    Minutes of Exercise per Session: Not on file   Stress:     Feeling of Stress : Not on file   Social Connections:     Frequency of Communication with Friends and Family: Not on file    Frequency of Social Gatherings with Friends and Family: Not on file    Attends Hinduism Services: Not on file    Active Member of [address] [place] [address] [employer] or Organizations: Not on file    Attends Club or Organization Meetings: Not on file    Marital Status: Not on file   Intimate Partner Violence:     Fear of Current or Ex-Partner: Not on file    Emotionally Abused: Not on file    Physically Abused: Not on file    Sexually Abused: Not on file   Housing Stability:     Unable to Pay for Housing in the Last Year: Not on file    Number of [place] in the Last Year: Not on file    Unstable Housing in the Last Year: Not on file       REVIEW OF SYSTEMS   General: no fever, chills, night sweats, anorexia, malaise, fatigue, or weight change  Hematologic: no unexplained bleeding or bruising  HEENT:   no nasal congestion, rhinorrhea, sore throat, or facial pain  Respiratory:  no cough, dyspnea, or chest pain  Cardiovascular:  no angina, FORBES, PND, orthopnea, dependent edema, or palpitations  Gastrointestinal:  no nausea, vomiting, diarrhea, constipation, or abdominal pain  Genitourinary:  no urinary urgency, frequency, dysuria, or hematuria  Musculoskeletal: see HPI  Endocrine:  no heat or cold intolerance and no polyphagia, polydipsia, or polyuria  Skin:  no skin eruptions or changing lesions  Neurologic:  no focal weakness, numbness/tingling, tremor, or severe headache. See HPI. See HPI for pertinent positives. PHYSICAL EXAM   Vital Signs: There were no vitals filed for this visit. GeneGeneral appearance: healthy, alert, no distress  Skin: Skin color, texture, turgor normal. No rashes or lesions  HEENT: atraumatic, normocephalic. PERRL  Respiratory: Unlabored breathing  Lymphatic: No adenopathy   Neuro: Alert and oriented, normal distal sensation, normal bilateral DTRs  Vascular: Normal distal capillary and distal pulses  Muskuloskeletal Exam:   Muskuloskeletal Exam: left Arm spasticity  Modified lavon scale  1. Bicep +3  2.  Flexors +3- FCR, FDP, FCU, FDP  3. 3/5 strength in deltoid     LLE  1. +2 gastroc  2. +2 TA     Ambulation- hemiwalker- very slow gait  Requires Left AFO     PROCEDURE      Botox Injection:  The patient consented to the procedure and a time out was performed before proceeding.  The left Bicep, FCU, FCR, FDP, FDS, medial gastroc and lateral gastroc was prepped in the normal sterile fashion.  A 25 gauge needle was introduced into the muscle with EMG guidance and confirmed using EMG.  500 units of Botox was injected into the muscle after the syringe was pulled back and confirmed negative aspiration.  The injection flowed freely, and the patient tolerated the procedure well.  Post injection expectations were reviewed with the

## 2022-04-27 ENCOUNTER — HOSPITAL ENCOUNTER (OUTPATIENT)
Dept: PHYSICAL THERAPY | Age: 52
Setting detail: THERAPIES SERIES
Discharge: HOME | End: 2022-04-27
Payer: COMMERCIAL

## 2022-04-27 PROCEDURE — G2066 INTER DEVC REMOTE 30D: HCPCS | Performed by: INTERNAL MEDICINE

## 2022-04-27 PROCEDURE — 93298 REM INTERROG DEV EVAL SCRMS: CPT | Performed by: INTERNAL MEDICINE

## 2022-04-27 PROCEDURE — 97110 THERAPEUTIC EXERCISES: CPT

## 2022-04-27 NOTE — PROGRESS NOTES
Physical Therapy: Daily Note   Patient: Shahzad Centeno (7 y.o. female)   Examination Date: 2022  No data recorded  No data recorded   :  1970 # of Visits since [facility]: Visit count could not be calculated. Make sure you are using a visit which is associated with an episode. MRN: [id number]  CSN: [id number] Start of Care Date:    Insurance: Payor: Linda Syed  / Plan: Tk Hanna LEAH / Product Type: *No Product type* /   Insurance ID: [id number] - (Commercial) Secondary Insurance (if applicable):    Referring Physician: Timbo Olivarez DO     PCP: Capri Dixon MD Visits to Date/Visits Approved: 6 /      No Show/Cancelled Appts:   /       Medical Diagnosis: Spastic hemiplegia affecting left nondominant side [G81.14] G81.14 - Left spastic hemiplegia ([address]  Treatment Diagnosis: Decreased functional mobility and spasticity. SUBJECTIVE EXAMINATION   Pain Level:  0/10    Patient Comments: Subjective: Pt reports she ambulated more last week. Also reports a fall. Received Botox injections, but has not felt much difference in her leg. She states her arm may feel a bit less stiff. HEP Compliance: Poor        OBJECTIVE EXAMINATION   Restrictions:  Restrictions/Precautions: Fall Risk   No data recorded No data recorded Position Activity Restriction  Other position/activity restrictions: L ALVIN; LUE/LLE spasticity        TREATMENT     Exercises:  Therapeutic exercise (CPT 55967)     Exercise 1: Supine: L hip ABD stretch x 3 min. Exercise 2: Supine: L jeremy stretch x 3 min. Exercise 3: Supine: L HS stretch x 2 min. Exercise 4: Standing: L gastroc stretch x 4 min. (Min A from PT). Exercise 5: Supine: L hip ABD AROM x 20 without resistance, x 30 with yellow t-band resistance. Exercise 6: Hooklying: (B) LE bridge x 20. LLE bent-knee fallout x 20. Exercise 7: Sidelying:  LLE clamshell x 20, cues for technique. Exercise 8: Supine: L hip ABD isometric x 10.     Exercise 9: Standing with // bar support:  mini-squat x 20, Airex between LEs to promote wide JOSETTE. Exercise 10: Standing with // bar support:  squat with lateral weight shift L/R x 10. Standing without UE support: Squat with L/R weight shift x 10. Exercise 11: Forward ambulation x 80' with pratik-walker, supervision, cues to attempt to increase L hip ER, and increase R trunk rotation (minimally successful, but no LOB). Pt Education: PT Education: Cheryl Class of Care,Home Exercise Program,Functional Mobility Training       ASSESSMENT     Assessment: Assessment: Pt reports receiving Botox injection to LUE and LLE. PT noted minimal change in pt's posture, gait mechanics, or spasticity level. She continues to tolerate stretching and exercising well, but is not able to exercise much at home. Recommend continued therapy to further improve her strength, ROM, balance, and independence transferring/ambulating. Body Structures, Functions, Activity Limitations Requiring Skilled Therapeutic Intervention: Decreased functional mobility ,Decreased strength,Decreased ROM,Decreased safe awareness,Decreased fine motor control,Decreased high-level IADLs,Decreased balance,Decreased ADL status,Decreased sensation,Decreased posture    Post-Treatment Pain Level: Activity Tolerance: Patient tolerated treatment well    Therapy Prognosis: [address]   Patient goals : to walk without using a cane or walker  Short Term Goals Completed by in 3-4 weeks pt will perform Current Status Goal Status   Independent in HEP and progression per patient tolerance, in order to prevent re-injury. - not met 4/13   Not Met   Ambulation at least 200' as part of 6 MWT to demonstrated improved tolerance for community mobility - met 4/13   Met                             Long Term Goals Completed by In 6-8 weeks pt will perform Current Status Goal Status   TUG with pratik-walker/SBQC support in 45 s. or less to demonstrate improved gait speed, independence with household mobility - not met 4/13   Not Met   Ambulate 200' with SBQC, mod I, to facilitate improved independence ambulating - not met 4/13   Not Met   Murrell Balance Scale, scoring 41 or higher to indicate low fall risk - not met 4/13   Not Met   LLE hip/knee MMTs, scoring grossly 3/5 or better to facilitate improved independence ambulating. - not met 4/13   Not Met   Patient will return to Sit <> stand without increased symptoms or restriction.  - met 4/13   Met        TREATMENT PLAN   PT Equipment Recommendations  Other: Will continue to assess  Plan weeks: 10 weeks (5 more visits)  Current Treatment Recommendations: Strengthening,ROM,Balance training,Gait training,Stair training,Functional mobility training,Transfer training,Home exercise program,Manual Therapy - Joint Manipulation,Manual Therapy - Soft Tissue Mobilization,Neuromuscular re-education,Modalities           Therapy Time  Individual Time In: 7949       Individual Time Out: 1440  Minutes: 47  Timed Code Treatment Minutes: 45 Minutes   Therapeutic Exercise x 3 charges     Electronically signed by Juancarlos Rea, PT [id number]  on 4/27/2022 at 3:00 PM

## 2022-05-11 ENCOUNTER — HOSPITAL ENCOUNTER (OUTPATIENT)
Dept: PHYSICAL THERAPY | Age: 52
Setting detail: THERAPIES SERIES
Discharge: HOME | End: 2022-05-11
Payer: COMMERCIAL

## 2022-05-11 PROCEDURE — 97032 APPL MODALITY 1+ESTIM EA 15: CPT

## 2022-05-11 PROCEDURE — 97116 GAIT TRAINING THERAPY: CPT

## 2022-05-11 PROCEDURE — 97110 THERAPEUTIC EXERCISES: CPT

## 2022-05-11 NOTE — PROGRESS NOTES
Physical Therapy: Daily Note   Patient: King Membreno (36 y.o. female)   Examination Date: 2022       :  1970 # of Visits since [facility]:      MRN: [id number]  CSN: [id number] Start of Care Date:   No linked episodes   Insurance: Payor: Stanley Galloway  / Plan: Brittany Ceregene LEAH / Product Type: *No Product type* /   Insurance ID: [id number] - (Commercial) Secondary Insurance (if applicable):    Referring Physician: Carolina Tong DO     PCP: Carlyn Webber MD Visits to Date/Visits Approved: 7 /      No Show/Cancelled Appts:   /       Medical Diagnosis: Spastic hemiplegia affecting left nondominant side [G81.14] G81.14 - Left spastic hemiplegia ([address]  Treatment Diagnosis: Decreased functional mobility and spasticity. SUBJECTIVE EXAMINATION   Pain Level:  0/10    Patient Comments: Subjective: Pt reports stretching intermittently at home. Feels like her Botox may be helping some. HEP Compliance: Poor        OBJECTIVE EXAMINATION   Restrictions:  Restrictions/Precautions: Fall Risk   No data recorded No data recorded Position Activity Restriction  Other position/activity restrictions: L ALVIN; LUE/LLE spasticity      TREATMENT     Exercises:  Therapeutic exercise (CPT 37221)     Exercise 1: Standing: L gastroc stretch x 2.5 min. with Mod A from PT for LE stabilization. Exercise 2: RLE/LLE step-up on 6\" step x 10, RUE support, Min A from PT for LLE control. Exercise 3: Ascend/Descend 4 steps with RUE support, non-reciprocal pattern, Min A from PT for LLE control, cues to widen JOSETTE. Gait: (CPT [id number])    Gait Training 1: Ambulation with LBQC, 100', 75', seated rest between trials. Pt moved with kyphotic posture, decreased RLE step length, but was able to increase RLE step length with cueing. L hip remains internally rotated, and L ankle is inverted and plantarflexed in spite of use of AFO.        Modalities  Electric Stimulation, manual:  (CPT [id number]) Treatment Reasoning · Patient Position: Supine hook-lying  · E-stim location: Left  · E-stim specified location: Tibialis Anterior  · E-stim type: [place]  · E-stim via: 2 Electrode Pads  · E-stim Parameters: 100 bps, 45 mA, 4/12 ratio. Treatment time: 11 min. · Reason for constant contact: Cues to activate ankle DF with stimulation, intermittent gastroc stretch (during Off time). · Post treatment skin assessment: Intact  · Limitations addressed: Tissue extensibility,Joint mobility  · Therapist provided: Assistance,Verbal cuing,Manual cuing,Tactile cuing  · Progressed: Duration       Unbillable time (minutes): 3               Pt Education: PT Education: Manuela Ocampo Safety,PT Role,Orientation,Gait Training,Plan of Care,Home Exercise Program,Functional Mobility Training       ASSESSMENT     Assessment: Assessment: Pt tolerated NMES to L anterior Tib for increasing Ankle DF strength and ROM. She was able to complete step-ups and stair climb exercise with Min A, and tolerated stretching to her L gastroc. She did well trialing ambulation with [facility], and may benefit from use of this device at home. Will assess again at next visit. Recommend continued therapy to further improve her strength, balance, ROM, gait mechanics, and independence ambulating. Body Structures, Functions, Activity Limitations Requiring Skilled Therapeutic Intervention: Decreased functional mobility ,Decreased strength,Decreased ROM,Decreased safe awareness,Decreased fine motor control,Decreased high-level IADLs,Decreased balance,Decreased ADL status,Decreased sensation,Decreased posture    Post-Treatment Pain Level: Activity Tolerance: Patient tolerated treatment well    Therapy Prognosis: [address]   Patient goals : to walk without using a cane or walker  Short Term Goals Completed by in 3-4 weeks pt will perform Current Status Goal Status   Independent in HEP and progression per patient tolerance, in order to prevent re-injury.  - not met 4/13 Not Met   Ambulation at least 200' as part of 6 MWT to demonstrated improved tolerance for community mobility - met 4/13   Met                             Long Term Goals Completed by In 6-8 weeks pt will perform Current Status Goal Status   TUG with pratik-walker/SBQC support in 45 s. or less to demonstrate improved gait speed, independence with household mobility - not met 4/13   Not Met   Ambulate 200' with SBQC, mod I, to facilitate improved independence ambulating - not met 4/13   Not Met   Murrell Balance Scale, scoring 41 or higher to indicate low fall risk - not met 4/13   Not Met   LLE hip/knee MMTs, scoring grossly 3/5 or better to facilitate improved independence ambulating. - not met 4/13   Not Met   Patient will return to Sit <> stand without increased symptoms or restriction.  - met 4/13   Met        TREATMENT PLAN   PT Equipment Recommendations  Other: Will continue to assess  Plan weeks: 8 weeks (4 more visits)  Current Treatment Recommendations: Strengthening,ROM,Balance training,Gait training,Stair training,Functional mobility training,Transfer training,Home exercise program,Manual Therapy - Joint Manipulation,Manual Therapy - Soft Tissue Mobilization,Neuromuscular re-education,Modalities           Therapy Time  Individual Time In:       4250  Individual Time Out:  1430  Minutes:  40   E-stim Manual x 1 charge  Therapeutic Exercise x 1 charge  Gait training x 1 charge     Electronically signed by Gavin Reyes, PT [id number]  on 5/11/2022 at 4:10 PM

## 2022-05-20 ENCOUNTER — TELEPHONE (OUTPATIENT)
Dept: ORTHOPEDIC SURGERY | Age: 52
End: 2022-05-20

## 2022-05-20 NOTE — TELEPHONE ENCOUNTER
General Question     Subject: PATIENT STATES SHE HAS QUESTIONS REGARDING BOTOX.  PLEASE ADVISE   Patient: Daisha Linn  Contact Number: [phone]

## 2022-05-25 ENCOUNTER — HOSPITAL ENCOUNTER (OUTPATIENT)
Dept: PHYSICAL THERAPY | Age: 52
Setting detail: THERAPIES SERIES
Discharge: HOME | End: 2022-05-25
Payer: COMMERCIAL

## 2022-05-25 ENCOUNTER — TELEPHONE (OUTPATIENT)
Dept: ADMINISTRATIVE | Age: 52
End: 2022-05-25

## 2022-05-25 PROCEDURE — 97530 THERAPEUTIC ACTIVITIES: CPT

## 2022-05-25 PROCEDURE — 97110 THERAPEUTIC EXERCISES: CPT

## 2022-05-25 NOTE — PLAN OF CARE
[facility]  [address] [address]  Dept: [phone]  Loc: [phone]    PHYSICAL THERAPY PLAN OF CARE: DISCHARGE    Patient: Lluvia Dupere (20 y.o. female)   Examination Date:   Plan of Care Certification Period: 2022 to        :  1970  MRN: [id number]  CSN: [id number]   Insurance: Payor: Fly Parrish / Plan: Anil Isabel [place] LEAH / Product Type: *No Product type* /   Insurance ID: [id number] - (Commercial) Secondary Insurance (if applicable):    Referring Physician: Eduardo Méndez DO     PCP: Mickie Sadlana MD Visits to Date/Visits Approved:   ( approved -  used to date)    No Show/Cancelled Appts:   /       Medical Diagnosis: Spastic hemiplegia affecting left nondominant side [G81.14] G81.14 - Left spastic hemiplegia ([facility])  Treatment Diagnosis: Decreased functional mobility and spasticity. ASSESSMENT     Assessment: Assessment: Pt has been unable to improve her L hip/knee strength to goal status (3+/5), and continues to exhibit minimal motion in L ankle (only trace ankle PF noted today). She has been attempting to complete her HEP, but notes that it is difficult based on her physical impairments. She  nearly met her goal of completing the TUG in 45 s. (finished in 48 s. today), but is not yet independent with SBQC d/t limited repetitions, and persistent spasticity in her L lower leg. At this time, pt will be discharged from OP PT. PT will discuss potential [facility] to determine if pt may benefit from this device. She has also requested information regarding purchase of a SBQC (will email - pt apparently purchased the wrong cane off [employer]).   Body Structures, Functions, Activity Limitations Requiring Skilled Therapeutic Intervention: Decreased functional mobility ,Decreased strength,Decreased ROM,Decreased safe awareness,Decreased fine motor control,Decreased high-level IADLs,Decreased balance,Decreased ADL status,Decreased sensation,Decreased posture    Post-Treatment Pain Level: Activity Tolerance: Patient tolerated treatment well    Therapy Prognosis: [address]   Patient goals : to walk without using a cane or walker - not met 5/25  Short Term Goals Completed by in 3-4 weeks pt will perform Current Status Goal Status   Independent in HEP and progression per patient tolerance, in order to prevent re-injury. - not met 4/13   Not Met   Ambulation at least 200' as part of 6 MWT to demonstrated improved tolerance for community mobility - met 4/13   Met                             Long Term Goals Completed by In 6-8 weeks pt will perform Current Status Goal Status   TUG with pratik-walker/SBQC support in 45 s. or less to demonstrate improved gait speed, independence with household mobility - not met 4/13; not met 5/25   Not Met   Ambulate 200' with SBQC, mod I, to facilitate improved independence ambulating - not met 4/13; not met 5/25   Not Met   Murrell Balance Scale, scoring 41 or higher to indicate low fall risk - not met 4/13   Not Met   LLE hip/knee MMTs, scoring grossly 3/5 or better to facilitate improved independence ambulating. - not met 4/13; not met 5/25   Not Met   Patient will return to Sit <> stand without increased symptoms or restriction. - met 4/13   Met        TREATMENT PLAN   Current Treatment Recommendations: Strengthening,ROM,Balance training,Gait training,Stair training,Functional mobility training,Transfer training,Home exercise program,Manual Therapy - Joint Manipulation,Manual Therapy - Soft Tissue Mobilization,Neuromuscular re-education,Modalities   Specific Instructions for Next Treatment: FES;      Patient Status: [x] Discharge PT. Recommend patient continue with HEP. Signature: Electronically signed by Michalea Kam PT [id number] on 5/25/2022 at 4:37 PM.     If you have any questions or concerns, please don't hesitate to call.   Thank you for your referral!

## 2022-05-25 NOTE — PROGRESS NOTES
Physical Therapy: Daily Note   Patient: Brandon Reyes (17 y.o. female)   Examination Date: 2022       :  1970 # of Visits since [facility]: 8     MRN: [id number]  CSN: [id number] Start of Care Date:      Insurance: Payor: Fantasma Graham / Plan: Elizabeth Alvarez LEAH / Product Type: *No Product type* /   Insurance ID: [id number] - (Commercial) Secondary Insurance (if applicable):    Referring Physician: Nica Clemente DO     PCP: Barbara Andino MD Visits to Date/Visits Approved:   (30 TE approved - 14 used to date)    No Show/Cancelled Appts:   /       Medical Diagnosis: Spastic hemiplegia affecting left nondominant side [G81.14] G81.14 - Left spastic hemiplegia (HCC)  Treatment Diagnosis: Decreased functional mobility and spasticity. SUBJECTIVE EXAMINATION   Pain Level:  0/10    Patient Comments: Subjective: Pt does not feel her strength has changed much. Denies pain. HEP Compliance: Fair        OBJECTIVE EXAMINATION   Restrictions:  Restrictions/Precautions: Fall Risk   No data recorded No data recorded Position Activity Restriction  Other position/activity restrictions: L ALVIN; LUE/LLE spasticity      Bed mobility  Sit > supine: Independent  Supine > sit: Independent  Rolling L/R: Independent    Transfers  Sit > stand: Modified independent  Stand > sit: Modified independent  Stand-pivot transfer: Modified independent with pratik-walker      Left Strength       Strength LLE  L Hip Flexion: 3+/5  L Hip Extension: 2/5  L Hip ABduction: 2+/5  L Knee Flexion: 2-/5  L Knee Extension: 4/5  L Ankle Dorsiflexion: 0/5  L Ankle Plantar Flexion: 1/5  L Ankle Inversion: 0/5  L Ankle Eversion: 0/5         TREATMENT     Gait: (CPT [id number])  Treatment Reasoning    Gait Training 1: Ambulated 20' with 2 turns as part of TUG. Supervision when moving with pratik-walker. 56 s. to complete.  On second trial, pt able to complete in 48 s. with cues to remind her to return to the chair as quickly as control,Decreased high-level IADLs,Decreased balance,Decreased ADL status,Decreased sensation,Decreased posture    Post-Treatment Pain Level: Activity Tolerance: Patient tolerated treatment well    Therapy Prognosis: [address] [place] [address]   Patient goals : to walk without using a cane or walker  Short Term Goals Completed by in 3-4 weeks pt will perform Current Status Goal Status   Independent in HEP and progression per patient tolerance, in order to prevent re-injury. - not met 4/13   Not Met   Ambulation at least 200' as part of 6 MWT to demonstrated improved tolerance for community mobility - met 4/13   Met                             Long Term Goals Completed by In 6-8 weeks pt will perform Current Status Goal Status   TUG with pratik-walker/SBQC support in 45 s. or less to demonstrate improved gait speed, independence with household mobility - not met 4/13; not met 5/25   Not Met   Ambulate 200' with SBQC, mod I, to facilitate improved independence ambulating - not met 4/13; not met 5/25   Not Met   Murrell Balance Scale, scoring 41 or higher to indicate low fall risk - not met 4/13   Not Met   LLE hip/knee MMTs, scoring grossly 3/5 or better to facilitate improved independence ambulating. - not met 4/13; not met 5/25   Not Met   Patient will return to Sit <> stand without increased symptoms or restriction. - met 4/13   Met        TREATMENT PLAN   Current Treatment Recommendations: Strengthening,ROM,Balance training,Gait training,Stair training,Functional mobility training,Transfer training,Home exercise program,Manual Therapy - Joint Manipulation,Manual Therapy - Soft Tissue Mobilization,Neuromuscular re-education,Modalities   Specific Instructions for Next Treatment: FES;        Therapy Time  Individual Time In: 9944       Individual Time Out: 1500  Minutes: 55  Timed Code Treatment Minutes: 40 Minutes   Therapeutic Activity x 2 charges  Therapeutic Exercise x 1 charge  Electronically signed by Dyke Moritz, [place] [id number]  on 5/25/2022 at 4:29 PM   POC NOTE

## 2022-06-03 ENCOUNTER — TELEPHONE (OUTPATIENT)
Dept: ORTHOPEDIC SURGERY | Age: 52
End: 2022-06-03

## 2022-06-03 NOTE — TELEPHONE ENCOUNTER
General Question     Subject: Patient is inquiring if botox is a maintenance procedure \"like something she has to do every 4 months\".     Patient: Mary Eng Number: [phone]

## 2022-06-09 ENCOUNTER — NURSE ONLY (OUTPATIENT)
Dept: CARDIOLOGY CLINIC | Age: 52
End: 2022-06-09
Payer: COMMERCIAL

## 2022-06-09 DIAGNOSIS — Z45.09 ENCOUNTER FOR LOOP RECORDER CHECK: ICD-10-CM

## 2022-06-09 DIAGNOSIS — I63.9 ACUTE CVA (CEREBROVASCULAR ACCIDENT) (HCC): ICD-10-CM

## 2022-06-10 PROCEDURE — 93298 REM INTERROG DEV EVAL SCRMS: CPT | Performed by: INTERNAL MEDICINE

## 2022-06-10 PROCEDURE — G2066 INTER DEVC REMOTE 30D: HCPCS | Performed by: INTERNAL MEDICINE

## 2022-06-13 NOTE — TELEPHONE ENCOUNTER
Tried to call patient back, unable to leave voicemail. Her Botox is given every 3 months for maintenance.

## 2022-07-21 DIAGNOSIS — G81.14 LEFT SPASTIC HEMIPLEGIA (HCC): ICD-10-CM

## 2022-07-21 DIAGNOSIS — R25.2 SPASTICITY AS LATE EFFECT OF CEREBROVASCULAR ACCIDENT (CVA): ICD-10-CM

## 2022-07-21 DIAGNOSIS — I69.398 SPASTICITY AS LATE EFFECT OF CEREBROVASCULAR ACCIDENT (CVA): ICD-10-CM

## 2022-07-27 ENCOUNTER — TELEPHONE (OUTPATIENT)
Dept: ORTHOPEDIC SURGERY | Age: 52
End: 2022-07-27

## 2022-07-27 ENCOUNTER — OFFICE VISIT (OUTPATIENT)
Dept: ORTHOPEDIC SURGERY | Age: 52
End: 2022-07-27
Payer: COMMERCIAL

## 2022-07-27 VITALS — BODY MASS INDEX: 33.89 KG/M2 | HEIGHT: 66 IN

## 2022-07-27 DIAGNOSIS — I63.9 ACUTE CVA (CEREBROVASCULAR ACCIDENT) (HCC): ICD-10-CM

## 2022-07-27 DIAGNOSIS — G81.14 LEFT SPASTIC HEMIPLEGIA (HCC): Primary | ICD-10-CM

## 2022-07-27 PROCEDURE — 99214 OFFICE O/P EST MOD 30 MIN: CPT | Performed by: PHYSICAL MEDICINE & REHABILITATION

## 2022-07-27 NOTE — TELEPHONE ENCOUNTER
PAULINE INFORMATION    DRUG:  BOTOX     ADMIN:  [id number], [id number], [id number]    DX CODE: G81.14    UNITS:  400 units     LAST OV DATE: 04/26/22    Patient has changed insurance to [employer]. I put in a prior auth for coverage for 500 units of Botox and this was denied. Denial letter scanned under media. Denial due to insurance only covers up to 400 units every 3 months. Please submit new insurance approval for 400 units.

## 2022-07-27 NOTE — PROGRESS NOTES
[facility]) Physical Medicine and Rehabilitation  Outpatient Progress Note  Joel Frye DO    Patient Name: Rory Jarrett MRN: [id number]   Age: 46 y.o. YOB: 1970   Sex: female      [address] Complaint   Patient presents with    Follow-up     Patient is having difficulty with her left leg brace. She feels like it is making her foot turn and wants to talk about a new brace       HISTORY OF PRESENT ILLNESS   Rory Jarrett is a 46 y.o. female admitted to inpatient rehabilitation on 2021 with CVA. She comes in today for follow up Botox of her left arm. She continues to have spasticity  in her left arm which is limiting her improvement. She has gone to therapy for this issue in the past but continues to hit a wall and is discouraged because she cannot improve her spasticity. Past injections did not show much improvement. Today she returns to check on the insurance status of her botox injections, Discuss bioness arm and leg systems to improve her overall therapy function and discuss her AFO.      PAST MEDICAL HISTORY      Past Medical History:   Diagnosis Date    Cerebral artery occlusion with cerebral infarction ([address] 2021    weakness left side with some short term memory loss     GERD (gastroesophageal reflux disease)     Hernia     Hyperlipidemia     Hypertension     Obesity     Unspecified sleep apnea     CPAP       PAST SURGICAL HISTORY     Past Surgical History:   Procedure Laterality Date     SECTION      CRANIOPLASTY Right 9/3/2021    RIGHT SKULL FLAP REPLACEMENT performed by Alfreda Downs MD at [facility] Right 2021    RIGHT HEMICRANIECTOMY performed by Alfreda Downs MD at [facility]  2006    SPLENECTOMY  2000    TOTAL HIP ARTHROPLASTY Left        MEDICATIONS     Current Outpatient Medications   Medication Sig Dispense Refill    tiZANidine (ZANAFLEX) 4 MG tablet Take 1 tablet by mouth 2 times daily 60 tablet 2    oxybutynin (DITROPAN XL) 10 MG extended release tablet Take 1 tablet by mouth daily 30 tablet 3    oxybutynin (DITROPAN XL) 10 MG extended release tablet Take 1 tablet by mouth daily 30 tablet 3    aspirin 81 MG chewable tablet Take 1 tablet by mouth daily Resume when okay with Dr. Karli Poe 30 tablet 3    gabapentin (NEURONTIN) 300 MG capsule Take 300 mg by mouth 3 times daily. oxyCODONE-acetaminophen (PERCOCET) 5-325 MG per tablet Take 1 tablet by mouth every 4 hours as needed for Pain.      levETIRAcetam (KEPPRA) 500 MG tablet Take 1 tablet by mouth 2 times daily 60 tablet 3    atorvastatin (LIPITOR) 80 MG tablet Take 1 tablet by mouth nightly 30 tablet 3    amLODIPine (NORVASC) 5 MG tablet Take 1 tablet by mouth daily 30 tablet 3    diclofenac sodium (VOLTAREN) 1 % GEL Apply 4 g topically 2 times daily 129 g 1    bisacodyl (DULCOLAX) 5 MG EC tablet Take 1 tablet by mouth daily 60 tablet 1    tiZANidine (ZANAFLEX) 2 MG tablet Take 1 tablet by mouth 2 times daily 60 tablet 1    thiamine 100 MG tablet Take 1 tablet by mouth daily 30 tablet 3    omeprazole (PRILOSEC) 20 MG delayed release capsule Take 40 mg by mouth daily      FLUoxetine (PROZAC) 20 MG capsule Take 20 mg by mouth daily      Multiple Vitamin (MULTIVITAMIN PO) Take  by mouth.        gabapentin (NEURONTIN) 300 MG capsule Take 1 capsule by mouth 3 times daily for 30 days. 90 capsule 2     No current facility-administered medications for this visit.        ALLERGIES   No Known Allergies    FAMILY HISTORY     Family History   Problem Relation Age of Onset    Cancer Mother     Depression Mother     Mental Illness Mother     Stroke Father     Cancer Father         prostate       SOCIAL HISTORY     Social History     Socioeconomic History    Marital status:    Tobacco Use    Smoking status: Former     Packs/day: 1.00     Years: 20.00     Pack years: 20.00     Types: Cigarettes     Quit date: 2021     Years since quittin.2    Smokeless tobacco: Never   Vaping Use    Vaping Use: Never used   Substance and Sexual Activity    Alcohol use: Not Currently    Drug use: No       REVIEW OF SYSTEMS   General: no fever, chills, night sweats, anorexia, malaise, fatigue, or weight change  Hematologic:  no unexplained bleeding or bruising  HEENT:   no nasal congestion, rhinorrhea, sore throat, or facial pain  Respiratory:  no cough, dyspnea, or chest pain  Cardiovascular:  no angina, FORBES, PND, orthopnea, dependent edema, or palpitations  Gastrointestinal:  no nausea, vomiting, diarrhea, constipation, or abdominal pain  Genitourinary:  no urinary urgency, frequency, dysuria, or hematuria  Musculoskeletal: see HPI  Endocrine:  no heat or cold intolerance and no polyphagia, polydipsia, or polyuria  Skin:  no skin eruptions or changing lesions  Neurologic:  no focal weakness, numbness/tingling, tremor, or severe headache. See HPI. See HPI for pertinent positives. PHYSICAL EXAM   Vital Signs:   Vitals:    22 1319   Height: 5' 6\" (1.676 m)       General appearance: healthy, alert, no distress  Skin: Skin color, texture, turgor normal. No rashes or lesions  HEENT: atraumatic, normocephalic. PERRL  Respiratory: Unlabored breathing  Lymphatic: No adenopathy  Neuro: Alert and oriented, normal distal sensation, normal bilateral DTRs  Vascular: Normal distal capillary and distal pulses  Muskuloskeletal Exam:   Muskuloskeletal Exam: left Arm spasticity  Modified lavon scale  1. Bicep +3  2. Flexors +3- FCR, FDP, FCU, FDP  3. 3/5 strength in deltoid     LLE  1. +2 gastroc  2. +2 TA     Ambulation- hemiwalker- very slow gait  Requires Left AFO         IMPRESSION     1. Left spastic hemiplegia ([address]    2.  Acute CVA (cerebrovascular accident) [facility])         PLAN   I had a lengthy discussion with patient today regarding diagnosis and treatment options and recommendations. New AFO- same basic shape but AFO that can accommodate bioness l300 unit. Lower height on gastroc. Lightweight materials- send to   Requires bioness l300 and n200 for LE and UE respectively. Tested with therapy which showed great improvements. Will recommend these devices to further her current therapy. Await botox approval- adjustments to 400 units in UE and LE. FOLLOWUP     Return in about 3 months (around 10/27/2022). Orders Placed This Encounter   Procedures    External Referral For Orthotics     Referral Priority:   Routine     Referral Type:   Eval and Treat     Referral Reason:   Specialty Services Required     Requested Specialty:   Orthotics Supplier     Number of Visits Requested:   1      No orders of the defined types were placed in this encounter.       Patient was instructed on appropriate use of braces, participation in home exercise programs, healthy lifestyle choices and weight loss as appropriate     Chirag Frye, DO

## 2022-07-28 NOTE — TELEPHONE ENCOUNTER
Submitted PA for BOTOX  Via FAX TO [facility] ( [id number] ) STATUS: APPROVED. If this requires a response please respond to the pool ( P [facility] [address]). Thank you please advise patient.

## 2022-07-29 ENCOUNTER — TELEPHONE (OUTPATIENT)
Dept: ORTHOPEDIC SURGERY | Age: 52
End: 2022-07-29

## 2022-07-29 NOTE — TELEPHONE ENCOUNTER
General Question     Subject: PATIENT STATES HER PHARMACY IS NEEDING A PRESCRIPTION APPROVAL. LUIS FERNANDO HAWK IN DENT. PLEASE ADVISE.    Anne Marie Negron Pellet Number: [phone]

## 2022-08-01 ENCOUNTER — TELEPHONE (OUTPATIENT)
Dept: ORTHOPEDIC SURGERY | Age: 52
End: 2022-08-01

## 2022-08-01 DIAGNOSIS — R25.2 SPASTICITY AS LATE EFFECT OF CEREBROVASCULAR ACCIDENT (CVA): ICD-10-CM

## 2022-08-01 DIAGNOSIS — I69.398 SPASTICITY AS LATE EFFECT OF CEREBROVASCULAR ACCIDENT (CVA): ICD-10-CM

## 2022-08-01 DIAGNOSIS — G81.14 LEFT SPASTIC HEMIPLEGIA (HCC): Primary | ICD-10-CM

## 2022-08-01 RX ORDER — TIZANIDINE HYDROCHLORIDE 4 MG/1
4 CAPSULE, GELATIN COATED ORAL 2 TIMES DAILY
Qty: 60 CAPSULE | Refills: 2 | Status: SHIPPED
Start: 2022-08-01 | End: 2022-08-03

## 2022-08-03 DIAGNOSIS — I69.398 SPASTICITY AS LATE EFFECT OF CEREBROVASCULAR ACCIDENT (CVA): Primary | ICD-10-CM

## 2022-08-03 DIAGNOSIS — R25.2 SPASTICITY AS LATE EFFECT OF CEREBROVASCULAR ACCIDENT (CVA): Primary | ICD-10-CM

## 2022-08-03 DIAGNOSIS — G81.14 LEFT SPASTIC HEMIPLEGIA (HCC): ICD-10-CM

## 2022-08-03 RX ORDER — TIZANIDINE 4 MG/1
4 TABLET ORAL 2 TIMES DAILY
Qty: 60 TABLET | Refills: 2 | Status: SHIPPED | OUTPATIENT
Start: 2022-08-03

## 2022-08-05 ENCOUNTER — NURSE ONLY (OUTPATIENT)
Dept: CARDIOLOGY CLINIC | Age: 52
End: 2022-08-05
Payer: COMMERCIAL

## 2022-08-05 DIAGNOSIS — I63.9 CRYPTOGENIC STROKE (HCC): Primary | ICD-10-CM

## 2022-08-05 DIAGNOSIS — Z45.09 ENCOUNTER FOR LOOP RECORDER CHECK: ICD-10-CM

## 2022-08-05 PROCEDURE — G2066 INTER DEVC REMOTE 30D: HCPCS | Performed by: INTERNAL MEDICINE

## 2022-08-05 PROCEDURE — 93298 REM INTERROG DEV EVAL SCRMS: CPT | Performed by: INTERNAL MEDICINE

## 2022-08-05 NOTE — PROGRESS NOTES
ILR for cryptogenic stroke. No AFib recorded to date. Remote transmission received from patient's monitor at home. Remote Confirm report shows no arrhythmias. EP physician to review. See PACEART report under Cardiology tab. Will continue to monitor remotely.     (End of 31-day monitoring period 8/5/22)

## 2022-08-07 NOTE — PROGRESS NOTES
Patient is in bed and resting at this time, vitals stable, pain 2/10 with head, gave scheduled Tylenol. Patient's CPAP is on. Alert and oriented x 4, call light with in reach and bed alarm on. To Rosanne LOERA [facility]

## 2022-08-09 ENCOUNTER — OFFICE VISIT (OUTPATIENT)
Dept: ORTHOPEDIC SURGERY | Age: 52
End: 2022-08-09
Payer: COMMERCIAL

## 2022-08-09 VITALS — WEIGHT: 205 LBS | HEIGHT: 66 IN | BODY MASS INDEX: 32.95 KG/M2

## 2022-08-09 DIAGNOSIS — R25.2 SPASTICITY AS LATE EFFECT OF CEREBROVASCULAR ACCIDENT (CVA): ICD-10-CM

## 2022-08-09 DIAGNOSIS — G81.14 LEFT SPASTIC HEMIPLEGIA (HCC): Primary | ICD-10-CM

## 2022-08-09 DIAGNOSIS — I69.398 SPASTICITY AS LATE EFFECT OF CEREBROVASCULAR ACCIDENT (CVA): ICD-10-CM

## 2022-08-09 PROCEDURE — 99214 OFFICE O/P EST MOD 30 MIN: CPT | Performed by: PHYSICAL MEDICINE & REHABILITATION

## 2022-08-09 PROCEDURE — 64644 CHEMODENERV 1 EXTREM 5/> MUS: CPT | Performed by: PHYSICAL MEDICINE & REHABILITATION

## 2022-08-09 PROCEDURE — 64643 CHEMODENERV 1 EXTREM 1-4 EA: CPT | Performed by: PHYSICAL MEDICINE & REHABILITATION

## 2022-08-09 PROCEDURE — 95874 GUIDE NERV DESTR NEEDLE EMG: CPT | Performed by: PHYSICAL MEDICINE & REHABILITATION

## 2022-08-09 NOTE — PROGRESS NOTES
[facility]) Physical Medicine and Rehabilitation  Outpatient Progress Note  Gin Valdez. DO Melva    Patient Name: Bryce Stallworth MRN: [id number]   Age: 46 y.o. YOB: 1970   Sex: female      [address] Complaint   Patient presents with    Follow-up     Botox injections Sarmad, brace is really bothering her       HISTORY OF PRESENT ILLNESS   Bryce Stallworth is a 46 y.o. female admitted to inpatient rehabilitation on 2021 with CVA. She comes in today for follow up Botox of her left arm. She continues to have spasticity  in her left arm which is limiting her improvement. She has gone to therapy for this issue in the past but continues to hit a wall and is discouraged because she cannot improve her spasticity. She feels like she is showing some improvement in therapy. Today she returns for evaluation of her AFO and repeat botox injections. PAST MEDICAL HISTORY      Past Medical History:   Diagnosis Date    Cerebral artery occlusion with cerebral infarction ([facility] [address] 2021    weakness left side with some short term memory loss     GERD (gastroesophageal reflux disease)     Hernia     Hyperlipidemia     Hypertension     Obesity     Unspecified sleep apnea     CPAP       PAST SURGICAL HISTORY     Past Surgical History:   Procedure Laterality Date     SECTION  2004    CRANIOPLASTY Right 9/3/2021    RIGHT SKULL FLAP REPLACEMENT performed by Tiffany Argueta MD at [address] [place] [address] Right 2021    RIGHT HEMICRANIECTOMY performed by Tiffany [facility]gueta MD at [facility]  2006    SPLENECTOMY  2000    TOTAL HIP ARTHROPLASTY Left 2001       MEDICATIONS     Current Outpatient Medications   Medication Sig Dispense Refill    tiZANidine (ZANAFLEX) 4 MG tablet Take 1 tablet by mouth in the morning and 1 tablet before bedtime.  60 tablet 2    tiZANidine (ZANAFLEX) 4 MG tablet Take 1 tablet by mouth 2 times daily 60 tablet 2    oxybutynin (DITROPAN XL) 10 MG extended release tablet Take 1 tablet by mouth daily 30 tablet 3    oxybutynin (DITROPAN XL) 10 MG extended release tablet Take 1 tablet by mouth daily 30 tablet 3    aspirin 81 MG chewable tablet Take 1 tablet by mouth daily Resume when okay with Dr. Anaid Still 30 tablet 3    gabapentin (NEURONTIN) 300 MG capsule Take 300 mg by mouth 3 times daily. oxyCODONE-acetaminophen (PERCOCET) 5-325 MG per tablet Take 1 tablet by mouth every 4 hours as needed for Pain.      levETIRAcetam (KEPPRA) 500 MG tablet Take 1 tablet by mouth 2 times daily 60 tablet 3    atorvastatin (LIPITOR) 80 MG tablet Take 1 tablet by mouth nightly 30 tablet 3    amLODIPine (NORVASC) 5 MG tablet Take 1 tablet by mouth daily 30 tablet 3    diclofenac sodium (VOLTAREN) 1 % GEL Apply 4 g topically 2 times daily 129 g 1    bisacodyl (DULCOLAX) 5 MG EC tablet Take 1 tablet by mouth daily 60 tablet 1    tiZANidine (ZANAFLEX) 2 MG tablet Take 1 tablet by mouth 2 times daily 60 tablet 1    thiamine 100 MG tablet Take 1 tablet by mouth daily 30 tablet 3    omeprazole (PRILOSEC) 20 MG delayed release capsule Take 40 mg by mouth daily      FLUoxetine (PROZAC) 20 MG capsule Take 20 mg by mouth daily      Multiple Vitamin (MULTIVITAMIN PO) Take  by mouth.        gabapentin (NEURONTIN) 300 MG capsule Take 1 capsule by mouth 3 times daily for 30 days. 90 capsule 2     No current facility-administered medications for this visit.        ALLERGIES   No Known Allergies    FAMILY HISTORY     Family History   Problem Relation Age of Onset    Cancer Mother     Depression Mother     Mental Illness Mother     Stroke Father     Cancer Father         prostate       SOCIAL HISTORY     Social History     Socioeconomic History    Marital status:    Tobacco Use    Smoking status: Former     Packs/day: 1.00     Years: 20.00     Pack years: 20.00     Types: Cigarettes     Quit date: 2021     Years since quittin.3    Smokeless tobacco: Never   Vaping Use    Vaping Use: Never used   Substance and Sexual Activity    Alcohol use: Not Currently    Drug use: No       REVIEW OF SYSTEMS   General: no fever, chills, night sweats, anorexia, malaise, fatigue, or weight change  Hematologic:  no unexplained bleeding or bruising  HEENT:   no nasal congestion, rhinorrhea, sore throat, or facial pain  Respiratory:  no cough, dyspnea, or chest pain  Cardiovascular:  no angina, FOBRES, PND, orthopnea, dependent edema, or palpitations  Gastrointestinal:  no nausea, vomiting, diarrhea, constipation, or abdominal pain  Genitourinary:  no urinary urgency, frequency, dysuria, or hematuria  Musculoskeletal: see HPI  Endocrine:  no heat or cold intolerance and no polyphagia, polydipsia, or polyuria  Skin:  no skin eruptions or changing lesions  Neurologic:  no focal weakness, numbness/tingling, tremor, or severe headache. See HPI. See HPI for pertinent positives. PHYSICAL EXAM   Vital Signs:   Vitals:    22 1255   Weight: 205 lb (93 kg)   Height: 5' 6\" (1.676 m)             IMPRESSION     1. Spasticity as late effect of cerebrovascular accident (CVA)    2. Left spastic hemiplegia (HCC)       PROCEDURE      Botox Injection:  The patient consented to the procedure and a time out was performed before proceeding. The left Bicep, FCU, FCR, FDP, FDS, medial gastroc and lateral gastroc was prepped in the normal sterile fashion. A 25 gauge needle was introduced into the muscle with EMG guidance and confirmed using EMG. 400 units of Botox was injected into the muscle after the syringe was pulled back and confirmed negative aspiration. The injection flowed freely, and the patient tolerated the procedure well. Post injection expectations were reviewed with the patient. PLAN   I had a lengthy discussion with patient today regarding diagnosis and treatment options and recommendations.     1. Botox injections- left arm    Bicep- 100 units  FDS- 40 units  FDP- 40 units  FCU- 20 units  FCR- 50 units  Left LE- Medial/lateral gastroc/solus- 75 units each- 150 total      400 units total - 0 waste        2. New AFO script for       FOLLOWUP     No follow-ups on file. No orders of the defined types were placed in this encounter.      Orders Placed This Encounter   Medications    onabotulinumtoxin A (BOTOX) injection 400 Units       Patient was instructed on appropriate use of braces, participation in home exercise programs, healthy lifestyle choices and weight loss as appropriate     Chirag Frye, DO

## 2022-09-07 ENCOUNTER — NURSE ONLY (OUTPATIENT)
Dept: CARDIOLOGY CLINIC | Age: 52
End: 2022-09-07
Payer: COMMERCIAL

## 2022-09-07 DIAGNOSIS — I63.9 ACUTE CVA (CEREBROVASCULAR ACCIDENT) (HCC): ICD-10-CM

## 2022-09-07 DIAGNOSIS — Z45.09 ENCOUNTER FOR LOOP RECORDER CHECK: Primary | ICD-10-CM

## 2022-09-07 PROCEDURE — G2066 INTER DEVC REMOTE 30D: HCPCS | Performed by: INTERNAL MEDICINE

## 2022-09-07 PROCEDURE — 93298 REM INTERROG DEV EVAL SCRMS: CPT | Performed by: INTERNAL MEDICINE

## 2022-09-07 NOTE — PROGRESS NOTES
ILR for cryptogenic stroke. No AFib recorded to date. Remote transmission received from patient's monitor at home. Remote Confirm report shows no arrhythmias. EP physician to review. See PACEART report under Cardiology tab. Will continue to monitor remotely. (End of 31-day monitoring period 9/7/22).

## 2022-10-10 ENCOUNTER — TELEPHONE (OUTPATIENT)
Dept: ORTHOPEDIC SURGERY | Age: 52
End: 2022-10-10

## 2022-10-10 NOTE — TELEPHONE ENCOUNTER
PAULINE INFORMATION    DRUG:  BOTOX [id number]    ADMIN:  [id number], [id number]    DX CODE: G81.14    UNITS:  400 units    LAST OV DATE: 08/09/22

## 2022-10-14 ENCOUNTER — NURSE ONLY (OUTPATIENT)
Dept: CARDIOLOGY CLINIC | Age: 52
End: 2022-10-14
Payer: COMMERCIAL

## 2022-10-14 DIAGNOSIS — Z45.09 ENCOUNTER FOR LOOP RECORDER CHECK: ICD-10-CM

## 2022-10-14 DIAGNOSIS — I63.9 ACUTE CVA (CEREBROVASCULAR ACCIDENT) (HCC): Primary | ICD-10-CM

## 2022-10-14 PROCEDURE — G2066 INTER DEVC REMOTE 30D: HCPCS | Performed by: INTERNAL MEDICINE

## 2022-10-14 PROCEDURE — 93298 REM INTERROG DEV EVAL SCRMS: CPT | Performed by: INTERNAL MEDICINE

## 2022-10-14 NOTE — PROGRESS NOTES
ILR for cryptogenic stroke. No AFib recorded to date. Remote transmission received from patient's monitor at home. Remote Confirm report shows no arrhythmias. End of 31-day monitoring period 10/14/22. EP physician to review. See PACEART report under Cardiology tab. Will continue to monitor remotely.

## 2022-11-07 NOTE — TELEPHONE ENCOUNTER
I called to check status, and had to refax. The PA was closed because they thought I was retro authing for some reason. PA refaxed today with DOS on the PA form.

## 2022-11-21 ENCOUNTER — NURSE ONLY (OUTPATIENT)
Dept: CARDIOLOGY CLINIC | Age: 52
End: 2022-11-21
Payer: COMMERCIAL

## 2022-11-21 DIAGNOSIS — I63.9 ACUTE CVA (CEREBROVASCULAR ACCIDENT) (HCC): ICD-10-CM

## 2022-11-21 DIAGNOSIS — Z45.09 ENCOUNTER FOR LOOP RECORDER CHECK: Primary | ICD-10-CM

## 2022-11-21 PROCEDURE — 93298 REM INTERROG DEV EVAL SCRMS: CPT | Performed by: INTERNAL MEDICINE

## 2022-11-21 PROCEDURE — G2066 INTER DEVC REMOTE 30D: HCPCS | Performed by: INTERNAL MEDICINE

## 2022-11-21 NOTE — PROGRESS NOTES
ILR for cryptogenic stroke. No AFib recorded to date. Remote transmission received from patient's monitor at home. Remote Confirm report shows no arrhythmias. EP physician to review. See PACEART report under Cardiology tab. Will continue to monitor remotely. (End of 31-day monitoring period 11/21/22).

## 2022-11-29 ENCOUNTER — OFFICE VISIT (OUTPATIENT)
Dept: ORTHOPEDIC SURGERY | Age: 52
End: 2022-11-29

## 2022-11-29 VITALS — BODY MASS INDEX: 33.09 KG/M2 | HEIGHT: 66 IN

## 2022-11-29 DIAGNOSIS — G81.14 LEFT SPASTIC HEMIPLEGIA (HCC): ICD-10-CM

## 2022-11-29 DIAGNOSIS — R25.2 SPASTICITY AS LATE EFFECT OF CEREBROVASCULAR ACCIDENT (CVA): Primary | ICD-10-CM

## 2022-11-29 DIAGNOSIS — I69.398 SPASTICITY AS LATE EFFECT OF CEREBROVASCULAR ACCIDENT (CVA): Primary | ICD-10-CM

## 2022-11-29 NOTE — PROGRESS NOTES
[facility]) Physical Medicine and Rehabilitation  Outpatient Progress Note  Savita Johnson. DO Melva     Patient Name: Thom Shi MRN: [id number]   Age: 46 y.o. YOB: 1970   Sex: female        [address] Complaint   Patient presents with    Follow-up       Botox injections Sarmad, brace is really bothering her         HISTORY OF PRESENT ILLNESS   Thom Shi is a 46 y.o. female admitted to inpatient rehabilitation on 2021 with CVA. She comes in today for follow up Botox of her left arm. She continues to have spasticity  in her left arm which is limiting her improvement. She has gone to therapy for this issue in the past but continues to hit a wall and is discouraged because she cannot improve her spasticity. She feels like she is showing some improvement in therapy. Today she returns for repeat botox injections. She is walking about her house but only occasionally. She has increased tone in her left leg. Her AFO continues to bother her and is uncomfortable to wear.       PAST MEDICAL HISTORY       Past Medical History        Past Medical History:   Diagnosis Date    Cerebral artery occlusion with cerebral infarction ([facility] [address] 2021     weakness left side with some short term memory loss     GERD (gastroesophageal reflux disease)      Hernia      Hyperlipidemia      Hypertension      Obesity      Unspecified sleep apnea       CPAP            PAST SURGICAL HISTORY      Past Surgical History         Past Surgical History:   Procedure Laterality Date     SECTION   2004    CRANIOPLASTY Right 9/3/2021     RIGHT SKULL FLAP REPLACEMENT performed by Deepa Tam MD at [facility] Right 2021     RIGHT HEMICRANIECTOMY performed by Deepa Tam MD at [facility] GASTRIC BANDING       [address] ARTHROPLASTY Left  MEDICATIONS      Current Facility-Administered Medications          Current Outpatient Medications   Medication Sig Dispense Refill    tiZANidine (ZANAFLEX) 4 MG tablet Take 1 tablet by mouth in the morning and 1 tablet before bedtime. 60 tablet 2    tiZANidine (ZANAFLEX) 4 MG tablet Take 1 tablet by mouth 2 times daily 60 tablet 2    oxybutynin (DITROPAN XL) 10 MG extended release tablet Take 1 tablet by mouth daily 30 tablet 3    oxybutynin (DITROPAN XL) 10 MG extended release tablet Take 1 tablet by mouth daily 30 tablet 3    aspirin 81 MG chewable tablet Take 1 tablet by mouth daily Resume when okay with Dr. Gonzalez Hedrick 30 tablet 3    gabapentin (NEURONTIN) 300 MG capsule Take 300 mg by mouth 3 times daily. oxyCODONE-acetaminophen (PERCOCET) 5-325 MG per tablet Take 1 tablet by mouth every 4 hours as needed for Pain.        levETIRAcetam (KEPPRA) 500 MG tablet Take 1 tablet by mouth 2 times daily 60 tablet 3    atorvastatin (LIPITOR) 80 MG tablet Take 1 tablet by mouth nightly 30 tablet 3    amLODIPine (NORVASC) 5 MG tablet Take 1 tablet by mouth daily 30 tablet 3    diclofenac sodium (VOLTAREN) 1 % GEL Apply 4 g topically 2 times daily 129 g 1    bisacodyl (DULCOLAX) 5 MG EC tablet Take 1 tablet by mouth daily 60 tablet 1    tiZANidine (ZANAFLEX) 2 MG tablet Take 1 tablet by mouth 2 times daily 60 tablet 1    thiamine 100 MG tablet Take 1 tablet by mouth daily 30 tablet 3    omeprazole (PRILOSEC) 20 MG delayed release capsule Take 40 mg by mouth daily        FLUoxetine (PROZAC) 20 MG capsule Take 20 mg by mouth daily        Multiple Vitamin (MULTIVITAMIN PO) Take  by mouth.          gabapentin (NEURONTIN) 300 MG capsule Take 1 capsule by mouth 3 times daily for 30 days. 90 capsule 2      No current facility-administered medications for this visit.             ALLERGIES   No Known Allergies     FAMILY HISTORY      Family History         Family History   Problem Relation Age of Onset    Cancer Mother Depression Mother      Mental Illness Mother      Stroke Father      Cancer Father           prostate            SOCIAL HISTORY      Social History   Social History            Socioeconomic History    Marital status:    Tobacco Use    Smoking status: Former       Packs/day: 1.00       Years: 20.00       Pack years: 20.00       Types: Cigarettes       Quit date: 2021       Years since quittin.3    Smokeless tobacco: Never   Vaping Use    Vaping Use: Never used   Substance and Sexual Activity    Alcohol use: Not Currently    Drug use: No            REVIEW OF SYSTEMS   General: no fever, chills, night sweats, anorexia, malaise, fatigue, or weight change  Hematologic:  no unexplained bleeding or bruising  HEENT:   no nasal congestion, rhinorrhea, sore throat, or facial pain  Respiratory:  no cough, dyspnea, or chest pain  Cardiovascular:  no angina, FORBES, PND, orthopnea, dependent edema, or palpitations  Gastrointestinal:  no nausea, vomiting, diarrhea, constipation, or abdominal pain  Genitourinary:  no urinary urgency, frequency, dysuria, or hematuria  Musculoskeletal: see HPI  Endocrine:  no heat or cold intolerance and no polyphagia, polydipsia, or polyuria  Skin:  no skin eruptions or changing lesions  Neurologic:  no focal weakness, numbness/tingling, tremor, or severe headache. See HPI. See HPI for pertinent positives. PHYSICAL EXAM   Vital Signs:   Vitals       Vitals:     22 1255   Weight: 205 lb (93 kg)   Height: 5' 6\" (1.676 m)                     IMPRESSION      1. Spasticity as late effect of cerebrovascular accident (CVA)    2. Left spastic hemiplegia (HCC)       PROCEDURE      Botox Injection:  The patient consented to the procedure and a time out was performed before proceeding. The left Bicep, FCU, FCR, FDP, FDS, medial gastroc and lateral gastroc was prepped in the normal sterile fashion.   A 25 gauge needle was introduced into the muscle with EMG guidance and confirmed using EMG. 400 units of Botox was injected into the muscle after the syringe was pulled back and confirmed negative aspiration. The injection flowed freely, and the patient tolerated the procedure well. Post injection expectations were reviewed with the patient. PLAN   I had a lengthy discussion with patient today regarding diagnosis and treatment options and recommendations. 1. Botox injections- left arm      **modification to left leg botox**  Bicep- 100 units  FDS- 40 units  FDP- 40 units  FCU- 20 units  FCR- 50 units  Left LE- Medial gastroc/soleus- 125 units   Lateral gastroc/solus- 25 units each- 150 total      400 units total - 0 waste        2.  to evaluate for improvement on AFO. Possible self pay. 3. Continue therapy.

## 2022-11-30 ENCOUNTER — TELEPHONE (OUTPATIENT)
Dept: ORTHOPEDIC SURGERY | Age: 52
End: 2022-11-30

## 2022-11-30 NOTE — TELEPHONE ENCOUNTER
General Question     Subject: A.F.O. Patient and /or Facility Request: PATIENT WOULD LIKE PAULINE TO GIVE HER A CALL REGARDING A NEW PRESCRIPITON FOR A NEW A. F.O  Contact Number:[phone]

## 2022-12-01 ENCOUNTER — TELEPHONE (OUTPATIENT)
Dept: ORTHOPEDIC SURGERY | Age: 52
End: 2022-12-01

## 2022-12-01 DIAGNOSIS — N39.498 OTHER URINARY INCONTINENCE: Primary | ICD-10-CM

## 2022-12-01 RX ORDER — OXYBUTYNIN CHLORIDE 10 MG/1
10 TABLET, EXTENDED RELEASE ORAL DAILY
Qty: 30 TABLET | Refills: 3 | Status: SHIPPED | OUTPATIENT
Start: 2022-12-01

## 2022-12-01 NOTE — TELEPHONE ENCOUNTER
Other PATIENT CALLED WANTS TO SPEAK WITH SOMEONE REGARDING THE MEDICATIONS THAT WERE DISCUSSED IN HER APPT.  PLS CALL

## 2023-01-10 ENCOUNTER — NURSE ONLY (OUTPATIENT)
Dept: CARDIOLOGY CLINIC | Age: 53
End: 2023-01-10
Payer: COMMERCIAL

## 2023-01-10 DIAGNOSIS — Z45.09 ENCOUNTER FOR LOOP RECORDER CHECK: Primary | ICD-10-CM

## 2023-01-10 DIAGNOSIS — I63.9 ACUTE CVA (CEREBROVASCULAR ACCIDENT) (HCC): ICD-10-CM

## 2023-01-10 PROCEDURE — 93298 REM INTERROG DEV EVAL SCRMS: CPT | Performed by: INTERNAL MEDICINE

## 2023-01-10 PROCEDURE — G2066 INTER DEVC REMOTE 30D: HCPCS | Performed by: INTERNAL MEDICINE

## 2023-01-11 NOTE — PROGRESS NOTES
ILR for cryptogenic stroke. No AFib recorded to date.   Remote transmission received from patient's ILR monitor at home. Remote Confirm report shows no arrhythmias. End of 31-day monitoring period 1/10/23. EP physician to review. See PACEART report under Cardiology tab. Will continue to monitor remotely.

## 2023-02-28 ENCOUNTER — OFFICE VISIT (OUTPATIENT)
Dept: ORTHOPEDIC SURGERY | Age: 53
End: 2023-02-28

## 2023-02-28 VITALS — HEIGHT: 66 IN | BODY MASS INDEX: 33.09 KG/M2

## 2023-02-28 DIAGNOSIS — G81.14 LEFT SPASTIC HEMIPLEGIA (HCC): Primary | ICD-10-CM

## 2023-02-28 NOTE — PROGRESS NOTES
[facility]) Physical Medicine and Rehabilitation  Outpatient Progress Note  Alexsander Frye DO    Patient Name: Parrish Muller MRN: [id number]   Age: 46 y.o. YOB: 1970   Sex: female      [address] Complaint   Patient presents with    Follow-up     Botox injections RONE and LENNY       HISTORY OF PRESENT ILLNESS   Parrish Muller is a 46 y.o. female admitted to inpatient rehabilitation on 2021 with CVA. She comes in today for follow up Botox of her left arm. She continues to have spasticity  in her left arm which is limiting her improvement. She has gone to therapy for this issue in the past but continues to hit a wall and is discouraged because she cannot improve her spasticity. She feels like she is showing some improvement in therapy. Today she returns for repeat botox injections.      PAST MEDICAL HISTORY      Past Medical History:   Diagnosis Date    Cerebral artery occlusion with cerebral infarction ([facility] [address] 2021    weakness left side with some short term memory loss     GERD (gastroesophageal reflux disease)     Hernia     Hyperlipidemia     Hypertension     Obesity     Unspecified sleep apnea     CPAP       PAST SURGICAL HISTORY     Past Surgical History:   Procedure Laterality Date     SECTION  2004    CRANIOPLASTY Right 9/3/2021    RIGHT SKULL FLAP REPLACEMENT performed by Allan Dela Cruz MD at [address] [place] [address] Right 2021    RIGHT HEMICRANIECTOMY performed by Allan Dela Cruz MD at [facility]  2006    SPLENECTOMY  2000    TOTAL HIP ARTHROPLASTY Left        MEDICATIONS     Current Outpatient Medications   Medication Sig Dispense Refill    oxybutynin (DITROPAN XL) 10 MG extended release tablet Take 1 tablet by mouth daily 30 tablet 3    tiZANidine (ZANAFLEX) 4 MG tablet Take 1 tablet by mouth in the morning and 1 tablet before bedtime. 60 tablet 2    tiZANidine (ZANAFLEX) 4 MG tablet Take 1 tablet by mouth 2 times daily 60 tablet 2    oxybutynin (DITROPAN XL) 10 MG extended release tablet Take 1 tablet by mouth daily 30 tablet 3    oxybutynin (DITROPAN XL) 10 MG extended release tablet Take 1 tablet by mouth daily 30 tablet 3    aspirin 81 MG chewable tablet Take 1 tablet by mouth daily Resume when okay with Dr. Jessica Banda 30 tablet 3    gabapentin (NEURONTIN) 300 MG capsule Take 300 mg by mouth 3 times daily. oxyCODONE-acetaminophen (PERCOCET) 5-325 MG per tablet Take 1 tablet by mouth every 4 hours as needed for Pain.      levETIRAcetam (KEPPRA) 500 MG tablet Take 1 tablet by mouth 2 times daily 60 tablet 3    atorvastatin (LIPITOR) 80 MG tablet Take 1 tablet by mouth nightly 30 tablet 3    amLODIPine (NORVASC) 5 MG tablet Take 1 tablet by mouth daily 30 tablet 3    diclofenac sodium (VOLTAREN) 1 % GEL Apply 4 g topically 2 times daily 129 g 1    bisacodyl (DULCOLAX) 5 MG EC tablet Take 1 tablet by mouth daily 60 tablet 1    tiZANidine (ZANAFLEX) 2 MG tablet Take 1 tablet by mouth 2 times daily 60 tablet 1    thiamine 100 MG tablet Take 1 tablet by mouth daily 30 tablet 3    omeprazole (PRILOSEC) 20 MG delayed release capsule Take 40 mg by mouth daily      FLUoxetine (PROZAC) 20 MG capsule Take 20 mg by mouth daily      Multiple Vitamin (MULTIVITAMIN PO) Take  by mouth.        gabapentin (NEURONTIN) 300 MG capsule Take 1 capsule by mouth 3 times daily for 30 days. 90 capsule 2     No current facility-administered medications for this visit. ALLERGIES   No Known Allergies    FAMILY HISTORY     Family History   Problem Relation Age of Onset    Cancer Mother     Depression Mother     Mental Illness Mother     Stroke Father     Cancer Father         prostate       SOCIAL HISTORY     Social History     Socioeconomic History    Marital status:     Tobacco Use    Smoking status: Former     Packs/day: 1.00     Years: 20.00     Pack years: 20.00     Types: Cigarettes     Quit date: 2021     Years since quittin.8    Smokeless tobacco: Never   Vaping Use    Vaping Use: Never used   Substance and Sexual Activity    Alcohol use: Not Currently    Drug use: No       REVIEW OF SYSTEMS   General: no fever, chills, night sweats, anorexia, malaise, fatigue, or weight change  Hematologic:  no unexplained bleeding or bruising  HEENT:   no nasal congestion, rhinorrhea, sore throat, or facial pain  Respiratory:  no cough, dyspnea, or chest pain  Cardiovascular:  no angina, FORBES, PND, orthopnea, dependent edema, or palpitations  Gastrointestinal:  no nausea, vomiting, diarrhea, constipation, or abdominal pain  Genitourinary:  no urinary urgency, frequency, dysuria, or hematuria  Musculoskeletal: see HPI  Endocrine:  no heat or cold intolerance and no polyphagia, polydipsia, or polyuria  Skin:  no skin eruptions or changing lesions  Neurologic:  no focal weakness, numbness/tingling, tremor, or severe headache. See HPI. See HPI for pertinent positives. PHYSICAL EXAM   Vital Signs:   Vitals:    23 1245   Height: 5' 6\" (1.676 m)       General appearance: healthy, alert, no distress  Skin: Skin color, texture, turgor normal. No rashes or lesions  HEENT: atraumatic, normocephalic. PERRL  Respiratory: Unlabored breathing  Lymphatic: No adenopathy   Neuro: Alert and oriented, normal distal sensation, normal bilateral DTRs  Vascular: Normal distal capillary and distal pulses  Muskuloskeletal Exam: left sided hemiplegia and spasticity throughout. PROCEDURE      Botox Injection:  The patient consented to the procedure and a time out was performed before proceeding. The left Bicep, FCU, FCR, FDP, FDS, medial gastroc and lateral gastroc was prepped in the normal sterile fashion. A 25 gauge needle was introduced into the muscle with EMG guidance and confirmed using EMG.   400 units of Botox was injected into the muscle after the syringe was pulled back and confirmed negative aspiration. The injection flowed freely, and the patient tolerated the procedure well. Post injection expectations were reviewed with the patient. IMPRESSION     1. Left spastic hemiplegia ([address]         PLAN   I had a lengthy discussion with patient today regarding diagnosis and treatment options and recommendations. 1. Botox injections- left arm       **modification to left leg botox**  Bicep- 100 units  FDS- 40 units  FDP- 40 units  FCU- 20 units  FCR- 50 units  Left LE- Medial gastroc/soleus- 100 units   Lateral gastroc/solus- 25 units each- 150 total      400 units total - 0 waste      FOLLOWUP     No follow-ups on file. No orders of the defined types were placed in this encounter.      Orders Placed This Encounter   Medications    onabotulinumtoxinA (BOTOX) injection 400 Units       Patient was instructed on appropriate use of braces, participation in home exercise programs, healthy lifestyle choices and weight loss as appropriate     Chirag Frye, DO

## 2023-03-06 ENCOUNTER — NURSE ONLY (OUTPATIENT)
Dept: CARDIOLOGY CLINIC | Age: 53
End: 2023-03-06
Payer: COMMERCIAL

## 2023-03-06 DIAGNOSIS — Z45.09 ENCOUNTER FOR LOOP RECORDER CHECK: Primary | ICD-10-CM

## 2023-03-06 DIAGNOSIS — I63.9 ACUTE CVA (CEREBROVASCULAR ACCIDENT) (HCC): ICD-10-CM

## 2023-03-06 PROCEDURE — G2066 INTER DEVC REMOTE 30D: HCPCS | Performed by: INTERNAL MEDICINE

## 2023-03-06 PROCEDURE — 93298 REM INTERROG DEV EVAL SCRMS: CPT | Performed by: INTERNAL MEDICINE

## 2023-03-08 NOTE — PROGRESS NOTES
ILR for cryptogenic stroke. No AFib recorded to date. Remote transmission received from patient's ILR monitor at home. Remote Confirm report shows no arrhythmias. EP physician to review. See PACEART report under Cardiology tab. Will continue to monitor remotely. (End of 31-day monitoring period 3/6/23).

## 2023-04-24 ENCOUNTER — NURSE ONLY (OUTPATIENT)
Dept: CARDIOLOGY CLINIC | Age: 53
End: 2023-04-24
Payer: COMMERCIAL

## 2023-04-24 DIAGNOSIS — Z45.09 ENCOUNTER FOR LOOP RECORDER CHECK: Primary | ICD-10-CM

## 2023-04-24 DIAGNOSIS — I63.9 ACUTE CVA (CEREBROVASCULAR ACCIDENT) (HCC): ICD-10-CM

## 2023-04-24 PROCEDURE — G2066 INTER DEVC REMOTE 30D: HCPCS | Performed by: INTERNAL MEDICINE

## 2023-04-24 PROCEDURE — 93298 REM INTERROG DEV EVAL SCRMS: CPT | Performed by: INTERNAL MEDICINE

## 2023-04-24 NOTE — PROGRESS NOTES
ILR for cryptogenic stroke. No AFib recorded to date. Remote transmission received from patient's ILR monitor at home. Remote Confirm report shows no arrhythmias. End of 31-day monitoring period 4/24/23. EP physician to review. See PACEART report under Cardiology tab. Will continue to monitor remotely.

## 2023-04-27 ENCOUNTER — TELEPHONE (OUTPATIENT)
Dept: ORTHOPEDIC SURGERY | Age: 53
End: 2023-04-27

## 2023-04-27 NOTE — TELEPHONE ENCOUNTER
Appointment Request     Patient requesting earlier appointment: No  Appointment offered to patient: PATIENT CALLED TO SCHEDULE FOR BOTOX INJECTION.  PLS CALL TO ASSIST  Patient Contact Number: [phone]

## 2023-04-27 NOTE — TELEPHONE ENCOUNTER
Spoke to patient and let her know that Dr. Haylee Reich is no longer doing outpatient hours. He is strictly at the rehab unit. I gave her the name of Dr. Juana Muhammad and Dr. Dinesh Bell at [facility] to call for an appointment to set up Botox injections.

## 2023-04-29 NOTE — PROGRESS NOTES
Pt assessment and vitals completed. Medications administered as ordered. Pt has no complaints at this time, resting comfortably in bed. Will continue to monitor. Pt received on shift pt in bed no s/s of distress pt no c/o pain pt confused on shift at times pt states she is trying to get rid of snake in bed pt yells out daughters name on shift. Pt as shift progressed seems more oriented pt lethargic pt tolerated meds pt daughter arrived on shift pt poor appetite pt daughter attempting to feed pt on shift pt in bed call light in reach no s/s of distress.

## 2023-06-13 ENCOUNTER — TELEPHONE (OUTPATIENT)
Dept: CARDIOLOGY CLINIC | Age: 53
End: 2023-06-13

## 2023-06-13 NOTE — TELEPHONE ENCOUNTER
Please contact pt to arrange a in office device check and EP OV. Thanks.     ----- Message from [employer] sent at 6/13/2023 11:00 AM EDT -----  ILR battery reached RRT on 05.24.23; no upcoming EP appts.

## 2023-07-13 ENCOUNTER — TELEPHONE (OUTPATIENT)
Dept: CARDIOLOGY CLINIC | Age: 53
End: 2023-07-13

## 2023-07-17 NOTE — PROGRESS NOTES
No results for input(s): NA, K, CL, CO2, PHOS, BUN, CREATININE, CA in the last 72 hours. Invalid input(s):  TSH  No results for input(s): WBC, HGB, HCT, MCV, PLT in the last 72 hours. Lab Results   Component Value Date/Time    CJPIFRH 379 04/13/2021 12:10 AM    TROPONINI <0.01 04/12/2021 11:15 PM     No results found for: BNP  Lab Results   Component Value Date/Time    PROTIME 11.0 09/03/2021 11:05 AM    PROTIME 12.3 04/13/2021 12:10 AM    INR 0.97 09/03/2021 11:05 AM    INR 1.06 04/13/2021 12:10 AM     Lab Results   Component Value Date/Time    CHOL 251 04/13/2021 01:45 PM    HDL 45 04/13/2021 01:45 PM    TRIG 278 04/13/2021 01:45 PM       ECG: Personally reviewed: NSR, HR 89, , QRS QTc 410    ECHO: 4.20.2021   Summary   Normal left ventricle size. There is mild-moderate concentric left   ventricular hypertrophy. Overall left ventricular systolic function appears   normal with an ejection fraction of 55%. No regional wall motion   abnormalities are noted. Diastolic filling parameters suggests normal   diastolic function. The aortic valve appears tricuspid with minimal sclerosis. Trivial tricuspid regurgitation. Estimated pulmonary artery systolic pressure is 26 mmHg assuming a right   atrial pressure of 3 mmHg. Trivial pulmonic regurgitation present. A bubble study was performed and fails to show evidence of right to left   shunting. Stress Test: n/a    Cardiac Angiography:n/a    Problem List:   Patient Active Problem List    Diagnosis Date Noted    Left spastic hemiplegia ([address]) 01/26/2022    Acquired deformity of skull 09/03/2021    Hyponatremia     Debility     Electrolyte imbalance     Acute CVA (cerebrovascular accident) ([address]) 04/23/2021    Encounter for loop recorder check-[facility] CONFIRM ILR 4/22/2021 Dr Killian Giordano for cryptogenic stroke. [facility] 04/22/2021    ICAO (internal carotid artery occlusion), right     Cerebral edema (HCC)     Smoking     Secondary hypertension     Status post

## 2023-07-20 ENCOUNTER — OFFICE VISIT (OUTPATIENT)
Dept: CARDIOLOGY CLINIC | Age: 53
End: 2023-07-20
Payer: COMMERCIAL

## 2023-07-20 ENCOUNTER — NURSE ONLY (OUTPATIENT)
Dept: CARDIOLOGY CLINIC | Age: 53
End: 2023-07-20

## 2023-07-20 VITALS
DIASTOLIC BLOOD PRESSURE: 70 MMHG | SYSTOLIC BLOOD PRESSURE: 104 MMHG | HEART RATE: 91 BPM | WEIGHT: 205 LBS | BODY MASS INDEX: 33.09 KG/M2

## 2023-07-20 DIAGNOSIS — I10 ESSENTIAL HYPERTENSION: ICD-10-CM

## 2023-07-20 DIAGNOSIS — Z95.818 IMPLANTABLE LOOP RECORDER PRESENT: ICD-10-CM

## 2023-07-20 DIAGNOSIS — I63.9 CEREBROVASCULAR ACCIDENT (CVA), UNSPECIFIED MECHANISM (HCC): Primary | ICD-10-CM

## 2023-07-20 PROCEDURE — 93000 ELECTROCARDIOGRAM COMPLETE: CPT | Performed by: NURSE PRACTITIONER

## 2023-07-20 PROCEDURE — 3074F SYST BP LT 130 MM HG: CPT | Performed by: NURSE PRACTITIONER

## 2023-07-20 PROCEDURE — 99214 OFFICE O/P EST MOD 30 MIN: CPT | Performed by: NURSE PRACTITIONER

## 2023-07-20 PROCEDURE — 3078F DIAST BP <80 MM HG: CPT | Performed by: NURSE PRACTITIONER

## 2023-07-31 NOTE — PROGRESS NOTES
Patient comes in for a programming evaluation on their Confirm RX ILR and ov w/NPFW. This device technician was unavailable this week. Unsure if [facility] rep tried to interrogate as it appears her ILR battery was at low detection on 05.25.2023.

## 2024-03-17 RX ORDER — OXYBUTYNIN CHLORIDE 10 MG/1
TABLET, EXTENDED RELEASE ORAL
Qty: 90 TABLET | Refills: 1 | OUTPATIENT
Start: 2024-03-17

## 2024-03-19 RX ORDER — OXYBUTYNIN CHLORIDE 10 MG/1
TABLET, EXTENDED RELEASE ORAL
Qty: 30 TABLET | Refills: 3 | OUTPATIENT
Start: 2024-03-19

## 2024-06-04 NOTE — PROGRESS NOTES
Alert, pleasant, oriented x 4. Participated in all therapies today. Improving with 1-2 person transfers with gait belt. Stated that she feels stronger and that she is getting better. Denying pain. R scalp surgical incision well approximated. SHEMAR/CDI. VSS on RA. Currently in bed. Call light within reach. Bed alarm in place. Statement Selected

## 2024-07-23 RX ORDER — TIZANIDINE 4 MG/1
TABLET ORAL
Qty: 60 TABLET | Refills: 2 | OUTPATIENT
Start: 2024-07-23

## 2025-06-28 NOTE — PLAN OF CARE
Problem: Pain:  Goal: Pain level will decrease  Description: Pain level will decrease  Outcome: Ongoing  Note: C/o left arm, hip pain managed with prn pain medication. Problem: Falls - Risk of:  Goal: Will remain free from falls  Description: Will remain free from falls  Outcome: Ongoing  Note: Pt is a High fall risk. Explained fall risk precautions to pt and rationale behind their use to keep the patient safe. Belongings are in reach. Pt encouraged to notify staff for any and all assistance. Staff present in tx suite throughout entirety of pts treatment to monitor and protect from falls. Assistance provided when ambulating to restroom utilizing Stay With Me. Problem: Skin Integrity:  Goal: Absence of new skin breakdown  Description: Absence of new skin breakdown  Outcome: Ongoing  Note: Pt cont on low air loss mattress, turned and repositioned, continent of bowel and bladder. No

## (undated) DEVICE — TOOL F2/8TA23 LEGEND 8CM 2.3MM TAPER: Brand: MIDAS REX™

## (undated) DEVICE — SPONGE GZ W4XL8IN COT WVN 12 PLY

## (undated) DEVICE — SYRINGE CATH TIP 50ML

## (undated) DEVICE — BLADE CLIPPER SURG SENSICLIP

## (undated) DEVICE — CABLE BPLR L12FT FLYING LD DISPOSABLE

## (undated) DEVICE — BLANKET WRM W29.9XL79.1IN UP BODY FORC AIR MISTRAL-AIR

## (undated) DEVICE — SUTURE MCRYL SZ 4-0 L27IN ABSRB UD L19MM PS-2 1/2 CIR PRIM Y426H

## (undated) DEVICE — PAD,NON-ADHERENT,3X8,STERILE,LF,1/PK: Brand: MEDLINE

## (undated) DEVICE — SYRINGE MED 10ML TRNSLUC BRL PLUNG BLK MRK POLYPR CTRL

## (undated) DEVICE — SUTURE VCRL SZ 2-0 L18IN ABSRB VLT L26MM SH 1/2 CIR J775D

## (undated) DEVICE — JEWISH HOSPITAL TURNOVER KIT: Brand: MEDLINE INDUSTRIES, INC.

## (undated) DEVICE — 3M™ STERI-STRIP™ REINFORCED ADHESIVE SKIN CLOSURES, R1548, 1 IN X 5 IN (25 MM X 125 MM), 4 STRIPS/ENVELOPE: Brand: 3M™ STERI-STRIP™

## (undated) DEVICE — RESERVOIR,SUCTION,100CC,SILICONE: Brand: MEDLINE

## (undated) DEVICE — 3L THIN WALL CAN: Brand: CRD

## (undated) DEVICE — STAPLER SKIN H3.9MM WIRE DIA0.58MM CRWN 6.9MM 35 STPL ROT

## (undated) DEVICE — DRAIN SURG W7MMXL20CM SIL FULL PERF HUBLESS FLAT RADPQ STRP

## (undated) DEVICE — GARMENT,MEDLINE,DVT,INT,CALF,MED, GEN2: Brand: MEDLINE

## (undated) DEVICE — TOOL 10BA50-MN LEGEND 10CM 5MM BA: Brand: MIDAS REX™

## (undated) DEVICE — SUTURE NRLN SZ 4-0 L18IN NONABSORBABLE BLK L13MM TF 1/2 CIR C584D

## (undated) DEVICE — PLATE ES AD W 9FT CRD 2

## (undated) DEVICE — PAD DRY FLOOR ABS 32X58IN GRN

## (undated) DEVICE — COVER LT HNDL BLU PLAS

## (undated) DEVICE — SOLUTION IV 1000ML 0.9% SOD CHL

## (undated) DEVICE — PRECISION SPECIMEN CONTAINER 4 OZ (118 ML) • POSITIVE SEAL INDICATOR OR PACKAGED: Brand: PRECISION

## (undated) DEVICE — SYRINGE MED 3ML CLR PLAS STD N CTRL LUERLOCK TIP DISP

## (undated) DEVICE — APPLICATOR MEDICATED 26 CC SOLUTION HI LT ORNG CHLORAPREP

## (undated) DEVICE — SURE SET-DOUBLE BASIN-LF: Brand: MEDLINE INDUSTRIES, INC.

## (undated) DEVICE — CODMAN® RANEY SCALP CLIPS 20 UNITS OF 10 CLIPS: Brand: CODMAN®

## (undated) DEVICE — SURGICAL SET UP - SURE SET: Brand: MEDLINE INDUSTRIES, INC.

## (undated) DEVICE — ANES EXTENSION SET 90IN-LF: Brand: MEDLINE INDUSTRIES, INC.

## (undated) DEVICE — DRESSING GERM DIA1IN CNTR H DIA7MM BLU CHG ANTIMIC PROTCT

## (undated) DEVICE — TOWEL,STOP FLAG GOLD N-W: Brand: MEDLINE

## (undated) DEVICE — NEURO SPONGES: Brand: DEROYAL

## (undated) DEVICE — COVER LT HNDL CAM BLU DISP W/ SURG CTRL

## (undated) DEVICE — JEWISH CRANI PACK: Brand: MEDLINE INDUSTRIES, INC.

## (undated) DEVICE — WAX SURG 2.5GM HEMSTAT BNE BEESWAX PARAFFIN ISO PALMITATE

## (undated) DEVICE — HOOK RETRCT 12MM S STL BLNT E STAY LONE STAR

## (undated) DEVICE — PROTECTOR ULN NRV PUR FOAM HK LOOP STRP ANATOMICALLY

## (undated) DEVICE — CRANIOTOMY KIT AUTOLGS BNE FLAP CRANIOLOC STD

## (undated) DEVICE — GOWN,SIRUS,POLYRNF,BRTHSLV,LG,30/CS: Brand: MEDLINE

## (undated) DEVICE — SPONGE GZ W4XL4IN COT 12 PLY TYP VII WVN C FLD DSGN

## (undated) DEVICE — STANDARD HYPODERMIC NEEDLE,POLYPROPYLENE HUB: Brand: MONOJECT

## (undated) DEVICE — SYRINGE MED 10ML LUERLOCK TIP W/O SFTY DISP

## (undated) DEVICE — CODMAN® SURGICAL PATTIES 3/4" X 3/4" (1.91CM X 1.91CM): Brand: CODMAN®

## (undated) DEVICE — INTENDED USE FOR SURGICAL MARKING ON INTACT SKIN, ALSO PROVIDES A PERMANENT METHOD OF IDENTIFYING OBJECTS IN THE OPERATING ROOM: Brand: WRITESITE® PLUS MINI PREP RESISTANT MARKER